# Patient Record
Sex: FEMALE | Race: WHITE | Employment: UNEMPLOYED | URBAN - METROPOLITAN AREA
[De-identification: names, ages, dates, MRNs, and addresses within clinical notes are randomized per-mention and may not be internally consistent; named-entity substitution may affect disease eponyms.]

---

## 2017-01-20 PROBLEM — R05.8 COUGH WITH SPUTUM: Status: ACTIVE | Noted: 2017-01-20

## 2017-07-12 PROBLEM — I83.90 VARICOSE VEIN OF LEG: Status: ACTIVE | Noted: 2017-07-12

## 2017-09-15 ENCOUNTER — APPOINTMENT (OUTPATIENT)
Dept: CT IMAGING | Age: 42
End: 2017-09-15
Payer: COMMERCIAL

## 2017-09-15 ENCOUNTER — HOSPITAL ENCOUNTER (EMERGENCY)
Age: 42
Discharge: HOME | End: 2017-09-16
Attending: EMERGENCY MEDICINE
Payer: COMMERCIAL

## 2017-09-15 DIAGNOSIS — N20.0 NEPHROLITHIASIS: Primary | ICD-10-CM

## 2017-09-15 LAB
-: ABNORMAL
ABSOLUTE EOS #: 0.4 K/UL (ref 0–0.4)
ABSOLUTE LYMPH #: 3.3 K/UL (ref 1–4.8)
ABSOLUTE MONO #: 0.3 K/UL (ref 0.1–1.3)
ALBUMIN SERPL-MCNC: 4.3 G/DL (ref 3.5–5.2)
ALBUMIN/GLOBULIN RATIO: ABNORMAL (ref 1–2.5)
ALP BLD-CCNC: 72 U/L (ref 35–104)
ALT SERPL-CCNC: 33 U/L (ref 5–33)
AMORPHOUS: ABNORMAL
ANION GAP SERPL CALCULATED.3IONS-SCNC: 14 MMOL/L (ref 9–17)
AST SERPL-CCNC: 19 U/L
BACTERIA: ABNORMAL
BASOPHILS # BLD: 1 %
BASOPHILS ABSOLUTE: 0.1 K/UL (ref 0–0.2)
BILIRUB SERPL-MCNC: <0.15 MG/DL (ref 0.3–1.2)
BILIRUBIN URINE: NEGATIVE
BUN BLDV-MCNC: 14 MG/DL (ref 6–20)
BUN/CREAT BLD: ABNORMAL (ref 9–20)
CALCIUM SERPL-MCNC: 9.2 MG/DL (ref 8.6–10.4)
CASTS UA: ABNORMAL /LPF
CHLORIDE BLD-SCNC: 104 MMOL/L (ref 98–107)
CO2: 21 MMOL/L (ref 20–31)
COLOR: YELLOW
COMMENT UA: ABNORMAL
CREAT SERPL-MCNC: 0.74 MG/DL (ref 0.5–0.9)
CRYSTALS, UA: ABNORMAL /HPF
DIFFERENTIAL TYPE: NORMAL
EOSINOPHILS RELATIVE PERCENT: 4 %
EPITHELIAL CELLS UA: ABNORMAL /HPF
GFR AFRICAN AMERICAN: >60 ML/MIN
GFR NON-AFRICAN AMERICAN: >60 ML/MIN
GFR SERPL CREATININE-BSD FRML MDRD: ABNORMAL ML/MIN/{1.73_M2}
GFR SERPL CREATININE-BSD FRML MDRD: ABNORMAL ML/MIN/{1.73_M2}
GLUCOSE BLD-MCNC: 100 MG/DL (ref 70–99)
GLUCOSE URINE: NEGATIVE
HCT VFR BLD CALC: 38.8 % (ref 36–46)
HEMOGLOBIN: 12.5 G/DL (ref 12–16)
KETONES, URINE: NEGATIVE
LEUKOCYTE ESTERASE, URINE: ABNORMAL
LYMPHOCYTES # BLD: 37 %
MCH RBC QN AUTO: 29.7 PG (ref 26–34)
MCHC RBC AUTO-ENTMCNC: 32.3 G/DL (ref 31–37)
MCV RBC AUTO: 91.9 FL (ref 80–100)
MONOCYTES # BLD: 4 %
MUCUS: ABNORMAL
NITRITE, URINE: NEGATIVE
OTHER OBSERVATIONS UA: ABNORMAL
PDW BLD-RTO: 14.2 % (ref 11.5–14.9)
PH UA: 5.5 (ref 5–8)
PLATELET # BLD: 352 K/UL (ref 150–450)
PLATELET ESTIMATE: NORMAL
PMV BLD AUTO: 9.3 FL (ref 6–12)
POTASSIUM SERPL-SCNC: 4.2 MMOL/L (ref 3.7–5.3)
PROTEIN UA: NEGATIVE
RBC # BLD: 4.22 M/UL (ref 4–5.2)
RBC # BLD: NORMAL 10*6/UL
RBC UA: ABNORMAL /HPF
RENAL EPITHELIAL, UA: ABNORMAL /HPF
SEG NEUTROPHILS: 54 %
SEGMENTED NEUTROPHILS ABSOLUTE COUNT: 4.9 K/UL (ref 1.3–9.1)
SODIUM BLD-SCNC: 139 MMOL/L (ref 135–144)
SPECIFIC GRAVITY UA: 1.02 (ref 1–1.03)
TOTAL PROTEIN: 7.6 G/DL (ref 6.4–8.3)
TRICHOMONAS: ABNORMAL
TURBIDITY: ABNORMAL
URINE HGB: ABNORMAL
UROBILINOGEN, URINE: NORMAL
WBC # BLD: 9 K/UL (ref 3.5–11)
WBC # BLD: NORMAL 10*3/UL
WBC UA: ABNORMAL /HPF
YEAST: ABNORMAL

## 2017-09-15 PROCEDURE — 87086 URINE CULTURE/COLONY COUNT: CPT

## 2017-09-15 PROCEDURE — 81001 URINALYSIS AUTO W/SCOPE: CPT

## 2017-09-15 PROCEDURE — 2580000003 HC RX 258: Performed by: EMERGENCY MEDICINE

## 2017-09-15 PROCEDURE — 96375 TX/PRO/DX INJ NEW DRUG ADDON: CPT

## 2017-09-15 PROCEDURE — 99284 EMERGENCY DEPT VISIT MOD MDM: CPT

## 2017-09-15 PROCEDURE — 36415 COLL VENOUS BLD VENIPUNCTURE: CPT

## 2017-09-15 PROCEDURE — 6360000002 HC RX W HCPCS: Performed by: EMERGENCY MEDICINE

## 2017-09-15 PROCEDURE — 96374 THER/PROPH/DIAG INJ IV PUSH: CPT

## 2017-09-15 PROCEDURE — 80053 COMPREHEN METABOLIC PANEL: CPT

## 2017-09-15 PROCEDURE — 74176 CT ABD & PELVIS W/O CONTRAST: CPT

## 2017-09-15 PROCEDURE — 85025 COMPLETE CBC W/AUTO DIFF WBC: CPT

## 2017-09-15 RX ORDER — MORPHINE SULFATE 15 MG/1
15 TABLET ORAL EVERY 4 HOURS PRN
Qty: 12 TABLET | Refills: 0 | Status: SHIPPED | OUTPATIENT
Start: 2017-09-15 | End: 2017-09-22

## 2017-09-15 RX ORDER — IBUPROFEN 200 MG
400 TABLET ORAL EVERY 6 HOURS PRN
Qty: 60 TABLET | Refills: 0 | Status: SHIPPED | OUTPATIENT
Start: 2017-09-15 | End: 2020-11-10

## 2017-09-15 RX ORDER — TAMSULOSIN HYDROCHLORIDE 0.4 MG/1
0.4 CAPSULE ORAL DAILY
Qty: 5 CAPSULE | Refills: 0 | Status: SHIPPED | OUTPATIENT
Start: 2017-09-15 | End: 2017-09-18 | Stop reason: SDUPTHER

## 2017-09-15 RX ORDER — KETOROLAC TROMETHAMINE 30 MG/ML
15 INJECTION, SOLUTION INTRAMUSCULAR; INTRAVENOUS ONCE
Status: COMPLETED | OUTPATIENT
Start: 2017-09-15 | End: 2017-09-15

## 2017-09-15 RX ORDER — MORPHINE SULFATE 2 MG/ML
4 INJECTION, SOLUTION INTRAMUSCULAR; INTRAVENOUS ONCE
Status: COMPLETED | OUTPATIENT
Start: 2017-09-15 | End: 2017-09-15

## 2017-09-15 RX ORDER — 0.9 % SODIUM CHLORIDE 0.9 %
1000 INTRAVENOUS SOLUTION INTRAVENOUS ONCE
Status: COMPLETED | OUTPATIENT
Start: 2017-09-15 | End: 2017-09-16

## 2017-09-15 RX ORDER — FENTANYL CITRATE 50 UG/ML
50 INJECTION, SOLUTION INTRAMUSCULAR; INTRAVENOUS ONCE
Status: DISCONTINUED | OUTPATIENT
Start: 2017-09-15 | End: 2017-09-16 | Stop reason: HOSPADM

## 2017-09-15 RX ORDER — ACETAMINOPHEN 500 MG
1000 TABLET ORAL EVERY 6 HOURS PRN
Qty: 60 TABLET | Refills: 0 | Status: SHIPPED | OUTPATIENT
Start: 2017-09-15 | End: 2020-11-10

## 2017-09-15 RX ADMIN — KETOROLAC TROMETHAMINE 15 MG: 30 INJECTION, SOLUTION INTRAMUSCULAR; INTRAVENOUS at 21:53

## 2017-09-15 RX ADMIN — MORPHINE SULFATE 4 MG: 2 INJECTION, SOLUTION INTRAMUSCULAR; INTRAVENOUS at 23:09

## 2017-09-15 RX ADMIN — SODIUM CHLORIDE 1000 ML: 9 INJECTION, SOLUTION INTRAVENOUS at 21:53

## 2017-09-15 ASSESSMENT — ENCOUNTER SYMPTOMS
NAUSEA: 1
SHORTNESS OF BREATH: 0
ABDOMINAL PAIN: 0
VOMITING: 0

## 2017-09-15 ASSESSMENT — PAIN SCALES - GENERAL
PAINLEVEL_OUTOF10: 7
PAINLEVEL_OUTOF10: 8

## 2017-09-15 ASSESSMENT — PAIN DESCRIPTION - LOCATION: LOCATION: BACK

## 2017-09-15 ASSESSMENT — PAIN DESCRIPTION - PAIN TYPE: TYPE: ACUTE PAIN

## 2017-09-15 ASSESSMENT — PAIN DESCRIPTION - ORIENTATION: ORIENTATION: LEFT

## 2017-09-16 VITALS
HEART RATE: 87 BPM | SYSTOLIC BLOOD PRESSURE: 105 MMHG | TEMPERATURE: 97.5 F | WEIGHT: 280 LBS | BODY MASS INDEX: 46.65 KG/M2 | RESPIRATION RATE: 18 BRPM | DIASTOLIC BLOOD PRESSURE: 60 MMHG | HEIGHT: 65 IN | OXYGEN SATURATION: 96 %

## 2017-09-16 LAB
CULTURE: NORMAL
CULTURE: NORMAL
Lab: NORMAL
SPECIMEN DESCRIPTION: NORMAL
SPECIMEN DESCRIPTION: NORMAL
STATUS: NORMAL

## 2017-09-16 ASSESSMENT — PAIN DESCRIPTION - LOCATION: LOCATION: ABDOMEN

## 2017-09-16 ASSESSMENT — PAIN SCALES - GENERAL: PAINLEVEL_OUTOF10: 3

## 2017-09-18 ENCOUNTER — TELEPHONE (OUTPATIENT)
Dept: UROLOGY | Age: 42
End: 2017-09-18

## 2017-09-18 ENCOUNTER — OFFICE VISIT (OUTPATIENT)
Dept: UROLOGY | Age: 42
End: 2017-09-18
Payer: COMMERCIAL

## 2017-09-18 VITALS
BODY MASS INDEX: 48.48 KG/M2 | HEART RATE: 78 BPM | TEMPERATURE: 98.8 F | HEIGHT: 65 IN | WEIGHT: 291 LBS | SYSTOLIC BLOOD PRESSURE: 104 MMHG | DIASTOLIC BLOOD PRESSURE: 67 MMHG

## 2017-09-18 DIAGNOSIS — N13.2 HYDRONEPHROSIS WITH URINARY OBSTRUCTION DUE TO RENAL CALCULUS: ICD-10-CM

## 2017-09-18 DIAGNOSIS — R10.9 FLANK PAIN: ICD-10-CM

## 2017-09-18 DIAGNOSIS — N20.1 URETERAL STONE: Primary | ICD-10-CM

## 2017-09-18 PROCEDURE — 99204 OFFICE O/P NEW MOD 45 MIN: CPT | Performed by: UROLOGY

## 2017-09-18 RX ORDER — TAMSULOSIN HYDROCHLORIDE 0.4 MG/1
0.4 CAPSULE ORAL DAILY
Qty: 30 CAPSULE | Refills: 0 | Status: SHIPPED | OUTPATIENT
Start: 2017-09-18 | End: 2017-09-22

## 2017-09-18 ASSESSMENT — ENCOUNTER SYMPTOMS
EYE PAIN: 0
WHEEZING: 0
NAUSEA: 1
COUGH: 0
EYE REDNESS: 0
BACK PAIN: 1
SHORTNESS OF BREATH: 0
VOMITING: 0
ABDOMINAL PAIN: 1
COLOR CHANGE: 0

## 2017-09-20 ENCOUNTER — APPOINTMENT (OUTPATIENT)
Dept: GENERAL RADIOLOGY | Age: 42
End: 2017-09-20
Attending: UROLOGY
Payer: COMMERCIAL

## 2017-09-20 ENCOUNTER — ANESTHESIA (OUTPATIENT)
Dept: OPERATING ROOM | Age: 42
End: 2017-09-20
Payer: COMMERCIAL

## 2017-09-20 ENCOUNTER — TELEPHONE (OUTPATIENT)
Dept: UROLOGY | Age: 42
End: 2017-09-20

## 2017-09-20 ENCOUNTER — ANESTHESIA EVENT (OUTPATIENT)
Dept: OPERATING ROOM | Age: 42
End: 2017-09-20
Payer: COMMERCIAL

## 2017-09-20 ENCOUNTER — HOSPITAL ENCOUNTER (OUTPATIENT)
Age: 42
Setting detail: OUTPATIENT SURGERY
Discharge: HOME | End: 2017-09-20
Attending: UROLOGY | Admitting: UROLOGY
Payer: COMMERCIAL

## 2017-09-20 VITALS
BODY MASS INDEX: 46.65 KG/M2 | RESPIRATION RATE: 16 BRPM | HEART RATE: 82 BPM | OXYGEN SATURATION: 94 % | TEMPERATURE: 97 F | WEIGHT: 280 LBS | HEIGHT: 65 IN | SYSTOLIC BLOOD PRESSURE: 127 MMHG | DIASTOLIC BLOOD PRESSURE: 77 MMHG

## 2017-09-20 VITALS — TEMPERATURE: 95.3 F | DIASTOLIC BLOOD PRESSURE: 69 MMHG | OXYGEN SATURATION: 99 % | SYSTOLIC BLOOD PRESSURE: 127 MMHG

## 2017-09-20 PROCEDURE — C1773 RET DEV, INSERTABLE: HCPCS | Performed by: UROLOGY

## 2017-09-20 PROCEDURE — 6370000000 HC RX 637 (ALT 250 FOR IP): Performed by: UROLOGY

## 2017-09-20 PROCEDURE — 3209999900 FLUORO FOR SURGICAL PROCEDURES

## 2017-09-20 PROCEDURE — C1769 GUIDE WIRE: HCPCS | Performed by: UROLOGY

## 2017-09-20 PROCEDURE — 7100000000 HC PACU RECOVERY - FIRST 15 MIN: Performed by: UROLOGY

## 2017-09-20 PROCEDURE — 3700000001 HC ADD 15 MINUTES (ANESTHESIA): Performed by: UROLOGY

## 2017-09-20 PROCEDURE — 6360000002 HC RX W HCPCS: Performed by: ANESTHESIOLOGY

## 2017-09-20 PROCEDURE — 7100000001 HC PACU RECOVERY - ADDTL 15 MIN: Performed by: UROLOGY

## 2017-09-20 PROCEDURE — C1758 CATHETER, URETERAL: HCPCS | Performed by: UROLOGY

## 2017-09-20 PROCEDURE — 2580000003 HC RX 258: Performed by: ANESTHESIOLOGY

## 2017-09-20 PROCEDURE — 2500000003 HC RX 250 WO HCPCS: Performed by: ANESTHESIOLOGY

## 2017-09-20 PROCEDURE — 74000 XR ABDOMEN LIMITED (KUB): CPT

## 2017-09-20 PROCEDURE — 7100000030 HC ASPR PHASE II RECOVERY - FIRST 15 MIN: Performed by: UROLOGY

## 2017-09-20 PROCEDURE — 82365 CALCULUS SPECTROSCOPY: CPT

## 2017-09-20 PROCEDURE — 3600000002 HC SURGERY LEVEL 2 BASE: Performed by: UROLOGY

## 2017-09-20 PROCEDURE — C2617 STENT, NON-COR, TEM W/O DEL: HCPCS | Performed by: UROLOGY

## 2017-09-20 PROCEDURE — 6370000000 HC RX 637 (ALT 250 FOR IP): Performed by: ANESTHESIOLOGY

## 2017-09-20 PROCEDURE — 7100000031 HC ASPR PHASE II RECOVERY - ADDTL 15 MIN: Performed by: UROLOGY

## 2017-09-20 PROCEDURE — 3700000000 HC ANESTHESIA ATTENDED CARE: Performed by: UROLOGY

## 2017-09-20 PROCEDURE — 3600000012 HC SURGERY LEVEL 2 ADDTL 15MIN: Performed by: UROLOGY

## 2017-09-20 PROCEDURE — 6360000002 HC RX W HCPCS: Performed by: UROLOGY

## 2017-09-20 DEVICE — URETERAL STENT
Type: IMPLANTABLE DEVICE | Status: FUNCTIONAL
Brand: POLARIS™ ULTRA

## 2017-09-20 RX ORDER — FENTANYL CITRATE 50 UG/ML
25 INJECTION, SOLUTION INTRAMUSCULAR; INTRAVENOUS EVERY 5 MIN PRN
Status: DISCONTINUED | OUTPATIENT
Start: 2017-09-20 | End: 2017-09-20 | Stop reason: HOSPADM

## 2017-09-20 RX ORDER — DIPHENHYDRAMINE HYDROCHLORIDE 50 MG/ML
12.5 INJECTION INTRAMUSCULAR; INTRAVENOUS
Status: DISCONTINUED | OUTPATIENT
Start: 2017-09-20 | End: 2017-09-20 | Stop reason: HOSPADM

## 2017-09-20 RX ORDER — OXYCODONE HYDROCHLORIDE AND ACETAMINOPHEN 5; 325 MG/1; MG/1
1 TABLET ORAL EVERY 6 HOURS PRN
Qty: 30 TABLET | Refills: 0 | Status: SHIPPED | OUTPATIENT
Start: 2017-09-20 | End: 2017-09-22

## 2017-09-20 RX ORDER — ONDANSETRON 2 MG/ML
INJECTION INTRAMUSCULAR; INTRAVENOUS PRN
Status: DISCONTINUED | OUTPATIENT
Start: 2017-09-20 | End: 2017-09-20 | Stop reason: SDUPTHER

## 2017-09-20 RX ORDER — MEPERIDINE HYDROCHLORIDE 25 MG/ML
12.5 INJECTION INTRAMUSCULAR; INTRAVENOUS; SUBCUTANEOUS EVERY 5 MIN PRN
Status: DISCONTINUED | OUTPATIENT
Start: 2017-09-20 | End: 2017-09-20 | Stop reason: HOSPADM

## 2017-09-20 RX ORDER — PROMETHAZINE HYDROCHLORIDE 25 MG/ML
6.25 INJECTION, SOLUTION INTRAMUSCULAR; INTRAVENOUS
Status: COMPLETED | OUTPATIENT
Start: 2017-09-20 | End: 2017-09-20

## 2017-09-20 RX ORDER — SCOLOPAMINE TRANSDERMAL SYSTEM 1 MG/1
1 PATCH, EXTENDED RELEASE TRANSDERMAL
Status: DISCONTINUED | OUTPATIENT
Start: 2017-09-20 | End: 2017-09-20 | Stop reason: HOSPADM

## 2017-09-20 RX ORDER — OXYBUTYNIN CHLORIDE 5 MG/1
5 TABLET, EXTENDED RELEASE ORAL DAILY
Qty: 90 TABLET | Refills: 3 | Status: SHIPPED | OUTPATIENT
Start: 2017-09-20 | End: 2017-11-22 | Stop reason: SDUPTHER

## 2017-09-20 RX ORDER — SODIUM CHLORIDE, SODIUM LACTATE, POTASSIUM CHLORIDE, CALCIUM CHLORIDE 600; 310; 30; 20 MG/100ML; MG/100ML; MG/100ML; MG/100ML
INJECTION, SOLUTION INTRAVENOUS CONTINUOUS
Status: DISCONTINUED | OUTPATIENT
Start: 2017-09-20 | End: 2017-09-20 | Stop reason: HOSPADM

## 2017-09-20 RX ORDER — DEXAMETHASONE SODIUM PHOSPHATE 4 MG/ML
INJECTION, SOLUTION INTRA-ARTICULAR; INTRALESIONAL; INTRAMUSCULAR; INTRAVENOUS; SOFT TISSUE PRN
Status: DISCONTINUED | OUTPATIENT
Start: 2017-09-20 | End: 2017-09-20 | Stop reason: SDUPTHER

## 2017-09-20 RX ORDER — LIDOCAINE HYDROCHLORIDE 10 MG/ML
INJECTION, SOLUTION INFILTRATION; PERINEURAL PRN
Status: DISCONTINUED | OUTPATIENT
Start: 2017-09-20 | End: 2017-09-20 | Stop reason: SDUPTHER

## 2017-09-20 RX ORDER — PROPOFOL 10 MG/ML
INJECTION, EMULSION INTRAVENOUS PRN
Status: DISCONTINUED | OUTPATIENT
Start: 2017-09-20 | End: 2017-09-20 | Stop reason: SDUPTHER

## 2017-09-20 RX ORDER — MORPHINE SULFATE 2 MG/ML
1 INJECTION, SOLUTION INTRAMUSCULAR; INTRAVENOUS EVERY 5 MIN PRN
Status: DISCONTINUED | OUTPATIENT
Start: 2017-09-20 | End: 2017-09-20 | Stop reason: HOSPADM

## 2017-09-20 RX ORDER — FENTANYL CITRATE 50 UG/ML
INJECTION, SOLUTION INTRAMUSCULAR; INTRAVENOUS PRN
Status: DISCONTINUED | OUTPATIENT
Start: 2017-09-20 | End: 2017-09-20 | Stop reason: SDUPTHER

## 2017-09-20 RX ORDER — TAMSULOSIN HYDROCHLORIDE 0.4 MG/1
0.4 CAPSULE ORAL DAILY
Qty: 90 CAPSULE | Refills: 3 | Status: SHIPPED | OUTPATIENT
Start: 2017-09-20 | End: 2017-11-22 | Stop reason: ALTCHOICE

## 2017-09-20 RX ORDER — SODIUM CHLORIDE, SODIUM LACTATE, POTASSIUM CHLORIDE, CALCIUM CHLORIDE 600; 310; 30; 20 MG/100ML; MG/100ML; MG/100ML; MG/100ML
INJECTION, SOLUTION INTRAVENOUS CONTINUOUS PRN
Status: DISCONTINUED | OUTPATIENT
Start: 2017-09-20 | End: 2017-09-20 | Stop reason: SDUPTHER

## 2017-09-20 RX ADMIN — MORPHINE SULFATE 1 MG: 2 INJECTION, SOLUTION INTRAMUSCULAR; INTRAVENOUS at 15:59

## 2017-09-20 RX ADMIN — Medication 3 G: at 17:23

## 2017-09-20 RX ADMIN — PROPOFOL 200 MG: 10 INJECTION, EMULSION INTRAVENOUS at 17:27

## 2017-09-20 RX ADMIN — SODIUM CHLORIDE, POTASSIUM CHLORIDE, SODIUM LACTATE AND CALCIUM CHLORIDE: 600; 310; 30; 20 INJECTION, SOLUTION INTRAVENOUS at 18:43

## 2017-09-20 RX ADMIN — FENTANYL CITRATE 50 MCG: 50 INJECTION, SOLUTION INTRAMUSCULAR; INTRAVENOUS at 17:27

## 2017-09-20 RX ADMIN — ONDANSETRON 4 MG: 2 INJECTION INTRAMUSCULAR; INTRAVENOUS at 17:45

## 2017-09-20 RX ADMIN — HYDROMORPHONE HYDROCHLORIDE 0.5 MG: 1 INJECTION, SOLUTION INTRAMUSCULAR; INTRAVENOUS; SUBCUTANEOUS at 18:19

## 2017-09-20 RX ADMIN — FENTANYL CITRATE 50 MCG: 50 INJECTION, SOLUTION INTRAMUSCULAR; INTRAVENOUS at 17:35

## 2017-09-20 RX ADMIN — SODIUM CHLORIDE, POTASSIUM CHLORIDE, SODIUM LACTATE AND CALCIUM CHLORIDE: 600; 310; 30; 20 INJECTION, SOLUTION INTRAVENOUS at 14:42

## 2017-09-20 RX ADMIN — DEXAMETHASONE SODIUM PHOSPHATE 4 MG: 4 INJECTION, SOLUTION INTRAMUSCULAR; INTRAVENOUS at 17:44

## 2017-09-20 RX ADMIN — FENTANYL CITRATE 50 MCG: 50 INJECTION, SOLUTION INTRAMUSCULAR; INTRAVENOUS at 17:52

## 2017-09-20 RX ADMIN — SODIUM CHLORIDE, POTASSIUM CHLORIDE, SODIUM LACTATE AND CALCIUM CHLORIDE: 600; 310; 30; 20 INJECTION, SOLUTION INTRAVENOUS at 17:23

## 2017-09-20 RX ADMIN — FENTANYL CITRATE 50 MCG: 50 INJECTION, SOLUTION INTRAMUSCULAR; INTRAVENOUS at 17:45

## 2017-09-20 RX ADMIN — LIDOCAINE HYDROCHLORIDE 5 ML: 10 INJECTION, SOLUTION INFILTRATION; PERINEURAL at 17:27

## 2017-09-20 RX ADMIN — MORPHINE SULFATE 1 MG: 2 INJECTION, SOLUTION INTRAMUSCULAR; INTRAVENOUS at 15:46

## 2017-09-20 RX ADMIN — PROMETHAZINE HYDROCHLORIDE 6.25 MG: 25 INJECTION INTRAMUSCULAR; INTRAVENOUS at 18:22

## 2017-09-20 ASSESSMENT — PAIN - FUNCTIONAL ASSESSMENT: PAIN_FUNCTIONAL_ASSESSMENT: 0-10

## 2017-09-20 ASSESSMENT — PAIN DESCRIPTION - DESCRIPTORS
DESCRIPTORS: BURNING;SHARP
DESCRIPTORS: SHARP
DESCRIPTORS: SHARP;ACHING

## 2017-09-20 ASSESSMENT — PAIN SCALES - GENERAL
PAINLEVEL_OUTOF10: 6
PAINLEVEL_OUTOF10: 7
PAINLEVEL_OUTOF10: 6
PAINLEVEL_OUTOF10: 5
PAINLEVEL_OUTOF10: 6

## 2017-09-20 ASSESSMENT — PAIN DESCRIPTION - PAIN TYPE
TYPE: SURGICAL PAIN

## 2017-09-20 ASSESSMENT — PAIN DESCRIPTION - LOCATION
LOCATION: FLANK;ABDOMEN
LOCATION: ABDOMEN
LOCATION: ABDOMEN;FLANK

## 2017-09-20 ASSESSMENT — PAIN DESCRIPTION - ORIENTATION
ORIENTATION: LEFT

## 2017-09-20 NOTE — IP AVS SNAPSHOT
Patient Information     Patient Name MANJINDER Sanchez 1975         This is your updated medication list to keep with you all times      TAKE these medications     acetaminophen 500 MG tablet   Commonly known as:  APAP EXTRA STRENGTH   Take 2 tablets by mouth every 6 hours as needed for Pain       acetaminophen-codeine 300-60 MG per tablet   Commonly known as:  TYLENOL #4   take 1 tablet by mouth twice a day       albuterol sulfate  (90 Base) MCG/ACT inhaler   Commonly known as:  PROVENTIL HFA   Inhale 2 puffs into the lungs every 4 hours as needed for Wheezing       ALPRAZolam 1 MG tablet   Commonly known as:  XANAX       amLODIPine 5 MG tablet   Commonly known as:  NORVASC   take 1 tablet by mouth once daily       betamethasone dipropionate 0.05 % ointment   Commonly known as:  DIPROLENE   Apply topically daily. budesonide-formoterol 160-4.5 MCG/ACT Aero   Commonly known as:  SYMBICORT   Inhale 2 puffs into the lungs 2 times daily       butalbital-acetaminophen-caffeine -40 MG per tablet   Commonly known as:  FIORICET, ESGIC   Take 1 tablet by mouth every 6 hours as needed for Migraine       clotrimazole-betamethasone 1-0.05 % cream   Commonly known as:  LOTRISONE   Apply topically 2 times daily.        DENAVIR 1 % cream   Generic drug:  penciclovir   APPLY TO AFFECTED AREA TWICE DAILY IF NEEDED       DULoxetine 60 MG extended release capsule   Commonly known as:  CYMBALTA   take 1 capsule by mouth twice a day       erythromycin with ethanol 2 % gel   Commonly known as:  EMGEL       fenofibrate 160 MG tablet   take 1 tablet by mouth once daily with food       fluconazole 100 MG tablet   Commonly known as:  DIFLUCAN   take 1 tablet by mouth once daily       * ibuprofen 800 MG tablet   Commonly known as:  ADVIL;MOTRIN   take 1 tablet by mouth every 8 hours if needed for pain       * ibuprofen 200 MG tablet   Commonly known as:  ADVIL Take 2 tablets by mouth every 6 hours as needed for Pain       ketoprofen 75 MG capsule   Commonly known as:  ORUDIS   Take 1 capsule by mouth 2 times daily       lamoTRIgine 100 MG tablet   Commonly known as:  LAMICTAL       metoprolol tartrate 25 MG tablet   Commonly known as:  LOPRESSOR       morphine 15 MG tablet   Commonly known as:  MSIR   Take 1 tablet by mouth every 4 hours as needed for Pain .        naproxen 500 MG tablet   Commonly known as:  NAPROSYN       nicotine 21 MG/24HR   Commonly known as:  NICODERM CQ   place 1 patch ONTO THE SKIN every 24 hours       nitrofurantoin (macrocrystal-monohydrate) 100 MG capsule   Commonly known as:  MACROBID   Take 1 capsule by mouth 2 times daily for 7 days       nortriptyline 50 MG capsule   Commonly known as:  PAMELOR       ONE TOUCH ULTRA TEST strip   Generic drug:  glucose blood VI test strips   TEST BLOOD SUGARS 2 TO 3 TIMES A DAY       ONETOUCH DELICA LANCETS FINE Misc   TEST BLOOD SUGARS 2 TO 3 TIMES A DAY       oxybutynin 5 MG extended release tablet   Commonly known as:  DITROPAN-XL   Take 1 tablet by mouth daily for 5 days       oxyCODONE-acetaminophen 5-325 MG per tablet   Commonly known as:  PERCOCET   Take 1 tablet by mouth every 6 hours as needed for Pain .       pantoprazole 20 MG tablet   Commonly known as:  PROTONIX   take 1 tablet by mouth once daily       promethazine 25 MG tablet   Commonly known as:  PHENERGAN   take 1 tablet by mouth twice a day if needed for nausea       promethazine-codeine 6.25-10 MG/5ML syrup   Commonly known as:  PHENERGAN with CODEINE   take 5 milliliters (1 teaspoonful) by mouth every 4 hours if needed for cough       RA ALLERGY RELIEF 180 MG tablet   Generic drug:  fexofenadine   take 1 tablet by mouth once daily       RA VITAMIN D-3 2000 units Caps   Generic drug:  Cholecalciferol   take 1 capsule by mouth once daily       ranitidine 300 MG tablet   Commonly known as:  ZANTAC   take 1 tablet by mouth twice a day

## 2017-09-20 NOTE — IP AVS SNAPSHOT
After Visit Summary  (Discharge Instructions)    Medication List for Home    Based on the information you provided to us as well as any changes during this visit, the following is your updated medication list.  Compare this with your prescription bottles at home. If you have any questions or concerns, contact your primary care physician's office. Daily Medication List (This medication list can be shared with any healthcare provider who is helping you manage your medications)      There are NEW medications for you. START taking them after you leave the hospital        Last Dose    Next Dose Due AM NOON PM NIGHT    oxybutynin 5 MG extended release tablet   Commonly known as:  DITROPAN-XL   Take 1 tablet by mouth daily for 5 days                                         oxyCODONE-acetaminophen 5-325 MG per tablet   Commonly known as:  PERCOCET   Take 1 tablet by mouth every 6 hours as needed for Pain . You told us you were taking these medications at home, but the amount or how often you take this medication has CHANGED        Last Dose    Next Dose Due AM NOON PM NIGHT    tamsulosin 0.4 MG capsule   Commonly known as:  FLOMAX   Take 1 capsule by mouth daily for 5 doses   What changed:  Another medication with the same name was added. Make sure you understand how and when to take each. tamsulosin 0.4 MG capsule   Commonly known as:  FLOMAX   Take 1 capsule by mouth daily for 14 days   What changed: You were already taking a medication with the same name, and this prescription was added. Make sure you understand how and when to take each.                                            These are medications you told us you were taking at home, CONTINUE taking them after you leave the hospital        Last Dose    Next Dose Due AM NOON PM NIGHT    acetaminophen 500 MG tablet   Commonly known as:  APAP EXTRA STRENGTH No driving while on narcotics  Please call attending physician with questions. Call or Present to ED if fever (> 101F), intractable nausea vomiting or pain. Rx in chart    Pt should follow up with Dr. Ether Goldmann, in 4 weeks, call to confirm appointment    Pt should Pull stent in 5 days. There may be some pain associated with the stent removal, which is usually self limiting. We suggest using the pain medication prescribed for you and a nonsteroidal anti-inflammatory such as Ibuprofen, if you are able to take this medication, to control symptoms. Take Ibuprofen as directed for 24 hrs after stent pull. Please stay hydrated. Please call with questions. DISCHARGE INSTRUCTIONS FOR GENERAL ANESTHESIA    In order to continue your care at home, please follow the instructions below. Anesthesia  Do not drink any alcoholic beverages or make any legal or important decisions for 24 hours. If your surgery was on an extremity or abdomen, do not drive or operate any machinery until your surgeon approves, otherwise do not drive or operate any machinery for 24 hours or as restricted by your surgeon. Pain Medications  Please do not drive, operate machinery, or drink alcoholic beverages while taking any prescribed pain medication. Diet -  Start out eating lightly (broth, soup, crackers, toast, etc.) advancing as tolerated to your usual diet. Try to avoid spicy and greasy/fatty foods for 24 hours. Drink plenty of fluids after surgery, unless you are on a fluid restriction. Avoid milk/milk product for several hours. Call your surgeon for the following:  ? You have pain that does not get better after you take pain medicine. ? For an oral temperature (by mouth) is 101 degrees or higher, chills, or excessive sweating. ? You have increasing and progressive bleeding or drainage from surgery site. ?  Signs of an infection:  increased swelling, redness, warmth, or hardness

## 2017-09-20 NOTE — OP NOTE
FACILITY:  [address] [place] [address]  1975  [id number]    DATE: 09/20/17  SURGEON: Dr. Diego Amezcua M.D, MD  PREOPERATIVE DIAGNOSIS: Left sided kidney stone  POSTOPERATIVE DIAGNOSIS: Left sided kidney stone  PROCEDURES PERFORMED:  1. Cystoscopy. 2. Left sided ureteroscopy with holmium laser lithotripsy and stone basketing  3. Left sided stent placement. 4. Lidocaine 2% jelly per the urethra for post op pain control  DRAINS: A Left sided 6x26 double J ureteral stent ( with string)  SPECIMEN: stone  ANESTHESIA: General  ESTIMATED BLOOD LOSS: None.   COMPLICATIONS: None.     INDICATIONS FOR PROCEDURE:  Tray Shelley is a 36 y.o. female presents for Left sided calculus. After the risks, benefits, alternatives, of the procedure were discussed with the patient, informed consent was obtained. The patient elected to proceed.     DETAILS OF THE PROCEDURE:  The patient was brought back from the preoperative holding area to the  operating suite, and was transferred to the operating table where the patient lay in  supine position. EPC's were in place, connected to the machine and the machine was turned on before induction. General endotracheal anesthesia was induced, and patient was prepped and draped in standard surgical fashion after being placed in dorsolithotomy position. A proper timeout was performed, preoperative antibiotics were given. We began by inserting a cystoscope with a 22 Tajik sheath and 30 degree lens into the patient's urethral meatus and advancing into the bladder without complication. A pan cystoscopy was preformed and the bladder appeared unremarkable. We then focused our attention on the Left ureteral orifice, which we cannulated with our glidewire, advanced up to renal pelvis. We then used a  dual lumen catheter to place a second wire.   Once in good position, we advanced our rigid ureteroscope over the working wire to the distal ureter

## 2017-09-20 NOTE — H&P
drained by Dr Popeye Krishna 2-3x, most recently > 5 yrs.      (Patient's old records have been requested, reviewed and summarized in today's note.)     Summary of old records: N/A     Additional History: N/A     Procedures Today: N/A     Urinalysis today:  No results found for this visit on 09/18/17.     AUA Symptom Score (9/18/2017): INCOMPLETE EMPTYING: How often have you had the sensation of not emptying your bladder?: More than Half the time  FREQUENCY: How often do you have to urinate less than every two hours?: More than Half the time  INTERMITTENCY: How often have you found you stopped and started again several times when you urinated?: More than Half the time  URGENCY: How often have you found it difficult to postpone urination?: Not at all  WEAK STREAM: How often have you had a weak urinary stream?: Not at all  STRAINING: How often have you had to strain to start  urination?: About Half the time  NOCTURIA: How many times did you typically get up at night to uriniate?: 5 Times  TOTAL I-PSS SCORE[de-identified] 20  How would you feel if you were to spend the rest of your life with your urinary condition?: Terrible     Last BUN and creatinine:        Lab Results   Component Value Date     BUN 14 09/15/2017            Lab Results   Component Value Date     CREATININE 0.74 09/15/2017         Additional Lab/Culture results: none     Imaging Reviewed during this Office Visit:       HISTORY:   ORDERING SYSTEM PROVIDED HISTORY: Left flank pain.  urinalysis suggestive of   nephrolithiasis   TECHNOLOGIST PROVIDED HISTORY:   Additional Contrast?->None   Ordering Physician Provided Reason for Exam: PATIENT C/O LEFT LOWER ABD PAIN   SINCE THIS AFTERNOON   Type of Exam: Initial        FINDINGS:   Lower Chest: Minor atelectatic changes.  In the left lung base, there is a 6   mm subpleural nodule, possibly focal subsegmental atelectasis.  Other   etiologies are not excluded.        Organs: Evidence of prior cholecystectomy.  Otherwise, the

## 2017-09-21 ENCOUNTER — TELEPHONE (OUTPATIENT)
Dept: UROLOGY | Age: 42
End: 2017-09-21

## 2017-09-22 ENCOUNTER — HOSPITAL ENCOUNTER (EMERGENCY)
Age: 42
Discharge: HOME | End: 2017-09-22
Attending: EMERGENCY MEDICINE
Payer: COMMERCIAL

## 2017-09-22 ENCOUNTER — APPOINTMENT (OUTPATIENT)
Dept: GENERAL RADIOLOGY | Age: 42
End: 2017-09-22
Payer: COMMERCIAL

## 2017-09-22 VITALS
BODY MASS INDEX: 46.65 KG/M2 | DIASTOLIC BLOOD PRESSURE: 47 MMHG | TEMPERATURE: 97.6 F | RESPIRATION RATE: 16 BRPM | WEIGHT: 280 LBS | HEART RATE: 76 BPM | OXYGEN SATURATION: 98 % | HEIGHT: 65 IN | SYSTOLIC BLOOD PRESSURE: 101 MMHG

## 2017-09-22 DIAGNOSIS — R10.9 FLANK PAIN, ACUTE: Primary | ICD-10-CM

## 2017-09-22 DIAGNOSIS — R31.9 HEMATURIA: ICD-10-CM

## 2017-09-22 LAB
-: ABNORMAL
ABSOLUTE EOS #: 0.3 K/UL (ref 0–0.4)
ABSOLUTE LYMPH #: 3.2 K/UL (ref 1–4.8)
ABSOLUTE MONO #: 0.6 K/UL (ref 0.1–1.3)
AMORPHOUS: ABNORMAL
ANION GAP SERPL CALCULATED.3IONS-SCNC: 11 MMOL/L (ref 9–17)
BACTERIA: ABNORMAL
BASOPHILS # BLD: 1 %
BASOPHILS ABSOLUTE: 0.1 K/UL (ref 0–0.2)
BILIRUBIN URINE: ABNORMAL
BUN BLDV-MCNC: 13 MG/DL (ref 6–20)
BUN/CREAT BLD: ABNORMAL (ref 9–20)
CALCIUM SERPL-MCNC: 8.7 MG/DL (ref 8.6–10.4)
CASTS UA: ABNORMAL /LPF
CHLORIDE BLD-SCNC: 104 MMOL/L (ref 98–107)
CO2: 26 MMOL/L (ref 20–31)
COLOR: ABNORMAL
COMMENT UA: ABNORMAL
CREAT SERPL-MCNC: 0.63 MG/DL (ref 0.5–0.9)
CRYSTALS, UA: ABNORMAL /HPF
DIFFERENTIAL TYPE: NORMAL
EOSINOPHILS RELATIVE PERCENT: 3 %
EPITHELIAL CELLS UA: ABNORMAL /HPF
GFR AFRICAN AMERICAN: >60 ML/MIN
GFR NON-AFRICAN AMERICAN: >60 ML/MIN
GFR SERPL CREATININE-BSD FRML MDRD: ABNORMAL ML/MIN/{1.73_M2}
GFR SERPL CREATININE-BSD FRML MDRD: ABNORMAL ML/MIN/{1.73_M2}
GLUCOSE BLD-MCNC: 83 MG/DL (ref 70–99)
GLUCOSE URINE: ABNORMAL
HCT VFR BLD CALC: 38 % (ref 36–46)
HEMOGLOBIN: 12.4 G/DL (ref 12–16)
KETONES, URINE: ABNORMAL
LEUKOCYTE ESTERASE, URINE: ABNORMAL
LYMPHOCYTES # BLD: 30 %
MCH RBC QN AUTO: 30.3 PG (ref 26–34)
MCHC RBC AUTO-ENTMCNC: 32.8 G/DL (ref 31–37)
MCV RBC AUTO: 92.5 FL (ref 80–100)
MONOCYTES # BLD: 5 %
MUCUS: ABNORMAL
NITRITE, URINE: POSITIVE
OTHER OBSERVATIONS UA: ABNORMAL
PDW BLD-RTO: 14.5 % (ref 11.5–14.9)
PH UA: 6 (ref 5–8)
PLATELET # BLD: 289 K/UL (ref 150–450)
PLATELET ESTIMATE: NORMAL
PMV BLD AUTO: 9.8 FL (ref 6–12)
POTASSIUM SERPL-SCNC: 3.6 MMOL/L (ref 3.7–5.3)
PROTEIN UA: ABNORMAL
RBC # BLD: 4.1 M/UL (ref 4–5.2)
RBC # BLD: NORMAL 10*6/UL
RBC UA: ABNORMAL /HPF
RENAL EPITHELIAL, UA: ABNORMAL /HPF
SEG NEUTROPHILS: 61 %
SEGMENTED NEUTROPHILS ABSOLUTE COUNT: 6.4 K/UL (ref 1.3–9.1)
SODIUM BLD-SCNC: 141 MMOL/L (ref 135–144)
SPECIFIC GRAVITY UA: 1.02 (ref 1–1.03)
TRICHOMONAS: ABNORMAL
TURBIDITY: ABNORMAL
URINE HGB: ABNORMAL
UROBILINOGEN, URINE: ABNORMAL
WBC # BLD: 10.5 K/UL (ref 3.5–11)
WBC # BLD: NORMAL 10*3/UL
WBC UA: ABNORMAL /HPF
YEAST: ABNORMAL

## 2017-09-22 PROCEDURE — 6360000002 HC RX W HCPCS: Performed by: EMERGENCY MEDICINE

## 2017-09-22 PROCEDURE — 87086 URINE CULTURE/COLONY COUNT: CPT

## 2017-09-22 PROCEDURE — 80048 BASIC METABOLIC PNL TOTAL CA: CPT

## 2017-09-22 PROCEDURE — 36415 COLL VENOUS BLD VENIPUNCTURE: CPT

## 2017-09-22 PROCEDURE — 85025 COMPLETE CBC W/AUTO DIFF WBC: CPT

## 2017-09-22 PROCEDURE — 2580000003 HC RX 258: Performed by: EMERGENCY MEDICINE

## 2017-09-22 PROCEDURE — 74000 XR ABDOMEN LIMITED (KUB): CPT

## 2017-09-22 PROCEDURE — 99284 EMERGENCY DEPT VISIT MOD MDM: CPT

## 2017-09-22 PROCEDURE — 81001 URINALYSIS AUTO W/SCOPE: CPT

## 2017-09-22 PROCEDURE — 96374 THER/PROPH/DIAG INJ IV PUSH: CPT

## 2017-09-22 PROCEDURE — 96375 TX/PRO/DX INJ NEW DRUG ADDON: CPT

## 2017-09-22 RX ORDER — KETOROLAC TROMETHAMINE 30 MG/ML
30 INJECTION, SOLUTION INTRAMUSCULAR; INTRAVENOUS ONCE
Status: COMPLETED | OUTPATIENT
Start: 2017-09-22 | End: 2017-09-22

## 2017-09-22 RX ORDER — 0.9 % SODIUM CHLORIDE 0.9 %
1000 INTRAVENOUS SOLUTION INTRAVENOUS ONCE
Status: COMPLETED | OUTPATIENT
Start: 2017-09-22 | End: 2017-09-22

## 2017-09-22 RX ORDER — ONDANSETRON 2 MG/ML
4 INJECTION INTRAMUSCULAR; INTRAVENOUS ONCE
Status: COMPLETED | OUTPATIENT
Start: 2017-09-22 | End: 2017-09-22

## 2017-09-22 RX ORDER — TRAMADOL HYDROCHLORIDE 50 MG/1
50 TABLET ORAL EVERY 8 HOURS PRN
Qty: 10 TABLET | Refills: 0 | Status: SHIPPED | OUTPATIENT
Start: 2017-09-22 | End: 2017-10-02

## 2017-09-22 RX ADMIN — KETOROLAC TROMETHAMINE 30 MG: 30 INJECTION, SOLUTION INTRAMUSCULAR at 11:43

## 2017-09-22 RX ADMIN — ONDANSETRON 4 MG: 2 INJECTION INTRAMUSCULAR; INTRAVENOUS at 11:43

## 2017-09-22 RX ADMIN — SODIUM CHLORIDE 1000 ML: 9 INJECTION, SOLUTION INTRAVENOUS at 11:42

## 2017-09-22 ASSESSMENT — PAIN DESCRIPTION - LOCATION: LOCATION: FLANK

## 2017-09-22 ASSESSMENT — ENCOUNTER SYMPTOMS
NAUSEA: 1
RHINORRHEA: 0
SHORTNESS OF BREATH: 0
EYE REDNESS: 0
VOMITING: 0
BACK PAIN: 0
EYE PAIN: 0
COUGH: 0
ABDOMINAL PAIN: 1

## 2017-09-22 ASSESSMENT — PAIN SCALES - GENERAL
PAINLEVEL_OUTOF10: 4
PAINLEVEL_OUTOF10: 9
PAINLEVEL_OUTOF10: 9

## 2017-09-22 ASSESSMENT — PAIN DESCRIPTION - ORIENTATION: ORIENTATION: LEFT

## 2017-09-23 LAB
CULTURE: NO GROWTH
CULTURE: NORMAL
Lab: NORMAL
SPECIMEN DESCRIPTION: NORMAL
SPECIMEN DESCRIPTION: NORMAL
STATUS: NORMAL

## 2017-09-24 RX ORDER — OXYBUTYNIN CHLORIDE 10 MG/1
10 TABLET, EXTENDED RELEASE ORAL 2 TIMES DAILY PRN
Qty: 20 TABLET | Refills: 0 | Status: SHIPPED | OUTPATIENT
Start: 2017-09-24 | End: 2017-11-22 | Stop reason: ALTCHOICE

## 2017-09-25 PROBLEM — M79.89 LEG SWELLING: Status: ACTIVE | Noted: 2017-09-25

## 2017-09-25 LAB
STONE COMPOSITION: NORMAL
STONE DESCRIPTION: NORMAL
STONE MASS: 25 MG
STONE NUMBER: 2
STONE SIZE: NORMAL MM

## 2017-10-25 ENCOUNTER — HOSPITAL ENCOUNTER (OUTPATIENT)
Dept: GENERAL RADIOLOGY | Age: 42
Discharge: HOME | End: 2017-10-25
Payer: COMMERCIAL

## 2017-10-25 ENCOUNTER — HOSPITAL ENCOUNTER (OUTPATIENT)
Age: 42
Discharge: HOME | End: 2017-10-25
Payer: COMMERCIAL

## 2017-10-25 DIAGNOSIS — R05.9 COUGH: ICD-10-CM

## 2017-10-25 PROCEDURE — 71020 XR CHEST STANDARD TWO VW: CPT

## 2018-07-23 PROBLEM — B02.9 HERPES ZOSTER WITHOUT COMPLICATION: Status: ACTIVE | Noted: 2018-07-23

## 2018-10-09 ENCOUNTER — HOSPITAL ENCOUNTER (EMERGENCY)
Age: 43
Discharge: HOME | End: 2018-10-09
Attending: EMERGENCY MEDICINE
Payer: COMMERCIAL

## 2018-10-09 ENCOUNTER — APPOINTMENT (OUTPATIENT)
Dept: GENERAL RADIOLOGY | Age: 43
End: 2018-10-09
Payer: COMMERCIAL

## 2018-10-09 ENCOUNTER — APPOINTMENT (OUTPATIENT)
Dept: CT IMAGING | Age: 43
End: 2018-10-09
Payer: COMMERCIAL

## 2018-10-09 VITALS
TEMPERATURE: 97.9 F | OXYGEN SATURATION: 92 % | DIASTOLIC BLOOD PRESSURE: 64 MMHG | HEIGHT: 65 IN | BODY MASS INDEX: 48.82 KG/M2 | SYSTOLIC BLOOD PRESSURE: 114 MMHG | WEIGHT: 293 LBS | HEART RATE: 82 BPM | RESPIRATION RATE: 21 BRPM

## 2018-10-09 DIAGNOSIS — R07.9 CHEST PAIN, UNSPECIFIED TYPE: Primary | ICD-10-CM

## 2018-10-09 LAB
ABSOLUTE EOS #: 0.3 K/UL (ref 0–0.4)
ABSOLUTE IMMATURE GRANULOCYTE: ABNORMAL K/UL (ref 0–0.3)
ABSOLUTE LYMPH #: 2.7 K/UL (ref 1–4.8)
ABSOLUTE MONO #: 0.6 K/UL (ref 0.1–1.3)
ANION GAP SERPL CALCULATED.3IONS-SCNC: 10 MMOL/L (ref 9–17)
BASOPHILS # BLD: 1 % (ref 0–2)
BASOPHILS ABSOLUTE: 0.1 K/UL (ref 0–0.2)
BUN BLDV-MCNC: 10 MG/DL (ref 6–20)
BUN/CREAT BLD: ABNORMAL (ref 9–20)
CALCIUM SERPL-MCNC: 8.7 MG/DL (ref 8.6–10.4)
CHLORIDE BLD-SCNC: 107 MMOL/L (ref 98–107)
CO2: 22 MMOL/L (ref 20–31)
CREAT SERPL-MCNC: 0.61 MG/DL (ref 0.5–0.9)
DIFFERENTIAL TYPE: ABNORMAL
EOSINOPHILS RELATIVE PERCENT: 3 % (ref 0–4)
GFR AFRICAN AMERICAN: >60 ML/MIN
GFR NON-AFRICAN AMERICAN: >60 ML/MIN
GFR SERPL CREATININE-BSD FRML MDRD: ABNORMAL ML/MIN/{1.73_M2}
GFR SERPL CREATININE-BSD FRML MDRD: ABNORMAL ML/MIN/{1.73_M2}
GLUCOSE BLD-MCNC: 112 MG/DL (ref 70–99)
HCT VFR BLD CALC: 36.4 % (ref 36–46)
HEMOGLOBIN: 11.8 G/DL (ref 12–16)
IMMATURE GRANULOCYTES: ABNORMAL %
LYMPHOCYTES # BLD: 26 % (ref 24–44)
MCH RBC QN AUTO: 29.4 PG (ref 26–34)
MCHC RBC AUTO-ENTMCNC: 32.4 G/DL (ref 31–37)
MCV RBC AUTO: 90.9 FL (ref 80–100)
MONOCYTES # BLD: 6 % (ref 1–7)
NRBC AUTOMATED: ABNORMAL PER 100 WBC
PDW BLD-RTO: 15.5 % (ref 11.5–14.9)
PLATELET # BLD: 285 K/UL (ref 150–450)
PLATELET ESTIMATE: ABNORMAL
PMV BLD AUTO: 9 FL (ref 6–12)
POTASSIUM SERPL-SCNC: 3.9 MMOL/L (ref 3.7–5.3)
RBC # BLD: 4.01 M/UL (ref 4–5.2)
RBC # BLD: ABNORMAL 10*6/UL
SEG NEUTROPHILS: 64 % (ref 36–66)
SEGMENTED NEUTROPHILS ABSOLUTE COUNT: 6.6 K/UL (ref 1.3–9.1)
SODIUM BLD-SCNC: 139 MMOL/L (ref 135–144)
TROPONIN INTERP: NORMAL
TROPONIN INTERP: NORMAL
TROPONIN T: <0.03 NG/ML
TROPONIN T: <0.03 NG/ML
WBC # BLD: 10.3 K/UL (ref 3.5–11)
WBC # BLD: ABNORMAL 10*3/UL

## 2018-10-09 PROCEDURE — 2580000003 HC RX 258: Performed by: EMERGENCY MEDICINE

## 2018-10-09 PROCEDURE — 99285 EMERGENCY DEPT VISIT HI MDM: CPT

## 2018-10-09 PROCEDURE — 85025 COMPLETE CBC W/AUTO DIFF WBC: CPT

## 2018-10-09 PROCEDURE — 36415 COLL VENOUS BLD VENIPUNCTURE: CPT

## 2018-10-09 PROCEDURE — 84484 ASSAY OF TROPONIN QUANT: CPT

## 2018-10-09 PROCEDURE — 6360000002 HC RX W HCPCS: Performed by: EMERGENCY MEDICINE

## 2018-10-09 PROCEDURE — 96374 THER/PROPH/DIAG INJ IV PUSH: CPT

## 2018-10-09 PROCEDURE — 6370000000 HC RX 637 (ALT 250 FOR IP): Performed by: EMERGENCY MEDICINE

## 2018-10-09 PROCEDURE — 80048 BASIC METABOLIC PNL TOTAL CA: CPT

## 2018-10-09 PROCEDURE — 93005 ELECTROCARDIOGRAM TRACING: CPT

## 2018-10-09 PROCEDURE — 71045 X-RAY EXAM CHEST 1 VIEW: CPT

## 2018-10-09 PROCEDURE — 71260 CT THORAX DX C+: CPT

## 2018-10-09 PROCEDURE — 6360000004 HC RX CONTRAST MEDICATION: Performed by: EMERGENCY MEDICINE

## 2018-10-09 RX ORDER — 0.9 % SODIUM CHLORIDE 0.9 %
80 INTRAVENOUS SOLUTION INTRAVENOUS ONCE
Status: COMPLETED | OUTPATIENT
Start: 2018-10-09 | End: 2018-10-09

## 2018-10-09 RX ORDER — SODIUM CHLORIDE 0.9 % (FLUSH) 0.9 %
10 SYRINGE (ML) INJECTION PRN
Status: DISCONTINUED | OUTPATIENT
Start: 2018-10-09 | End: 2018-10-09 | Stop reason: HOSPADM

## 2018-10-09 RX ORDER — ACETAMINOPHEN 500 MG
1000 TABLET ORAL ONCE
Status: COMPLETED | OUTPATIENT
Start: 2018-10-09 | End: 2018-10-09

## 2018-10-09 RX ORDER — KETOROLAC TROMETHAMINE 30 MG/ML
30 INJECTION, SOLUTION INTRAMUSCULAR; INTRAVENOUS ONCE
Status: COMPLETED | OUTPATIENT
Start: 2018-10-09 | End: 2018-10-09

## 2018-10-09 RX ORDER — 0.9 % SODIUM CHLORIDE 0.9 %
1000 INTRAVENOUS SOLUTION INTRAVENOUS ONCE
Status: COMPLETED | OUTPATIENT
Start: 2018-10-09 | End: 2018-10-09

## 2018-10-09 RX ADMIN — Medication 10 ML: at 14:31

## 2018-10-09 RX ADMIN — ACETAMINOPHEN 1000 MG: 500 TABLET ORAL at 16:15

## 2018-10-09 RX ADMIN — KETOROLAC TROMETHAMINE 30 MG: 30 INJECTION, SOLUTION INTRAMUSCULAR; INTRAVENOUS at 13:48

## 2018-10-09 RX ADMIN — IOPAMIDOL 75 ML: 755 INJECTION, SOLUTION INTRAVENOUS at 14:31

## 2018-10-09 RX ADMIN — SODIUM CHLORIDE 1000 ML: 9 INJECTION, SOLUTION INTRAVENOUS at 13:48

## 2018-10-09 RX ADMIN — SODIUM CHLORIDE 80 ML: 9 INJECTION, SOLUTION INTRAVENOUS at 14:31

## 2018-10-09 ASSESSMENT — ENCOUNTER SYMPTOMS
CONSTIPATION: 0
VOICE CHANGE: 0
TROUBLE SWALLOWING: 0
EYE PAIN: 0
COUGH: 0
COLOR CHANGE: 0
VOMITING: 0
NAUSEA: 0
SHORTNESS OF BREATH: 0
DIARRHEA: 0
EYE ITCHING: 0
SORE THROAT: 0
EYE DISCHARGE: 0
RHINORRHEA: 0
BACK PAIN: 0
WHEEZING: 0
ABDOMINAL PAIN: 0
ABDOMINAL DISTENTION: 0

## 2018-10-09 ASSESSMENT — PAIN DESCRIPTION - ORIENTATION: ORIENTATION: LEFT

## 2018-10-09 ASSESSMENT — PAIN SCALES - GENERAL
PAINLEVEL_OUTOF10: 2
PAINLEVEL_OUTOF10: 4

## 2018-10-09 ASSESSMENT — PAIN DESCRIPTION - LOCATION: LOCATION: BREAST;CHEST

## 2018-10-09 NOTE — ED PROVIDER NOTES
daily, Disp-30 tablet, R-3Normal      pramipexole (MIRAPEX) 0.5 MG tablet take 1 tablet by mouth twice a day, Disp-60 tablet, R-3Normal      RA VITAMIN D-3 2000 units CAPS take 1 capsule by mouth once daily, Disp-30 capsule, R-5Normal      clotrimazole-betamethasone (LOTRISONE) 1-0.05 % cream apply to affected area twice a day, Disp-45 g, R-0, Normal      albuterol (PROVENTIL) (2.5 MG/3ML) 0.083% nebulizer solution Take 2.5 mg by nebulization every 8 hours as needed for WheezingHistorical Med      hydrocortisone (ANUSOL-HC) 2.5 % rectal cream Place rectally 2 times daily. , Disp-60 g, R-1, Normal      Phenazopyridine HCl (PYRIDIUM PO) Take by mouth Indications: Pt says that she takes the OTC brand 97.5 mg once a day. Historical Med      ibuprofen (ADVIL) 200 MG tablet Take 2 tablets by mouth every 6 hours as needed for Pain, Disp-60 tablet, R-0Print      acetaminophen (APAP EXTRA STRENGTH) 500 MG tablet Take 2 tablets by mouth every 6 hours as needed for Pain, Disp-60 tablet, R-0Print      RA ALLERGY RELIEF 180 MG tablet take 1 tablet by mouth once daily, Disp-90 tablet, R-1Normal      ONETOUCH DELICA LANCETS FINE MISC Disp-100 each, R-2, Normal      ONE TOUCH ULTRA TEST strip TEST BLOOD SUGARS 2 TO 3 TIMES A DAY, Disp-100 strip, R-2Normal      budesonide-formoterol (SYMBICORT) 160-4.5 MCG/ACT AERO Inhale 2 puffs into the lungs 2 times daily, Disp-1 Inhaler, R-3      nortriptyline (PAMELOR) 50 MG capsule Take 100 mg by mouth nightly      metoprolol (LOPRESSOR) 25 MG tablet 25 mg 2 times daily , R-0      butalbital-acetaminophen-caffeine (FIORICET, ESGIC) -40 MG per tablet Take 1 tablet by mouth every 6 hours as needed for Migraine, Disp-10 tablet, R-0Normal           ALLERGIES     is allergic to aripiprazole; ciprofloxacin; eletriptan; hydrocodone-acetaminophen; oxycodone-acetaminophen; triptans [sumatriptan]; and lamotrigine. FAMILY HISTORY     indicated that her mother is alive.  She indicated that her father is alive. She indicated that both of her brothers are alive. SOCIAL HISTORY      reports that she has been smoking Cigarettes. She has been smoking about 0.25 packs per day. She has never used smokeless tobacco. She reports that she does not drink alcohol or use drugs. PHYSICAL EXAM     INITIAL VITALS: /64   Pulse 82   Temp 97.9 °F (36.6 °C) (Oral)   Resp 21   Ht 5' 5\" (1.651 m)   Wt 300 lb (136.1 kg)   SpO2 92%   BMI 49.92 kg/m²    Physical Exam   Constitutional: She is oriented to person, place, and time. She appears well-developed and well-nourished. No distress. HENT:   Head: Normocephalic and atraumatic. Nose: Nose normal.   Mouth/Throat: Oropharynx is clear and moist.   Eyes: Pupils are equal, round, and reactive to light. Conjunctivae and EOM are normal.   Neck: Normal range of motion. Neck supple. Cardiovascular: Normal rate, regular rhythm, normal heart sounds and intact distal pulses. Exam reveals no gallop and no friction rub. No murmur heard. Pulmonary/Chest: Effort normal and breath sounds normal. No stridor. No respiratory distress. She has no wheezes. She has no rales. She exhibits no tenderness. Abdominal: Soft. Bowel sounds are normal. She exhibits no distension. There is no tenderness. There is no rebound and no guarding. Musculoskeletal: Normal range of motion. She exhibits no edema, tenderness or deformity. Neurological: She is alert and oriented to person, place, and time. No cranial nerve deficit. Skin: Skin is warm and dry. No rash noted. She is not diaphoretic. No erythema. Psychiatric: She has a normal mood and affect. Her behavior is normal. Judgment and thought content normal.   Nursing note and vitals reviewed. MEDICAL DECISION MAKING:        patient's blood pressure dropped after 1 nitro. Provide patient fluids, Toradol and obtain CT chest for PE. Patient's initial troponin negative. EKG unremarkable.     Patient with heart score of 1 we'll

## 2018-10-10 LAB
EKG ATRIAL RATE: 80 BPM
EKG P AXIS: 57 DEGREES
EKG P-R INTERVAL: 168 MS
EKG Q-T INTERVAL: 382 MS
EKG QRS DURATION: 86 MS
EKG QTC CALCULATION (BAZETT): 440 MS
EKG R AXIS: 20 DEGREES
EKG T AXIS: 33 DEGREES
EKG VENTRICULAR RATE: 80 BPM

## 2019-01-25 ENCOUNTER — HOSPITAL ENCOUNTER (OUTPATIENT)
Age: 44
Setting detail: OUTPATIENT SURGERY
Discharge: HOME | End: 2019-01-25
Attending: SURGERY | Admitting: SURGERY
Payer: COMMERCIAL

## 2019-01-25 VITALS
OXYGEN SATURATION: 97 % | RESPIRATION RATE: 18 BRPM | TEMPERATURE: 97.9 F | HEART RATE: 75 BPM | DIASTOLIC BLOOD PRESSURE: 78 MMHG | WEIGHT: 290 LBS | HEIGHT: 65 IN | SYSTOLIC BLOOD PRESSURE: 128 MMHG | BODY MASS INDEX: 48.32 KG/M2

## 2019-01-25 DIAGNOSIS — L98.9 SKIN LESION OF HAND: Primary | ICD-10-CM

## 2019-01-25 PROCEDURE — 3600000012 HC SURGERY LEVEL 2 ADDTL 15MIN: Performed by: SURGERY

## 2019-01-25 PROCEDURE — 7100000010 HC PHASE II RECOVERY - FIRST 15 MIN: Performed by: SURGERY

## 2019-01-25 PROCEDURE — 2500000003 HC RX 250 WO HCPCS: Performed by: SURGERY

## 2019-01-25 PROCEDURE — 3600000002 HC SURGERY LEVEL 2 BASE: Performed by: SURGERY

## 2019-01-25 PROCEDURE — 7100000011 HC PHASE II RECOVERY - ADDTL 15 MIN: Performed by: SURGERY

## 2019-01-25 PROCEDURE — 88305 TISSUE EXAM BY PATHOLOGIST: CPT

## 2019-01-25 PROCEDURE — 2709999900 HC NON-CHARGEABLE SUPPLY: Performed by: SURGERY

## 2019-01-25 RX ORDER — CEPHALEXIN 500 MG/1
CAPSULE ORAL
Qty: 21 CAPSULE | Refills: 0 | Status: SHIPPED | OUTPATIENT
Start: 2019-01-25 | End: 2019-05-16 | Stop reason: ALTCHOICE

## 2019-01-25 RX ORDER — LIDOCAINE HYDROCHLORIDE 10 MG/ML
INJECTION, SOLUTION INFILTRATION; PERINEURAL PRN
Status: DISCONTINUED | OUTPATIENT
Start: 2019-01-25 | End: 2019-01-25 | Stop reason: HOSPADM

## 2019-01-25 RX ORDER — OXYCODONE HYDROCHLORIDE AND ACETAMINOPHEN 5; 325 MG/1; MG/1
1 TABLET ORAL EVERY 6 HOURS PRN
Qty: 28 TABLET | Refills: 0 | Status: SHIPPED | OUTPATIENT
Start: 2019-01-25 | End: 2019-02-01

## 2019-01-25 ASSESSMENT — PAIN DESCRIPTION - DESCRIPTORS: DESCRIPTORS: ACHING;DISCOMFORT

## 2019-01-25 ASSESSMENT — PAIN - FUNCTIONAL ASSESSMENT: PAIN_FUNCTIONAL_ASSESSMENT: 0-10

## 2019-01-25 ASSESSMENT — PAIN SCALES - GENERAL: PAINLEVEL_OUTOF10: 0

## 2019-01-29 LAB — SURGICAL PATHOLOGY REPORT: NORMAL

## 2019-02-11 ENCOUNTER — HOSPITAL ENCOUNTER (EMERGENCY)
Age: 44
Discharge: HOME | End: 2019-02-11
Attending: EMERGENCY MEDICINE
Payer: COMMERCIAL

## 2019-02-11 VITALS
TEMPERATURE: 97.4 F | HEART RATE: 98 BPM | WEIGHT: 290 LBS | HEIGHT: 65 IN | OXYGEN SATURATION: 99 % | RESPIRATION RATE: 16 BRPM | SYSTOLIC BLOOD PRESSURE: 115 MMHG | DIASTOLIC BLOOD PRESSURE: 56 MMHG | BODY MASS INDEX: 48.32 KG/M2

## 2019-02-11 DIAGNOSIS — T81.49XA WOUND INFECTION AFTER SURGERY: Primary | ICD-10-CM

## 2019-02-11 PROCEDURE — 6370000000 HC RX 637 (ALT 250 FOR IP): Performed by: PHYSICIAN ASSISTANT

## 2019-02-11 PROCEDURE — 99282 EMERGENCY DEPT VISIT SF MDM: CPT

## 2019-02-11 RX ORDER — SULFAMETHOXAZOLE AND TRIMETHOPRIM 800; 160 MG/1; MG/1
1 TABLET ORAL ONCE
Status: COMPLETED | OUTPATIENT
Start: 2019-02-11 | End: 2019-02-11

## 2019-02-11 RX ORDER — CEPHALEXIN 250 MG/1
500 CAPSULE ORAL ONCE
Status: COMPLETED | OUTPATIENT
Start: 2019-02-11 | End: 2019-02-11

## 2019-02-11 RX ORDER — TRAMADOL HYDROCHLORIDE 50 MG/1
50 TABLET ORAL EVERY 6 HOURS PRN
Qty: 10 TABLET | Refills: 0 | Status: SHIPPED | OUTPATIENT
Start: 2019-02-11 | End: 2019-02-14

## 2019-02-11 RX ORDER — SULFAMETHOXAZOLE AND TRIMETHOPRIM 800; 160 MG/1; MG/1
1 TABLET ORAL 2 TIMES DAILY
Qty: 20 TABLET | Refills: 0 | Status: SHIPPED | OUTPATIENT
Start: 2019-02-11 | End: 2019-02-21

## 2019-02-11 RX ORDER — CEPHALEXIN 500 MG/1
500 CAPSULE ORAL 4 TIMES DAILY
Qty: 40 CAPSULE | Refills: 0 | Status: SHIPPED | OUTPATIENT
Start: 2019-02-11 | End: 2019-02-21

## 2019-02-11 RX ADMIN — SULFAMETHOXAZOLE AND TRIMETHOPRIM 1 TABLET: 800; 160 TABLET ORAL at 20:58

## 2019-02-11 RX ADMIN — CEPHALEXIN 500 MG: 250 CAPSULE ORAL at 20:58

## 2019-02-11 ASSESSMENT — PAIN SCALES - GENERAL: PAINLEVEL_OUTOF10: 4

## 2019-05-03 ENCOUNTER — HOSPITAL ENCOUNTER (OUTPATIENT)
Dept: CT IMAGING | Age: 44
Discharge: HOME | End: 2019-05-05
Payer: COMMERCIAL

## 2019-05-03 DIAGNOSIS — R91.1 LUNG NODULE: ICD-10-CM

## 2019-05-03 PROCEDURE — 71250 CT THORAX DX C-: CPT

## 2019-05-16 ENCOUNTER — HOSPITAL ENCOUNTER (OUTPATIENT)
Age: 44
Setting detail: OBSERVATION
Discharge: HOME | End: 2019-05-18
Attending: EMERGENCY MEDICINE | Admitting: INTERNAL MEDICINE
Payer: COMMERCIAL

## 2019-05-16 ENCOUNTER — APPOINTMENT (OUTPATIENT)
Dept: GENERAL RADIOLOGY | Age: 44
End: 2019-05-16
Payer: COMMERCIAL

## 2019-05-16 DIAGNOSIS — R55 SYNCOPE AND COLLAPSE: ICD-10-CM

## 2019-05-16 DIAGNOSIS — R07.9 CHEST PAIN, UNSPECIFIED TYPE: Primary | ICD-10-CM

## 2019-05-16 LAB
ABSOLUTE EOS #: 0.3 K/UL (ref 0–0.4)
ABSOLUTE IMMATURE GRANULOCYTE: NORMAL K/UL (ref 0–0.3)
ABSOLUTE LYMPH #: 2.9 K/UL (ref 1–4.8)
ABSOLUTE MONO #: 0.5 K/UL (ref 0.1–1.3)
ANION GAP SERPL CALCULATED.3IONS-SCNC: 14 MMOL/L (ref 9–17)
BASOPHILS # BLD: 1 % (ref 0–2)
BASOPHILS ABSOLUTE: 0.1 K/UL (ref 0–0.2)
BNP INTERPRETATION: NORMAL
BUN BLDV-MCNC: 11 MG/DL (ref 6–20)
BUN/CREAT BLD: ABNORMAL (ref 9–20)
CALCIUM SERPL-MCNC: 8.2 MG/DL (ref 8.6–10.4)
CHLORIDE BLD-SCNC: 104 MMOL/L (ref 98–107)
CO2: 21 MMOL/L (ref 20–31)
CREAT SERPL-MCNC: 0.46 MG/DL (ref 0.5–0.9)
D-DIMER QUANTITATIVE: 0.28 MG/L FEU (ref 0–0.59)
DIFFERENTIAL TYPE: NORMAL
EOSINOPHILS RELATIVE PERCENT: 3 % (ref 0–4)
GFR AFRICAN AMERICAN: >60 ML/MIN
GFR NON-AFRICAN AMERICAN: >60 ML/MIN
GFR SERPL CREATININE-BSD FRML MDRD: ABNORMAL ML/MIN/{1.73_M2}
GFR SERPL CREATININE-BSD FRML MDRD: ABNORMAL ML/MIN/{1.73_M2}
GLUCOSE BLD-MCNC: 117 MG/DL (ref 70–99)
HCT VFR BLD CALC: 37.3 % (ref 36–46)
HEMOGLOBIN: 12.3 G/DL (ref 12–16)
IMMATURE GRANULOCYTES: NORMAL %
INR BLD: 1
LYMPHOCYTES # BLD: 27 % (ref 24–44)
MCH RBC QN AUTO: 30.6 PG (ref 26–34)
MCHC RBC AUTO-ENTMCNC: 33 G/DL (ref 31–37)
MCV RBC AUTO: 92.6 FL (ref 80–100)
MONOCYTES # BLD: 5 % (ref 1–7)
MYOGLOBIN: <21 NG/ML (ref 25–58)
MYOGLOBIN: <21 NG/ML (ref 25–58)
NRBC AUTOMATED: NORMAL PER 100 WBC
PARTIAL THROMBOPLASTIN TIME: 31.1 SEC (ref 24–36)
PDW BLD-RTO: 13.8 % (ref 11.5–14.9)
PLATELET # BLD: 311 K/UL (ref 150–450)
PLATELET ESTIMATE: NORMAL
PMV BLD AUTO: 8.8 FL (ref 6–12)
POTASSIUM SERPL-SCNC: 3.8 MMOL/L (ref 3.7–5.3)
PRO-BNP: 191 PG/ML
PROTHROMBIN TIME: 12.8 SEC (ref 11.8–14.6)
RBC # BLD: 4.03 M/UL (ref 4–5.2)
RBC # BLD: NORMAL 10*6/UL
SEG NEUTROPHILS: 64 % (ref 36–66)
SEGMENTED NEUTROPHILS ABSOLUTE COUNT: 6.9 K/UL (ref 1.3–9.1)
SODIUM BLD-SCNC: 139 MMOL/L (ref 135–144)
TROPONIN INTERP: ABNORMAL
TROPONIN INTERP: ABNORMAL
TROPONIN T: ABNORMAL NG/ML
TROPONIN T: ABNORMAL NG/ML
TROPONIN, HIGH SENSITIVITY: <6 NG/L (ref 0–14)
TROPONIN, HIGH SENSITIVITY: <6 NG/L (ref 0–14)
TSH SERPL DL<=0.05 MIU/L-ACNC: 1.55 MIU/L (ref 0.3–5)
WBC # BLD: 10.7 K/UL (ref 3.5–11)
WBC # BLD: NORMAL 10*3/UL

## 2019-05-16 PROCEDURE — G0378 HOSPITAL OBSERVATION PER HR: HCPCS

## 2019-05-16 PROCEDURE — 99285 EMERGENCY DEPT VISIT HI MDM: CPT

## 2019-05-16 PROCEDURE — 93005 ELECTROCARDIOGRAM TRACING: CPT

## 2019-05-16 PROCEDURE — 85025 COMPLETE CBC W/AUTO DIFF WBC: CPT

## 2019-05-16 PROCEDURE — 85379 FIBRIN DEGRADATION QUANT: CPT

## 2019-05-16 PROCEDURE — 6370000000 HC RX 637 (ALT 250 FOR IP): Performed by: EMERGENCY MEDICINE

## 2019-05-16 PROCEDURE — 80048 BASIC METABOLIC PNL TOTAL CA: CPT

## 2019-05-16 PROCEDURE — 84484 ASSAY OF TROPONIN QUANT: CPT

## 2019-05-16 PROCEDURE — 83880 ASSAY OF NATRIURETIC PEPTIDE: CPT

## 2019-05-16 PROCEDURE — 85610 PROTHROMBIN TIME: CPT

## 2019-05-16 PROCEDURE — 96374 THER/PROPH/DIAG INJ IV PUSH: CPT

## 2019-05-16 PROCEDURE — 84443 ASSAY THYROID STIM HORMONE: CPT

## 2019-05-16 PROCEDURE — 71046 X-RAY EXAM CHEST 2 VIEWS: CPT

## 2019-05-16 PROCEDURE — 85730 THROMBOPLASTIN TIME PARTIAL: CPT

## 2019-05-16 PROCEDURE — 83874 ASSAY OF MYOGLOBIN: CPT

## 2019-05-16 PROCEDURE — 36415 COLL VENOUS BLD VENIPUNCTURE: CPT

## 2019-05-16 PROCEDURE — 6360000002 HC RX W HCPCS: Performed by: EMERGENCY MEDICINE

## 2019-05-16 PROCEDURE — 96375 TX/PRO/DX INJ NEW DRUG ADDON: CPT

## 2019-05-16 RX ORDER — ACETAMINOPHEN 325 MG/1
650 TABLET ORAL EVERY 4 HOURS PRN
Status: DISCONTINUED | OUTPATIENT
Start: 2019-05-16 | End: 2019-05-18 | Stop reason: HOSPADM

## 2019-05-16 RX ORDER — ZALEPLON 5 MG/1
5 CAPSULE ORAL NIGHTLY
Status: ON HOLD | COMMUNITY
End: 2019-05-18 | Stop reason: HOSPADM

## 2019-05-16 RX ORDER — FENTANYL CITRATE 50 UG/ML
50 INJECTION, SOLUTION INTRAMUSCULAR; INTRAVENOUS ONCE
Status: COMPLETED | OUTPATIENT
Start: 2019-05-16 | End: 2019-05-16

## 2019-05-16 RX ORDER — MORPHINE SULFATE 4 MG/ML
4 INJECTION, SOLUTION INTRAMUSCULAR; INTRAVENOUS ONCE
Status: COMPLETED | OUTPATIENT
Start: 2019-05-16 | End: 2019-05-16

## 2019-05-16 RX ORDER — SODIUM CHLORIDE 0.9 % (FLUSH) 0.9 %
10 SYRINGE (ML) INJECTION EVERY 12 HOURS SCHEDULED
Status: DISCONTINUED | OUTPATIENT
Start: 2019-05-17 | End: 2019-05-18 | Stop reason: HOSPADM

## 2019-05-16 RX ORDER — MAGNESIUM OXIDE 400 MG/1
400 TABLET ORAL DAILY
COMMUNITY
End: 2022-07-05

## 2019-05-16 RX ORDER — ACETAMINOPHEN 325 MG/1
650 TABLET ORAL ONCE
Status: COMPLETED | OUTPATIENT
Start: 2019-05-16 | End: 2019-05-16

## 2019-05-16 RX ORDER — ONDANSETRON 2 MG/ML
4 INJECTION INTRAMUSCULAR; INTRAVENOUS ONCE
Status: COMPLETED | OUTPATIENT
Start: 2019-05-16 | End: 2019-05-16

## 2019-05-16 RX ORDER — SODIUM CHLORIDE 0.9 % (FLUSH) 0.9 %
10 SYRINGE (ML) INJECTION PRN
Status: DISCONTINUED | OUTPATIENT
Start: 2019-05-16 | End: 2019-05-17

## 2019-05-16 RX ADMIN — ONDANSETRON 4 MG: 2 INJECTION INTRAMUSCULAR; INTRAVENOUS at 15:43

## 2019-05-16 RX ADMIN — FENTANYL CITRATE 50 MCG: 50 INJECTION INTRAMUSCULAR; INTRAVENOUS at 19:34

## 2019-05-16 RX ADMIN — ACETAMINOPHEN 650 MG: 325 TABLET, FILM COATED ORAL at 18:20

## 2019-05-16 RX ADMIN — MORPHINE SULFATE 4 MG: 4 INJECTION INTRAVENOUS at 15:43

## 2019-05-16 ASSESSMENT — ENCOUNTER SYMPTOMS
VOMITING: 0
EYE PAIN: 0
WHEEZING: 0
EYE REDNESS: 0
TROUBLE SWALLOWING: 0
COLOR CHANGE: 0
COUGH: 0
DIARRHEA: 0
NAUSEA: 1
RHINORRHEA: 0
ABDOMINAL PAIN: 0
FACIAL SWELLING: 0
SHORTNESS OF BREATH: 0
EYE DISCHARGE: 0
SORE THROAT: 0
CHEST TIGHTNESS: 0
BACK PAIN: 0
CONSTIPATION: 0
SINUS PRESSURE: 0
BLOOD IN STOOL: 0

## 2019-05-16 ASSESSMENT — PAIN DESCRIPTION - FREQUENCY: FREQUENCY: CONTINUOUS

## 2019-05-16 ASSESSMENT — PAIN SCALES - GENERAL
PAINLEVEL_OUTOF10: 4
PAINLEVEL_OUTOF10: 6
PAINLEVEL_OUTOF10: 2
PAINLEVEL_OUTOF10: 6
PAINLEVEL_OUTOF10: 0
PAINLEVEL_OUTOF10: 5
PAINLEVEL_OUTOF10: 4
PAINLEVEL_OUTOF10: 5

## 2019-05-16 ASSESSMENT — PAIN DESCRIPTION - PAIN TYPE: TYPE: ACUTE PAIN

## 2019-05-16 ASSESSMENT — PAIN DESCRIPTION - DESCRIPTORS: DESCRIPTORS: HEAVINESS

## 2019-05-16 ASSESSMENT — PAIN DESCRIPTION - LOCATION: LOCATION: CHEST

## 2019-05-16 NOTE — ED PROVIDER NOTES
shortness of breath and wheezing. Cardiovascular: Positive for chest pain. Negative for palpitations and leg swelling. Gastrointestinal: Positive for nausea. Negative for abdominal pain, blood in stool, constipation, diarrhea and vomiting. Genitourinary: Negative for difficulty urinating, dysuria, flank pain, frequency, genital sores and hematuria. Musculoskeletal: Negative for arthralgias, back pain, gait problem, joint swelling, myalgias and neck pain. Skin: Negative for color change, pallor, rash and wound. Neurological: Positive for syncope and headaches. Negative for dizziness, tremors, seizures, speech difficulty, weakness and numbness. Psychiatric/Behavioral: Negative for confusion, decreased concentration, hallucinations, self-injury, sleep disturbance and suicidal ideas.        PAST MEDICAL HISTORY     Past Medical History:   Diagnosis Date    Anxiety     Chronic kidney disease     right renal cyst    Depression     DVT (deep venous thrombosis) ([facility] [address] 1997    after delivery    Fibromyalgia     Headache(784.0)     migraines    Hx of blood clots     1997 left calf    Kidney stone     Pancreatitis 2001    Pleural effusion 2001    SVT (supraventricular tachycardia) ([facility] [address] 9/8/2015       SURGICAL HISTORY       Past Surgical History:   Procedure Laterality Date    ATRIAL ABLATION SURGERY      BACK SURGERY      L4-5    CHOLECYSTECTOMY  2001    ENDOMETRIAL ABLATION      e sure implants placed also    ENDOSCOPY, COLON, DIAGNOSTIC      EXCISION LESION HAND / FINGER Right 1/25/2019    HAND LESION BIOPSY EXCISION performed by Bee Burns MD at [facility] Bilateral     left x2, right x1    FOOT SURGERY Right     x3    KNEE ARTHROSCOPY Left     x2    IN CYSTO/URETERO/PYELOSCOPY W/LITHOTRIPSY Left 9/20/2017    CYSTOSCOPY URETEROSCOPY HOLMIUM LASER LITHO WITH STONE BASKETING WITH  STENT  performed by Summer Palomino MD at [facility] Right        CURRENT TABLET    take 1 tablet by mouth twice a day    PROMETHAZINE (PHENERGAN) 25 MG TABLET    take 1 tablet by mouth twice a day if needed for nausea    RA VITAMIN D-3 2000 UNITS CAPS    take 1 capsule by mouth once daily    RANITIDINE (ZANTAC) 300 MG TABLET    take 1 tablet by mouth twice a day    TOPIRAMATE (TOPAMAX) 100 MG TABLET    take 1 tablet by mouth twice a day       ALLERGIES     is allergic to aripiprazole; eletriptan; triptans [sumatriptan]; and lamotrigine. FAMILY HISTORY     [unfilled]     SOCIAL HISTORY      reports that she has been smoking cigarettes. She has been smoking about 0.25 packs per day. She has never used smokeless tobacco. She reports that she does not drink alcohol or use drugs. PHYSICAL EXAM     INITIAL VITALS: BP (!) 101/53   Pulse 77   Temp 98.5 °F (36.9 °C) (Oral)   Resp 14   Ht 5' 5\" (1.651 m)   Wt 295 lb (133.8 kg)   SpO2 96%   BMI 49.09 kg/m²      Physical Exam   Constitutional: She is oriented to person, place, and time. She appears well-developed and well-nourished. No distress. HENT:   Head: Normocephalic and atraumatic. Eyes: Pupils are equal, round, and reactive to light. Conjunctivae and EOM are normal. Right eye exhibits no discharge. Left eye exhibits no discharge. No scleral icterus. Cardiovascular: Normal rate, regular rhythm and normal heart sounds. Exam reveals no gallop and no friction rub. No murmur heard. Pulmonary/Chest: Effort normal and breath sounds normal. No respiratory distress. She has no wheezes. She has no rales. She exhibits no tenderness. Abdominal: Soft. Bowel sounds are normal. She exhibits no distension and no mass. There is no tenderness. There is no rebound and no guarding. Musculoskeletal: Normal range of motion. She exhibits no edema or tenderness. Neurological: She is alert and oriented to person, place, and time. She displays normal reflexes. No cranial nerve deficit or sensory deficit. She exhibits normal muscle tone. Coordination normal.   Skin: Skin is warm and dry. No rash noted. She is not diaphoretic. No erythema. No pallor. Psychiatric: She has a normal mood and affect. Her behavior is normal. Judgment and thought content normal.   Nursing note and vitals reviewed. MEDICAL DECISION MAKING:     Patient is having chest pain and had a syncopal event. This point time has been concerned about a cardiac issue or a PE. We'll do a d-dimer as patient is low risk for this. EKG and a full cardiac workup. Depending on how patient is feeling will most likely try to get her admitted for further workup. DIAGNOSTIC RESULTS     EKG: All EKG's are interpreted by the Emergency Department Physician who either signs or Co-signs this chart in the absence of a cardiologist.    EKG Interpretation    Interpreted by me    Rhythm: normal sinus   Rate: normal  Axis: normal  Ectopy: none  Conduction: normal  ST Segments: no acute change  T Waves: no acute change  Q Waves: none    Clinical Impression: no acute changes and normal EKG    RADIOLOGY:All plain film, CT, MRI, and formal ultrasound images (except ED bedside ultrasound)are read by the radiologist and interpretations are directly viewed by the emergency physician. Xr Chest Standard (2 Vw)    Result Date: 5/16/2019  EXAMINATION: TWO XRAY VIEWS OF THE CHEST 5/16/2019 3:21 pm COMPARISON: 10/09/2018 HISTORY: ORDERING SYSTEM PROVIDED HISTORY: Chest Pain TECHNOLOGIST PROVIDED HISTORY: Chest Pain Ordering Physician Provided Reason for Exam: chest pain, passed out FINDINGS: Stable borderline heart size without evidence of vascular congestion. Minimal subsegmental atelectasis/scarring in the left mid lung/lingula. No focal lung consolidation or significant pleural effusion is seen. Mild degenerative changes of the spine with no acute osseous abnormality.   Tiny lung nodules described on the recent CT are not visualized on this exam. There are clips in the upper abdomen     Minimal subsegmental atelectasis/scarring left mid chest.           LABS: All lab results were reviewed bymyself, and all abnormals are listed below. Labs Reviewed   BASIC METABOLIC PANEL - Abnormal; Notable for the following components:       Result Value    Glucose 117 (*)     CREATININE 0.46 (*)     Calcium 8.2 (*)     All other components within normal limits   TROP/MYOGLOBIN - Abnormal; Notable for the following components:    Myoglobin <21 (*)     All other components within normal limits   CBC WITH AUTO DIFFERENTIAL   D-DIMER, QUANTITATIVE   APTT   PROTIME-INR   TSH WITHOUT REFLEX   BRAIN NATRIURETIC PEPTIDE   TROP/MYOGLOBIN   URINE RT REFLEX TO CULTURE         EMERGENCY DEPARTMENT COURSE:   Vitals:    Vitals:    05/16/19 1504 05/16/19 1644   BP: (!) 100/56 (!) 101/53   Pulse: 82 77   Resp: 16 14   Temp: 98.5 °F (36.9 °C)    TempSrc: Oral    SpO2: 97% 96%   Weight: 295 lb (133.8 kg)    Height: 5' 5\" (1.651 m)        The patient was given the following medications while in the emergency department:     Orders Placed This Encounter   Medications    morphine sulfate (PF) injection 4 mg    ondansetron (ZOFRAN) injection 4 mg       -------------------------  5:36 PM  Patient was updated on results. Patient states she is pain-free at this time. Because of the syncopal event with the chest pain this does increase my concern for cardiac event. At this point time I feel patient should be admitted. Patient is in agreement. I spoke with Dr. Lesly Pérez who agrees to admit the patient. CRITICAL CARE:   None    CONSULTS:  IP CONSULT TO PRIMARY CARE PROVIDER    PROCEDURES:  None    FINAL IMPRESSION      1. Chest pain, unspecified type    2.  Syncope and collapse          DISPOSITION/PLAN   DISPOSITION Admitted 05/16/2019 05:35:52 PM      PATIENT REFERRED TO:  Nikki De La Garza MD  [address]  [phone]            DISCHARGE MEDICATIONS:  New Prescriptions    No medications on file       (Please note that portions of this note were completed with a voice recognition program.  Efforts were made to edit the dictations but occasionally words are mis-transcribed.)    Wan Walsh MD  Attending Theone MD [place]  05/16/19 [id number]

## 2019-05-16 NOTE — PROGRESS NOTES
Medication History completed:    New medications: zaleplon, magnesium oxide    Medications discontinued: albuterol nebulizer, Symbicort, Fioricet, Lotrisone, fenofibrate, hydrocortisone cream, phenazopyridine, cephalexin    Changes to dosing: none    Stated allergies: As listed    Other pertinent information: [facility] history reviewed. Patient confirmed medication history.      Thank you,  Claudette Goltz, PharmD  [phone]

## 2019-05-16 NOTE — ED NOTES
Report given to Daryle Milian from [employer]. Report method in person   The following was reviewed with receiving RN:   Current vital signs:  BP (!) 101/47   Pulse 83   Temp 97.9 °F (36.6 °C) (Oral)   Resp 24   Ht 5' 5\" (1.651 m)   Wt 295 lb (133.8 kg)   SpO2 96%   BMI 49.09 kg/m²                MEWS Score: 1     Any medication or safety alerts were reviewed. Any pending diagnostics and notifications were also reviewed, as well as any safety concerns or issues, abnormal labs, abnormal imaging, and abnormal assessment findings. Questions were answered.           Shruthi Costa RN  05/16/19 7947

## 2019-05-17 ENCOUNTER — APPOINTMENT (OUTPATIENT)
Dept: NUCLEAR MEDICINE | Age: 44
End: 2019-05-17
Payer: COMMERCIAL

## 2019-05-17 LAB — HCG QUALITATIVE: NEGATIVE

## 2019-05-17 PROCEDURE — 3430000000 HC RX DIAGNOSTIC RADIOPHARMACEUTICAL: Performed by: INTERNAL MEDICINE

## 2019-05-17 PROCEDURE — 78452 HT MUSCLE IMAGE SPECT MULT: CPT

## 2019-05-17 PROCEDURE — 2580000003 HC RX 258: Performed by: INTERNAL MEDICINE

## 2019-05-17 PROCEDURE — 6360000002 HC RX W HCPCS: Performed by: INTERNAL MEDICINE

## 2019-05-17 PROCEDURE — 96372 THER/PROPH/DIAG INJ SC/IM: CPT

## 2019-05-17 PROCEDURE — 36415 COLL VENOUS BLD VENIPUNCTURE: CPT

## 2019-05-17 PROCEDURE — G0378 HOSPITAL OBSERVATION PER HR: HCPCS

## 2019-05-17 PROCEDURE — A9500 TC99M SESTAMIBI: HCPCS | Performed by: INTERNAL MEDICINE

## 2019-05-17 PROCEDURE — 6370000000 HC RX 637 (ALT 250 FOR IP): Performed by: INTERNAL MEDICINE

## 2019-05-17 PROCEDURE — 99222 1ST HOSP IP/OBS MODERATE 55: CPT | Performed by: INTERNAL MEDICINE

## 2019-05-17 PROCEDURE — 84703 CHORIONIC GONADOTROPIN ASSAY: CPT

## 2019-05-17 RX ORDER — ATROPINE SULFATE 0.1 MG/ML
0.5 INJECTION INTRAVENOUS EVERY 5 MIN PRN
Status: DISCONTINUED | OUTPATIENT
Start: 2019-05-17 | End: 2019-05-17

## 2019-05-17 RX ORDER — SODIUM CHLORIDE 0.9 % (FLUSH) 0.9 %
10 SYRINGE (ML) INJECTION PRN
Status: DISCONTINUED | OUTPATIENT
Start: 2019-05-17 | End: 2019-05-17

## 2019-05-17 RX ORDER — AMLODIPINE BESYLATE 5 MG/1
5 TABLET ORAL DAILY
Status: DISCONTINUED | OUTPATIENT
Start: 2019-05-17 | End: 2019-05-17

## 2019-05-17 RX ORDER — LORAZEPAM 0.5 MG/1
0.5 TABLET ORAL NIGHTLY
Status: DISCONTINUED | OUTPATIENT
Start: 2019-05-17 | End: 2019-05-18 | Stop reason: HOSPADM

## 2019-05-17 RX ORDER — SODIUM CHLORIDE 0.9 % (FLUSH) 0.9 %
10 SYRINGE (ML) INJECTION PRN
Status: ACTIVE | OUTPATIENT
Start: 2019-05-17 | End: 2019-05-17

## 2019-05-17 RX ORDER — TOPIRAMATE 100 MG/1
100 TABLET, FILM COATED ORAL 2 TIMES DAILY
Status: DISCONTINUED | OUTPATIENT
Start: 2019-05-17 | End: 2019-05-18 | Stop reason: HOSPADM

## 2019-05-17 RX ORDER — SODIUM CHLORIDE 9 MG/ML
500 INJECTION, SOLUTION INTRAVENOUS CONTINUOUS PRN
Status: DISCONTINUED | OUTPATIENT
Start: 2019-05-17 | End: 2019-05-17

## 2019-05-17 RX ORDER — METOPROLOL TARTRATE 5 MG/5ML
5 INJECTION INTRAVENOUS EVERY 5 MIN PRN
Status: ACTIVE | OUTPATIENT
Start: 2019-05-17 | End: 2019-05-17

## 2019-05-17 RX ORDER — ATROPINE SULFATE 0.1 MG/ML
0.5 INJECTION INTRAVENOUS EVERY 5 MIN PRN
Status: ACTIVE | OUTPATIENT
Start: 2019-05-17 | End: 2019-05-17

## 2019-05-17 RX ORDER — FAMOTIDINE 20 MG/1
40 TABLET, FILM COATED ORAL EVERY EVENING
Status: DISCONTINUED | OUTPATIENT
Start: 2019-05-17 | End: 2019-05-18 | Stop reason: HOSPADM

## 2019-05-17 RX ORDER — NICOTINE 21 MG/24HR
1 PATCH, TRANSDERMAL 24 HOURS TRANSDERMAL DAILY
Status: DISCONTINUED | OUTPATIENT
Start: 2019-05-17 | End: 2019-05-18 | Stop reason: HOSPADM

## 2019-05-17 RX ORDER — ACETAMINOPHEN 500 MG
1000 TABLET ORAL EVERY 6 HOURS PRN
Status: DISCONTINUED | OUTPATIENT
Start: 2019-05-17 | End: 2019-05-18 | Stop reason: SDUPTHER

## 2019-05-17 RX ORDER — PRAMIPEXOLE DIHYDROCHLORIDE 0.25 MG/1
0.5 TABLET ORAL 2 TIMES DAILY
Status: DISCONTINUED | OUTPATIENT
Start: 2019-05-17 | End: 2019-05-18 | Stop reason: HOSPADM

## 2019-05-17 RX ORDER — NITROGLYCERIN 0.4 MG/1
0.4 TABLET SUBLINGUAL EVERY 5 MIN PRN
Status: ACTIVE | OUTPATIENT
Start: 2019-05-17 | End: 2019-05-17

## 2019-05-17 RX ORDER — SODIUM CHLORIDE 9 MG/ML
500 INJECTION, SOLUTION INTRAVENOUS CONTINUOUS PRN
Status: ACTIVE | OUTPATIENT
Start: 2019-05-17 | End: 2019-05-17

## 2019-05-17 RX ORDER — NORTRIPTYLINE HYDROCHLORIDE 50 MG/1
100 CAPSULE ORAL NIGHTLY
Status: DISCONTINUED | OUTPATIENT
Start: 2019-05-17 | End: 2019-05-18 | Stop reason: HOSPADM

## 2019-05-17 RX ORDER — AMINOPHYLLINE DIHYDRATE 25 MG/ML
50 INJECTION, SOLUTION INTRAVENOUS PRN
Status: ACTIVE | OUTPATIENT
Start: 2019-05-17 | End: 2019-05-17

## 2019-05-17 RX ORDER — ALBUTEROL SULFATE 90 UG/1
2 AEROSOL, METERED RESPIRATORY (INHALATION) PRN
Status: DISCONTINUED | OUTPATIENT
Start: 2019-05-17 | End: 2019-05-17

## 2019-05-17 RX ORDER — NITROGLYCERIN 0.4 MG/1
0.4 TABLET SUBLINGUAL EVERY 5 MIN PRN
Status: DISCONTINUED | OUTPATIENT
Start: 2019-05-17 | End: 2019-05-17

## 2019-05-17 RX ORDER — ALBUTEROL SULFATE 90 UG/1
2 AEROSOL, METERED RESPIRATORY (INHALATION) PRN
Status: ACTIVE | OUTPATIENT
Start: 2019-05-17 | End: 2019-05-17

## 2019-05-17 RX ORDER — SODIUM CHLORIDE 0.9 % (FLUSH) 0.9 %
10 SYRINGE (ML) INJECTION PRN
Status: DISCONTINUED | OUTPATIENT
Start: 2019-05-17 | End: 2019-05-18 | Stop reason: HOSPADM

## 2019-05-17 RX ORDER — DULOXETIN HYDROCHLORIDE 60 MG/1
60 CAPSULE, DELAYED RELEASE ORAL 2 TIMES DAILY
Status: DISCONTINUED | OUTPATIENT
Start: 2019-05-17 | End: 2019-05-18 | Stop reason: HOSPADM

## 2019-05-17 RX ORDER — ONDANSETRON 2 MG/ML
4 INJECTION INTRAMUSCULAR; INTRAVENOUS EVERY 6 HOURS PRN
Status: DISCONTINUED | OUTPATIENT
Start: 2019-05-17 | End: 2019-05-18 | Stop reason: HOSPADM

## 2019-05-17 RX ADMIN — BREXPIPRAZOLE 1 MG: 1 TABLET ORAL at 12:08

## 2019-05-17 RX ADMIN — TOPIRAMATE 100 MG: 100 TABLET, FILM COATED ORAL at 12:08

## 2019-05-17 RX ADMIN — PRAMIPEXOLE DIHYDROCHLORIDE 0.5 MG: 0.25 TABLET ORAL at 21:29

## 2019-05-17 RX ADMIN — DULOXETINE HYDROCHLORIDE 60 MG: 60 CAPSULE, DELAYED RELEASE ORAL at 21:28

## 2019-05-17 RX ADMIN — DULOXETINE HYDROCHLORIDE 60 MG: 60 CAPSULE, DELAYED RELEASE ORAL at 12:08

## 2019-05-17 RX ADMIN — FAMOTIDINE 40 MG: 20 TABLET ORAL at 21:28

## 2019-05-17 RX ADMIN — ENOXAPARIN SODIUM 30 MG: 30 INJECTION SUBCUTANEOUS at 12:09

## 2019-05-17 RX ADMIN — TOPIRAMATE 100 MG: 100 TABLET, FILM COATED ORAL at 02:45

## 2019-05-17 RX ADMIN — Medication 10 ML: at 12:10

## 2019-05-17 RX ADMIN — Medication 10 ML: at 10:37

## 2019-05-17 RX ADMIN — DULOXETINE HYDROCHLORIDE 60 MG: 60 CAPSULE, DELAYED RELEASE ORAL at 02:45

## 2019-05-17 RX ADMIN — NORTRIPTYLINE HYDROCHLORIDE 100 MG: 50 CAPSULE ORAL at 21:29

## 2019-05-17 RX ADMIN — Medication 10 ML: at 21:29

## 2019-05-17 RX ADMIN — METOPROLOL TARTRATE 25 MG: 25 TABLET, FILM COATED ORAL at 12:07

## 2019-05-17 RX ADMIN — TOPIRAMATE 100 MG: 100 TABLET, FILM COATED ORAL at 21:29

## 2019-05-17 RX ADMIN — TETRAKIS(2-METHOXYISOBUTYLISOCYANIDE)COPPER(I) TETRAFLUOROBORATE 13 MILLICURIE: 1 INJECTION, POWDER, LYOPHILIZED, FOR SOLUTION INTRAVENOUS at 09:00

## 2019-05-17 RX ADMIN — ENOXAPARIN SODIUM 30 MG: 30 INJECTION SUBCUTANEOUS at 21:28

## 2019-05-17 RX ADMIN — PRAMIPEXOLE DIHYDROCHLORIDE 0.5 MG: 0.25 TABLET ORAL at 02:58

## 2019-05-17 RX ADMIN — PRAMIPEXOLE DIHYDROCHLORIDE 0.5 MG: 0.25 TABLET ORAL at 12:08

## 2019-05-17 RX ADMIN — LORAZEPAM 0.5 MG: 0.5 TABLET ORAL at 21:28

## 2019-05-17 RX ADMIN — METOPROLOL TARTRATE 25 MG: 25 TABLET, FILM COATED ORAL at 21:28

## 2019-05-17 NOTE — PLAN OF CARE
Problem: Falls - Risk of:  Goal: Will remain free from falls  Description  Will remain free from falls  5/17/2019 1808 by Scar Estrella RN  Outcome: Met This Shift  No falls this shift. Patient is alert and oriented Call light is within reach. Goal: Absence of physical injury  Description  Absence of physical injury  5/17/2019 1808 by Scar Estrella RN  Outcome: Met This Shift  No injury this shift.

## 2019-05-17 NOTE — H&P
History and Physical Service  [facility]    HISTORY AND PHYSICAL EXAMINATION            Date:   5/17/2019  Patient name:  Malina Russo  MRN:   [id number]  Account:  [de-identified]  YOB: 1975  PCP:    Elke Forde MD  Code Status:    Full Code    Chief Complaint:     Chief Complaint   Patient presents with    Chest Pain    Loss of Consciousness         History Obtained From:     patient    History of Present Illness: The patient is a 38 y.o. Non-/non  female who presents Malina Russo is a 38 y.o. female who presents complaining of chest pain. Patient states that about an hour ago she was at work not really doing anything physical when she started having a crushing heaviness in the left side of her chest that was going into her left shoulder. Patient states she denies any shortness of breath or palpitations. Patient states that she went off to the side and sat down to try to relax put her feet up. Patient states that it wasn't getting any better after a few minutes that she got up and she states next thing she know she was face down on the ground. Patient states nobody was around to see her. Patient is not sure how long she was out for. Patient continues to have chest pain. EMS was called they gave aspirin and nitroglycerin which has not helped. Patient states that she doesn't think she hit her head and does not have any neck pain or back pain. Patient does have a headache mostly in the back part of her head and behind her eyes. Patient is very nauseous but no vomiting. Patient denies numbness tingling or weakness. Patient denies any injuries other than an abrasion to her right elbow.   Patient states that she has had something like this before but they never found out exactly what was            Past Medical History:     Past Medical History:   Diagnosis Date    Anxiety     Chronic kidney disease     right renal cyst  Depression     DVT (deep venous thrombosis) ([facility] [address] 1997    after delivery    Fibromyalgia     Headache(784.0)     migraines    Hx of blood clots     1997 left calf    Kidney stone     Pancreatitis 2001    Pleural effusion 2001    SVT (supraventricular tachycardia) ([facility] [address] 9/8/2015        Past Surgical History:     Past Surgical History:   Procedure Laterality Date    ATRIAL ABLATION SURGERY      BACK SURGERY      L4-5    CHOLECYSTECTOMY  2001    ENDOMETRIAL ABLATION      e sure implants placed also    ENDOSCOPY, COLON, DIAGNOSTIC      EXCISION LESION HAND / FINGER Right 1/25/2019    HAND LESION BIOPSY EXCISION performed by Sang Jefferson MD at [facility] Bilateral     left x2, right x1    FOOT SURGERY Right     x3    KNEE ARTHROSCOPY Left     x2    CA CYSTO/URETERO/PYELOSCOPY W/LITHOTRIPSY Left 9/20/2017    CYSTOSCOPY URETEROSCOPY HOLMIUM LASER LITHO WITH STONE BASKETING WITH  STENT  performed by Ric Prado MD at [facility]         Medications Prior to Admission:     Prior to Admission medications    Medication Sig Start Date End Date Taking? Authorizing Provider   zaleplon (SONATA) 5 MG capsule Take 5 mg by mouth nightly.    Yes Historical Provider, MD   magnesium oxide (MAG-OX) 400 MG tablet Take 400 mg by mouth daily   Yes Historical Provider, MD   brexpiprazole (REXULTI) 1 MG TABS tablet Take 1 mg by mouth daily   Yes Historical Provider, MD   promethazine (PHENERGAN) 25 MG tablet take 1 tablet by mouth twice a day if needed for nausea 11/7/18  Yes Isaias Jeffries MD   fexofenadine (RA ALLERGY RELIEF) 180 MG tablet take 1 tablet by mouth once daily 11/7/18  Yes Isaias Jeffries MD   pramipexole (MIRAPEX) 0.5 MG tablet take 1 tablet by mouth twice a day 11/7/18  Yes Isaias Jeffries MD   nicotine (NICODERM CQ) 21 MG/24HR Place 1 patch onto the skin every 24 hours 10/22/18 10/22/19 Yes Isaias Jeffries MD   DULoxetine (CYMBALTA) 60 MG extended release capsule TAKE 1 CAPSULE BY MOUTH TWICE A DAY 8/3/18  Yes Gloria Arce MD   topiramate (TOPAMAX) 100 MG tablet take 1 tablet by mouth twice a day 8/3/18  Yes Gloria Arce MD   ranitidine (ZANTAC) 300 MG tablet take 1 tablet by mouth twice a day 8/3/18  Yes Gloria Arce MD   amLODIPine (NORVASC) 5 MG tablet take 1 tablet by mouth once daily 8/3/18  Yes Gloria Arce MD RA VITAMIN D-3 2000 units CAPS take 1 capsule by mouth once daily 5/30/18  Yes Gloria Arce MD   ibuprofen (ADVIL) 200 MG tablet Take 2 tablets by mouth every 6 hours as needed for Pain 9/15/17  Yes Meme De Leon MD   acetaminophen (APAP EXTRA STRENGTH) 500 MG tablet Take 2 tablets by mouth every 6 hours as needed for Pain 9/15/17  Yes Meme De Leon MD   nortriptyline (PAMELOR) 50 MG capsule Take 100 mg by mouth nightly   Yes Historical Provider, MD   metoprolol (LOPRESSOR) 25 MG tablet Take 25 mg by mouth 2 times daily  1/6/16  Yes Historical Provider, MD Carin Mcnamara LANCETS FINE [address] [facility] TEST BLOOD SUGARS 2 TO 3 TIMES A DAY  DX Code E11.9 11/14/18   Gloria Arce MD   blood glucose test strips (ONE TOUCH ULTRA TEST) strip TEST BLOOD SUGARS 2 TO 3 TIMES A DAY  DX Code E11.9 11/14/18   Gloria Arce MD        Allergies:     Aripiprazole; Eletriptan; Triptans [sumatriptan]; and Lamotrigine    Social History:     Tobacco:    reports that she has been smoking cigarettes. She has been smoking about 0.25 packs per day. She has never used smokeless tobacco.  Alcohol:      reports that she does not drink alcohol. Drug Use:  reports that she does not use drugs. Family History:     Family History   Problem Relation Age of Onset    Heart Disease Father     High Blood Pressure Brother        Review of Systems:     Positive and Negative as described in HPI.     CONSTITUTIONAL:  negative for fevers, chills, sweats, fatigue, weight loss  HEENT:  negative for vision, hearing changes, runny nose, throat pain  RESPIRATORY:  negative for shortness of breath, cough, congestion, wheezing. CARDIOVASCULAR:  POSor chest pain, palpitations. GASTROINTESTINAL:  POS for nausea, vomiting, diarrhea, constipation, change in bowel habits, abdominal pain   GENITOURINARY:  negative for difficulty of urination, burning with urination, frequency   INTEGUMENT:  negative for rash, skin lesions, easy bruising   HEMATOLOGIC/LYMPHATIC:  negative for swelling/edema   ALLERGIC/IMMUNOLOGIC:  negative for urticaria , itching  ENDOCRINE:  negative increase in drinking, increase in urination, hot or cold intolerance  MUSCULOSKELETAL:  negative joint pains, muscle aches, swelling of joints  NEUROLOGICAL:  negative for headaches, dizziness, lightheadedness, numbness, pain, tingling extremitiesPOS SYNCOPE  BEHAVIOR/PSYCH:  negative for depression, anxiety    Physical Exam:   /71   Pulse 88   Temp 97.7 °F (36.5 °C) (Oral)   Resp 17   Ht 5' 5\" (1.651 m)   Wt 295 lb (133.8 kg)   SpO2 95%   BMI 49.09 kg/m²   No results for input(s): POCGLU in the last 72 hours. General Appearance:  alert, well appearing, and in no acute distress  Mental status: oriented to person, place, and time with normal affect  Head:  normocephalic, atraumatic. Eye: no icterus, redness, pupils equal and reactive, extraocular eye movements intact, conjunctiva clear  Mouth: mucous membranes moist  Neck: supple, no carotid bruits, thyroid not palpable  Lungs: Bilateral equal air entry, clear to ausculation, no wheezing, rales or rhonchi, normal effort  Cardiovascular: normal rate, regular rhythm, no murmur, gallop, rub.   Abdomen: Soft, nontender, nondistended, normal bowel sounds, no hepatomegaly or splenomegaly  Neurologic: There are no new focal motor or sensory deficits, normal muscle tone and bulk, no abnormal sensation, normal speech, cranial nerves II through XII grossly intact  Skin: No gross lesions, rashes, bruising or bleeding on exposed skin area  Extremities:  peripheral pulses palpable, no pedal edema or calf pain with palpation      Investigations:      Laboratory Testing:  Recent Results (from the past 24 hour(s))   EKG 12 Lead    Collection Time: 05/16/19  3:26 PM   Result Value Ref Range    Ventricular Rate 74 BPM    Atrial Rate 74 BPM    P-R Interval 150 ms    QRS Duration 88 ms    Q-T Interval 396 ms    QTc Calculation (Bazett) 439 ms    P Axis 37 degrees    R Axis 19 degrees    T Axis 23 degrees   Basic Metabolic Panel    Collection Time: 05/16/19  4:25 PM   Result Value Ref Range    Glucose 117 (H) 70 - 99 mg/dL    BUN 11 6 - 20 mg/dL    CREATININE 0.46 (L) 0.50 - 0.90 mg/dL    Bun/Cre Ratio NOT REPORTED 9 - 20    Calcium 8.2 (L) 8.6 - 10.4 mg/dL    Sodium 139 135 - 144 mmol/L    Potassium 3.8 3.7 - 5.3 mmol/L    Chloride 104 98 - 107 mmol/L    CO2 21 20 - 31 mmol/L    Anion Gap 14 9 - 17 mmol/L    GFR Non-African American >60 >60 mL/min    GFR African American >60 >60 mL/min    GFR Comment          GFR Staging NOT REPORTED    Brain Natriuretic Peptide    Collection Time: 05/16/19  4:25 PM   Result Value Ref Range    Pro- <300 pg/mL    BNP Interpretation Pro-BNP Reference Range:    CBC Auto Differential    Collection Time: 05/16/19  4:25 PM   Result Value Ref Range    WBC 10.7 3.5 - 11.0 k/uL    RBC 4.03 4.0 - 5.2 m/uL    Hemoglobin 12.3 12.0 - 16.0 g/dL    Hematocrit 37.3 36 - 46 %    MCV 92.6 80 - 100 fL    MCH 30.6 26 - 34 pg    MCHC 33.0 31 - 37 g/dL    RDW 13.8 11.5 - 14.9 %    Platelets 680 340 - 391 k/uL    MPV 8.8 6.0 - 12.0 fL    NRBC Automated NOT REPORTED per 100 WBC    Differential Type NOT REPORTED     Seg Neutrophils 64 36 - 66 %    Lymphocytes 27 24 - 44 %    Monocytes 5 1 - 7 %    Eosinophils % 3 0 - 4 %    Basophils 1 0 - 2 %    Immature Granulocytes NOT REPORTED 0 %    Segs Absolute 6.90 1.3 - 9.1 k/uL    Absolute Lymph # 2.90 1.0 - 4.8 k/uL    Absolute Mono # 0.50 0.1 - 1.3 k/uL    Absolute Eos # 0.30 0.0 - 0.4 k/uL    Basophils # 0.10 0.0 - 0.2 k/uL    Absolute Immature Granulocyte NOT REPORTED 0.00 - 0.30 k/uL    WBC Morphology NOT REPORTED     RBC Morphology NOT REPORTED     Platelet Estimate NOT REPORTED    D-Dimer, Quantitative    Collection Time: 05/16/19  4:25 PM   Result Value Ref Range    D-Dimer, Quant 0.28 0.00 - 0.59 mg/L FEU   TROP/MYOGLOBIN    Collection Time: 05/16/19  4:25 PM   Result Value Ref Range    Troponin, High Sensitivity <6 0 - 14 ng/L    Troponin T NOT REPORTED <0.03 ng/mL    Troponin Interp NOT REPORTED     Myoglobin <21 (L) 25 - 58 ng/mL   APTT    Collection Time: 05/16/19  4:25 PM   Result Value Ref Range    PTT 31.1 24.0 - 36.0 sec   Protime-INR    Collection Time: 05/16/19  4:25 PM   Result Value Ref Range    Protime 12.8 11.8 - 14.6 sec    INR 1.0    TSH without Reflex    Collection Time: 05/16/19  4:25 PM   Result Value Ref Range    TSH 1.55 0.30 - 5.00 mIU/L   TROP/MYOGLOBIN    Collection Time: 05/16/19  6:20 PM   Result Value Ref Range    Troponin, High Sensitivity <6 0 - 14 ng/L    Troponin T NOT REPORTED <0.03 ng/mL    Troponin Interp NOT REPORTED     Myoglobin <21 (L) 25 - 58 ng/mL   HCG Qualitative, Serum    Collection Time: 05/17/19  8:56 AM   Result Value Ref Range    hCG Qual NEGATIVE NEGATIVE       Recent Labs     05/16/19  1625   HGB 12.3   HCT 37.3   WBC 10.7   MCV 92.6      K 3.8      CO2 21   BUN 11   CREATININE 0.46*   GLUCOSE 117*   INR 1.0   PROTIME 12.8   APTT 31.1       Hematology:  Recent Labs     05/16/19  1625   WBC 10.7   RBC 4.03   HGB 12.3   HCT 37.3   MCV 92.6   MCH 30.6   MCHC 33.0   RDW 13.8      MPV 8.8   INR 1.0   DDIMER 0.28     Chemistry:  Recent Labs     05/16/19  1625 05/16/19  1820     --    K 3.8  --      --    CO2 21  --    GLUCOSE 117*  --    BUN 11  --    CREATININE 0.46*  --    ANIONGAP 14  --    LABGLOM >60  --    GFRAA >60  --    CALCIUM 8.2*  --    PROBNP 191  --    TROPONINT NOT REPORTED NOT REPORTED   MYOGLOBIN <21* <21*     Recent Labs     05/16/19  1625   TSH 1.55 Imaging/Diagnostics:       Xr Chest Standard (2 Vw)    Result Date: 5/16/2019  EXAMINATION: TWO XRAY VIEWS OF THE CHEST 5/16/2019 3:21 pm COMPARISON: 10/09/2018 HISTORY: ORDERING SYSTEM PROVIDED HISTORY: Chest Pain TECHNOLOGIST PROVIDED HISTORY: Chest Pain Ordering Physician Provided Reason for Exam: chest pain, passed out FINDINGS: Stable borderline heart size without evidence of vascular congestion. Minimal subsegmental atelectasis/scarring in the left mid lung/lingula. No focal lung consolidation or significant pleural effusion is seen. Mild degenerative changes of the spine with no acute osseous abnormality. Tiny lung nodules described on the recent CT are not visualized on this exam. There are clips in the upper abdomen     Minimal subsegmental atelectasis/scarring left mid chest.     Ct Chest Wo Contrast    Result Date: 5/3/2019  EXAMINATION: CT OF THE CHEST WITHOUT CONTRAST 5/3/2019 11:52 am TECHNIQUE: CT of the chest was performed without the administration of intravenous contrast. Multiplanar reformatted images are provided for review. Dose modulation, iterative reconstruction, and/or weight based adjustment of the mA/kV was utilized to reduce the radiation dose to as low as reasonably achievable. COMPARISON: 10/19/2018 HISTORY: ORDERING SYSTEM PROVIDED HISTORY: Lung nodule TECHNOLOGIST PROVIDED HISTORY: Ordering Physician Provided Reason for Exam: PULMONARY NODULES Acuity: Unknown Type of Exam: Subsequent/Follow-up Additional signs and symptoms: NO COMPLAINTS; PT STATES RECHECK OF NODULES Relevant Medical/Surgical History: NO HX COPD OR ASTHMA; NO SURGERIES TO CHEST FINDINGS: Mediastinum: The cardiac size is normal. There is no significant mediastinal, hilar or axillary lymphadenopathy. The thyroid gland shows no significant abnormalities. The esophagus shows no significant abnormalities.  Lungs/pleura: 3 mm subpleural nodule with calcification in the anterior basal right lower lobe on series 4, image 67 was previous seen measuring 4 mm. A 6 mm(7 x 5 mm) solid noncalcified subpleural nodule in the anterior basal left lower lobe, series 4, image 79, is unchanged. Minor inferior lingular subsegmental atelectasis/scarring. No focal consolidation. No pleural effusion or pneumothorax. Upper Abdomen: Cholecystectomy. Soft Tissues/Bones: No acute process. Lipoma in the lower left lateral chest wall muscles. 1. Stable 6 mm (7 x 5 mm) anterior basal left lower lobe solid noncalcified subpleural nodule and a 3 mm nodule with calcification is also essentially unchanged from prior. 12 month follow-up CT for the 6 mm nodule. Impressions :      1. Active Problems:    Chest pain  Resolved Problems:    * No resolved hospital problems. *        2.  has a past medical history of Anxiety, Chronic kidney disease, Depression, DVT (deep venous thrombosis) ([facility] [address] (1997), Fibromyalgia, Headache(784.0), blood clots, Kidney stone, Pancreatitis (2001), Pleural effusion (2001), and SVT (supraventricular tachycardia) ([facility] [address] (9/8/2015). CHEST PAIN    TROPS NEG   EKG UNREMARKABLE   HAD SYNCOPE      ,H/O FIBROMYALGIA       BP    NO AFIB   NO VT  SVT     Plans:     1.   STRESS / ECHO     ASA   CONT LOPRESSOR    D/C NORVASC       Current Facility-Administered Medications   Medication Dose Route Frequency Provider Last Rate Last Dose    DULoxetine (CYMBALTA) extended release capsule 60 mg  60 mg Oral BID Bette Graham MD   60 mg at 05/17/19 0245    metoprolol tartrate (LOPRESSOR) tablet 25 mg  25 mg Oral BID Bette Graham MD        pramipexole (MIRAPEX) tablet 0.5 mg  0.5 mg Oral BID Lizy Alfaro MD   0.5 mg at 05/17/19 0258    topiramate (TOPAMAX) tablet 100 mg  100 mg Oral BID Lizy Alfaro MD   100 mg at 05/17/19 0245    LORazepam (ATIVAN) tablet 0.5 mg  0.5 mg Oral Nightly Bette Graham MD        nicotine (NICODERM CQ) 21 MG/24HR 1 patch  1 patch Transdermal Daily Hoyle High, MD Romayne Hoffman ondansetron (ZOFRAN) injection 4 mg  4 mg Intravenous Q6H PRN Madison Heath MD        brexpiprazole (REXULTI) tablet 1 mg  1 mg Oral Daily Madison Heath MD        metoprolol (LOPRESSOR) injection 5 mg  5 mg Intravenous Q5 Min PRN Ramon Lay MD        perflutren lipid microspheres (DEFINITY) injection 2.2 mg  2 mL Intravenous ONCE PRN Humberto Tierney MD        sodium chloride flush 0.9 % injection 10 mL  10 mL Intravenous PRN Ramon Lay MD        0.9 % sodium chloride infusion  500 mL Intravenous Continuous PRN Ramon Lay MD        albuterol sulfate  (90 Base) MCG/ACT inhaler 2 puff  2 puff Inhalation PRN Ramon Lay MD        atropine injection 0.5 mg  0.5 mg Intravenous Q5 Min PRN Ramon Lay MD        nitroGLYCERIN (NITROSTAT) SL tablet 0.4 mg  0.4 mg Sublingual Q5 Min PRN Ramon Lay MD        metoprolol (LOPRESSOR) injection 5 mg  5 mg Intravenous Q5 Min PRN Ramon Lay MD        technetium sestamibi (CARDIOLITE) injection 10 millicurie  10 millicurie Intravenous ONCE PRN Ramon Lay MD        sodium chloride flush 0.9 % injection 10 mL  10 mL Intravenous PRN Ramon Lay MD        regadenoson [facility]) injection 0.4 mg  0.4 mg Intravenous ONCE PRN Kartik Bj, DO        sodium chloride flush 0.9 % injection 10 mL  10 mL Intravenous PRN Kartik Bj, DO        0.9 % sodium chloride infusion  500 mL Intravenous Continuous PRN Kartik Bj, DO        albuterol sulfate  (90 Base) MCG/ACT inhaler 2 puff  2 puff Inhalation PRN Kartik Bj, DO        atropine injection 0.5 mg  0.5 mg Intravenous Q5 Min PRN Kartik Bj, DO        nitroGLYCERIN (NITROSTAT) SL tablet 0.4 mg  0.4 mg Sublingual Q5 Min PRN Kartik Bj, DO        metoprolol (LOPRESSOR) injection 5 mg  5 mg Intravenous Q5 Min PRN Kartik Bj, DO        aminophylline injection 50 mg  50 mg Intravenous PRN Kartik Bj, DO        acetaminophen (TYLENOL) tablet 1,000 mg  1,000 mg Oral Q6H PRN Frederick Garg MD        nortriptyline (PAMELOR) capsule 100 mg  100 mg Oral Nightly Frederick Garg MD        famotidine (PEPCID) tablet 40 mg  40 mg Oral QPM Frederick Garg MD        sodium chloride flush 0.9 % injection 10 mL  10 mL Intravenous 2 times per day Frederick Garg MD        acetaminophen (TYLENOL) tablet 650 mg  650 mg Oral Q4H PRN Frederick Garg MD        enoxaparin (LOVENOX) injection 30 mg  30 mg Subcutaneous BID MD Frederick Golden MD  5/17/2019  10:24 AM

## 2019-05-17 NOTE — ED NOTES
Report given to Ferny Allan RN from [place]. Report method by phone   The following was reviewed with receiving RN:   Current vital signs:  /64   Pulse 81   Temp 97.6 °F (36.4 °C) (Oral)   Resp 18   Ht 5' 5\" (1.651 m)   Wt 295 lb (133.8 kg)   SpO2 96%   BMI 49.09 kg/m²                MEWS Score: 1     Any medication or safety alerts were reviewed. Any pending diagnostics and notifications were also reviewed, as well as any safety concerns or issues, abnormal labs, abnormal imaging, and abnormal assessment findings. Questions were answered.             Josephine Santa RN  05/16/19 [id number]

## 2019-05-17 NOTE — CARE COORDINATION
CASE MANAGEMENT NOTE:    Admission Date:  5/16/2019 Helena Villatoro is a 38 y.o.  female    Admitted for : Chest pain [R07.9]  Chest pain [R07.9]    Met with:  Patient    PCP:  Dr. Lopez Ped:  [facility]      Current Residence/ Living Arrangements:  independently at home             Current Services PTA:  No    Is patient agreeable to VNS: No    Freedom of choice provided: NA    List of [address] provided: NA    VNS chosen:  No    DME:  none    Home Oxygen: No    Nebulizer: No    CPAP/BIPAP: No    Supplier: N/A    Potential Assistance Needed: No    SNF needed: No    Pharmacy:  [facility] in [place]       Is the Patient an [facility] with Readmission Risk Score greater than 14%? No  If yes, pt needs a follow up appointment made within 7 days. Does Patient want to use MEDS to BEDS? No    Family Members/Caregivers that pt would like involved in their care:    Yes    If yes, list name here:  HENNESSY    Transportation Provider:  Family             Is patient in Isolation/One on One/Altered Mental Status? No  If yes, skip next question. If no, would they like an I-Pad to  use? No  If yes, call [phone]. Discharge Plan:  5/17/19 [facility] Pt is from home in a one story home she uses no dme and denies need for vns plan is to discharge to home with no needs will continue to follow possible discharge to home today is stress is neg .//tv                 Electronically signed by:  Riri Samson RN on 5/17/2019 at 9:47 AM

## 2019-05-17 NOTE — PROGRESS NOTES
CRUZITO LIZARRAGA RN ATTEMPTED TO START PATIENT IV X2, UNSUCCESFULL, PATIENT REQUESTED WE CALL HER BROTHER WHO IS AN RN IN THE ICU JULIET, TO COME AND TRY AND START HER IV AND TELL HIM WHAT IS GOING ON WITH HER CARE. JULIET STATED HE DID NOT WANT TO COME AND START THE IV AND IT WAS AGAINST THE HOSPITAL POLICY TO DO WORK ON FAMILY MEMBERS. PATIENT UPDATED WHAT WAS STATED, AND SHE WAS TEARFUL. AGREED TO HAVE ANESTHESIA TRY AND IV. SUBJECTIVE:                                                      Deandra Garcia is a 1 year old female, here for a routine health maintenance visit.    Patient was roomed by: Cat Johansen    [facility] Child     Family/Social History  Patient accompanied by:  Mother and father  Questions or concerns?: No    Forms to complete? YES  Child lives with::  Mother, father, maternal grandmother and maternal grandfather  Who takes care of your child?:  Home with family member, pre-school and after school program  Languages spoken in the home:  English  Recent family changes/ special stressors?:  None noted and OTHER*    Safety  Is your child around anyone who smokes?  YES; passive exposure from smoking outside home    TB Exposure:     No TB exposure    Car seat <6 years old, in back seat, 5-point restraint?  Yes  Bike or sport helmet for bike trailer or trike?  Yes    Home Safety Survey:      Wood stove / Fireplace screened?  Not applicable     Poisons / cleaning supplies out of reach?:  Yes     Swimming pool?:  No     Firearms in the home?: No      Daily Activities    Diet and Exercise     Child gets at least 4 servings fruit or vegetables daily: Yes    Consumes beverages other than lowfat white milk or water: YES    Dairy/calcium sources: whole milk    Calcium servings per day: >3    Child gets at least 60 minutes per day of active play: Yes    TV in child's room: No    Sleep       Sleep concerns: no concerns- sleeps well through night     Bedtime: 19:30     Sleep duration (hours): 10    Elimination       Urinary frequency:4-6 times per 24 hours     Stool frequency: 1-3 times per 24 hours     Stool consistency: soft     Elimination problems:  None     Toilet training status:  Starting to toilet train    Media     Types of media used: video/dvd/tv    Daily use of media (hours): 0    Dental     Water source:  City water    Dental provider: patient has a dental home    Dental exam in last 6 months: No     No dental  risks      Dental visit recommended: Yes  Dental varnish declined by parent    Cardiac risk assessment:     Family history (males <55, females <65) of angina (chest pain), heart attack, heart surgery for clogged arteries, or stroke: no    Biological parent(s) with a total cholesterol over 240:  no    DEVELOPMENT  Screening tool used, reviewed with parent/guardian:   ASQ 30 month Communication Gross Motor Fine Motor Problem Solving Personal-social   Score 60 50 50 30 60   Cutoff 33.30 36.14 19.25 27.08 32.01   Result Passed Passed Passed MONITOR Passed     Milestones (by observation/ exam/ report) 75-90% ile   PERSONAL/ SOCIAL/COGNITIVE:    Removes garment    Emerging pretend play    Shows sympathy/ comforts others  LANGUAGE:    2 word phrases    Points to / names pictures    Follows 2 step commands  GROSS MOTOR:    Runs    Walks up steps    Kicks ball  FINE MOTOR/ ADAPTIVE:    Uses spoon/fork    [employer] of 4 blocks    Opens door by turning knob    PROBLEM LIST  Patient Active Problem List   Diagnosis     Normal  (single liveborn)     Cephalohematoma     Hyperbilirubinemia,       weight loss     Family history of severe allergy     Gross motor delay     MEDICATIONS  Current Outpatient Medications   Medication Sig Dispense Refill     Acetaminophen (TYLENOL PO)         ALLERGY  Allergies   Allergen Reactions     Sulfa Drugs Unknown     Family hx of sulfa allergy ,moms family          IMMUNIZATIONS  Immunization History   Administered Date(s) Administered     DTAP (<7y) 2018     DTAP-IPV/HIB (PENTACEL) 2016, 2016, 2017     HepA-ped 2 Dose 2017, 10/09/2018     HepB 2016, 2016, 2017     Hib (PRP-T) 2018     Influenza Vaccine IM Ages 6-35 Months 4 Valent (PF) 2017, 2018, 10/09/2018     MMR 2017     Pneumo Conj 13-V (2010&after) 2016, 2016, 2017, 2018     Rotavirus, monovalent, 2-dose 2016, 2016  "    Varicella 09/12/2017       HEALTH HISTORY SINCE LAST VISIT  No surgery, major illness or injury since last physical exam    ROS  Constitutional, eye, ENT, skin, respiratory, cardiac, and GI are normal except as otherwise noted.    OBJECTIVE:   EXAM  Pulse 129   Temp 97.7  F (36.5  C) (Tympanic)   Ht 3' 1\" (0.94 m)   Wt 27 lb 5 oz (12.4 kg)   HC 18.75\" (47.6 cm)   SpO2 99%   BMI 14.03 kg/m    82 %ile based on CDC (Girls, 2-20 Years) Stature-for-age data based on Stature recorded on 3/19/2019.  31 %ile based on CDC (Girls, 2-20 Years) weight-for-age data based on Weight recorded on 3/19/2019.  35 %ile based on [facility] (Girls, 0-36 Months) head circumference-for-age based on Head Circumference recorded on 3/19/2019.  GENERAL: Alert, well appearing, no distress  SKIN: Clear. No significant rash, abnormal pigmentation or lesions  HEAD: Normocephalic.  EYES:  Symmetric light reflex and no eye movement on cover/uncover test. Normal conjunctivae.  EARS: Normal canals. Tympanic membranes are normal; gray and translucent.  NOSE: Normal without discharge.  MOUTH/THROAT: Clear. No oral lesions. Teeth without obvious abnormalities.  NECK: Supple, no masses.  No thyromegaly.  LYMPH NODES: No adenopathy  LUNGS: Clear. No rales, rhonchi, wheezing or retractions  HEART: Regular rhythm. Normal S1/S2. No murmurs. Normal pulses.  ABDOMEN: Soft, non-tender, not distended, no masses or hepatosplenomegaly. Bowel sounds normal.   GENITALIA: Normal female external genitalia. Bayron stage I,  No inguinal herniae are present.  EXTREMITIES: Full range of motion, no deformities  NEUROLOGIC: No focal findings. Cranial nerves grossly intact: DTR's normal. Normal gait, strength and tone    ASSESSMENT/PLAN:       ICD-10-CM    1. Encounter for routine child health examination w/o abnormal findings Z00.129 DEVELOPMENTAL TEST, MILLER       Anticipatory Guidance  The following topics were discussed:  SOCIAL/ FAMILY:    Toilet training    " Speech/language    Reading to child    Given a book from Reach [employer] & Read    Limit TV - < 2 hrs/day  NUTRITION:    Variety at mealtime  HEALTH/ SAFETY:    Dental hygiene    Sleep issues    Preventive Care Plan  Immunizations    Reviewed, up to date  Referrals/Ongoing Specialty care: No   See other orders in EpicCare.  BMI at 4 %ile based on CDC (Girls, 2-20 Years) BMI-for-age based on body measurements available as of 3/19/2019. No weight concerns.  Dyslipidemia risk:    None    FOLLOW-UP:  at 1  years for a Preventive Care visit    Resources  Goal Tracker: Be More Active  Goal Tracker: Less Screen Time  Goal Tracker: Drink More Water  Goal Tracker: Eat More Fruits and Veggies  [place] Child and Teen Checkups (C&TC) Schedule of Age-Related Screening Standards    Umu Vega MD  [facility]

## 2019-05-17 NOTE — FLOWSHEET NOTE
05/17/19 0156   Provider Notification   Reason for Communication Evaluate;New orders   Provider Name Dr. Brian Schumacher   Provider Notification Physician   Method of Communication Call   Response See orders   Notification Time [phone]   RN spoke with physician regarding pts admission.  RN went over pts home meds with physician and he stated to reorder pts night time home meds, zofran, nicotine patch and a consult to Dr. Longo Postin

## 2019-05-17 NOTE — PROGRESS NOTES
Pt admitted to room 2096. Vitals taken, telemetry on and admission complete. No s/s or c/o distress noted at time of admission. Pt has no complaints of chest pain at this time.

## 2019-05-17 NOTE — FLOWSHEET NOTE
05/17/19 0044   Provider Notification   Reason for Communication Evaluate;New orders   Provider Name Dr. Hannah Kraft    Provider Notification Physician   Method of Communication Secure Message   Response No new orders   Notification Time [phone]   RN notified physician regarding pts reason for admission, restarting home meds, consults, and vitals. No new orders given.

## 2019-05-17 NOTE — PROGRESS NOTES
Highly likely that IV contrast will need to be utilized for optimal echo quality. After several attempts in the stress lab, IV access was not obtained. The echo will be done on this patient in the morning after her stress test, when contrast can be utilized. FRANKO burden.

## 2019-05-17 NOTE — PROGRESS NOTES
SPOKE WITH SAMANTHA FROM SURGERY, ASKED IF ANESTHESIA WAS AVAILABLE TO START AN IV ON THIS PATIENT, SHE STATED THEY WERE IN THE MIDDLE OF SURGERY CASES AND COULD DO THE IV AFTER THEY WERE DONE. PATIENT UPDATED, AND SHE AGREED. PATIENT PREPARED TO GO BACK TO HER ROOM PER WHEELCHAIR, TO ROOM 2096. IV PER ANESTHESIA WILL BE DONE LATER TODAY, STRESS TEST WILL HAVE TO BE FINISHED TOMORROW, DR ADAM RODRIGUEZ UPDATED, AND ECHO WILL HAVE TO BE COMPLETED TOMORROW, DUE TO PATIENT NEEDING DEFINITY.

## 2019-05-17 NOTE — PLAN OF CARE
Problem: Falls - Risk of:  Goal: Will remain free from falls  Description  Will remain free from falls  Outcome: Ongoing   The patient remained free from falls this shift, call light within reach, bed in locked and lowest position. Side rails up x2. Continue to monitor closely.

## 2019-05-17 NOTE — CONSULTS
(SONATA) 5 MG capsule Take 5 mg by mouth nightly.    Yes Historical Provider, MD   magnesium oxide (MAG-OX) 400 MG tablet Take 400 mg by mouth daily   Yes Historical Provider, MD   brexpiprazole (REXULTI) 1 MG TABS tablet Take 1 mg by mouth daily   Yes Historical Provider, MD   promethazine (PHENERGAN) 25 MG tablet take 1 tablet by mouth twice a day if needed for nausea 11/7/18  Yes Hernandez Knapp MD   fexofenadine (RA ALLERGY RELIEF) 180 MG tablet take 1 tablet by mouth once daily 11/7/18  Yes Hernandez Knapp MD   pramipexole (MIRAPEX) 0.5 MG tablet take 1 tablet by mouth twice a day 11/7/18  Yes Hernandez Knapp MD   nicotine (NICODERM CQ) 21 MG/24HR Place 1 patch onto the skin every 24 hours 10/22/18 10/22/19 Yes Hernandez Knapp MD   DULoxetine (CYMBALTA) 60 MG extended release capsule TAKE 1 CAPSULE BY MOUTH TWICE A DAY 8/3/18  Yes Hernandez Knapp MD   topiramate (TOPAMAX) 100 MG tablet take 1 tablet by mouth twice a day 8/3/18  Yes Hernandez Knapp MD   ranitidine (ZANTAC) 300 MG tablet take 1 tablet by mouth twice a day 8/3/18  Yes Hernandez Knapp MD   amLODIPine (NORVASC) 5 MG tablet take 1 tablet by mouth once daily 8/3/18  Yes Hernandez Knapp MD   RA VITAMIN D-3 2000 units CAPS take 1 capsule by mouth once daily 5/30/18  Yes Hernandez Knapp MD   ibuprofen (ADVIL) 200 MG tablet Take 2 tablets by mouth every 6 hours as needed for Pain 9/15/17  Yes Debra Xie MD   acetaminophen (APAP EXTRA STRENGTH) 500 MG tablet Take 2 tablets by mouth every 6 hours as needed for Pain 9/15/17  Yes Debra Xie MD   nortriptyline (PAMELOR) 50 MG capsule Take 100 mg by mouth nightly   Yes Historical Provider, MD   metoprolol (LOPRESSOR) 25 MG tablet Take 25 mg by mouth 2 times daily  1/6/16  Yes Historical Provider, MD   [address] TEST BLOOD SUGARS 2 TO 3 TIMES A DAY  DX Code E11.9 11/14/18   Hernandez Knapp MD   blood glucose test strips (ONE TOUCH ULTRA TEST) strip TEST BLOOD SUGARS 2 TO 3 TIMES A DAY  DX Code E11.9 11/14/18   Larissa Rivera MD        [facility] Meds:    Current Facility-Administered Medications   Medication Dose Route Frequency Provider Last Rate Last Dose    DULoxetine (CYMBALTA) extended release capsule 60 mg  60 mg Oral BID Bo Dasilva MD   60 mg at 05/17/19 0245    metoprolol tartrate (LOPRESSOR) tablet 25 mg  25 mg Oral BID Bo Dasilva MD        pramipexole (MIRAPEX) tablet 0.5 mg  0.5 mg Oral BID Bo Dasilva MD   0.5 mg at 05/17/19 0258    topiramate (TOPAMAX) tablet 100 mg  100 mg Oral BID Bo Dasilva MD   100 mg at 05/17/19 0245    LORazepam (ATIVAN) tablet 0.5 mg  0.5 mg Oral Nightly Bo Dasilva MD        nicotine (NICODERM CQ) 21 MG/24HR 1 patch  1 patch Transdermal Daily Bo Dasilva MD        ondansetron (ZOFRAN) injection 4 mg  4 mg Intravenous Q6H PRN Bo Dasilva MD        brexpiprazole (REXULTI) tablet 1 mg  1 mg Oral Daily Bo Dasilva MD        sodium chloride flush 0.9 % injection 10 mL  10 mL Intravenous 2 times per day Bo Dasilva MD        sodium chloride flush 0.9 % injection 10 mL  10 mL Intravenous PRN Bo Dasilva MD        acetaminophen (TYLENOL) tablet 650 mg  650 mg Oral Q4H PRN Bo Dasilva MD        enoxaparin (LOVENOX) injection 30 mg  30 mg Subcutaneous BID Bo Dasilva MD           Social History:     Current smoker:   yes     Ex-smoker:   not applicable         Family Histroy:    Heart Disease:   yes   Stroke:   no   Lung Disease:  not applicable        Review of Systems:   · Constitutional: there has been no unanticipated weight loss. There's been no change in energy level, sleep pattern, or activity level. · Eyes: No visual changes or diplopia. No scleral icterus. · ENT: No Headaches, hearing loss or vertigo. No mouth sores or sore throat. · Cardiovascular:  See HPI  · Respiratory: No cough or wheezing, no sputum production. No hematemesis.     · Gastrointestinal: No abdominal pain, appetite loss, blood in stools. No change in bowel or bladder habits. · Genitourinary: No dysuria, trouble voiding, or hematuria. · Musculoskeletal:  No gait disturbance, weakness or joint complaints. · Integumentary: No rash or pruritis. · Neurological: No headache, diplopia, change in muscle strength, numbness or tingling. No change in gait, balance, coordination, mood, affect, memory, mentation, behavior. · Psychiatric: No anxiety, or depression. · Endocrine: No temperature intolerance. No excessive thirst, fluid intake, or urination. No tremor. · Hematologic/Lymphatic: No abnormal bruising or bleeding, blood clots or swollen lymph nodes. · Allergic/Immunologic: No nasal congestion or hives. Physical Exam   Vital Signs: /71   Pulse 88   Temp 97.7 °F (36.5 °C) (Oral)   Resp 17   Ht 5' 5\" (1.651 m)   Wt 295 lb (133.8 kg)   SpO2 95%   BMI 49.09 kg/m²        Admission Weight: 295 lb (133.8 kg)     General appearance: Awake, Alert Cooperative    Head: Normocephalic, without obvious abnormality, atraumatic    Eyes: Conjunctivae/corneas clear. PERRL, EOM's intact. Fundi benign    Neck: no adenopathy, no carotid bruit, no JVD, supple, symmetrical, trachea midline and thyroid: not enlarged, symmetric, no tenderness/mass/nodules    Lungs: clear to auscultation bilaterally    Heart: regular rate and rhythm, S1, S2 normal, no murmur, click, rub or gallop    Abdomen: Soft, non-tender.  Bowel sounds normal. No masses,  no organomegaly    Extremities: extremities normal, atraumatic, no cyanosis or edema    Skin: Skin color, texture, turgor normal. No rashes or lesions    Neurologic: Grossly normal            Labs:      CBC:   Recent Labs     05/16/19  1625   WBC 10.7   HGB 12.3   HCT 37.3   MCV 92.6        BMP:   Recent Labs     05/16/19  1625      K 3.8      CO2 21   BUN 11   CREATININE 0.46*     PT/INR:   Recent Labs     05/16/19  1625   PROTIME 12.8   INR 1.0     APTT:   Recent Labs     05/16/19  1625

## 2019-05-17 NOTE — PROGRESS NOTES
PATIENT IN STRESS LAB, IV IN RIGHT HAND ATTEMPTED TO BE FLUSHED WITH NORMAL SALINE PER PROTOCOL, DID NOT FLUSH, D/C'D. ATTEMPTED TO RESTART PATIENT IV X2, NO SUCCESS.

## 2019-05-18 VITALS
HEART RATE: 83 BPM | BODY MASS INDEX: 48.82 KG/M2 | WEIGHT: 293 LBS | OXYGEN SATURATION: 97 % | SYSTOLIC BLOOD PRESSURE: 116 MMHG | RESPIRATION RATE: 15 BRPM | TEMPERATURE: 97.7 F | DIASTOLIC BLOOD PRESSURE: 64 MMHG | HEIGHT: 65 IN

## 2019-05-18 LAB
LV EF: 62 %
LV EF: 68 %
LVEF MODALITY: NORMAL
LVEF MODALITY: NORMAL

## 2019-05-18 PROCEDURE — 93306 TTE W/DOPPLER COMPLETE: CPT

## 2019-05-18 PROCEDURE — G0378 HOSPITAL OBSERVATION PER HR: HCPCS

## 2019-05-18 PROCEDURE — A9500 TC99M SESTAMIBI: HCPCS | Performed by: INTERNAL MEDICINE

## 2019-05-18 PROCEDURE — 93017 CV STRESS TEST TRACING ONLY: CPT

## 2019-05-18 PROCEDURE — 96372 THER/PROPH/DIAG INJ SC/IM: CPT

## 2019-05-18 PROCEDURE — 6360000002 HC RX W HCPCS: Performed by: INTERNAL MEDICINE

## 2019-05-18 PROCEDURE — 99238 HOSP IP/OBS DSCHRG MGMT 30/<: CPT | Performed by: INTERNAL MEDICINE

## 2019-05-18 PROCEDURE — 3430000000 HC RX DIAGNOSTIC RADIOPHARMACEUTICAL: Performed by: INTERNAL MEDICINE

## 2019-05-18 PROCEDURE — 6370000000 HC RX 637 (ALT 250 FOR IP): Performed by: INTERNAL MEDICINE

## 2019-05-18 PROCEDURE — 93351 STRESS TTE COMPLETE: CPT

## 2019-05-18 PROCEDURE — 2580000003 HC RX 258: Performed by: INTERNAL MEDICINE

## 2019-05-18 RX ADMIN — DULOXETINE HYDROCHLORIDE 60 MG: 60 CAPSULE, DELAYED RELEASE ORAL at 12:03

## 2019-05-18 RX ADMIN — ENOXAPARIN SODIUM 30 MG: 30 INJECTION SUBCUTANEOUS at 12:03

## 2019-05-18 RX ADMIN — Medication 10 ML: at 09:37

## 2019-05-18 RX ADMIN — BREXPIPRAZOLE 1 MG: 1 TABLET ORAL at 12:02

## 2019-05-18 RX ADMIN — METOPROLOL TARTRATE 25 MG: 25 TABLET, FILM COATED ORAL at 12:03

## 2019-05-18 RX ADMIN — PRAMIPEXOLE DIHYDROCHLORIDE 0.5 MG: 0.25 TABLET ORAL at 12:04

## 2019-05-18 RX ADMIN — TETRAKIS(2-METHOXYISOBUTYLISOCYANIDE)COPPER(I) TETRAFLUOROBORATE 33.1 MILLICURIE: 1 INJECTION, POWDER, LYOPHILIZED, FOR SOLUTION INTRAVENOUS at 09:37

## 2019-05-18 RX ADMIN — TOPIRAMATE 100 MG: 100 TABLET, FILM COATED ORAL at 12:03

## 2019-05-18 RX ADMIN — REGADENOSON 0.4 MG: 0.08 INJECTION, SOLUTION INTRAVENOUS at 09:36

## 2019-05-18 NOTE — DISCHARGE INSTR - COC
Continuity of Care Form    Patient Name: Kp Pratt   :  1975  MRN:  [id number]    Admit date:  2019  Discharge date:  ***    Code Status Order: Full Code   Advance Directives:   Advance Care Flowsheet Documentation     Date/Time Healthcare Directive Type of Healthcare Directive Copy in [address] Agent's Name Healthcare Agent's Phone Number    [phone]  No, patient does not have an advance directive for healthcare treatment -- -- -- -- --          Admitting Physician:  John Adams MD  PCP: Tia Cote MD    Discharging Nurse: [facility] Unit/Room#: 2096/2096-01  Discharging Unit Phone Number: ***    Emergency Contact:   Extended Emergency Contact Information  Primary Emergency Contact: PierreSuyapa   [address] Phone: [phone]  Relation: Parent    Past Surgical History:  Past Surgical History:   Procedure Laterality Date    ATRIAL ABLATION SURGERY      BACK SURGERY      L4-5    CHOLECYSTECTOMY      ENDOMETRIAL ABLATION      e sure implants placed also    ENDOSCOPY, COLON, DIAGNOSTIC      EXCISION LESION HAND / FINGER Right 2019    HAND LESION BIOPSY EXCISION performed by Nadja Bojorquez MD at [facility] Bilateral     left x2, right x1    FOOT SURGERY Right     x3    KNEE ARTHROSCOPY Left     x2    MO CYSTO/URETERO/PYELOSCOPY W/LITHOTRIPSY Left 2017    CYSTOSCOPY URETEROSCOPY HOLMIUM LASER LITHO WITH STONE BASKETING WITH  STENT  performed by Kenton Pham MD at [facility]        Immunization History:   Immunization History   Administered Date(s) Administered    Hepatitis A 2019    Influenza Virus Vaccine 2013    Influenza, Magaly Neri, 3 yrs and older, IM, PF (Fluzone 3 yrs and older or Afluria 5 yrs and older) 2016, 2018    Tdap (Boostrix, Adacel) 09/15/2017       Active Problems:  Patient Active Problem List   Diagnosis Code  Anxiety F41.9    Obesity E66.9    Restless leg syndrome G25.81    Previous back surgery Z98.890    Major depression F32.9    HTN (hypertension) I10    FHx: rheumatoid arthritis Z82.61    SVT (supraventricular tachycardia) (HCC) I47.1    Right elbow pain M25.521    Pain of right lower extremity M79.604    Cough with sputum R05    Varicose vein of leg I83.90    Leg swelling M79.89    Herpes zoster without complication I97.8    Chest pain R07.9       Isolation/Infection:   Isolation          No Isolation            Nurse Assessment:  Last Vital Signs: /64   Pulse 83   Temp 97.7 °F (36.5 °C) (Oral)   Resp 15   Ht 5' 5\" (1.651 m)   Wt 293 lb 10.4 oz (133.2 kg)   SpO2 97%   BMI 48.87 kg/m²     Last documented pain score (0-10 scale): Pain Level: 0  Last Weight:   Wt Readings from Last 1 Encounters:   05/18/19 293 lb 10.4 oz (133.2 kg)     Mental Status:  {IP PT MENTAL STATUS:78431}    IV Access:  { JUAN FRANCISCO IV ACCESS:848582485}    Nursing Mobility/ADLs:  Walking   {CHP DME LPHP:299636630}  Transfer  {CHP DME PYSQ:035546702}  Bathing  {CHP DME JQOV:336354195}  Dressing  {CHP DME SEPH:616965528}  Toileting  {CHP DME KFFM:071036344}  Feeding  {P DME TZEK:633801695}  Med Admin  {CHP DME YGWC:856334451}  Med Delivery   {[facility] [place] Delivery:656972863}    Wound Care Documentation and Therapy:        Elimination:  Continence:   · Bowel: {YES / KU:00666}  · Bladder: {YES / QQ:58616}  Urinary Catheter: {Urinary Catheter:704860778}   Colostomy/Ileostomy/Ileal Conduit: {YES / MB:14241}       Date of Last BM: ***    Intake/Output Summary (Last 24 hours) at 5/18/2019 1556  Last data filed at 5/18/2019 1345  Gross per 24 hour   Intake 1080 ml   Output 500 ml   Net 580 ml     I/O last 3 completed shifts:   In: 1080 [P.O.:1080]  Out: 500 [Urine:500]    Safety Concerns:     [address] JUAN FRANCISCO Safety Concerns:456214646}    Impairments/Disabilities:      [address] CHICAS Impairments/Disabilities:043360611}    Nutrition Therapy:  Current Nutrition Therapy:   [address] JUAN FRANCISCO Diet List:252872733}    Routes of Feeding: {CHP DME Other Feedings:489206517}  Liquids: {Slp liquid thickness:43932}  Daily Fluid Restriction: {CHP DME Yes amt example:449490960}  Last Modified Barium Swallow with Video (Video Swallowing Test): {Done Not Done XZSA:016452189}    Treatments at the Time of Hospital Discharge:   Respiratory Treatments: ***  Oxygen Therapy:  {Therapy; copd oxygen:52628}  Ventilator:    { CC Vent EHUP:893560784}    Rehab Therapies: {THERAPEUTIC INTERVENTION:1447350487}  Weight Bearing Status/Restrictions: { CC Weight Bearin}  Other Medical Equipment (for information only, NOT a DME order):  {EQUIPMENT:008178206}  Other Treatments: ***    Patient's personal belongings (please select all that are sent with patient):  {[facility] DME Belongings:692076775}    RN SIGNATURE:  {Esignature:863924022}    CASE MANAGEMENT/SOCIAL WORK SECTION    Inpatient Status Date: ***    Readmission Risk Assessment Score:  Readmission Risk              Risk of Unplanned Readmission:        10           Discharging to Facility/ Agency   · Name:   · Address:  · Phone:  · Fax:    Dialysis Facility (if applicable)   · Name:  · Address:  · Dialysis Schedule:  · Phone:  · Fax:    / signature: {Esignature:480362424}    PHYSICIAN SECTION    Prognosis: {Prognosis:3188899528}    Condition at Discharge: [address] Patient Condition:006605175}    Rehab Potential (if transferring to Rehab): {Prognosis:6537577897}    Recommended Labs or Other Treatments After Discharge: ***    Physician Certification: I certify the above information and transfer of Zena Carlin  is necessary for the continuing treatment of the diagnosis listed and that she requires {Admit to Appropriate Level of Care:40215} for {GREATER/LESS:471166398} 30 days.      Update Admission H&P: {[facility] DME Changes in YKB:204656578}    PHYSICIAN SIGNATURE:  {Esignature:467308005}

## 2019-05-18 NOTE — DISCHARGE INSTR - DIET

## 2019-05-18 NOTE — PROCEDURES
[address] [facility]                    [facility]                              CARDIAC STRESS TEST    PATIENT NAME: Aramis Zhao               :        1975  MED REC NO:   [id number]                              ROOM:       2096  ACCOUNT NO:   [de-identified]                           ADMIT DATE: 2019  PROVIDER:     Sylvia Beyer MD    DATE OF STUDY:  2019    TEST TYPE: LEXISCAN CARDIOLYTE STRESS TEST  INDICATION: CHEST PAIN  REFERRING PHYSICIAN: NORMA MULLINS    RESTING HEART RATE: 76 BEATS PER MINUTE  RESTING BLOOD PRESSURE: 112/70    MEDICATION(S) GIVEN: 0.4MG IV LEXISCAN  REASON FOR TERMINATION: MEDICATION INFUSION COMPLETE    RESTING EKG: NORMAL  STRESS EKGs: NO CHANGES SEEN  ISCHEMIC EKG CHANGES: NONE    EKG IMPRESSION: ELECTROCARDIOGRAPHICALLY NEGATIVE LEXISCAN STRESS TEST. RADIOISOTOPE RESULTS TO FOLLOW FROM THE DEPARTMENT OF NUCLEAR MEDICINE.       Mireya Robb MD    D: 2019 11:19:07       T: 2019 11:26:37     /NICK  Job#: [id number]     Doc#: Unknown    CC:    (Retain this field even if not dictated or not decipherable)

## 2019-05-18 NOTE — PROGRESS NOTES
Lexiscan stress complete. Patient tolerated well. Patient to [facility] for additional imaging per wheelchair.

## 2019-05-26 ENCOUNTER — APPOINTMENT (OUTPATIENT)
Dept: GENERAL RADIOLOGY | Age: 44
End: 2019-05-26
Payer: COMMERCIAL

## 2019-05-26 ENCOUNTER — HOSPITAL ENCOUNTER (EMERGENCY)
Age: 44
Discharge: HOME | End: 2019-05-26
Attending: EMERGENCY MEDICINE
Payer: COMMERCIAL

## 2019-05-26 ENCOUNTER — APPOINTMENT (OUTPATIENT)
Dept: CT IMAGING | Age: 44
End: 2019-05-26
Payer: COMMERCIAL

## 2019-05-26 VITALS
HEIGHT: 65 IN | SYSTOLIC BLOOD PRESSURE: 115 MMHG | OXYGEN SATURATION: 95 % | DIASTOLIC BLOOD PRESSURE: 61 MMHG | HEART RATE: 79 BPM | TEMPERATURE: 98 F | RESPIRATION RATE: 18 BRPM | BODY MASS INDEX: 48.82 KG/M2 | WEIGHT: 293 LBS

## 2019-05-26 DIAGNOSIS — R60.9 PERIPHERAL EDEMA: Primary | ICD-10-CM

## 2019-05-26 LAB
-: ABNORMAL
ABSOLUTE EOS #: 0.4 K/UL (ref 0–0.4)
ABSOLUTE IMMATURE GRANULOCYTE: ABNORMAL K/UL (ref 0–0.3)
ABSOLUTE LYMPH #: 2.7 K/UL (ref 1–4.8)
ABSOLUTE MONO #: 0.4 K/UL (ref 0.1–1.3)
AMORPHOUS: ABNORMAL
ANION GAP SERPL CALCULATED.3IONS-SCNC: 11 MMOL/L (ref 9–17)
BACTERIA: ABNORMAL
BASOPHILS # BLD: 1 % (ref 0–2)
BASOPHILS ABSOLUTE: 0.1 K/UL (ref 0–0.2)
BILIRUBIN URINE: NEGATIVE
BNP INTERPRETATION: NORMAL
BUN BLDV-MCNC: 9 MG/DL (ref 6–20)
BUN/CREAT BLD: ABNORMAL (ref 9–20)
CALCIUM SERPL-MCNC: 8.3 MG/DL (ref 8.6–10.4)
CASTS UA: ABNORMAL /LPF
CHLORIDE BLD-SCNC: 109 MMOL/L (ref 98–107)
CO2: 21 MMOL/L (ref 20–31)
COLOR: YELLOW
COMMENT UA: ABNORMAL
CREAT SERPL-MCNC: 0.54 MG/DL (ref 0.5–0.9)
CRYSTALS, UA: ABNORMAL /HPF
D-DIMER QUANTITATIVE: 0.67 MG/L FEU (ref 0–0.59)
DIFFERENTIAL TYPE: ABNORMAL
EOSINOPHILS RELATIVE PERCENT: 4 % (ref 0–4)
EPITHELIAL CELLS UA: ABNORMAL /HPF
GFR AFRICAN AMERICAN: >60 ML/MIN
GFR NON-AFRICAN AMERICAN: >60 ML/MIN
GFR SERPL CREATININE-BSD FRML MDRD: ABNORMAL ML/MIN/{1.73_M2}
GFR SERPL CREATININE-BSD FRML MDRD: ABNORMAL ML/MIN/{1.73_M2}
GLUCOSE BLD-MCNC: 136 MG/DL (ref 70–99)
GLUCOSE URINE: NEGATIVE
HCT VFR BLD CALC: 36.3 % (ref 36–46)
HEMOGLOBIN: 11.9 G/DL (ref 12–16)
IMMATURE GRANULOCYTES: ABNORMAL %
INR BLD: 1
KETONES, URINE: NEGATIVE
LEUKOCYTE ESTERASE, URINE: NEGATIVE
LYMPHOCYTES # BLD: 32 % (ref 24–44)
MCH RBC QN AUTO: 30.5 PG (ref 26–34)
MCHC RBC AUTO-ENTMCNC: 32.8 G/DL (ref 31–37)
MCV RBC AUTO: 92.9 FL (ref 80–100)
MONOCYTES # BLD: 4 % (ref 1–7)
MUCUS: ABNORMAL
MYOGLOBIN: <21 NG/ML (ref 25–58)
MYOGLOBIN: <21 NG/ML (ref 25–58)
NITRITE, URINE: NEGATIVE
NRBC AUTOMATED: ABNORMAL PER 100 WBC
OTHER OBSERVATIONS UA: ABNORMAL
PARTIAL THROMBOPLASTIN TIME: 33 SEC (ref 24–36)
PDW BLD-RTO: 14.6 % (ref 11.5–14.9)
PH UA: 5.5 (ref 5–8)
PLATELET # BLD: 283 K/UL (ref 150–450)
PLATELET ESTIMATE: ABNORMAL
PMV BLD AUTO: 9.2 FL (ref 6–12)
POTASSIUM SERPL-SCNC: 3.6 MMOL/L (ref 3.7–5.3)
PRO-BNP: 232 PG/ML
PROTEIN UA: NEGATIVE
PROTHROMBIN TIME: 13 SEC (ref 11.8–14.6)
RBC # BLD: 3.91 M/UL (ref 4–5.2)
RBC # BLD: ABNORMAL 10*6/UL
RBC UA: ABNORMAL /HPF
RENAL EPITHELIAL, UA: ABNORMAL /HPF
SEG NEUTROPHILS: 59 % (ref 36–66)
SEGMENTED NEUTROPHILS ABSOLUTE COUNT: 4.9 K/UL (ref 1.3–9.1)
SODIUM BLD-SCNC: 141 MMOL/L (ref 135–144)
SPECIFIC GRAVITY UA: 1.02 (ref 1–1.03)
THYROXINE, FREE: 0.95 NG/DL (ref 0.93–1.7)
TRICHOMONAS: ABNORMAL
TROPONIN INTERP: ABNORMAL
TROPONIN INTERP: ABNORMAL
TROPONIN T: ABNORMAL NG/ML
TROPONIN T: ABNORMAL NG/ML
TROPONIN, HIGH SENSITIVITY: <6 NG/L (ref 0–14)
TROPONIN, HIGH SENSITIVITY: <6 NG/L (ref 0–14)
TSH SERPL DL<=0.05 MIU/L-ACNC: 0.96 MIU/L (ref 0.3–5)
TURBIDITY: ABNORMAL
URINE HGB: NEGATIVE
UROBILINOGEN, URINE: NORMAL
WBC # BLD: 8.4 K/UL (ref 3.5–11)
WBC # BLD: ABNORMAL 10*3/UL
WBC UA: ABNORMAL /HPF
YEAST: ABNORMAL

## 2019-05-26 PROCEDURE — 71046 X-RAY EXAM CHEST 2 VIEWS: CPT

## 2019-05-26 PROCEDURE — 85379 FIBRIN DEGRADATION QUANT: CPT

## 2019-05-26 PROCEDURE — 99285 EMERGENCY DEPT VISIT HI MDM: CPT

## 2019-05-26 PROCEDURE — 6360000004 HC RX CONTRAST MEDICATION: Performed by: EMERGENCY MEDICINE

## 2019-05-26 PROCEDURE — 84443 ASSAY THYROID STIM HORMONE: CPT

## 2019-05-26 PROCEDURE — 83874 ASSAY OF MYOGLOBIN: CPT

## 2019-05-26 PROCEDURE — 36415 COLL VENOUS BLD VENIPUNCTURE: CPT

## 2019-05-26 PROCEDURE — 85730 THROMBOPLASTIN TIME PARTIAL: CPT

## 2019-05-26 PROCEDURE — 93005 ELECTROCARDIOGRAM TRACING: CPT | Performed by: EMERGENCY MEDICINE

## 2019-05-26 PROCEDURE — 71260 CT THORAX DX C+: CPT

## 2019-05-26 PROCEDURE — 84484 ASSAY OF TROPONIN QUANT: CPT

## 2019-05-26 PROCEDURE — 2580000003 HC RX 258: Performed by: EMERGENCY MEDICINE

## 2019-05-26 PROCEDURE — 83880 ASSAY OF NATRIURETIC PEPTIDE: CPT

## 2019-05-26 PROCEDURE — 6370000000 HC RX 637 (ALT 250 FOR IP): Performed by: EMERGENCY MEDICINE

## 2019-05-26 PROCEDURE — 80048 BASIC METABOLIC PNL TOTAL CA: CPT

## 2019-05-26 PROCEDURE — 84439 ASSAY OF FREE THYROXINE: CPT

## 2019-05-26 PROCEDURE — 85025 COMPLETE CBC W/AUTO DIFF WBC: CPT

## 2019-05-26 PROCEDURE — 81001 URINALYSIS AUTO W/SCOPE: CPT

## 2019-05-26 PROCEDURE — 85610 PROTHROMBIN TIME: CPT

## 2019-05-26 RX ORDER — SODIUM CHLORIDE 0.9 % (FLUSH) 0.9 %
10 SYRINGE (ML) INJECTION PRN
Status: DISCONTINUED | OUTPATIENT
Start: 2019-05-26 | End: 2019-05-26 | Stop reason: HOSPADM

## 2019-05-26 RX ORDER — FUROSEMIDE 40 MG/1
40 TABLET ORAL DAILY
Qty: 5 TABLET | Refills: 0 | Status: SHIPPED | OUTPATIENT
Start: 2019-05-26 | End: 2019-07-11

## 2019-05-26 RX ORDER — ASPIRIN 81 MG/1
324 TABLET, CHEWABLE ORAL ONCE
Status: COMPLETED | OUTPATIENT
Start: 2019-05-26 | End: 2019-05-26

## 2019-05-26 RX ORDER — 0.9 % SODIUM CHLORIDE 0.9 %
80 INTRAVENOUS SOLUTION INTRAVENOUS ONCE
Status: COMPLETED | OUTPATIENT
Start: 2019-05-26 | End: 2019-05-26

## 2019-05-26 RX ADMIN — SODIUM CHLORIDE 80 ML: 9 INJECTION, SOLUTION INTRAVENOUS at 10:56

## 2019-05-26 RX ADMIN — Medication 10 ML: at 10:56

## 2019-05-26 RX ADMIN — IOVERSOL 75 ML: 741 INJECTION INTRA-ARTERIAL; INTRAVENOUS at 10:56

## 2019-05-26 RX ADMIN — ASPIRIN 81 MG 324 MG: 81 TABLET ORAL at 09:21

## 2019-05-26 ASSESSMENT — ENCOUNTER SYMPTOMS
RHINORRHEA: 0
COUGH: 0
VOMITING: 0
COLOR CHANGE: 0
EYE PAIN: 0
TROUBLE SWALLOWING: 0
NAUSEA: 0
ABDOMINAL PAIN: 0
SORE THROAT: 0
CHEST TIGHTNESS: 0
DIARRHEA: 0
FACIAL SWELLING: 0
SHORTNESS OF BREATH: 1
EYE REDNESS: 0
SINUS PRESSURE: 0
WHEEZING: 0
EYE DISCHARGE: 0
CONSTIPATION: 0
BLOOD IN STOOL: 0
BACK PAIN: 0

## 2019-05-26 ASSESSMENT — PAIN DESCRIPTION - ORIENTATION: ORIENTATION: RIGHT;LEFT

## 2019-05-26 ASSESSMENT — PAIN DESCRIPTION - LOCATION: LOCATION: LEG

## 2019-05-26 ASSESSMENT — PAIN SCALES - GENERAL
PAINLEVEL_OUTOF10: 3
PAINLEVEL_OUTOF10: 4

## 2019-05-26 ASSESSMENT — PAIN DESCRIPTION - PAIN TYPE: TYPE: ACUTE PAIN

## 2019-05-26 NOTE — ED NOTES
Pt given instructions for follow-up and discharge. Pt given education on prescriptions. Pt verbalizes understanding. Pt is A&O x4, PWD, eupneic, and ambulatory with steady, even gait upon discharge.       Demond Johnston RN  05/26/19 9343

## 2019-05-26 NOTE — ED PROVIDER NOTES
[address] ED  eMERGENCY dEPARTMENT eNCOUnter      Pt Name: Maria Leung  MRN: [id number]  [place] 1975  Date of evaluation: 5/26/19      CHIEF COMPLAINT       Chief Complaint   Patient presents with    Leg Swelling    Shortness of Breath     began last night          HISTORY OF PRESENT ILLNESS    Maria Leung is a 38 y.o. female who presents complaining of leg swelling. Sensation states she's noticed that her feet and legs been swollen for last couple days. Patient states that that is new for her. Patient states that she even feels today that her thighs are tight. Patient states that doesn't really have much pain and numbness more swollen. Patient states last night she noticed when she went to lay down that she had some shortness of breath and has continued this morning. Patient denies any chest pain or palpitations. Patient has no fevers or recent illnesses. Patient was just admitted about a week and a half ago for chest pain and had a stress test that was normal.  Patient has no history of congestive heart failure. Patient said no recent travel. REVIEW OF SYSTEMS       Review of Systems   Constitutional: Negative for activity change, appetite change, chills, diaphoresis and fever. HENT: Negative for congestion, ear pain, facial swelling, nosebleeds, rhinorrhea, sinus pressure, sore throat and trouble swallowing. Eyes: Negative for pain, discharge and redness. Respiratory: Positive for shortness of breath. Negative for cough, chest tightness and wheezing. Cardiovascular: Positive for leg swelling. Negative for chest pain and palpitations. Gastrointestinal: Negative for abdominal pain, blood in stool, constipation, diarrhea, nausea and vomiting. Genitourinary: Negative for difficulty urinating, dysuria, flank pain, frequency, genital sores and hematuria.    Musculoskeletal: Negative for arthralgias, back pain, gait problem, joint swelling, myalgias and neck pain.   Skin: Negative for color change, pallor, rash and wound. Neurological: Negative for dizziness, tremors, seizures, syncope, speech difficulty, weakness, numbness and headaches. Psychiatric/Behavioral: Negative for confusion, decreased concentration, hallucinations, self-injury, sleep disturbance and suicidal ideas.        PAST MEDICAL HISTORY     Past Medical History:   Diagnosis Date    Anxiety     Chronic kidney disease     right renal cyst    Depression     DVT (deep venous thrombosis) ([facility] [address] 1997    after delivery    Fibromyalgia     Headache(784.0)     migraines    Hx of blood clots     1997 left calf    Kidney stone     Pancreatitis 2001    Pleural effusion 2001    SVT (supraventricular tachycardia) ([facility] [address] 9/8/2015       SURGICAL HISTORY       Past Surgical History:   Procedure Laterality Date    ATRIAL ABLATION SURGERY      BACK SURGERY      L4-5    CHOLECYSTECTOMY  2001    ENDOMETRIAL ABLATION      e sure implants placed also    ENDOSCOPY, COLON, DIAGNOSTIC      EXCISION LESION HAND / FINGER Right 1/25/2019    HAND LESION BIOPSY EXCISION performed by Sukhdeep Cervantes MD at [address] [facility] Bilateral     left x2, right x1    FOOT SURGERY Right     x3    KNEE ARTHROSCOPY Left     x2    HI CYSTO/URETERO/PYELOSCOPY W/LITHOTRIPSY Left 9/20/2017    CYSTOSCOPY URETEROSCOPY HOLMIUM LASER LITHO WITH STONE BASKETING WITH  STENT  performed by Genaro Amado MD at [facility]        CURRENT MEDICATIONS       Previous Medications    ACETAMINOPHEN (APAP EXTRA STRENGTH) 500 MG TABLET    Take 2 tablets by mouth every 6 hours as needed for Pain    AMLODIPINE (NORVASC) 5 MG TABLET    take 1 tablet by mouth once daily    BREXPIPRAZOLE (REXULTI) 1 MG TABS TABLET    Take 1 mg by mouth daily    DULOXETINE (CYMBALTA) 60 MG EXTENDED RELEASE CAPSULE    TAKE 1 CAPSULE BY MOUTH TWICE A DAY    FEXOFENADINE (RA ALLERGY RELIEF) 180 MG TABLET    take 1 tablet by mouth once daily IBUPROFEN (ADVIL) 200 MG TABLET    Take 2 tablets by mouth every 6 hours as needed for Pain    MAGNESIUM OXIDE (MAG-OX) 400 MG TABLET    Take 400 mg by mouth daily    METOPROLOL (LOPRESSOR) 25 MG TABLET    Take 25 mg by mouth 2 times daily     NORTRIPTYLINE (PAMELOR) 50 MG CAPSULE    Take 100 mg by mouth nightly    PRAMIPEXOLE (MIRAPEX) 0.5 MG TABLET    take 1 tablet by mouth twice a day    PROMETHAZINE (PHENERGAN) 25 MG TABLET    take 1 tablet by mouth twice a day if needed for nausea    TOPIRAMATE (TOPAMAX) 100 MG TABLET    take 1 tablet by mouth twice a day       ALLERGIES     is allergic to aripiprazole; eletriptan; triptans [sumatriptan]; and lamotrigine. FAMILY HISTORY     [unfilled]     SOCIAL HISTORY      reports that she has been smoking cigarettes. She has been smoking about 0.25 packs per day. She has never used smokeless tobacco. She reports that she does not drink alcohol or use drugs. PHYSICAL EXAM     INITIAL VITALS: /61   Pulse 79   Temp 98 °F (36.7 °C) (Oral)   Resp 18   Ht 5' 5\" (1.651 m)   Wt 296 lb (134.3 kg)   SpO2 95%   BMI 49.26 kg/m²      Physical Exam   Constitutional: She is oriented to person, place, and time. She appears well-developed and well-nourished. No distress. HENT:   Head: Normocephalic and atraumatic. Eyes: Pupils are equal, round, and reactive to light. Conjunctivae and EOM are normal. Right eye exhibits no discharge. Left eye exhibits no discharge. No scleral icterus. Cardiovascular: Normal rate, regular rhythm and normal heart sounds. Exam reveals no gallop and no friction rub. No murmur heard. Pulmonary/Chest: Effort normal and breath sounds normal. No respiratory distress. She has no wheezes. She has no rales. She exhibits no tenderness. Abdominal: Soft. Bowel sounds are normal. She exhibits no distension and no mass. There is no tenderness. There is no rebound and no guarding. Musculoskeletal: Normal range of motion.  She exhibits no edema atelectasis. No airspace consolidation. No pneumothorax. Patent airways. Upper Abdomen: Mild thickening of the distal esophagus likely due to reflux disease. No hiatal hernia. No adrenal nodules. Fatty liver. Soft Tissues/Bones: No acute osseous abnormality. Multilevel degenerative change. No pulmonary embolism within the central or segmental branches of the pulmonary artery. 7 mm self left lower lobe pulmonary nodule should be further evaluated with CT as below. RECOMMENDATIONS: Recommend a non-contrast Chest CT at 6-12 months, then consider an additional non-contrast Chest CT at 18-24 months. *These guidelines do not apply to patients younger than 35 years, immunocompromised patients, and patients with cancer. Follow up in patients with significant comorbidities as clinically warranted. For lung cancer screening, adhere to Lung-RADS guidelines. Reference:  Radiology. 2017 Jul; 284(1):228-243. LABS: All lab results were reviewed byjaqui, and all abnormals are listed below.   Labs Reviewed   BASIC METABOLIC PANEL - Abnormal; Notable for the following components:       Result Value    Glucose 136 (*)     Calcium 8.3 (*)     Potassium 3.6 (*)     Chloride 109 (*)     All other components within normal limits   CBC WITH AUTO DIFFERENTIAL - Abnormal; Notable for the following components:    RBC 3.91 (*)     Hemoglobin 11.9 (*)     All other components within normal limits   D-DIMER, QUANTITATIVE - Abnormal; Notable for the following components:    D-Dimer, Quant 0.67 (*)     All other components within normal limits   URINE RT REFLEX TO CULTURE - Abnormal; Notable for the following components:    Turbidity UA CLOUDY (*)     All other components within normal limits   TROP/MYOGLOBIN - Abnormal; Notable for the following components:    Myoglobin <21 (*)     All other components within normal limits   TROP/MYOGLOBIN - Abnormal; Notable for the following components:    Myoglobin <21 (*)     All other components within normal limits   MICROSCOPIC URINALYSIS - Abnormal; Notable for the following components:    Bacteria, UA FEW (*)     Amorphous, UA 1+ (*)     All other components within normal limits   BRAIN NATRIURETIC PEPTIDE   TSH WITHOUT REFLEX   T4, FREE   APTT   PROTIME-INR         EMERGENCY DEPARTMENT COURSE:   Vitals:    Vitals:    05/26/19 1115 05/26/19 1140 05/26/19 1145 05/26/19 1203   BP: 128/78 139/82 131/79 115/61   Pulse: 86 80 81 79   Resp: 20 17 20 18   Temp:       TempSrc:       SpO2: 96% 95% 95% 95%   Weight:       Height:           The patient was given the following medications while in the emergency department:     Orders Placed This Encounter   Medications    aspirin chewable tablet 324 mg    0.9 % sodium chloride bolus    sodium chloride flush 0.9 % injection 10 mL    ioversol (OPTIRAY) 74 % injection 75 mL    furosemide (LASIX) 40 MG tablet     Sig: Take 1 tablet by mouth daily for 5 days     Dispense:  5 tablet     Refill:  0       -------------------------  12:20 PM  Patient was updated on the results and plan of care. Patient is okay being discharged. CRITICAL CARE:   None    CONSULTS:  None    PROCEDURES:  None    FINAL IMPRESSION      1.  Peripheral edema          DISPOSITION/PLAN   DISPOSITION Decision To Discharge 05/26/2019 12:19:36 PM      PATIENT REFERRED TO:  Kalli Alonso MD  [facility] [address] [facility]  [phone]    In 1 week      [facility] ED  [address] [id number] [phone]    If symptoms worsen      DISCHARGE MEDICATIONS:  New Prescriptions    FUROSEMIDE (LASIX) 40 MG TABLET    Take 1 tablet by mouth daily for 5 days       (Please note that portions of this note were completed with a voice recognition program.  Efforts were made to edit the dictations but occasionally words are mis-transcribed.)    Sri Min MD  Attending Any Dior MD  05/26/19 4054

## 2019-05-28 ENCOUNTER — HOSPITAL ENCOUNTER (EMERGENCY)
Age: 44
Discharge: HOME | End: 2019-05-28
Attending: EMERGENCY MEDICINE
Payer: COMMERCIAL

## 2019-05-28 VITALS
BODY MASS INDEX: 48.82 KG/M2 | HEIGHT: 65 IN | TEMPERATURE: 97.9 F | SYSTOLIC BLOOD PRESSURE: 158 MMHG | RESPIRATION RATE: 15 BRPM | WEIGHT: 293 LBS | OXYGEN SATURATION: 95 % | DIASTOLIC BLOOD PRESSURE: 93 MMHG | HEART RATE: 92 BPM

## 2019-05-28 DIAGNOSIS — G43.901 MIGRAINE WITH STATUS MIGRAINOSUS, NOT INTRACTABLE, UNSPECIFIED MIGRAINE TYPE: Primary | ICD-10-CM

## 2019-05-28 PROCEDURE — 96374 THER/PROPH/DIAG INJ IV PUSH: CPT

## 2019-05-28 PROCEDURE — 96375 TX/PRO/DX INJ NEW DRUG ADDON: CPT

## 2019-05-28 PROCEDURE — 93010 ELECTROCARDIOGRAM REPORT: CPT | Performed by: INTERNAL MEDICINE

## 2019-05-28 PROCEDURE — 2580000003 HC RX 258: Performed by: EMERGENCY MEDICINE

## 2019-05-28 PROCEDURE — 6360000002 HC RX W HCPCS: Performed by: EMERGENCY MEDICINE

## 2019-05-28 PROCEDURE — 6370000000 HC RX 637 (ALT 250 FOR IP): Performed by: EMERGENCY MEDICINE

## 2019-05-28 PROCEDURE — 99283 EMERGENCY DEPT VISIT LOW MDM: CPT

## 2019-05-28 RX ORDER — METOCLOPRAMIDE HYDROCHLORIDE 5 MG/ML
10 INJECTION INTRAMUSCULAR; INTRAVENOUS ONCE
Status: COMPLETED | OUTPATIENT
Start: 2019-05-28 | End: 2019-05-28

## 2019-05-28 RX ORDER — 0.9 % SODIUM CHLORIDE 0.9 %
1000 INTRAVENOUS SOLUTION INTRAVENOUS ONCE
Status: COMPLETED | OUTPATIENT
Start: 2019-05-28 | End: 2019-05-28

## 2019-05-28 RX ORDER — DIPHENHYDRAMINE HYDROCHLORIDE 50 MG/ML
25 INJECTION INTRAMUSCULAR; INTRAVENOUS ONCE
Status: COMPLETED | OUTPATIENT
Start: 2019-05-28 | End: 2019-05-28

## 2019-05-28 RX ORDER — BUTALBITAL, ACETAMINOPHEN AND CAFFEINE 300; 40; 50 MG/1; MG/1; MG/1
1 CAPSULE ORAL EVERY 6 HOURS PRN
Qty: 15 CAPSULE | Refills: 0 | Status: SHIPPED | OUTPATIENT
Start: 2019-05-28 | End: 2019-07-11

## 2019-05-28 RX ORDER — BUTALBITAL, ACETAMINOPHEN AND CAFFEINE 50; 325; 40 MG/1; MG/1; MG/1
1 TABLET ORAL EVERY 4 HOURS PRN
Status: DISCONTINUED | OUTPATIENT
Start: 2019-05-28 | End: 2019-05-28

## 2019-05-28 RX ORDER — KETOROLAC TROMETHAMINE 30 MG/ML
15 INJECTION, SOLUTION INTRAMUSCULAR; INTRAVENOUS ONCE
Status: COMPLETED | OUTPATIENT
Start: 2019-05-28 | End: 2019-05-28

## 2019-05-28 RX ORDER — BUTALBITAL, ACETAMINOPHEN AND CAFFEINE 50; 325; 40 MG/1; MG/1; MG/1
1 TABLET ORAL ONCE
Status: COMPLETED | OUTPATIENT
Start: 2019-05-28 | End: 2019-05-28

## 2019-05-28 RX ADMIN — DIPHENHYDRAMINE HYDROCHLORIDE 25 MG: 50 INJECTION INTRAMUSCULAR; INTRAVENOUS at 16:36

## 2019-05-28 RX ADMIN — KETOROLAC TROMETHAMINE 15 MG: 30 INJECTION, SOLUTION INTRAMUSCULAR; INTRAVENOUS at 16:36

## 2019-05-28 RX ADMIN — METOCLOPRAMIDE 10 MG: 5 INJECTION, SOLUTION INTRAMUSCULAR; INTRAVENOUS at 16:35

## 2019-05-28 RX ADMIN — BUTALBITAL, ACETAMINOPHEN AND CAFFEINE 1 TABLET: 50; 325; 40 TABLET ORAL at 18:29

## 2019-05-28 RX ADMIN — SODIUM CHLORIDE 1000 ML: 9 INJECTION, SOLUTION INTRAVENOUS at 16:35

## 2019-05-28 ASSESSMENT — PAIN SCALES - GENERAL
PAINLEVEL_OUTOF10: 1
PAINLEVEL_OUTOF10: 7
PAINLEVEL_OUTOF10: 1

## 2019-05-28 ASSESSMENT — ENCOUNTER SYMPTOMS
DIARRHEA: 0
ABDOMINAL PAIN: 0
VOMITING: 0
PHOTOPHOBIA: 1
NAUSEA: 1
SHORTNESS OF BREATH: 0
BACK PAIN: 0

## 2019-05-28 ASSESSMENT — PAIN DESCRIPTION - FREQUENCY: FREQUENCY: CONTINUOUS

## 2019-05-28 ASSESSMENT — PAIN DESCRIPTION - DESCRIPTORS: DESCRIPTORS: ACHING

## 2019-05-28 ASSESSMENT — PAIN DESCRIPTION - PAIN TYPE: TYPE: ACUTE PAIN

## 2019-05-28 ASSESSMENT — PAIN DESCRIPTION - LOCATION: LOCATION: HEAD

## 2019-05-28 NOTE — LETTER
[facility] ED  [place] [address]  Phone: [phone]             May 28, 2019    Patient: Latosha Ortega   YOB: 1975   Date of Visit: 5/28/2019       To Whom It May Concern:    Jacquelyn Sin was seen and treated in our emergency department on 5/28/2019. She may return to work on 5/29/2019.       Sincerely,             Signature:__________________________________

## 2019-05-28 NOTE — ED NOTES
Pt arrived in ED with c/o migraine since this morning when she woke up. Pt states she has a hx of migraines, but hasn't had one in a long time. Pt states she also has photophobia and nausea. Pt is alert and oriented x4.       Ana Amos RN  05/28/19 5770

## 2019-05-28 NOTE — ED PROVIDER NOTES
[address] ED  eMERGENCY dEPARTMENT eNCOUnter    Pt Name: Gordon Navas  MRN: [id number]  [place] 1975  Date of evaluation: 5/28/19  CHIEF COMPLAINT       Chief Complaint   Patient presents with    Headache     started this morning    Shortness of Breath     HISTORY OF PRESENT ILLNESS   HPI  38 y.o. female with history of anxiety, fibromyalgia, migraine headaches, presents with a headache. Patient states she's having a migraine that began this morning, gradually, getting more intense throughout the day. It is located on the left side of her head, associated with light sensitivity. She denies any visual disturbances, nausea, vomiting, dizziness. She endorses feeling somewhat short of breath, but denies cough, chest pain, recent upper respiratory symptoms. No fever, no neck pain. REVIEW OF SYSTEMS     Review of Systems   Constitutional: Negative for fever. HENT: Negative for congestion. Eyes: Positive for photophobia. Negative for visual disturbance. Respiratory: Negative for shortness of breath. Cardiovascular: Negative for chest pain. Gastrointestinal: Positive for nausea. Negative for abdominal pain, diarrhea and vomiting. Musculoskeletal: Negative for back pain. Skin: Negative for rash. Neurological: Positive for headaches. Negative for light-headedness. Psychiatric/Behavioral: Negative for confusion.      PASTMEDICAL HISTORY     Past Medical History:   Diagnosis Date    Anxiety     Chronic kidney disease     right renal cyst    Depression     DVT (deep venous thrombosis) ([address] 1997    after delivery    Fibromyalgia     Headache(784.0)     migraines    Hx of blood clots     1997 left calf    Kidney stone     Pancreatitis 2001    Pleural effusion 2001    SVT (supraventricular tachycardia) ([address] 9/8/2015     SURGICAL HISTORY       Past Surgical History:   Procedure Laterality Date    ATRIAL ABLATION SURGERY      BACK SURGERY      L4-5    CHOLECYSTECTOMY 2001    ENDOMETRIAL ABLATION      e sure implants placed also    ENDOSCOPY, COLON, DIAGNOSTIC      EXCISION LESION HAND / FINGER Right 1/25/2019    HAND LESION BIOPSY EXCISION performed by Abilio Walls MD at [address] Bilateral     left x2, right x1    FOOT SURGERY Right     x3    KNEE ARTHROSCOPY Left     x2    VA CYSTO/URETERO/PYELOSCOPY W/LITHOTRIPSY Left 9/20/2017    CYSTOSCOPY URETEROSCOPY HOLMIUM LASER LITHO WITH STONE BASKETING WITH  STENT  performed by Tomy Quiñones MD at [facility]      CURRENT MEDICATIONS       Previous Medications    ACETAMINOPHEN (APAP EXTRA STRENGTH) 500 MG TABLET    Take 2 tablets by mouth every 6 hours as needed for Pain    AMLODIPINE (NORVASC) 5 MG TABLET    take 1 tablet by mouth once daily    BREXPIPRAZOLE (REXULTI) 1 MG TABS TABLET    Take 1 mg by mouth daily    DULOXETINE (CYMBALTA) 60 MG EXTENDED RELEASE CAPSULE    TAKE 1 CAPSULE BY MOUTH TWICE A DAY    FEXOFENADINE (RA ALLERGY RELIEF) 180 MG TABLET    take 1 tablet by mouth once daily    FUROSEMIDE (LASIX) 40 MG TABLET    Take 1 tablet by mouth daily for 5 days    IBUPROFEN (ADVIL) 200 MG TABLET    Take 2 tablets by mouth every 6 hours as needed for Pain    MAGNESIUM OXIDE (MAG-OX) 400 MG TABLET    Take 400 mg by mouth daily    METOPROLOL (LOPRESSOR) 25 MG TABLET    Take 25 mg by mouth 2 times daily     NORTRIPTYLINE (PAMELOR) 50 MG CAPSULE    Take 100 mg by mouth nightly    PRAMIPEXOLE (MIRAPEX) 0.5 MG TABLET    take 1 tablet by mouth twice a day    PROMETHAZINE (PHENERGAN) 25 MG TABLET    take 1 tablet by mouth twice a day if needed for nausea    TOPIRAMATE (TOPAMAX) 100 MG TABLET    take 1 tablet by mouth twice a day     ALLERGIES     is allergic to aripiprazole; eletriptan; triptans [sumatriptan]; and lamotrigine. FAMILY HISTORY     indicated that her mother is alive. She indicated that her father is alive. She indicated that both of her brothers are alive.      SOCIAL HISTORY       Social History     Tobacco Use    Smoking status: Current Every Day Smoker     Packs/day: 0.25     Types: Cigarettes    Smokeless tobacco: Never Used    Tobacco comment: today last smoke   Substance Use Topics    Alcohol use: No     Alcohol/week: 0.0 oz    Drug use: No     PHYSICAL EXAM     INITIAL VITALS: BP (!) 158/93   Pulse 92   Temp 97.9 °F (36.6 °C) (Oral)   Resp 15   Ht 5' 5\" (1.651 m)   Wt 297 lb (134.7 kg)   SpO2 94%   BMI 49.42 kg/m²    Physical Exam   Constitutional: She is oriented to person, place, and time. She appears well-developed and well-nourished. No distress. HENT:   Head: Normocephalic and atraumatic. Eyes: Pupils are equal, round, and reactive to light. EOM are normal.   +photophobia   Neck: Normal range of motion. Neck supple. Cardiovascular: Normal rate. Pulmonary/Chest: Effort normal. No stridor. No respiratory distress. She has no wheezes. She has no rales. Abdominal: She exhibits no distension. There is no tenderness. Neurological: She is alert and oriented to person, place, and time. She has normal strength. No cranial nerve deficit or sensory deficit. GCS eye subscore is 4. GCS verbal subscore is 5. GCS motor subscore is 6. Skin: Skin is warm and dry. She is not diaphoretic. Nursing note and vitals reviewed. MEDICAL DECISION MAKING:     Patient presents for evaluation of migraine headache. On exam patient has photophobia and looks somewhat uncomfortable, but otherwise has a unremarkable neurologic exam, normal vital signs other than a heart rate of 101. She is afebrile and exhibits no signs of meningismus or infectious symptoms. She has no red flag symptoms that would be concerning for malignant cause of headache. Despite her subjective shortness of breath, her lung exam is normal and she has a normal oxygen saturation and lack of upper respiratory symptoms.   She has no exam findings that would be concerning for DVT at this time.    Patient will be treated symptomatically for her migraine. ED Course as of May 28 1823   Tue May 28, 2019   1819 On initial reevaluation, patient felt some improvement of her headache but not quite resolved yet. I added Fioricet and Tylenol for headache. On second reevaluation, patient is not feeling much better. She tells me she has gotten relief from Fioricet in the past.  I will give her a prescription for Fioricet to take home to try when she gets migraines. [TR]   1820 Patient feels comfortable going home. No other apparent acute process was found during her evaluation. Multiple diagnoses were considered during her evaluation    [TR]      ED Course User Index  [TR] Merry Chung MD       Procedures    DIAGNOSTIC RESULTS   EKG:All EKG's are interpreted by the Emergency Department Physician who either signs or Co-signs this chart in the absence of a cardiologist.      RADIOLOGY:All plain film, CT, MRI, and formal ultrasound images (except ED bedside ultrasound) are read by the radiologist, see reports below, unless otherwisenoted in MDM or here. No orders to display     LABS: All lab results were reviewed by myself, and all abnormals are listed below.   Labs Reviewed - No data to display  EMERGENCY DEPARTMENTCOURSE:   Vitals:    Vitals:    05/28/19 1438 05/28/19 1701   BP: (!) 158/93    Pulse: 101 92   Resp: 15 15   Temp: 97.9 °F (36.6 °C)    TempSrc: Oral    SpO2: 97% 94%   Weight: 297 lb (134.7 kg)    Height: 5' 5\" (1.651 m)        The patient was given the following medications while in the emergency department:  Orders Placed This Encounter   Medications    metoclopramide (REGLAN) injection 10 mg    ketorolac (TORADOL) injection 15 mg    diphenhydrAMINE (BENADRYL) injection 25 mg    0.9 % sodium chloride bolus    DISCONTD: butalbital-acetaminophen-caffeine (FIORICET, ESGIC) per tablet 1 tablet    butalbital-acetaminophen-caffeine (FIORICET, ESGIC) per tablet 1 tablet CONSULTS:  None    FINAL IMPRESSION      1. Migraine with status migrainosus, not intractable, unspecified migraine type          DISPOSITION/PLAN   DISPOSITION Decision To Discharge 05/28/2019 06:22:12 PM      PATIENT REFERRED TO:  Dariel Hernandez MD  [facility] [address] [facility]  [phone]      If symptoms worsen    DISCHARGE MEDICATIONS:  New Prescriptions    No medications on file     Alanis Marroquin MD  Attending Emergency Physician  LRN voice recognition software used in portions of this document.               Alanis Marroquin MD  05/28/19 9232

## 2019-05-30 LAB
EKG ATRIAL RATE: 74 BPM
EKG ATRIAL RATE: 79 BPM
EKG ATRIAL RATE: 84 BPM
EKG P AXIS: 37 DEGREES
EKG P AXIS: 49 DEGREES
EKG P AXIS: 54 DEGREES
EKG P-R INTERVAL: 150 MS
EKG P-R INTERVAL: 168 MS
EKG P-R INTERVAL: 170 MS
EKG Q-T INTERVAL: 394 MS
EKG Q-T INTERVAL: 396 MS
EKG Q-T INTERVAL: 414 MS
EKG QRS DURATION: 88 MS
EKG QRS DURATION: 94 MS
EKG QRS DURATION: 96 MS
EKG QTC CALCULATION (BAZETT): 439 MS
EKG QTC CALCULATION (BAZETT): 465 MS
EKG QTC CALCULATION (BAZETT): 474 MS
EKG R AXIS: 17 DEGREES
EKG R AXIS: 19 DEGREES
EKG R AXIS: 9 DEGREES
EKG T AXIS: 20 DEGREES
EKG T AXIS: 23 DEGREES
EKG T AXIS: 24 DEGREES
EKG VENTRICULAR RATE: 74 BPM
EKG VENTRICULAR RATE: 79 BPM
EKG VENTRICULAR RATE: 84 BPM

## 2019-07-11 ENCOUNTER — HOSPITAL ENCOUNTER (OUTPATIENT)
Dept: PAIN MANAGEMENT | Age: 44
Discharge: HOME | End: 2019-07-11
Payer: COMMERCIAL

## 2019-07-11 VITALS
OXYGEN SATURATION: 98 % | RESPIRATION RATE: 16 BRPM | TEMPERATURE: 97.6 F | HEIGHT: 65 IN | BODY MASS INDEX: 48.82 KG/M2 | SYSTOLIC BLOOD PRESSURE: 116 MMHG | DIASTOLIC BLOOD PRESSURE: 56 MMHG | WEIGHT: 293 LBS | HEART RATE: 72 BPM

## 2019-07-11 DIAGNOSIS — F41.9 ANXIETY: ICD-10-CM

## 2019-07-11 DIAGNOSIS — M79.7 FIBROMYALGIA: Primary | ICD-10-CM

## 2019-07-11 PROCEDURE — 99203 OFFICE O/P NEW LOW 30 MIN: CPT

## 2019-07-11 PROCEDURE — 80307 DRUG TEST PRSMV CHEM ANLYZR: CPT

## 2019-07-11 PROCEDURE — 99244 OFF/OP CNSLTJ NEW/EST MOD 40: CPT | Performed by: PAIN MEDICINE

## 2019-07-11 RX ORDER — ALBUTEROL SULFATE 90 UG/1
2 AEROSOL, METERED RESPIRATORY (INHALATION) PRN
COMMUNITY
Start: 2018-12-28 | End: 2021-12-29

## 2019-07-11 RX ORDER — TIZANIDINE HYDROCHLORIDE 4 MG/1
4 CAPSULE, GELATIN COATED ORAL 3 TIMES DAILY
Qty: 90 CAPSULE | Refills: 0 | Status: SHIPPED | OUTPATIENT
Start: 2019-07-11 | End: 2020-06-25

## 2019-07-11 RX ORDER — ESZOPICLONE 3 MG/1
3 TABLET, FILM COATED ORAL NIGHTLY
COMMUNITY
Start: 2019-07-10 | End: 2020-07-09

## 2019-07-11 RX ORDER — HYDROXYZINE PAMOATE 25 MG/1
25 CAPSULE ORAL NIGHTLY PRN
Refills: 2 | Status: ON HOLD | COMMUNITY
Start: 2019-05-18 | End: 2020-08-19

## 2019-07-11 RX ORDER — METFORMIN HYDROCHLORIDE 500 MG/1
500 TABLET, EXTENDED RELEASE ORAL DAILY
Refills: 0 | COMMUNITY
Start: 2019-07-03 | End: 2020-05-19 | Stop reason: ALTCHOICE

## 2019-07-11 RX ORDER — RANITIDINE 150 MG/1
300 CAPSULE ORAL 2 TIMES DAILY
COMMUNITY
End: 2020-05-19 | Stop reason: ALTCHOICE

## 2019-07-11 RX ORDER — VALACYCLOVIR HYDROCHLORIDE 1 G/1
1000 TABLET, FILM COATED ORAL 3 TIMES DAILY
COMMUNITY
Start: 2018-12-20 | End: 2020-05-19 | Stop reason: ALTCHOICE

## 2019-07-11 RX ORDER — FUROSEMIDE 20 MG/1
20 TABLET ORAL PRN
Refills: 0 | COMMUNITY
Start: 2019-06-17 | End: 2020-05-19 | Stop reason: ALTCHOICE

## 2019-07-11 ASSESSMENT — ENCOUNTER SYMPTOMS
COUGH: 0
CHOKING: 0
SINUS PRESSURE: 0
BLOOD IN STOOL: 0
ABDOMINAL PAIN: 0
GASTROINTESTINAL NEGATIVE: 1
CONSTIPATION: 0
VOMITING: 0
EYE REDNESS: 0
SHORTNESS OF BREATH: 0
RESPIRATORY NEGATIVE: 1
EYES NEGATIVE: 1
NAUSEA: 0
PHOTOPHOBIA: 0

## 2019-07-11 ASSESSMENT — PAIN SCALES - GENERAL: PAINLEVEL_OUTOF10: 7

## 2019-07-11 ASSESSMENT — PAIN DESCRIPTION - PROGRESSION: CLINICAL_PROGRESSION: GRADUALLY WORSENING

## 2019-07-11 ASSESSMENT — PAIN DESCRIPTION - FREQUENCY: FREQUENCY: CONTINUOUS

## 2019-07-11 ASSESSMENT — PAIN DESCRIPTION - DESCRIPTORS: DESCRIPTORS: THROBBING;CONSTANT

## 2019-07-11 ASSESSMENT — PAIN DESCRIPTION - ONSET: ONSET: ON-GOING

## 2019-07-11 ASSESSMENT — PAIN DESCRIPTION - LOCATION: LOCATION: GENERALIZED;LEG;BACK

## 2019-07-11 ASSESSMENT — PAIN DESCRIPTION - PAIN TYPE: TYPE: CHRONIC PAIN

## 2019-07-11 ASSESSMENT — PAIN DESCRIPTION - ORIENTATION: ORIENTATION: RIGHT;LEFT;LOWER

## 2019-07-11 NOTE — PROGRESS NOTES
07-  Was Serum/UDS as anticipated?  not applicable  Brought pill bottles in :not applicable   Was Pill count appropriate? :not applicable   Are currently pregnant? no  Recent ER visits: No             Past Medical History      Diagnosis Date    Anxiety     Chronic kidney disease     right renal cyst    Depression     DVT (deep venous thrombosis) ([facility] [address] 1997    after delivery    Fibromyalgia     Headache(784.0)     migraines    Hx of blood clots     1997 left calf    Kidney stone     Pancreatitis 2001    Pleural effusion 2001    SVT (supraventricular tachycardia) ([facility] [address] 9/8/2015       Surgical History  Past Surgical History:   Procedure Laterality Date    ATRIAL ABLATION SURGERY      BACK SURGERY      L4-5    CHOLECYSTECTOMY  2001    ENDOMETRIAL ABLATION      e sure implants placed also    ENDOSCOPY, COLON, DIAGNOSTIC      EXCISION LESION HAND / FINGER Right 1/25/2019    HAND LESION BIOPSY EXCISION performed by J[facility] Gao [facility] at [facility] Bilateral     left x2, right x1    FOOT SURGERY Right     x3    KNEE ARTHROSCOPY Left     x2    IN CYSTO/URETERO/PYELOSCOPY W/LITHOTRIPSY Left 9/20/2017    CYSTOSCOPY URETEROSCOPY HOLMIUM LASER LITHO WITH STONE BASKETING WITH  STENT  performed by Ingris Canela MD at [facility]        Medications  Current Outpatient Medications   Medication Sig Dispense Refill    eszopiclone (ESZOPICLONE) 3 MG TABS Take 3 mg by mouth nightly.  Prn as needed      albuterol sulfate HFA (PROVENTIL HFA) 108 (90 Base) MCG/ACT inhaler Inhale 2 puffs into the lungs as needed      hydrOXYzine (VISTARIL) 25 MG capsule Take 25 mg by mouth nightly  2    metFORMIN (GLUCOPHAGE-XR) 500 MG extended release tablet Take 500 mg by mouth daily  0    ranitidine (ZANTAC) 150 MG capsule Take 300 mg by mouth 2 times daily      valACYclovir (VALTREX) 1 g tablet Take 1,000 mg by mouth 3 times daily Prn for outbreaks      furosemide (LASIX) 20 MG tablet Take 20 mg by mouth as needed Prn for ankle edema  0    tiZANidine (ZANAFLEX) 4 MG capsule Take 1 capsule by mouth 3 times daily 90 capsule 0    magnesium oxide (MAG-OX) 400 MG tablet Take 400 mg by mouth daily      brexpiprazole (REXULTI) 1 MG TABS tablet Take 1 mg by mouth daily      fexofenadine (RA ALLERGY RELIEF) 180 MG tablet take 1 tablet by mouth once daily 30 tablet 0    pramipexole (MIRAPEX) 0.5 MG tablet take 1 tablet by mouth twice a day 60 tablet 0    DULoxetine (CYMBALTA) 60 MG extended release capsule TAKE 1 CAPSULE BY MOUTH TWICE A DAY 60 capsule 3    topiramate (TOPAMAX) 100 MG tablet take 1 tablet by mouth twice a day 60 tablet 3    ibuprofen (ADVIL) 200 MG tablet Take 2 tablets by mouth every 6 hours as needed for Pain 60 tablet 0    acetaminophen (APAP EXTRA STRENGTH) 500 MG tablet Take 2 tablets by mouth every 6 hours as needed for Pain 60 tablet 0    nortriptyline (PAMELOR) 50 MG capsule Take 100 mg by mouth nightly      metoprolol (LOPRESSOR) 25 MG tablet Take 25 mg by mouth 2 times daily   0     No current facility-administered medications for this encounter. Allergies  Aripiprazole; Eletriptan; Triptans [sumatriptan]; and Lamotrigine    Family History  family history includes Heart Disease in her father; High Blood Pressure in her brother. Social History  Social History     Socioeconomic History    Marital status: Single     Spouse name: None    Number of children: None    Years of education: None    Highest education level: None   Occupational History    None   Social Needs    Financial resource strain: None    Food insecurity:     Worry: None     Inability: None    Transportation needs:     Medical: None     Non-medical: None   Tobacco Use    Smoking status: Current Every Day Smoker     Packs/day: 1.00     Types: Cigarettes    Smokeless tobacco: Never Used    Tobacco comment: today last smoke   Substance and Sexual Activity    Alcohol use:  No Patient Active Problem List   Diagnosis    Anxiety    Obesity    Restless leg syndrome    Previous back surgery    Major depression    HTN (hypertension)    FHx: rheumatoid arthritis    SVT (supraventricular tachycardia) (HCC)    Right elbow pain    Pain of right lower extremity    Cough with sputum    Varicose vein of leg    Leg swelling    Herpes zoster without complication    Chest pain        ASSESSMENT     Char Menjivar is a 38 y.o. female with     1. Fibromyalgia    2. Anxiety           PLAN   Patient's   [] x-ray    [x] CT scan    [] MRI  Were/was  Reviewed. These findings are inconsistent with the patient's symptoms and physical examination. [] Patient's findings on the x-ray were explained to the patient using a bone modal.    Other reports reviewed include    [] Bone scan   [] EMG and nerve conduction studies   [] Referral reports-  I also discussed with him the following treatment options Including advantages and disadvantages of each:    [] Physical therapy    [] Interventional pain treatment    [] Medication management    [] Surgical options    Patient's OARRS were reviewed. It is acceptable and appears patient is not receiving prescriptions from multiple prescribers. Patient is  forthcoming regarding prescriptions for pain medication in the past  Controlled Substances Monitoring: Periodic Controlled Substance Monitoring: Possible medication side effects, risk of tolerance/dependence & alternative treatments discussed. , Assessed functional status. , Random urine drug screen sent today. (Salena Thompson MD)    The following screens were also reviewed  SOAPP- the score is 3. (Values:cates patient is  <4minimal potential  4-7 Moderate potential  >7 High potential  for drug addiction  Counselling/Preventive measures for pain  Control:    [x]  Spine strengthening exercises are discussed with patient in detail.    []  Patient is counseled about  ill effects of smoking for 10

## 2019-07-14 LAB
6-ACETYLMORPHINE, UR: NOT DETECTED
7-AMINOCLONAZEPAM, URINE: NOT DETECTED
ALPHA-OH-ALPRAZ, URINE: NOT DETECTED
ALPRAZOLAM, URINE: NOT DETECTED
AMPHETAMINES, URINE: NOT DETECTED
BARBITURATES, URINE: NOT DETECTED
BENZOYLECGONINE, UR: NOT DETECTED
BUPRENORPHINE URINE: NOT DETECTED
CARISOPRODOL, UR: NOT DETECTED
CLONAZEPAM, URINE: NOT DETECTED
CODEINE, URINE: PRESENT
CREATININE URINE: 151.5 MG/DL (ref 20–400)
DIAZEPAM, URINE: NOT DETECTED
DRUGS EXPECTED, UR: NORMAL
EER HI RES INTERP UR: NORMAL
ETHYL GLUCURONIDE UR: NOT DETECTED
FENTANYL URINE: NOT DETECTED
HYDROCODONE, URINE: PRESENT
HYDROMORPHONE, URINE: NOT DETECTED
LORAZEPAM, URINE: NOT DETECTED
MARIJUANA METAB, UR: NOT DETECTED
MDA, UR: NOT DETECTED
MDEA, EVE, UR: NOT DETECTED
MDMA URINE: NOT DETECTED
MEPERIDINE METAB, UR: NOT DETECTED
METHADONE, URINE: NOT DETECTED
METHAMPHETAMINE, URINE: NOT DETECTED
METHYLPHENIDATE: NOT DETECTED
MIDAZOLAM, URINE: NOT DETECTED
MORPHINE URINE: NOT DETECTED
NORBUPRENORPHINE, URINE: NOT DETECTED
NORDIAZEPAM, URINE: NOT DETECTED
NORFENTANYL, URINE: NOT DETECTED
NORHYDROCODONE, URINE: NOT DETECTED
NOROXYCODONE, URINE: NOT DETECTED
NOROXYMORPHONE, URINE: NOT DETECTED
OXAZEPAM, URINE: NOT DETECTED
OXYCODONE URINE: NOT DETECTED
OXYMORPHONE, URINE: NOT DETECTED
PAIN MANAGEMENT DRUG PANEL INTERP, URINE: NORMAL
PAIN MGT DRUG PANEL, HI RES, UR: NORMAL
PCP,URINE: NOT DETECTED
PHENTERMINE, UR: NOT DETECTED
PROPOXYPHENE, URINE: NOT DETECTED
TAPENTADOL, URINE: NOT DETECTED
TAPENTADOL-O-SULFATE, URINE: NOT DETECTED
TEMAZEPAM, URINE: NOT DETECTED
TRAMADOL, URINE: NOT DETECTED
ZOLPIDEM, URINE: NOT DETECTED

## 2019-10-20 ENCOUNTER — HOSPITAL ENCOUNTER (EMERGENCY)
Age: 44
Discharge: HOME | End: 2019-10-20
Attending: EMERGENCY MEDICINE
Payer: COMMERCIAL

## 2019-10-20 VITALS
SYSTOLIC BLOOD PRESSURE: 134 MMHG | BODY MASS INDEX: 48.32 KG/M2 | OXYGEN SATURATION: 98 % | HEIGHT: 65 IN | HEART RATE: 90 BPM | RESPIRATION RATE: 18 BRPM | WEIGHT: 290 LBS | TEMPERATURE: 98 F | DIASTOLIC BLOOD PRESSURE: 78 MMHG

## 2019-10-20 DIAGNOSIS — R51.9 ACUTE NONINTRACTABLE HEADACHE, UNSPECIFIED HEADACHE TYPE: Primary | ICD-10-CM

## 2019-10-20 PROCEDURE — 96374 THER/PROPH/DIAG INJ IV PUSH: CPT

## 2019-10-20 PROCEDURE — 2580000003 HC RX 258: Performed by: EMERGENCY MEDICINE

## 2019-10-20 PROCEDURE — 96375 TX/PRO/DX INJ NEW DRUG ADDON: CPT

## 2019-10-20 PROCEDURE — 6360000002 HC RX W HCPCS: Performed by: EMERGENCY MEDICINE

## 2019-10-20 PROCEDURE — 96372 THER/PROPH/DIAG INJ SC/IM: CPT

## 2019-10-20 PROCEDURE — 99283 EMERGENCY DEPT VISIT LOW MDM: CPT

## 2019-10-20 RX ORDER — PROMETHAZINE HYDROCHLORIDE 25 MG/ML
25 INJECTION, SOLUTION INTRAMUSCULAR; INTRAVENOUS ONCE
Status: COMPLETED | OUTPATIENT
Start: 2019-10-20 | End: 2019-10-20

## 2019-10-20 RX ORDER — DIPHENHYDRAMINE HYDROCHLORIDE 50 MG/ML
50 INJECTION INTRAMUSCULAR; INTRAVENOUS ONCE
Status: COMPLETED | OUTPATIENT
Start: 2019-10-20 | End: 2019-10-20

## 2019-10-20 RX ORDER — 0.9 % SODIUM CHLORIDE 0.9 %
1000 INTRAVENOUS SOLUTION INTRAVENOUS ONCE
Status: COMPLETED | OUTPATIENT
Start: 2019-10-20 | End: 2019-10-20

## 2019-10-20 RX ORDER — DEXAMETHASONE SODIUM PHOSPHATE 10 MG/ML
10 INJECTION, SOLUTION INTRAMUSCULAR; INTRAVENOUS ONCE
Status: COMPLETED | OUTPATIENT
Start: 2019-10-20 | End: 2019-10-20

## 2019-10-20 RX ORDER — KETOROLAC TROMETHAMINE 30 MG/ML
30 INJECTION, SOLUTION INTRAMUSCULAR; INTRAVENOUS ONCE
Status: COMPLETED | OUTPATIENT
Start: 2019-10-20 | End: 2019-10-20

## 2019-10-20 RX ADMIN — SODIUM CHLORIDE 1000 ML: 9 INJECTION, SOLUTION INTRAVENOUS at 10:16

## 2019-10-20 RX ADMIN — PROMETHAZINE HYDROCHLORIDE 25 MG: 25 INJECTION INTRAMUSCULAR; INTRAVENOUS at 10:22

## 2019-10-20 RX ADMIN — DEXAMETHASONE SODIUM PHOSPHATE 10 MG: 10 INJECTION, SOLUTION INTRAMUSCULAR; INTRAVENOUS at 10:21

## 2019-10-20 RX ADMIN — KETOROLAC TROMETHAMINE 30 MG: 30 INJECTION, SOLUTION INTRAMUSCULAR; INTRAVENOUS at 10:19

## 2019-10-20 RX ADMIN — DIPHENHYDRAMINE HYDROCHLORIDE 50 MG: 50 INJECTION INTRAMUSCULAR; INTRAVENOUS at 10:17

## 2019-10-20 ASSESSMENT — ENCOUNTER SYMPTOMS
GASTROINTESTINAL NEGATIVE: 1
SHORTNESS OF BREATH: 0
EYES NEGATIVE: 1
BACK PAIN: 0
COUGH: 0
RESPIRATORY NEGATIVE: 1
ABDOMINAL PAIN: 0

## 2019-10-20 ASSESSMENT — PAIN SCALES - GENERAL
PAINLEVEL_OUTOF10: 7
PAINLEVEL_OUTOF10: 7

## 2019-10-24 ENCOUNTER — APPOINTMENT (OUTPATIENT)
Dept: CT IMAGING | Age: 44
End: 2019-10-24
Payer: COMMERCIAL

## 2019-10-24 ENCOUNTER — HOSPITAL ENCOUNTER (EMERGENCY)
Age: 44
Discharge: HOME | End: 2019-10-24
Attending: EMERGENCY MEDICINE
Payer: COMMERCIAL

## 2019-10-24 VITALS
HEIGHT: 65 IN | DIASTOLIC BLOOD PRESSURE: 77 MMHG | TEMPERATURE: 98.2 F | BODY MASS INDEX: 48.32 KG/M2 | OXYGEN SATURATION: 97 % | RESPIRATION RATE: 18 BRPM | HEART RATE: 98 BPM | SYSTOLIC BLOOD PRESSURE: 133 MMHG | WEIGHT: 290 LBS

## 2019-10-24 DIAGNOSIS — G43.009 MIGRAINE WITHOUT AURA AND WITHOUT STATUS MIGRAINOSUS, NOT INTRACTABLE: Primary | ICD-10-CM

## 2019-10-24 PROCEDURE — 96374 THER/PROPH/DIAG INJ IV PUSH: CPT

## 2019-10-24 PROCEDURE — 99283 EMERGENCY DEPT VISIT LOW MDM: CPT

## 2019-10-24 PROCEDURE — 6360000002 HC RX W HCPCS: Performed by: PHYSICIAN ASSISTANT

## 2019-10-24 PROCEDURE — 70450 CT HEAD/BRAIN W/O DYE: CPT

## 2019-10-24 PROCEDURE — 96375 TX/PRO/DX INJ NEW DRUG ADDON: CPT

## 2019-10-24 PROCEDURE — 2580000003 HC RX 258: Performed by: PHYSICIAN ASSISTANT

## 2019-10-24 RX ORDER — DIPHENHYDRAMINE HYDROCHLORIDE 50 MG/ML
25 INJECTION INTRAMUSCULAR; INTRAVENOUS ONCE
Status: COMPLETED | OUTPATIENT
Start: 2019-10-24 | End: 2019-10-24

## 2019-10-24 RX ORDER — METOCLOPRAMIDE HYDROCHLORIDE 5 MG/ML
5 INJECTION INTRAMUSCULAR; INTRAVENOUS ONCE
Status: COMPLETED | OUTPATIENT
Start: 2019-10-24 | End: 2019-10-24

## 2019-10-24 RX ORDER — DEXAMETHASONE SODIUM PHOSPHATE 10 MG/ML
10 INJECTION, SOLUTION INTRAMUSCULAR; INTRAVENOUS ONCE
Status: COMPLETED | OUTPATIENT
Start: 2019-10-24 | End: 2019-10-24

## 2019-10-24 RX ORDER — NALBUPHINE HCL 10 MG/ML
5 AMPUL (ML) INJECTION ONCE
Status: COMPLETED | OUTPATIENT
Start: 2019-10-24 | End: 2019-10-24

## 2019-10-24 RX ORDER — KETOROLAC TROMETHAMINE 30 MG/ML
30 INJECTION, SOLUTION INTRAMUSCULAR; INTRAVENOUS ONCE
Status: COMPLETED | OUTPATIENT
Start: 2019-10-24 | End: 2019-10-24

## 2019-10-24 RX ORDER — 0.9 % SODIUM CHLORIDE 0.9 %
1000 INTRAVENOUS SOLUTION INTRAVENOUS ONCE
Status: COMPLETED | OUTPATIENT
Start: 2019-10-24 | End: 2019-10-24

## 2019-10-24 RX ORDER — BUTALBITAL, ACETAMINOPHEN AND CAFFEINE 300; 40; 50 MG/1; MG/1; MG/1
1 CAPSULE ORAL EVERY 6 HOURS PRN
Qty: 6 CAPSULE | Refills: 0 | Status: ON HOLD | OUTPATIENT
Start: 2019-10-24 | End: 2020-08-19

## 2019-10-24 RX ADMIN — KETOROLAC TROMETHAMINE 30 MG: 30 INJECTION, SOLUTION INTRAMUSCULAR; INTRAVENOUS at 17:41

## 2019-10-24 RX ADMIN — NALBUPHINE HYDROCHLORIDE 5 MG: 10 INJECTION, SOLUTION INTRAMUSCULAR; INTRAVENOUS; SUBCUTANEOUS at 19:05

## 2019-10-24 RX ADMIN — DIPHENHYDRAMINE HYDROCHLORIDE 25 MG: 50 INJECTION, SOLUTION INTRAMUSCULAR; INTRAVENOUS at 17:44

## 2019-10-24 RX ADMIN — SODIUM CHLORIDE 1000 ML: 9 INJECTION, SOLUTION INTRAVENOUS at 17:41

## 2019-10-24 RX ADMIN — METOCLOPRAMIDE 5 MG: 5 INJECTION, SOLUTION INTRAMUSCULAR; INTRAVENOUS at 18:12

## 2019-10-24 RX ADMIN — DEXAMETHASONE SODIUM PHOSPHATE 10 MG: 10 INJECTION, SOLUTION INTRAMUSCULAR; INTRAVENOUS at 17:43

## 2019-10-24 ASSESSMENT — ENCOUNTER SYMPTOMS
BLURRED VISION: 0
PHOTOPHOBIA: 1
NAUSEA: 1
TINGLING: 0
VISUAL CHANGE: 0
VOMITING: 0

## 2019-10-24 ASSESSMENT — PAIN SCALES - GENERAL
PAINLEVEL_OUTOF10: 7
PAINLEVEL_OUTOF10: 8
PAINLEVEL_OUTOF10: 7

## 2019-10-24 ASSESSMENT — PAIN DESCRIPTION - PAIN TYPE: TYPE: ACUTE PAIN

## 2019-10-24 ASSESSMENT — PAIN DESCRIPTION - DESCRIPTORS: DESCRIPTORS: HEADACHE

## 2020-04-06 ENCOUNTER — APPOINTMENT (OUTPATIENT)
Dept: GENERAL RADIOLOGY | Age: 45
End: 2020-04-06
Payer: COMMERCIAL

## 2020-04-06 ENCOUNTER — HOSPITAL ENCOUNTER (EMERGENCY)
Age: 45
Discharge: HOME | End: 2020-04-06
Attending: EMERGENCY MEDICINE
Payer: COMMERCIAL

## 2020-04-06 VITALS
HEART RATE: 77 BPM | OXYGEN SATURATION: 98 % | BODY MASS INDEX: 48.26 KG/M2 | RESPIRATION RATE: 15 BRPM | DIASTOLIC BLOOD PRESSURE: 64 MMHG | SYSTOLIC BLOOD PRESSURE: 138 MMHG | WEIGHT: 290 LBS | TEMPERATURE: 97.9 F

## 2020-04-06 LAB
ABSOLUTE EOS #: 0.3 K/UL (ref 0–0.4)
ABSOLUTE IMMATURE GRANULOCYTE: ABNORMAL K/UL (ref 0–0.3)
ABSOLUTE LYMPH #: 2.9 K/UL (ref 1–4.8)
ABSOLUTE MONO #: 0.5 K/UL (ref 0.1–1.3)
ALBUMIN SERPL-MCNC: 4.5 G/DL (ref 3.5–5.2)
ALBUMIN/GLOBULIN RATIO: ABNORMAL (ref 1–2.5)
ALP BLD-CCNC: 144 U/L (ref 35–104)
ALT SERPL-CCNC: 44 U/L (ref 5–33)
ANION GAP SERPL CALCULATED.3IONS-SCNC: 13 MMOL/L (ref 9–17)
AST SERPL-CCNC: 87 U/L
BASOPHILS # BLD: 1 % (ref 0–2)
BASOPHILS ABSOLUTE: 0.1 K/UL (ref 0–0.2)
BILIRUB SERPL-MCNC: 0.31 MG/DL (ref 0.3–1.2)
BUN BLDV-MCNC: 8 MG/DL (ref 6–20)
BUN/CREAT BLD: ABNORMAL (ref 9–20)
C-REACTIVE PROTEIN: 31.6 MG/L (ref 0–5)
CALCIUM SERPL-MCNC: 9.5 MG/DL (ref 8.6–10.4)
CHLORIDE BLD-SCNC: 99 MMOL/L (ref 98–107)
CO2: 25 MMOL/L (ref 20–31)
CREAT SERPL-MCNC: 0.52 MG/DL (ref 0.5–0.9)
DIFFERENTIAL TYPE: ABNORMAL
EOSINOPHILS RELATIVE PERCENT: 3 % (ref 0–4)
GFR AFRICAN AMERICAN: >60 ML/MIN
GFR NON-AFRICAN AMERICAN: >60 ML/MIN
GFR SERPL CREATININE-BSD FRML MDRD: ABNORMAL ML/MIN/{1.73_M2}
GFR SERPL CREATININE-BSD FRML MDRD: ABNORMAL ML/MIN/{1.73_M2}
GLUCOSE BLD-MCNC: 94 MG/DL (ref 70–99)
HCT VFR BLD CALC: 40.5 % (ref 36–46)
HEMOGLOBIN: 13.5 G/DL (ref 12–16)
IMMATURE GRANULOCYTES: ABNORMAL %
INR BLD: 0.9
LACTATE DEHYDROGENASE: 261 U/L (ref 135–214)
LIPASE: 11 U/L (ref 13–60)
LYMPHOCYTES # BLD: 27 % (ref 24–44)
MCH RBC QN AUTO: 30.8 PG (ref 26–34)
MCHC RBC AUTO-ENTMCNC: 33.4 G/DL (ref 31–37)
MCV RBC AUTO: 92.4 FL (ref 80–100)
MONOCYTES # BLD: 4 % (ref 1–7)
NRBC AUTOMATED: ABNORMAL PER 100 WBC
PDW BLD-RTO: 15.2 % (ref 11.5–14.9)
PLATELET # BLD: 267 K/UL (ref 150–450)
PLATELET ESTIMATE: ABNORMAL
PMV BLD AUTO: 9.6 FL (ref 6–12)
POTASSIUM SERPL-SCNC: 4.6 MMOL/L (ref 3.7–5.3)
PROTHROMBIN TIME: 12.3 SEC (ref 11.8–14.6)
RBC # BLD: 4.38 M/UL (ref 4–5.2)
RBC # BLD: ABNORMAL 10*6/UL
SEG NEUTROPHILS: 65 % (ref 36–66)
SEGMENTED NEUTROPHILS ABSOLUTE COUNT: 7.1 K/UL (ref 1.3–9.1)
SODIUM BLD-SCNC: 137 MMOL/L (ref 135–144)
TOTAL PROTEIN: 7.6 G/DL (ref 6.4–8.3)
TROPONIN INTERP: NORMAL
TROPONIN INTERP: NORMAL
TROPONIN T: NORMAL NG/ML
TROPONIN T: NORMAL NG/ML
TROPONIN, HIGH SENSITIVITY: <6 NG/L (ref 0–14)
TROPONIN, HIGH SENSITIVITY: <6 NG/L (ref 0–14)
WBC # BLD: 10.9 K/UL (ref 3.5–11)
WBC # BLD: ABNORMAL 10*3/UL

## 2020-04-06 PROCEDURE — 96374 THER/PROPH/DIAG INJ IV PUSH: CPT

## 2020-04-06 PROCEDURE — 71045 X-RAY EXAM CHEST 1 VIEW: CPT

## 2020-04-06 PROCEDURE — 84484 ASSAY OF TROPONIN QUANT: CPT

## 2020-04-06 PROCEDURE — 6370000000 HC RX 637 (ALT 250 FOR IP): Performed by: PHYSICIAN ASSISTANT

## 2020-04-06 PROCEDURE — 80053 COMPREHEN METABOLIC PANEL: CPT

## 2020-04-06 PROCEDURE — 85610 PROTHROMBIN TIME: CPT

## 2020-04-06 PROCEDURE — 83690 ASSAY OF LIPASE: CPT

## 2020-04-06 PROCEDURE — 83615 LACTATE (LD) (LDH) ENZYME: CPT

## 2020-04-06 PROCEDURE — 93005 ELECTROCARDIOGRAM TRACING: CPT | Performed by: PHYSICIAN ASSISTANT

## 2020-04-06 PROCEDURE — 85025 COMPLETE CBC W/AUTO DIFF WBC: CPT

## 2020-04-06 PROCEDURE — 6360000002 HC RX W HCPCS: Performed by: PHYSICIAN ASSISTANT

## 2020-04-06 PROCEDURE — 36415 COLL VENOUS BLD VENIPUNCTURE: CPT

## 2020-04-06 PROCEDURE — 96375 TX/PRO/DX INJ NEW DRUG ADDON: CPT

## 2020-04-06 PROCEDURE — 86140 C-REACTIVE PROTEIN: CPT

## 2020-04-06 PROCEDURE — 99285 EMERGENCY DEPT VISIT HI MDM: CPT

## 2020-04-06 RX ORDER — ONDANSETRON 2 MG/ML
4 INJECTION INTRAMUSCULAR; INTRAVENOUS ONCE
Status: COMPLETED | OUTPATIENT
Start: 2020-04-06 | End: 2020-04-06

## 2020-04-06 RX ORDER — NITROGLYCERIN 0.4 MG/1
0.4 TABLET SUBLINGUAL EVERY 5 MIN PRN
Status: DISCONTINUED | OUTPATIENT
Start: 2020-04-06 | End: 2020-04-06 | Stop reason: HOSPADM

## 2020-04-06 RX ORDER — MORPHINE SULFATE 4 MG/ML
2 INJECTION, SOLUTION INTRAMUSCULAR; INTRAVENOUS ONCE
Status: COMPLETED | OUTPATIENT
Start: 2020-04-06 | End: 2020-04-06

## 2020-04-06 RX ADMIN — MORPHINE SULFATE 2 MG: 4 INJECTION, SOLUTION INTRAMUSCULAR; INTRAVENOUS at 18:28

## 2020-04-06 RX ADMIN — ONDANSETRON 4 MG: 2 INJECTION INTRAMUSCULAR; INTRAVENOUS at 18:28

## 2020-04-06 RX ADMIN — NITROGLYCERIN 0.4 MG: 0.4 TABLET SUBLINGUAL at 17:09

## 2020-04-06 ASSESSMENT — PAIN SCALES - GENERAL: PAINLEVEL_OUTOF10: 8

## 2020-04-06 ASSESSMENT — ENCOUNTER SYMPTOMS
SHORTNESS OF BREATH: 0
VOMITING: 0
COUGH: 0
BACK PAIN: 0
ABDOMINAL PAIN: 0
NAUSEA: 0

## 2020-04-06 ASSESSMENT — PAIN DESCRIPTION - LOCATION: LOCATION: CHEST

## 2020-04-06 NOTE — ED PROVIDER NOTES
[address] ED  eMERGENCY dEPARTMENT eNCOUnter      Pt Name: Dami Epstein  MRN: [id number]  [place] 1975  Date of evaluation: 4/6/20      CHIEF COMPLAINT       Chief Complaint   Patient presents with    Shortness of Breath    Chest Pain    Pharyngitis         HISTORY OF PRESENT ILLNESS    Dami Epstein is a 38 y.o. female who presents complaining of  Chest pain, sore throat and sob  The history is provided by the patient. Chest Pain   Pain location:  Substernal area and epigastric  Pain quality: aching and dull    Pain radiates to:  Does not radiate  Pain severity:  Mild  Onset quality:  Sudden  Duration:  2 hours  Timing:  Intermittent  Progression:  Waxing and waning  Chronicity:  New  Relieved by:  Nothing  Worsened by:  Nothing  Ineffective treatments:  None tried  Associated symptoms: no abdominal pain, no back pain, no cough, no fever, no nausea, no numbness, no palpitations, no shortness of breath and no vomiting    Risk factors: obesity        REVIEW OF SYSTEMS       Review of Systems   Constitutional: Negative for fever. Respiratory: Negative for cough and shortness of breath. Cardiovascular: Positive for chest pain. Negative for palpitations. Gastrointestinal: Negative for abdominal pain, nausea and vomiting. Musculoskeletal: Negative for back pain. Neurological: Negative for numbness. All other systems reviewed and are negative.       PAST MEDICAL HISTORY     Past Medical History:   Diagnosis Date    Anxiety     Chronic kidney disease     right renal cyst    Depression     DVT (deep venous thrombosis) ([address] 1997    after delivery    Fibromyalgia     Headache(784.0)     migraines    Hx of blood clots     1997 left calf    Kidney stone     Pancreatitis 2001    Pleural effusion 2001    SVT (supraventricular tachycardia) ([address] 9/8/2015       SURGICAL HISTORY       Past Surgical History:   Procedure Laterality Date    ATRIAL ABLATION SURGERY      BACK SURGERY      L4-5    CHOLECYSTECTOMY  2001    ENDOMETRIAL ABLATION      e sure implants placed also    ENDOSCOPY, COLON, DIAGNOSTIC      EXCISION LESION HAND / FINGER Right 1/25/2019    HAND LESION BIOPSY EXCISION performed by Kusum Adame MD at [address] Bilateral     left x2, right x1    FOOT SURGERY Right     x3    KNEE ARTHROSCOPY Left     x2    MD CYSTO/URETERO/PYELOSCOPY W/LITHOTRIPSY Left 9/20/2017    CYSTOSCOPY URETEROSCOPY HOLMIUM LASER LITHO WITH STONE BASKETING WITH  STENT  performed by Mario Osei MD at [facility]        CURRENT MEDICATIONS       Discharge Medication List as of 4/6/2020  9:09 PM      CONTINUE these medications which have NOT CHANGED    Details   butalbital-APAP-caffeine (FIORICET) -40 MG CAPS per capsule Take 1 capsule by mouth every 6 hours as needed for Headaches, Disp-6 capsule, R-0Print      eszopiclone (ESZOPICLONE) 3 MG TABS Take 3 mg by mouth nightly.  Prn as neededHistorical Med      albuterol sulfate HFA (PROVENTIL HFA) 108 (90 Base) MCG/ACT inhaler Inhale 2 puffs into the lungs as neededHistorical Med      hydrOXYzine (VISTARIL) 25 MG capsule Take 25 mg by mouth nightly, R-2Historical Med      metFORMIN (GLUCOPHAGE-XR) 500 MG extended release tablet Take 500 mg by mouth daily, R-0Historical Med      ranitidine (ZANTAC) 150 MG capsule Take 300 mg by mouth 2 times dailyHistorical Med      valACYclovir (VALTREX) 1 g tablet Take 1,000 mg by mouth 3 times daily Prn for outbreaksHistorical Med      furosemide (LASIX) 20 MG tablet Take 20 mg by mouth as needed Prn for ankle edema, R-0Historical Med      tiZANidine (ZANAFLEX) 4 MG capsule Take 1 capsule by mouth 3 times daily, Disp-90 capsule, R-0Normal      magnesium oxide (MAG-OX) 400 MG tablet Take 400 mg by mouth dailyHistorical Med      brexpiprazole (REXULTI) 1 MG TABS tablet Take 1 mg by mouth dailyHistorical Med      fexofenadine (RA ALLERGY RELIEF) 180 MG tablet take 1 The patient was evaluated during the global COVID-19 pandemic, and that diagnosis was suspected/considered upon their initial presentation. Their evaluation, treatment and testing was consistent with current guidelines for patients who present with complaints or symptoms that may be related to COVID-19. Troponin and EKG done x2 showed no acute process EKG shows normal sinus rhythm at 77 bpm no ST elevation ST depression or other acute changes. She is given nausea medicine pain medication she had relief with the morphine and Zofran . nitroglycerin given without relief x2. Chest x-ray shows no acute process. Elevated AST ALT noted elevated LDH and CRP. Cannot rule out covert at this time. I discussed the findings with the patient and recommend that she follow CDC guidelines and self quarantine. Increase fluids Tylenol for pain or fever if needed follow-up with primary care provider as needed. Return if difficulty breathing fevers worsening of symptoms any other concerns. DIAGNOSTIC RESULTS     EKG: All EKG's are interpreted by the Emergency Department Physician who either signs or Co-signs this chart in the absence of acardiologist.        RADIOLOGY:Allplain film, CT, MRI, and formal ultrasound images (except ED bedside ultrasound) are read by the radiologist and the images and interpretations are directly viewed by the emergency physician. LABS:All lab results were reviewed by myself, and all abnormals are listed below.   Labs Reviewed   CBC WITH AUTO DIFFERENTIAL - Abnormal; Notable for the following components:       Result Value    RDW 15.2 (*)     All other components within normal limits   COMPREHENSIVE METABOLIC PANEL W/ REFLEX TO MG FOR LOW K - Abnormal; Notable for the following components:    Alkaline Phosphatase 144 (*)     ALT 44 (*)     AST 87 (*)     All other components within normal limits   C-REACTIVE PROTEIN - Abnormal; Notable for the following components:    CRP 31.6 (*)

## 2020-04-07 ENCOUNTER — CARE COORDINATION (OUTPATIENT)
Dept: CARE COORDINATION | Age: 45
End: 2020-04-07

## 2020-04-07 LAB
EKG ATRIAL RATE: 75 BPM
EKG ATRIAL RATE: 75 BPM
EKG ATRIAL RATE: 77 BPM
EKG P AXIS: 44 DEGREES
EKG P AXIS: 64 DEGREES
EKG P AXIS: 67 DEGREES
EKG P-R INTERVAL: 142 MS
EKG P-R INTERVAL: 146 MS
EKG P-R INTERVAL: 152 MS
EKG Q-T INTERVAL: 388 MS
EKG Q-T INTERVAL: 388 MS
EKG Q-T INTERVAL: 394 MS
EKG QRS DURATION: 80 MS
EKG QRS DURATION: 84 MS
EKG QRS DURATION: 94 MS
EKG QTC CALCULATION (BAZETT): 433 MS
EKG QTC CALCULATION (BAZETT): 439 MS
EKG QTC CALCULATION (BAZETT): 439 MS
EKG R AXIS: 23 DEGREES
EKG R AXIS: 41 DEGREES
EKG R AXIS: 8 DEGREES
EKG T AXIS: 11 DEGREES
EKG T AXIS: 18 DEGREES
EKG T AXIS: 41 DEGREES
EKG VENTRICULAR RATE: 75 BPM
EKG VENTRICULAR RATE: 75 BPM
EKG VENTRICULAR RATE: 77 BPM

## 2020-04-07 PROCEDURE — 93010 ELECTROCARDIOGRAM REPORT: CPT | Performed by: INTERNAL MEDICINE

## 2020-04-07 NOTE — CARE COORDINATION
Ira Simpson was seen at [facility] ER 4/6/2020- Atypical CP, SOB, Pharyngitis    4/7/2020- Covid initial outreach-1:12 pm Unable to reach Patient- phone number no longer in service. Called - unable to reach- voice mail not set up. 4/7/2020- 4:25 pm- Unable to reach Patient- no longer in service, unable to Emergency contact- voicemail not set up.

## 2020-04-08 ENCOUNTER — CARE COORDINATION (OUTPATIENT)
Dept: CARE COORDINATION | Age: 45
End: 2020-04-08

## 2020-05-19 ENCOUNTER — HOSPITAL ENCOUNTER (EMERGENCY)
Age: 45
Discharge: HOME | End: 2020-05-19
Attending: EMERGENCY MEDICINE
Payer: COMMERCIAL

## 2020-05-19 ENCOUNTER — APPOINTMENT (OUTPATIENT)
Dept: GENERAL RADIOLOGY | Age: 45
End: 2020-05-19
Payer: COMMERCIAL

## 2020-05-19 VITALS
HEART RATE: 91 BPM | HEIGHT: 64 IN | OXYGEN SATURATION: 99 % | SYSTOLIC BLOOD PRESSURE: 135 MMHG | BODY MASS INDEX: 49.51 KG/M2 | WEIGHT: 290 LBS | DIASTOLIC BLOOD PRESSURE: 58 MMHG | RESPIRATION RATE: 17 BRPM | TEMPERATURE: 98.4 F

## 2020-05-19 LAB
ABSOLUTE EOS #: 0.4 K/UL (ref 0–0.4)
ABSOLUTE IMMATURE GRANULOCYTE: NORMAL K/UL (ref 0–0.3)
ABSOLUTE LYMPH #: 3.3 K/UL (ref 1–4.8)
ABSOLUTE MONO #: 0.5 K/UL (ref 0.1–1.3)
ALBUMIN SERPL-MCNC: 4 G/DL (ref 3.5–5.2)
ALBUMIN/GLOBULIN RATIO: ABNORMAL (ref 1–2.5)
ALP BLD-CCNC: 121 U/L (ref 35–104)
ALT SERPL-CCNC: 41 U/L (ref 5–33)
ANION GAP SERPL CALCULATED.3IONS-SCNC: 13 MMOL/L (ref 9–17)
AST SERPL-CCNC: 65 U/L
BASOPHILS # BLD: 1 % (ref 0–2)
BASOPHILS ABSOLUTE: 0.1 K/UL (ref 0–0.2)
BILIRUB SERPL-MCNC: <0.15 MG/DL (ref 0.3–1.2)
BNP INTERPRETATION: NORMAL
BUN BLDV-MCNC: 9 MG/DL (ref 6–20)
BUN/CREAT BLD: ABNORMAL (ref 9–20)
C-REACTIVE PROTEIN: 30.7 MG/L (ref 0–5)
CALCIUM SERPL-MCNC: 8.8 MG/DL (ref 8.6–10.4)
CHLORIDE BLD-SCNC: 104 MMOL/L (ref 98–107)
CO2: 22 MMOL/L (ref 20–31)
CREAT SERPL-MCNC: 0.73 MG/DL (ref 0.5–0.9)
D-DIMER QUANTITATIVE: 0.48 MG/L FEU (ref 0–0.59)
DIFFERENTIAL TYPE: NORMAL
EOSINOPHILS RELATIVE PERCENT: 4 % (ref 0–4)
GFR AFRICAN AMERICAN: >60 ML/MIN
GFR NON-AFRICAN AMERICAN: >60 ML/MIN
GFR SERPL CREATININE-BSD FRML MDRD: ABNORMAL ML/MIN/{1.73_M2}
GFR SERPL CREATININE-BSD FRML MDRD: ABNORMAL ML/MIN/{1.73_M2}
GLUCOSE BLD-MCNC: 132 MG/DL (ref 70–99)
HCT VFR BLD CALC: 37.5 % (ref 36–46)
HEMOGLOBIN: 12.6 G/DL (ref 12–16)
IMMATURE GRANULOCYTES: NORMAL %
LACTATE DEHYDROGENASE: 227 U/L (ref 135–214)
LYMPHOCYTES # BLD: 31 % (ref 24–44)
MCH RBC QN AUTO: 31.3 PG (ref 26–34)
MCHC RBC AUTO-ENTMCNC: 33.6 G/DL (ref 31–37)
MCV RBC AUTO: 93.1 FL (ref 80–100)
MONOCYTES # BLD: 5 % (ref 1–7)
NRBC AUTOMATED: NORMAL PER 100 WBC
PDW BLD-RTO: 14.4 % (ref 11.5–14.9)
PLATELET # BLD: 263 K/UL (ref 150–450)
PLATELET ESTIMATE: NORMAL
PMV BLD AUTO: 9.3 FL (ref 6–12)
POTASSIUM SERPL-SCNC: 3.6 MMOL/L (ref 3.7–5.3)
PRO-BNP: 227 PG/ML
RBC # BLD: 4.03 M/UL (ref 4–5.2)
RBC # BLD: NORMAL 10*6/UL
SEG NEUTROPHILS: 59 % (ref 36–66)
SEGMENTED NEUTROPHILS ABSOLUTE COUNT: 6.5 K/UL (ref 1.3–9.1)
SODIUM BLD-SCNC: 139 MMOL/L (ref 135–144)
TOTAL PROTEIN: 7.1 G/DL (ref 6.4–8.3)
TROPONIN INTERP: NORMAL
TROPONIN INTERP: NORMAL
TROPONIN T: NORMAL NG/ML
TROPONIN T: NORMAL NG/ML
TROPONIN, HIGH SENSITIVITY: <6 NG/L (ref 0–14)
TROPONIN, HIGH SENSITIVITY: <6 NG/L (ref 0–14)
WBC # BLD: 10.7 K/UL (ref 3.5–11)
WBC # BLD: NORMAL 10*3/UL

## 2020-05-19 PROCEDURE — 96374 THER/PROPH/DIAG INJ IV PUSH: CPT

## 2020-05-19 PROCEDURE — 96375 TX/PRO/DX INJ NEW DRUG ADDON: CPT

## 2020-05-19 PROCEDURE — 6360000002 HC RX W HCPCS: Performed by: EMERGENCY MEDICINE

## 2020-05-19 PROCEDURE — 6370000000 HC RX 637 (ALT 250 FOR IP): Performed by: EMERGENCY MEDICINE

## 2020-05-19 PROCEDURE — 84484 ASSAY OF TROPONIN QUANT: CPT

## 2020-05-19 PROCEDURE — 83615 LACTATE (LD) (LDH) ENZYME: CPT

## 2020-05-19 PROCEDURE — 93005 ELECTROCARDIOGRAM TRACING: CPT | Performed by: EMERGENCY MEDICINE

## 2020-05-19 PROCEDURE — 86140 C-REACTIVE PROTEIN: CPT

## 2020-05-19 PROCEDURE — 85379 FIBRIN DEGRADATION QUANT: CPT

## 2020-05-19 PROCEDURE — 80053 COMPREHEN METABOLIC PANEL: CPT

## 2020-05-19 PROCEDURE — 99285 EMERGENCY DEPT VISIT HI MDM: CPT

## 2020-05-19 PROCEDURE — 85025 COMPLETE CBC W/AUTO DIFF WBC: CPT

## 2020-05-19 PROCEDURE — 83880 ASSAY OF NATRIURETIC PEPTIDE: CPT

## 2020-05-19 PROCEDURE — 36415 COLL VENOUS BLD VENIPUNCTURE: CPT

## 2020-05-19 PROCEDURE — 71045 X-RAY EXAM CHEST 1 VIEW: CPT

## 2020-05-19 RX ORDER — KETOROLAC TROMETHAMINE 30 MG/ML
30 INJECTION, SOLUTION INTRAMUSCULAR; INTRAVENOUS ONCE
Status: COMPLETED | OUTPATIENT
Start: 2020-05-19 | End: 2020-05-19

## 2020-05-19 RX ORDER — QUETIAPINE 400 MG/1
800 TABLET, FILM COATED, EXTENDED RELEASE ORAL NIGHTLY
COMMUNITY

## 2020-05-19 RX ORDER — BUTALBITAL, ACETAMINOPHEN AND CAFFEINE 300; 40; 50 MG/1; MG/1; MG/1
1 CAPSULE ORAL EVERY 6 HOURS PRN
Qty: 28 CAPSULE | Refills: 0 | Status: SHIPPED | OUTPATIENT
Start: 2020-05-19 | End: 2022-07-05

## 2020-05-19 RX ORDER — OMEPRAZOLE 40 MG/1
40 CAPSULE, DELAYED RELEASE ORAL DAILY
COMMUNITY

## 2020-05-19 RX ORDER — BUTALBITAL, ACETAMINOPHEN AND CAFFEINE 300; 40; 50 MG/1; MG/1; MG/1
1 CAPSULE ORAL ONCE
Status: COMPLETED | OUTPATIENT
Start: 2020-05-19 | End: 2020-05-19

## 2020-05-19 RX ORDER — BUSPIRONE HYDROCHLORIDE 15 MG/1
30 TABLET ORAL NIGHTLY
COMMUNITY

## 2020-05-19 RX ORDER — MULTIVIT-MIN/IRON/FOLIC ACID/K 18-600-40
CAPSULE ORAL DAILY
COMMUNITY
End: 2022-07-05

## 2020-05-19 RX ORDER — BUTALBITAL, ASPIRIN, AND CAFFEINE 325; 50; 40 MG/1; MG/1; MG/1
1 CAPSULE ORAL ONCE
Status: DISCONTINUED | OUTPATIENT
Start: 2020-05-19 | End: 2020-05-19 | Stop reason: ALTCHOICE

## 2020-05-19 RX ORDER — ONDANSETRON 2 MG/ML
4 INJECTION INTRAMUSCULAR; INTRAVENOUS ONCE
Status: COMPLETED | OUTPATIENT
Start: 2020-05-19 | End: 2020-05-19

## 2020-05-19 RX ORDER — NIFEDIPINE 30 MG/1
30 TABLET, FILM COATED, EXTENDED RELEASE ORAL DAILY
Status: ON HOLD | COMMUNITY
End: 2020-08-19

## 2020-05-19 RX ADMIN — KETOROLAC TROMETHAMINE 30 MG: 30 INJECTION, SOLUTION INTRAMUSCULAR at 14:37

## 2020-05-19 RX ADMIN — ONDANSETRON 4 MG: 2 INJECTION INTRAMUSCULAR; INTRAVENOUS at 14:35

## 2020-05-19 RX ADMIN — BUTALBITAL, ACETAMINOPHEN AND CAFFEINE 1 CAPSULE: 300; 40; 50 CAPSULE ORAL at 17:08

## 2020-05-19 ASSESSMENT — PAIN SCALES - GENERAL
PAINLEVEL_OUTOF10: 7

## 2020-05-19 ASSESSMENT — ENCOUNTER SYMPTOMS
BACK PAIN: 0
ABDOMINAL PAIN: 0
SORE THROAT: 0
CONSTIPATION: 0
COUGH: 0
NAUSEA: 0
COLOR CHANGE: 0
DIARRHEA: 0
TROUBLE SWALLOWING: 0
BLOOD IN STOOL: 0
SHORTNESS OF BREATH: 1
VOMITING: 0

## 2020-05-19 ASSESSMENT — PAIN DESCRIPTION - ONSET: ONSET: SUDDEN

## 2020-05-19 ASSESSMENT — PAIN DESCRIPTION - PAIN TYPE: TYPE: ACUTE PAIN

## 2020-05-19 ASSESSMENT — PAIN DESCRIPTION - DESCRIPTORS: DESCRIPTORS: SHARP;STABBING

## 2020-05-19 ASSESSMENT — PAIN DESCRIPTION - LOCATION
LOCATION: CHEST
LOCATION: CHEST

## 2020-05-19 ASSESSMENT — PAIN DESCRIPTION - ORIENTATION: ORIENTATION: MID

## 2020-05-19 ASSESSMENT — PAIN DESCRIPTION - PROGRESSION: CLINICAL_PROGRESSION: NOT CHANGED

## 2020-05-19 NOTE — ED PROVIDER NOTES
[address] ED  eMERGENCY dEPARTMENT eNCOUnter    Pt Name: Azalea Sanchez  MRN: [id number]  YOB: 1975  Date of evaluation:5/19/20  PCP: Mayco Zuniga MD    [address] [place] [address]       Chief Complaint   Patient presents with    Chest Pain    Shortness of Breath       HISTORY OF PRESENT ILLNESS    Azalea Sanchez is a 54 y.o. female who presents with a chief complaint of chest pain. Patient states pain started acutely while she was at work today around 12:40 PM.  It 7 out of 10 severity. Pain is sharp it is in the center of her chest and does not radiate. Nothing makes it better or worse. Also reports a mild shortness of breath. She thinks she was also short of breath yesterday but did not have any chest pain then. No cough, fever, chills. No nausea vomiting or diarrhea. Has not noticed any leg swelling. Patient acknowledges a history of SVT but denies any palpitations. She has had an ablation in the past.  She also had a history of DVT in the past but she is not on blood thinners. States this was in the 1990s. Symptoms are acute. Symptoms are moderate. Nothing make symptoms better or worse. Patient has no other complaints at this time. REVIEW OF SYSTEMS       Review of Systems   Constitutional: Negative for chills, fatigue and fever. HENT: Negative for congestion, ear pain, sore throat and trouble swallowing. Eyes: Negative for visual disturbance. Respiratory: Positive for shortness of breath. Negative for cough. Cardiovascular: Positive for chest pain. Negative for palpitations and leg swelling. Gastrointestinal: Negative for abdominal pain, blood in stool, constipation, diarrhea, nausea and vomiting. Genitourinary: Negative for dysuria and flank pain. Musculoskeletal: Negative for arthralgias, back pain, myalgias and neck pain. Skin: Negative for color change, rash and wound.    Neurological: Negative for dizziness, weakness, light-headedness, numbness and hydrOXYzine (VISTARIL) 25 MG capsule Take 25 mg by mouth nightly as needed for Anxiety , R-2Historical Med      tiZANidine (ZANAFLEX) 4 MG capsule Take 1 capsule by mouth 3 times daily, Disp-90 capsule, R-0Normal      magnesium oxide (MAG-OX) 400 MG tablet Take 400 mg by mouth dailyHistorical Med      fexofenadine (RA ALLERGY RELIEF) 180 MG tablet take 1 tablet by mouth once daily, Disp-30 tablet, R-0Normal      pramipexole (MIRAPEX) 0.5 MG tablet take 1 tablet by mouth twice a day, Disp-60 tablet, R-0Normal      DULoxetine (CYMBALTA) 60 MG extended release capsule TAKE 1 CAPSULE BY MOUTH TWICE A DAY, Disp-60 capsule, R-3Normal      ibuprofen (ADVIL) 200 MG tablet Take 2 tablets by mouth every 6 hours as needed for Pain, Disp-60 tablet, R-0Print      acetaminophen (APAP EXTRA STRENGTH) 500 MG tablet Take 2 tablets by mouth every 6 hours as needed for Pain, Disp-60 tablet, R-0Print      metoprolol (LOPRESSOR) 25 MG tablet Take 25 mg by mouth daily , R-0Historical Med      eszopiclone (ESZOPICLONE) 3 MG TABS Take 3 mg by mouth nightly. Prn as neededHistorical Med       !! - Potential duplicate medications found. Please discuss with provider. ALLERGIES     is allergic to aripiprazole; eletriptan; triptans [sumatriptan]; and lamotrigine. FAMILY HISTORY     She indicated that her mother is alive. She indicated that her father is alive. She indicated that both of her brothers are alive. family history includes Heart Disease in her father; High Blood Pressure in her brother. SOCIAL HISTORY      reports that she quit smoking about 5 months ago. Her smoking use included cigarettes. She smoked 1.00 pack per day. She has never used smokeless tobacco. She reports current alcohol use. She reports that she does not use drugs. PHYSICAL EXAM     INITIAL VITALS:  height is 5' 4\" (1.626 m) and weight is 290 lb (131.5 kg). Her oral temperature is 98.4 °F (36.9 °C).  Her blood pressure is 135/58 (abnormal) and

## 2020-05-20 ENCOUNTER — TELEPHONE (OUTPATIENT)
Dept: FAMILY MEDICINE CLINIC | Age: 45
End: 2020-05-20

## 2020-05-20 ENCOUNTER — CARE COORDINATION (OUTPATIENT)
Dept: CARE COORDINATION | Age: 45
End: 2020-05-20

## 2020-05-20 LAB
EKG ATRIAL RATE: 90 BPM
EKG P AXIS: 54 DEGREES
EKG P-R INTERVAL: 146 MS
EKG Q-T INTERVAL: 344 MS
EKG QRS DURATION: 82 MS
EKG QTC CALCULATION (BAZETT): 420 MS
EKG R AXIS: 14 DEGREES
EKG T AXIS: 9 DEGREES
EKG VENTRICULAR RATE: 90 BPM

## 2020-05-20 PROCEDURE — 93010 ELECTROCARDIOGRAM REPORT: CPT | Performed by: INTERNAL MEDICINE

## 2020-05-20 NOTE — CARE COORDINATION
Patient contacted regarding Kim Carey. Care Transition Nurse/ Ambulatory Care Manager contacted the patient by telephone to perform post discharge assessment. Verified name and  with patient as identifiers. Provided introduction to self, and explanation of the CTN/ACM role, and reason for call due to risk factors for infection and/or exposure to COVID-19. Symptoms reviewed with patient who verbalized the following symptoms: shortness of breath. Due to no new or worsening symptoms encounter was not routed to provider for escalation. Patient has following risk factors of: no known risk factors. CTN/ACM reviewed discharge instructions, medical action plan and red flags such as increased shortness of breath, increasing fever and signs of decompensation with patient who verbalized understanding. Discussed exposure protocols and quarantine with CDC Guidelines What to do if you are sick with coronavirus disease .  Patient was given an opportunity for questions and concerns. The patient agrees to contact the [facility] exposure line [phone], local [facility] department [place] [facility]: ([phone]) and PCP office for questions related to their healthcare. CTN/ACM provided contact information for future needs. Reviewed and educated patient on any new and changed medications related to discharge diagnosis     Patient/family/caregiver given information for GetWell Loop and agrees to enroll yes  Patient referred for LOOP Program: yes  Patient's preferred e-mail:   [email or web address]  Patient's preferred phone number: [phone]   Care Plan:  Self Monitoring  LOOP Provider: Naun Handley  ([place] ONLY) Recommended Follow Up: 1 week    Pt will be further monitored by COVID Loop Team based on severity of symptoms and risk factors.

## 2020-06-14 ENCOUNTER — HOSPITAL ENCOUNTER (EMERGENCY)
Age: 45
Discharge: HOME | End: 2020-06-14
Attending: EMERGENCY MEDICINE
Payer: COMMERCIAL

## 2020-06-14 VITALS
TEMPERATURE: 97.8 F | OXYGEN SATURATION: 98 % | SYSTOLIC BLOOD PRESSURE: 187 MMHG | BODY MASS INDEX: 47.48 KG/M2 | HEART RATE: 47 BPM | HEIGHT: 65 IN | RESPIRATION RATE: 16 BRPM | DIASTOLIC BLOOD PRESSURE: 83 MMHG | WEIGHT: 285 LBS

## 2020-06-14 PROCEDURE — 96375 TX/PRO/DX INJ NEW DRUG ADDON: CPT

## 2020-06-14 PROCEDURE — 96374 THER/PROPH/DIAG INJ IV PUSH: CPT

## 2020-06-14 PROCEDURE — 99283 EMERGENCY DEPT VISIT LOW MDM: CPT

## 2020-06-14 PROCEDURE — 6360000002 HC RX W HCPCS: Performed by: EMERGENCY MEDICINE

## 2020-06-14 RX ORDER — METOCLOPRAMIDE HYDROCHLORIDE 5 MG/ML
10 INJECTION INTRAMUSCULAR; INTRAVENOUS ONCE
Status: COMPLETED | OUTPATIENT
Start: 2020-06-14 | End: 2020-06-14

## 2020-06-14 RX ORDER — DIPHENHYDRAMINE HYDROCHLORIDE 50 MG/ML
25 INJECTION INTRAMUSCULAR; INTRAVENOUS ONCE
Status: COMPLETED | OUTPATIENT
Start: 2020-06-14 | End: 2020-06-14

## 2020-06-14 RX ORDER — DEXAMETHASONE SODIUM PHOSPHATE 4 MG/ML
4 INJECTION, SOLUTION INTRA-ARTICULAR; INTRALESIONAL; INTRAMUSCULAR; INTRAVENOUS; SOFT TISSUE ONCE
Status: COMPLETED | OUTPATIENT
Start: 2020-06-14 | End: 2020-06-14

## 2020-06-14 RX ORDER — KETOROLAC TROMETHAMINE 30 MG/ML
30 INJECTION, SOLUTION INTRAMUSCULAR; INTRAVENOUS ONCE
Status: COMPLETED | OUTPATIENT
Start: 2020-06-14 | End: 2020-06-14

## 2020-06-14 RX ADMIN — KETOROLAC TROMETHAMINE 30 MG: 30 INJECTION, SOLUTION INTRAMUSCULAR at 14:52

## 2020-06-14 RX ADMIN — METOCLOPRAMIDE 10 MG: 5 INJECTION, SOLUTION INTRAMUSCULAR; INTRAVENOUS at 14:51

## 2020-06-14 RX ADMIN — DEXAMETHASONE SODIUM PHOSPHATE 4 MG: 4 INJECTION, SOLUTION INTRAMUSCULAR; INTRAVENOUS at 14:51

## 2020-06-14 RX ADMIN — DIPHENHYDRAMINE HYDROCHLORIDE 25 MG: 50 INJECTION INTRAMUSCULAR; INTRAVENOUS at 14:51

## 2020-06-14 ASSESSMENT — ENCOUNTER SYMPTOMS
COUGH: 0
EYE PAIN: 0
SORE THROAT: 0
VOMITING: 0
WHEEZING: 0
RHINORRHEA: 0
ABDOMINAL PAIN: 0
DIARRHEA: 0
BLOOD IN STOOL: 0
EYE DISCHARGE: 0
EYE REDNESS: 0
FACIAL SWELLING: 0
NAUSEA: 1
SINUS PRESSURE: 0
COLOR CHANGE: 0
SHORTNESS OF BREATH: 0
CHEST TIGHTNESS: 0
BACK PAIN: 0
TROUBLE SWALLOWING: 0
CONSTIPATION: 0

## 2020-06-14 ASSESSMENT — PAIN SCALES - GENERAL
PAINLEVEL_OUTOF10: 8
PAINLEVEL_OUTOF10: 8
PAINLEVEL_OUTOF10: 0

## 2020-06-14 ASSESSMENT — PAIN DESCRIPTION - PAIN TYPE: TYPE: ACUTE PAIN

## 2020-06-14 ASSESSMENT — PAIN DESCRIPTION - PROGRESSION: CLINICAL_PROGRESSION: RESOLVED

## 2020-06-14 ASSESSMENT — PAIN DESCRIPTION - LOCATION: LOCATION: HEAD

## 2020-06-14 ASSESSMENT — PAIN DESCRIPTION - DESCRIPTORS: DESCRIPTORS: ACHING

## 2020-06-14 ASSESSMENT — PAIN DESCRIPTION - FREQUENCY: FREQUENCY: CONTINUOUS

## 2020-06-14 NOTE — ED NOTES
Bed: 09  Expected date:   Expected time:   Means of arrival:   Comments:  Clif Marsh RN  06/14/20 2642

## 2020-06-14 NOTE — ED PROVIDER NOTES
[address] ED  eMERGENCY dEPARTMENT eNCOUnter      Pt Name: Fariba Maher  MRN: [id number]  [place] 1975  Date of evaluation: 6/14/20      CHIEF COMPLAINT       Chief Complaint   Patient presents with    Headache         HISTORY OF PRESENT ILLNESS    Fariba Maher is a 54 y.o. female who presents complaining of headache. Patient states that around noon today she woke up from a nap and was having a severe diffuse headache. Patient states she does have a history of migraines but states usually her headache is on one side versus the other but this 1 is on both sides. Patient states she has no blurry vision no numbness tingling or weakness. Patient does have some nausea but no vomiting. Patient has had no recent illnesses. REVIEW OF SYSTEMS       Review of Systems   Constitutional: Negative for activity change, appetite change, chills, diaphoresis and fever. HENT: Negative for congestion, ear pain, facial swelling, nosebleeds, rhinorrhea, sinus pressure, sore throat and trouble swallowing. Eyes: Negative for pain, discharge and redness. Respiratory: Negative for cough, chest tightness, shortness of breath and wheezing. Cardiovascular: Negative for chest pain, palpitations and leg swelling. Gastrointestinal: Positive for nausea. Negative for abdominal pain, blood in stool, constipation, diarrhea and vomiting. Genitourinary: Negative for difficulty urinating, dysuria, flank pain, frequency, genital sores and hematuria. Musculoskeletal: Negative for arthralgias, back pain, gait problem, joint swelling, myalgias and neck pain. Skin: Negative for color change, pallor, rash and wound. Neurological: Positive for headaches. Negative for dizziness, tremors, seizures, syncope, speech difficulty, weakness and numbness. Psychiatric/Behavioral: Negative for confusion, decreased concentration, hallucinations, self-injury, sleep disturbance and suicidal ideas.        PAST MEDICAL HISTORY     Past Medical History:   Diagnosis Date    Anxiety     Chronic kidney disease     right renal cyst    Depression     DVT (deep [facility] thrombosis) [address] [address] 1997    after delivery    Fibromyalgia     Headache(784.0)     migraines    Hx of blood clots     1997 left calf    Kidney stone     Pancreatitis 2001    Pleural effusion 2001    SVT (supraventricular tachycardia) ([facility] [address] 9/8/2015       SURGICAL HISTORY       Past Surgical History:   Procedure Laterality Date    ATRIAL ABLATION SURGERY      BACK SURGERY      L4-5    CHOLECYSTECTOMY  2001    ENDOMETRIAL ABLATION      e sure implants placed also    ENDOSCOPY, COLON, DIAGNOSTIC      EXCISION LESION HAND / FINGER Right 1/25/2019    HAND LESION BIOPSY EXCISION performed by Waldo Tirado MD at [facility] Bilateral     left x2, right x1    FOOT SURGERY Right     x3    KNEE ARTHROSCOPY Left     x2    HI CYSTO/URETERO/PYELOSCOPY W/LITHOTRIPSY Left 9/20/2017    CYSTOSCOPY URETEROSCOPY HOLMIUM LASER LITHO WITH STONE BASKETING WITH  STENT  performed by Jennifer Lester MD at [facility]        [address]       Current Discharge Medication List      CONTINUE these medications which have NOT CHANGED    Details   NIFEdipine (ADALAT CC) 30 MG extended release tablet Take 30 mg by mouth daily      QUEtiapine (SEROQUEL XR) 400 MG extended release tablet Take 800 mg by mouth nightly      busPIRone (BUSPAR) 15 MG tablet Take 15 mg by mouth nightly Can take once in morning PRN      omeprazole (PRILOSEC) 40 MG delayed release capsule Take 40 mg by mouth daily      Cholecalciferol (VITAMIN D) 50 MCG (2000 UT) CAPS capsule Take by mouth daily      butalbital-APAP-caffeine (FIORICET) -40 MG CAPS per capsule Take 1 capsule by mouth every 6 hours as needed for Headaches  Qty: 28 capsule, Refills: 0      butalbital-APAP-caffeine (FIORICET) -40 MG CAPS per capsule Take 1 capsule by mouth every 6 infection or other serious etiology. The patient is neurologically intact. Given the extremely low risk of these diagnoses further testing and evaluation for these possibilities does not appear to be indicated at this time. The patient has been instructed to return if the symptoms worsen or change in any way. EMERGENCY DEPARTMENT COURSE:   Vitals:    Vitals:    06/14/20 1445   BP: (!) 187/83   Pulse: (!) 47   Resp: 16   Temp: 97.8 °F (36.6 °C)   TempSrc: Oral   SpO2: 98%   Weight: 285 lb (129.3 kg)   Height: 5' 5\" (1.651 m)       The patient was given the following medications while in the emergency department:  Orders Placed This Encounter   Medications    ketorolac (TORADOL) injection 30 mg    diphenhydrAMINE (BENADRYL) injection 25 mg    metoclopramide (REGLAN) injection 10 mg    dexamethasone (DECADRON) injection 4 mg       -------------------------  3:44 PM EDT  Patient is reevaluated and states she is feeling much better at this time and is ready for discharge home. CONSULTS:  None    PROCEDURES:  None    FINAL IMPRESSION      1.  Migraine without status migrainosus, not intractable, unspecified migraine type          DISPOSITION/PLAN   DISPOSITION Decision To Discharge 06/14/2020 03:43:47 PM      PATIENT REFERREDTO:  [facility] ED  [facility] [address] [phone]    If symptoms worsen      DISCHARGEMEDICATIONS:  Current Discharge Medication List          (Please note that portions of this note were completed with a voice recognition program.  Efforts were made to edit thedictations but occasionally words are mis-transcribed.)    Meredith Turpin MD  Attending Emergency Physician                        Meredith Turpin MD  06/14/20 [id number]

## 2020-06-25 ENCOUNTER — APPOINTMENT (OUTPATIENT)
Dept: GENERAL RADIOLOGY | Age: 45
End: 2020-06-25
Payer: COMMERCIAL

## 2020-06-25 ENCOUNTER — HOSPITAL ENCOUNTER (EMERGENCY)
Age: 45
Discharge: HOME | End: 2020-06-25
Attending: EMERGENCY MEDICINE
Payer: COMMERCIAL

## 2020-06-25 VITALS
WEIGHT: 290 LBS | DIASTOLIC BLOOD PRESSURE: 68 MMHG | BODY MASS INDEX: 48.32 KG/M2 | SYSTOLIC BLOOD PRESSURE: 142 MMHG | TEMPERATURE: 97.6 F | HEIGHT: 65 IN | HEART RATE: 113 BPM | OXYGEN SATURATION: 95 % | RESPIRATION RATE: 18 BRPM

## 2020-06-25 LAB
-: NORMAL
ABSOLUTE EOS #: 0.2 K/UL (ref 0–0.4)
ABSOLUTE IMMATURE GRANULOCYTE: NORMAL K/UL (ref 0–0.3)
ABSOLUTE LYMPH #: 2.8 K/UL (ref 1–4.8)
ABSOLUTE MONO #: 0.5 K/UL (ref 0.1–1.3)
ALBUMIN SERPL-MCNC: 4.2 G/DL (ref 3.5–5.2)
ALBUMIN/GLOBULIN RATIO: ABNORMAL (ref 1–2.5)
ALP BLD-CCNC: 126 U/L (ref 35–104)
ALT SERPL-CCNC: 67 U/L (ref 5–33)
ANION GAP SERPL CALCULATED.3IONS-SCNC: 15 MMOL/L (ref 9–17)
AST SERPL-CCNC: 54 U/L
BASOPHILS # BLD: 0 % (ref 0–2)
BASOPHILS ABSOLUTE: 0 K/UL (ref 0–0.2)
BILIRUB SERPL-MCNC: 0.18 MG/DL (ref 0.3–1.2)
BUN BLDV-MCNC: 9 MG/DL (ref 6–20)
BUN/CREAT BLD: ABNORMAL (ref 9–20)
CALCIUM SERPL-MCNC: 9 MG/DL (ref 8.6–10.4)
CHLORIDE BLD-SCNC: 99 MMOL/L (ref 98–107)
CO2: 21 MMOL/L (ref 20–31)
CREAT SERPL-MCNC: 0.64 MG/DL (ref 0.5–0.9)
DIFFERENTIAL TYPE: NORMAL
EOSINOPHILS RELATIVE PERCENT: 2 % (ref 0–4)
GFR AFRICAN AMERICAN: >60 ML/MIN
GFR NON-AFRICAN AMERICAN: >60 ML/MIN
GFR SERPL CREATININE-BSD FRML MDRD: ABNORMAL ML/MIN/{1.73_M2}
GFR SERPL CREATININE-BSD FRML MDRD: ABNORMAL ML/MIN/{1.73_M2}
GLUCOSE BLD-MCNC: 105 MG/DL (ref 70–99)
HCT VFR BLD CALC: 41.8 % (ref 36–46)
HEMOGLOBIN: 13.9 G/DL (ref 12–16)
IMMATURE GRANULOCYTES: NORMAL %
LYMPHOCYTES # BLD: 29 % (ref 24–44)
MAGNESIUM: 2 MG/DL (ref 1.6–2.6)
MCH RBC QN AUTO: 31.2 PG (ref 26–34)
MCHC RBC AUTO-ENTMCNC: 33.2 G/DL (ref 31–37)
MCV RBC AUTO: 94 FL (ref 80–100)
MONOCYTES # BLD: 5 % (ref 1–7)
NRBC AUTOMATED: NORMAL PER 100 WBC
PDW BLD-RTO: 14 % (ref 11.5–14.9)
PLATELET # BLD: 347 K/UL (ref 150–450)
PLATELET ESTIMATE: NORMAL
PMV BLD AUTO: 9.2 FL (ref 6–12)
POTASSIUM SERPL-SCNC: 4.3 MMOL/L (ref 3.7–5.3)
RBC # BLD: 4.45 M/UL (ref 4–5.2)
RBC # BLD: NORMAL 10*6/UL
REASON FOR REJECTION: NORMAL
SEG NEUTROPHILS: 64 % (ref 36–66)
SEGMENTED NEUTROPHILS ABSOLUTE COUNT: 6.1 K/UL (ref 1.3–9.1)
SODIUM BLD-SCNC: 135 MMOL/L (ref 135–144)
TOTAL PROTEIN: 7.5 G/DL (ref 6.4–8.3)
TROPONIN INTERP: NORMAL
TROPONIN T: NORMAL NG/ML
TROPONIN, HIGH SENSITIVITY: <6 NG/L (ref 0–14)
WBC # BLD: 9.5 K/UL (ref 3.5–11)
WBC # BLD: NORMAL 10*3/UL
ZZ NTE CLEAN UP: ORDERED TEST: NORMAL
ZZ NTE WITH NAME CLEAN UP: SPECIMEN SOURCE: NORMAL

## 2020-06-25 PROCEDURE — 6360000002 HC RX W HCPCS: Performed by: EMERGENCY MEDICINE

## 2020-06-25 PROCEDURE — 99285 EMERGENCY DEPT VISIT HI MDM: CPT

## 2020-06-25 PROCEDURE — 96374 THER/PROPH/DIAG INJ IV PUSH: CPT

## 2020-06-25 PROCEDURE — 84484 ASSAY OF TROPONIN QUANT: CPT

## 2020-06-25 PROCEDURE — 93005 ELECTROCARDIOGRAM TRACING: CPT | Performed by: EMERGENCY MEDICINE

## 2020-06-25 PROCEDURE — 71045 X-RAY EXAM CHEST 1 VIEW: CPT

## 2020-06-25 PROCEDURE — 80053 COMPREHEN METABOLIC PANEL: CPT

## 2020-06-25 PROCEDURE — 2580000003 HC RX 258: Performed by: EMERGENCY MEDICINE

## 2020-06-25 PROCEDURE — 36415 COLL VENOUS BLD VENIPUNCTURE: CPT

## 2020-06-25 PROCEDURE — 85025 COMPLETE CBC W/AUTO DIFF WBC: CPT

## 2020-06-25 PROCEDURE — 83735 ASSAY OF MAGNESIUM: CPT

## 2020-06-25 RX ORDER — TIZANIDINE 4 MG/1
4 TABLET ORAL 3 TIMES DAILY PRN
Qty: 15 TABLET | Refills: 0 | Status: SHIPPED | OUTPATIENT
Start: 2020-06-25 | End: 2020-06-25 | Stop reason: SDUPTHER

## 2020-06-25 RX ORDER — 0.9 % SODIUM CHLORIDE 0.9 %
1000 INTRAVENOUS SOLUTION INTRAVENOUS ONCE
Status: COMPLETED | OUTPATIENT
Start: 2020-06-25 | End: 2020-06-25

## 2020-06-25 RX ORDER — TIZANIDINE 4 MG/1
4 TABLET ORAL 3 TIMES DAILY PRN
Qty: 15 TABLET | Refills: 0 | Status: ON HOLD | OUTPATIENT
Start: 2020-06-25 | End: 2020-08-19

## 2020-06-25 RX ORDER — DIPHENHYDRAMINE HYDROCHLORIDE 50 MG/ML
50 INJECTION INTRAMUSCULAR; INTRAVENOUS ONCE
Status: COMPLETED | OUTPATIENT
Start: 2020-06-25 | End: 2020-06-25

## 2020-06-25 RX ADMIN — SODIUM CHLORIDE 1000 ML: 9 INJECTION, SOLUTION INTRAVENOUS at 13:13

## 2020-06-25 RX ADMIN — DIPHENHYDRAMINE HYDROCHLORIDE 50 MG: 50 INJECTION INTRAMUSCULAR; INTRAVENOUS at 13:13

## 2020-06-25 ASSESSMENT — PAIN DESCRIPTION - PAIN TYPE: TYPE: ACUTE PAIN

## 2020-06-25 ASSESSMENT — ENCOUNTER SYMPTOMS
ABDOMINAL PAIN: 0
COUGH: 0
SHORTNESS OF BREATH: 1
BACK PAIN: 0
EYES NEGATIVE: 1
GASTROINTESTINAL NEGATIVE: 1

## 2020-06-25 ASSESSMENT — PAIN SCALES - GENERAL: PAINLEVEL_OUTOF10: 7

## 2020-06-25 ASSESSMENT — PAIN DESCRIPTION - LOCATION: LOCATION: ARM

## 2020-06-25 NOTE — ED PROVIDER NOTES
5\" (1.651 m)   Wt 290 lb (131.5 kg)   SpO2 95%   BMI 48.26 kg/m²    Physical Exam  Vitals signs and nursing note reviewed. Constitutional:       Appearance: Normal appearance. HENT:      Head: Normocephalic and atraumatic. Nose: No congestion. Mouth/Throat:      Mouth: Mucous membranes are moist.   Eyes:      Conjunctiva/sclera: Conjunctivae normal.   Neck:      Musculoskeletal: Normal range of motion and neck supple. Cardiovascular:      Rate and Rhythm: Normal rate and regular rhythm. Heart sounds: No murmur. Pulmonary:      Effort: Pulmonary effort is normal.      Breath sounds: Normal breath sounds. Abdominal:      Palpations: Abdomen is soft. Tenderness: There is no abdominal tenderness. Musculoskeletal:         General: No signs of injury. Skin:     General: Skin is warm and dry. Capillary Refill: Capillary refill takes less than 2 seconds. Neurological:      General: No focal deficit present. Mental Status: She is alert and oriented to person, place, and time. GCS: GCS eye subscore is 4. GCS verbal subscore is 5. GCS motor subscore is 6. Cranial Nerves: Cranial nerves are intact. Sensory: Sensation is intact. Motor: Motor function is intact. Gait: Gait is intact. Comments: Pt is having some twitching sporadically in all 4 extremities. MEDICAL DECISION MAKING:     Reviewed results. Nonacute. CXR negative.  ekg and trop negative. On reeval, no further twitching. Pt is resting comfortably, no distress. I do not suspect acs, pe, dissection, pna, pneumo or other emergent neuro, vascular, infectious, traumatic, cardiac, or pulmonary pathology. Pt does continue to describe some muscle spasms, which have previously responded to zanaflex. Otherwise, pt is appropriate for discharge. Discussed results and plan with the pt. They expressed appropriate understanding.   Pt given close follow up, supportive care instructions and

## 2020-06-26 ENCOUNTER — CARE COORDINATION (OUTPATIENT)
Dept: CARE COORDINATION | Age: 45
End: 2020-06-26

## 2020-06-26 LAB
EKG ATRIAL RATE: 111 BPM
EKG P AXIS: 45 DEGREES
EKG P-R INTERVAL: 136 MS
EKG Q-T INTERVAL: 308 MS
EKG QRS DURATION: 76 MS
EKG QTC CALCULATION (BAZETT): 418 MS
EKG R AXIS: 15 DEGREES
EKG T AXIS: 106 DEGREES
EKG VENTRICULAR RATE: 111 BPM

## 2020-06-30 ENCOUNTER — CARE COORDINATION (OUTPATIENT)
Dept: CARE COORDINATION | Age: 45
End: 2020-06-30

## 2020-06-30 NOTE — CARE COORDINATION
Date/Time:  6/30/2020 10:59 AM  RN attempted to reach patient by telephone regarding yellow alert in [facility] remote symptom monitoring program. Left HIPPA compliant message requesting a return call. Will attempt to reach patient again. Comment left in Loop monitoring program with contact information. Ravi Kan Morning and thank you for checking in with us this morning. I tried to call you to see how you are feeling today. You can reach me at [phone]. We are available 8-4 M-F, and 9-1 S/S.  Thank You- Radha Mathew RN

## 2020-07-01 ENCOUNTER — CARE COORDINATION (OUTPATIENT)
Dept: CARE COORDINATION | Age: 45
End: 2020-07-01

## 2020-07-01 NOTE — CARE COORDINATION
Yellow alert noted in [facility] remote symptom monitoring program. Messaged patient to notify Beba Curran if symptoms have worsened since yesterday. Loreta, Ravi Morning and thank you for checking in with us this morning. Please let us know if any of your symptoms are worse than yesterday, or if you would like to speak with a nurse. We are available 8-4 M-F, and 9-1 S/S. Thank You- Irma ABDUL    Patient responded:  9:23 AM  \"Not any worse than yesterday\"    Message sent to patient:   Thanks for the update. I hope things continue to improve each day.  Irma ABDUL

## 2020-07-08 ENCOUNTER — CARE COORDINATION (OUTPATIENT)
Dept: CARE COORDINATION | Age: 45
End: 2020-07-08

## 2020-07-08 NOTE — CARE COORDINATION
Yellow alert noted in [facility] remote symptom monitoring program. Messaged patient to notify Ania Robison if symptoms have worsened since yesterday. Ravi Kan Morning and thank you for checking in with us this morning. Please let us know if any of your symptoms are worse than yesterday, or if you would like to speak with a nurse. We are available 8-4 M-F, and 9-1 S/S.  Thank Brien Arana RN.

## 2020-07-10 ENCOUNTER — CARE COORDINATION (OUTPATIENT)
Dept: CASE MANAGEMENT | Age: 45
End: 2020-07-10

## 2020-07-10 NOTE — CARE COORDINATION
Yellow alert noted in Loop remote symptom monitoring program. Messaged patient to notify Beba Curran if symptoms have worsened since yesterday. Ozzy Gunter LPN, 68:14 AM  Thank you for checking in with us. Please let us know if your symptoms are worse than yesterday or if you wish to speak to a nurse. You can also E-mail your message to us. We are available between 8 am to 4 pm Mon-Fri. and 9 am to 1 pm weekends. If your symptoms worsen or are severe - Please call your doctor, call 911 and/or go to the nearest Emergency Room. Ozzy Gunter LPN    Waiting for Patient Reply. Ozzy Gunter, 3:50 PM  Patient did not respond to Loop E-mail or Emile Rdoriguez.       Ozzy Gunter LPN     [address] [facility] / [facility]

## 2020-07-18 ENCOUNTER — HOSPITAL ENCOUNTER (EMERGENCY)
Age: 45
Discharge: HOME | End: 2020-07-18
Attending: EMERGENCY MEDICINE
Payer: COMMERCIAL

## 2020-07-18 ENCOUNTER — APPOINTMENT (OUTPATIENT)
Dept: GENERAL RADIOLOGY | Age: 45
End: 2020-07-18
Payer: COMMERCIAL

## 2020-07-18 VITALS
WEIGHT: 293 LBS | BODY MASS INDEX: 48.82 KG/M2 | TEMPERATURE: 97.7 F | HEART RATE: 86 BPM | DIASTOLIC BLOOD PRESSURE: 80 MMHG | OXYGEN SATURATION: 98 % | HEIGHT: 65 IN | SYSTOLIC BLOOD PRESSURE: 139 MMHG | RESPIRATION RATE: 16 BRPM

## 2020-07-18 LAB
ABSOLUTE EOS #: 0.4 K/UL (ref 0–0.4)
ABSOLUTE IMMATURE GRANULOCYTE: ABNORMAL K/UL (ref 0–0.3)
ABSOLUTE LYMPH #: 2.6 K/UL (ref 1–4.8)
ABSOLUTE MONO #: 0.3 K/UL (ref 0.1–1.3)
ALBUMIN SERPL-MCNC: 3.9 G/DL (ref 3.5–5.2)
ALBUMIN/GLOBULIN RATIO: ABNORMAL (ref 1–2.5)
ALP BLD-CCNC: 202 U/L (ref 35–104)
ALT SERPL-CCNC: 188 U/L (ref 5–33)
ANION GAP SERPL CALCULATED.3IONS-SCNC: 11 MMOL/L (ref 9–17)
AST SERPL-CCNC: 111 U/L
BASOPHILS # BLD: 1 % (ref 0–2)
BASOPHILS ABSOLUTE: 0.1 K/UL (ref 0–0.2)
BILIRUB SERPL-MCNC: 0.19 MG/DL (ref 0.3–1.2)
BNP INTERPRETATION: NORMAL
BUN BLDV-MCNC: 15 MG/DL (ref 6–20)
BUN/CREAT BLD: ABNORMAL (ref 9–20)
CALCIUM SERPL-MCNC: 9 MG/DL (ref 8.6–10.4)
CHLORIDE BLD-SCNC: 100 MMOL/L (ref 98–107)
CO2: 26 MMOL/L (ref 20–31)
CREAT SERPL-MCNC: 0.55 MG/DL (ref 0.5–0.9)
D-DIMER QUANTITATIVE: 0.46 MG/L FEU (ref 0–0.59)
DIFFERENTIAL TYPE: ABNORMAL
EOSINOPHILS RELATIVE PERCENT: 5 % (ref 0–4)
GFR AFRICAN AMERICAN: >60 ML/MIN
GFR NON-AFRICAN AMERICAN: >60 ML/MIN
GFR SERPL CREATININE-BSD FRML MDRD: ABNORMAL ML/MIN/{1.73_M2}
GFR SERPL CREATININE-BSD FRML MDRD: ABNORMAL ML/MIN/{1.73_M2}
GLUCOSE BLD-MCNC: 124 MG/DL (ref 70–99)
HCT VFR BLD CALC: 35.1 % (ref 36–46)
HEMOGLOBIN: 11.8 G/DL (ref 12–16)
IMMATURE GRANULOCYTES: ABNORMAL %
LYMPHOCYTES # BLD: 36 % (ref 24–44)
MAGNESIUM: 2.1 MG/DL (ref 1.6–2.6)
MCH RBC QN AUTO: 32 PG (ref 26–34)
MCHC RBC AUTO-ENTMCNC: 33.6 G/DL (ref 31–37)
MCV RBC AUTO: 95.3 FL (ref 80–100)
MONOCYTES # BLD: 5 % (ref 1–7)
NRBC AUTOMATED: ABNORMAL PER 100 WBC
PDW BLD-RTO: 14 % (ref 11.5–14.9)
PLATELET # BLD: 345 K/UL (ref 150–450)
PLATELET ESTIMATE: ABNORMAL
PMV BLD AUTO: 9 FL (ref 6–12)
POTASSIUM SERPL-SCNC: 3.5 MMOL/L (ref 3.7–5.3)
PRO-BNP: 101 PG/ML
RBC # BLD: 3.68 M/UL (ref 4–5.2)
RBC # BLD: ABNORMAL 10*6/UL
SEG NEUTROPHILS: 53 % (ref 36–66)
SEGMENTED NEUTROPHILS ABSOLUTE COUNT: 4 K/UL (ref 1.3–9.1)
SODIUM BLD-SCNC: 137 MMOL/L (ref 135–144)
TOTAL PROTEIN: 6.8 G/DL (ref 6.4–8.3)
TROPONIN INTERP: NORMAL
TROPONIN T: NORMAL NG/ML
TROPONIN, HIGH SENSITIVITY: 7 NG/L (ref 0–14)
WBC # BLD: 7.4 K/UL (ref 3.5–11)
WBC # BLD: ABNORMAL 10*3/UL

## 2020-07-18 PROCEDURE — 85025 COMPLETE CBC W/AUTO DIFF WBC: CPT

## 2020-07-18 PROCEDURE — 84484 ASSAY OF TROPONIN QUANT: CPT

## 2020-07-18 PROCEDURE — 71045 X-RAY EXAM CHEST 1 VIEW: CPT

## 2020-07-18 PROCEDURE — 80053 COMPREHEN METABOLIC PANEL: CPT

## 2020-07-18 PROCEDURE — 99285 EMERGENCY DEPT VISIT HI MDM: CPT

## 2020-07-18 PROCEDURE — 93005 ELECTROCARDIOGRAM TRACING: CPT

## 2020-07-18 PROCEDURE — 83735 ASSAY OF MAGNESIUM: CPT

## 2020-07-18 PROCEDURE — 36415 COLL VENOUS BLD VENIPUNCTURE: CPT

## 2020-07-18 PROCEDURE — 83880 ASSAY OF NATRIURETIC PEPTIDE: CPT

## 2020-07-18 PROCEDURE — 85379 FIBRIN DEGRADATION QUANT: CPT

## 2020-07-18 RX ORDER — POTASSIUM CHLORIDE 750 MG/1
10 TABLET, EXTENDED RELEASE ORAL 2 TIMES DAILY
Qty: 8 TABLET | Refills: 0 | Status: SHIPPED | OUTPATIENT
Start: 2020-07-18 | End: 2021-02-25

## 2020-07-18 RX ORDER — FUROSEMIDE 20 MG/1
20 TABLET ORAL 2 TIMES DAILY
Qty: 8 TABLET | Refills: 0 | Status: SHIPPED | OUTPATIENT
Start: 2020-07-18 | End: 2020-11-10

## 2020-07-18 ASSESSMENT — PAIN DESCRIPTION - PAIN TYPE: TYPE: ACUTE PAIN

## 2020-07-18 ASSESSMENT — ENCOUNTER SYMPTOMS
RHINORRHEA: 0
NAUSEA: 0
VOMITING: 0
SHORTNESS OF BREATH: 1
BACK PAIN: 0
DIARRHEA: 0
ABDOMINAL PAIN: 0
COUGH: 0

## 2020-07-18 ASSESSMENT — PAIN DESCRIPTION - ORIENTATION: ORIENTATION: RIGHT;LEFT

## 2020-07-18 ASSESSMENT — PAIN DESCRIPTION - LOCATION: LOCATION: LEG

## 2020-07-18 ASSESSMENT — PAIN SCALES - GENERAL: PAINLEVEL_OUTOF10: 6

## 2020-07-18 NOTE — ED NOTES
Pt discharged in stable condition with Rx's and discharge instructions, including follow up with PCP. Pt ambulates to door with steady gait and without assistance.       Aura Lino RN  07/18/20 3513

## 2020-07-18 NOTE — ED NOTES
Bed: A  Expected date:   Expected time:   Means of arrival:   Comments:  covid clean at [address]  07/18/20 3277

## 2020-07-18 NOTE — ED NOTES
Writer attempted IV start x1 without success. Patient tolerated well. Lab paged for blood draw.      Regino Shankar RN  07/18/20 0078

## 2020-07-18 NOTE — ED PROVIDER NOTES
([facility] [address] 9/8/2015       SURGICAL HISTORY       Past Surgical History:   Procedure Laterality Date    ATRIAL ABLATION SURGERY      BACK SURGERY      L4-5    CHOLECYSTECTOMY  2001    ENDOMETRIAL ABLATION      e sure implants placed also    ENDOSCOPY, COLON, DIAGNOSTIC      EXCISION LESION HAND / FINGER Right 1/25/2019    HAND LESION BIOPSY EXCISION performed by Naun Bishop MD at [address] [place] [address] Bilateral     left x2, right x1    FOOT SURGERY Right     x3    KNEE ARTHROSCOPY Left     x2    SC CYSTO/URETERO/PYELOSCOPY W/LITHOTRIPSY Left 9/20/2017    CYSTOSCOPY URETEROSCOPY HOLMIUM LASER LITHO WITH STONE BASKETING WITH  STENT  performed by Danay Dejesus MD at [facility]. [address]       Discharge Medication List as of 7/18/2020 10:04 AM      CONTINUE these medications which have NOT CHANGED    Details   tiZANidine (ZANAFLEX) 4 MG tablet Take 1 tablet by mouth 3 times daily as needed (muscle spasm), Disp-15 tablet, R-0Print      NIFEdipine (ADALAT CC) 30 MG extended release tablet Take 30 mg by mouth dailyHistorical Med      QUEtiapine (SEROQUEL XR) 400 MG extended release tablet Take 800 mg by mouth nightlyHistorical Med      busPIRone (BUSPAR) 15 MG tablet Take 15 mg by mouth nightly Can take once in morning PRNHistorical Med      omeprazole (PRILOSEC) 40 MG delayed release capsule Take 40 mg by mouth dailyHistorical Med      Cholecalciferol (VITAMIN D) 50 MCG (2000 UT) CAPS capsule Take by mouth dailyHistorical Med      butalbital-APAP-caffeine (FIORICET) -40 MG CAPS per capsule Take 1 capsule by mouth every 6 hours as needed for Headaches, Disp-28 capsule, R-0Print      butalbital-APAP-caffeine (FIORICET) -40 MG CAPS per capsule Take 1 capsule by mouth every 6 hours as needed for Headaches, Disp-6 capsule, R-0Print      albuterol sulfate HFA (PROVENTIL HFA) 108 (90 Base) MCG/ACT inhaler Inhale 2 puffs into the lungs as neededHistorical Med hydrOXYzine (VISTARIL) 25 MG capsule Take 25 mg by mouth nightly as needed for Anxiety , R-2Historical Med      magnesium oxide (MAG-OX) 400 MG tablet Take 400 mg by mouth dailyHistorical Med      fexofenadine (RA ALLERGY RELIEF) 180 MG tablet take 1 tablet by mouth once daily, Disp-30 tablet, R-0Normal      pramipexole (MIRAPEX) 0.5 MG tablet take 1 tablet by mouth twice a day, Disp-60 tablet, R-0Normal      DULoxetine (CYMBALTA) 60 MG extended release capsule TAKE 1 CAPSULE BY MOUTH TWICE A DAY, Disp-60 capsule, R-3Normal      ibuprofen (ADVIL) 200 MG tablet Take 2 tablets by mouth every 6 hours as needed for Pain, Disp-60 tablet, R-0Print      acetaminophen (APAP EXTRA STRENGTH) 500 MG tablet Take 2 tablets by mouth every 6 hours as needed for Pain, Disp-60 tablet, R-0Print      metoprolol (LOPRESSOR) 25 MG tablet Take 25 mg by mouth daily , R-0Historical Med             ALLERGIES     is allergic to aripiprazole; eletriptan; triptans [sumatriptan]; and lamotrigine. FAMILY HISTORY     She indicated that her mother is alive. She indicated that her father is alive. She indicated that both of her brothers are alive. SOCIAL HISTORY      reports that she quit smoking about 7 months ago. Her smoking use included cigarettes. She smoked 1.00 pack per day. She has never used smokeless tobacco. She reports current alcohol use. She reports that she does not use drugs.     PHYSICAL EXAM     INITIAL VITALS: /80   Pulse 86   Temp 97.7 °F (36.5 °C) (Oral)   Resp 16   Ht 5' 5\" (1.651 m)   Wt 293 lb (132.9 kg)   SpO2 98%   BMI 48.76 kg/m²   General: NAD  Head: Normocephalic, atraumatic  Eye: Pupils equal round reactive to light, no conjunctivitis  Heart: Regular rate and rhythm no murmurs  Lungs: Clear to auscultation bilaterally, no respiratory distress  Chest wall: No crepitus, no tenderness palpation  Abdomen: Soft, nontender, nondistended, with no peritoneal signs  Neurologic: Patient is alert and oriented x3, motor and sensation is intact in all 4 extremities, cerebellar function is normal  Extremities: Bilateral equal pitting edema. There is some mild erythema to the left lower leg just proximal to the ankle this is not circumferential in nature. Is not warm to touch. There is no induration. MEDICAL DECISION MAKING:     MDM  This is a 57-year-old female presenting with bilateral ankle edema. We will rule out a DVT, will evaluate for any renal failure or liver failure heart failure. No sign of cellulitis. Emergency Department course:  Renal function is normal  Liver function, she has normal hepatic function but elevated AST and ALT, this is been chronic for her, but it is up above what she was previously. This was discussed with her and she will follow closely as an outpatient. D-dimer is negative and so I doubt any DVTs. BNP is not elevated. We will put the patient on some Lasix. D/w pt the results, treatment plan, warning precautions for prompt ED return and importance of close OP FU, she verbalizes understanding and agrees with the treatment plan. DIAGNOSTIC RESULTS     EKG: All EKG's are interpreted by the Emergency Department Physician who either signs or Co-signs this chart in the absence of a cardiologist.  EKG shows a sinus rhythm. HR is 70, , QRS 82, , no MARISA, No STD, No TWI, the axis is normal.          RADIOLOGY:All plain film, CT, MRI, and formal ultrasound images (except ED bedside ultrasound) are read by the radiologist and the images and interpretations are directly viewed by the emergency physician. XR CHEST PORTABLE   Final Result   Perihilar edema right greater than left possibly from shallow inspiration but   edema and pneumonia are also considered. LABS: All lab results were reviewed by myself, and all abnormals are listed below.   Labs Reviewed   CBC WITH AUTO DIFFERENTIAL - Abnormal; Notable for the following components: Result Value    RBC 3.68 (*)     Hemoglobin 11.8 (*)     Hematocrit 35.1 (*)     Eosinophils % 5 (*)     All other components within normal limits   COMPREHENSIVE METABOLIC PANEL - Abnormal; Notable for the following components:    Glucose 124 (*)     Potassium 3.5 (*)     Alkaline Phosphatase 202 (*)      (*)      (*)     Total Bilirubin 0.19 (*)     All other components within normal limits   D-DIMER, QUANTITATIVE   BRAIN NATRIURETIC PEPTIDE   MAGNESIUM   TROPONIN       EMERGENCY DEPARTMENT COURSE:   Vitals:    Vitals:    07/18/20 0724 07/18/20 0725 07/18/20 0730 07/18/20 1000   BP:  (!) 88/40 (!) 103/59 139/80   Pulse: 68   86   Resp: 16   16   Temp: 97.7 °F (36.5 °C)      TempSrc: Oral      SpO2: 96%   98%   Weight: 293 lb (132.9 kg)      Height: 5' 5\" (1.651 m)          The patient was given the following medications while in the emergency department:  Orders Placed This Encounter   Medications    potassium chloride (KLOR-CON M) 10 MEQ extended release tablet     Sig: Take 1 tablet by mouth 2 times daily     Dispense:  8 tablet     Refill:  0    furosemide (LASIX) 20 MG tablet     Sig: Take 1 tablet by mouth 2 times daily     Dispense:  8 tablet     Refill:  0     -------------------------  CRITICAL CARE:   CONSULTS: None  PROCEDURES: Procedures     FINAL IMPRESSION      1. Leg swelling    2. Hypokalemia    3. Abnormal chest x-ray    4.  Abnormal liver function tests          DISPOSITION/PLAN   DISPOSITION Decision To Discharge 07/18/2020 10:02:48 AM      PATIENT REFERRED TO:  Naun Veloz  [address]  [phone]    In 2 days      [facility] ED  [place] [id number] [phone]    If symptoms worsen      DISCHARGE MEDICATIONS:  Discharge Medication List as of 7/18/2020 10:04 AM      START taking these medications    Details   potassium chloride (KLOR-CON M) 10 MEQ extended release tablet Take 1 tablet by mouth 2 times daily, Disp-8 tablet,R-0Print

## 2020-07-18 NOTE — ED TRIAGE NOTES
Mode of arrival (squad #, walk in, police, etc) : Walk In        Chief complaint(s): Leg swelling, shortness of breath        Arrival Note (brief scenario, treatment PTA, etc). : Pt arrives to ED c/o leg swelling. Patient states that she has noticed over the last couple of days that her legs have been swelling. Patient states that last night she noticed redness to her legs. Patient states that she feelt short of breath when walking up the ramp entering the ED. Patient states that she does not normally have shortness of breath. Patient states that she takes lasix at home and reports taking it as prescribed. Patient is resting on stretcher at this time with no s/s of distress. Call light within reach. C= \"Have you ever felt that you should Cut down on your drinking? \"  No  A= \"Have people Annoyed you by criticizing your drinking? \"  No  G= \"Have you ever felt bad or Guilty about your drinking? \"  No  E= \"Have you ever had a drink as an Eye-opener first thing in the morning to steady your nerves or to help a hangover? \"  No      Deferred []      Reason for deferring: N/A    *If yes to two or more: probable alcohol abuse. *

## 2020-07-22 LAB
EKG ATRIAL RATE: 70 BPM
EKG P AXIS: 63 DEGREES
EKG P-R INTERVAL: 158 MS
EKG Q-T INTERVAL: 448 MS
EKG QRS DURATION: 82 MS
EKG QTC CALCULATION (BAZETT): 483 MS
EKG R AXIS: 37 DEGREES
EKG T AXIS: 35 DEGREES
EKG VENTRICULAR RATE: 70 BPM

## 2020-08-18 ENCOUNTER — TELEPHONE (OUTPATIENT)
Dept: GASTROENTEROLOGY | Age: 45
End: 2020-08-18

## 2020-08-18 NOTE — TELEPHONE ENCOUNTER
Patient called the office to schedule from referral.  Appointment for 9/3/20 2:45 pm at the [place] office. On cancellation list.  Writer thanked and call ended.

## 2020-08-19 ENCOUNTER — HOSPITAL ENCOUNTER (INPATIENT)
Age: 45
LOS: 16 days | Discharge: HOME | DRG: 871 | End: 2020-09-04
Attending: EMERGENCY MEDICINE | Admitting: INTERNAL MEDICINE
Payer: COMMERCIAL

## 2020-08-19 ENCOUNTER — APPOINTMENT (OUTPATIENT)
Dept: CT IMAGING | Age: 45
DRG: 871 | End: 2020-08-19
Payer: COMMERCIAL

## 2020-08-19 PROBLEM — K85.90 ACUTE PANCREATITIS: Status: ACTIVE | Noted: 2020-08-19

## 2020-08-19 LAB
ABSOLUTE EOS #: 0.4 K/UL (ref 0–0.4)
ABSOLUTE IMMATURE GRANULOCYTE: ABNORMAL K/UL (ref 0–0.3)
ABSOLUTE LYMPH #: 3.8 K/UL (ref 1–4.8)
ABSOLUTE MONO #: 0.5 K/UL (ref 0.1–1.3)
ALBUMIN SERPL-MCNC: 4.1 G/DL (ref 3.5–5.2)
ALBUMIN/GLOBULIN RATIO: ABNORMAL (ref 1–2.5)
ALP BLD-CCNC: 111 U/L (ref 35–104)
ALT SERPL-CCNC: 20 U/L (ref 5–33)
ANION GAP SERPL CALCULATED.3IONS-SCNC: 11 MMOL/L (ref 9–17)
AST SERPL-CCNC: 16 U/L
BASOPHILS # BLD: 1 % (ref 0–2)
BASOPHILS ABSOLUTE: 0.1 K/UL (ref 0–0.2)
BILIRUB SERPL-MCNC: 0.23 MG/DL (ref 0.3–1.2)
BILIRUBIN URINE: NEGATIVE
BUN BLDV-MCNC: 9 MG/DL (ref 6–20)
BUN/CREAT BLD: ABNORMAL (ref 9–20)
CALCIUM SERPL-MCNC: 9.3 MG/DL (ref 8.6–10.4)
CHLORIDE BLD-SCNC: 102 MMOL/L (ref 98–107)
CO2: 27 MMOL/L (ref 20–31)
COLOR: YELLOW
COMMENT UA: NORMAL
CREAT SERPL-MCNC: 0.57 MG/DL (ref 0.5–0.9)
DIFFERENTIAL TYPE: ABNORMAL
EOSINOPHILS RELATIVE PERCENT: 3 % (ref 0–4)
GFR AFRICAN AMERICAN: >60 ML/MIN
GFR NON-AFRICAN AMERICAN: >60 ML/MIN
GFR SERPL CREATININE-BSD FRML MDRD: ABNORMAL ML/MIN/{1.73_M2}
GFR SERPL CREATININE-BSD FRML MDRD: ABNORMAL ML/MIN/{1.73_M2}
GLUCOSE BLD-MCNC: 122 MG/DL (ref 70–99)
GLUCOSE URINE: NEGATIVE
HCT VFR BLD CALC: 39.7 % (ref 36–46)
HEMOGLOBIN: 13.6 G/DL (ref 12–16)
IMMATURE GRANULOCYTES: ABNORMAL %
KETONES, URINE: NEGATIVE
LEUKOCYTE ESTERASE, URINE: NEGATIVE
LIPASE: 1065 U/L (ref 13–60)
LYMPHOCYTES # BLD: 32 % (ref 24–44)
MCH RBC QN AUTO: 31.6 PG (ref 26–34)
MCHC RBC AUTO-ENTMCNC: 34.3 G/DL (ref 31–37)
MCV RBC AUTO: 92 FL (ref 80–100)
MONOCYTES # BLD: 5 % (ref 1–7)
NITRITE, URINE: NEGATIVE
NRBC AUTOMATED: ABNORMAL PER 100 WBC
PDW BLD-RTO: 13.7 % (ref 11.5–14.9)
PH UA: 6.5 (ref 5–8)
PLATELET # BLD: 327 K/UL (ref 150–450)
PLATELET ESTIMATE: ABNORMAL
PMV BLD AUTO: 10.4 FL (ref 6–12)
POTASSIUM SERPL-SCNC: 4.6 MMOL/L (ref 3.7–5.3)
PROTEIN UA: NEGATIVE
RBC # BLD: 4.31 M/UL (ref 4–5.2)
RBC # BLD: ABNORMAL 10*6/UL
SEG NEUTROPHILS: 59 % (ref 36–66)
SEGMENTED NEUTROPHILS ABSOLUTE COUNT: 7 K/UL (ref 1.3–9.1)
SODIUM BLD-SCNC: 140 MMOL/L (ref 135–144)
SPECIFIC GRAVITY UA: 1.01 (ref 1–1.03)
TOTAL PROTEIN: 7.3 G/DL (ref 6.4–8.3)
TROPONIN INTERP: NORMAL
TROPONIN T: NORMAL NG/ML
TROPONIN, HIGH SENSITIVITY: 7 NG/L (ref 0–14)
TURBIDITY: CLEAR
URINE HGB: NEGATIVE
UROBILINOGEN, URINE: NORMAL
WBC # BLD: 11.8 K/UL (ref 3.5–11)
WBC # BLD: ABNORMAL 10*3/UL

## 2020-08-19 PROCEDURE — 36415 COLL VENOUS BLD VENIPUNCTURE: CPT

## 2020-08-19 PROCEDURE — 74177 CT ABD & PELVIS W/CONTRAST: CPT

## 2020-08-19 PROCEDURE — 6360000002 HC RX W HCPCS: Performed by: NURSE PRACTITIONER

## 2020-08-19 PROCEDURE — 99223 1ST HOSP IP/OBS HIGH 75: CPT | Performed by: INTERNAL MEDICINE

## 2020-08-19 PROCEDURE — 80053 COMPREHEN METABOLIC PANEL: CPT

## 2020-08-19 PROCEDURE — 96375 TX/PRO/DX INJ NEW DRUG ADDON: CPT

## 2020-08-19 PROCEDURE — 81003 URINALYSIS AUTO W/O SCOPE: CPT

## 2020-08-19 PROCEDURE — 96374 THER/PROPH/DIAG INJ IV PUSH: CPT

## 2020-08-19 PROCEDURE — 6360000002 HC RX W HCPCS: Performed by: EMERGENCY MEDICINE

## 2020-08-19 PROCEDURE — 83690 ASSAY OF LIPASE: CPT

## 2020-08-19 PROCEDURE — 84484 ASSAY OF TROPONIN QUANT: CPT

## 2020-08-19 PROCEDURE — 93005 ELECTROCARDIOGRAM TRACING: CPT | Performed by: EMERGENCY MEDICINE

## 2020-08-19 PROCEDURE — 99285 EMERGENCY DEPT VISIT HI MDM: CPT

## 2020-08-19 PROCEDURE — 2580000003 HC RX 258: Performed by: EMERGENCY MEDICINE

## 2020-08-19 PROCEDURE — 6360000004 HC RX CONTRAST MEDICATION: Performed by: EMERGENCY MEDICINE

## 2020-08-19 PROCEDURE — 1200000000 HC SEMI PRIVATE

## 2020-08-19 PROCEDURE — 85025 COMPLETE CBC W/AUTO DIFF WBC: CPT

## 2020-08-19 RX ORDER — HYDROCODONE BITARTRATE AND ACETAMINOPHEN 5; 325 MG/1; MG/1
2 TABLET ORAL EVERY 4 HOURS PRN
Status: DISCONTINUED | OUTPATIENT
Start: 2020-08-19 | End: 2020-08-21

## 2020-08-19 RX ORDER — HYDROMORPHONE HCL 110MG/55ML
1.5 PATIENT CONTROLLED ANALGESIA SYRINGE INTRAVENOUS ONCE
Status: COMPLETED | OUTPATIENT
Start: 2020-08-20 | End: 2020-08-19

## 2020-08-19 RX ORDER — ONDANSETRON 2 MG/ML
4 INJECTION INTRAMUSCULAR; INTRAVENOUS EVERY 6 HOURS PRN
Status: DISCONTINUED | OUTPATIENT
Start: 2020-08-19 | End: 2020-09-04 | Stop reason: HOSPADM

## 2020-08-19 RX ORDER — POTASSIUM CHLORIDE 7.45 MG/ML
10 INJECTION INTRAVENOUS PRN
Status: DISCONTINUED | OUTPATIENT
Start: 2020-08-19 | End: 2020-09-04 | Stop reason: HOSPADM

## 2020-08-19 RX ORDER — CHOLECALCIFEROL (VITAMIN D3) 125 MCG
10 CAPSULE ORAL DAILY
COMMUNITY

## 2020-08-19 RX ORDER — SODIUM CHLORIDE 9 MG/ML
10 INJECTION INTRAVENOUS DAILY
Status: DISCONTINUED | OUTPATIENT
Start: 2020-08-20 | End: 2020-09-04 | Stop reason: HOSPADM

## 2020-08-19 RX ORDER — PANTOPRAZOLE SODIUM 40 MG/10ML
40 INJECTION, POWDER, LYOPHILIZED, FOR SOLUTION INTRAVENOUS DAILY
Status: DISCONTINUED | OUTPATIENT
Start: 2020-08-20 | End: 2020-09-04 | Stop reason: HOSPADM

## 2020-08-19 RX ORDER — SODIUM CHLORIDE, SODIUM LACTATE, POTASSIUM CHLORIDE, CALCIUM CHLORIDE 600; 310; 30; 20 MG/100ML; MG/100ML; MG/100ML; MG/100ML
INJECTION, SOLUTION INTRAVENOUS ONCE
Status: COMPLETED | OUTPATIENT
Start: 2020-08-19 | End: 2020-08-20

## 2020-08-19 RX ORDER — HYDROCODONE BITARTRATE AND ACETAMINOPHEN 5; 325 MG/1; MG/1
1 TABLET ORAL EVERY 4 HOURS PRN
Status: DISCONTINUED | OUTPATIENT
Start: 2020-08-19 | End: 2020-08-21

## 2020-08-19 RX ORDER — 0.9 % SODIUM CHLORIDE 0.9 %
1000 INTRAVENOUS SOLUTION INTRAVENOUS ONCE
Status: COMPLETED | OUTPATIENT
Start: 2020-08-19 | End: 2020-08-19

## 2020-08-19 RX ORDER — MORPHINE SULFATE 4 MG/ML
4 INJECTION, SOLUTION INTRAMUSCULAR; INTRAVENOUS ONCE
Status: COMPLETED | OUTPATIENT
Start: 2020-08-19 | End: 2020-08-19

## 2020-08-19 RX ORDER — 0.9 % SODIUM CHLORIDE 0.9 %
80 INTRAVENOUS SOLUTION INTRAVENOUS ONCE
Status: COMPLETED | OUTPATIENT
Start: 2020-08-19 | End: 2020-08-19

## 2020-08-19 RX ORDER — ONDANSETRON 2 MG/ML
4 INJECTION INTRAMUSCULAR; INTRAVENOUS ONCE
Status: COMPLETED | OUTPATIENT
Start: 2020-08-19 | End: 2020-08-19

## 2020-08-19 RX ORDER — MORPHINE SULFATE 4 MG/ML
4 INJECTION, SOLUTION INTRAMUSCULAR; INTRAVENOUS
Status: DISCONTINUED | OUTPATIENT
Start: 2020-08-19 | End: 2020-08-20

## 2020-08-19 RX ORDER — SODIUM CHLORIDE, SODIUM LACTATE, POTASSIUM CHLORIDE, CALCIUM CHLORIDE 600; 310; 30; 20 MG/100ML; MG/100ML; MG/100ML; MG/100ML
INJECTION, SOLUTION INTRAVENOUS CONTINUOUS
Status: DISCONTINUED | OUTPATIENT
Start: 2020-08-20 | End: 2020-08-24

## 2020-08-19 RX ORDER — SODIUM CHLORIDE 9 MG/ML
1000 INJECTION, SOLUTION INTRAVENOUS CONTINUOUS
Status: DISCONTINUED | OUTPATIENT
Start: 2020-08-19 | End: 2020-08-20

## 2020-08-19 RX ORDER — MORPHINE SULFATE 2 MG/ML
2 INJECTION, SOLUTION INTRAMUSCULAR; INTRAVENOUS
Status: DISCONTINUED | OUTPATIENT
Start: 2020-08-19 | End: 2020-08-20

## 2020-08-19 RX ORDER — CETIRIZINE HYDROCHLORIDE 10 MG/1
10 TABLET ORAL DAILY
Status: DISCONTINUED | OUTPATIENT
Start: 2020-08-20 | End: 2020-09-04 | Stop reason: HOSPADM

## 2020-08-19 RX ORDER — PROMETHAZINE HYDROCHLORIDE 25 MG/1
12.5 TABLET ORAL EVERY 6 HOURS PRN
Status: DISCONTINUED | OUTPATIENT
Start: 2020-08-19 | End: 2020-08-19

## 2020-08-19 RX ORDER — MAGNESIUM OXIDE 400 MG/1
400 TABLET ORAL DAILY
Status: DISCONTINUED | OUTPATIENT
Start: 2020-08-20 | End: 2020-08-20 | Stop reason: CLARIF

## 2020-08-19 RX ORDER — ACETAMINOPHEN 325 MG/1
650 TABLET ORAL EVERY 4 HOURS PRN
Status: DISCONTINUED | OUTPATIENT
Start: 2020-08-19 | End: 2020-09-04 | Stop reason: HOSPADM

## 2020-08-19 RX ORDER — POTASSIUM CHLORIDE 20 MEQ/1
40 TABLET, EXTENDED RELEASE ORAL PRN
Status: DISCONTINUED | OUTPATIENT
Start: 2020-08-19 | End: 2020-09-04 | Stop reason: HOSPADM

## 2020-08-19 RX ORDER — QUETIAPINE 400 MG/1
800 TABLET, FILM COATED, EXTENDED RELEASE ORAL NIGHTLY
Status: DISCONTINUED | OUTPATIENT
Start: 2020-08-20 | End: 2020-09-04 | Stop reason: HOSPADM

## 2020-08-19 RX ORDER — PRAMIPEXOLE DIHYDROCHLORIDE 0.25 MG/1
0.25 TABLET ORAL DAILY
Status: DISCONTINUED | OUTPATIENT
Start: 2020-08-20 | End: 2020-09-03

## 2020-08-19 RX ORDER — DULOXETIN HYDROCHLORIDE 60 MG/1
60 CAPSULE, DELAYED RELEASE ORAL 2 TIMES DAILY
Status: DISCONTINUED | OUTPATIENT
Start: 2020-08-20 | End: 2020-09-04 | Stop reason: HOSPADM

## 2020-08-19 RX ORDER — SODIUM CHLORIDE 0.9 % (FLUSH) 0.9 %
10 SYRINGE (ML) INJECTION ONCE
Status: COMPLETED | OUTPATIENT
Start: 2020-08-19 | End: 2020-08-19

## 2020-08-19 RX ORDER — BUSPIRONE HYDROCHLORIDE 15 MG/1
15 TABLET ORAL NIGHTLY
Status: DISCONTINUED | OUTPATIENT
Start: 2020-08-20 | End: 2020-09-04 | Stop reason: HOSPADM

## 2020-08-19 RX ADMIN — SODIUM CHLORIDE 1000 ML: 9 INJECTION, SOLUTION INTRAVENOUS at 20:09

## 2020-08-19 RX ADMIN — SODIUM CHLORIDE 1000 ML: 9 INJECTION, SOLUTION INTRAVENOUS at 21:00

## 2020-08-19 RX ADMIN — IOVERSOL 75 ML: 741 INJECTION INTRA-ARTERIAL; INTRAVENOUS at 22:15

## 2020-08-19 RX ADMIN — HYDROMORPHONE HYDROCHLORIDE 1.5 MG: 2 INJECTION, SOLUTION INTRAMUSCULAR; INTRAVENOUS; SUBCUTANEOUS at 23:58

## 2020-08-19 RX ADMIN — ONDANSETRON 4 MG: 2 INJECTION INTRAMUSCULAR; INTRAVENOUS at 20:05

## 2020-08-19 RX ADMIN — Medication 10 ML: at 22:15

## 2020-08-19 RX ADMIN — ONDANSETRON 4 MG: 2 INJECTION INTRAMUSCULAR; INTRAVENOUS at 23:33

## 2020-08-19 RX ADMIN — MORPHINE SULFATE 4 MG: 4 INJECTION, SOLUTION INTRAMUSCULAR; INTRAVENOUS at 20:05

## 2020-08-19 RX ADMIN — MORPHINE SULFATE 4 MG: 4 INJECTION, SOLUTION INTRAMUSCULAR; INTRAVENOUS at 23:29

## 2020-08-19 RX ADMIN — SODIUM CHLORIDE 80 ML: 9 INJECTION, SOLUTION INTRAVENOUS at 22:15

## 2020-08-19 RX ADMIN — SODIUM CHLORIDE 1000 ML: 9 INJECTION, SOLUTION INTRAVENOUS at 22:00

## 2020-08-19 ASSESSMENT — PAIN DESCRIPTION - ONSET: ONSET: ON-GOING

## 2020-08-19 ASSESSMENT — PAIN SCALES - GENERAL
PAINLEVEL_OUTOF10: 8
PAINLEVEL_OUTOF10: 9
PAINLEVEL_OUTOF10: 9
PAINLEVEL_OUTOF10: 8
PAINLEVEL_OUTOF10: 10

## 2020-08-19 ASSESSMENT — PAIN DESCRIPTION - PAIN TYPE
TYPE: ACUTE PAIN
TYPE: ACUTE PAIN

## 2020-08-19 ASSESSMENT — PAIN DESCRIPTION - ORIENTATION
ORIENTATION: MID;UPPER
ORIENTATION: RIGHT;UPPER

## 2020-08-19 ASSESSMENT — PAIN DESCRIPTION - DESCRIPTORS: DESCRIPTORS: CONSTANT;SHARP

## 2020-08-19 ASSESSMENT — PAIN DESCRIPTION - FREQUENCY: FREQUENCY: CONTINUOUS

## 2020-08-19 ASSESSMENT — PAIN DESCRIPTION - LOCATION
LOCATION: ABDOMEN
LOCATION: ABDOMEN

## 2020-08-19 NOTE — ED NOTES
Bed: 12  Expected date: 8/19/20  Expected time: 7:35 PM  Means of arrival: [facility]  Comments:  Abdominal pain     Joan Mueller RN  08/19/20 1935

## 2020-08-19 NOTE — ED PROVIDER NOTES
EMERGENCY DEPARTMENT ENCOUNTER    Pt Name: Roman Jose  MRN: [id number]  [place] 1975  Date of evaluation: 8/19/20  CHIEF COMPLAINT       Chief Complaint   Patient presents with    Abdominal Pain     RUQ     HISTORY OF PRESENT ILLNESS   59-year-old female presents with chief complaint of midepigastric and right upper quadrant pain. Patient states pain started approximately 2 hours ago while she was sitting and watching TV. Patient states that the pain is gotten progressively worse since then describes it as a sharp aching burning pain. Patient denies radiation of pain, admits nausea associate with pain. Patient with similar episode of pain prior which she states she was diagnosed with pancreatitis. Patient is unsure of the etiology of pancreatitis. Patient denies alcohol use at this time, denies any medication or other injuries denies recent trauma. Patient states she had prior cholecystectomy. The history is provided by the patient. REVIEW OF SYSTEMS     Review of Systems   Constitutional: Negative for fever. HENT: Negative for congestion and ear pain. Eyes: Negative for pain. Respiratory: Negative for shortness of breath. Cardiovascular: Negative for chest pain, palpitations and leg swelling. Gastrointestinal: Positive for abdominal pain and nausea. Genitourinary: Negative for dysuria and flank pain. Musculoskeletal: Negative for back pain. Skin: Negative for color change. Neurological: Negative for numbness and headaches. Psychiatric/Behavioral: Negative for confusion. All other systems reviewed and are negative.     PASTMEDICAL HISTORY     Past Medical History:   Diagnosis Date    Anxiety     Chronic kidney disease     right renal cyst    Depression     DVT (deep venous thrombosis) ([facility] [address] 1997    after delivery    Fibromyalgia     Headache(784.0)     migraines    Hx of blood clots     1997 left calf    Kidney stone     Pancreatitis 2001    Pleural effusion 2001    SVT (supraventricular tachycardia) ([facility] [address] 9/8/2015     Past Problem List  Patient Active Problem List   Diagnosis Code    Anxiety F41.9    Obesity E66.9    Restless leg syndrome G25.81    Previous back surgery Z98.890    Major depression F32.9    HTN (hypertension) I10    FHx: rheumatoid arthritis Z82.61    SVT (supraventricular tachycardia) (formerly [facility]) I47.1    Right elbow pain M25.521    Pain of right lower extremity M79.604    Cough with sputum R05    Varicose vein of leg I83.90    Leg swelling M79.89    Herpes zoster without complication A82.0    Chest pain R07.9    Fibromyalgia M79.7    Acute pancreatitis K85.90     SURGICAL HISTORY       Past Surgical History:   Procedure Laterality Date    ATRIAL ABLATION SURGERY      BACK SURGERY      L4-5    CHOLECYSTECTOMY  2001    ENDOMETRIAL ABLATION      e sure implants placed also    ENDOSCOPY, COLON, DIAGNOSTIC      EXCISION LESION HAND / FINGER Right 1/25/2019    HAND LESION BIOPSY EXCISION performed by Roger Segura MD at [address] [facility] Bilateral     left x2, right x1    FOOT SURGERY Right     x3    KNEE ARTHROSCOPY Left     x2    SD CYSTO/URETERO/PYELOSCOPY W/LITHOTRIPSY Left 9/20/2017    CYSTOSCOPY URETEROSCOPY HOLMIUM LASER LITHO WITH STONE BASKETING WITH  STENT  performed by Yun Live MD at [facility] Right      [address]       Current Discharge Medication List      CONTINUE these medications which have NOT CHANGED    Details   melatonin 5 MG TABS tablet Take 5 mg by mouth daily      potassium chloride (KLOR-CON M) 10 MEQ extended release tablet Take 1 tablet by mouth 2 times daily  Qty: 8 tablet, Refills: 0      furosemide (LASIX) 20 MG tablet Take 1 tablet by mouth 2 times daily  Qty: 8 tablet, Refills: 0      QUEtiapine (SEROQUEL XR) 400 MG extended release tablet Take 800 mg by mouth nightly      busPIRone (BUSPAR) 15 MG tablet Take 15 mg by mouth nightly Can take once in morning PRN      omeprazole (PRILOSEC) 40 MG delayed release capsule Take 40 mg by mouth daily      Cholecalciferol (VITAMIN D) 50 MCG (2000 UT) CAPS capsule Take by mouth daily      albuterol sulfate HFA (PROVENTIL HFA) 108 (90 Base) MCG/ACT inhaler Inhale 2 puffs into the lungs as needed      magnesium oxide (MAG-OX) 400 MG tablet Take 400 mg by mouth daily      fexofenadine (RA ALLERGY RELIEF) 180 MG tablet take 1 tablet by mouth once daily  Qty: 30 tablet, Refills: 0      pramipexole (MIRAPEX) 0.5 MG tablet take 1 tablet by mouth twice a day  Qty: 60 tablet, Refills: 0      DULoxetine (CYMBALTA) 60 MG extended release capsule TAKE 1 CAPSULE BY MOUTH TWICE A DAY  Qty: 60 capsule, Refills: 3      ibuprofen (ADVIL) 200 MG tablet Take 2 tablets by mouth every 6 hours as needed for Pain  Qty: 60 tablet, Refills: 0      acetaminophen (APAP EXTRA STRENGTH) 500 MG tablet Take 2 tablets by mouth every 6 hours as needed for Pain  Qty: 60 tablet, Refills: 0      metoprolol (LOPRESSOR) 25 MG tablet Take 25 mg by mouth daily   Refills: 0      butalbital-APAP-caffeine (FIORICET) -40 MG CAPS per capsule Take 1 capsule by mouth every 6 hours as needed for Headaches  Qty: 28 capsule, Refills: 0           ALLERGIES     is allergic to aripiprazole; eletriptan; triptans [sumatriptan]; and lamotrigine. FAMILY HISTORY     She indicated that her mother is alive. She indicated that her father is alive. She indicated that both of her brothers are alive. SOCIAL HISTORY       Social History     Tobacco Use    Smoking status: Former Smoker     Packs/day: 1.00     Types: Cigarettes     Last attempt to quit: 2019     Years since quittin.7    Smokeless tobacco: Never Used    Tobacco comment: today last smoke   Substance Use Topics    Alcohol use:  Yes     Alcohol/week: 0.0 standard drinks     Comment: rarely    Drug use: No     PHYSICAL EXAM     INITIAL VITALS: BP (!) 154/96   Pulse 95   Temp 97.2 °F (36.2 °C) (Oral) Patient agreeable to admission. Spoke with Maurisio Florez (NP for [facility]) who accepts admission on behalf of Dr. Harman Mcleod. Patient demonstrates understanding and agreement with the plan, was given the opportunity to ask questions, and these questions were answered to the best of the provided information at this time. VS stable for transfer. Assessment: Acute Pancreatitis    Patient signed out to oncoming physician pending CT results, patient will be admitted following CT. PCP: Ervin Rob  Admitting Physician:  Dr. Harman Mcleod    This dictation was prepared using [employer] voice recognition software. As a result, errors may have occurred. When identified, these errors have been corrected. While every attempt is made to correct errors in dictation, errors may still exist.            CRITICAL CARE:       PROCEDURES:    Procedures    DIAGNOSTIC RESULTS   EKG:All EKG's are interpreted by the Emergency Department Physician who either signs or Co-signs this chart in the absence of a cardiologist.    EKG reviewed showing normal sinus rhythm with a rate of 77, normal axis, normal intervals, no ST segment changes, normal T waves    RADIOLOGY:All plain film, CT, MRI, and formal ultrasound images (except ED bedside ultrasound) are read by the radiologist, see reports below, unless otherwisenoted in MDM or here. CT ABDOMEN PELVIS W IV CONTRAST Additional Contrast? None   Final Result   1. Pancreatic edema and associated fluid is consistent with pancreatitis. No   loculated fluid collection/pseudocyst.   2. Shotty retroperitoneal lymph nodes are likely reactive. 3. Hepatic steatosis. 4. Bilateral lower lobe consolidation, which could be due to edema or   pneumonia. XR CHEST (2 VW)    (Results Pending)     LABS: All lab results were reviewed by myself, and all abnormals are listed below.   Labs Reviewed   CBC WITH AUTO DIFFERENTIAL - Abnormal; Notable for the following components:       Result Value    WBC 11.8 (*)     All other components within normal limits   COMPREHENSIVE METABOLIC PANEL W/ REFLEX TO MG FOR LOW K - Abnormal; Notable for the following components:    Glucose 122 (*)     Alkaline Phosphatase 111 (*)     Total Bilirubin 0.23 (*)     All other components within normal limits   LIPASE - Abnormal; Notable for the following components:    Lipase 1,065 (*)     All other components within normal limits   URINALYSIS   TROPONIN   LIPID PANEL   HEPATIC FUNCTION PANEL   LACTIC ACID, PLASMA   LIPASE   AMYLASE   CBC WITH AUTO DIFFERENTIAL   PROTIME-INR   APTT       EMERGENCY DEPARTMENTCOURSE:         Vitals:    Vitals:    08/19/20 1942 08/19/20 2054 08/19/20 2315   BP: (!) 144/92 121/73 (!) 154/96   Pulse: 73 80 95   Resp: 20 16 19   Temp: 97.5 °F (36.4 °C)  97.2 °F (36.2 °C)   TempSrc: Oral  Oral   SpO2: 97% 92% 92%       The patient was given the following medications while in the emergency department:  Orders Placed This Encounter   Medications    0.9 % sodium chloride bolus    morphine sulfate (PF) injection 4 mg    ondansetron (ZOFRAN) injection 4 mg    0.9 % sodium chloride bolus    0.9 % sodium chloride infusion    sodium chloride flush 0.9 % injection 10 mL    0.9 % sodium chloride bolus    ioversol (OPTIRAY) 74 % injection 75 mL    FOLLOWED BY Linked Order Group     lactated ringers infusion     lactated ringers infusion    OR Linked Order Group     potassium chloride (KLOR-CON M) extended release tablet 40 mEq     potassium bicarb-citric acid (EFFER-K) effervescent tablet 40 mEq     potassium chloride 10 mEq/100 mL IVPB (Peripheral Line)    acetaminophen (TYLENOL) tablet 650 mg    OR Linked Order Group     HYDROcodone-acetaminophen (NORCO) 5-325 MG per tablet 1 tablet     HYDROcodone-acetaminophen (NORCO) 5-325 MG per tablet 2 tablet    OR Linked Order Group     morphine (PF) injection 2 mg     morphine sulfate (PF) injection 4 mg    DISCONTD: promethazine (PHENERGAN) tablet 12.5

## 2020-08-20 ENCOUNTER — APPOINTMENT (OUTPATIENT)
Dept: CT IMAGING | Age: 45
DRG: 871 | End: 2020-08-20
Payer: COMMERCIAL

## 2020-08-20 ENCOUNTER — APPOINTMENT (OUTPATIENT)
Dept: INTERVENTIONAL RADIOLOGY/VASCULAR | Age: 45
DRG: 871 | End: 2020-08-20
Payer: COMMERCIAL

## 2020-08-20 ENCOUNTER — APPOINTMENT (OUTPATIENT)
Dept: GENERAL RADIOLOGY | Age: 45
DRG: 871 | End: 2020-08-20
Payer: COMMERCIAL

## 2020-08-20 PROBLEM — A41.9 SEPSIS (HCC): Status: ACTIVE | Noted: 2020-08-20

## 2020-08-20 LAB
ABSOLUTE BANDS #: 4.94 K/UL (ref 0–1)
ABSOLUTE EOS #: 0 K/UL (ref 0–0.4)
ABSOLUTE EOS #: 0 K/UL (ref 0–0.4)
ABSOLUTE IMMATURE GRANULOCYTE: ABNORMAL K/UL (ref 0–0.3)
ABSOLUTE IMMATURE GRANULOCYTE: ABNORMAL K/UL (ref 0–0.3)
ABSOLUTE LYMPH #: 0.33 K/UL (ref 1–4.8)
ABSOLUTE LYMPH #: 1.64 K/UL (ref 1–4.8)
ABSOLUTE MONO #: 0.82 K/UL (ref 0.1–1.3)
ABSOLUTE MONO #: 0.99 K/UL (ref 0.1–1.3)
ALBUMIN SERPL-MCNC: 3.6 G/DL (ref 3.5–5.2)
ALBUMIN SERPL-MCNC: 4.2 G/DL (ref 3.5–5.2)
ALBUMIN/GLOBULIN RATIO: ABNORMAL (ref 1–2.5)
ALBUMIN/GLOBULIN RATIO: ABNORMAL (ref 1–2.5)
ALP BLD-CCNC: 121 U/L (ref 35–104)
ALP BLD-CCNC: 135 U/L (ref 35–104)
ALT SERPL-CCNC: 29 U/L (ref 5–33)
ALT SERPL-CCNC: 47 U/L (ref 5–33)
AMYLASE: 435 U/L (ref 28–100)
ANION GAP SERPL CALCULATED.3IONS-SCNC: 13 MMOL/L (ref 9–17)
ANION GAP SERPL CALCULATED.3IONS-SCNC: 15 MMOL/L (ref 9–17)
AST SERPL-CCNC: 33 U/L
AST SERPL-CCNC: 57 U/L
BANDS: 15 % (ref 0–10)
BASOPHILS # BLD: 0 % (ref 0–2)
BASOPHILS # BLD: 0 % (ref 0–2)
BASOPHILS ABSOLUTE: 0 K/UL (ref 0–0.2)
BASOPHILS ABSOLUTE: 0 K/UL (ref 0–0.2)
BILIRUB SERPL-MCNC: 0.35 MG/DL (ref 0.3–1.2)
BILIRUB SERPL-MCNC: 0.55 MG/DL (ref 0.3–1.2)
BILIRUBIN DIRECT: 0.14 MG/DL
BILIRUBIN, INDIRECT: 0.21 MG/DL (ref 0–1)
BUN BLDV-MCNC: 5 MG/DL (ref 6–20)
BUN BLDV-MCNC: 6 MG/DL (ref 6–20)
BUN/CREAT BLD: ABNORMAL (ref 9–20)
BUN/CREAT BLD: ABNORMAL (ref 9–20)
CALCIUM SERPL-MCNC: 7.1 MG/DL (ref 8.6–10.4)
CALCIUM SERPL-MCNC: 7.7 MG/DL (ref 8.6–10.4)
CHLORIDE BLD-SCNC: 103 MMOL/L (ref 98–107)
CHLORIDE BLD-SCNC: 99 MMOL/L (ref 98–107)
CO2: 18 MMOL/L (ref 20–31)
CO2: 20 MMOL/L (ref 20–31)
CREAT SERPL-MCNC: 0.5 MG/DL (ref 0.5–0.9)
CREAT SERPL-MCNC: <0.4 MG/DL (ref 0.5–0.9)
DIFFERENTIAL TYPE: ABNORMAL
DIFFERENTIAL TYPE: ABNORMAL
EKG ATRIAL RATE: 77 BPM
EKG P AXIS: 63 DEGREES
EKG P-R INTERVAL: 158 MS
EKG Q-T INTERVAL: 416 MS
EKG QRS DURATION: 86 MS
EKG QTC CALCULATION (BAZETT): 470 MS
EKG R AXIS: 43 DEGREES
EKG T AXIS: 27 DEGREES
EKG VENTRICULAR RATE: 77 BPM
EOSINOPHILS RELATIVE PERCENT: 0 % (ref 0–4)
EOSINOPHILS RELATIVE PERCENT: 0 % (ref 0–4)
ESTIMATED AVERAGE GLUCOSE: 134 MG/DL
GFR AFRICAN AMERICAN: >60 ML/MIN
GFR AFRICAN AMERICAN: ABNORMAL ML/MIN
GFR NON-AFRICAN AMERICAN: >60 ML/MIN
GFR NON-AFRICAN AMERICAN: ABNORMAL ML/MIN
GFR SERPL CREATININE-BSD FRML MDRD: ABNORMAL ML/MIN/{1.73_M2}
GLOBULIN: ABNORMAL G/DL (ref 1.5–3.8)
GLUCOSE BLD-MCNC: 120 MG/DL (ref 70–99)
GLUCOSE BLD-MCNC: 139 MG/DL (ref 70–99)
HBA1C MFR BLD: 6.3 % (ref 4–6)
HCT VFR BLD CALC: 45.1 % (ref 36–46)
HCT VFR BLD CALC: 47.5 % (ref 36–46)
HEMOGLOBIN: 15 G/DL (ref 12–16)
HEMOGLOBIN: 15.8 G/DL (ref 12–16)
IMMATURE GRANULOCYTES: ABNORMAL %
IMMATURE GRANULOCYTES: ABNORMAL %
INR BLD: 0.9
LACTIC ACID, SEPSIS WHOLE BLOOD: ABNORMAL MMOL/L (ref 0.5–1.9)
LACTIC ACID, SEPSIS WHOLE BLOOD: NORMAL MMOL/L (ref 0.5–1.9)
LACTIC ACID, SEPSIS: 1.8 MMOL/L (ref 0.5–1.9)
LACTIC ACID, SEPSIS: 5.4 MMOL/L (ref 0.5–1.9)
LACTIC ACID, WHOLE BLOOD: ABNORMAL MMOL/L (ref 0.7–2.1)
LACTIC ACID: 2.3 MMOL/L (ref 0.5–2.2)
LACTIC ACID: 5.3 MMOL/L (ref 0.5–2.2)
LIPASE: 1613 U/L (ref 13–60)
LYMPHOCYTES # BLD: 1 % (ref 24–44)
LYMPHOCYTES # BLD: 8 % (ref 24–44)
MCH RBC QN AUTO: 30.8 PG (ref 26–34)
MCH RBC QN AUTO: 31.1 PG (ref 26–34)
MCHC RBC AUTO-ENTMCNC: 33.2 G/DL (ref 31–37)
MCHC RBC AUTO-ENTMCNC: 33.2 G/DL (ref 31–37)
MCV RBC AUTO: 92.7 FL (ref 80–100)
MCV RBC AUTO: 93.6 FL (ref 80–100)
MONOCYTES # BLD: 3 % (ref 1–7)
MONOCYTES # BLD: 4 % (ref 1–7)
MORPHOLOGY: ABNORMAL
MORPHOLOGY: ABNORMAL
MORPHOLOGY: NORMAL
NRBC AUTOMATED: ABNORMAL PER 100 WBC
NRBC AUTOMATED: ABNORMAL PER 100 WBC
PARTIAL THROMBOPLASTIN TIME: 24.4 SEC (ref 24–36)
PDW BLD-RTO: 14 % (ref 11.5–14.9)
PDW BLD-RTO: 14 % (ref 11.5–14.9)
PLATELET # BLD: 343 K/UL (ref 150–450)
PLATELET # BLD: 365 K/UL (ref 150–450)
PLATELET ESTIMATE: ABNORMAL
PLATELET ESTIMATE: ABNORMAL
PMV BLD AUTO: 9.5 FL (ref 6–12)
PMV BLD AUTO: 9.8 FL (ref 6–12)
POTASSIUM SERPL-SCNC: 3.9 MMOL/L (ref 3.7–5.3)
POTASSIUM SERPL-SCNC: 4.1 MMOL/L (ref 3.7–5.3)
PROTHROMBIN TIME: 12.2 SEC (ref 11.8–14.6)
RBC # BLD: 4.81 M/UL (ref 4–5.2)
RBC # BLD: 5.12 M/UL (ref 4–5.2)
RBC # BLD: ABNORMAL 10*6/UL
RBC # BLD: ABNORMAL 10*6/UL
SEG NEUTROPHILS: 81 % (ref 36–66)
SEG NEUTROPHILS: 88 % (ref 36–66)
SEGMENTED NEUTROPHILS ABSOLUTE COUNT: 18.04 K/UL (ref 1.3–9.1)
SEGMENTED NEUTROPHILS ABSOLUTE COUNT: 26.64 K/UL (ref 1.3–9.1)
SODIUM BLD-SCNC: 132 MMOL/L (ref 135–144)
SODIUM BLD-SCNC: 136 MMOL/L (ref 135–144)
TOTAL PROTEIN: 6.9 G/DL (ref 6.4–8.3)
TOTAL PROTEIN: 8 G/DL (ref 6.4–8.3)
TRIGL SERPL-MCNC: 259 MG/DL
TROPONIN INTERP: NORMAL
TROPONIN T: NORMAL NG/ML
TROPONIN, HIGH SENSITIVITY: 11 NG/L (ref 0–14)
WBC # BLD: 20.5 K/UL (ref 3.5–11)
WBC # BLD: 32.9 K/UL (ref 3.5–11)
WBC # BLD: ABNORMAL 10*3/UL
WBC # BLD: ABNORMAL 10*3/UL

## 2020-08-20 PROCEDURE — 80053 COMPREHEN METABOLIC PANEL: CPT

## 2020-08-20 PROCEDURE — 82150 ASSAY OF AMYLASE: CPT

## 2020-08-20 PROCEDURE — 2700000000 HC OXYGEN THERAPY PER DAY

## 2020-08-20 PROCEDURE — 83605 ASSAY OF LACTIC ACID: CPT

## 2020-08-20 PROCEDURE — 84478 ASSAY OF TRIGLYCERIDES: CPT

## 2020-08-20 PROCEDURE — 87040 BLOOD CULTURE FOR BACTERIA: CPT

## 2020-08-20 PROCEDURE — 02HV33Z INSERTION OF INFUSION DEVICE INTO SUPERIOR VENA CAVA, PERCUTANEOUS APPROACH: ICD-10-PCS | Performed by: RADIOLOGY

## 2020-08-20 PROCEDURE — 6360000002 HC RX W HCPCS: Performed by: NURSE PRACTITIONER

## 2020-08-20 PROCEDURE — 83036 HEMOGLOBIN GLYCOSYLATED A1C: CPT

## 2020-08-20 PROCEDURE — 80076 HEPATIC FUNCTION PANEL: CPT

## 2020-08-20 PROCEDURE — 94770 HC ETCO2 MONITOR DAILY: CPT

## 2020-08-20 PROCEDURE — 85730 THROMBOPLASTIN TIME PARTIAL: CPT

## 2020-08-20 PROCEDURE — 36573 INSJ PICC RS&I 5 YR+: CPT | Performed by: RADIOLOGY

## 2020-08-20 PROCEDURE — 2580000003 HC RX 258: Performed by: RADIOLOGY

## 2020-08-20 PROCEDURE — 2709999900 IR FLUORO GUIDED CVA DEVICE PLMT/REPLACE/REMOVAL

## 2020-08-20 PROCEDURE — 2580000003 HC RX 258: Performed by: NURSE PRACTITIONER

## 2020-08-20 PROCEDURE — 2500000003 HC RX 250 WO HCPCS: Performed by: INTERNAL MEDICINE

## 2020-08-20 PROCEDURE — 74176 CT ABD & PELVIS W/O CONTRAST: CPT

## 2020-08-20 PROCEDURE — 36415 COLL VENOUS BLD VENIPUNCTURE: CPT

## 2020-08-20 PROCEDURE — 85610 PROTHROMBIN TIME: CPT

## 2020-08-20 PROCEDURE — 80048 BASIC METABOLIC PNL TOTAL CA: CPT

## 2020-08-20 PROCEDURE — 6360000002 HC RX W HCPCS: Performed by: INTERNAL MEDICINE

## 2020-08-20 PROCEDURE — 94761 N-INVAS EAR/PLS OXIMETRY MLT: CPT

## 2020-08-20 PROCEDURE — 99222 1ST HOSP IP/OBS MODERATE 55: CPT | Performed by: INTERNAL MEDICINE

## 2020-08-20 PROCEDURE — C9113 INJ PANTOPRAZOLE SODIUM, VIA: HCPCS | Performed by: NURSE PRACTITIONER

## 2020-08-20 PROCEDURE — 85025 COMPLETE CBC W/AUTO DIFF WBC: CPT

## 2020-08-20 PROCEDURE — 83690 ASSAY OF LIPASE: CPT

## 2020-08-20 PROCEDURE — 81001 URINALYSIS AUTO W/SCOPE: CPT

## 2020-08-20 PROCEDURE — 93010 ELECTROCARDIOGRAM REPORT: CPT | Performed by: INTERNAL MEDICINE

## 2020-08-20 PROCEDURE — 84484 ASSAY OF TROPONIN QUANT: CPT

## 2020-08-20 PROCEDURE — 2000000000 HC ICU R&B

## 2020-08-20 PROCEDURE — 71045 X-RAY EXAM CHEST 1 VIEW: CPT

## 2020-08-20 RX ORDER — 0.9 % SODIUM CHLORIDE 0.9 %
1000 INTRAVENOUS SOLUTION INTRAVENOUS ONCE
Status: COMPLETED | OUTPATIENT
Start: 2020-08-20 | End: 2020-08-20

## 2020-08-20 RX ORDER — MAGNESIUM SULFATE 1 G/100ML
1 INJECTION INTRAVENOUS PRN
Status: DISCONTINUED | OUTPATIENT
Start: 2020-08-20 | End: 2020-09-04 | Stop reason: HOSPADM

## 2020-08-20 RX ORDER — LORAZEPAM 2 MG/ML
0.5 INJECTION INTRAMUSCULAR EVERY 6 HOURS PRN
Status: DISCONTINUED | OUTPATIENT
Start: 2020-08-20 | End: 2020-08-23

## 2020-08-20 RX ORDER — SODIUM CHLORIDE 0.9 % (FLUSH) 0.9 %
10 SYRINGE (ML) INJECTION EVERY 12 HOURS SCHEDULED
Status: DISCONTINUED | OUTPATIENT
Start: 2020-08-20 | End: 2020-09-04 | Stop reason: HOSPADM

## 2020-08-20 RX ORDER — METOPROLOL TARTRATE 5 MG/5ML
5 INJECTION INTRAVENOUS EVERY 12 HOURS
Status: DISCONTINUED | OUTPATIENT
Start: 2020-08-20 | End: 2020-08-20

## 2020-08-20 RX ORDER — METOPROLOL TARTRATE 5 MG/5ML
5 INJECTION INTRAVENOUS ONCE
Status: COMPLETED | OUTPATIENT
Start: 2020-08-20 | End: 2020-08-20

## 2020-08-20 RX ORDER — METOPROLOL TARTRATE 5 MG/5ML
5 INJECTION INTRAVENOUS EVERY 6 HOURS
Status: DISCONTINUED | OUTPATIENT
Start: 2020-08-20 | End: 2020-08-21

## 2020-08-20 RX ORDER — LORAZEPAM 2 MG/ML
0.5 INJECTION INTRAMUSCULAR ONCE
Status: COMPLETED | OUTPATIENT
Start: 2020-08-20 | End: 2020-08-20

## 2020-08-20 RX ORDER — SODIUM CHLORIDE 0.9 % (FLUSH) 0.9 %
10 SYRINGE (ML) INJECTION PRN
Status: DISCONTINUED | OUTPATIENT
Start: 2020-08-20 | End: 2020-09-04 | Stop reason: HOSPADM

## 2020-08-20 RX ADMIN — Medication 10 ML: at 13:48

## 2020-08-20 RX ADMIN — LORAZEPAM 0.5 MG: 2 INJECTION INTRAMUSCULAR; INTRAVENOUS at 19:23

## 2020-08-20 RX ADMIN — MORPHINE SULFATE 4 MG: 4 INJECTION, SOLUTION INTRAMUSCULAR; INTRAVENOUS at 02:25

## 2020-08-20 RX ADMIN — SODIUM CHLORIDE, POTASSIUM CHLORIDE, SODIUM LACTATE AND CALCIUM CHLORIDE: 600; 310; 30; 20 INJECTION, SOLUTION INTRAVENOUS at 05:44

## 2020-08-20 RX ADMIN — PROMETHAZINE HYDROCHLORIDE 12.5 MG: 25 INJECTION INTRAMUSCULAR; INTRAVENOUS at 02:22

## 2020-08-20 RX ADMIN — METOPROLOL TARTRATE 5 MG: 5 INJECTION, SOLUTION INTRAVENOUS at 20:45

## 2020-08-20 RX ADMIN — HYDROMORPHONE HYDROCHLORIDE 1 MG: 1 INJECTION, SOLUTION INTRAMUSCULAR; INTRAVENOUS; SUBCUTANEOUS at 05:44

## 2020-08-20 RX ADMIN — MORPHINE SULFATE 4 MG: 4 INJECTION, SOLUTION INTRAMUSCULAR; INTRAVENOUS at 04:24

## 2020-08-20 RX ADMIN — HYDROMORPHONE HYDROCHLORIDE 1.5 MG: 1 INJECTION, SOLUTION INTRAMUSCULAR; INTRAVENOUS; SUBCUTANEOUS at 17:08

## 2020-08-20 RX ADMIN — PROMETHAZINE HYDROCHLORIDE 12.5 MG: 25 INJECTION INTRAMUSCULAR; INTRAVENOUS at 13:14

## 2020-08-20 RX ADMIN — METOPROLOL TARTRATE 5 MG: 5 INJECTION, SOLUTION INTRAVENOUS at 22:42

## 2020-08-20 RX ADMIN — PANTOPRAZOLE SODIUM 40 MG: 40 INJECTION, POWDER, FOR SOLUTION INTRAVENOUS at 13:48

## 2020-08-20 RX ADMIN — Medication 10 ML: at 23:00

## 2020-08-20 RX ADMIN — PIPERACILLIN SODIUM AND TAZOBACTAM SODIUM 3.38 G: 3; .375 INJECTION, POWDER, LYOPHILIZED, FOR SOLUTION INTRAVENOUS at 13:36

## 2020-08-20 RX ADMIN — LORAZEPAM 0.5 MG: 2 INJECTION INTRAMUSCULAR; INTRAVENOUS at 12:14

## 2020-08-20 RX ADMIN — ENOXAPARIN SODIUM 30 MG: 30 INJECTION SUBCUTANEOUS at 13:35

## 2020-08-20 RX ADMIN — PIPERACILLIN SODIUM AND TAZOBACTAM SODIUM 3.38 G: 3; .375 INJECTION, POWDER, LYOPHILIZED, FOR SOLUTION INTRAVENOUS at 22:50

## 2020-08-20 RX ADMIN — ONDANSETRON 4 MG: 2 INJECTION INTRAMUSCULAR; INTRAVENOUS at 10:26

## 2020-08-20 RX ADMIN — SODIUM CHLORIDE 1000 ML: 9 INJECTION, SOLUTION INTRAVENOUS at 08:40

## 2020-08-20 RX ADMIN — PIPERACILLIN AND TAZOBACTAM 4.5 G: 4; .5 INJECTION, POWDER, LYOPHILIZED, FOR SOLUTION INTRAVENOUS at 08:30

## 2020-08-20 RX ADMIN — ONDANSETRON 4 MG: 2 INJECTION INTRAMUSCULAR; INTRAVENOUS at 04:24

## 2020-08-20 RX ADMIN — PROMETHAZINE HYDROCHLORIDE 12.5 MG: 25 INJECTION INTRAMUSCULAR; INTRAVENOUS at 08:55

## 2020-08-20 RX ADMIN — HYDROMORPHONE HYDROCHLORIDE 1.5 MG: 1 INJECTION, SOLUTION INTRAMUSCULAR; INTRAVENOUS; SUBCUTANEOUS at 12:20

## 2020-08-20 RX ADMIN — PROMETHAZINE HYDROCHLORIDE 12.5 MG: 25 INJECTION INTRAMUSCULAR; INTRAVENOUS at 22:50

## 2020-08-20 RX ADMIN — METOPROLOL TARTRATE 5 MG: 5 INJECTION, SOLUTION INTRAVENOUS at 13:17

## 2020-08-20 RX ADMIN — HYDROMORPHONE HYDROCHLORIDE 1.5 MG: 1 INJECTION, SOLUTION INTRAMUSCULAR; INTRAVENOUS; SUBCUTANEOUS at 20:13

## 2020-08-20 RX ADMIN — HYDROMORPHONE HYDROCHLORIDE 1.5 MG: 1 INJECTION, SOLUTION INTRAMUSCULAR; INTRAVENOUS; SUBCUTANEOUS at 23:03

## 2020-08-20 RX ADMIN — HYDROMORPHONE HYDROCHLORIDE 1.5 MG: 1 INJECTION, SOLUTION INTRAMUSCULAR; INTRAVENOUS; SUBCUTANEOUS at 08:38

## 2020-08-20 RX ADMIN — SODIUM CHLORIDE, POTASSIUM CHLORIDE, SODIUM LACTATE AND CALCIUM CHLORIDE: 600; 310; 30; 20 INJECTION, SOLUTION INTRAVENOUS at 01:38

## 2020-08-20 ASSESSMENT — PAIN DESCRIPTION - LOCATION
LOCATION: ABDOMEN

## 2020-08-20 ASSESSMENT — PAIN SCALES - GENERAL
PAINLEVEL_OUTOF10: 10
PAINLEVEL_OUTOF10: 9
PAINLEVEL_OUTOF10: 10
PAINLEVEL_OUTOF10: 5
PAINLEVEL_OUTOF10: 10

## 2020-08-20 ASSESSMENT — PAIN DESCRIPTION - ORIENTATION
ORIENTATION: RIGHT;LEFT
ORIENTATION: ANTERIOR
ORIENTATION: LEFT
ORIENTATION: ANTERIOR
ORIENTATION: ANTERIOR
ORIENTATION: RIGHT;LEFT

## 2020-08-20 ASSESSMENT — ENCOUNTER SYMPTOMS
WHEEZING: 0
NAUSEA: 1
SORE THROAT: 0
ABDOMINAL PAIN: 1
COLOR CHANGE: 0
COUGH: 0
VOMITING: 0
SHORTNESS OF BREATH: 0
DIARRHEA: 0
BACK PAIN: 0
CONSTIPATION: 0
EYE PAIN: 0

## 2020-08-20 ASSESSMENT — PAIN DESCRIPTION - PAIN TYPE
TYPE: ACUTE PAIN

## 2020-08-20 ASSESSMENT — PAIN DESCRIPTION - FREQUENCY: FREQUENCY: CONTINUOUS

## 2020-08-20 ASSESSMENT — PAIN DESCRIPTION - ONSET: ONSET: ON-GOING

## 2020-08-20 ASSESSMENT — PAIN DESCRIPTION - DESCRIPTORS: DESCRIPTORS: CONSTANT;SHARP;STABBING

## 2020-08-20 NOTE — FLOWSHEET NOTE
08/20/20 1618   Provider Notification   Reason for Communication Evaluate   Provider Name Dr Ana Maria Alexandra   Provider Notification Physician   Method of Communication Call   Response See orders   Notification Time [id number]   Physician notified of SVT HR 120s, orders placed for labs and mag replacement of low level.

## 2020-08-20 NOTE — PROGRESS NOTES
House supervisor notified of patient in distress and heart rate in the 140's, EKG and vitals taken. MEWS of a five and EKG report reviewed with house supervisor, patient currently being moved to ICU.

## 2020-08-20 NOTE — H&P
[facility]     HISTORY AND PHYSICAL EXAMINATION            Date:   8/20/2020  Patientname:  Arcelia Torres  Date of admission:  8/19/2020  7:35 PM  MRN:   [id number]  Account:  [de-identified]  YOB: 1975  PCP:    Jensen Fulton  Room:   2081/2081-01  Code Status:    Full Code    CHIEF COMPLAINT     Chief Complaint   Patient presents with    Abdominal Pain     RUQ       HISTORY OF PRESENT ILLNESS  (Character, Onset, Location, Duration,  Exacerbating/RelievingFactors, Radiation,   Associated Symptoms, Severity )      The patient is a 54 y.o.  female, with a history of HTN, morbid obesity, and CKD, who presents with RUQ abdominal pain. According to patient, she developed constant, sharp, upper abdominal pain at approximately 6 PM this evening. Symptoms are associated with nausea and bilateral lower extremity edema. Patient denies vomiting, but is actively retching throughout examination. Denies fever, chills, chest pain, cough, diarrhea, and urinary symptoms. Denies recent alcohol use; denies history of hypertriglyceridemia. There are no aggravating or alleviating factors. Symptoms are reported as constant and severe. Patient reports 1 previous episode of pancreatitis approximately 10 years prior. PAST MEDICAL HISTORY   Patient  has a past medical history of Anxiety, Chronic kidney disease, Depression, DVT (deep venous thrombosis) ([address] Fibromyalgia, Headache(784.0), Hx of blood clots, Kidney stone, Pancreatitis, Pleural effusion, and SVT (supraventricular tachycardia) ([address] PAST SURGICAL HISTORY    Patient  has a past surgical history that includes Thyroid lobectomy (Right); Cholecystectomy (2001); Knee arthroscopy (Left); Foot surgery (Right); Endometrial ablation; Atrial ablation surgery; eye surgery (Bilateral);  Endoscopy, colon, diagnostic; back surgery; pr cysto/uretero/pyeloscopy w/lithotripsy (Left, 9/20/2017); and EXCISION LESION HAND / FINGER (Right, 1/25/2019). FAMILY HISTORY    Patient family history includes Heart Disease in her father; High Blood Pressure in her brother. SOCIAL HISTORY    Patient  reports that she quit smoking about 8 months ago. Her smoking use included cigarettes. She smoked 1.00 pack per day. She has never used smokeless tobacco. She reports current alcohol use. She reports that she does not use drugs. HOME MEDICATIONS        Prior to Admission medications    Medication Sig Start Date End Date Taking?  Authorizing Provider   melatonin 5 MG TABS tablet Take 5 mg by mouth daily   Yes Historical Provider, MD   potassium chloride (KLOR-CON M) 10 MEQ extended release tablet Take 1 tablet by mouth 2 times daily 7/18/20  Yes Monty Davila MD   furosemide (LASIX) 20 MG tablet Take 1 tablet by mouth 2 times daily 7/18/20  Yes Monty Davila MD   QUEtiapine (SEROQUEL XR) 400 MG extended release tablet Take 800 mg by mouth nightly   Yes Historical Provider, MD   busPIRone (BUSPAR) 15 MG tablet Take 15 mg by mouth nightly Can take once in morning PRN   Yes Historical Provider, MD   omeprazole (PRILOSEC) 40 MG delayed release capsule Take 40 mg by mouth daily   Yes Historical Provider, MD   Cholecalciferol (VITAMIN D) 50 MCG (2000 UT) CAPS capsule Take by mouth daily   Yes Historical Provider, MD   albuterol sulfate HFA (PROVENTIL HFA) 108 (90 Base) MCG/ACT inhaler Inhale 2 puffs into the lungs as needed 12/28/18  Yes Historical Provider, MD   magnesium oxide (MAG-OX) 400 MG tablet Take 400 mg by mouth daily   Yes Historical Provider, MD   fexofenadine (RA ALLERGY RELIEF) 180 MG tablet take 1 tablet by mouth once daily  Patient taking differently: Take 180 mg by mouth daily as needed take 1 tablet by mouth once daily 11/7/18  Yes Shellie Rodgers MD   pramipexole (MIRAPEX) 0.5 MG tablet take 1 tablet by mouth twice a day  Patient taking differently: Take 0.25 mg by mouth daily take 1 tablet by mouth twice a day 11/7/18  Yes Terry Saldana MD   DULoxetine (CYMBALTA) 60 MG extended release capsule TAKE 1 CAPSULE BY MOUTH TWICE A DAY 8/3/18  Yes Terry Saldana MD   ibuprofen (ADVIL) 200 MG tablet Take 2 tablets by mouth every 6 hours as needed for Pain  Patient taking differently: Take 600 mg by mouth every 6 hours as needed for Pain  9/15/17  Yes Paulina Ferris MD   acetaminophen (APAP EXTRA STRENGTH) 500 MG tablet Take 2 tablets by mouth every 6 hours as needed for Pain 9/15/17  Yes Paulina Ferris MD   metoprolol (LOPRESSOR) 25 MG tablet Take 25 mg by mouth daily  1/6/16  Yes Historical Provider, MD bentleybital-APAP-caffeine (FIORICET) -40 MG CAPS per capsule Take 1 capsule by mouth every 6 hours as needed for Headaches 5/19/20 5/26/20  Deandra Hill,        ALLERGIES      Aripiprazole; Eletriptan; Triptans [sumatriptan]; and Lamotrigine    REVIEW OF SYSTEMS     Review of Systems   Constitutional: Negative for chills, diaphoresis and fever. HENT: Negative for congestion and sore throat. Respiratory: Negative for cough, shortness of breath and wheezing. Cardiovascular: Positive for leg swelling. Negative for chest pain and palpitations. Gastrointestinal: Positive for abdominal pain and nausea. Negative for constipation, diarrhea and vomiting. Genitourinary: Negative for dysuria, frequency and urgency. Musculoskeletal: Negative for back pain and myalgias. Skin: Negative for rash. Neurological: Negative for dizziness, weakness and headaches. Psychiatric/Behavioral: The patient is not nervous/anxious. PHYSICAL EXAM      BP (!) 165/57   Pulse 118   Temp 98 °F (36.7 °C) (Oral)   Resp 20   Ht 5' 5\" (1.651 m)   Wt (!) 300 lb 4.3 oz (136.2 kg)   SpO2 99%   BMI 49.97 kg/m²  Body mass index is 49.97 kg/m². Physical Exam  Constitutional:       General: She is in acute distress. Appearance: She is well-developed. She is obese. She is ill-appearing.  She is not toxic-appearing or diaphoretic. HENT:      Head: Normocephalic and atraumatic. Mouth/Throat:      Mouth: Mucous membranes are dry. Eyes:      Conjunctiva/sclera: Conjunctivae normal.      Pupils: Pupils are equal, round, and reactive to light. Neck:      Musculoskeletal: Normal range of motion and neck supple. Trachea: No tracheal deviation. Cardiovascular:      Rate and Rhythm: Normal rate and regular rhythm. Heart sounds: Normal heart sounds. No murmur. No friction rub. No gallop. Pulmonary:      Effort: Pulmonary effort is normal. No respiratory distress. Breath sounds: Normal breath sounds. No wheezing or rales. Chest:      Chest wall: No tenderness. Abdominal:      General: Bowel sounds are normal. There is no distension. Palpations: Abdomen is soft. Tenderness: There is abdominal tenderness. There is no guarding. Musculoskeletal: Normal range of motion. General: No tenderness. Right lower leg: Edema present. Left lower leg: Edema present. Lymphadenopathy:      Cervical: No cervical adenopathy. Skin:     General: Skin is warm and dry. Coloration: Skin is not pale. Findings: No erythema or rash. Neurological:      Mental Status: She is alert and oriented to person, place, and time. Motor: No seizure activity. Coordination: Coordination normal.   Psychiatric:         Behavior: Behavior normal.         Thought Content:  Thought content normal.       DIAGNOSTICS      EKG:   EKG 12 Lead [[id number]]      Collected: 08/19/20 2013     Updated: 08/19/20 2020      Ventricular Rate  77  BPM     Atrial Rate  77  BPM     P-R Interval  158  ms     QRS Duration  86  ms     Q-T Interval  416  ms     QTc Calculation (Bazett)  470  ms     P Axis  63  degrees     R Axis  43  degrees     T Axis  27  degrees    Narrative:      Normal sinus rhythm   Low voltage QRS   Borderline ECG   When compared with ECG of 18-JUL-2020 08:41,   No significant change was found Labs:  CBC:   Recent Labs     08/19/20 2002 08/20/20  0606   WBC 11.8* 20.5*   HGB 13.6 15.8    365     BMP:    Recent Labs     08/19/20 2002      K 4.6      CO2 27   BUN 9   CREATININE 0.57   GLUCOSE 122*     S. Calcium:  Recent Labs     08/19/20 2002   CALCIUM 9.3     S. Ionized Calcium:No results for input(s): IONCA in the last 72 hours. S. Magnesium:No results for input(s): MG in the last 72 hours. S. Phosphorus:No results for input(s): PHOS in the last 72 hours. S. Glucose:No results for input(s): POCGLU in the last 72 hours. Glycosylated hemoglobin A1C:   Lab Results   Component Value Date    LABA1C 5.7 03/22/2016     Hepatic:   Recent Labs     08/19/20 2002 08/20/20  0606   AST 16 33*   ALT 20 29   ALKPHOS 111* 135*     CARDIAC ENZY:   Recent Labs     08/19/20 2002   TROPHS 7     INR:   Recent Labs     08/20/20  0606   INR 0.9     BNP: No results for input(s): PROBNP in the last 72 hours. ABGs: No results for input(s): PH, PCO2, PO2, HCO3, O2SAT in the last 72 hours. Lipids: No results for input(s): CHOL, TRIG, HDL, LDLCALC in the last 72 hours. Invalid input(s): LDL  Pancreatic functions:  Recent Labs     08/19/20 2002 08/20/20  0606   LIPASE 1,065* 1,613*   AMYLASE  --  435*     S. LacticAcid:   Recent Labs     08/20/20  0606   LACTA 5.3*     Thyroid functions:   Lab Results   Component Value Date    TSH 0.96 05/26/2019      U/A:  Recent Labs     08/19/20  2231   COLORU YELLOW   SPECGRAV 1.012   LEUKOCYTESUR NEGATIVE   GLUCOSEU NEGATIVE       Imaging/Diagonstics:     Ct Abdomen Pelvis W Iv Contrast Additional Contrast? None    Result Date: 8/19/2020  EXAMINATION: CT OF THE ABDOMEN AND PELVIS WITH CONTRAST 8/19/2020 10:11 pm TECHNIQUE: CT of the abdomen and pelvis was performed with the administration of intravenous contrast. Multiplanar reformatted images are provided for review.  Dose modulation, iterative reconstruction, and/or weight based adjustment of the mA/kV was utilized to reduce the radiation dose to as low as reasonably achievable. COMPARISON: None. HISTORY: ORDERING SYSTEM PROVIDED HISTORY: pain TECHNOLOGIST PROVIDED HISTORY: pain Reason for Exam: pt c/o all over abdominal pain with nausea for a few hours Acuity: Acute Type of Exam: Initial Relevant Medical/Surgical History: surg - gallbladder FINDINGS: Lower Chest: Consolidation within both lower lobes and the lingula. Scattered ground-glass opacity. No pleural effusion. Organs: Liver is diffusely hypoattenuating. No acute abnormality within the spleen, adrenals, or left kidney. There is right renal cortical scarring and calcification. Subcentimeter hypoattenuating bilateral renal lesions are too small to accurately characterize, likely cysts. Prior cholecystectomy. There is diffuse peripancreatic stranding and fluid. No loculated fluid collection. GI/Bowel: Stomach is partially distended. Small bowel is nondilated. Colon is nondilated. Pelvis: Urinary bladder is partially distended without vesicular stones. Uterus is present. Peritoneum/Retroperitoneum: Shotty retroperitoneal lymph nodes, likely reactive. Abdominal aorta is normal in caliber. No pneumoperitoneum. Bones/Soft Tissues: No acute osseous abnormality. 1. Pancreatic edema and associated fluid is consistent with pancreatitis. No loculated fluid collection/pseudocyst. 2. Shotty retroperitoneal lymph nodes are likely reactive. 3. Hepatic steatosis. 4. Bilateral lower lobe consolidation, which could be due to edema or pneumonia. ASSESSMENT  and  PLAN     Principal Problem:    Acute pancreatitis  Active Problems:    Anxiety    Class 3 severe obesity due to excess calories with serious comorbidity in adult [facility])    Restless leg syndrome    HTN (hypertension)    Fibromyalgia  Resolved Problems:    * No resolved hospital problems.  *    Plan:  Acute Pancreatitis  -presents with 4 hour history of sharp upper abdominal pain  -Reports 1 past episode of pancreatitis approximately 20 years ago  -Denies alcohol use and hypertriglyceridemia  -Lipase 1065 in ED  -NPO  -2 Litre fluid bolus in ED  -IVF @150 ml/hr  -Pain and nausea control  -Monitor amylase and lipase  -Check lipid panel in a.m. Consultations:     Jagdeep Tidwell TO PULMONOLOGY      Gustavo Ramsey MD   8/20/2020  11:05 AM    [facility]: [place] [address], [address] [place] [address]. Phone  and add on       I have discussed the care of [facility] ,   including pertinent history and exam findings,      8/20/20   with the Marcus Kendall CNP  I have seen and examined the patient and the key elements of all parts of the encounter have been performed by me . I agree with the assessment, plan and orders as documented by the resident. Principal Problem:    Acute pancreatitis  Active Problems:    Anxiety    Class 3 severe obesity due to excess calories with serious comorbidity in adult [facility])    Restless leg syndrome    HTN (hypertension)    Fibromyalgia  Resolved Problems:    * No resolved hospital problems.  *       Patient was admitted with acute pancreatitis  Her lactic acid was 5.4  Her lipase more than thousand  This morning at 16  Lactic acid and coming down  Patient was transferred from [facility] 5 to  Progressive  Patient does have a bilateral basilar infiltrate  With lactic acidosis bilateral basilar infiltrates she is high risk  We will transfer her to intermediate  We will consult Dr. Ezio Reaves who has seen the patient in the past  Patient is on Zosyn  Bilateral infiltrate can be part of the severe pancreatitis  We will check pulse ox and see if she is hypoxic    Her IV line is poor  We are going PICC line or midline   Consult pulmonary-Dr. Ezio Reaves          -    Condition    [x] ill ,     [x] high risk , [x] critical ,          [] improved but still labile                                        [] delirium ,      [x] --- acute pancreatitis with bilateral pulmonary infiltrates with lactic acidosis--,                 [] I----     Unit  [] ICU           [] PICU       [] MED_SRG             []  Other    Prognosis     ---                   Medications: Allergies: Allergies   Allergen Reactions    Aripiprazole      Bad reaction    Eletriptan      Other reaction(s): Swelling-throat    Triptans [Sumatriptan]     Lamotrigine Rash       Current Meds:   Scheduled Meds:    magnesium oxide  400 mg Oral Daily    piperacillin-tazobactam (ZOSYN) 3.375 g in dextrose 5% IVPB extended infusion (mini-bag)  3.375 g Intravenous Q8H    LORazepam  0.5 mg Intravenous Once    enoxaparin  30 mg Subcutaneous BID    QUEtiapine  800 mg Oral Nightly    pramipexole  0.25 mg Oral Daily    pantoprazole  40 mg Intravenous Daily    And    sodium chloride (PF)  10 mL Intravenous Daily    metoprolol tartrate  25 mg Oral Daily    busPIRone  15 mg Oral Nightly    DULoxetine  60 mg Oral BID    cetirizine  10 mg Oral Daily     Continuous Infusions:    lactated ringers 150 mL/hr at 08/20/20 0544     PRN Meds: promethazine (PHENERGAN) in sodium chloride 0.9% IVPB, HYDROmorphone **OR** HYDROmorphone, potassium chloride **OR** potassium alternative oral replacement **OR** potassium chloride, acetaminophen, HYDROcodone 5 mg - acetaminophen **OR** HYDROcodone 5 mg - acetaminophen, [DISCONTINUED] promethazine **OR** ondansetron, melatonin ER      ---- ;     [address]  [address] [facility] [place] [address] [place] [address].    Phone [phone]   Fax: [phone]  Answering Service: [phone]

## 2020-08-20 NOTE — PROGRESS NOTES
Pt arrived to room 2074 via stretcher from ED. Pt a&o x4, VSS, no s/s of distress noted at this time. Pt oriented to room. Head to toe and admission assessment completed at this time.

## 2020-08-20 NOTE — PLAN OF CARE
Problem: Falls - Risk of:  Goal: Will remain free from falls  Description: Will remain free from falls  8/20/2020 1640 by Chaim Nielsen RN  Outcome: Ongoing  8/20/2020 0830 by Arie Rios RN  Outcome: Ongoing  8/20/2020 0415 by Carley Russell RN  Outcome: Ongoing  Note: Pt remains free from falls this shift. Bed in lowest position with wheels locked and 2/4 siderails up. Call light and bedside table within reach. Room free from clutter. Nonskid socks on. Will continue to monitor. Goal: Absence of physical injury  Description: Absence of physical injury  8/20/2020 1640 by Chaim Nielsen RN  Outcome: Ongoing  8/20/2020 0830 by Arie Rios RN  Outcome: Ongoing  8/20/2020 0415 by Carley Russell RN  Outcome: Ongoing     Problem: Pain:  Description: Pain management should include both nonpharmacologic and pharmacologic interventions. Goal: Pain level will decrease  Description: Pain level will decrease  8/20/2020 1640 by Chaim Nielsen RN  Outcome: Ongoing  8/20/2020 0830 by Arie Rios RN  Outcome: Ongoing  8/20/2020 0415 by Carley Russell RN  Outcome: Ongoing  Note: Full pain assessment completed this shift. Pt pain controlled with PRN medication. Pt educated on nonpharmacologic interventions to help decrease pain. Will continue to monitor. Goal: Control of acute pain  Description: Control of acute pain  8/20/2020 1640 by Chaim Nielsen RN  Outcome: Ongoing  8/20/2020 0415 by Carley Russell RN  Outcome: Ongoing  Goal: Control of chronic pain  Description: Control of chronic pain  8/20/2020 1640 by Chaim Nielsen RN  Outcome: Ongoing  8/20/2020 0415 by Carley Russell RN  Outcome: Ongoing     Problem: Nausea/Vomiting:  Goal: Absence of nausea/vomiting  Description: Absence of nausea/vomiting  8/20/2020 1640 by Chaim Nielsen RN  Outcome: Ongoing  8/20/2020 0415 by Carley Russell RN  Outcome: Ongoing  Note: Pt encouraged to rest. Pt nausea being controlled with PRN medication.  Will continue to monitor.    Goal: Able to drink  Description: Able to drink  8/20/2020 1640 by Chaim Nielsen RN  Outcome: Ongoing  8/20/2020 0415 by Carley Russell RN  Outcome: Ongoing  Goal: Able to eat  Description: Able to eat  8/20/2020 1640 by Chaim Nielsen RN  Outcome: Ongoing  8/20/2020 0415 by Carley Russell RN  Outcome: Ongoing  Goal: Ability to achieve adequate nutritional intake will improve  Description: Ability to achieve adequate nutritional intake will improve  8/20/2020 1640 by Chaim Nielsen RN  Outcome: Ongoing  8/20/2020 0415 by Carley Russell RN  Outcome: Ongoing     Problem: Gas Exchange - Impaired:  Goal: Levels of oxygenation will improve  Description: Levels of oxygenation will improve  Outcome: Ongoing     Problem: Infection, Septic Shock:  Goal: Will show no infection signs and symptoms  Description: Will show no infection signs and symptoms  Outcome: Ongoing     Problem: Serum Glucose Level - Abnormal:  Goal: Ability to maintain appropriate glucose levels will improve  Description: Ability to maintain appropriate glucose levels will improve  Outcome: Ongoing     Problem: Tissue Perfusion, Altered:  Goal: Circulatory function within specified parameters  Description: Circulatory function within specified parameters  Outcome: Ongoing     Problem: Venous Thromboembolism:  Goal: Will show no signs or symptoms of venous thromboembolism  Description: Will show no signs or symptoms of venous thromboembolism  Outcome: Ongoing  Goal: Absence of signs or symptoms of impaired coagulation  Description: Absence of signs or symptoms of impaired coagulation  Outcome: Ongoing

## 2020-08-20 NOTE — PROGRESS NOTES
Attempted to call pt's mom, Poonam Sanz, again, cont. To be no answer. Major Cleaning RN, U, notified that pt's mom, KALPESH, can not be contacted yet,  After 2 attempts, regarding the pt.  Being moved to U

## 2020-08-20 NOTE — PROGRESS NOTES
Report called to PCU, Lucy Denney RN  Passed on to Lucy Denney all of the elevated labs from this AM, and that the pt's zosyn and IV bolus needs to be started asap.    Also passed on that the GI consult still needs to be done

## 2020-08-20 NOTE — PROGRESS NOTES
Tried to notify pt's mom, Marylen Jamaica, regarding pt's transfer to PCU-- no answer and no voicemail available

## 2020-08-20 NOTE — PROGRESS NOTES
Cele Willis visits at bedside aware of all elevated labs from this  AM, many new orders received  Sera Granados notified of pt's pain level of a 10, abd. Pain, requested that fentanyl be tried , as a new order  Not ordered at this time  Cont to await for phenergan IVPB to arrive from pharmacy

## 2020-08-20 NOTE — CONSULTS
PULMONARY  CONSULT NOTE      Date of Admission: 8/19/2020  7:35 PM    Reason for Consult: Possible pneumonia, pancreatitis     Referring Physician: Rk Ruiz MD   PCP: Keeley Ray     History of Present Illness: Ash Jolley is a 54 y.o. White   female, past medical history HTN, morbid obesity, CKD, who presents with RUQ abdominal pain. It started about an hour or so prior to arrival and she described it as a contast, sharp, upper abdominal pain. Associated symptoms include nausea, BLE edema. Denies fever, chills, chest pain, cough, diarrhea. Initial lipase 1065, WBC 11.8. CT abdomen and pelvis showed pancreatic edema and associated fluid, hepatic steatosis, and bilateral lower lobe consolidation. This morning WBC increased to 20.5, triglycerides 259, and lactic acid 5.4. She is being transferred to ICU due to elevated lactic acid and for closer monitoring.      Problem:  Principal Problem: Pancreatitis, possible pneumonia     PMH:   Past Medical History:   Diagnosis Date    Anxiety     Chronic kidney disease     right renal cyst    Depression     DVT (deep venous thrombosis) ([address] 1997    after delivery    Fibromyalgia     Headache(784.0)     migraines    Hx of blood clots     1997 left calf    Kidney stone     Pancreatitis 2001    Pleural effusion 2001    SVT (supraventricular tachycardia) ([address] 9/8/2015       PSH:   Past Surgical History:   Procedure Laterality Date    ATRIAL ABLATION SURGERY      BACK SURGERY      L4-5    CHOLECYSTECTOMY  2001    ENDOMETRIAL ABLATION      e sure implants placed also    ENDOSCOPY, COLON, DIAGNOSTIC      EXCISION LESION HAND / FINGER Right 1/25/2019    HAND LESION BIOPSY EXCISION performed by Gayle Hopkins MD at [address] Bilateral     left x2, right x1    FOOT SURGERY Right     x3    KNEE ARTHROSCOPY Left     x2    FL CYSTO/URETERO/PYELOSCOPY W/LITHOTRIPSY Left 9/20/2017    CYSTOSCOPY URETEROSCOPY HOLMIUM LASER LITHO WITH file     Forced sexual activity: Not on file   Other Topics Concern    Not on file   Social History Narrative    Not on file       Family History:   Family History   Problem Relation Age of Onset    Heart Disease Father     High Blood Pressure Brother        Review of Systems  Fever/ chills - no  Chest pain - no  Cough - no  Expectoration / hemoptysis - no  shortness of breath - no  Headache - no  Sinus drainage/ sore throat - no  abdominal pain - yes   Swelling feet - no  Nausea/ vomiting/ diarrhea/ constipation - no    Physical Exam  Vital Signs: BP (!) 149/87   Pulse 132   Temp 97.3 °F (36.3 °C) (Oral)   Resp 21   Ht 5' 5\" (1.651 m)   Wt (!) 300 lb 4.3 oz (136.2 kg)   SpO2 97%   BMI 49.97 kg/m²       Admission Weight: Weight: 298 lb 15.1 oz (135.6 kg)    General Appearance:Ill appearing, moaning out in pain, oriented to time, place and person  Head: Normocephalic, without obvious abnormality, atraumatic  Neck: no adenopathy, no JVD, supple, symmetrical, trachea midline\"thyroid not enlarged, symmetric, no tenderness/mass/nodule  Lungs: fair air entry bilaterally; breath sounds- vesicular; rhonchi- absent; rales/ crackles- absent  Heart: : regular rate and rhythm, S1, S2 normal, no murmur, click, rub or gallop  Abdomen: soft, tenderness noted RUQ, LUQ, no guarding; bowel sounds normal; no masses,  no organomegaly, obese   Extremities: extremities normal, atraumatic, no cyanosis or edema  Skin: skin color, texture, turgor normal. No rashes or lesions  Neurologic: Grossly normal    [unfilled]    Recent labs, Imaging studies reviewed      Data ReviewCBC:   Recent Labs     08/19/20 2002 08/20/20  0606   WBC 11.8* 20.5*   RBC 4.31 5.12   HGB 13.6 15.8   HCT 39.7 47.5*    365     BMP:   Recent Labs     08/19/20 2002   GLUCOSE 122*      K 4.6   BUN 9   CREATININE 0.57   CALCIUM 9.3     ABGs: No results for input(s): PHART, PO2ART, PMO2DND, QDS2SOX, BEART, O3QGNRGM, PTT3UDN in the last 72 hours.   PT/INR:  No results found for: PTINR      ASSESSMENT / PLAN:  Acute pancreatitis- GI consulted, monitor amylase lipase, MRCP ordered   Lactic acidosis- IVF   Possible pneumonia- ABx   Diet NPO  Monitor end tidal Co2 with pain meds   CXR 8/21    This is a late note on patient seen by me earlier today.   Electronically signed by Kadie Frank on 08/20/20 at 6:42 PM

## 2020-08-20 NOTE — CONSULTS
GI Consult Note:    Name: Elvia Shepherd  MRN: [id number]     Acct: [de-identified]  Room: 2081/2081-01    Admit Date: 8/19/2020  PCP: Livan Rodriguez    Physician Requesting Consult: Jenny Phipps MD     Reason for Consult:    Severe pancreatitis  Abnormal imaging  Morbid obesity  Abdominal pains  Nausea    Chief Complaint:     Chief Complaint   Patient presents with    Abdominal Pain     RUQ       History Obtained From:     Patient her mother at bedside and EMR    History of Present Illness:      Elvia Shepherd is a  54 y.o.  female who presents with Abdominal Pain (RUQ)    This patient has a history significant for morbid obesity has been having some significant abdominal pain for the past few days which is gradually getting worse she was brought to the emergency room subsequently was found to have marked elevation of the pancreatic enzymes    Her CT scan has revealed the following findings    Pancreatic edema and associated fluid is consistent with pancreatitis.  No    loculated fluid collection/pseudocyst.    2. Shotty retroperitoneal lymph nodes are likely reactive. 3. Hepatic steatosis. 4. Bilateral lower lobe consolidation, which could be due to edema or    pneumonia.       Patient never had previous episodes of pancreatitis like this before she claims    She denies any alcohol abuse illicit drug usage    Her triglycerides are normal range    Has no family history for pancreatitis as well she is currently in extreme pain    Has  been nauseated without any significant vomiting hematemesis etc.    She does have significant history for some irritable bowel syndrome-like symptoms including abdominal pain bloating gas    Had cholecystectomy done in the past?  Symptoms:  Onset:  Location:  abdomen  Duration:  day(s)  Severity:  severe  Quality:  constant      Past Medical History:     Past Medical History:   Diagnosis Date    Anxiety     Chronic kidney disease     right renal cyst    Depression     [facility]) [facility] 1997    after delivery    Fibromyalgia     Headache(784.0)     migraines    Hx of blood clots     1997 left calf    Kidney stone     Pancreatitis 2001    Pleural effusion 2001    [facility] 9/8/2015        Past Surgical History:     Past Surgical History:   Procedure Laterality Date    ATRIAL ABLATION SURGERY      BACK SURGERY      L4-5    CHOLECYSTECTOMY  2001    ENDOMETRIAL ABLATION      e sure implants placed also    ENDOSCOPY, COLON, DIAGNOSTIC      EXCISION LESION HAND / FINGER Right 1/25/2019    HAND LESION BIOPSY EXCISION performed by Nery Jones MD at [address] [facility] Bilateral     left x2, right x1    FOOT SURGERY Right     x3    KNEE ARTHROSCOPY Left     x2    LA CYSTO/URETERO/PYELOSCOPY W/LITHOTRIPSY Left 9/20/2017    CYSTOSCOPY URETEROSCOPY HOLMIUM LASER LITHO WITH STONE BASKETING WITH  STENT  performed by Christin Singer MD at [facility]         Medications Prior to Admission:       Prior to Admission medications    Medication Sig Start Date End Date Taking?  Authorizing Provider   melatonin 5 MG TABS tablet Take 5 mg by mouth daily   Yes Historical Provider, MD   potassium chloride (KLOR-CON M) 10 MEQ extended release tablet Take 1 tablet by mouth 2 times daily 7/18/20  Yes Buck Contreras MD   furosemide (LASIX) 20 MG tablet Take 1 tablet by mouth 2 times daily 7/18/20  Yes Buck Contreras MD   QUEtiapine (SEROQUEL XR) 400 MG extended release tablet Take 800 mg by mouth nightly   Yes Historical Provider, MD   busPIRone (BUSPAR) 15 MG tablet Take 15 mg by mouth nightly Can take once in morning PRN   Yes Historical Provider, MD   omeprazole (PRILOSEC) 40 MG delayed release capsule Take 40 mg by mouth daily   Yes Historical Provider, MD   Cholecalciferol (VITAMIN D) 50 MCG (2000 UT) CAPS capsule Take by mouth daily   Yes Historical Provider, MD   albuterol sulfate HFA (PROVENTIL HFA) 108 (90 Base) MCG/ACT inhaler Inhale 2 puffs into the lungs as needed 12/28/18  Yes Historical Provider, MD   magnesium oxide (MAG-OX) 400 MG tablet Take 400 mg by mouth daily   Yes Historical Provider, MD   fexofenadine (RA ALLERGY RELIEF) 180 MG tablet take 1 tablet by mouth once daily  Patient taking differently: Take 180 mg by mouth daily as needed take 1 tablet by mouth once daily 11/7/18  Yes Young Hussein MD   pramipexole (MIRAPEX) 0.5 MG tablet take 1 tablet by mouth twice a day  Patient taking differently: Take 0.25 mg by mouth daily take 1 tablet by mouth twice a day 11/7/18  Yes Young Hussein MD   DULoxetine (CYMBALTA) 60 MG extended release capsule TAKE 1 CAPSULE BY MOUTH TWICE A DAY 8/3/18  Yes Young Hussein MD   ibuprofen (ADVIL) 200 MG tablet Take 2 tablets by mouth every 6 hours as needed for Pain  Patient taking differently: Take 600 mg by mouth every 6 hours as needed for Pain  9/15/17  Yes Sharif Doherty MD   acetaminophen (APAP EXTRA STRENGTH) 500 MG tablet Take 2 tablets by mouth every 6 hours as needed for Pain 9/15/17  Yes Sharif Doherty MD   metoprolol (LOPRESSOR) 25 MG tablet Take 25 mg by mouth daily  1/6/16  Yes Historical Provider, MD   butalbital-APAP-caffeine (FIORICET) -40 MG CAPS per capsule Take 1 capsule by mouth every 6 hours as needed for Headaches 5/19/20 5/26/20  Delmar Duverney, DO        Allergies:       Aripiprazole; Eletriptan; Triptans [sumatriptan]; and Lamotrigine    Social History:     Tobacco:    reports that she quit smoking about 8 months ago. Her smoking use included cigarettes. She smoked 1.00 pack per day. She has never used smokeless tobacco.  Alcohol:      reports current alcohol use. Drug Use:  reports no history of drug use.     Family History:     Family History   Problem Relation Age of Onset    Heart Disease Father     High Blood Pressure Brother        Review of Systems:     Positive and Negative as described in or induration noted  Cranial Nerves : grossly intact  Lymph nodes: not done at this time    Data:   CBC:   Lab Results   Component Value Date    WBC 20.5 08/20/2020    RBC 5.12 08/20/2020    HGB 15.8 08/20/2020    HCT 47.5 08/20/2020    MCV 92.7 08/20/2020    MCH 30.8 08/20/2020    MCHC 33.2 08/20/2020    RDW 14.0 08/20/2020     08/20/2020    MPV 9.8 08/20/2020     CBC with Differential:    Lab Results   Component Value Date    WBC 20.5 08/20/2020    RBC 5.12 08/20/2020    HGB 15.8 08/20/2020    HCT 47.5 08/20/2020     08/20/2020    MCV 92.7 08/20/2020    MCH 30.8 08/20/2020    MCHC 33.2 08/20/2020    RDW 14.0 08/20/2020    LYMPHOPCT 8 08/20/2020    LYMPHOPCT 35.0 03/22/2016    MONOPCT 4 08/20/2020    MONOPCT 6.8 03/22/2016    EOSPCT 3.8 03/22/2016    BASOPCT 0 08/20/2020    BASOPCT 0.9 03/22/2016    MONOSABS 0.82 08/20/2020    MONOSABS 0.6 03/22/2016    LYMPHSABS 1.64 08/20/2020    LYMPHSABS 3.1 03/22/2016    EOSABS 0.00 08/20/2020    EOSABS 0.3 03/22/2016    BASOSABS 0.00 08/20/2020    DIFFTYPE NOT REPORTED 08/20/2020     Hemoglobin/Hematocrit:    Lab Results   Component Value Date    HGB 15.8 08/20/2020    HCT 47.5 08/20/2020     CMP:    Lab Results   Component Value Date     08/19/2020    K 4.6 08/19/2020     08/19/2020    CO2 27 08/19/2020    BUN 9 08/19/2020    CREATININE 0.57 08/19/2020    GFRAA >60 08/19/2020    LABGLOM >60 08/19/2020    GLUCOSE 122 08/19/2020    PROT 8.0 08/20/2020    PROT 7.2 01/23/2015    LABALBU 4.2 08/20/2020    CALCIUM 9.3 08/19/2020    BILITOT 0.35 08/20/2020    ALKPHOS 135 08/20/2020    AST 33 08/20/2020    ALT 29 08/20/2020     BMP:    Lab Results   Component Value Date     08/19/2020    K 4.6 08/19/2020     08/19/2020    CO2 27 08/19/2020    BUN 9 08/19/2020    LABALBU 4.2 08/20/2020    CREATININE 0.57 08/19/2020    CALCIUM 9.3 08/19/2020    GFRAA >60 08/19/2020    LABGLOM >60 08/19/2020    GLUCOSE 122 08/19/2020     PT/INR:    Lab Results Component Value Date    PROTIME 12.2 08/20/2020    INR 0.9 08/20/2020     PTT:    Lab Results   Component Value Date    APTT 24.4 08/20/2020   [APTT}    Assesment:     Primary Problem  Acute pancreatitis    Active Hospital Problems    Diagnosis Date Noted    [facility] [A41.9] 08/20/2020    Acute pancreatitis [K85.90] 08/19/2020    Fibromyalgia [M79.7]     HTN (hypertension) [I10] 12/15/2014    Major depression [F32.9] 10/30/2014    Restless leg syndrome [G25.81] 10/22/2013    Class 3 severe obesity due to excess calories with serious comorbidity in adult ([facility] [E66.01] 01/14/2013    Anxiety [F41.9] 01/14/2013     Severe pancreatitis  Abnormal imaging  Morbid obesity  Abdominal pains  Nausea    Plan:     1. Aggressive IV hydration  2. Watch intake and output  3. Analgesia  4. PRN antiemetics  5. Follow-up liver enzymes follow-up amylase lipase  6. Recheck triglycerides tomorrow  7. We will order MRCP to rule out any CBD stones  8. Supportive symptomatic care at this time  9. Explained to the patient discussed with her mother and their questions were answered  10. Discussed with nursing staff on the floor  11. We will reevaluate in the morning        Thank you for allowing me to participate in the care of your patient. Please feel free to contact me with any questions or concerns.      Electronically signed by Shelby Mathews MD on 8/20/2020 at 4:47 PM     Copy sent to Dr. Jason Espinal

## 2020-08-20 NOTE — PLAN OF CARE
Problem: Falls - Risk of:  Goal: Will remain free from falls  Outcome: Ongoing  Note: Pt remains free from falls this shift. Bed in lowest position with wheels locked and 2/4 siderails up. Call light and bedside table within reach. Room free from clutter. Nonskid socks on. Will continue to monitor. Goal: Absence of physical injury  Outcome: Ongoing     Problem: Pain:  Goal: Pain level will decrease  Outcome: Ongoing  Note: Full pain assessment completed this shift. Pt pain controlled with PRN medication. Pt educated on nonpharmacologic interventions to help decrease pain. Will continue to monitor. Goal: Control of acute pain  Outcome: Ongoing  Goal: Control of chronic pain  Outcome: Ongoing     Problem: Nausea/Vomiting:  Goal: Absence of nausea/vomiting  Outcome: Ongoing  Note: Pt encouraged to rest. Pt nausea being controlled with PRN medication. Will continue to monitor.    Goal: Able to drink  Outcome: Ongoing  Goal: Able to eat  Outcome: Ongoing  Goal: Ability to achieve adequate nutritional intake will improve  Outcome: Ongoing

## 2020-08-20 NOTE — PROGRESS NOTES
[employer] message sent to Raf Carlos CNP to notify of pt increased pain. Still awaiting response at this time.

## 2020-08-20 NOTE — ED NOTES
Report given to Sam Brochure from Elizabeth Harden. Report method by phone   The following was reviewed with receiving RN:   Current vital signs:  /73   Pulse 80   Temp 97.5 °F (36.4 °C) (Oral)   Resp 16   SpO2 92%                MEWS Score: 1     Any medication or safety alerts were reviewed. Any pending diagnostics and notifications were also reviewed, as well as any safety concerns or issues, abnormal labs, abnormal imaging, and abnormal assessment findings. Questions were answered.           Gustavo Elmore RN  08/19/20 [id number]

## 2020-08-20 NOTE — PROGRESS NOTES
Pt transferred to PCU, pt. Is alert and  O x4  No c/o of SOB  Pt. Cont. To c/o of nausea and abd. Pain  Call light put on when pt. Ar[facility] to [facility]  [facility]  [facility] .

## 2020-08-20 NOTE — BRIEF OP NOTE
Brief Postoperative Note    Valarie Solis  YOB: 1975  [id number]    Pre-operative Diagnosis: Sepsis    Post-operative Diagnosis: Same    Procedure: PICC placement    Anesthesia: Local    Surgeons/Assistants: Terrance    Estimated Blood Loss: less than 50     Complications: None    Specimens: Was Not Obtained    Electronically signed by Nasrin Wolfe MD on 8/20/2020 at 12:13 PM

## 2020-08-20 NOTE — CARE COORDINATION
CASE MANAGEMENT NOTE:    Admission Date:  8/19/2020 Knedy Kaur is a 54 y.o.  female    Admitted for : Acute pancreatitis, unspecified complication status, unspecified pancreatitis type [K85.90]    Met with:  Patient    PCP:  Miguel Mcdermott                                Insurance:  [facility]      Current Residence/ Living Arrangements:  independently at home             Current Services PTA:  Yes, current with [facility] on [facility]    Is patient agreeable to VNS: No    Freedom of choice provided:  No    List of [address] provided: NA    VNS chosen:  No    DME:  none    Home Oxygen: No    Nebulizer: No    CPAP/BIPAP: No    Supplier: N/A    Potential Assistance Needed: No    SNF needed: No    Freedom of choice and list provided: NA    Pharmacy:  [address] [facility]       Does Patient want to use MEDS to BEDS? No    Is the Patient an [facility] with Readmission Risk Score greater than 14%? No  If yes, pt needs a follow up appointment made within 7 days. Family Members/Caregivers that pt would like involved in their care:    Yes    If yes, list name here: Mother Ivon Gallardo    Transportation Provider:  Family             Is patient in Isolation/One on One/Altered Mental Status? No  If yes, skip next question. If no, would they like an I-Pad to  use? No  If yes, call [phone]. Discharge Plan:  8/20/20 [facility] - Patient is from home. Current with [facility] on [facility], Denies DME/VNS. Lipase 1065, WBC 11.5, on admission, transferred from med surg to PCU today,  Labs today show LActic Acid 5.4,WBC 20.5, Lipase 1613. Remains on IV Zosyn,  IV fluids, Plan is to return home with no needs. Will follow.  //pf                Electronically signed by: Danni Oneal RN on 8/20/2020 at 11:10 AM

## 2020-08-20 NOTE — CONSULTS
PULMONARY  CONSULT NOTE      Date of Admission: 8/19/2020  7:35 PM    Reason for Consult: Possible pneumonia, pancreatitis     Referring Physician: Selena Blanca MD   PCP: Bethel Velasco     History of Present Illness: Joon Gonzalez is a 54 y.o. White   female, past medical history HTN, morbid obesity, CKD, who presents with RUQ abdominal pain. It started about an hour or so prior to arrival and she described it as a contast, sharp, upper abdominal pain. Associated symptoms include nausea, BLE edema. Denies fever, chills, chest pain, cough, diarrhea. Initial lipase 1065, WBC 11.8. CT abdomen and pelvis showed pancreatic edema and associated fluid, hepatic steatosis, and bilateral lower lobe consolidation. This morning WBC increased to 20.5, triglycerides 259, and lactic acid 5.4. She is being transferred to ICU due to elevated lactic acid and for closer monitoring.      Problem:  Principal Problem: Pancreatitis, possible pneumonia     PMH:   Past Medical History:   Diagnosis Date    Anxiety     Chronic kidney disease     right renal cyst    Depression     DVT (deep venous thrombosis) (Micheline Eben) 1997    after delivery    Fibromyalgia     Headache(784.0)     migraines    Hx of blood clots     1997 left calf    Kidney stone     Pancreatitis 2001    Pleural effusion 2001    SVT (supraventricular tachycardia) (Micheline Eben) 9/8/2015       PSH:   Past Surgical History:   Procedure Laterality Date    ATRIAL ABLATION SURGERY      BACK SURGERY      L4-5    CHOLECYSTECTOMY  2001    ENDOMETRIAL ABLATION      e sure implants placed also    ENDOSCOPY, COLON, DIAGNOSTIC      EXCISION LESION HAND / FINGER Right 1/25/2019    HAND LESION BIOPSY EXCISION performed by Nery Jones MD at [address] Bilateral     left x2, right x1    FOOT SURGERY Right     x3    KNEE ARTHROSCOPY Left     x2    MN CYSTO/URETERO/PYELOSCOPY W/LITHOTRIPSY Left 9/20/2017    CYSTOSCOPY URETEROSCOPY HOLMIUM LASER LITHO WITH STONE BASKETING WITH  STENT  performed by Arcadio Marroquin MD at [facility]        Allergies:    Allergies   Allergen Reactions    Aripiprazole      Bad reaction    Eletriptan      Other reaction(s): Swelling-throat    Triptans [Sumatriptan]     Lamotrigine Rash       Home Meds:  Medications Prior to Admission: melatonin 5 MG TABS tablet, Take 5 mg by mouth daily  potassium chloride (KLOR-CON M) 10 MEQ extended release tablet, Take 1 tablet by mouth 2 times daily  furosemide (LASIX) 20 MG tablet, Take 1 tablet by mouth 2 times daily  QUEtiapine (SEROQUEL XR) 400 MG extended release tablet, Take 800 mg by mouth nightly  busPIRone (BUSPAR) 15 MG tablet, Take 15 mg by mouth nightly Can take once in morning PRN  omeprazole (PRILOSEC) 40 MG delayed release capsule, Take 40 mg by mouth daily  Cholecalciferol (VITAMIN D) 50 MCG (2000 UT) CAPS capsule, Take by mouth daily  albuterol sulfate HFA (PROVENTIL HFA) 108 (90 Base) MCG/ACT inhaler, Inhale 2 puffs into the lungs as needed  magnesium oxide (MAG-OX) 400 MG tablet, Take 400 mg by mouth daily  fexofenadine (RA ALLERGY RELIEF) 180 MG tablet, take 1 tablet by mouth once daily (Patient taking differently: Take 180 mg by mouth daily as needed take 1 tablet by mouth once daily)  pramipexole (MIRAPEX) 0.5 MG tablet, take 1 tablet by mouth twice a day (Patient taking differently: Take 0.25 mg by mouth daily take 1 tablet by mouth twice a day)  DULoxetine (CYMBALTA) 60 MG extended release capsule, TAKE 1 CAPSULE BY MOUTH TWICE A DAY  ibuprofen (ADVIL) 200 MG tablet, Take 2 tablets by mouth every 6 hours as needed for Pain (Patient taking differently: Take 600 mg by mouth every 6 hours as needed for Pain )  acetaminophen (APAP EXTRA STRENGTH) 500 MG tablet, Take 2 tablets by mouth every 6 hours as needed for Pain  metoprolol (LOPRESSOR) 25 MG tablet, Take 25 mg by mouth daily   [DISCONTINUED] tiZANidine (ZANAFLEX) 4 MG tablet, Take 1 tablet by mouth 3 times daily as needed (muscle spasm)  butalbital-APAP-caffeine (FIORICET) -40 MG CAPS per capsule, Take 1 capsule by mouth every 6 hours as needed for Headaches  [DISCONTINUED] NIFEdipine (ADALAT CC) 30 MG extended release tablet, Take 30 mg by mouth daily  [DISCONTINUED] butalbital-APAP-caffeine (FIORICET) -40 MG CAPS per capsule, Take 1 capsule by mouth every 6 hours as needed for Headaches  [DISCONTINUED] hydrOXYzine (VISTARIL) 25 MG capsule, Take 25 mg by mouth nightly as needed for Anxiety     Social History:   Social History     Socioeconomic History    Marital status:      Spouse name: Not on file    Number of children: Not on file    Years of education: Not on file    Highest education level: Not on file   Occupational History    Not on file   Social Needs    Financial resource strain: Not on file    Food insecurity     Worry: Not on file     Inability: Not on file    Transportation needs     Medical: Not on file     Non-medical: Not on file   Tobacco Use    Smoking status: Former Smoker     Packs/day: 1.00     Types: Cigarettes     Last attempt to quit: 2019     Years since quittin.7    Smokeless tobacco: Never Used    Tobacco comment: today last smoke   Substance and Sexual Activity    Alcohol use:  Yes     Alcohol/week: 0.0 standard drinks     Comment: rarely    Drug use: No    Sexual activity: Not on file   Lifestyle    Physical activity     Days per week: Not on file     Minutes per session: Not on file    Stress: Not on file   Relationships    Social connections     Talks on phone: Not on file     Gets together: Not on file     Attends Jewish service: Not on file     Active member of club or organization: Not on file     Attends meetings of clubs or organizations: Not on file     Relationship status: Not on file    Intimate partner violence     Fear of current or ex partner: Not on file     Emotionally abused: Not on file     Physically abused: Not on file     Forced sexual activity: Not on file   Other Topics Concern    Not on file   Social History Narrative    Not on file       Family History:   Family History   Problem Relation Age of Onset    Heart Disease Father     High Blood Pressure Brother        Review of Systems  Fever/ chills - no  Chest pain - no  Cough - no  Expectoration / hemoptysis - no  shortness of breath - no  Headache - no  Sinus drainage/ sore throat - no  abdominal pain - yes   Swelling feet - no  Nausea/ vomiting/ diarrhea/ constipation - no    Physical Exam  Vital Signs: BP (!) 160/101   Pulse 124   Temp 96.5 °F (35.8 °C) (Axillary)   Resp 21   Ht 5' 5\" (1.651 m)   Wt (!) 300 lb 4.3 oz (136.2 kg)   SpO2 100%   BMI 49.97 kg/m²       Admission Weight: Weight: 298 lb 15.1 oz (135.6 kg)    General Appearance:Ill appearing, moaning out in pain, oriented to time, place and person  Head: Normocephalic, without obvious abnormality, atraumatic  Neck: no adenopathy, no JVD, supple, symmetrical, trachea midline\"thyroid not enlarged, symmetric, no tenderness/mass/nodule  Lungs: fair air entry bilaterally; breath sounds- vesicular; rhonchi- absent; rales/ crackles- absent  Heart: : regular rate and rhythm, S1, S2 normal, no murmur, click, rub or gallop  Abdomen: soft, tenderness noted RUQ, LUQ, no guarding; bowel sounds normal; no masses,  no organomegaly, obese   Extremities: extremities normal, atraumatic, no cyanosis or edema  Skin: skin color, texture, turgor normal. No rashes or lesions  Neurologic: Grossly normal    [unfilled]    Recent labs, Imaging studies reviewed      Data ReviewCBC:   Recent Labs     08/19/20 2002 08/20/20  0606   WBC 11.8* 20.5*   RBC 4.31 5.12   HGB 13.6 15.8   HCT 39.7 47.5*    365     BMP:   Recent Labs     08/19/20 2002   GLUCOSE 122*      K 4.6   BUN 9   CREATININE 0.57   CALCIUM 9.3     ABGs: No results for input(s): PHART, PO2ART, AKC4YVL, LJI7DLK, BEART, R0LAOFNQ, QQH2SFT in the last 72 hours.    PT/INR:  No results found for: PTINR      ASSESSMENT / PLAN:  Acute pancreatitis- GI consulted, monitor amylase lipase, MRCP ordered   Lactic acidosis- IVF   Possible pneumonia- ABx   Diet NPO  Monitor end tidal Co2 with pain meds   CXR 8/21  Discussed with Dr. Ricky Walter     Electronically signed by Amy Majano on 08/20/20 at 1:08 PM.

## 2020-08-20 NOTE — ED NOTES
Mode of arrival (squad #, walk in, police, etc) : squad        Chief complaint(s): abdominal pain        Arrival Note (brief scenario, treatment PTA, etc). : Pt reports 9/10 RUQ abdominal pain x1 hour prior to call. States she's had similar pain before, during pancreatitis attacks, which she hasn't had in months. C= \"Have you ever felt that you should Cut down on your drinking? \"  No  A= \"Have people Annoyed you by criticizing your drinking? \"  No  G= \"Have you ever felt bad or Guilty about your drinking? \"  No  E= \"Have you ever had a drink as an Eye-opener first thing in the morning to steady your nerves or to help a hangover? \"  No      Deferred []      Reason for deferring: N/A    *If yes to two or more: probable alcohol abuse. Irwin Grace RN  08/19/20 FRANKO Brennan  08/19/20 1420

## 2020-08-21 ENCOUNTER — APPOINTMENT (OUTPATIENT)
Dept: GENERAL RADIOLOGY | Age: 45
DRG: 871 | End: 2020-08-21
Payer: COMMERCIAL

## 2020-08-21 PROBLEM — E83.51 HYPOCALCEMIA: Status: ACTIVE | Noted: 2020-08-21

## 2020-08-21 PROBLEM — J96.01 ACUTE RESPIRATORY FAILURE WITH HYPOXIA (HCC): Status: ACTIVE | Noted: 2020-08-21

## 2020-08-21 LAB
-: ABNORMAL
ABSOLUTE BANDS #: 9.69 K/UL (ref 0–1)
ABSOLUTE EOS #: 0 K/UL (ref 0–0.4)
ABSOLUTE IMMATURE GRANULOCYTE: ABNORMAL K/UL (ref 0–0.3)
ABSOLUTE LYMPH #: 1.04 K/UL (ref 1–4.8)
ABSOLUTE MONO #: 0.69 K/UL (ref 0.1–1.3)
ALBUMIN SERPL-MCNC: 3.2 G/DL (ref 3.5–5.2)
ALBUMIN/GLOBULIN RATIO: ABNORMAL (ref 1–2.5)
ALLEN TEST: ABNORMAL
ALP BLD-CCNC: 114 U/L (ref 35–104)
ALPHA-1 ANTITRYPSIN: 208 MG/DL (ref 90–200)
ALT SERPL-CCNC: 36 U/L (ref 5–33)
AMORPHOUS: ABNORMAL
ANION GAP SERPL CALCULATED.3IONS-SCNC: 11 MMOL/L (ref 9–17)
ANION GAP SERPL CALCULATED.3IONS-SCNC: 11 MMOL/L (ref 9–17)
AST SERPL-CCNC: 45 U/L
BACTERIA: ABNORMAL
BANDS: 28 % (ref 0–10)
BASOPHILS # BLD: 0 % (ref 0–2)
BASOPHILS ABSOLUTE: 0 K/UL (ref 0–0.2)
BILIRUB SERPL-MCNC: 0.47 MG/DL (ref 0.3–1.2)
BILIRUBIN URINE: NEGATIVE
BUN BLDV-MCNC: 6 MG/DL (ref 6–20)
BUN BLDV-MCNC: 9 MG/DL (ref 6–20)
BUN/CREAT BLD: ABNORMAL (ref 9–20)
BUN/CREAT BLD: ABNORMAL (ref 9–20)
CALCIUM IONIZED: 0.9 MMOL/L (ref 1.13–1.33)
CALCIUM IONIZED: 0.95 MMOL/L (ref 1.13–1.33)
CALCIUM SERPL-MCNC: 6.5 MG/DL (ref 8.6–10.4)
CALCIUM SERPL-MCNC: 6.8 MG/DL (ref 8.6–10.4)
CARBOXYHEMOGLOBIN: 1 % (ref 0–5)
CASTS UA: ABNORMAL /LPF
CERULOPLASMIN: 26 MG/DL (ref 16–45)
CHLORIDE BLD-SCNC: 104 MMOL/L (ref 98–107)
CHLORIDE BLD-SCNC: 104 MMOL/L (ref 98–107)
CO2: 23 MMOL/L (ref 20–31)
CO2: 23 MMOL/L (ref 20–31)
COLOR: YELLOW
COMMENT UA: ABNORMAL
CREAT SERPL-MCNC: 0.48 MG/DL (ref 0.5–0.9)
CREAT SERPL-MCNC: 0.66 MG/DL (ref 0.5–0.9)
CRYSTALS, UA: ABNORMAL /HPF
DIFFERENTIAL TYPE: ABNORMAL
EOSINOPHILS RELATIVE PERCENT: 0 % (ref 0–4)
EPITHELIAL CELLS UA: ABNORMAL /HPF
FIO2: ABNORMAL
GFR AFRICAN AMERICAN: >60 ML/MIN
GFR AFRICAN AMERICAN: >60 ML/MIN
GFR NON-AFRICAN AMERICAN: >60 ML/MIN
GFR NON-AFRICAN AMERICAN: >60 ML/MIN
GFR SERPL CREATININE-BSD FRML MDRD: ABNORMAL ML/MIN/{1.73_M2}
GLUCOSE BLD-MCNC: 147 MG/DL (ref 70–99)
GLUCOSE BLD-MCNC: 148 MG/DL (ref 70–99)
GLUCOSE URINE: NEGATIVE
HAV IGM SER IA-ACNC: NONREACTIVE
HCO3 ARTERIAL: 24.5 MMOL/L (ref 22–26)
HCT VFR BLD CALC: 41.7 % (ref 36–46)
HEMOGLOBIN: 13.9 G/DL (ref 12–16)
HEPATITIS B CORE IGM ANTIBODY: NONREACTIVE
HEPATITIS B SURFACE ANTIGEN: NONREACTIVE
HEPATITIS C ANTIBODY: NONREACTIVE
IMMATURE GRANULOCYTES: ABNORMAL %
KETONES, URINE: ABNORMAL
LACTIC ACID: 1.7 MMOL/L (ref 0.5–2.2)
LACTIC ACID: 1.9 MMOL/L (ref 0.5–2.2)
LACTIC ACID: 2.1 MMOL/L (ref 0.5–2.2)
LACTIC ACID: 2.1 MMOL/L (ref 0.5–2.2)
LEUKOCYTE ESTERASE, URINE: NEGATIVE
LIPASE: 692 U/L (ref 13–60)
LV EF: 65 %
LVEF MODALITY: NORMAL
LYMPHOCYTES # BLD: 3 % (ref 24–44)
MCH RBC QN AUTO: 31.1 PG (ref 26–34)
MCHC RBC AUTO-ENTMCNC: 33.2 G/DL (ref 31–37)
MCV RBC AUTO: 93.7 FL (ref 80–100)
METAMYELOCYTES ABSOLUTE COUNT: 1.04 K/UL
METAMYELOCYTES: 3 %
METHEMOGLOBIN: 0.5 % (ref 0–1.9)
MODE: ABNORMAL
MONOCYTES # BLD: 2 % (ref 1–7)
MORPHOLOGY: ABNORMAL
MUCUS: ABNORMAL
NEGATIVE BASE EXCESS, ART: 0.6 MMOL/L (ref 0–2)
NITRITE, URINE: NEGATIVE
NOTIFICATION TIME: ABNORMAL
NOTIFICATION: ABNORMAL
NRBC AUTOMATED: ABNORMAL PER 100 WBC
O2 DEVICE/FLOW/%: ABNORMAL
O2 SAT, ARTERIAL: 86.4 % (ref 95–98)
OTHER OBSERVATIONS UA: ABNORMAL
OXYHEMOGLOBIN: ABNORMAL % (ref 95–98)
PATIENT TEMP: 37
PCO2 ARTERIAL: 41.2 MMHG (ref 35–45)
PCO2, ART, TEMP ADJ: ABNORMAL (ref 35–45)
PDW BLD-RTO: 13.9 % (ref 11.5–14.9)
PEEP/CPAP: ABNORMAL
PH ARTERIAL: 7.38 (ref 7.35–7.45)
PH UA: 5.5 (ref 5–8)
PH, ART, TEMP ADJ: ABNORMAL (ref 7.35–7.45)
PLATELET # BLD: 331 K/UL (ref 150–450)
PLATELET ESTIMATE: ABNORMAL
PMV BLD AUTO: 10 FL (ref 6–12)
PO2 ARTERIAL: 50.9 MMHG (ref 80–100)
PO2, ART, TEMP ADJ: ABNORMAL MMHG (ref 80–100)
POSITIVE BASE EXCESS, ART: ABNORMAL MMOL/L (ref 0–2)
POTASSIUM SERPL-SCNC: 4.1 MMOL/L (ref 3.7–5.3)
POTASSIUM SERPL-SCNC: 4.1 MMOL/L (ref 3.7–5.3)
PROTEIN UA: ABNORMAL
PSV: ABNORMAL
PT. POSITION: ABNORMAL
RBC # BLD: 4.46 M/UL (ref 4–5.2)
RBC # BLD: ABNORMAL 10*6/UL
RBC UA: ABNORMAL /HPF
RENAL EPITHELIAL, UA: ABNORMAL /HPF
RESPIRATORY RATE: 25
SAMPLE SITE: ABNORMAL
SEG NEUTROPHILS: 64 % (ref 36–66)
SEGMENTED NEUTROPHILS ABSOLUTE COUNT: 22.14 K/UL (ref 1.3–9.1)
SET RATE: ABNORMAL
SODIUM BLD-SCNC: 138 MMOL/L (ref 135–144)
SODIUM BLD-SCNC: 138 MMOL/L (ref 135–144)
SPECIFIC GRAVITY UA: 1.02 (ref 1–1.03)
TEXT FOR RESPIRATORY: ABNORMAL
TOTAL HB: ABNORMAL G/DL (ref 12–16)
TOTAL PROTEIN: 6.2 G/DL (ref 6.4–8.3)
TOTAL RATE: ABNORMAL
TRANSFERRIN: 232 MG/DL (ref 200–360)
TRICHOMONAS: ABNORMAL
TURBIDITY: CLEAR
URINE HGB: NEGATIVE
UROBILINOGEN, URINE: NORMAL
VT: ABNORMAL
WBC # BLD: 34.6 K/UL (ref 3.5–11)
WBC # BLD: ABNORMAL 10*3/UL
WBC UA: ABNORMAL /HPF
YEAST: ABNORMAL

## 2020-08-21 PROCEDURE — 87641 MR-STAPH DNA AMP PROBE: CPT

## 2020-08-21 PROCEDURE — 84466 ASSAY OF TRANSFERRIN: CPT

## 2020-08-21 PROCEDURE — 6360000002 HC RX W HCPCS: Performed by: INTERNAL MEDICINE

## 2020-08-21 PROCEDURE — 82805 BLOOD GASES W/O2 SATURATION: CPT

## 2020-08-21 PROCEDURE — 99233 SBSQ HOSP IP/OBS HIGH 50: CPT | Performed by: INTERNAL MEDICINE

## 2020-08-21 PROCEDURE — 6360000002 HC RX W HCPCS: Performed by: NURSE PRACTITIONER

## 2020-08-21 PROCEDURE — 82330 ASSAY OF CALCIUM: CPT

## 2020-08-21 PROCEDURE — 36415 COLL VENOUS BLD VENIPUNCTURE: CPT

## 2020-08-21 PROCEDURE — 82390 ASSAY OF CERULOPLASMIN: CPT

## 2020-08-21 PROCEDURE — 80074 ACUTE HEPATITIS PANEL: CPT

## 2020-08-21 PROCEDURE — 6360000002 HC RX W HCPCS: Performed by: STUDENT IN AN ORGANIZED HEALTH CARE EDUCATION/TRAINING PROGRAM

## 2020-08-21 PROCEDURE — 2580000003 HC RX 258: Performed by: RADIOLOGY

## 2020-08-21 PROCEDURE — 94660 CPAP INITIATION&MGMT: CPT

## 2020-08-21 PROCEDURE — 83516 IMMUNOASSAY NONANTIBODY: CPT

## 2020-08-21 PROCEDURE — 80048 BASIC METABOLIC PNL TOTAL CA: CPT

## 2020-08-21 PROCEDURE — 99291 CRITICAL CARE FIRST HOUR: CPT | Performed by: INTERNAL MEDICINE

## 2020-08-21 PROCEDURE — 2580000003 HC RX 258: Performed by: NURSE PRACTITIONER

## 2020-08-21 PROCEDURE — 85025 COMPLETE CBC W/AUTO DIFF WBC: CPT

## 2020-08-21 PROCEDURE — 36600 WITHDRAWAL OF ARTERIAL BLOOD: CPT

## 2020-08-21 PROCEDURE — 83605 ASSAY OF LACTIC ACID: CPT

## 2020-08-21 PROCEDURE — 2580000003 HC RX 258: Performed by: STUDENT IN AN ORGANIZED HEALTH CARE EDUCATION/TRAINING PROGRAM

## 2020-08-21 PROCEDURE — C8929 TTE W OR WO FOL WCON,DOPPLER: HCPCS

## 2020-08-21 PROCEDURE — 2500000003 HC RX 250 WO HCPCS: Performed by: STUDENT IN AN ORGANIZED HEALTH CARE EDUCATION/TRAINING PROGRAM

## 2020-08-21 PROCEDURE — 94761 N-INVAS EAR/PLS OXIMETRY MLT: CPT

## 2020-08-21 PROCEDURE — 82103 ALPHA-1-ANTITRYPSIN TOTAL: CPT

## 2020-08-21 PROCEDURE — 83690 ASSAY OF LIPASE: CPT

## 2020-08-21 PROCEDURE — C9113 INJ PANTOPRAZOLE SODIUM, VIA: HCPCS | Performed by: NURSE PRACTITIONER

## 2020-08-21 PROCEDURE — 2580000003 HC RX 258: Performed by: INTERNAL MEDICINE

## 2020-08-21 PROCEDURE — 2000000000 HC ICU R&B

## 2020-08-21 PROCEDURE — 94770 HC ETCO2 MONITOR DAILY: CPT

## 2020-08-21 PROCEDURE — 71045 X-RAY EXAM CHEST 1 VIEW: CPT

## 2020-08-21 PROCEDURE — 6360000002 HC RX W HCPCS: Performed by: SURGERY

## 2020-08-21 PROCEDURE — 2500000003 HC RX 250 WO HCPCS: Performed by: INTERNAL MEDICINE

## 2020-08-21 PROCEDURE — 80053 COMPREHEN METABOLIC PANEL: CPT

## 2020-08-21 RX ORDER — KETOROLAC TROMETHAMINE 30 MG/ML
15 INJECTION, SOLUTION INTRAMUSCULAR; INTRAVENOUS EVERY 6 HOURS PRN
Status: DISPENSED | OUTPATIENT
Start: 2020-08-21 | End: 2020-08-26

## 2020-08-21 RX ORDER — DEXMEDETOMIDINE HYDROCHLORIDE 4 UG/ML
0.2 INJECTION, SOLUTION INTRAVENOUS CONTINUOUS
Status: DISCONTINUED | OUTPATIENT
Start: 2020-08-21 | End: 2020-08-21

## 2020-08-21 RX ADMIN — SODIUM CHLORIDE 6 MG/HR: 9 INJECTION, SOLUTION INTRAVENOUS at 04:59

## 2020-08-21 RX ADMIN — LABETALOL HYDROCHLORIDE 1 MG/MIN: 5 INJECTION INTRAVENOUS at 10:23

## 2020-08-21 RX ADMIN — HYDROMORPHONE HYDROCHLORIDE 1.5 MG: 1 INJECTION, SOLUTION INTRAMUSCULAR; INTRAVENOUS; SUBCUTANEOUS at 05:49

## 2020-08-21 RX ADMIN — HYDROMORPHONE HYDROCHLORIDE 1.5 MG: 1 INJECTION, SOLUTION INTRAMUSCULAR; INTRAVENOUS; SUBCUTANEOUS at 03:50

## 2020-08-21 RX ADMIN — PANTOPRAZOLE SODIUM 40 MG: 40 INJECTION, POWDER, FOR SOLUTION INTRAVENOUS at 08:41

## 2020-08-21 RX ADMIN — LORAZEPAM 0.5 MG: 2 INJECTION INTRAMUSCULAR; INTRAVENOUS at 21:16

## 2020-08-21 RX ADMIN — HYDROMORPHONE HYDROCHLORIDE 1.5 MG: 1 INJECTION, SOLUTION INTRAMUSCULAR; INTRAVENOUS; SUBCUTANEOUS at 13:38

## 2020-08-21 RX ADMIN — PIPERACILLIN SODIUM AND TAZOBACTAM SODIUM 3.38 G: 3; .375 INJECTION, POWDER, LYOPHILIZED, FOR SOLUTION INTRAVENOUS at 06:59

## 2020-08-21 RX ADMIN — HYDROMORPHONE HYDROCHLORIDE 1.5 MG: 1 INJECTION, SOLUTION INTRAMUSCULAR; INTRAVENOUS; SUBCUTANEOUS at 10:57

## 2020-08-21 RX ADMIN — LORAZEPAM 0.5 MG: 2 INJECTION INTRAMUSCULAR; INTRAVENOUS at 00:57

## 2020-08-21 RX ADMIN — PIPERACILLIN SODIUM AND TAZOBACTAM SODIUM 3.38 G: 3; .375 INJECTION, POWDER, LYOPHILIZED, FOR SOLUTION INTRAVENOUS at 13:42

## 2020-08-21 RX ADMIN — HYDROMORPHONE HYDROCHLORIDE 1.5 MG: 1 INJECTION, SOLUTION INTRAMUSCULAR; INTRAVENOUS; SUBCUTANEOUS at 01:07

## 2020-08-21 RX ADMIN — SODIUM CHLORIDE 5 MG/HR: 9 INJECTION, SOLUTION INTRAVENOUS at 09:06

## 2020-08-21 RX ADMIN — ENOXAPARIN SODIUM 30 MG: 30 INJECTION SUBCUTANEOUS at 08:41

## 2020-08-21 RX ADMIN — SODIUM CHLORIDE, POTASSIUM CHLORIDE, SODIUM LACTATE AND CALCIUM CHLORIDE: 600; 310; 30; 20 INJECTION, SOLUTION INTRAVENOUS at 06:29

## 2020-08-21 RX ADMIN — DEXMEDETOMIDINE 0.2 MCG/KG/HR: 100 INJECTION, SOLUTION, CONCENTRATE INTRAVENOUS at 21:47

## 2020-08-21 RX ADMIN — PIPERACILLIN SODIUM AND TAZOBACTAM SODIUM 3.38 G: 3; .375 INJECTION, POWDER, LYOPHILIZED, FOR SOLUTION INTRAVENOUS at 22:32

## 2020-08-21 RX ADMIN — SODIUM CHLORIDE 5 MG/HR: 9 INJECTION, SOLUTION INTRAVENOUS at 01:30

## 2020-08-21 RX ADMIN — HYDROMORPHONE HYDROCHLORIDE 1.5 MG: 1 INJECTION, SOLUTION INTRAMUSCULAR; INTRAVENOUS; SUBCUTANEOUS at 15:38

## 2020-08-21 RX ADMIN — METOPROLOL TARTRATE 5 MG: 5 INJECTION, SOLUTION INTRAVENOUS at 05:20

## 2020-08-21 RX ADMIN — Medication 10 ML: at 08:41

## 2020-08-21 RX ADMIN — HYDROMORPHONE HYDROCHLORIDE 1.5 MG: 1 INJECTION, SOLUTION INTRAMUSCULAR; INTRAVENOUS; SUBCUTANEOUS at 17:31

## 2020-08-21 RX ADMIN — ENOXAPARIN SODIUM 30 MG: 30 INJECTION SUBCUTANEOUS at 21:19

## 2020-08-21 RX ADMIN — Medication 10 ML: at 21:21

## 2020-08-21 RX ADMIN — KETOROLAC TROMETHAMINE 15 MG: 30 INJECTION, SOLUTION INTRAMUSCULAR at 23:12

## 2020-08-21 RX ADMIN — SODIUM CHLORIDE, POTASSIUM CHLORIDE, SODIUM LACTATE AND CALCIUM CHLORIDE: 600; 310; 30; 20 INJECTION, SOLUTION INTRAVENOUS at 20:20

## 2020-08-21 RX ADMIN — CALCIUM GLUCONATE 2 G: 98 INJECTION, SOLUTION INTRAVENOUS at 15:16

## 2020-08-21 RX ADMIN — HYDROMORPHONE HYDROCHLORIDE 1.5 MG: 1 INJECTION, SOLUTION INTRAMUSCULAR; INTRAVENOUS; SUBCUTANEOUS at 20:04

## 2020-08-21 RX ADMIN — HYDROMORPHONE HYDROCHLORIDE 1.5 MG: 1 INJECTION, SOLUTION INTRAMUSCULAR; INTRAVENOUS; SUBCUTANEOUS at 08:41

## 2020-08-21 ASSESSMENT — PAIN SCALES - GENERAL
PAINLEVEL_OUTOF10: 8
PAINLEVEL_OUTOF10: 8
PAINLEVEL_OUTOF10: 9
PAINLEVEL_OUTOF10: 10
PAINLEVEL_OUTOF10: 9
PAINLEVEL_OUTOF10: 10
PAINLEVEL_OUTOF10: 9
PAINLEVEL_OUTOF10: 10

## 2020-08-21 ASSESSMENT — PAIN DESCRIPTION - LOCATION
LOCATION: ABDOMEN

## 2020-08-21 ASSESSMENT — PAIN DESCRIPTION - PAIN TYPE
TYPE: ACUTE PAIN
TYPE: ACUTE PAIN

## 2020-08-21 ASSESSMENT — PAIN DESCRIPTION - ORIENTATION
ORIENTATION: ANTERIOR
ORIENTATION: ANTERIOR

## 2020-08-21 ASSESSMENT — ENCOUNTER SYMPTOMS: ABDOMINAL PAIN: 1

## 2020-08-21 ASSESSMENT — PAIN DESCRIPTION - FREQUENCY: FREQUENCY: CONTINUOUS

## 2020-08-21 ASSESSMENT — PAIN DESCRIPTION - DESCRIPTORS: DESCRIPTORS: CONSTANT;SHARP

## 2020-08-21 NOTE — PLAN OF CARE
Nutrition Problem #1: Inadequate oral intake  Intervention: Food and/or Nutrient Delivery: Continue NPO  Nutritional Goals: Meet 75% of nutrient needs with PO diet

## 2020-08-21 NOTE — PROGRESS NOTES
[address]    PROGRESS NOTE             8/21/2020    1:44 PM    Name:   Roman Jose  MRN:     [id number]     Acct:      [de-identified]   Room:   2003/2003-01  IP Day:  2  Admit Date:  8/19/2020  7:35 PM    PCP:  Betty Silva  Code Status:  Full Code    Subjective:     C/C:   Chief Complaint   Patient presents with    Abdominal Pain     RUQ     Interval History Status: worsened. Patient seen and examined at bedside this AM.  Overnight patient was transferred to the ICU for sinus tachycardia and a BP of 180/90. Patient was started on IV lopressor and cardene drip. Overnight she also needed to be placed on BiPaP. Her pain is being controlled with dilaudid q2h PRN. Overnight patient remained tachycardic in the 130s but her BP was controlled with SBP in the 130s. Remains afebrile. Berkowitz placed overnight and UO in the last shift was 0.6 ml/kr/hr. Lactic acid remains stable at 2.1. Unable to thoroughly interview patient since she was repeatedly moaning during exam this AM.    Brief History:     Please see H&P. Patient was admitted for RUQ abdominal pain and found to have a lipase of 1065 and CT showing pancreatitis. The next day her lactic acid was 5.4. CT abdomen and pelvis did note possible bilateral lower lobe consolidation. Concern for pneumonia vs. ARDS and patient was started on IV zosyn. Her lactic acid improved following fluids. Later in the evening patient had elevated HR in the 140s and /90. EKG showed nonspecific changes and patient was transferred to the ICU and started on IV lopressor and cardene drip. Review of Systems:     Review of Systems   Gastrointestinal: Positive for abdominal pain. Unable to assess since patient was continually moaning and not responding to questions. Medications: Allergies:     Allergies   Allergen Reactions    Aripiprazole      Bad reaction    Eletriptan      Other reaction(s): Swelling-throat    Triptans [Sumatriptan]     Lamotrigine Rash       Current Meds:   Scheduled Meds:    magnesium oxide  400 mg Oral Daily    piperacillin-tazobactam (ZOSYN) 3.375 g in dextrose 5% IVPB extended infusion (mini-bag)  3.375 g Intravenous Q8H    sodium chloride flush  10 mL Intravenous 2 times per day    enoxaparin  30 mg Subcutaneous BID    QUEtiapine  800 mg Oral Nightly    pramipexole  0.25 mg Oral Daily    pantoprazole  40 mg Intravenous Daily    And    sodium chloride (PF)  10 mL Intravenous Daily    busPIRone  15 mg Oral Nightly    DULoxetine  60 mg Oral BID    cetirizine  10 mg Oral Daily     Continuous Infusions:    labetalol (NORMODYNE) 1 mg/mL infusion 1 mg/min (08/21/20 1023)    lactated ringers 150 mL/hr at 08/21/20 0629     PRN Meds: perflutren lipid microspheres, promethazine (PHENERGAN) in sodium chloride 0.9% IVPB, LORazepam, sodium chloride flush, magnesium sulfate, HYDROmorphone, potassium chloride **OR** potassium alternative oral replacement **OR** potassium chloride, acetaminophen, HYDROcodone 5 mg - acetaminophen **OR** HYDROcodone 5 mg - acetaminophen, [DISCONTINUED] promethazine **OR** ondansetron, melatonin ER    Data:     Past Medical History:   has a past medical history of Anxiety, Chronic kidney disease, Depression, DVT (deep venous thrombosis) ([address] Fibromyalgia, Headache(784.0), Hx of blood clots, Kidney stone, Pancreatitis, Pleural effusion, and SVT (supraventricular tachycardia) ([address] Social History:   reports that she quit smoking about 8 months ago. Her smoking use included cigarettes. She smoked 1.00 pack per day. She has never used smokeless tobacco. She reports current alcohol use. She reports that she does not use drugs.      Family History:   Family History   Problem Relation Age of Onset    Heart Disease Father     High Blood Pressure Brother        Vitals:  /71   Pulse 116   Temp 98.5 °F (36.9 °C) (Axillary)   Resp 24 Ht 5' 5\" (1.651 m)   Wt (!) 304 lb 3.8 oz (138 kg)   SpO2 93%   BMI 50.63 kg/m²   Temp (24hrs), Av.8 °F (36.6 °C), Min:97.3 °F (36.3 °C), Max:98.5 °F (36.9 °C)    No results for input(s): POCGLU in the last 72 hours. I/O(24Hr):     Intake/Output Summary (Last 24 hours) at 2020 1344  Last data filed at 2020 0448  Gross per 24 hour   Intake 3886.83 ml   Output 1400 ml   Net 2486.83 ml       Labs:    CBC with Differential:    Lab Results   Component Value Date    WBC 34.6 2020    RBC 4.46 2020    HGB 13.9 2020    HCT 41.7 2020     2020    MCV 93.7 2020    MCH 31.1 2020    MCHC 33.2 2020    RDW 13.9 2020    METASPCT 3 2020    LYMPHOPCT 3 2020    LYMPHOPCT 35.0 2016    MONOPCT 2 2020    MONOPCT 6.8 2016    EOSPCT 3.8 2016    BASOPCT 0 2020    BASOPCT 0.9 2016    MONOSABS 0.69 2020    MONOSABS 0.6 2016    LYMPHSABS 1.04 2020    LYMPHSABS 3.1 2016    EOSABS 0.00 2020    EOSABS 0.3 2016    BASOSABS 0.00 2020    DIFFTYPE NOT REPORTED 2020     CMP:    Lab Results   Component Value Date     2020    K 4.1 2020     2020    CO2 23 2020    BUN 6 2020    CREATININE 0.48 2020    GFRAA >60 2020    LABGLOM >60 2020    GLUCOSE 148 2020    PROT 6.2 2020    PROT 7.2 2015    LABALBU 3.2 2020    CALCIUM 6.8 2020    BILITOT 0.47 2020    ALKPHOS 114 2020    AST 45 2020    ALT 36 2020     BUN/Creatinine:    Lab Results   Component Value Date    BUN 6 2020    CREATININE 0.48 2020     Hepatic Function Panel:    Lab Results   Component Value Date    ALKPHOS 114 2020    ALT 36 2020    AST 45 2020    PROT 6.2 2020    PROT 7.2 2015    BILITOT 0.47 2020    BILIDIR 0.14 2020    IBILI 0.21 2020    LABALBU 3.2 08/21/2020     Calcium:    Lab Results   Component Value Date    CALCIUM 6.8 08/21/2020     Ionized Calcium:  No results found for: IONCA  LDH:    Lab Results   Component Value Date     05/19/2020     U/A:    Lab Results   Component Value Date    NITRITE neg 09/15/2017    COLORU YELLOW 08/20/2020    PROTEINU TRACE 08/20/2020    PHUR 5.5 08/20/2020    WBCUA 0 TO 2 08/20/2020    RBCUA 5 TO 10 08/20/2020    MUCUS NOT REPORTED 08/20/2020    TRICHOMONAS NOT REPORTED 08/20/2020    YEAST NOT REPORTED 08/20/2020    BACTERIA MANY 08/20/2020    CLARITYU cloudy 09/15/2017    SPECGRAV 1.022 08/20/2020    LEUKOCYTESUR NEGATIVE 08/20/2020    UROBILINOGEN Normal 08/20/2020    BILIRUBINUR NEGATIVE 08/20/2020    BLOODU large 09/15/2017    GLUCOSEU NEGATIVE 08/20/2020    AMORPHOUS NOT REPORTED 08/20/2020     ABG:  No results found for: PH, PCO2, PO2, HCO3, BE, THGB, TCO2, O2SAT    Lab Results   Component Value Date/Time    SPECIAL left arm 10cc 08/20/2020 09:25 AM     Lab Results   Component Value Date/Time    CULTURE NO GROWTH 18 HOURS 08/20/2020 09:25 AM         Radiology:    Ct Abdomen Pelvis Wo Contrast Additional Contrast? None    Result Date: 8/20/2020  EXAMINATION: CT OF THE ABDOMEN AND PELVIS WITHOUT CONTRAST 8/20/2020 10:22 pm TECHNIQUE: CT of the abdomen and pelvis was performed without the administration of intravenous contrast. Multiplanar reformatted images are provided for review. Dose modulation, iterative reconstruction, and/or weight based adjustment of the mA/kV was utilized to reduce the radiation dose to as low as reasonably achievable. COMPARISON: CT abdomen and pelvis with contrast August 19, 2020. HISTORY: ORDERING SYSTEM PROVIDED HISTORY: severe acute pain TECHNOLOGIST PROVIDED HISTORY: severe acute pain Is the patient pregnant? ->Yes Reason for Exam: worsening abdominal pain Acuity: Acute Type of Exam: Ongoing Relevant Medical/Surgical History: surg - gallbladder FINDINGS: Lower Chest: Persisting bilateral lower lung scattered consolidation and atelectasis is present with trace posterior left-sided pleural effusion identified. Eileen Waka Heart is normal in size and configuration. Organs: Interval mild worsening of severe peripancreatic fat stranding and fluid is noted with intraperitoneal free fluid collecting within the bilateral pericolic gutters and collecting inferiorly within the pelvis with increased volume in comparison with the prior study. Diffuse fatty infiltration of the liver is again seen. The liver, gallbladder, spleen, pancreas, adrenal glands, kidneys, are otherwise unremarkable in appearance. GI/Bowel: The stomach is unremarkable without wall thickening or distention. Bowel loops are unremarkable in appearance without evidence of obstruction, distension or mucosal thickening. Pelvis: Berkowitz catheter is noted within the nondistended but grossly unremarkable urinary bladder. Bilateral fallopian tube Essure coils are seen without evidence of complication. The uterus is anteverted in position and is unremarkable in appearance there no evidence of pelvic free fluid is seen. Peritoneum/Retroperitoneum: No evidence of retroperitoneal or intraperitoneal lymphadenopathy is identified. . Bones/Soft Tissues: No evidence of retroperitoneal or intraperitoneal lymphadenopathy is identified. No evidence of intraperitoneal free fluid is seen. 1. Mild interval worsening of severe peripancreatic inflammation associated with acute pancreatitis, as discussed above. 2. No evidence of complication i.e. pseudocyst noted. 3. Stable bilateral lower lung multifocal consolidation atelectasis suggesting pneumonia versus alveolar edema. 4. Hepatic steatosis, unchanged. Ct Abdomen Pelvis W Iv Contrast Additional Contrast? None    Addendum Date: 8/20/2020    ADDENDUM: As stated in the body of the report, the abdominal aorta is normal in size. There is no significant atherosclerosis.      Result Date: 8/20/2020  EXAMINATION: CT OF THE ABDOMEN AND PELVIS WITH CONTRAST 8/19/2020 10:11 pm TECHNIQUE: CT of the abdomen and pelvis was performed with the administration of intravenous contrast. Multiplanar reformatted images are provided for review. Dose modulation, iterative reconstruction, and/or weight based adjustment of the mA/kV was utilized to reduce the radiation dose to as low as reasonably achievable. COMPARISON: None. HISTORY: ORDERING SYSTEM PROVIDED HISTORY: pain TECHNOLOGIST PROVIDED HISTORY: pain Reason for Exam: pt c/o all over abdominal pain with nausea for a few hours Acuity: Acute Type of Exam: Initial Relevant Medical/Surgical History: surg - gallbladder FINDINGS: Lower Chest: Consolidation within both lower lobes and the lingula. Scattered ground-glass opacity. No pleural effusion. Organs: Liver is diffusely hypoattenuating. No acute abnormality within the spleen, adrenals, or left kidney. There is right renal cortical scarring and calcification. Subcentimeter hypoattenuating bilateral renal lesions are too small to accurately characterize, likely cysts. Prior cholecystectomy. There is diffuse peripancreatic stranding and fluid. No loculated fluid collection. GI/Bowel: Stomach is partially distended. Small bowel is nondilated. Colon is nondilated. Pelvis: Urinary bladder is partially distended without vesicular stones. Uterus is present. Peritoneum/Retroperitoneum: Shotty retroperitoneal lymph nodes, likely reactive. Abdominal aorta is normal in caliber. No pneumoperitoneum. Bones/Soft Tissues: No acute osseous abnormality. 1. Pancreatic edema and associated fluid is consistent with pancreatitis. No loculated fluid collection/pseudocyst. 2. Shotty retroperitoneal lymph nodes are likely reactive. 3. Hepatic steatosis. 4. Bilateral lower lobe consolidation, which could be due to edema or pneumonia.      Libertad Banegas Device Plmt/replace/removal    Result Date: 8/20/2020  PROCEDURE: ULTRASOUND GUIDED VASCULAR ACCESS. FLUOROSCOPY GUIDED PICC PLACEMENT 8/20/2020. HISTORY: ORDERING SYSTEM PROVIDED HISTORY: fluid resusitation TECHNOLOGIST PROVIDED HISTORY: fluid resusitation Lumen?->Double Lumen Is the patient pregnant? ->Yes Acuity: Unknown Type of Exam: Unknown Sepsis SEDATION: None FLUOROSCOPY TIME: DAP 63 cGy cm squared TECHNIQUE: Informed consent was obtained after a detailed explanation of the procedure including risks, benefits, and alternatives. Universal protocol was observed. The right arm was prepped and draped in sterile fashion using maximum sterile barrier technique. Local anesthesia was achieved with lidocaine. A micropuncture needle was used to access the right basilic vein using ultrasound guidance. An ultrasound image demonstrating patency of the vein with needle tip located within it. An image was obtained and stored in PACs. A 0.018 guidewire was used to place a peel-a-way sheath and a 5 [employer] power injectable dual-lumen PICC was advanced with fluoroscopic guidance with the tip at the cavo-atrial junction. The catheter flushed easily and there was a good blood return. The catheter was secured to the skin. The patient tolerated the procedure well and there were no immediate complications. FINDINGS: Fluoroscopic image demonstrates the tip of the catheter at the cavo-atrial junction. Successful ultrasound and fluoroscopy guided right basilic vein 5 Lao power injectable dual-lumen PICC placement. Ready for use.      Xr Chest Portable    Result Date: 8/20/2020  EXAMINATION: ONE XRAY VIEW OF THE CHEST 8/20/2020 7:38 am COMPARISON: July 18, 2020 chest exam HISTORY: ORDERING SYSTEM PROVIDED HISTORY: possible pneumonia on CT chest TECHNOLOGIST PROVIDED HISTORY: possible pneumonia on CT chest Reason for Exam: possible pneumonia on CT chest Acuity: Acute Type of Exam: Initial Additional signs and symptoms: possible pneumonia on CT chest FINDINGS: Expiratory exam with mild streaky bibasilar density. Normal cardiopericardial silhouette No significant pleural or mediastinal findings     Expiratory exam with mild streaky bibasilar atelectasis         Physical Examination:        Physical Exam  Constitutional:       Appearance: She is obese. She is ill-appearing. HENT:      Head: Normocephalic and atraumatic. Cardiovascular:      Rate and Rhythm: Regular rhythm. Tachycardia present. Pulses: Normal pulses. Heart sounds: Normal heart sounds. No murmur. No gallop. Pulmonary:      Comments: On BiPaP this AM.  Diminished breath sounds bilaterally but no crackles or wheeze. Abdominal:      Comments: Abdomen is mildly distended and taut. Tender to palpation in RUQ and epigastric area. There is a faint blanching erythema overlying the abdomen on exam today. Documented in media. No hepatosplenomegaly. Skin:     General: Skin is warm and dry. Capillary Refill: Capillary refill takes less than 2 seconds. Neurological:      Mental Status: She is alert. Assessment:        Primary Problem  Acute pancreatitis    Active Hospital Problems    Diagnosis Date Noted    Acute respiratory failure with hypoxia ([address] [J96.01] 08/21/2020    Hypocalcemia [E83.51] 08/21/2020    Sepsis ([address] [A41.9] 08/20/2020    Morbid obesity ([address] [E66.01]     Abdominal pain, epigastric [R10.13]     Nausea [R11.0]     Acute pancreatitis [K85.90] 08/19/2020    Fibromyalgia [M79.7]     HTN (hypertension) [I10] 12/15/2014    Major depression [F32.9] 10/30/2014    Restless leg syndrome [G25.81] 10/22/2013    Class 3 severe obesity due to excess calories with serious comorbidity in adult [facility]) [E66.01] 01/14/2013    Anxiety [F41.9] 01/14/2013       Plan:        Severe acute pancreatitis complicated by sepsis and possible ARDS  - Lipase 1,065 on admission --> 1613 -->692 today;  Amylase 435  - CT abdomen and pelvis showed pancreatic edema and fluid consistent with pancreatitis and reactive lymph nodes; repeat CT showed worsening peripancreatic inflammation but no evidence of pseudocyst  - NPO; nausea control; pain control with norco / dilaudid  - GI on board; awaiting MRCP today to r/o CBD stones  - Lipid panel pending; TGs 259 (H)  - Monitoring creatinine and urine output for possible compartment syndrome, gen surg consulted  - Alpha-1 antitrypsin mildly elevated at 208; mitochondrial antibodies and smooth muscle Ab pending    Acute hypoxic respiratory failure secondary to ARDS secondary to pancreatitis vs. Pneumonia (less likely)  - 8/19 CT abdomen and pelvis showed bilateral lower lobe consolidation which could be edema or pneumonia  - On BiPaP this AM  - ABG pH 7.382/ pCO2 41.2 / pO2 50.9 (L) / bicarb 24.5  - Pulmonology consulted  - Repeat CXR on 8/21 with streaky bilateral basilar atelectasis  - On zosyn day 2    Sepsis and lactic acidosis secondary to pancreatitis  - On 8/20 lactic acid 5.4; SIRS criteria of tachypnea, tachycardia, and elevated WBC 20.5 met  - On IV zosyn day 2  - Continue  ml/hr LR  - UA normal; blood cultures x2 done on 8/20 NGTD  - Lactic acid stable at 2.1    HTN  - Lopressor oral 25 mg daily or 5 mg IV BID  - Cardine drip changed to labetalol drip this AM due to persistent tachycardia    Hypocalcemia  - Ionized calcium 0.90, replacing    Abdominal redness  - Marked today, will continue to monitor for concern of early cellulitis vs. compartment syndrome changes  - Nasal MRSA swab pendign    Anxiety/depression  - Buspar 15 mg nightly, cymbalta 60 mg BID, quetiapine 800 mg nightly    Restless leg syndrome  - Pramipexole 0.25 mg daily    PICC line in place  Diet: NPO  DVT prophylaxis: lovenox 30 mg BID      Elton Villa MD  8/21/2020  1:44 PM

## 2020-08-21 NOTE — CONSULTS
General Surgery Consult      Pt Name: Elise Vasquez  MRN: [id number]  YOB: 1975  Date of evaluation: 8/21/2020  Primary Care Physician: Tray Marr   Patient evaluated at the request of  Dr. Yesenia Ash  Reason for evaluation: Abdominal pain    SUBJECTIVE:   History of Chief Complaint:    Elise Vasquez is a 54 y.o. female who presents with abdominal pain. Patient with previous history of cholecystectomy. Admitted with abdominal pain was diagnosed with severe pancreatitis. Patient is on BiPAP. Significant abdominal pain. Abdominal bloating. Family at the bedside. Work-up since admission reviewed. CT scan images reports of blood work all reviewed. Symptom onset has been acute for a time period of few day(s). Severity is described as severe. Course of her symptoms over time is acute. Past Medical History   has a past medical history of Anxiety, Chronic kidney disease, Depression, DVT (deep venous thrombosis) ([address] Fibromyalgia, Headache(784.0), Hx of blood clots, Kidney stone, Pancreatitis, Pleural effusion, and SVT (supraventricular tachycardia) ([address] Past Surgical History   has a past surgical history that includes Thyroid lobectomy (Right); Cholecystectomy (2001); Knee arthroscopy (Left); Foot surgery (Right); Endometrial ablation; Atrial ablation surgery; eye surgery (Bilateral); Endoscopy, colon, diagnostic; back surgery; pr cysto/uretero/pyeloscopy w/lithotripsy (Left, 9/20/2017); and EXCISION LESION HAND / FINGER (Right, 1/25/2019). Medications  Prior to Admission medications    Medication Sig Start Date End Date Taking?  Authorizing Provider   melatonin 5 MG TABS tablet Take 5 mg by mouth daily   Yes Historical Provider, MD   potassium chloride (KLOR-CON M) 10 MEQ extended release tablet Take 1 tablet by mouth 2 times daily 7/18/20  Yes Adriana Diaz MD   furosemide (LASIX) 20 MG tablet Take 1 tablet by mouth 2 times daily 7/18/20  Yes Adriana Diaz MD lamotrigine. Family History  family history includes Heart Disease in her father; High Blood Pressure in her brother. Social History   reports that she quit smoking about 8 months ago. Her smoking use included cigarettes. She smoked 1.00 pack per day. She has never used smokeless tobacco. She reports current alcohol use. She reports that she does not use drugs. Review of Systems:  Patient in some respiratory distress on BiPAP. Difficult to obtain detailed review of system at this time. Chart was reviewed. OBJECTIVE:   VITALS:  height is 5' 5\" (1.651 m) and weight is 304 lb 3.8 oz (138 kg) (abnormal). Her axillary temperature is 97.8 °F (36.6 °C). Her blood pressure is 130/74 and her pulse is 115. Her respiration is 27 and oxygen saturation is 95%. CONSTITUTIONAL: Awake alert in some distress because of respiratory issues and abdominal pain. SKIN: Skin color, texture, turgor normal. No rashes or lesions. LYMPH: no cervical nodes, no inguinal nodes  HEENT: Head is normocephalic, atraumatic. EOMI, PERRLA  NECK: Supple, symmetrical, trachea midline, no adenopathy, thyroid symmetric, not enlarged and no tenderness, skin normal  CHEST/LUNGS: Lungs decreased air entry at bases. CARDIOVASCULAR: Heart regular rate and rhythm  Tachycardia. Normal S1 and S2. . Carotid and femoral pulses 2+/4 and equal bilaterally  ABDOMEN: Distended tympanitic significant tenderness upper abdomen. No erythema. No evidence of abdominal compartment syndrome.     RECTAL: deferred, not clinically indicated  NEUROLOGIC: Deferred  EXTREMITIES: no cyanosis, no clubbing and no edema    LABS:   CBC with Differential:    Lab Results   Component Value Date    WBC 34.6 08/21/2020    RBC 4.46 08/21/2020    HGB 13.9 08/21/2020    HCT 41.7 08/21/2020     08/21/2020    MCV 93.7 08/21/2020    MCH 31.1 08/21/2020    MCHC 33.2 08/21/2020    RDW 13.9 08/21/2020    METASPCT 3 08/21/2020    LYMPHOPCT 3 08/21/2020    LYMPHOPCT 35.0 03/22/2016    MONOPCT 2 08/21/2020    MONOPCT 6.8 03/22/2016    EOSPCT 3.8 03/22/2016    BASOPCT 0 08/21/2020    BASOPCT 0.9 03/22/2016    MONOSABS 0.69 08/21/2020    MONOSABS 0.6 03/22/2016    LYMPHSABS 1.04 08/21/2020    LYMPHSABS 3.1 03/22/2016    EOSABS 0.00 08/21/2020    EOSABS 0.3 03/22/2016    BASOSABS 0.00 08/21/2020    DIFFTYPE NOT REPORTED 08/21/2020     BMP:    Lab Results   Component Value Date     08/21/2020    K 4.1 08/21/2020     08/21/2020    CO2 23 08/21/2020    BUN 9 08/21/2020    LABALBU 3.2 08/21/2020    CREATININE 0.66 08/21/2020    CALCIUM 6.5 08/21/2020    GFRAA >60 08/21/2020    LABGLOM >60 08/21/2020    GLUCOSE 147 08/21/2020     Hepatic Function Panel:    Lab Results   Component Value Date    ALKPHOS 114 08/21/2020    ALT 36 08/21/2020    AST 45 08/21/2020    PROT 6.2 08/21/2020    PROT 7.2 01/23/2015    BILITOT 0.47 08/21/2020    BILIDIR 0.14 08/20/2020    IBILI 0.21 08/20/2020    LABALBU 3.2 08/21/2020     Calcium:    Lab Results   Component Value Date    CALCIUM 6.5 08/21/2020     Magnesium:    Lab Results   Component Value Date    MG 2.1 07/18/2020     Phosphorus:  No results found for: PHOS  PT/INR:    Lab Results   Component Value Date    PROTIME 12.2 08/20/2020    INR 0.9 08/20/2020     ABG:    Lab Results   Component Value Date    PHART 7.382 08/21/2020    NKO4JQZ 41.2 08/21/2020    PO2ART 50.9 08/21/2020    YYZ6UOS 24.5 08/21/2020    [id number] 86.4 08/21/2020     Urine Culture:  No components found for: CURINE  Blood Culture:  No components found for: CBLOOD, CFUNGUSBL  Stool Culture:  No components found for: CSTOOL    RADIOLOGY:   I have personally reviewed the following films:  Ct Abdomen Pelvis Wo Contrast Additional Contrast? None    Result Date: 8/20/2020  EXAMINATION: CT OF THE ABDOMEN AND PELVIS WITHOUT CONTRAST 8/20/2020 10:22 pm TECHNIQUE: CT of the abdomen and pelvis was performed without the administration of intravenous contrast. Multiplanar reformatted images are provided for review. Dose modulation, iterative reconstruction, and/or weight based adjustment of the mA/kV was utilized to reduce the radiation dose to as low as reasonably achievable. COMPARISON: CT abdomen and pelvis with contrast August 19, 2020. HISTORY: ORDERING SYSTEM PROVIDED HISTORY: severe acute pain TECHNOLOGIST PROVIDED HISTORY: severe acute pain Is the patient pregnant? ->Yes Reason for Exam: worsening abdominal pain Acuity: Acute Type of Exam: Ongoing Relevant Medical/Surgical History: surg - gallbladder FINDINGS: Lower Chest: Persisting bilateral lower lung scattered consolidation and atelectasis is present with trace posterior left-sided pleural effusion identified. Demi Messing Heart is normal in size and configuration. Organs: Interval mild worsening of severe peripancreatic fat stranding and fluid is noted with intraperitoneal free fluid collecting within the bilateral pericolic gutters and collecting inferiorly within the pelvis with increased volume in comparison with the prior study. Diffuse fatty infiltration of the liver is again seen. The liver, gallbladder, spleen, pancreas, adrenal glands, kidneys, are otherwise unremarkable in appearance. GI/Bowel: The stomach is unremarkable without wall thickening or distention. Bowel loops are unremarkable in appearance without evidence of obstruction, distension or mucosal thickening. Pelvis: Berkowitz catheter is noted within the nondistended but grossly unremarkable urinary bladder. Bilateral fallopian tube Essure coils are seen without evidence of complication. The uterus is anteverted in position and is unremarkable in appearance there no evidence of pelvic free fluid is seen. Peritoneum/Retroperitoneum: No evidence of retroperitoneal or intraperitoneal lymphadenopathy is identified. . Bones/Soft Tissues: No evidence of retroperitoneal or intraperitoneal lymphadenopathy is identified. No evidence of intraperitoneal free fluid is seen.      1. Mild Peritoneum/Retroperitoneum: Shotty retroperitoneal lymph nodes, likely reactive. Abdominal aorta is normal in caliber. No pneumoperitoneum. Bones/Soft Tissues: No acute osseous abnormality. 1. Pancreatic edema and associated fluid is consistent with pancreatitis. No loculated fluid collection/pseudocyst. 2. Shotty retroperitoneal lymph nodes are likely reactive. 3. Hepatic steatosis. 4. Bilateral lower lobe consolidation, which could be due to edema or pneumonia. Ir Radha Fam Device Plmt/replace/removal    Result Date: 8/20/2020  PROCEDURE: ULTRASOUND GUIDED VASCULAR ACCESS. FLUOROSCOPY GUIDED PICC PLACEMENT 8/20/2020. HISTORY: ORDERING SYSTEM PROVIDED HISTORY: fluid resusitation TECHNOLOGIST PROVIDED HISTORY: fluid resusitation Lumen?->Double Lumen Is the patient pregnant? ->Yes Acuity: Unknown Type of Exam: Unknown Sepsis SEDATION: None FLUOROSCOPY TIME: DAP 63 cGy cm squared TECHNIQUE: Informed consent was obtained after a detailed explanation of the procedure including risks, benefits, and alternatives. Universal protocol was observed. The right arm was prepped and draped in sterile fashion using maximum sterile barrier technique. Local anesthesia was achieved with lidocaine. A micropuncture needle was used to access the right basilic vein using ultrasound guidance. An ultrasound image demonstrating patency of the vein with needle tip located within it. An image was obtained and stored in PACs. A 0.018 guidewire was used to place a peel-a-way sheath and a 5 [place] power injectable dual-lumen PICC was advanced with fluoroscopic guidance with the tip at the cavo-atrial junction. The catheter flushed easily and there was a good blood return. The catheter was secured to the skin. The patient tolerated the procedure well and there were no immediate complications. FINDINGS: Fluoroscopic image demonstrates the tip of the catheter at the cavo-atrial junction.      Successful ultrasound and fluoroscopy guided right basilic vein 5 [place] power injectable dual-lumen PICC placement. Ready for use. Xr Chest Portable    Result Date: 8/20/2020  EXAMINATION: ONE XRAY VIEW OF THE CHEST 8/20/2020 7:38 am COMPARISON: July 18, 2020 chest exam HISTORY: ORDERING SYSTEM PROVIDED HISTORY: possible pneumonia on CT chest TECHNOLOGIST PROVIDED HISTORY: possible pneumonia on CT chest Reason for Exam: possible pneumonia on CT chest Acuity: Acute Type of Exam: Initial Additional signs and symptoms: possible pneumonia on CT chest FINDINGS: Expiratory exam with mild streaky bibasilar density. Normal cardiopericardial silhouette No significant pleural or mediastinal findings     Expiratory exam with mild streaky bibasilar atelectasis         IMPRESSION:   1. Acute severe pancreatitis. Etiology uncertain. 2. History of cholecystectomy. 3. Respiratory distress. Blood work CT scan all reviewed. does not have any pertinent problems on file. PLAN:   1. Continue close ICU monitoring. Monitor urine output. IV hydration. PICC line/TPN. If patient's WBC count remains elevated we will repeat her CT scan with IV contrast tomorrow looking for any complications from pancreatitis. Overall prognosis guarded given the overall clinical picture. Family updated. Discussed with nursing staff. If CT scan shows evidence of pancreatic necrosis or sign of infection patient may have to be transferred to tertiary care center. Thank you for this interesting consult and for allowing us to participate in the care of this patient. If you have any questions please don't hesitate to call.           Electronically signed by Lawrence Simmons MD  on 8/21/2020 at 6:32 PM

## 2020-08-21 NOTE — PROGRESS NOTES
PULMONARY PROGRESS NOTE:    REASON FOR VISIT: pancreatitis, resp failure  Interval History:  Poor historian  Shortness of Breath: +  Cough: no  Sputum: no          Hemoptysis: no  Chest Pain: no  Fever: no                   Swelling Feet: no  Headache: no                                           Nausea, Emesis, Abdominal Pain: +  Diarrhea: no         Constipation: no    Events since last visit: developed resp distress; transferred to ICU; on BIPAP    PAST MEDICAL HISTORY:      Scheduled Meds:   magnesium oxide  400 mg Oral Daily    piperacillin-tazobactam (ZOSYN) 3.375 g in dextrose 5% IVPB extended infusion (mini-bag)  3.375 g Intravenous Q8H    sodium chloride flush  10 mL Intravenous 2 times per day    enoxaparin  30 mg Subcutaneous BID    QUEtiapine  800 mg Oral Nightly    pramipexole  0.25 mg Oral Daily    pantoprazole  40 mg Intravenous Daily    And    sodium chloride (PF)  10 mL Intravenous Daily    busPIRone  15 mg Oral Nightly    DULoxetine  60 mg Oral BID    cetirizine  10 mg Oral Daily     Continuous Infusions:   labetalol (NORMODYNE) 1 mg/mL infusion 1 mg/min (08/21/20 1023)    lactated ringers 150 mL/hr at 08/21/20 0629     PRN Meds:perflutren lipid microspheres, promethazine (PHENERGAN) in sodium chloride 0.9% IVPB, LORazepam, sodium chloride flush, magnesium sulfate, HYDROmorphone, potassium chloride **OR** potassium alternative oral replacement **OR** potassium chloride, acetaminophen, HYDROcodone 5 mg - acetaminophen **OR** HYDROcodone 5 mg - acetaminophen, [DISCONTINUED] promethazine **OR** ondansetron, melatonin ER        PHYSICAL EXAMINATION:  /71   Pulse 116   Temp 98.5 °F (36.9 °C) (Axillary)   Resp 24   Ht 5' 5\" (1.651 m)   Wt (!) 304 lb 3.8 oz (138 kg)   SpO2 93%   BMI 50.63 kg/m²     General : on arouseable, BIPAP  Neck - supple, no lymphadenopathy, JVD not raised  Heart - regular rhythm, S1 and S2 normal; no additional sounds heard  Lungs - Air Entry- fair bilaterally; breath sounds : vesicular;   rales/crackles - absent  Abdomen - soft, no tenderness  Upper Extremities  - no cyanosis, mottling; edema : absent  Lower Extremities: no cyanosis, mottling; edema : absent    Current Laboratory, Radiologic, Microbiologic, and Diagnostic studies reviewed  Data ReviewCBC:   Recent Labs     08/20/20  0606 08/20/20 2159 08/21/20  0431   WBC 20.5* 32.9* 34.6*   RBC 5.12 4.81 4.46   HGB 15.8 15.0 13.9   HCT 47.5* 45.1 41.7    343 331     BMP:   Recent Labs     08/20/20  1943 08/20/20 2159 08/21/20  0431   GLUCOSE 139* 120* 148*   * 136 138   K 3.9 4.1 4.1   BUN 6 5* 6   CREATININE 0.50 <0.40* 0.48*   CALCIUM 7.7* 7.1* 6.8*     ABGs:   Recent Labs     08/21/20  1045   PHART 7.382   PO2ART 50.9*   CRP4FOI 41.2   [id number] 24.5   [id number] 86.4*      PT/INR:  No results found for: PTINR    ASSESSMENT / PLAN:    Acute pancreatitis- GI consulted, monitor amylase lipase, MRCP ordered   Lactic acidosis- resolved  Possible pneumonia- ABx   Diet NPO  Monitor end tidal Co2 with pain meds   acute hypoxic resp failure - O2/ NIPPV    This is a late note on patient seen by me earlier today.     Electronically signed by Corinne Herrera on 08/21/20 at 1:22 PM.

## 2020-08-21 NOTE — CONSULTS
Consult        Date of Admission:  8/19/2020  Date of Consultation:  8/20/2020      PCP:  Michael Johnson      Reason for the consult: Hypotension and tachycardia    History of Present Illness:  Paxton De Santiago is a 54 y.o. female admitted with acute pancreatitis. Patient has been having progressive severe epigastric pain with radiation to the chest area and the back. Her blood pressure have been rising with a rise of intensity of abdominal pain. Her heart rate was 130 when I came to see her with a blood pressure of 180/90. She is in severe pain and unable to give any history      Ablation of supraventricular:   has a past medical history of Anxiety, Chronic kidney disease, Depression, DVT (deep venous thrombosis) ([address] Fibromyalgia, Headache(784.0), Hx of blood clots, Kidney stone, Pancreatitis, Pleural effusion, and SVT (supraventricular tachycardia) ([address] PSH:   has a past surgical history that includes Thyroid lobectomy (Right); Cholecystectomy (2001); Knee arthroscopy (Left); Foot surgery (Right); Endometrial ablation; Atrial ablation surgery; eye surgery (Bilateral); Endoscopy, colon, diagnostic; back surgery; pr cysto/uretero/pyeloscopy w/lithotripsy (Left, 9/20/2017); and EXCISION LESION HAND / FINGER (Right, 1/25/2019). Allergies: Allergies   Allergen Reactions    Aripiprazole      Bad reaction    Eletriptan      Other reaction(s): Swelling-throat    Triptans [Sumatriptan]     Lamotrigine Rash        Home Meds:    Prior to Admission medications    Medication Sig Start Date End Date Taking?  Authorizing Provider   melatonin 5 MG TABS tablet Take 5 mg by mouth daily   Yes Historical Provider, MD   potassium chloride (KLOR-CON M) 10 MEQ extended release tablet Take 1 tablet by mouth 2 times daily 7/18/20  Yes Francesco Dee MD   furosemide (LASIX) 20 MG tablet Take 1 tablet by mouth 2 times daily 7/18/20  Yes Francesco Dee MD   QUEtiapine (SEROQUEL XR) 400 MG extended release tablet Take 800 mg by mouth nightly   Yes Historical Provider, MD   busPIRone (BUSPAR) 15 MG tablet Take 15 mg by mouth nightly Can take once in morning PRN   Yes Historical Provider, MD   omeprazole (PRILOSEC) 40 MG delayed release capsule Take 40 mg by mouth daily   Yes Historical Provider, MD   Cholecalciferol (VITAMIN D) 50 MCG (2000 UT) CAPS capsule Take by mouth daily   Yes Historical Provider, MD   albuterol sulfate HFA (PROVENTIL HFA) 108 (90 Base) MCG/ACT inhaler Inhale 2 puffs into the lungs as needed 12/28/18  Yes Historical Provider, MD   magnesium oxide (MAG-OX) 400 MG tablet Take 400 mg by mouth daily   Yes Historical Provider, MD   fexofenadine (RA ALLERGY RELIEF) 180 MG tablet take 1 tablet by mouth once daily  Patient taking differently: Take 180 mg by mouth daily as needed take 1 tablet by mouth once daily 11/7/18  Yes Lisa Fraire MD   pramipexole (MIRAPEX) 0.5 MG tablet take 1 tablet by mouth twice a day  Patient taking differently: Take 0.25 mg by mouth daily take 1 tablet by mouth twice a day 11/7/18  Yes Lisa Fraire MD   DULoxetine (CYMBALTA) 60 MG extended release capsule TAKE 1 CAPSULE BY MOUTH TWICE A DAY 8/3/18  Yes Lisa Fraire MD   ibuprofen (ADVIL) 200 MG tablet Take 2 tablets by mouth every 6 hours as needed for Pain  Patient taking differently: Take 600 mg by mouth every 6 hours as needed for Pain  9/15/17  Yes Wyatt Jiménez MD   acetaminophen (APAP EXTRA STRENGTH) 500 MG tablet Take 2 tablets by mouth every 6 hours as needed for Pain 9/15/17  Yes Wyatt Jiménez MD   metoprolol (LOPRESSOR) 25 MG tablet Take 25 mg by mouth daily  1/6/16  Yes Historical Provider, MD   butalbital-APAP-caffeine (FIORICET) -40 MG CAPS per capsule Take 1 capsule by mouth every 6 hours as needed for Headaches 5/19/20 5/26/20  Jesenia Alegria DO        [facility] Meds:    Current Facility-Administered Medications   Medication Dose Route Frequency Provider Last Rate Last Dose    magnesium oxide (MAG-OX) tablet 400 mg  400 mg Oral Daily Minal Rumpf, APRN - CNP        promethazine (PHENERGAN) 12.5 mg in sodium chloride 0.9 % 50 mL IVPB  12.5 mg Intravenous Q4H PRN Minal Rumpf, APRN - CNP   Stopped at 08/20/20 1330    piperacillin-tazobactam (ZOSYN) 3.375 g in dextrose 5 % 50 mL IVPB extended infusion (mini-bag)  3.375 g Intravenous Q8H Minal Rumpf, APRN - CNP   Stopped at 08/20/20 1736    HYDROmorphone (DILAUDID) injection 1 mg  1 mg Intravenous Q3H PRN Minal Rumpf, APRN - CNP        Or    HYDROmorphone (DILAUDID) injection 1.5 mg  1.5 mg Intravenous Q3H PRN Minal Rumpf, APRN - CNP   1.5 mg at 08/20/20 2013    LORazepam (ATIVAN) injection 0.5 mg  0.5 mg Intravenous Q6H PRN Asha Seo MD   0.5 mg at 08/20/20 1923    sodium chloride flush 0.9 % injection 10 mL  10 mL Intravenous 2 times per day Claude Slot, MD        sodium chloride flush 0.9 % injection 10 mL  10 mL Intravenous PRN Claude Slot, MD        metoprolol (LOPRESSOR) injection 5 mg  5 mg Intravenous Q12H Asha Seo MD   5 mg at 08/20/20 1317    magnesium sulfate 1 g in dextrose 5% 100 mL IVPB  1 g Intravenous PRN Asha Seo MD        lactated ringers infusion   Intravenous Continuous Minal Rumpf, APRN -  mL/hr at 08/20/20 0544      potassium chloride (KLOR-CON M) extended release tablet 40 mEq  40 mEq Oral PRN Minal Rumpf, APRN - CNP        Or    potassium bicarb-citric acid (EFFER-K) effervescent tablet 40 mEq  40 mEq Oral PRN Minal Rumpf, APRN - CNP        Or    potassium chloride 10 mEq/100 mL IVPB (Peripheral Line)  10 mEq Intravenous PRN Minal Rumpf, APRN - CNP        acetaminophen (TYLENOL) tablet 650 mg  650 mg Oral Q4H PRN Minal Rumpf, APRN - CNP        HYDROcodone-acetaminophen (NORCO) 5-325 MG per tablet 1 tablet  1 tablet Oral Q4H PRN ANIBAL Yang CNP        Or    HYDROcodone-acetaminophen (NORCO) 5-325 MG per tablet 2 tablet  2 tablet Oral Q4H PRN Leandro Velazquez, APRN - CNP        [facility]etron [facility]) injection 4 mg  4 mg Intravenous Q6H PRN Leandro Velazquez, APRN - CNP   4 mg at 20 1026    enoxaparin (LOVENOX) injection 30 mg  30 mg Subcutaneous BID Leandro Velazquez, APRN - CNP   30 mg at 20 1335    QUEtiapine (SEROQUEL XR) extended release tablet 800 mg  800 mg Oral Nightly Leandro Velazquez, APRN - CNP        pramipexole (MIRAPEX) tablet 0.25 mg  0.25 mg Oral Daily Leandro Velazquez, APRN - CNP        pantoprazole (PROTONIX) injection 40 mg  40 mg Intravenous Daily Leandro Velazquez, APRN - CNP   40 mg at 20 1348    And    sodium chloride (PF) 0.9 % injection 10 mL  10 mL Intravenous Daily Leandro Velazquez, APRN - CNP   10 mL at 20 1348    metoprolol tartrate (LOPRESSOR) tablet 25 mg  25 mg Oral Daily Leandro Velazquez, APRN - CNP        melatonin ER tablet 5 mg  5 mg Oral Nightly PRN Leandro Velazquez, APRN - CNP        busPIRone (BUSPAR) tablet 15 mg  15 mg Oral Nightly Leandro Velazquez, APRN - CNP        DULoxetine (CYMBALTA) extended release capsule 60 mg  60 mg Oral BID Leandro Velazquez, APRN - CNP        cetirizine (ZYRTEC) tablet 10 mg  10 mg Oral Daily Leandro Velazquez, APRN - CNP           Social History:    Social History     Socioeconomic History    Marital status:      Spouse name: Not on file    Number of children: Not on file    Years of education: Not on file    Highest education level: Not on file   Occupational History    Not on file   Social Needs    Financial resource strain: Not on file    Food insecurity     Worry: Not on file     Inability: Not on file    Transportation needs     Medical: Not on file     Non-medical: Not on file   Tobacco Use    Smoking status: Former Smoker     Packs/day: 1.00     Types: Cigarettes     Last attempt to quit: 2019     Years since quittin.7    Smokeless tobacco: Never Used    Tobacco comment: today last smoke   Substance and Sexual Activity    Alcohol use:  Yes     Alcohol/week: 0.0 standard drinks     Comment: rarely    Drug use: No    Sexual activity: Not on file   Lifestyle    Physical activity     Days per week: Not on file     Minutes per session: Not on file    Stress: Not on file   Relationships    Social connections     Talks on phone: Not on file     Gets together: Not on file     Attends Yazdanism service: Not on file     Active member of club or organization: Not on file     Attends meetings of clubs or organizations: Not on file     Relationship status: Not on file    Intimate partner violence     Fear of current or ex partner: Not on file     Emotionally abused: Not on file     Physically abused: Not on file     Forced sexual activity: Not on file   Other Topics Concern    Not on file   Social History Narrative    Not on file      Family History:   Family History   Problem Relation Age of Onset    Heart Disease Father     High Blood Pressure Brother        Review of Systems:   · Patient unable to give any review of system because of severe pain     Physical Exam   Vital Signs: BP (!) 150/100   Pulse 140   Temp 97.4 °F (36.3 °C) (Oral)   Resp 22   Ht 5' 5\" (1.651 m)   Wt (!) 300 lb 4.3 oz (136.2 kg)   SpO2 93%   BMI 49.97 kg/m²  O2 Flow Rate (L/min): 2 L/min     Admission Weight: 298 lb 15.1 oz (135.6 kg)     General appearance: Severely agitated and severe abdominal pain  Skin:  Warm and Dry to touch    Head: Normocephalic, without obvious abnormality, atraumatic    Eyes: Conjunctivae unremarkable, EOM's intact. Neck: No JVD, No carotid bruit. Neck supple, trachea midline    Lungs: Clear to ausculation bilaterally, no use of accessory muscles. Heart: Normal first and second heart sound    Abdomen: Diffusely tender    Extremities: No edema    Neurologic: Oriented to time, person and place, affect appropriate. No focal/major motor defects noted.       Psychiatric:  Appropriate mood, memory and judgment      Pertinent Testing:     Echocardiogram  Summary  Technically difficult study. Left ventricle is normal size. Mild concentric left ventricular hypertrophy. The endocardial borders are poorly defined in this study  Global left ventricular systolic function is normal. Estimated LV EF 65-70 %  Normal right ventricular size and function. No significant valvular regurgitation or stenosis seen. EKG:      CXR:      Labs:      CBC:   Recent Labs     08/19/20 2002 08/20/20 0606   WBC 11.8* 20.5*   HGB 13.6 15.8   HCT 39.7 47.5*   MCV 92.0 92.7    365     BMP:   Recent Labs     08/19/20 2002 08/20/20 1943    132*   K 4.6 3.9    99   CO2 27 18*   BUN 9 6   CREATININE 0.57 0.50     PT/INR:   Recent Labs     08/20/20 0606   PROTIME 12.2   INR 0.9     APTT:   Recent Labs     08/20/20 0606   APTT 24.4     MAG: No results for input(s): MG in the last 72 hours. D Dimer: No results for input(s): DDIMER in the last 72 hours. Troponin T   Recent Labs     08/19/20 2002 08/20/20 1943   TROPONINT NOT REPORTED NOT REPORTED      Pro-BNP No results for input(s): PROBNP in the last 72 hours. Impression  Severe hypertension with tachycardia appears to be related to severe abdominal pain. Minor nonspecific EKG changes which were present on previous EKG  Acute pancreatitis with worsening abdominal pain. .  Patient had a previous history of pancreatitis  History of longstanding hypertension left ventricular hypertrophy  History of supraventricular tachycardia and ablation.   Changes on the x-ray which is probably related to her acute pancreatitis  Recommendations  Patient going now for a repeat CT scan of the abdomen  Patient need much better pain control  Use IV Lopressor for blood pressure heart rate control if blood pressure still significantly elevated recommended to start Cardene drip  Clinicals or radiological evidence to suggest aortic dissection or acute  Myocardial infarction      Electronically signed by Trish Parikh MD on 8/20/2020 at 10:11 PM       This note was created with the assistance of a speech-recognition program.  Although the intention is to generate a document that actually reflects the content of the visit, no guarantees can be provided that every mistake has been identified and corrected by editing.     Patria Rosado MD [facility]

## 2020-08-21 NOTE — PROGRESS NOTES
Patient remains on BIPAP at high settings, unable to wean oxygen for MRI.  Attempted to notify Dr. Darcy Lee

## 2020-08-21 NOTE — PROGRESS NOTES
Dr. Carlos Mail updated that patient has not had any ABGs. Patient lethargic and on continuous BIPAP. Initial FIO2 on BIPAP this morning was 80%. RT has since made changes to decrease FIO2 to 60%. Order received for STAT ABG and STAT portable chest x-ray.

## 2020-08-21 NOTE — PROGRESS NOTES
Spoke with Nurse Roberth Simmons) from ICU - No screening form at this time. Patient has been NPO and has a CT abdomen/pelvis ordered (STAT). Nurse notified that MRI will be completed tomorrow once screening form is received (10:15pm).

## 2020-08-21 NOTE — CARE COORDINATION
Dr Tammi Nye updated on increase lactic,& WBC, decreased CA, continued acute pain. Dr Karel Camacho suggestions, and CT results. Orders received. RN updated. Pt has denied chest pain this afternoon and evening for us.

## 2020-08-21 NOTE — PLAN OF CARE
Problem: Falls - Risk of:  Goal: Will remain free from falls  Description: Will remain free from falls  8/21/2020 0526 by Edmundo Ferguson RN  Outcome: Ongoing  8/20/2020 1640 by Janell Jasso RN  Outcome: Ongoing  Goal: Absence of physical injury  Description: Absence of physical injury  8/21/2020 0526 by Edmundo Ferguson RN  Outcome: Ongoing  8/20/2020 1640 by Janell Jasso RN  Outcome: Ongoing     Problem: Pain:  Description: Pain management should include both nonpharmacologic and pharmacologic interventions.   Goal: Pain level will decrease  Description: Pain level will decrease  8/21/2020 0526 by Edmundo Ferguson RN  Outcome: Ongoing  Goal: Control of acute pain  Description: Control of acute pain  8/21/2020 0526 by Edmundo Ferguson RN  Outcome: Ongoing  Goal: Control of chronic pain  Description: Control of chronic pain  8/21/2020 0526 by Edmundo Ferguson RN  Outcome: Ongoing    Problem: Nausea/Vomiting:  Goal: Absence of nausea/vomiting  Description: Absence of nausea/vomiting  8/21/2020 0526 by Edmundo Ferguson RN  Outcome: Ongoing  Goal: Able to drink  Description: Able to drink  8/21/2020 0526 by Edmundo Ferguson RN  Outcome: Ongoing  Goal: Able to eat  Description: Able to eat  8/21/2020 0526 by Edmundo Ferguson RN  Outcome: Ongoing  Goal: Ability to achieve adequate nutritional intake will improve  Description: Ability to achieve adequate nutritional intake will improve  8/21/2020 0526 by Edmundo Ferguson RN  Outcome: Ongoing     Problem: Gas Exchange - Impaired:  Goal: Levels of oxygenation will improve  Description: Levels of oxygenation will improve  8/21/2020 0526 by Edmundo Ferguson RN  Outcome: Ongoing     Problem: Infection, Septic Shock:  Goal: Will show no infection signs and symptoms  Description: Will show no infection signs and symptoms  8/21/2020 0526 by Edmundo Ferguson RN  Outcome: Ongoing     Problem: Serum Glucose Level - Abnormal:  Goal: Ability to maintain appropriate glucose levels will

## 2020-08-21 NOTE — PROGRESS NOTES
Comprehensive Nutrition Assessment    Type and Reason for Visit:  Initial    Nutrition Recommendations/Plan: Continue NPO as ordered. Nutrition Assessment:  Patient to ER with severe abdominal pain, nausea, and vomiting diagnosed with acute Pancreatitis. Lipase elevated at 1,613 & amylase 435, , WBC-34.6; pt transferred to ICU for closer monitoring. Pt currently NPO with Rn noting she is lethargic and on continuous bipap. GI ordered MRCP.     Malnutrition Assessment:  Malnutrition Status:  No malnutrition    Context:  Acute Illness     Findings of the 6 clinical characteristics of malnutrition:  Energy Intake:  No significant decrease in energy intake  Weight Loss:  No significant weight loss     Body Fat Loss:  No significant body fat loss     Muscle Mass Loss:  No significant muscle mass loss    Fluid Accumulation:  No significant fluid accumulation     Strength:  Not Performed    Estimated Daily Nutrient Needs:  Energy (kcal):  2020 kcal based on  Luce-St. Jeor equation using adm wt 138 kg; Weight Used for Energy Requirements:  Admission     Protein (g):  102-113 gm protein based on 1.8-2 gm/kg IBW; Weight Used for Protein Requirements:  Ideal             Nutrition Related Findings:  GI: severe abdominal pain, nausea, vomiting      Wounds:  None       Current Nutrition Therapies:    Diet NPO Effective Now Exceptions are: Ice Chips    Anthropometric Measures:  · Height: 5' 5\" (165.1 cm)  · Current Body Weight: 304 lb (137.9 kg)   · Admission Body Weight: 304 lb (137.9 kg)    · Ideal Body Weight: 125 lbs; % Ideal Body Weight 243.2 %   · BMI: 50.6  · BMI Categories: Obese Class 3 (BMI 40.0 or greater)       Nutrition Diagnosis:   · Inadequate oral intake related to altered GI function as evidenced by NPO or clear liquid status due to medical condition, nausea, vomiting      Nutrition Interventions:   Food and/or Nutrient Delivery:  Continue NPO  Nutrition Education/Counseling:  No recommendation at this time   Coordination of Nutrition Care:  Continued Inpatient Monitoring    Goals:  Meet 75% of nutrient needs with PO diet       Nutrition Monitoring and Evaluation:   Food/Nutrient Intake Outcomes:  Diet Advancement/Tolerance  Physical Signs/Symptoms Outcomes:  Biochemical Data, GI Status, Nausea or Vomiting, Fluid Status or Edema, Hemodynamic Status, Weight     Discharge Planning: Too soon to determine     Some areas of assessment may be incomplete due to COVID-19 precautions. Dakota Reza R.D., L.D.   Clinical Dietitian  Office: [phone]

## 2020-08-21 NOTE — PROGRESS NOTES
Dr Adrian Coon called and wanted to add on a ionized calcium, asked for RNs to make Dr Keith Waddell aware today.

## 2020-08-21 NOTE — PLAN OF CARE
Problem: Falls - Risk of:  Goal: Will remain free from falls  Description: Will remain free from falls  8/21/2020 0815 by Joshua Clark  Outcome: Ongoing  Pt remained free from falls this shift. 2/4 side rails up, bed wheels locked and in lowest position, and proper safety measures put in place.      Problem: Falls - Risk of:  Goal: Absence of physical injury  Description: Absence of physical injury  8/21/2020 1901 by Joshua Clark  Outcome: Ongoing      Problem: Pain:  Goal: Pain level will decrease  Description: Pain level will decrease  8/21/2020 1901 by Joshua Clark  Outcome: Ongoing

## 2020-08-21 NOTE — PROGRESS NOTES
GI Progress notes    8/21/2020   12:17 PM    Name:  Joao Garza  MRN:    [id number]     Acct:     [de-identified]   Room:  2003/2003-01  IP Day: 2     Admit Date: 8/19/2020  7:35 PM  PCP: Jason Espinal    Subjective:     C/C:   Chief Complaint   Patient presents with    Abdominal Pain     RUQ       Interval History: Status: worsened. Patient seen and examined. Patient transferred to ICU. Currently on BiPAP  Patient alert but lethargic. Unable to answer questions. LFTs and lipase improved. Abdominal pain appears to be improved. UOP satisfactory per RN. Labs and progress notes reviewed. ROS:  Unable to assess: Lethargy    Medications: Allergies:    Allergies   Allergen Reactions    Aripiprazole      Bad reaction    Eletriptan      Other reaction(s): Swelling-throat    Triptans [Sumatriptan]     Lamotrigine Rash       Current Meds: perflutren lipid microspheres (DEFINITY) injection 2.2 mg, ONCE PRN  labetalol (NORMODYNE;TRANDATE) 300 mg in dextrose 5 % 300 mL infusion, Continuous  magnesium oxide (MAG-OX) tablet 400 mg, Daily  promethazine (PHENERGAN) 12.5 mg in sodium chloride 0.9 % 50 mL IVPB, Q4H PRN  piperacillin-tazobactam (ZOSYN) 3.375 g in dextrose 5 % 50 mL IVPB extended infusion (mini-bag), Q8H  LORazepam (ATIVAN) injection 0.5 mg, Q6H PRN  sodium chloride flush 0.9 % injection 10 mL, 2 times per day  sodium chloride flush 0.9 % injection 10 mL, PRN  magnesium sulfate 1 g in dextrose 5% 100 mL IVPB, PRN  HYDROmorphone (DILAUDID) injection 1.5 mg, Q2H PRN  lactated ringers infusion, Continuous  potassium chloride (KLOR-CON M) extended release tablet 40 mEq, PRN    Or  potassium bicarb-citric acid (EFFER-K) effervescent tablet 40 mEq, PRN    Or  potassium chloride 10 mEq/100 mL IVPB (Peripheral Line), PRN  acetaminophen (TYLENOL) tablet 650 mg, Q4H PRN  HYDROcodone-acetaminophen (NORCO) 5-325 MG per tablet 1 tablet, Q4H PRN    Or  HYDROcodone-acetaminophen (NORCO) 5-325 MG per tablet 2 tablet, Q4H PRN  ondansetron (ZOFRAN) injection 4 mg, Q6H PRN  enoxaparin (LOVENOX) injection 30 mg, BID  QUEtiapine (SEROQUEL XR) extended release tablet 800 mg, Nightly  pramipexole (MIRAPEX) tablet 0.25 mg, Daily  pantoprazole (PROTONIX) injection 40 mg, Daily    And  sodium chloride (PF) 0.9 % injection 10 mL, Daily  melatonin ER tablet 5 mg, Nightly PRN  busPIRone (BUSPAR) tablet 15 mg, Nightly  DULoxetine (CYMBALTA) extended release capsule 60 mg, BID  cetirizine (ZYRTEC) tablet 10 mg, Daily        Data:     Code Status:  Full Code    Family History   Problem Relation Age of Onset    Heart Disease Father     High Blood Pressure Brother        Social History     Socioeconomic History    Marital status:      Spouse name: Not on file    Number of children: Not on file    Years of education: Not on file    Highest education level: Not on file   Occupational History    Not on file   Social Needs    Financial resource strain: Not on file    Food insecurity     Worry: Not on file     Inability: Not on file    Transportation needs     Medical: Not on file     Non-medical: Not on file   Tobacco Use    Smoking status: Former Smoker     Packs/day: 1.00     Types: Cigarettes     Last attempt to quit: 2019     Years since quittin.7    Smokeless tobacco: Never Used    Tobacco comment: today last smoke   Substance and Sexual Activity    Alcohol use:  Yes     Alcohol/week: 0.0 standard drinks     Comment: rarely    Drug use: No    Sexual activity: Not on file   Lifestyle    Physical activity     Days per week: Not on file     Minutes per session: Not on file    Stress: Not on file   Relationships    Social connections     Talks on phone: Not on file     Gets together: Not on file     Attends Latter day service: Not on file     Active member of club or organization: Not on file     Attends meetings of clubs or organizations: Not on file     Relationship status: Not on file    Intimate partner violence     Fear of current or ex partner: Not on file     Emotionally abused: Not on file     Physically abused: Not on file     Forced sexual activity: Not on file   Other Topics Concern    Not on file   Social History Narrative    Not on file       Vitals:  /71   Pulse 116   Temp 98.5 °F (36.9 °C) (Axillary)   Resp 24   Ht 5' 5\" (1.651 m)   Wt (!) 304 lb 3.8 oz (138 kg)   SpO2 93%   BMI 50.63 kg/m²   Temp (24hrs), Av.6 °F (36.4 °C), Min:96.5 °F (35.8 °C), Max:98.5 °F (36.9 °C)    No results for input(s): POCGLU in the last 72 hours. I/O (24Hr):     Intake/Output Summary (Last 24 hours) at 2020 1217  Last data filed at 2020 0448  Gross per 24 hour   Intake 3886.83 ml   Output 1400 ml   Net 2486.83 ml       Labs:      CBC:   Lab Results   Component Value Date    WBC 34.6 2020    RBC 4.46 2020    HGB 13.9 2020    HCT 41.7 2020    MCV 93.7 2020    MCH 31.1 2020    MCHC 33.2 2020    RDW 13.9 2020     2020    MPV 10.0 2020     CBC with Differential:    Lab Results   Component Value Date    WBC 34.6 2020    RBC 4.46 2020    HGB 13.9 2020    HCT 41.7 2020     2020    MCV 93.7 2020    MCH 31.1 2020    MCHC 33.2 2020    RDW 13.9 2020    METASPCT 3 2020    LYMPHOPCT 3 2020    LYMPHOPCT 35.0 2016    MONOPCT 2 2020    MONOPCT 6.8 2016    EOSPCT 3.8 2016    BASOPCT 0 2020    BASOPCT 0.9 2016    MONOSABS 0.69 2020    MONOSABS 0.6 2016    LYMPHSABS 1.04 2020    LYMPHSABS 3.1 2016    EOSABS 0.00 2020    EOSABS 0.3 2016    BASOSABS 0.00 2020    DIFFTYPE NOT REPORTED 2020     Hemoglobin/Hematocrit:    Lab Results   Component Value Date    HGB 13.9 2020    HCT 41.7 2020     CMP:    Lab Results   Component Value Date     2020    K 4.1 2020    CL [facility]) [facility]        Plan:        1. Acute pancreatitis, unknown etiology  1. IV hydration  2. I&O  3. NPO  4. MRCP when able  5. Lipase improved 692 --> 1613  6. Triglycerides 259  7. Follow-up liver enzymes, amylase, lipase  8. CT reviewed:  Severe peripancreatic inflammation; no evidence of pseudocyst noted  9. Pain control per primary  10. Antiemetics as needed  11.  Supportive, symptomatic care    Explained to the patient and d/W Nursing Staff  Will F/U with you  Please call or Page for any issues or change in status  Thanks    Electronically signed by ANIBAL Allan NP on 8/21/2020 at 12:17 PM

## 2020-08-21 NOTE — PROGRESS NOTES
Physician Progress Note      Anurag Acuña  CSN #:                  [id number]  :                       1975  ADMIT DATE:       2020 7:35 PM  [address] [place] DATE:  RESPONDING  PROVIDER #:        Fredy Sanders MD          QUERY TEXT:    Internal Medicine,    Pt admitted with Acute pancreatitis. Pt noted to have streaky bibasilar   atelectasis on CXR, also documented Patient does have a bilateral basilar   infiltrate. If possible, please document in the progress notes and discharge   summary if you are evaluating and/or treating any of the following: The medical record reflects the following:  Risk Factors: Acute pancreatitis, Obesity  Clinical Indicators: Lactic acid 5.4, WBC 20.4, streaky bibasilar atelectasis   on CXR, also documented Patient does have a bilateral basilar infiltrate  Treatment: Pulmonary consulted, IV ATB's, lab monitoring, imaging, IVF    Thank You! Feliciano Jolly RN  RN Clinical   (V) [phone] (I) [phone]  Options provided:  -- Gram negative pneumonia  -- Gram positive pneumonia  -- Bacterial pneumonia  -- Viral pneumonia  -- Other - I will add my own diagnosis  -- Disagree - Not applicable / Not valid  -- Disagree - Clinically unable to determine / Unknown  -- Refer to Clinical Documentation Reviewer    PROVIDER RESPONSE TEXT:    Basilar infiltrates due to acute pancreatitis associated lung involvement .     Query created by: Chayo Vela on 2020 6:50 AM      Electronically signed by:  Fredy Sanders MD 2020 9:54 AM

## 2020-08-21 NOTE — PROGRESS NOTES
Pt transferred to ICU 3. Pt. Attached to B/P, Cardiac, O2 sat, ETCO2 monitors. Pt. Calling out in pain, dilaudid prn given as ordered.

## 2020-08-21 NOTE — PROGRESS NOTES
[facility])        Progress Note    2020 6:07 AM      Subjective:  Ms. Karla Osei is better this morning. She is sleeping had severe abdominal pain yesterday              LABS:     CBC:   Recent Labs     20   WBC 20.5* 32.9* 34.6*   HGB 15.8 15.0 13.9   HCT 47.5* 45.1 41.7   MCV 92.7 93.6 93.7    343 331     BMP:   Recent Labs     20   * 136 138   K 3.9 4.1 4.1   CL 99 103 104   CO2 18* 20 23   BUN 6 5* 6   CREATININE 0.50 <0.40* 0.48*     PT/INR:   Recent Labs     20   PROTIME 12.2   INR 0.9     APTT:   Recent Labs     20   APTT 24.4     MAG: No results for input(s): MG in the last 72 hours. D Dimer: No results for input(s): DDIMER in the last 72 hours. Troponin:   Recent Labs     20   TROPONINT NOT REPORTED NOT REPORTED     PROBNP No results for input(s): PROBNP in the last 72 hours. Pulse Ox:  SpO2  Av.4 %  Min: 88 %  Max: 100 %  Supplemental O2: O2 Flow Rate (L/min): 15 L/min     Current Meds:    magnesium oxide  400 mg Oral Daily    piperacillin-tazobactam (ZOSYN) 3.375 g in dextrose 5% IVPB extended infusion (mini-bag)  3.375 g Intravenous Q8H    sodium chloride flush  10 mL Intravenous 2 times per day    metoprolol  5 mg Intravenous Q6H    enoxaparin  30 mg Subcutaneous BID    QUEtiapine  800 mg Oral Nightly    pramipexole  0.25 mg Oral Daily    pantoprazole  40 mg Intravenous Daily    And    sodium chloride (PF)  10 mL Intravenous Daily    busPIRone  15 mg Oral Nightly    DULoxetine  60 mg Oral BID    cetirizine  10 mg Oral Daily     Continuous Infusions:    niCARdipine 6 mg/hr (20 7568)    lactated ringers 150 mL/hr at 20 0448          VITAL SIGNS:    /71   Pulse 116   Temp 97.7 °F (36.5 °C) (Axillary)   Resp 23   Ht 5' 5\" (1.651 m)   Wt (!) 304 lb 3.8 oz (138 kg)   SpO2 93%   BMI 50.63 kg/m²  15 L/min      Admit Weight:  298 lb 15.1 oz (135.6 kg)    Last 3 weights: Wt Readings from Last 3 Encounters:   08/21/20 (!) 304 lb 3.8 oz (138 kg)   07/18/20 293 lb (132.9 kg)   06/25/20 290 lb (131.5 kg)       BMI: Body mass index is 50.63 kg/m². INPUT/OUTPUT:          Intake/Output Summary (Last 24 hours) at 8/21/2020 0607  Last data filed at 8/21/2020 0448  Gross per 24 hour   Intake 3886.83 ml   Output 1400 ml   Net 2486.83 ml        TELEMETRY {sinus tachycardia    EXAM:     General appearance: Sleeping  Lungs: no rhonchi, no wheezes, no rales  Heart: S1 and S2 no murmur  Abdomen: Tender abdomen   extremities: warm and dry, no cyanosis, no clubbing      Principal Problem:    Acute pancreatitis  Active Problems:    Anxiety    Class 3 severe obesity due to excess calories with serious comorbidity in adult [facility])    Restless leg syndrome    Major depression    HTN (hypertension)    Fibromyalgia    Sepsis ([address]    Morbid obesity (HCC)    Abdominal pain, epigastric    Nausea  Resolved Problems:    * No resolved hospital problems. *      Assessment:  1. Asked to see the patient for severe hypertension and tachycardia which seem related to severe abdominal pain. Since control of the pain the blood pressure is better heart rate still up. 2.  Severe pancreatitis severe abdominal pain. 3.  Longstanding hypertension. 4.  History of supraventricular tachycardia and ablation    Recommendations:  Continue IV Lopressor  Consider Cardene IV if blood pressure starts to rise  Need to continue pain control  Echocardiogram today    Electronically signed by Raheem Sorenson MD on 8/21/2020 at 6:07 AM     This note was created with the assistance of a speech-recognition program.  Although the intention is to generate a document that actually reflects the content of the visit, no guarantees can be provided that every mistake has been identified and corrected by editing.     Gurjit Avila MD [address] [facility]

## 2020-08-21 NOTE — CARE COORDINATION
Spoke to Dr Anisa Hernandez at length regarding pain, VS, general condition, etc. Orders received. All results to be called to her when resulted tonight.

## 2020-08-22 ENCOUNTER — APPOINTMENT (OUTPATIENT)
Dept: GENERAL RADIOLOGY | Age: 45
DRG: 871 | End: 2020-08-22
Payer: COMMERCIAL

## 2020-08-22 LAB
ABSOLUTE BANDS #: 4.83 K/UL (ref 0–1)
ABSOLUTE EOS #: 0 K/UL (ref 0–0.4)
ABSOLUTE IMMATURE GRANULOCYTE: ABNORMAL K/UL (ref 0–0.3)
ABSOLUTE LYMPH #: 1.27 K/UL (ref 1–4.8)
ABSOLUTE MONO #: 0.25 K/UL (ref 0.1–1.3)
ALBUMIN SERPL-MCNC: 2.9 G/DL (ref 3.5–5.2)
ALBUMIN/GLOBULIN RATIO: ABNORMAL (ref 1–2.5)
ALLEN TEST: ABNORMAL
ALP BLD-CCNC: 107 U/L (ref 35–104)
ALT SERPL-CCNC: 34 U/L (ref 5–33)
ANION GAP SERPL CALCULATED.3IONS-SCNC: 8 MMOL/L (ref 9–17)
AST SERPL-CCNC: 42 U/L
BANDS: 19 % (ref 0–10)
BASOPHILS # BLD: 0 % (ref 0–2)
BASOPHILS ABSOLUTE: 0 K/UL (ref 0–0.2)
BILIRUB SERPL-MCNC: 0.46 MG/DL (ref 0.3–1.2)
BUN BLDV-MCNC: 14 MG/DL (ref 6–20)
BUN/CREAT BLD: ABNORMAL (ref 9–20)
CALCIUM IONIZED: 0.94 MMOL/L (ref 1.13–1.33)
CALCIUM SERPL-MCNC: 6.8 MG/DL (ref 8.6–10.4)
CARBOXYHEMOGLOBIN: 1.1 % (ref 0–5)
CHLORIDE BLD-SCNC: 102 MMOL/L (ref 98–107)
CO2: 26 MMOL/L (ref 20–31)
CREAT SERPL-MCNC: 0.77 MG/DL (ref 0.5–0.9)
DIFFERENTIAL TYPE: ABNORMAL
DIRECT EXAM: NORMAL
EOSINOPHILS RELATIVE PERCENT: 0 % (ref 0–4)
FIO2: 50
GFR AFRICAN AMERICAN: >60 ML/MIN
GFR NON-AFRICAN AMERICAN: >60 ML/MIN
GFR SERPL CREATININE-BSD FRML MDRD: ABNORMAL ML/MIN/{1.73_M2}
GFR SERPL CREATININE-BSD FRML MDRD: ABNORMAL ML/MIN/{1.73_M2}
GLUCOSE BLD-MCNC: 151 MG/DL (ref 70–99)
HCO3 ARTERIAL: 26.6 MMOL/L (ref 22–26)
HCT VFR BLD CALC: 35.7 % (ref 36–46)
HEMOGLOBIN: 11.5 G/DL (ref 12–16)
IMMATURE GRANULOCYTES: ABNORMAL %
LACTIC ACID: 1.3 MMOL/L (ref 0.5–2.2)
LACTIC ACID: 1.6 MMOL/L (ref 0.5–2.2)
LACTIC ACID: 1.6 MMOL/L (ref 0.5–2.2)
LIPASE: 273 U/L (ref 13–60)
LYMPHOCYTES # BLD: 5 % (ref 24–44)
Lab: NORMAL
MCH RBC QN AUTO: 30.8 PG (ref 26–34)
MCHC RBC AUTO-ENTMCNC: 32.3 G/DL (ref 31–37)
MCV RBC AUTO: 95.1 FL (ref 80–100)
METHEMOGLOBIN: 0.7 % (ref 0–1.9)
MODE: ABNORMAL
MONOCYTES # BLD: 1 % (ref 1–7)
MORPHOLOGY: ABNORMAL
NEGATIVE BASE EXCESS, ART: ABNORMAL MMOL/L (ref 0–2)
NOTIFICATION TIME: ABNORMAL
NOTIFICATION: ABNORMAL
NRBC AUTOMATED: ABNORMAL PER 100 WBC
O2 DEVICE/FLOW/%: ABNORMAL
O2 SAT, ARTERIAL: 95.1 % (ref 95–98)
OXYHEMOGLOBIN: ABNORMAL % (ref 95–98)
PATIENT TEMP: 37
PCO2 ARTERIAL: 44.6 MMHG (ref 35–45)
PCO2, ART, TEMP ADJ: ABNORMAL (ref 35–45)
PDW BLD-RTO: 14.3 % (ref 11.5–14.9)
PEEP/CPAP: ABNORMAL
PH ARTERIAL: 7.38 (ref 7.35–7.45)
PH, ART, TEMP ADJ: ABNORMAL (ref 7.35–7.45)
PLATELET # BLD: 234 K/UL (ref 150–450)
PLATELET ESTIMATE: ABNORMAL
PMV BLD AUTO: 9.8 FL (ref 6–12)
PO2 ARTERIAL: 80 MMHG (ref 80–100)
PO2, ART, TEMP ADJ: ABNORMAL MMHG (ref 80–100)
POSITIVE BASE EXCESS, ART: 1.5 MMOL/L (ref 0–2)
POTASSIUM SERPL-SCNC: 3.8 MMOL/L (ref 3.7–5.3)
PSV: ABNORMAL
PT. POSITION: ABNORMAL
RBC # BLD: 3.75 M/UL (ref 4–5.2)
RBC # BLD: ABNORMAL 10*6/UL
RESPIRATORY RATE: 21
SAMPLE SITE: ABNORMAL
SEG NEUTROPHILS: 75 % (ref 36–66)
SEGMENTED NEUTROPHILS ABSOLUTE COUNT: 19.05 K/UL (ref 1.3–9.1)
SET RATE: 16
SODIUM BLD-SCNC: 136 MMOL/L (ref 135–144)
SPECIMEN DESCRIPTION: NORMAL
TEXT FOR RESPIRATORY: ABNORMAL
TOTAL HB: ABNORMAL G/DL (ref 12–16)
TOTAL PROTEIN: 5.8 G/DL (ref 6.4–8.3)
TOTAL RATE: 21
VT: ABNORMAL
WBC # BLD: 25.4 K/UL (ref 3.5–11)
WBC # BLD: ABNORMAL 10*3/UL

## 2020-08-22 PROCEDURE — 6360000002 HC RX W HCPCS: Performed by: STUDENT IN AN ORGANIZED HEALTH CARE EDUCATION/TRAINING PROGRAM

## 2020-08-22 PROCEDURE — 2580000003 HC RX 258: Performed by: STUDENT IN AN ORGANIZED HEALTH CARE EDUCATION/TRAINING PROGRAM

## 2020-08-22 PROCEDURE — 80053 COMPREHEN METABOLIC PANEL: CPT

## 2020-08-22 PROCEDURE — 82330 ASSAY OF CALCIUM: CPT

## 2020-08-22 PROCEDURE — 6360000002 HC RX W HCPCS: Performed by: NURSE PRACTITIONER

## 2020-08-22 PROCEDURE — 6360000002 HC RX W HCPCS: Performed by: SURGERY

## 2020-08-22 PROCEDURE — 2000000000 HC ICU R&B

## 2020-08-22 PROCEDURE — 2700000000 HC OXYGEN THERAPY PER DAY

## 2020-08-22 PROCEDURE — 94761 N-INVAS EAR/PLS OXIMETRY MLT: CPT

## 2020-08-22 PROCEDURE — 2500000003 HC RX 250 WO HCPCS: Performed by: STUDENT IN AN ORGANIZED HEALTH CARE EDUCATION/TRAINING PROGRAM

## 2020-08-22 PROCEDURE — 2580000003 HC RX 258: Performed by: NURSE PRACTITIONER

## 2020-08-22 PROCEDURE — APPNB30 APP NON BILLABLE TIME 0-30 MINS: Performed by: NURSE PRACTITIONER

## 2020-08-22 PROCEDURE — 2500000003 HC RX 250 WO HCPCS: Performed by: INTERNAL MEDICINE

## 2020-08-22 PROCEDURE — 94660 CPAP INITIATION&MGMT: CPT

## 2020-08-22 PROCEDURE — 85025 COMPLETE CBC W/AUTO DIFF WBC: CPT

## 2020-08-22 PROCEDURE — 6360000002 HC RX W HCPCS: Performed by: INTERNAL MEDICINE

## 2020-08-22 PROCEDURE — 82805 BLOOD GASES W/O2 SATURATION: CPT

## 2020-08-22 PROCEDURE — 99233 SBSQ HOSP IP/OBS HIGH 50: CPT | Performed by: INTERNAL MEDICINE

## 2020-08-22 PROCEDURE — C9113 INJ PANTOPRAZOLE SODIUM, VIA: HCPCS | Performed by: NURSE PRACTITIONER

## 2020-08-22 PROCEDURE — 71045 X-RAY EXAM CHEST 1 VIEW: CPT

## 2020-08-22 PROCEDURE — 99232 SBSQ HOSP IP/OBS MODERATE 35: CPT | Performed by: INTERNAL MEDICINE

## 2020-08-22 PROCEDURE — 2580000003 HC RX 258: Performed by: INTERNAL MEDICINE

## 2020-08-22 PROCEDURE — 36600 WITHDRAWAL OF ARTERIAL BLOOD: CPT

## 2020-08-22 PROCEDURE — 82787 IGG 1 2 3 OR 4 EACH: CPT

## 2020-08-22 PROCEDURE — 36415 COLL VENOUS BLD VENIPUNCTURE: CPT

## 2020-08-22 PROCEDURE — 83690 ASSAY OF LIPASE: CPT

## 2020-08-22 RX ADMIN — PIPERACILLIN SODIUM AND TAZOBACTAM SODIUM 3.38 G: 3; .375 INJECTION, POWDER, LYOPHILIZED, FOR SOLUTION INTRAVENOUS at 05:38

## 2020-08-22 RX ADMIN — CALCIUM GLUCONATE 2 G: 98 INJECTION, SOLUTION INTRAVENOUS at 15:01

## 2020-08-22 RX ADMIN — SODIUM CHLORIDE, POTASSIUM CHLORIDE, SODIUM LACTATE AND CALCIUM CHLORIDE: 600; 310; 30; 20 INJECTION, SOLUTION INTRAVENOUS at 23:47

## 2020-08-22 RX ADMIN — Medication 10 ML: at 09:49

## 2020-08-22 RX ADMIN — DEXMEDETOMIDINE 1.2 MCG/KG/HR: 100 INJECTION, SOLUTION, CONCENTRATE INTRAVENOUS at 14:49

## 2020-08-22 RX ADMIN — HYDROMORPHONE HYDROCHLORIDE 1.5 MG: 1 INJECTION, SOLUTION INTRAMUSCULAR; INTRAVENOUS; SUBCUTANEOUS at 08:34

## 2020-08-22 RX ADMIN — HYDROMORPHONE HYDROCHLORIDE 1.5 MG: 1 INJECTION, SOLUTION INTRAMUSCULAR; INTRAVENOUS; SUBCUTANEOUS at 02:16

## 2020-08-22 RX ADMIN — PANTOPRAZOLE SODIUM 40 MG: 40 INJECTION, POWDER, FOR SOLUTION INTRAVENOUS at 09:49

## 2020-08-22 RX ADMIN — DEXMEDETOMIDINE 1 MCG/KG/HR: 100 INJECTION, SOLUTION, CONCENTRATE INTRAVENOUS at 08:38

## 2020-08-22 RX ADMIN — HYDROMORPHONE HYDROCHLORIDE 1.5 MG: 1 INJECTION, SOLUTION INTRAMUSCULAR; INTRAVENOUS; SUBCUTANEOUS at 06:18

## 2020-08-22 RX ADMIN — HYDROMORPHONE HYDROCHLORIDE 1.5 MG: 1 INJECTION, SOLUTION INTRAMUSCULAR; INTRAVENOUS; SUBCUTANEOUS at 00:04

## 2020-08-22 RX ADMIN — HYDROMORPHONE HYDROCHLORIDE 1 MG: 1 INJECTION, SOLUTION INTRAMUSCULAR; INTRAVENOUS; SUBCUTANEOUS at 18:15

## 2020-08-22 RX ADMIN — DEXMEDETOMIDINE 1.3 MCG/KG/HR: 100 INJECTION, SOLUTION, CONCENTRATE INTRAVENOUS at 22:02

## 2020-08-22 RX ADMIN — HYDROMORPHONE HYDROCHLORIDE 1.5 MG: 1 INJECTION, SOLUTION INTRAMUSCULAR; INTRAVENOUS; SUBCUTANEOUS at 11:34

## 2020-08-22 RX ADMIN — ENOXAPARIN SODIUM 30 MG: 30 INJECTION SUBCUTANEOUS at 22:02

## 2020-08-22 RX ADMIN — LABETALOL HYDROCHLORIDE 1 MG/MIN: 5 INJECTION INTRAVENOUS at 06:22

## 2020-08-22 RX ADMIN — PIPERACILLIN SODIUM AND TAZOBACTAM SODIUM 3.38 G: 3; .375 INJECTION, POWDER, LYOPHILIZED, FOR SOLUTION INTRAVENOUS at 22:02

## 2020-08-22 RX ADMIN — DEXMEDETOMIDINE 0.8 MCG/KG/HR: 100 INJECTION, SOLUTION, CONCENTRATE INTRAVENOUS at 05:32

## 2020-08-22 RX ADMIN — HYDROMORPHONE HYDROCHLORIDE 1 MG: 1 INJECTION, SOLUTION INTRAMUSCULAR; INTRAVENOUS; SUBCUTANEOUS at 22:43

## 2020-08-22 RX ADMIN — DEXMEDETOMIDINE 0.6 MCG/KG/HR: 100 INJECTION, SOLUTION, CONCENTRATE INTRAVENOUS at 00:21

## 2020-08-22 RX ADMIN — KETOROLAC TROMETHAMINE 15 MG: 30 INJECTION, SOLUTION INTRAMUSCULAR at 06:30

## 2020-08-22 RX ADMIN — PIPERACILLIN SODIUM AND TAZOBACTAM SODIUM 3.38 G: 3; .375 INJECTION, POWDER, LYOPHILIZED, FOR SOLUTION INTRAVENOUS at 13:46

## 2020-08-22 RX ADMIN — DEXMEDETOMIDINE 1.2 MCG/KG/HR: 100 INJECTION, SOLUTION, CONCENTRATE INTRAVENOUS at 16:53

## 2020-08-22 RX ADMIN — ENOXAPARIN SODIUM 30 MG: 30 INJECTION SUBCUTANEOUS at 09:49

## 2020-08-22 RX ADMIN — DEXMEDETOMIDINE 1.2 MCG/KG/HR: 100 INJECTION, SOLUTION, CONCENTRATE INTRAVENOUS at 20:03

## 2020-08-22 RX ADMIN — DEXMEDETOMIDINE 1 MCG/KG/HR: 100 INJECTION, SOLUTION, CONCENTRATE INTRAVENOUS at 11:35

## 2020-08-22 RX ADMIN — HYDROMORPHONE HYDROCHLORIDE 1 MG: 1 INJECTION, SOLUTION INTRAMUSCULAR; INTRAVENOUS; SUBCUTANEOUS at 14:58

## 2020-08-22 ASSESSMENT — PAIN SCALES - GENERAL
PAINLEVEL_OUTOF10: 6
PAINLEVEL_OUTOF10: 8
PAINLEVEL_OUTOF10: 9
PAINLEVEL_OUTOF10: 9
PAINLEVEL_OUTOF10: 8
PAINLEVEL_OUTOF10: 4
PAINLEVEL_OUTOF10: 4
PAINLEVEL_OUTOF10: 7
PAINLEVEL_OUTOF10: 9
PAINLEVEL_OUTOF10: 8
PAINLEVEL_OUTOF10: 7
PAINLEVEL_OUTOF10: 9
PAINLEVEL_OUTOF10: 9
PAINLEVEL_OUTOF10: 8

## 2020-08-22 ASSESSMENT — PAIN DESCRIPTION - DESCRIPTORS
DESCRIPTORS: CONSTANT
DESCRIPTORS: CONSTANT;SHARP
DESCRIPTORS: CONSTANT;SHARP

## 2020-08-22 ASSESSMENT — PAIN DESCRIPTION - FREQUENCY
FREQUENCY: CONTINUOUS

## 2020-08-22 ASSESSMENT — PAIN DESCRIPTION - LOCATION
LOCATION: ABDOMEN

## 2020-08-22 ASSESSMENT — PAIN DESCRIPTION - PAIN TYPE
TYPE: ACUTE PAIN

## 2020-08-22 ASSESSMENT — PAIN DESCRIPTION - ONSET
ONSET: ON-GOING

## 2020-08-22 ASSESSMENT — PAIN DESCRIPTION - ORIENTATION
ORIENTATION: MID
ORIENTATION: ANTERIOR
ORIENTATION: ANTERIOR

## 2020-08-22 NOTE — CARE COORDINATION
ONGOING DISCHARGE PLAN:    Patient currently on continuous bipap and on a precedex gtt. Writer reviewed chart and from previous discharge plans, patient denied any needs. Per GI notes - Severe pancreatitis, case was discussed with dr Tiffany Chapa; recommend starting TPN Monday  -continue IV hydration and pain mgt  -will order IGG subclasses to help r/o autoimmune pancreatitis  -will hold on mri abd due to bipap, its unlikely that she could complete testing at this time  -trend lft and wbc    PICC line on Monday. Start TPN    Remains on Precedex gtt, labetalol gtt, IV fluids, Iv zosyn,     Will continue to follow for additional discharge needs.     Electronically signed by Kenneth Zuluaga RN on 8/22/2020 at 4:39 PM

## 2020-08-22 NOTE — PROGRESS NOTES
[address] [facility].    PROGRESS NOTE             8/22/2020    9:41 AM    Name:   Damaris Borrero  MRN:     [id number]     Acct:      [de-identified]   Room:   2003/2003-01  IP Day:  3  Admit Date:  8/19/2020  7:35 PM    PCP:  Shawn Hernandez  Code Status:  Full Code    Subjective:     C/C:   Chief Complaint   Patient presents with    Abdominal Pain     RUQ     Interval History Status: not changed. Patient seen and examined at bedside. No acute events overnight. Patient is currently on BiPAP and is sleeping after receiving Dilaudid. Pt also on Precedex. PICC line was placed yesterday and patient is receiving Zosyn. Patient is still requiring BiPAP at 60%, and MRCP will be put on hold for now. As per surgery, there is no indication for Abdominal Compartment Syndrome at this time. Patient has been on a Labetalol drip which is currently stopped, BP is 109/92 and HR is 98. Patient will receive TPN starting Monday, continue NPO for now, and continue LR 160mL/hr. Chest XR today shows no consolidation in the lungs. WBC has decreased today from yesterday. Calcium today is 6.8 from 6.5, received Calcium Gluconate infusion yesterday. Brief History:     Refer to H&P. Review of Systems:     Review of Systems   Unable to perform ROS: Other   Unable to assess, patient is sedated. Medications: Allergies:     Allergies   Allergen Reactions    Aripiprazole      Bad reaction    Eletriptan      Other reaction(s): Swelling-throat    Triptans [Sumatriptan]     Lamotrigine Rash       Current Meds:   Scheduled Meds:    magnesium oxide  400 mg Oral Daily    piperacillin-tazobactam (ZOSYN) 3.375 g in dextrose 5% IVPB extended infusion (mini-bag)  3.375 g Intravenous Q8H    sodium chloride flush  10 mL Intravenous 2 times per day    enoxaparin  30 mg Subcutaneous BID    QUEtiapine  800 mg Oral Nightly    pramipexole  0.25 mg Oral Daily    pantoprazole  40 mg Intravenous Daily    And    sodium chloride (PF)  10 mL Intravenous Daily    busPIRone  15 mg Oral Nightly    DULoxetine  60 mg Oral BID    cetirizine  10 mg Oral Daily     Continuous Infusions:    labetalol (NORMODYNE) 1 mg/mL infusion Stopped (20 0800)    dexmedetomidine (PRECEDEX) IV infusion 1 mcg/kg/hr (20 8986)    lactated ringers 150 mL/hr at 20 0544     PRN Meds: perflutren lipid microspheres, ketorolac, promethazine (PHENERGAN) in sodium chloride 0.9% IVPB, LORazepam, sodium chloride flush, magnesium sulfate, HYDROmorphone, potassium chloride **OR** potassium alternative oral replacement **OR** potassium chloride, acetaminophen, [DISCONTINUED] promethazine **OR** ondansetron, melatonin ER    Data:     Past Medical History:   has a past medical history of Anxiety, Chronic kidney disease, Depression, DVT (deep venous thrombosis) ([facility] [address] Fibromyalgia, Headache(784.0), Hx of blood clots, Kidney stone, Pancreatitis, Pleural effusion, and SVT (supraventricular tachycardia) ([facility] [address] Social History:   reports that she quit smoking about 8 months ago. Her smoking use included cigarettes. She smoked 1.00 pack per day. She has never used smokeless tobacco. She reports current alcohol use. She reports that she does not use drugs. Family History:   Family History   Problem Relation Age of Onset    Heart Disease Father     High Blood Pressure Brother        Vitals:  BP (!) 109/92   Pulse 98   Temp 100.1 °F (37.8 °C) (Axillary)   Resp 19   Ht 5' 5\" (1.651 m)   Wt (!) 304 lb 14.3 oz (138.3 kg)   SpO2 95%   BMI 50.74 kg/m²   Temp (24hrs), Av.1 °F (37.3 °C), Min:97.3 °F (36.3 °C), Max:100.8 °F (38.2 °C)    No results for input(s): POCGLU in the last 72 hours. I/O(24Hr):     Intake/Output Summary (Last 24 hours) at 2020 0941  Last data filed at 2020 0544  Gross per 24 hour   Intake 4127 ml   Output 1100 ml   Net 3027 ml       Labs:    CBC with Differential:    Lab Results   Component Value Date    WBC 25.4 08/22/2020    RBC 3.75 08/22/2020    HGB 11.5 08/22/2020    HCT 35.7 08/22/2020     08/22/2020    MCV 95.1 08/22/2020    MCH 30.8 08/22/2020    MCHC 32.3 08/22/2020    RDW 14.3 08/22/2020    METASPCT 3 08/21/2020    LYMPHOPCT 5 08/22/2020    LYMPHOPCT 35.0 03/22/2016    MONOPCT 1 08/22/2020    MONOPCT 6.8 03/22/2016    EOSPCT 3.8 03/22/2016    BASOPCT 0 08/22/2020    BASOPCT 0.9 03/22/2016    MONOSABS 0.25 08/22/2020    MONOSABS 0.6 03/22/2016    LYMPHSABS 1.27 08/22/2020    LYMPHSABS 3.1 03/22/2016    EOSABS 0.00 08/22/2020    EOSABS 0.3 03/22/2016    BASOSABS 0.00 08/22/2020    DIFFTYPE NOT REPORTED 08/22/2020     CMP:    Lab Results   Component Value Date     08/22/2020    K 3.8 08/22/2020     08/22/2020    CO2 26 08/22/2020    BUN 14 08/22/2020    CREATININE 0.77 08/22/2020    GFRAA >60 08/22/2020    LABGLOM >60 08/22/2020    GLUCOSE 151 08/22/2020    PROT 5.8 08/22/2020    PROT 7.2 01/23/2015    LABALBU 2.9 08/22/2020    CALCIUM 6.8 08/22/2020    BILITOT 0.46 08/22/2020    ALKPHOS 107 08/22/2020    AST 42 08/22/2020    ALT 34 08/22/2020     BUN/Creatinine:    Lab Results   Component Value Date    BUN 14 08/22/2020    CREATININE 0.77 08/22/2020     Calcium:    Lab Results   Component Value Date    CALCIUM 6.8 08/22/2020     Ionized Calcium:  No results found for: IONCA  ABG:  No results found for: PH, PCO2, PO2, HCO3, BE, THGB, TCO2, O2SAT    Lab Results   Component Value Date/Time    SPECIAL NOT REPORTED 08/21/2020 10:48 PM     Lab Results   Component Value Date/Time    CULTURE NO GROWTH 2 DAYS 08/20/2020 09:25 AM         Radiology:    Ct Abdomen Pelvis Wo Contrast Additional Contrast? None    Result Date: 8/20/2020  EXAMINATION: CT OF THE ABDOMEN AND PELVIS WITHOUT CONTRAST 8/20/2020 10:22 pm TECHNIQUE: CT of the abdomen and pelvis was performed without the administration of intravenous contrast. Multiplanar reformatted images are provided for review. Dose modulation, iterative reconstruction, and/or weight based adjustment of the mA/kV was utilized to reduce the radiation dose to as low as reasonably achievable. COMPARISON: CT abdomen and pelvis with contrast August 19, 2020. HISTORY: ORDERING SYSTEM PROVIDED HISTORY: severe acute pain TECHNOLOGIST PROVIDED HISTORY: severe acute pain Is the patient pregnant? ->Yes Reason for Exam: worsening abdominal pain Acuity: Acute Type of Exam: Ongoing Relevant Medical/Surgical History: surg - gallbladder FINDINGS: Lower Chest: Persisting bilateral lower lung scattered consolidation and atelectasis is present with trace posterior left-sided pleural effusion identified. Micah Meagan Heart is normal in size and configuration. Organs: Interval mild worsening of severe peripancreatic fat stranding and fluid is noted with intraperitoneal free fluid collecting within the bilateral pericolic gutters and collecting inferiorly within the pelvis with increased volume in comparison with the prior study. Diffuse fatty infiltration of the liver is again seen. The liver, gallbladder, spleen, pancreas, adrenal glands, kidneys, are otherwise unremarkable in appearance. GI/Bowel: The stomach is unremarkable without wall thickening or distention. Bowel loops are unremarkable in appearance without evidence of obstruction, distension or mucosal thickening. Pelvis: Berkowitz catheter is noted within the nondistended but grossly unremarkable urinary bladder. Bilateral fallopian tube Essure coils are seen without evidence of complication. The uterus is anteverted in position and is unremarkable in appearance there no evidence of pelvic free fluid is seen. Peritoneum/Retroperitoneum: No evidence of retroperitoneal or intraperitoneal lymphadenopathy is identified. . Bones/Soft Tissues: No evidence of retroperitoneal or intraperitoneal lymphadenopathy is identified. No evidence of intraperitoneal free fluid is seen.      1. Mild interval worsening of severe peripancreatic inflammation associated with acute pancreatitis, as discussed above. 2. No evidence of complication i.e. pseudocyst noted. 3. Stable bilateral lower lung multifocal consolidation atelectasis suggesting pneumonia versus alveolar edema. 4. Hepatic steatosis, unchanged. Xr Chest (single View Frontal)    Result Date: 8/21/2020  EXAMINATION: ONE XRAY VIEW OF THE CHEST 8/21/2020 6:06 am COMPARISON: August 20, 2020 chest exam HISTORY: ORDERING SYSTEM PROVIDED HISTORY: f/u atelectasis TECHNOLOGIST PROVIDED HISTORY: f/u atelectasis Reason for Exam: F/U atelectasis. Acuity: Unknown Type of Exam: Unknown Additional signs and symptoms: F/U atelectasis. FINDINGS: Interval insertion of right PICC line catheter, the tip projected at the right atrium Mild cardiomegaly Limited extra sonya exam with low volume lungs, mild streaky, left greater than right, bibasilar atelectasis. Grossly clear upper lungs     Line placement as above Limited expiratory exam with mild streaky bibasilar atelectasis     Ct Abdomen Pelvis W Iv Contrast Additional Contrast? None    Addendum Date: 8/20/2020    ADDENDUM: As stated in the body of the report, the abdominal aorta is normal in size. There is no significant atherosclerosis. Result Date: 8/20/2020  EXAMINATION: CT OF THE ABDOMEN AND PELVIS WITH CONTRAST 8/19/2020 10:11 pm TECHNIQUE: CT of the abdomen and pelvis was performed with the administration of intravenous contrast. Multiplanar reformatted images are provided for review. Dose modulation, iterative reconstruction, and/or weight based adjustment of the mA/kV was utilized to reduce the radiation dose to as low as reasonably achievable. COMPARISON: None.  HISTORY: ORDERING SYSTEM PROVIDED HISTORY: pain TECHNOLOGIST PROVIDED HISTORY: pain Reason for Exam: pt c/o all over abdominal pain with nausea for a few hours Acuity: Acute Type of Exam: Initial Relevant Medical/Surgical History: surg - gallbladder are unremarkable. Cardiomegaly and small bilateral effusions with adjacent left basilar infiltrate. Xr Chest Portable    Result Date: 8/20/2020  EXAMINATION: ONE XRAY VIEW OF THE CHEST 8/20/2020 7:38 am COMPARISON: July 18, 2020 chest exam HISTORY: ORDERING SYSTEM PROVIDED HISTORY: possible pneumonia on CT chest TECHNOLOGIST PROVIDED HISTORY: possible pneumonia on CT chest Reason for Exam: possible pneumonia on CT chest Acuity: Acute Type of Exam: Initial Additional signs and symptoms: possible pneumonia on CT chest FINDINGS: Expiratory exam with mild streaky bibasilar density. Normal cardiopericardial silhouette No significant pleural or mediastinal findings     Expiratory exam with mild streaky bibasilar atelectasis         Physical Examination:        Physical Exam  Vitals signs reviewed. Constitutional:       General: She is sleeping. Appearance: She is morbidly obese. Cardiovascular:      Rate and Rhythm: Normal rate and regular rhythm. Heart sounds: Normal heart sounds. Pulmonary:      Effort: Pulmonary effort is normal.      Breath sounds: Normal breath sounds. Comments: Patient is on BiPAP. 60%  Musculoskeletal:      Right lower leg: No edema. Left lower leg: No edema. Skin:     Capillary Refill: Capillary refill takes less than 2 seconds.        Assessment:        Primary Problem  Acute pancreatitis    Active Hospital Problems    Diagnosis Date Noted    Acute respiratory failure with hypoxia ([facility] [address] [J96.01] 08/21/2020    Hypocalcemia [E83.51] 08/21/2020    Sepsis ([facility] [address] [A41.9] 08/20/2020    Morbid obesity ([address] [E66.01]     Abdominal pain, epigastric [R10.13]     Nausea [R11.0]     Acute pancreatitis [K85.90] 08/19/2020    Fibromyalgia [M79.7]     HTN (hypertension) [I10] 12/15/2014    Major depression [F32.9] 10/30/2014    Restless leg syndrome [G25.81] 10/22/2013    Class 3 severe obesity due to excess calories with serious comorbidity in adult ([address] [E66.01] 01/14/2013    Anxiety [F41.9] 01/14/2013       Plan:        1. Severe acute pancreatitis complicated by sepsis and possible ARDS  - Lipase 1,065 on admission --> 1613 -->692 --> 273 today today; Amylase 435 yesterday   - CT abdomen and pelvis showed pancreatic edema and fluid consistent with pancreatitis and reactive lymph nodes; repeat CT showed worsening peripancreatic inflammation but no evidence of pseudocyst  - NPO; nausea control; pain control with norco / dilaudid  - Precedex 400mcg at 6.9mL/hr   - GI on board, start TPN on Monday, IgG subclasses ordered to rule out Autoimmune Pacnreatitis  - MRCP currently on hold due to patient requiring BiPAP  - Triglycerides elevated at 259 on admission  - Alpha-1 antitrypsin mildly elevated at 208; mitochondrial antibodies and smooth muscle Ab pending  - WBC is 25.4 today compared to 34.6 yesterday  - Zosyn day 3  - Patient is +2500 for input and output, urine output is decreased still, but Kidney function intact with BUN 14 and Creatinine 0.7     2. Acute hypoxic respiratory failure secondary to ARDS secondary to pancreatitis vs. Pneumonia   - 8/19 CT abdomen and pelvis showed bilateral lower lobe consolidation which could be edema or pneumonia  - Chest XR today shows Cardiomegaly and No Consolidation  - On BiPaP this AM  - ABG today unremarkable: pH 7.384/pCO2 44.6/pO2 80/HCO3 26.6  - Pulmonology on board  - On Zosyn day 3  - Patient still requiring BiPAP     3. Sepsis and Lactic acidosis secondary to pancreatitis  - On 8/20 lactic acid 5.4; SIRS criteria of tachypnea, tachycardia, and elevated WBC 20.5 met  - Today, WBC is 25.4 less than yesterday  - On IV zosyn day 3  - Continue  ml/hr LR  - UA normal; blood cultures no growth after 2 days  - Lactic acid stable at 1.6     4. HTN  - Lopressor oral 25 mg daily or 5 mg IV BID  - Labetalol Drip, currently on hold due to normal HR and BP     5.  Hypocalcemia  - Corrected Calcium is 7.7  - Calcium Gluconate infusion was given yesterday      6. Anxiety/depression  - Buspar 15 mg nightly, cymbalta 60 mg BID, quetiapine 800 mg nightly     7. Restless leg syndrome  - Pramipexole 0.25 mg daily     PICC line in place  Diet: NPO  DVT prophylaxis: lovenox 30 mg BID    Kyra Caldwell MD  8/22/2020  9:41 AM     Attestation and add on       I have discussed the care of Quincy Whitt , including pertinent history and exam findings,      8/22/20    with the resident. I have seen and examined the patient and the key elements of all parts of the encounter have been performed by me . I agree with the assessment, plan and orders as documented by the resident. Principal Problem:    Acute pancreatitis  Active Problems:    Anxiety    Class 3 severe obesity due to excess calories with serious comorbidity in adult [facility])    Restless leg syndrome    Major depression    HTN (hypertension)    Fibromyalgia    Sepsis (HCC)    Morbid obesity (HCC)    Abdominal pain, epigastric    Nausea    Acute respiratory failure with hypoxia (HCC)    Hypocalcemia  Resolved Problems:    * No resolved hospital problems. *            ---- ;        Medications: Allergies:     Allergies   Allergen Reactions    Aripiprazole      Bad reaction    Eletriptan      Other reaction(s): Swelling-throat    Triptans [Sumatriptan]     Lamotrigine Rash       Current Meds:   Scheduled Meds:    calcium gluconate IVPB  2 g Intravenous Once    magnesium oxide  400 mg Oral Daily    piperacillin-tazobactam (ZOSYN) 3.375 g in dextrose 5% IVPB extended infusion (mini-bag)  3.375 g Intravenous Q8H    sodium chloride flush  10 mL Intravenous 2 times per day    enoxaparin  30 mg Subcutaneous BID    QUEtiapine  800 mg Oral Nightly    pramipexole  0.25 mg Oral Daily    pantoprazole  40 mg Intravenous Daily    And    sodium chloride (PF)  10 mL Intravenous Daily    busPIRone  15 mg Oral Nightly    DULoxetine  60 mg Oral BID    cetirizine  10 mg Oral Daily Continuous Infusions:    labetalol (NORMODYNE) 1 mg/mL infusion Stopped (08/22/20 0800)    dexmedetomidine (PRECEDEX) IV infusion 1 mcg/kg/hr (08/22/20 1135)    lactated ringers 150 mL/hr at 08/22/20 0544     PRN Meds: HYDROmorphone, perflutren lipid microspheres, ketorolac, promethazine (PHENERGAN) in sodium chloride 0.9% IVPB, LORazepam, sodium chloride flush, magnesium sulfate, potassium chloride **OR** potassium alternative oral replacement **OR** potassium chloride, acetaminophen, [DISCONTINUED] promethazine **OR** ondansetron, melatonin ER        [address], [address] [place] [address].    Phone [phone]   Fax: [phone]  Answering Service: [phone]

## 2020-08-22 NOTE — PROGRESS NOTES
NPO  Pancreatitis  She is on BIPAP  On labetolol gtt    BP controlled    Vitals:    08/22/20 0758   BP:    Pulse:    Resp: 28   Temp:    SpO2:      nad  Regular tachy  Skin intact  Op clear  jvp normal      Impression  1. Physiologic ST  2. HTN, uncontrolled, requiring IV labetolol gtt  3.  Hx SVT ablation    Echo reviewed, hyperdynamic LV function  No valvular issues  When able to take oral, switch to coreg or labetolol PO

## 2020-08-22 NOTE — PLAN OF CARE
Problem: Falls - Risk of:  Goal: Will remain free from falls  Description: Will remain free from falls  Outcome: Ongoing   Pt remained free from falls this shift. 2/4 side rails up, bed wheels locked and in lowest position, nonskid footwear on, and proper safety measures put in place.         Problem: Pain:  Goal: Pain level will decrease  Description: Pain level will decrease  Outcome: Ongoing       Problem: Nausea/Vomiting:  Goal: Absence of nausea/vomiting  Description: Absence of nausea/vomiting  Outcome: Ongoing       Problem: Infection, Septic Shock:  Goal: Will show no infection signs and symptoms  Description: Will show no infection signs and symptoms  Outcome: Ongoing

## 2020-08-22 NOTE — PROGRESS NOTES
Patient was seen and examined. Remains on BiPAP. Vital signs are stable. Low-grade temp of 100.8. Urine output is adequate. Abdomen is soft. Tender epigastric region. No peritoneal signs. Extremity positive edema. Blood work was reviewed. WBC count is improved. BMP is unremarkable. Lactic acid is normal.  Liver battery noted. Bilirubin is normal.  Alkaline phosphatase AST ALT are minimally elevated. Lipase is significantly improved to 273. WBC count is improved. Hemoglobin is 11.5. Surgically stable. Discussed with GI nurse practitioner. Discussed with nursing staff. PICC line on Monday. Start TPN. Continue to monitor clinically at this time. Some improvement. Continue aggressive supportive care.

## 2020-08-22 NOTE — PROGRESS NOTES
rhonchi, normal air movement, pt is on bipap  Cardiovascular:tachycardic rate, regular rhythm, normal S1 and S2, no murmurs, rubs, clicks or gallops, distal pulses intact, no carotid bruits  Abdomen: soft, obese LUQ is-tender, distended, normal bowel sounds, no masses or organomegaly lemons with clear yellow urine  Extremities: no cyanosis, clubbing or edema  Musculoskeletal: normal range of motion, no joint swelling, deformity or tenderness  Neurologic: no cranial nerve deficit and muscle strength normal    Lab and Imaging Review     CBC  Recent Labs     08/20/20  2159 08/21/20  0431 08/22/20 0432   WBC 32.9* 34.6* 25.4*   HGB 15.0 13.9 11.5*   HCT 45.1 41.7 35.7*   MCV 93.6 93.7 95.1    331 234       BMP  Recent Labs     08/21/20  0431 08/21/20  1504 08/22/20  0432    138 136   K 4.1 4.1 3.8    104 102   CO2 23 23 26   BUN 6 9 14   CREATININE 0.48* 0.66 0.77   GLUCOSE 148* 147* 151*   CALCIUM 6.8* 6.5* 6.8*       LFTS  Recent Labs     08/20/20  0606 08/20/20  1943 08/21/20 0431 08/22/20 0432   ALKPHOS 135* 121* 114* 107*   ALT 29 47* 36* 34*   AST 33* 57* 45* 42*   PROT 8.0 6.9 6.2* 5.8*   BILITOT 0.35 0.55 0.47 0.46   BILIDIR 0.14  --   --   --    LABALBU 4.2 3.6 3.2* 2.9*       AMYLASE/LIPASE/AMMONIA  Recent Labs     08/20/20  0606 08/21/20  0431 08/22/20  0432   AMYLASE 435*  --   --    LIPASE 1,613* 692* 273*   Results for Meaghan Rolon (MRN [id number]) as of 8/22/2020 08:50   Ref. Range 8/20/2020 06:06   Triglycerides Latest Ref Range: <150 mg/dL 259 (H)       PT/INR  Recent Labs     08/20/20  0606   PROTIME 12.2   INR 0.9     FINDINGS: ct abd 8/19/20   Lower Chest: Consolidation within both lower lobes and the lingula.     Scattered ground-glass opacity.  No pleural effusion.         Organs: Liver is diffusely hypoattenuating.  No acute abnormality within the    spleen, adrenals, or left kidney.  There is right renal cortical scarring and    calcification.  Subcentimeter hypoattenuating bilateral renal lesions are too    small to accurately characterize, likely cysts.  Prior cholecystectomy. There is diffuse peripancreatic stranding and fluid.  No loculated fluid    collection.         GI/Bowel: Stomach is partially distended.  Small bowel is nondilated.  Colon    is nondilated.         Pelvis: Urinary bladder is partially distended without vesicular stones. Uterus is present.         Peritoneum/Retroperitoneum: Shotty retroperitoneal lymph nodes, likely    reactive.  Abdominal aorta is normal in caliber.  No pneumoperitoneum.         Bones/Soft Tissues: No acute osseous abnormality.              Impression    1. Pancreatic edema and associated fluid is consistent with pancreatitis.  No    loculated fluid collection/pseudocyst.    2. Shotty retroperitoneal lymph nodes are likely reactive. 3. Hepatic steatosis. 4. Bilateral lower lobe consolidation, which could be due to edema or    pneumonia.                FINDINGS:  Ct abd 8/20/20   Lower Chest: Persisting bilateral lower lung scattered consolidation and    atelectasis is present with trace posterior left-sided pleural effusion    identified. Equilla Beat is normal in size and configuration.         Organs: Interval mild worsening of severe peripancreatic fat stranding and    fluid is noted with intraperitoneal free fluid collecting within the    bilateral pericolic gutters and collecting inferiorly within the pelvis with    increased volume in comparison with the prior study.  Diffuse fatty    infiltration of the liver is again seen.  The liver, gallbladder, spleen,    pancreas, adrenal glands, kidneys, are otherwise unremarkable in appearance.         GI/Bowel: The stomach is unremarkable without wall thickening or distention.     Bowel loops are unremarkable in appearance without evidence of obstruction,    distension or mucosal thickening.         Pelvis: Berkowitz catheter is noted within the nondistended but grossly unremarkable urinary bladder.  Bilateral fallopian tube Essure coils are seen    without evidence of complication.  The uterus is anteverted in position and    is unremarkable in appearance there no evidence of pelvic free fluid is seen.         Peritoneum/Retroperitoneum: No evidence of retroperitoneal or intraperitoneal    lymphadenopathy is identified. .         Bones/Soft Tissues: No evidence of retroperitoneal or intraperitoneal    lymphadenopathy is identified.  No evidence of intraperitoneal free fluid is    seen.              Impression    1. Mild interval worsening of severe peripancreatic inflammation associated    with acute pancreatitis, as discussed above. 2. No evidence of complication i.e. pseudocyst noted. 3. Stable bilateral lower lung multifocal consolidation atelectasis    suggesting pneumonia versus alveolar edema. 4. Hepatic steatosis, unchanged.                  ASSESSMENT/PLAN:  1. Severe pancreatitis; etiology unknown at this time  -case was discussed with dr Sari Cedillo; recommend starting TPN Monday  -continue IV hydration and pain mgt  -will order IGG subclasses to help r/o autoimmune pancreatitis  -will hold on mri abd due to bipap, its unlikely that she could complete testing at this time  -trend lft and wbc              This plan was formulated in collaboration with Dr. Venessa Lowe.     Electronically signed by: ANIBAL Rodriguez - CNP on 8/22/2020 at 7:20 AM

## 2020-08-22 NOTE — PROGRESS NOTES
Dr. Tere Chavez notified of increased respiratory rate. Patient stating she cannot breathe. Patient remains on continuous BIPAP. Order received for chest x-ray and ABG.

## 2020-08-22 NOTE — PROGRESS NOTES
PULMONARY PROGRESS NOTE:    REASON FOR VISIT: pancreatitis, resp failure  Interval History:  Poor historian  Shortness of Breath: +  Cough: no  Sputum: no          Hemoptysis: no  Chest Pain: no  Fever: no                   Swelling Feet: no  Headache: no                                           Nausea, Emesis, Abdominal Pain: +  Diarrhea: no         Constipation: no    Events since last visit: on BIPAP, precedex    PAST MEDICAL HISTORY:      Scheduled Meds:   magnesium oxide  400 mg Oral Daily    piperacillin-tazobactam (ZOSYN) 3.375 g in dextrose 5% IVPB extended infusion (mini-bag)  3.375 g Intravenous Q8H    sodium chloride flush  10 mL Intravenous 2 times per day    enoxaparin  30 mg Subcutaneous BID    QUEtiapine  800 mg Oral Nightly    pramipexole  0.25 mg Oral Daily    pantoprazole  40 mg Intravenous Daily    And    sodium chloride (PF)  10 mL Intravenous Daily    busPIRone  15 mg Oral Nightly    DULoxetine  60 mg Oral BID    cetirizine  10 mg Oral Daily     Continuous Infusions:   labetalol (NORMODYNE) 1 mg/mL infusion Stopped (08/22/20 0800)    dexmedetomidine (PRECEDEX) IV infusion 1 mcg/kg/hr (08/22/20 1135)    lactated ringers 150 mL/hr at 08/22/20 0544     PRN Meds:HYDROmorphone, perflutren lipid microspheres, ketorolac, promethazine (PHENERGAN) in sodium chloride 0.9% IVPB, LORazepam, sodium chloride flush, magnesium sulfate, potassium chloride **OR** potassium alternative oral replacement **OR** potassium chloride, acetaminophen, [DISCONTINUED] promethazine **OR** ondansetron, melatonin ER        PHYSICAL EXAMINATION:  /87   Pulse 96   Temp 98.1 °F (36.7 °C) (Temporal)   Resp 16   Ht 5' 5\" (1.651 m)   Wt (!) 304 lb 14.3 oz (138.3 kg)   SpO2 98%   BMI 50.74 kg/m²     General : on BIPAP  Neck - supple, no lymphadenopathy, JVD not raised  Heart - regular rhythm, S1 and S2 normal; no additional sounds heard  Lungs - Air Entry- fair bilaterally; breath sounds : vesicular; rales/crackles - absent  Abdomen - soft, no tenderness  Upper Extremities  - no cyanosis, mottling; edema : absent  Lower Extremities: no cyanosis, mottling; edema : absent    Current Laboratory, Radiologic, Microbiologic, and Diagnostic studies reviewed  Data ReviewCBC:   Recent Labs     08/20/20  2159 08/21/20  0431 08/22/20  0432   WBC 32.9* 34.6* 25.4*   RBC 4.81 4.46 3.75*   HGB 15.0 13.9 11.5*   HCT 45.1 41.7 35.7*    331 234     BMP:   Recent Labs     08/21/20  0431 08/21/20  1504 08/22/20  0432   GLUCOSE 148* 147* 151*    138 136   K 4.1 4.1 3.8   BUN 6 9 14   CREATININE 0.48* 0.66 0.77   CALCIUM 6.8* 6.5* 6.8*     ABGs:   Recent Labs     08/21/20  1045 08/22/20  0812   PHART 7.382 7.384   PO2ART 50.9* 80.0   HKM1LRN 41.2 44.6   KQD6PDM 24.5 26.6*   [id number] 86.4* 95.1      PT/INR:  No results found for: PTINR    ASSESSMENT / PLAN:    Acute pancreatitis- GI consulted, monitor amylase lipase - improving, MRCP ordered   Lactic acidosis- resolved  Possible pneumonia- ABx   Diet NPO  Monitor end tidal Co2 with pain meds   acute hypoxic resp failure - O2/ NIPPV  precedex for restlessness  Reduce pain meds    This is a late note on patient seen by me earlier today.     Electronically signed by Pardeep Hicks on 08/22/20 at 1:20 PM.

## 2020-08-22 NOTE — FLOWSHEET NOTE
08/22/20 1207   Encounter Summary   Services provided to: Patient   Referral/Consult From: Rounding   Complexity of Encounter Low   Length of Encounter 15 minutes   Spiritual/Evangelical   Type Spiritual support   Assessment Sleeping   Intervention Prayer

## 2020-08-23 LAB
ABSOLUTE BANDS #: 3.56 K/UL (ref 0–1)
ABSOLUTE EOS #: 0 K/UL (ref 0–0.4)
ABSOLUTE IMMATURE GRANULOCYTE: ABNORMAL K/UL (ref 0–0.3)
ABSOLUTE LYMPH #: 1.07 K/UL (ref 1–4.8)
ABSOLUTE MONO #: 1.25 K/UL (ref 0.1–1.3)
ALBUMIN SERPL-MCNC: 2.5 G/DL (ref 3.5–5.2)
ALBUMIN/GLOBULIN RATIO: ABNORMAL (ref 1–2.5)
ALP BLD-CCNC: 116 U/L (ref 35–104)
ALT SERPL-CCNC: 30 U/L (ref 5–33)
ANION GAP SERPL CALCULATED.3IONS-SCNC: 9 MMOL/L (ref 9–17)
AST SERPL-CCNC: 42 U/L
BANDS: 20 % (ref 0–10)
BASOPHILS # BLD: 0 % (ref 0–2)
BASOPHILS ABSOLUTE: 0 K/UL (ref 0–0.2)
BILIRUB SERPL-MCNC: 0.51 MG/DL (ref 0.3–1.2)
BUN BLDV-MCNC: 13 MG/DL (ref 6–20)
BUN/CREAT BLD: ABNORMAL (ref 9–20)
CALCIUM IONIZED: 1.02 MMOL/L (ref 1.13–1.33)
CALCIUM SERPL-MCNC: 7.3 MG/DL (ref 8.6–10.4)
CHLORIDE BLD-SCNC: 104 MMOL/L (ref 98–107)
CO2: 27 MMOL/L (ref 20–31)
CREAT SERPL-MCNC: 0.63 MG/DL (ref 0.5–0.9)
DIFFERENTIAL TYPE: ABNORMAL
EOSINOPHILS RELATIVE PERCENT: 0 % (ref 0–4)
GFR AFRICAN AMERICAN: >60 ML/MIN
GFR NON-AFRICAN AMERICAN: >60 ML/MIN
GFR SERPL CREATININE-BSD FRML MDRD: ABNORMAL ML/MIN/{1.73_M2}
GFR SERPL CREATININE-BSD FRML MDRD: ABNORMAL ML/MIN/{1.73_M2}
GLUCOSE BLD-MCNC: 122 MG/DL (ref 70–99)
HCT VFR BLD CALC: 30.7 % (ref 36–46)
HEMOGLOBIN: 10 G/DL (ref 12–16)
IMMATURE GRANULOCYTES: ABNORMAL %
LACTIC ACID: 1.1 MMOL/L (ref 0.5–2.2)
LIPASE: 51 U/L (ref 13–60)
LYMPHOCYTES # BLD: 6 % (ref 24–44)
MAGNESIUM: 2.1 MG/DL (ref 1.6–2.6)
MCH RBC QN AUTO: 31.1 PG (ref 26–34)
MCHC RBC AUTO-ENTMCNC: 32.6 G/DL (ref 31–37)
MCV RBC AUTO: 95.3 FL (ref 80–100)
MITOCHONDRIAL ANTIBODY: 3.6 UNITS (ref 0–24.9)
MONOCYTES # BLD: 7 % (ref 1–7)
MORPHOLOGY: ABNORMAL
NRBC AUTOMATED: ABNORMAL PER 100 WBC
PDW BLD-RTO: 14.3 % (ref 11.5–14.9)
PHOSPHORUS: 1.8 MG/DL (ref 2.6–4.5)
PLATELET # BLD: 199 K/UL (ref 150–450)
PLATELET ESTIMATE: ABNORMAL
PMV BLD AUTO: 10.1 FL (ref 6–12)
POTASSIUM SERPL-SCNC: 3.8 MMOL/L (ref 3.7–5.3)
PREALBUMIN: 6.1 MG/DL (ref 20–40)
PROCALCITONIN: 0.33 NG/ML
RBC # BLD: 3.22 M/UL (ref 4–5.2)
RBC # BLD: ABNORMAL 10*6/UL
SEG NEUTROPHILS: 67 % (ref 36–66)
SEGMENTED NEUTROPHILS ABSOLUTE COUNT: 11.92 K/UL (ref 1.3–9.1)
SMOOTH MUSCLE ANTIBODY: 5 UNITS (ref 0–19)
SODIUM BLD-SCNC: 140 MMOL/L (ref 135–144)
TOTAL PROTEIN: 5.7 G/DL (ref 6.4–8.3)
TRIGL SERPL-MCNC: 107 MG/DL
WBC # BLD: 17.8 K/UL (ref 3.5–11)
WBC # BLD: ABNORMAL 10*3/UL

## 2020-08-23 PROCEDURE — 82330 ASSAY OF CALCIUM: CPT

## 2020-08-23 PROCEDURE — 87040 BLOOD CULTURE FOR BACTERIA: CPT

## 2020-08-23 PROCEDURE — 84145 PROCALCITONIN (PCT): CPT

## 2020-08-23 PROCEDURE — 6370000000 HC RX 637 (ALT 250 FOR IP): Performed by: NURSE PRACTITIONER

## 2020-08-23 PROCEDURE — 84478 ASSAY OF TRIGLYCERIDES: CPT

## 2020-08-23 PROCEDURE — 2580000003 HC RX 258: Performed by: NURSE PRACTITIONER

## 2020-08-23 PROCEDURE — 6360000002 HC RX W HCPCS: Performed by: STUDENT IN AN ORGANIZED HEALTH CARE EDUCATION/TRAINING PROGRAM

## 2020-08-23 PROCEDURE — 6360000002 HC RX W HCPCS: Performed by: SURGERY

## 2020-08-23 PROCEDURE — 3E0336Z INTRODUCTION OF NUTRITIONAL SUBSTANCE INTO PERIPHERAL VEIN, PERCUTANEOUS APPROACH: ICD-10-PCS | Performed by: INTERNAL MEDICINE

## 2020-08-23 PROCEDURE — 6360000002 HC RX W HCPCS: Performed by: INTERNAL MEDICINE

## 2020-08-23 PROCEDURE — 83735 ASSAY OF MAGNESIUM: CPT

## 2020-08-23 PROCEDURE — 6360000002 HC RX W HCPCS: Performed by: NURSE PRACTITIONER

## 2020-08-23 PROCEDURE — 94660 CPAP INITIATION&MGMT: CPT

## 2020-08-23 PROCEDURE — 99232 SBSQ HOSP IP/OBS MODERATE 35: CPT | Performed by: INTERNAL MEDICINE

## 2020-08-23 PROCEDURE — 84100 ASSAY OF PHOSPHORUS: CPT

## 2020-08-23 PROCEDURE — 2500000003 HC RX 250 WO HCPCS: Performed by: INTERNAL MEDICINE

## 2020-08-23 PROCEDURE — 85025 COMPLETE CBC W/AUTO DIFF WBC: CPT

## 2020-08-23 PROCEDURE — 84134 ASSAY OF PREALBUMIN: CPT

## 2020-08-23 PROCEDURE — 2580000003 HC RX 258: Performed by: INTERNAL MEDICINE

## 2020-08-23 PROCEDURE — 2580000003 HC RX 258: Performed by: STUDENT IN AN ORGANIZED HEALTH CARE EDUCATION/TRAINING PROGRAM

## 2020-08-23 PROCEDURE — 80053 COMPREHEN METABOLIC PANEL: CPT

## 2020-08-23 PROCEDURE — 2000000000 HC ICU R&B

## 2020-08-23 PROCEDURE — 36415 COLL VENOUS BLD VENIPUNCTURE: CPT

## 2020-08-23 PROCEDURE — APPNB30 APP NON BILLABLE TIME 0-30 MINS: Performed by: NURSE PRACTITIONER

## 2020-08-23 PROCEDURE — C9113 INJ PANTOPRAZOLE SODIUM, VIA: HCPCS | Performed by: NURSE PRACTITIONER

## 2020-08-23 PROCEDURE — 83690 ASSAY OF LIPASE: CPT

## 2020-08-23 PROCEDURE — 83605 ASSAY OF LACTIC ACID: CPT

## 2020-08-23 PROCEDURE — 2500000003 HC RX 250 WO HCPCS: Performed by: SURGERY

## 2020-08-23 PROCEDURE — 99233 SBSQ HOSP IP/OBS HIGH 50: CPT | Performed by: INTERNAL MEDICINE

## 2020-08-23 RX ORDER — LORAZEPAM 2 MG/ML
0.5 INJECTION INTRAMUSCULAR EVERY 8 HOURS
Status: DISCONTINUED | OUTPATIENT
Start: 2020-08-23 | End: 2020-08-25

## 2020-08-23 RX ADMIN — HYDROMORPHONE HYDROCHLORIDE 1 MG: 1 INJECTION, SOLUTION INTRAMUSCULAR; INTRAVENOUS; SUBCUTANEOUS at 02:11

## 2020-08-23 RX ADMIN — DEXMEDETOMIDINE 1.3 MCG/KG/HR: 100 INJECTION, SOLUTION, CONCENTRATE INTRAVENOUS at 00:31

## 2020-08-23 RX ADMIN — SODIUM CHLORIDE, POTASSIUM CHLORIDE, SODIUM LACTATE AND CALCIUM CHLORIDE: 600; 310; 30; 20 INJECTION, SOLUTION INTRAVENOUS at 05:37

## 2020-08-23 RX ADMIN — KETOROLAC TROMETHAMINE 15 MG: 30 INJECTION, SOLUTION INTRAMUSCULAR at 20:41

## 2020-08-23 RX ADMIN — PANTOPRAZOLE SODIUM 40 MG: 40 INJECTION, POWDER, FOR SOLUTION INTRAVENOUS at 08:28

## 2020-08-23 RX ADMIN — SODIUM CHLORIDE, POTASSIUM CHLORIDE, SODIUM LACTATE AND CALCIUM CHLORIDE: 600; 310; 30; 20 INJECTION, SOLUTION INTRAVENOUS at 13:47

## 2020-08-23 RX ADMIN — DEXMEDETOMIDINE 1.1 MCG/KG/HR: 100 INJECTION, SOLUTION, CONCENTRATE INTRAVENOUS at 16:55

## 2020-08-23 RX ADMIN — ACETAMINOPHEN 650 MG: 325 TABLET, FILM COATED ORAL at 19:16

## 2020-08-23 RX ADMIN — POTASSIUM CHLORIDE: 2 INJECTION, SOLUTION, CONCENTRATE INTRAVENOUS at 18:58

## 2020-08-23 RX ADMIN — ACETAMINOPHEN 650 MG: 325 TABLET, FILM COATED ORAL at 08:51

## 2020-08-23 RX ADMIN — DEXMEDETOMIDINE 1.1 MCG/KG/HR: 100 INJECTION, SOLUTION, CONCENTRATE INTRAVENOUS at 13:54

## 2020-08-23 RX ADMIN — DEXMEDETOMIDINE 1.3 MCG/KG/HR: 100 INJECTION, SOLUTION, CONCENTRATE INTRAVENOUS at 08:01

## 2020-08-23 RX ADMIN — DEXMEDETOMIDINE 1 MCG/KG/HR: 100 INJECTION, SOLUTION, CONCENTRATE INTRAVENOUS at 22:58

## 2020-08-23 RX ADMIN — Medication 10 ML: at 08:27

## 2020-08-23 RX ADMIN — HYDROMORPHONE HYDROCHLORIDE 0.5 MG: 1 INJECTION, SOLUTION INTRAMUSCULAR; INTRAVENOUS; SUBCUTANEOUS at 13:28

## 2020-08-23 RX ADMIN — CALCIUM GLUCONATE 1 G: 98 INJECTION, SOLUTION INTRAVENOUS at 11:56

## 2020-08-23 RX ADMIN — ENOXAPARIN SODIUM 30 MG: 30 INJECTION SUBCUTANEOUS at 19:16

## 2020-08-23 RX ADMIN — HYDROMORPHONE HYDROCHLORIDE 1 MG: 1 INJECTION, SOLUTION INTRAMUSCULAR; INTRAVENOUS; SUBCUTANEOUS at 05:24

## 2020-08-23 RX ADMIN — PIPERACILLIN SODIUM AND TAZOBACTAM SODIUM 3.38 G: 3; .375 INJECTION, POWDER, LYOPHILIZED, FOR SOLUTION INTRAVENOUS at 15:42

## 2020-08-23 RX ADMIN — SODIUM CHLORIDE, POTASSIUM CHLORIDE, SODIUM LACTATE AND CALCIUM CHLORIDE: 600; 310; 30; 20 INJECTION, SOLUTION INTRAVENOUS at 19:17

## 2020-08-23 RX ADMIN — HYDROMORPHONE HYDROCHLORIDE 1 MG: 1 INJECTION, SOLUTION INTRAMUSCULAR; INTRAVENOUS; SUBCUTANEOUS at 18:38

## 2020-08-23 RX ADMIN — PIPERACILLIN SODIUM AND TAZOBACTAM SODIUM 3.38 G: 3; .375 INJECTION, POWDER, LYOPHILIZED, FOR SOLUTION INTRAVENOUS at 05:28

## 2020-08-23 RX ADMIN — ENOXAPARIN SODIUM 30 MG: 30 INJECTION SUBCUTANEOUS at 08:27

## 2020-08-23 RX ADMIN — DEXMEDETOMIDINE 1.2 MCG/KG/HR: 100 INJECTION, SOLUTION, CONCENTRATE INTRAVENOUS at 10:30

## 2020-08-23 RX ADMIN — DEXMEDETOMIDINE 1.3 MCG/KG/HR: 100 INJECTION, SOLUTION, CONCENTRATE INTRAVENOUS at 05:28

## 2020-08-23 RX ADMIN — LORAZEPAM 0.5 MG: 2 INJECTION INTRAMUSCULAR; INTRAVENOUS at 21:36

## 2020-08-23 RX ADMIN — DEXMEDETOMIDINE 1.1 MCG/KG/HR: 100 INJECTION, SOLUTION, CONCENTRATE INTRAVENOUS at 20:32

## 2020-08-23 RX ADMIN — PIPERACILLIN SODIUM AND TAZOBACTAM SODIUM 3.38 G: 3; .375 INJECTION, POWDER, LYOPHILIZED, FOR SOLUTION INTRAVENOUS at 21:43

## 2020-08-23 RX ADMIN — LORAZEPAM 0.5 MG: 2 INJECTION INTRAMUSCULAR; INTRAVENOUS at 13:58

## 2020-08-23 RX ADMIN — HYDROMORPHONE HYDROCHLORIDE 1 MG: 1 INJECTION, SOLUTION INTRAMUSCULAR; INTRAVENOUS; SUBCUTANEOUS at 08:27

## 2020-08-23 RX ADMIN — DEXMEDETOMIDINE 1.3 MCG/KG/HR: 100 INJECTION, SOLUTION, CONCENTRATE INTRAVENOUS at 02:55

## 2020-08-23 RX ADMIN — KETOROLAC TROMETHAMINE 15 MG: 30 INJECTION, SOLUTION INTRAMUSCULAR at 14:34

## 2020-08-23 ASSESSMENT — PAIN SCALES - GENERAL
PAINLEVEL_OUTOF10: 6
PAINLEVEL_OUTOF10: 6
PAINLEVEL_OUTOF10: 8
PAINLEVEL_OUTOF10: 4
PAINLEVEL_OUTOF10: 8
PAINLEVEL_OUTOF10: 9
PAINLEVEL_OUTOF10: 6
PAINLEVEL_OUTOF10: 8
PAINLEVEL_OUTOF10: 3
PAINLEVEL_OUTOF10: 2
PAINLEVEL_OUTOF10: 6
PAINLEVEL_OUTOF10: 8
PAINLEVEL_OUTOF10: 6
PAINLEVEL_OUTOF10: 4
PAINLEVEL_OUTOF10: 5

## 2020-08-23 ASSESSMENT — PAIN DESCRIPTION - PAIN TYPE
TYPE: ACUTE PAIN

## 2020-08-23 ASSESSMENT — PAIN DESCRIPTION - LOCATION
LOCATION: ABDOMEN

## 2020-08-23 ASSESSMENT — PAIN DESCRIPTION - DESCRIPTORS
DESCRIPTORS: ACHING;CONSTANT
DESCRIPTORS: CONSTANT;CRAMPING
DESCRIPTORS: CONSTANT;ACHING

## 2020-08-23 ASSESSMENT — PAIN DESCRIPTION - ORIENTATION
ORIENTATION: MID
ORIENTATION: MID

## 2020-08-23 ASSESSMENT — ENCOUNTER SYMPTOMS: ABDOMINAL PAIN: 1

## 2020-08-23 ASSESSMENT — PAIN DESCRIPTION - FREQUENCY
FREQUENCY: CONTINUOUS

## 2020-08-23 ASSESSMENT — PAIN DESCRIPTION - ONSET: ONSET: ON-GOING

## 2020-08-23 NOTE — FLOWSHEET NOTE
08/23/20 1137   Encounter Summary   Services provided to: Patient   Referral/Consult From: Rounding   Complexity of Encounter Low   Length of Encounter 15 minutes   Spiritual/Latter day   Type Spiritual support   Assessment Sleeping   Intervention Prayer   Outcome Did not respond

## 2020-08-23 NOTE — PROGRESS NOTES
Comprehensive Nutrition Assessment    Type and Reason for Visit:  (PN ordering and management)    Nutrition Recommendations/Plan: Start TPN at 41.7 ml without lipids. Additives indicated below. Nutrition Assessment:  Consult received to start TPN, day 1 rate 41.7 ml without lipids. Additives: K acetate- 15 mEq, K Phos- 20mmol, KCl- 20 mEq, Ca+- 5 mEq, no MVI or trace elements. Lipase down to 51 from 273.     Malnutrition Assessment:  Malnutrition Status:  No malnutrition    Context:  Acute Illness     Findings of the 6 clinical characteristics of malnutrition:  Energy Intake:  No significant decrease in energy intake  Weight Loss:  No significant weight loss     Body Fat Loss:  No significant body fat loss     Muscle Mass Loss:  No significant muscle mass loss    Fluid Accumulation:  No significant fluid accumulation     Strength:  Not Performed    Estimated Daily Nutrient Needs:  Energy (kcal):  2020 kcal based on  [place]-St. Jeor equation using adm wt 138 kg; Weight Used for Energy Requirements:  Admission     Protein (g):  102-113 gm protein based on 1.8-2 gm/kg IBW; Weight Used for Protein Requirements:  Ideal          Nutrition Related Findings:  GI: severe abdominal pain, nausea, vomiting      Wounds:  None       Current Nutrition Therapies:    Current Parenteral Nutrition Orders:  · Type and Formula: 2-in-1 Custom   · Lipids: None  · Duration: Continuous  · Rate/Volume: 41.7 ml/ 1000 ml  · Current PN Order Provides: 880 kcal, 50 gm protein    Anthropometric Measures:  · Height: 5' 5\" (165.1 cm)  · Current Body Weight: 311 lb (141.1 kg)   · Admission Body Weight: 304 lb (137.9 kg)    · Ideal Body Weight: 125 lbs; % Ideal Body Weight 243.2 %   · BMI: 51.8  · BMI Categories: Obese Class 3 (BMI 40.0 or greater)       Nutrition Diagnosis:   · Inadequate oral intake related to altered GI function as evidenced by NPO or clear liquid status due to medical condition, nutrition support - parenteral nutrition      Nutrition Interventions:   Food and/or Nutrient Delivery:  Continue NPO, Start Parenteral Nutrititon  Nutrition Education/Counseling:  No recommendation at this time   Coordination of Nutrition Care:  Continued Inpatient Monitoring    Goals:  Meet 75% of nutrient needs with TPN       Nutrition Monitoring and Evaluation:   Food/Nutrient Intake Outcomes:  Parenteral Nutrition Intake/Tolerance  Physical Signs/Symptoms Outcomes:  Biochemical Data, GI Status, Nausea or Vomiting, Fluid Status or Edema, Hemodynamic Status, Weight     Discharge Planning: Too soon to determine     Some areas of assessment may be incomplete due to COVID-19 precautions. Shyanne Wheat R.D., L.D.   Clinical Dietitian  Office: [phone]

## 2020-08-23 NOTE — FLOWSHEET NOTE
08/23/20 0414   Provider Notification   Reason for Communication Evaluate;New orders   Provider Name Dr. Marilynn So   Provider Notification Physician   Method of Communication Face to face   Response At bedside   Notification Time [phone]     Dr. Marilynn So rounding at bedside, updated on pt condition. Orders received to consult dietitian to start and manage TPN.

## 2020-08-23 NOTE — PROGRESS NOTES
Pt remains on bipap, appears to be in no distress at this time.  Pt pO2 96% on 45% FiO2 previously intubated - no problems

## 2020-08-23 NOTE — PLAN OF CARE
Nutrition Problem #1: Inadequate oral intake  Intervention: Food and/or Nutrient Delivery: Continue NPO, Start Parenteral Nutrititon  Nutritional Goals: Meet 75% of nutrient needs with TPN

## 2020-08-23 NOTE — CARE COORDINATION
ONGOING DISCHARGE PLAN:    Spoke with patient's mother Anahy Silverman at bedside regarding discharge plan and rose advised she will see how she progresses in the next few days  And stated she will either go home and she will help her or she will come stay with her for a while. No plans on having a VNS. Patient has a PICC line and will start TPN today. Labs:  WBC 17.8, Hgb 10.0, lipase 51    Per GI notes - Severe pancreatitis,    Remains on IV Zosyn, Precedex gtt, Labetalol gtt, Iv fluids, TPN,     Will continue to follow for additional discharge needs.     Electronically signed by Sree Cheek RN on 8/23/2020 at 2:09 PM

## 2020-08-23 NOTE — PROGRESS NOTES
Agitated overnight, on precedex  bipap  She is sleeping comfortably  Vitals:    08/23/20 0736   BP:    Pulse:    Resp: 18   Temp:    SpO2:      nad  RRR  Skin intact  No edema  jvp normal    Impression  1. Physiologic ST 2/2 pancreatitis  2. Agitation, controlled, BP responded to precedex  3. HTN, was on labetolol gtt, weaned without issue after agitation controlled  4. Hx SVT ablation    Reviewed echo and telemetry  Normal findings  No further CV plans, pls call with questions, will s/o  If BP becomes issue again, coreg would be ideal for her, pls call back if that is required.

## 2020-08-23 NOTE — PROGRESS NOTES
Pt put to 3L NC and sat up on side of bed per request, then to [facility]. Oxygen sats maintained above 90 for about 30 minutes; pt became tired and allowed mask to be replaced and lay back down. Slight tachycardia (-110) and slight increase in BP noted while sitting. Pt continues to exhibit constant discomfort through continuous moaning and expressing pain and desire to go home.

## 2020-08-23 NOTE — PROGRESS NOTES
[facility]    Gastroenterology Daily Progress Note      Patient:   Jack Herron   :    1975   [place]   [facility] [place]  Consultant:   Briana Khan, CNP      SUBJECTIVE  54 y.o. female admitted 2020 with Acute pancreatitis, unspecified complication status, unspecified pancreatitis type [K85.90] and seen for severe pancreatitis. The pt was seen and examined. She is off of bipap and on nasal cannula O2 but remains on precidex. Continues to have upper abdominal pain. No emesis. Lipase 51, IGG labs pending, leucocytosis is decreased.  Was febrile this am.        OBJECTIVE  Scheduled Meds:   calcium gluconate IVPB  1 g Intravenous Once    magnesium oxide  400 mg Oral Daily    piperacillin-tazobactam (ZOSYN) 3.375 g in dextrose 5% IVPB extended infusion (mini-bag)  3.375 g Intravenous Q8H    sodium chloride flush  10 mL Intravenous 2 times per day    enoxaparin  30 mg Subcutaneous BID    QUEtiapine  800 mg Oral Nightly    pramipexole  0.25 mg Oral Daily    pantoprazole  40 mg Intravenous Daily    And    sodium chloride (PF)  10 mL Intravenous Daily    busPIRone  15 mg Oral Nightly    DULoxetine  60 mg Oral BID    cetirizine  10 mg Oral Daily       Vital Signs:  /69   Pulse 75   Temp 97.5 °F (36.4 °C) (Axillary)   Resp 19   Ht 5' 5\" (1.651 m)   Wt (!) 311 lb 4.6 oz (141.2 kg)   SpO2 97%   BMI 51.80 kg/m²      Physical Exam:     General Appearance: ill appearing,lert and oriented to person, place and time, well-developed and well-nourished, in no acute distress  Skin: warm and dry, no rash or erythema  Head: normocephalic and atraumatic  Eyes: pupils equal, round, and reactive to light, extraocular eye movements intact, conjunctivae normal  ENT: hearing grossly normal bilaterally  Neck: neck supple and non tender without mass, no thyromegaly or thyroid nodules, no cervical lymphadenopathy   Pulmonary/Chest: clear to auscultation bilaterally- no wheezes, rales or rhonchi, normal air movement, no respiratory distress  Cardiovascular: normal rate, regular rhythm, normal S1 and S2, no murmurs, rubs, clicks or gallops, distal pulses intact, no carotid bruits  Abdomen: soft, obese LUQ is-tender, non-distended, normal bowel sounds, no masses or organomegaly  Extremities: no cyanosis, clubbing or edema  Musculoskeletal: normal range of motion, no joint swelling, deformity or tenderness  Neurologic: no cranial nerve deficit and muscle strength normal    Lab and Imaging Review     CBC  Recent Labs     08/21/20  0431 08/22/20  0432 08/23/20  0359   WBC 34.6* 25.4* 17.8*   HGB 13.9 11.5* 10.0*   HCT 41.7 35.7* 30.7*   MCV 93.7 95.1 95.3    234 199       BMP  Recent Labs     08/21/20  1504 08/22/20  0432 08/23/20  0359    136 140   K 4.1 3.8 3.8    102 104   CO2 23 26 27   BUN 9 14 13   CREATININE 0.66 0.77 0.63   GLUCOSE 147* 151* 122*   CALCIUM 6.5* 6.8* 7.3*       LFTS  Recent Labs     08/21/20  0431 08/22/20  0432 08/23/20  0359   ALKPHOS 114* 107* 116*   ALT 36* 34* 30   AST 45* 42* 42*   PROT 6.2* 5.8* 5.7*   BILITOT 0.47 0.46 0.51   LABALBU 3.2* 2.9* 2.5*       AMYLASE/LIPASE/AMMONIA  Recent Labs     08/21/20  0431 08/22/20  0432 08/23/20  0359   LIPASE 692* 273* 51   Results for Kendal Allred (MRN [id number]) as of 8/22/2020 08:50    Ref. Range 8/20/2020 06:06   Triglycerides Latest Ref Range: <150 mg/dL 259 (H)     FINDINGS: ct abd 8/19/20   Lower Chest: Consolidation within both lower lobes and the lingula. Scattered ground-glass opacity.  No pleural effusion.         Organs: Liver is diffusely hypoattenuating.  No acute abnormality within the    spleen, adrenals, or left kidney.  There is right renal cortical scarring and    calcification.  Subcentimeter hypoattenuating bilateral renal lesions are too    small to accurately characterize, likely cysts.  Prior cholecystectomy.     There is diffuse peripancreatic stranding and fluid.  No loculated fluid    collection.         GI/Bowel: Stomach is partially distended.  Small bowel is nondilated.  Colon    is nondilated.         Pelvis: Urinary bladder is partially distended without vesicular stones. Uterus is present.         Peritoneum/Retroperitoneum: Shotty retroperitoneal lymph nodes, likely    reactive.  Abdominal aorta is normal in caliber.  No pneumoperitoneum.         Bones/Soft Tissues: No acute osseous abnormality.              Impression    1. Pancreatic edema and associated fluid is consistent with pancreatitis.  No    loculated fluid collection/pseudocyst.    2. Shotty retroperitoneal lymph nodes are likely reactive. 3. Hepatic steatosis. 4. Bilateral lower lobe consolidation, which could be due to edema or    pneumonia.                 FINDINGS:  Ct abd 8/20/20   Lower Chest: Persisting bilateral lower lung scattered consolidation and    atelectasis is present with trace posterior left-sided pleural effusion    identified. Launie Sicard is normal in size and configuration.         Organs: Interval mild worsening of severe peripancreatic fat stranding and    fluid is noted with intraperitoneal free fluid collecting within the    bilateral pericolic gutters and collecting inferiorly within the pelvis with    increased volume in comparison with the prior study.  Diffuse fatty    infiltration of the liver is again seen.  The liver, gallbladder, spleen,    pancreas, adrenal glands, kidneys, are otherwise unremarkable in appearance.         GI/Bowel: The stomach is unremarkable without wall thickening or distention.     Bowel loops are unremarkable in appearance without evidence of obstruction,    distension or mucosal thickening.         Pelvis: Berkowitz catheter is noted within the nondistended but grossly    unremarkable urinary bladder.  Bilateral fallopian tube Essure coils are seen    without evidence of complication.  The uterus is anteverted in position and is unremarkable in appearance there no evidence of pelvic free fluid is seen.         Peritoneum/Retroperitoneum: No evidence of retroperitoneal or intraperitoneal    lymphadenopathy is identified. .         Bones/Soft Tissues: No evidence of retroperitoneal or intraperitoneal    lymphadenopathy is identified.  No evidence of intraperitoneal free fluid is    seen.              Impression    1. Mild interval worsening of severe peripancreatic inflammation associated    with acute pancreatitis, as discussed above. 2. No evidence of complication i.e. pseudocyst noted. 3. Stable bilateral lower lung multifocal consolidation atelectasis    suggesting pneumonia versus alveolar edema. 4. Hepatic steatosis, unchanged.                   ASSESSMENT/PLAN:  1. Severe pancreatitis; etiology unknown at this time. ipase and leucocytosis are improved but she was febrile this am  -continue antibiotics and IV hydration/pain mgt  -IGG subclasses pending  -to start TPN tonight  - discussed case with md will order mri abd to eval pancreas and bile ducts  her breathing has improved  -trend lft and wbc        This plan was formulated in collaboration with Dr. Randol Moritz.     Electronically signed by: ANIBAL Mcgowan - CNP on 8/23/2020 at 12:32 PM

## 2020-08-23 NOTE — PROGRESS NOTES
[facility]    PROGRESS NOTE             8/23/2020    7:14 AM    Name:   Ela Baeza  MRN:     [id number]     Acct:      [de-identified]   Room:   2003/2003-01  IP Day:  4  Admit Date:  8/19/2020  7:35 PM    PCP:  Nasim Michel  Code Status:  Full Code    Subjective:     C/C:   Chief Complaint   Patient presents with    Abdominal Pain     RUQ     Interval History Status: slightly improved. Patient seen and examined at bedside this AM.  No acute events overnight. On BiPaP with FiO2 45% and precedex 1.3 mcgkg/hr. Per nurse patient was off BiPaP some since yesterday for oral hygiene but she could not tolerate nasal cannula long before her O2 sat dropped. IV labetalol discontinued yesterday. Patient did have some SBP readings in the 150s and 160s. HR in 80s-90s. On LR at 150 ml/hr. UO 0.3 ml/kg/hr x 24 hours but 0.7 ml/kg/hr x last 8 hrs. Creatinine stable at 0.63. Leukocytosis trending down, 17.8 today. On IV Dilaudid q3h for pain, pain 6/10 this AM but seems to be better controlled. Tmax 100.4 at 20:15 overnight and 101.4 this AM.  Will repeat blood cultures. Brief History:     Please see H&P. Patient was admitted for RUQ abdominal pain and found to have a lipase of 1065 and CT showing pancreatitis. The next day her lactic acid was 5.4. CT abdomen and pelvis did note possible bilateral lower lobe consolidation. Concern for pneumonia vs. ARDS and patient was started on IV zosyn. Her lactic acid improved following fluids. Later in the evening patient had elevated HR in the 140s and /90. EKG showed nonspecific changes and patient was transferred to the ICU and started on IV lopressor and cardene drip. Review of Systems:     Review of Systems   Gastrointestinal: Positive for abdominal pain. Unable to complete full ROS since patient is on BiPaP and precedex and does not fully answer questions.       Medications: Allergies: Allergies   Allergen Reactions    Aripiprazole      Bad reaction    Eletriptan      Other reaction(s): Swelling-throat    Triptans [Sumatriptan]     Lamotrigine Rash       Current Meds:   Scheduled Meds:    magnesium oxide  400 mg Oral Daily    piperacillin-tazobactam (ZOSYN) 3.375 g in dextrose 5% IVPB extended infusion (mini-bag)  3.375 g Intravenous Q8H    sodium chloride flush  10 mL Intravenous 2 times per day    enoxaparin  30 mg Subcutaneous BID    QUEtiapine  800 mg Oral Nightly    pramipexole  0.25 mg Oral Daily    pantoprazole  40 mg Intravenous Daily    And    sodium chloride (PF)  10 mL Intravenous Daily    busPIRone  15 mg Oral Nightly    DULoxetine  60 mg Oral BID    cetirizine  10 mg Oral Daily     Continuous Infusions:    labetalol (NORMODYNE) 1 mg/mL infusion Stopped (08/22/20 0800)    dexmedetomidine (PRECEDEX) IV infusion 1.3 mcg/kg/hr (08/23/20 0528)    lactated ringers 150 mL/hr at 08/23/20 0537     PRN Meds: HYDROmorphone, perflutren lipid microspheres, ketorolac, promethazine (PHENERGAN) in sodium chloride 0.9% IVPB, LORazepam, sodium chloride flush, magnesium sulfate, potassium chloride **OR** potassium alternative oral replacement **OR** potassium chloride, acetaminophen, [DISCONTINUED] promethazine **OR** ondansetron, melatonin ER    Data:     Past Medical History:   has a past medical history of Anxiety, Chronic kidney disease, Depression, DVT (deep venous thrombosis) ([address] Fibromyalgia, Headache(784.0), Hx of blood clots, Kidney stone, Pancreatitis, Pleural effusion, and SVT (supraventricular tachycardia) ([address] Social History:   reports that she quit smoking about 8 months ago. Her smoking use included cigarettes. She smoked 1.00 pack per day. She has never used smokeless tobacco. She reports current alcohol use. She reports that she does not use drugs.      Family History:   Family History   Problem Relation Age of Onset    Heart Disease Father  High Blood Pressure Brother        Vitals:  BP (!) 146/77   Pulse 93   Temp 99.2 °F (37.3 °C) (Axillary)   Resp 20   Ht 5' 5\" (1.651 m)   Wt (!) 311 lb 4.6 oz (141.2 kg)   SpO2 95%   BMI 51.80 kg/m²   Temp (24hrs), Av.3 °F (37.4 °C), Min:98.1 °F (36.7 °C), Max:100.4 °F (38 °C)    No results for input(s): POCGLU in the last 72 hours. I/O(24Hr):     Intake/Output Summary (Last 24 hours) at 2020 0714  Last data filed at 2020 0505  Gross per 24 hour   Intake 2438 ml   Output 1050 ml   Net 1388 ml       Labs:    CBC with Differential:    Lab Results   Component Value Date    WBC 17.8 2020    RBC 3.22 2020    HGB 10.0 2020    HCT 30.7 2020     2020    MCV 95.3 2020    MCH 31.1 2020    MCHC 32.6 2020    RDW 14.3 2020    METASPCT 3 2020    LYMPHOPCT 6 2020    LYMPHOPCT 35.0 2016    MONOPCT 7 2020    MONOPCT 6.8 2016    EOSPCT 3.8 2016    BASOPCT 0 2020    BASOPCT 0.9 2016    MONOSABS 1.25 2020    MONOSABS 0.6 2016    LYMPHSABS 1.07 2020    LYMPHSABS 3.1 2016    EOSABS 0.00 2020    EOSABS 0.3 2016    BASOSABS 0.00 2020    DIFFTYPE NOT REPORTED 2020     CMP:    Lab Results   Component Value Date     2020    K 3.8 2020     2020    CO2 27 2020    BUN 13 2020    CREATININE 0.63 2020    GFRAA >60 2020    LABGLOM >60 2020    GLUCOSE 122 2020    PROT 5.7 2020    PROT 7.2 2015    LABALBU 2.5 2020    CALCIUM 7.3 2020    BILITOT 0.51 2020    ALKPHOS 116 2020    AST 42 2020    ALT 30 2020     BUN/Creatinine:    Lab Results   Component Value Date    BUN 13 2020    CREATININE 0.63 2020     Hepatic Function Panel:    Lab Results   Component Value Date    ALKPHOS 116 2020    ALT 30 2020    AST 42 2020    PROT 5.7 08/23/2020    PROT 7.2 01/23/2015    BILITOT 0.51 08/23/2020    BILIDIR 0.14 08/20/2020    IBILI 0.21 08/20/2020    LABALBU 2.5 08/23/2020     Calcium:    Lab Results   Component Value Date    CALCIUM 7.3 08/23/2020     Ionized Calcium:  No results found for: IONCA  LDH:    Lab Results   Component Value Date     05/19/2020     U/A:    Lab Results   Component Value Date    NITRITE neg 09/15/2017    COLORU YELLOW 08/20/2020    PROTEINU TRACE 08/20/2020    PHUR 5.5 08/20/2020    WBCUA 0 TO 2 08/20/2020    RBCUA 5 TO 10 08/20/2020    MUCUS NOT REPORTED 08/20/2020    TRICHOMONAS NOT REPORTED 08/20/2020    YEAST NOT REPORTED 08/20/2020    BACTERIA MANY 08/20/2020    CLARITYU cloudy 09/15/2017    SPECGRAV 1.022 08/20/2020    LEUKOCYTESUR NEGATIVE 08/20/2020    UROBILINOGEN Normal 08/20/2020    BILIRUBINUR NEGATIVE 08/20/2020    BLOODU large 09/15/2017    GLUCOSEU NEGATIVE 08/20/2020    AMORPHOUS NOT REPORTED 08/20/2020     ABG:  No results found for: PH, PCO2, PO2, HCO3, BE, THGB, TCO2, O2SAT    Lab Results   Component Value Date/Time    SPECIAL NOT REPORTED 08/21/2020 10:48 PM     Lab Results   Component Value Date/Time    CULTURE NO GROWTH 2 DAYS 08/20/2020 09:25 AM         Radiology:    Ct Abdomen Pelvis Wo Contrast Additional Contrast? None    Result Date: 8/20/2020  EXAMINATION: CT OF THE ABDOMEN AND PELVIS WITHOUT CONTRAST 8/20/2020 10:22 pm TECHNIQUE: CT of the abdomen and pelvis was performed without the administration of intravenous contrast. Multiplanar reformatted images are provided for review. Dose modulation, iterative reconstruction, and/or weight based adjustment of the mA/kV was utilized to reduce the radiation dose to as low as reasonably achievable. COMPARISON: CT abdomen and pelvis with contrast August 19, 2020. HISTORY: ORDERING SYSTEM PROVIDED HISTORY: severe acute pain TECHNOLOGIST PROVIDED HISTORY: severe acute pain Is the patient pregnant? ->Yes Reason for Exam: worsening abdominal pain Acuity: Acute Type of Exam: Ongoing Relevant Medical/Surgical History: surg - gallbladder FINDINGS: Lower Chest: Persisting bilateral lower lung scattered consolidation and atelectasis is present with trace posterior left-sided pleural effusion identified. [place] Heart is normal in size and configuration. Organs: Interval mild worsening of severe peripancreatic fat stranding and fluid is noted with intraperitoneal free fluid collecting within the bilateral pericolic gutters and collecting inferiorly within the pelvis with increased volume in comparison with the prior study. Diffuse fatty infiltration of the liver is again seen. The liver, gallbladder, spleen, pancreas, adrenal glands, kidneys, are otherwise unremarkable in appearance. GI/Bowel: The stomach is unremarkable without wall thickening or distention. Bowel loops are unremarkable in appearance without evidence of obstruction, distension or mucosal thickening. Pelvis: Berkowitz catheter is noted within the nondistended but grossly unremarkable urinary bladder. Bilateral fallopian tube Essure coils are seen without evidence of complication. The uterus is anteverted in position and is unremarkable in appearance there no evidence of pelvic free fluid is seen. Peritoneum/Retroperitoneum: No evidence of retroperitoneal or intraperitoneal lymphadenopathy is identified. . Bones/Soft Tissues: No evidence of retroperitoneal or intraperitoneal lymphadenopathy is identified. No evidence of intraperitoneal free fluid is seen. 1. Mild interval worsening of severe peripancreatic inflammation associated with acute pancreatitis, as discussed above. 2. No evidence of complication i.e. pseudocyst noted. 3. Stable bilateral lower lung multifocal consolidation atelectasis suggesting pneumonia versus alveolar edema. 4. Hepatic steatosis, unchanged.      Xr Chest (single View Frontal)    Result Date: 8/21/2020  EXAMINATION: ONE XRAY VIEW OF THE CHEST 8/21/2020 6:06 am COMPARISON: August 20, 2020 chest exam HISTORY: ORDERING SYSTEM PROVIDED HISTORY: f/u atelectasis TECHNOLOGIST PROVIDED HISTORY: f/u atelectasis Reason for Exam: F/U atelectasis. Acuity: Unknown Type of Exam: Unknown Additional signs and symptoms: F/U atelectasis. FINDINGS: Interval insertion of right PICC line catheter, the tip projected at the right atrium Mild cardiomegaly Limited extra sonya exam with low volume lungs, mild streaky, left greater than right, bibasilar atelectasis. Grossly clear upper lungs     Line placement as above Limited expiratory exam with mild streaky bibasilar atelectasis     Ct Abdomen Pelvis W Iv Contrast Additional Contrast? None    Addendum Date: 8/20/2020    ADDENDUM: As stated in the body of the report, the abdominal aorta is normal in size. There is no significant atherosclerosis. Result Date: 8/20/2020  EXAMINATION: CT OF THE ABDOMEN AND PELVIS WITH CONTRAST 8/19/2020 10:11 pm TECHNIQUE: CT of the abdomen and pelvis was performed with the administration of intravenous contrast. Multiplanar reformatted images are provided for review. Dose modulation, iterative reconstruction, and/or weight based adjustment of the mA/kV was utilized to reduce the radiation dose to as low as reasonably achievable. COMPARISON: None. HISTORY: ORDERING SYSTEM PROVIDED HISTORY: pain TECHNOLOGIST PROVIDED HISTORY: pain Reason for Exam: pt c/o all over abdominal pain with nausea for a few hours Acuity: Acute Type of Exam: Initial Relevant Medical/Surgical History: surg - gallbladder FINDINGS: Lower Chest: Consolidation within both lower lobes and the lingula. Scattered ground-glass opacity. No pleural effusion. Organs: Liver is diffusely hypoattenuating. No acute abnormality within the spleen, adrenals, or left kidney. There is right renal cortical scarring and calcification. Subcentimeter hypoattenuating bilateral renal lesions are too small to accurately characterize, likely cysts. Prior cholecystectomy. There is diffuse peripancreatic stranding and fluid. No loculated fluid collection. GI/Bowel: Stomach is partially distended. Small bowel is nondilated. Colon is nondilated. Pelvis: Urinary bladder is partially distended without vesicular stones. Uterus is present. Peritoneum/Retroperitoneum: Shotty retroperitoneal lymph nodes, likely reactive. Abdominal aorta is normal in caliber. No pneumoperitoneum. Bones/Soft Tissues: No acute osseous abnormality. 1. Pancreatic edema and associated fluid is consistent with pancreatitis. No loculated fluid collection/pseudocyst. 2. Shotty retroperitoneal lymph nodes are likely reactive. 3. Hepatic steatosis. 4. Bilateral lower lobe consolidation, which could be due to edema or pneumonia. [employer] Amat[employer] Device Plmt/replace/removal    Result Date: 8/20/2020  PROCEDURE: ULTRASOUND GUIDED VASCULAR ACCESS. FLUOROSCOPY GUIDED PICC PLACEMENT 8/20/2020. HISTORY: ORDERING SYSTEM PROVIDED HISTORY: fluid resusitation TECHNOLOGIST PROVIDED HISTORY: fluid resusitation Lumen?->Double Lumen Is the patient pregnant? ->Yes Acuity: Unknown Type of Exam: Unknown Sepsis SEDATION: None FLUOROSCOPY TIME: DAP 63 cGy cm squared TECHNIQUE: Informed consent was obtained after a detailed explanation of the procedure including risks, benefits, and alternatives. Universal protocol was observed. The right arm was prepped and draped in sterile fashion using maximum sterile barrier technique. Local anesthesia was achieved with lidocaine. A micropuncture needle was used to access the right basilic vein using ultrasound guidance. An ultrasound image demonstrating patency of the vein with needle tip located within it. An image was obtained and stored in PACs. A 0.018 guidewire was used to place a peel-a-way sheath and a 5 [place] power injectable dual-lumen PICC was advanced with fluoroscopic guidance with the tip at the cavo-atrial junction.   The catheter flushed easily and there was a good blood return. The catheter was secured to the skin. The patient tolerated the procedure well and there were no immediate complications. FINDINGS: Fluoroscopic image demonstrates the tip of the catheter at the cavo-atrial junction. Successful ultrasound and fluoroscopy guided right basilic vein 5 Wol[place] power injectable dual-lumen PICC placement. Ready for use. Xr Chest Portable    Result Date: 8/22/2020  EXAMINATION: ONE XRAY VIEW OF THE CHEST 8/22/2020 8:41 am COMPARISON: 08/21/2020 HISTORY: ORDERING SYSTEM PROVIDED HISTORY: Difficulty breathing TECHNOLOGIST PROVIDED HISTORY: Difficulty breathing Reason for Exam: dyspnea Acuity: Acute Type of Exam: Initial FINDINGS: Cardiomegaly. Low lung volumes. Right-sided PICC line remains in place. No focal consolidation. No pleural effusion or pneumothorax. Low lung volumes. This accentuates cardiomegaly. No focal consolidation. Xr Chest Portable    Result Date: 8/21/2020  EXAMINATION: ONE XRAY VIEW OF THE CHEST 8/21/2020 9:27 am COMPARISON: Chest radiograph performed 08/21/2020. HISTORY: ORDERING SYSTEM PROVIDED HISTORY: Increased oxygen demand TECHNOLOGIST PROVIDED HISTORY: Increased oxygen demand Reason for Exam: Increased oxygen demand Acuity: Acute Type of Exam: Initial Additional signs and symptoms: Increased oxygen demand FINDINGS: There are small bilateral effusions. There is a left basilar infiltrate. There is no pneumothorax. The heart is prominent. The upper abdomen is unremarkable. The extrathoracic soft tissues are unremarkable. Cardiomegaly and small bilateral effusions with adjacent left basilar infiltrate.      Xr Chest Portable    Result Date: 8/20/2020  EXAMINATION: ONE XRAY VIEW OF THE CHEST 8/20/2020 7:38 am COMPARISON: July 18, 2020 chest exam HISTORY: ORDERING SYSTEM PROVIDED HISTORY: possible pneumonia on CT chest TECHNOLOGIST PROVIDED HISTORY: possible pneumonia on CT chest Reason for Exam: possible pneumonia on CT chest Acuity: Acute Type of Exam: Initial Additional signs and symptoms: possible pneumonia on CT chest FINDINGS: Expiratory exam with mild streaky bibasilar density. Normal cardiopericardial silhouette No significant pleural or mediastinal findings     Expiratory exam with mild streaky bibasilar atelectasis       Physical Examination:        Physical Exam  Constitutional:       Appearance: She is obese. She is ill-appearing. HENT:      Head: Normocephalic and atraumatic. Cardiovascular:      Rate and Rhythm: Normal rate and regular rhythm. Pulses: Normal pulses. Heart sounds: Normal heart sounds. No murmur. No gallop. Pulmonary:      Comments: On BiPaP FiOs 45% and precedex. Diminished breath sounds bilaterally but no crackles or wheeze. Abdominal:      Comments: Abdomen is softer compared to previous examination. Still slightly distended and edematous. Tenderness to palpation in upper quadrants. No blanching erythema on today's exam.    Musculoskeletal:      Right lower leg: Edema present. Left lower leg: Edema present. Comments: Pitting edema present on bilateral lower extremities to midshin. Skin:     General: Skin is warm and dry. Capillary Refill: Capillary refill takes less than 2 seconds. Neurological:      Mental Status: She is alert. Comments: On precedex. Patient is awake and alert to sound.            Assessment:        Primary Problem  Acute pancreatitis    Active Hospital Problems    Diagnosis Date Noted    History of anxiety disorder [Z86.59]     Acute respiratory failure with hypoxia ([facility] [J96.01] 08/21/2020    Hypocalcemia [E83.51] 08/21/2020    Sepsis ([facility] [A41.9] 08/20/2020    Morbid obesity ([facility] [E66.01]     Abdominal pain, epigastric [R10.13]     Nausea [R11.0]     Acute pancreatitis [K85.90] 08/19/2020    Fibromyalgia [M79.7]     HTN (hypertension) [I10] 12/15/2014    Major depression [F32.9] 10/30/2014    Restless leg syndrome [G25.81] 10/22/2013    Class 3 severe obesity due to excess calories with serious comorbidity in adult [facility]) [E66.01] 01/14/2013    Anxiety [F41.9] 01/14/2013       Plan:        Severe acute pancreatitis complicated by sepsis and possible ARDS  - Lipase 1,065 on admission --> 1613 --> 692 --> 273 today;  Amylase 435  - CT abdomen and pelvis showed pancreatic edema and fluid consistent with pancreatitis and reactive lymph nodes; repeat CT showed worsening peripancreatic inflammation but no evidence of pseudocyst  - NPO; nausea control; pain control with norco / dilaudid  - GI on board  - TGs 259 (H)  - Monitoring creatinine and urine output for possible compartment syndrome, gen surg consulted  - Alpha-1 antitrypsin mildly elevated at 208; mitochondrial antibodies and smooth muscle Ab pending  - IGGg subclasses pending to rule out autoimmune pancreatitis  - MRCP planned for when patient stable to r/o CBD stone  - Gen surg on board in case of possible abdominal compartment syndrome    Acute hypoxic respiratory failure secondary to ARDS secondary to pancreatitis vs. Pneumonia (less likely)  - 8/19 CT abdomen and pelvis showed bilateral lower lobe consolidation which could be edema or pneumonia  - On BiPaP and precedex  - Pulmonology on board  - CXR yesterday showed low lung volumes with no focal consolidation  - On zosyn day 4  - Procal elevated at 0.33; will consult ID if patient continues to spike fevers  - Started lorazepam q8h    Sepsis secondary to pancreatitis  - On 8/20 lactic acid 5.4; SIRS criteria of tachypnea, tachycardia, and elevated WBC 20.5 met  - On IV zosyn day 4  - Continue  ml/hr LR  - UA normal; blood cultures x2 done on 8/20 NGTD x 3 days; repeat blood cultures today for fevers  - Lactic acid stable at 1.6  - Leukocytosis resolving, 17.8 today     HTN  - Lopressor oral 25 mg daily or 5 mg IV BID  - Labetolol drip for persistent sinus tachycardia; currently held; can switch to coreg or labetolol PO when able per cardio  - Cardio signed off today    Hypocalcemia  - Awaiting ionized calcium for today, received IV calcium yesterday    Abdominal redness, resolved  - Not present on exam today  - Nasal MRSA swab negative    Anxiety/depression  - Buspar 15 mg nightly, cymbalta 60 mg BID, quetiapine 800 mg nightly    Restless leg syndrome  - Pramipexole 0.25 mg daily    PICC line in place  Diet: NPO; TPN starting Monday  DVT prophylaxis: lovenox 30 mg BID    Will discuss plan with attending, Dr. Ana Maria Alexandra. Kevin Grijalva MD  8/23/2020  7:14 AM       Attestation and add on       I have discussed the care of Quincy Whitt , including pertinent history and exam findings,    8/23/20   with the resident. I have seen and examined the patient and the key elements of all parts of the encounter have been performed by me . I agree with the assessment, plan and orders as documented by the resident. Principal Problem:    Acute pancreatitis  Active Problems:    Anxiety    Class 3 severe obesity due to excess calories with serious comorbidity in adult [facility])    Restless leg syndrome    Major depression    HTN (hypertension)    Fibromyalgia    Sepsis (HCC)    Morbid obesity (HCC)    Abdominal pain, epigastric    Nausea    Acute respiratory failure with hypoxia (HCC)    Hypocalcemia    History of anxiety disorder  Resolved Problems:    * No resolved hospital problems.  *        --   CLINICAL COURSE ---          clinical course has improved,    -Patient was on multiple psychoactive medications including BuSpar and Seroquel etc  Has a history of fibromyalgia anxiety depression  Some of her symptoms of moaning groaning are related with anxiety disorder         Condition    [x] ill ,     [x] high risk , [] critical ,        [x] improved but still labile                                        [] delirium ,      [] -----,                 [] I----     Unit  [x] ICU           [] PICU       [] MED_SRG             [] Other  Prognosis -              Medications: Allergies: Allergies   Allergen Reactions    Aripiprazole      Bad reaction    Eletriptan      Other reaction(s): Swelling-throat    Triptans [Sumatriptan]     Lamotrigine Rash       Current Meds:   Scheduled Meds:    insulin lispro  0-6 Units Subcutaneous TID WC    LORazepam  0.5 mg Intravenous Q8H    magnesium oxide  400 mg Oral Daily    piperacillin-tazobactam (ZOSYN) 3.375 g in dextrose 5% IVPB extended infusion (mini-bag)  3.375 g Intravenous Q8H    sodium chloride flush  10 mL Intravenous 2 times per day    enoxaparin  30 mg Subcutaneous BID    QUEtiapine  800 mg Oral Nightly    pramipexole  0.25 mg Oral Daily    pantoprazole  40 mg Intravenous Daily    And    sodium chloride (PF)  10 mL Intravenous Daily    busPIRone  15 mg Oral Nightly    DULoxetine  60 mg Oral BID    cetirizine  10 mg Oral Daily     Continuous Infusions:    PN-Adult 2-in-1 Central Line (Standard)      labetalol (NORMODYNE) 1 mg/mL infusion Stopped (08/22/20 0800)    dexmedetomidine (PRECEDEX) IV infusion 1.1 mcg/kg/hr (08/23/20 1354)    lactated ringers 150 mL/hr at 08/23/20 1347     PRN Meds: HYDROmorphone, perflutren lipid microspheres, ketorolac, promethazine (PHENERGAN) in sodium chloride 0.9% IVPB, sodium chloride flush, magnesium sulfate, potassium chloride **OR** potassium alternative oral replacement **OR** potassium chloride, acetaminophen, [DISCONTINUED] promethazine **OR** ondansetron, melatonin ER      ---- ;     [address] [place] [address], [address] [place] [address].    Phone [phone]   Fax: [phone]  Answering Service: [phone]

## 2020-08-23 NOTE — PROGRESS NOTES
Patient was seen and examined. Resting comfortably. On BiPAP. Low-grade temp earlier. Vital signs are otherwise stable. Urine output is excellent. Abdomen is soft morbidly obese tender upper abdomen. No peritoneal signs. Extremity positive edema. Blood work reviewed. BMP is normal.  Alk phos is mildly elevated. AST is mildly elevated possibly hepatic steatosis. Bilirubin is normal.  Lipase is down to normal today. WBC count continues to improve at 17.8. Hemoglobin is 10. Platelet count is adequate. Patient has PICC line in place. Dietitian to see the patient and start TPN today. Continue aggressive supportive care. Overall stable and doing better. Pulmonary toilet. DVT prophylaxis.

## 2020-08-23 NOTE — PLAN OF CARE
Tissue Perfusion, Altered:  Goal: Circulatory function within specified parameters  Description: Circulatory function within specified parameters  8/23/2020 0313 by Jw Lei RN  Outcome: Ongoing     Problem: Venous Thromboembolism:  Goal: Will show no signs or symptoms of venous thromboembolism  Description: Will show no signs or symptoms of venous thromboembolism  8/23/2020 0313 by Jw Lei RN  Outcome: Ongoing     Problem: Venous Thromboembolism:  Goal: Absence of signs or symptoms of impaired coagulation  Description: Absence of signs or symptoms of impaired coagulation  8/23/2020 0313 by Jw Lei RN  Outcome: Ongoing     Problem: Nutrition  Goal: Optimal nutrition therapy  8/23/2020 0313 by Jw Lei RN  Outcome: Ongoing

## 2020-08-23 NOTE — PROGRESS NOTES
PULMONARY PROGRESS NOTE:    REASON FOR VISIT: pancreatitis, resp failure  Interval History:  Poor historian, no BIPAP  Shortness of Breath: +  Cough: no  Sputum: no          Hemoptysis: no  Chest Pain: no  Fever: +              Swelling Feet: no  Headache: no                                           Nausea, Emesis, Abdominal Pain: +  Diarrhea: no         Constipation: no    Events since last visit: on BIPAP, precedex    PAST MEDICAL HISTORY:      Scheduled Meds:   calcium gluconate IVPB  1 g Intravenous Once    magnesium oxide  400 mg Oral Daily    piperacillin-tazobactam (ZOSYN) 3.375 g in dextrose 5% IVPB extended infusion (mini-bag)  3.375 g Intravenous Q8H    sodium chloride flush  10 mL Intravenous 2 times per day    enoxaparin  30 mg Subcutaneous BID    QUEtiapine  800 mg Oral Nightly    pramipexole  0.25 mg Oral Daily    pantoprazole  40 mg Intravenous Daily    And    sodium chloride (PF)  10 mL Intravenous Daily    busPIRone  15 mg Oral Nightly    DULoxetine  60 mg Oral BID    cetirizine  10 mg Oral Daily     Continuous Infusions:   labetalol (NORMODYNE) 1 mg/mL infusion Stopped (08/22/20 0800)    dexmedetomidine (PRECEDEX) IV infusion 1.2 mcg/kg/hr (08/23/20 1046)    lactated ringers 150 mL/hr at 08/23/20 0537     PRN Meds:HYDROmorphone, perflutren lipid microspheres, ketorolac, promethazine (PHENERGAN) in sodium chloride 0.9% IVPB, LORazepam, sodium chloride flush, magnesium sulfate, potassium chloride **OR** potassium alternative oral replacement **OR** potassium chloride, acetaminophen, [DISCONTINUED] promethazine **OR** ondansetron, melatonin ER        PHYSICAL EXAMINATION:  /69   Pulse 75   Temp 97.5 °F (36.4 °C) (Axillary)   Resp 19   Ht 5' 5\" (1.651 m)   Wt (!) 311 lb 4.6 oz (141.2 kg)   SpO2 97%   BMI 51.80 kg/m²     General : on BIPAP, febrile  Neck - supple, no lymphadenopathy, JVD not raised  Heart - regular rhythm, S1 and S2 normal; no additional sounds heard  Lungs - Air Entry- fair bilaterally; breath sounds : vesicular;   rales/crackles - absent  Abdomen - soft, no tenderness  Upper Extremities  - no cyanosis, mottling; edema : absent  Lower Extremities: no cyanosis, mottling; edema : absent    Current Laboratory, Radiologic, Microbiologic, and Diagnostic studies reviewed  Data ReviewCBC:   Recent Labs     08/21/20  0431 08/22/20  0432 08/23/20  0359   WBC 34.6* 25.4* 17.8*   RBC 4.46 3.75* 3.22*   HGB 13.9 11.5* 10.0*   HCT 41.7 35.7* 30.7*    234 199     BMP:   Recent Labs     08/21/20  1504 08/22/20  0432 08/23/20  0359   GLUCOSE 147* 151* 122*    136 140   K 4.1 3.8 3.8   BUN 9 14 13   CREATININE 0.66 0.77 0.63   CALCIUM 6.5* 6.8* 7.3*     ABGs:   Recent Labs     08/21/20  1045 08/22/20  0812   PHART 7.382 7.384   PO2ART 50.9* 80.0   [id number] 41.2 44.6   [id number] 24.5 26.6*   [id number] 86.4* 95.1      PT/INR:  No results found for: PTINR    ASSESSMENT / PLAN:    Acute pancreatitis- GI consulted, monitor amylase lipase - improving,   Lactic acidosis- resolved  Possible pneumonia- ABx   Diet NPO  Monitor end tidal Co2 with pain meds   acute hypoxic resp failure - O2/ NIPPV - BPAP to prn  precedex for restlessness  Reduce pain meds    This is a late note on patient seen by me earlier today.     Electronically signed by Yolanda Muse on 08/23/20 at 12:12 PM.

## 2020-08-24 ENCOUNTER — APPOINTMENT (OUTPATIENT)
Dept: GENERAL RADIOLOGY | Age: 45
DRG: 871 | End: 2020-08-24
Payer: COMMERCIAL

## 2020-08-24 LAB
ABSOLUTE BANDS #: 0.65 K/UL (ref 0–1)
ABSOLUTE EOS #: 0.16 K/UL (ref 0–0.4)
ABSOLUTE IMMATURE GRANULOCYTE: ABNORMAL K/UL (ref 0–0.3)
ABSOLUTE LYMPH #: 2.75 K/UL (ref 1–4.8)
ABSOLUTE MONO #: 1.62 K/UL (ref 0.1–1.3)
ALBUMIN SERPL-MCNC: 2.5 G/DL (ref 3.5–5.2)
ALBUMIN/GLOBULIN RATIO: ABNORMAL (ref 1–2.5)
ALP BLD-CCNC: 134 U/L (ref 35–104)
ALT SERPL-CCNC: 34 U/L (ref 5–33)
AMPHETAMINE SCREEN URINE: NEGATIVE
ANION GAP SERPL CALCULATED.3IONS-SCNC: 10 MMOL/L (ref 9–17)
AST SERPL-CCNC: 46 U/L
BANDS: 4 % (ref 0–10)
BARBITURATE SCREEN URINE: NEGATIVE
BASOPHILS # BLD: 0 % (ref 0–2)
BASOPHILS ABSOLUTE: 0 K/UL (ref 0–0.2)
BENZODIAZEPINE SCREEN, URINE: NEGATIVE
BILIRUB SERPL-MCNC: 0.38 MG/DL (ref 0.3–1.2)
BUN BLDV-MCNC: 14 MG/DL (ref 6–20)
BUN/CREAT BLD: ABNORMAL (ref 9–20)
BUPRENORPHINE URINE: ABNORMAL
CALCIUM SERPL-MCNC: 7.8 MG/DL (ref 8.6–10.4)
CANNABINOID SCREEN URINE: NEGATIVE
CHLORIDE BLD-SCNC: 107 MMOL/L (ref 98–107)
CO2: 25 MMOL/L (ref 20–31)
COCAINE METABOLITE, URINE: NEGATIVE
CREAT SERPL-MCNC: 0.64 MG/DL (ref 0.5–0.9)
DIFFERENTIAL TYPE: ABNORMAL
EOSINOPHILS RELATIVE PERCENT: 1 % (ref 0–4)
GFR AFRICAN AMERICAN: >60 ML/MIN
GFR NON-AFRICAN AMERICAN: >60 ML/MIN
GFR SERPL CREATININE-BSD FRML MDRD: ABNORMAL ML/MIN/{1.73_M2}
GFR SERPL CREATININE-BSD FRML MDRD: ABNORMAL ML/MIN/{1.73_M2}
GLUCOSE BLD-MCNC: 147 MG/DL (ref 65–105)
GLUCOSE BLD-MCNC: 149 MG/DL (ref 70–99)
GLUCOSE BLD-MCNC: 152 MG/DL (ref 65–105)
GLUCOSE BLD-MCNC: 167 MG/DL (ref 65–105)
HCT VFR BLD CALC: 29.6 % (ref 36–46)
HEMOGLOBIN: 9.7 G/DL (ref 12–16)
IGG 1: 257 MG/DL (ref 240–1118)
IGG 2: 339 MG/DL (ref 124–549)
IGG 3: 44 MG/DL (ref 21–134)
IGG 4: 49 MG/DL (ref 1–123)
IMMATURE GRANULOCYTES: ABNORMAL %
LIPASE: 30 U/L (ref 13–60)
LYMPHOCYTES # BLD: 17 % (ref 24–44)
MAGNESIUM: 2.2 MG/DL (ref 1.6–2.6)
MCH RBC QN AUTO: 31.3 PG (ref 26–34)
MCHC RBC AUTO-ENTMCNC: 32.9 G/DL (ref 31–37)
MCV RBC AUTO: 95.3 FL (ref 80–100)
MDMA URINE: ABNORMAL
METHADONE SCREEN, URINE: NEGATIVE
METHAMPHETAMINE, URINE: ABNORMAL
MONOCYTES # BLD: 10 % (ref 1–7)
MORPHOLOGY: NORMAL
NRBC AUTOMATED: ABNORMAL PER 100 WBC
OPIATES, URINE: POSITIVE
OXYCODONE SCREEN URINE: NEGATIVE
PDW BLD-RTO: 14.5 % (ref 11.5–14.9)
PHENCYCLIDINE, URINE: NEGATIVE
PHOSPHORUS: 2 MG/DL (ref 2.6–4.5)
PLATELET # BLD: 210 K/UL (ref 150–450)
PLATELET ESTIMATE: ABNORMAL
PMV BLD AUTO: 9.5 FL (ref 6–12)
POTASSIUM SERPL-SCNC: 3.9 MMOL/L (ref 3.7–5.3)
PROPOXYPHENE, URINE: ABNORMAL
RBC # BLD: 3.11 M/UL (ref 4–5.2)
RBC # BLD: ABNORMAL 10*6/UL
SEG NEUTROPHILS: 68 % (ref 36–66)
SEGMENTED NEUTROPHILS ABSOLUTE COUNT: 11.02 K/UL (ref 1.3–9.1)
SODIUM BLD-SCNC: 142 MMOL/L (ref 135–144)
TEST INFORMATION: ABNORMAL
TOTAL PROTEIN: 5.7 G/DL (ref 6.4–8.3)
TRICYCLIC ANTIDEPRESSANTS, UR: ABNORMAL
WBC # BLD: 16.2 K/UL (ref 3.5–11)
WBC # BLD: ABNORMAL 10*3/UL

## 2020-08-24 PROCEDURE — 2000000000 HC ICU R&B

## 2020-08-24 PROCEDURE — 83690 ASSAY OF LIPASE: CPT

## 2020-08-24 PROCEDURE — 6360000002 HC RX W HCPCS: Performed by: SURGERY

## 2020-08-24 PROCEDURE — 2500000003 HC RX 250 WO HCPCS: Performed by: INTERNAL MEDICINE

## 2020-08-24 PROCEDURE — 6370000000 HC RX 637 (ALT 250 FOR IP): Performed by: NURSE PRACTITIONER

## 2020-08-24 PROCEDURE — 2500000003 HC RX 250 WO HCPCS: Performed by: SURGERY

## 2020-08-24 PROCEDURE — 83735 ASSAY OF MAGNESIUM: CPT

## 2020-08-24 PROCEDURE — 2580000003 HC RX 258: Performed by: INTERNAL MEDICINE

## 2020-08-24 PROCEDURE — C9113 INJ PANTOPRAZOLE SODIUM, VIA: HCPCS | Performed by: NURSE PRACTITIONER

## 2020-08-24 PROCEDURE — 71045 X-RAY EXAM CHEST 1 VIEW: CPT

## 2020-08-24 PROCEDURE — 2700000000 HC OXYGEN THERAPY PER DAY

## 2020-08-24 PROCEDURE — 94761 N-INVAS EAR/PLS OXIMETRY MLT: CPT

## 2020-08-24 PROCEDURE — 94660 CPAP INITIATION&MGMT: CPT

## 2020-08-24 PROCEDURE — 99233 SBSQ HOSP IP/OBS HIGH 50: CPT | Performed by: INTERNAL MEDICINE

## 2020-08-24 PROCEDURE — APPNB30 APP NON BILLABLE TIME 0-30 MINS: Performed by: NURSE PRACTITIONER

## 2020-08-24 PROCEDURE — 85025 COMPLETE CBC W/AUTO DIFF WBC: CPT

## 2020-08-24 PROCEDURE — 84100 ASSAY OF PHOSPHORUS: CPT

## 2020-08-24 PROCEDURE — 99291 CRITICAL CARE FIRST HOUR: CPT | Performed by: INTERNAL MEDICINE

## 2020-08-24 PROCEDURE — 36415 COLL VENOUS BLD VENIPUNCTURE: CPT

## 2020-08-24 PROCEDURE — 2500000003 HC RX 250 WO HCPCS: Performed by: STUDENT IN AN ORGANIZED HEALTH CARE EDUCATION/TRAINING PROGRAM

## 2020-08-24 PROCEDURE — 82947 ASSAY GLUCOSE BLOOD QUANT: CPT

## 2020-08-24 PROCEDURE — 6360000002 HC RX W HCPCS: Performed by: NURSE PRACTITIONER

## 2020-08-24 PROCEDURE — 80307 DRUG TEST PRSMV CHEM ANLYZR: CPT

## 2020-08-24 PROCEDURE — 80053 COMPREHEN METABOLIC PANEL: CPT

## 2020-08-24 PROCEDURE — 2580000003 HC RX 258: Performed by: NURSE PRACTITIONER

## 2020-08-24 PROCEDURE — 6360000002 HC RX W HCPCS: Performed by: STUDENT IN AN ORGANIZED HEALTH CARE EDUCATION/TRAINING PROGRAM

## 2020-08-24 PROCEDURE — 6370000000 HC RX 637 (ALT 250 FOR IP): Performed by: SURGERY

## 2020-08-24 RX ORDER — NICOTINE POLACRILEX 4 MG
15 LOZENGE BUCCAL PRN
Status: DISCONTINUED | OUTPATIENT
Start: 2020-08-24 | End: 2020-09-04

## 2020-08-24 RX ORDER — DEXTROSE MONOHYDRATE 25 G/50ML
12.5 INJECTION, SOLUTION INTRAVENOUS PRN
Status: DISCONTINUED | OUTPATIENT
Start: 2020-08-24 | End: 2020-09-04

## 2020-08-24 RX ORDER — DEXTROSE MONOHYDRATE 50 MG/ML
100 INJECTION, SOLUTION INTRAVENOUS PRN
Status: DISCONTINUED | OUTPATIENT
Start: 2020-08-24 | End: 2020-09-04

## 2020-08-24 RX ORDER — ENALAPRILAT 2.5 MG/2ML
1.25 INJECTION INTRAVENOUS EVERY 6 HOURS SCHEDULED
Status: DISCONTINUED | OUTPATIENT
Start: 2020-08-24 | End: 2020-08-30

## 2020-08-24 RX ADMIN — PIPERACILLIN SODIUM AND TAZOBACTAM SODIUM 3.38 G: 3; .375 INJECTION, POWDER, LYOPHILIZED, FOR SOLUTION INTRAVENOUS at 06:10

## 2020-08-24 RX ADMIN — DEXMEDETOMIDINE 0.9 MCG/KG/HR: 100 INJECTION, SOLUTION, CONCENTRATE INTRAVENOUS at 02:39

## 2020-08-24 RX ADMIN — SODIUM CHLORIDE, POTASSIUM CHLORIDE, SODIUM LACTATE AND CALCIUM CHLORIDE: 600; 310; 30; 20 INJECTION, SOLUTION INTRAVENOUS at 00:56

## 2020-08-24 RX ADMIN — HYDROMORPHONE HYDROCHLORIDE 0.5 MG: 1 INJECTION, SOLUTION INTRAMUSCULAR; INTRAVENOUS; SUBCUTANEOUS at 13:54

## 2020-08-24 RX ADMIN — DEXMEDETOMIDINE 0.9 MCG/KG/HR: 100 INJECTION, SOLUTION, CONCENTRATE INTRAVENOUS at 09:47

## 2020-08-24 RX ADMIN — ENALAPRILAT 1.25 MG: 1.25 INJECTION INTRAVENOUS at 09:46

## 2020-08-24 RX ADMIN — ENALAPRILAT 1.25 MG: 1.25 INJECTION INTRAVENOUS at 18:01

## 2020-08-24 RX ADMIN — KETOROLAC TROMETHAMINE 15 MG: 30 INJECTION, SOLUTION INTRAMUSCULAR at 19:00

## 2020-08-24 RX ADMIN — HYDROMORPHONE HYDROCHLORIDE 1 MG: 1 INJECTION, SOLUTION INTRAMUSCULAR; INTRAVENOUS; SUBCUTANEOUS at 06:29

## 2020-08-24 RX ADMIN — ENALAPRILAT 1.25 MG: 1.25 INJECTION INTRAVENOUS at 12:29

## 2020-08-24 RX ADMIN — PIPERACILLIN SODIUM AND TAZOBACTAM SODIUM 3.38 G: 3; .375 INJECTION, POWDER, LYOPHILIZED, FOR SOLUTION INTRAVENOUS at 14:18

## 2020-08-24 RX ADMIN — KETOROLAC TROMETHAMINE 15 MG: 30 INJECTION, SOLUTION INTRAMUSCULAR at 03:34

## 2020-08-24 RX ADMIN — DEXMEDETOMIDINE 0.9 MCG/KG/HR: 100 INJECTION, SOLUTION, CONCENTRATE INTRAVENOUS at 06:06

## 2020-08-24 RX ADMIN — ENOXAPARIN SODIUM 30 MG: 30 INJECTION SUBCUTANEOUS at 09:45

## 2020-08-24 RX ADMIN — PANTOPRAZOLE SODIUM 40 MG: 40 INJECTION, POWDER, FOR SOLUTION INTRAVENOUS at 09:46

## 2020-08-24 RX ADMIN — DEXMEDETOMIDINE 1 MCG/KG/HR: 100 INJECTION, SOLUTION, CONCENTRATE INTRAVENOUS at 18:05

## 2020-08-24 RX ADMIN — LORAZEPAM 0.5 MG: 2 INJECTION INTRAMUSCULAR; INTRAVENOUS at 13:54

## 2020-08-24 RX ADMIN — LORAZEPAM 0.5 MG: 2 INJECTION INTRAMUSCULAR; INTRAVENOUS at 22:24

## 2020-08-24 RX ADMIN — I.V. FAT EMULSION 100 ML: 20 EMULSION INTRAVENOUS at 17:57

## 2020-08-24 RX ADMIN — INSULIN LISPRO 1 UNITS: 100 INJECTION, SOLUTION INTRAVENOUS; SUBCUTANEOUS at 18:01

## 2020-08-24 RX ADMIN — ACETAMINOPHEN 650 MG: 325 TABLET, FILM COATED ORAL at 19:11

## 2020-08-24 RX ADMIN — Medication 10 ML: at 09:46

## 2020-08-24 RX ADMIN — HYDROMORPHONE HYDROCHLORIDE 1 MG: 1 INJECTION, SOLUTION INTRAMUSCULAR; INTRAVENOUS; SUBCUTANEOUS at 00:30

## 2020-08-24 RX ADMIN — DEXMEDETOMIDINE 1 MCG/KG/HR: 100 INJECTION, SOLUTION, CONCENTRATE INTRAVENOUS at 21:42

## 2020-08-24 RX ADMIN — DEXMEDETOMIDINE 0.7 MCG/KG/HR: 100 INJECTION, SOLUTION, CONCENTRATE INTRAVENOUS at 13:55

## 2020-08-24 RX ADMIN — POTASSIUM CHLORIDE: 2 INJECTION, SOLUTION, CONCENTRATE INTRAVENOUS at 17:57

## 2020-08-24 RX ADMIN — LORAZEPAM 0.5 MG: 2 INJECTION INTRAMUSCULAR; INTRAVENOUS at 06:10

## 2020-08-24 RX ADMIN — INSULIN LISPRO 1 UNITS: 100 INJECTION, SOLUTION INTRAVENOUS; SUBCUTANEOUS at 09:45

## 2020-08-24 RX ADMIN — KETOROLAC TROMETHAMINE 15 MG: 30 INJECTION, SOLUTION INTRAMUSCULAR at 11:39

## 2020-08-24 RX ADMIN — ENOXAPARIN SODIUM 30 MG: 30 INJECTION SUBCUTANEOUS at 19:01

## 2020-08-24 RX ADMIN — PIPERACILLIN SODIUM AND TAZOBACTAM SODIUM 3.38 G: 3; .375 INJECTION, POWDER, LYOPHILIZED, FOR SOLUTION INTRAVENOUS at 22:24

## 2020-08-24 RX ADMIN — INSULIN LISPRO 1 UNITS: 100 INJECTION, SOLUTION INTRAVENOUS; SUBCUTANEOUS at 12:29

## 2020-08-24 RX ADMIN — ENALAPRILAT 1.25 MG: 1.25 INJECTION INTRAVENOUS at 23:49

## 2020-08-24 ASSESSMENT — PAIN DESCRIPTION - LOCATION
LOCATION: ABDOMEN

## 2020-08-24 ASSESSMENT — PAIN DESCRIPTION - PAIN TYPE
TYPE: ACUTE PAIN

## 2020-08-24 ASSESSMENT — PAIN DESCRIPTION - PROGRESSION

## 2020-08-24 ASSESSMENT — PAIN DESCRIPTION - FREQUENCY
FREQUENCY: CONTINUOUS

## 2020-08-24 ASSESSMENT — PAIN - FUNCTIONAL ASSESSMENT
PAIN_FUNCTIONAL_ASSESSMENT: PREVENTS OR INTERFERES SOME ACTIVE ACTIVITIES AND ADLS

## 2020-08-24 ASSESSMENT — PAIN SCALES - GENERAL
PAINLEVEL_OUTOF10: 8
PAINLEVEL_OUTOF10: 6
PAINLEVEL_OUTOF10: 5
PAINLEVEL_OUTOF10: 4
PAINLEVEL_OUTOF10: 8
PAINLEVEL_OUTOF10: 4
PAINLEVEL_OUTOF10: 2
PAINLEVEL_OUTOF10: 8
PAINLEVEL_OUTOF10: 8
PAINLEVEL_OUTOF10: 9
PAINLEVEL_OUTOF10: 6
PAINLEVEL_OUTOF10: 5
PAINLEVEL_OUTOF10: 8
PAINLEVEL_OUTOF10: 3

## 2020-08-24 ASSESSMENT — PAIN DESCRIPTION - ORIENTATION
ORIENTATION: MID

## 2020-08-24 ASSESSMENT — PAIN DESCRIPTION - DESCRIPTORS
DESCRIPTORS: SORE

## 2020-08-24 ASSESSMENT — PAIN DESCRIPTION - ONSET
ONSET: ON-GOING

## 2020-08-24 NOTE — PROGRESS NOTES
Eletriptan      Other reaction(s): Swelling-throat    Triptans [Sumatriptan]     Lamotrigine Rash       Current Meds:   Scheduled Meds:    insulin lispro  0-6 Units Subcutaneous TID WC    LORazepam  0.5 mg Intravenous Q8H    magnesium oxide  400 mg Oral Daily    piperacillin-tazobactam (ZOSYN) 3.375 g in dextrose 5% IVPB extended infusion (mini-bag)  3.375 g Intravenous Q8H    sodium chloride flush  10 mL Intravenous 2 times per day    enoxaparin  30 mg Subcutaneous BID    QUEtiapine  800 mg Oral Nightly    pramipexole  0.25 mg Oral Daily    pantoprazole  40 mg Intravenous Daily    And    sodium chloride (PF)  10 mL Intravenous Daily    busPIRone  15 mg Oral Nightly    DULoxetine  60 mg Oral BID    cetirizine  10 mg Oral Daily     Continuous Infusions:    PN-Adult 2-in-1 Central Line (Standard) 41.7 mL/hr at 08/23/20 1858    labetalol (NORMODYNE) 1 mg/mL infusion Stopped (08/22/20 0800)    dexmedetomidine (PRECEDEX) IV infusion 0.9 mcg/kg/hr (08/24/20 0606)    lactated ringers 150 mL/hr at 08/24/20 0056     PRN Meds: HYDROmorphone, perflutren lipid microspheres, ketorolac, promethazine (PHENERGAN) in sodium chloride 0.9% IVPB, sodium chloride flush, magnesium sulfate, potassium chloride **OR** potassium alternative oral replacement **OR** potassium chloride, acetaminophen, [DISCONTINUED] promethazine **OR** ondansetron, melatonin ER    Data:     Past Medical History:   has a past medical history of Anxiety, Chronic kidney disease, Depression, DVT (deep venous thrombosis) ([address] Fibromyalgia, Headache(784.0), Hx of blood clots, Kidney stone, Pancreatitis, Pleural effusion, and SVT (supraventricular tachycardia) ([address] Social History:   reports that she quit smoking about 8 months ago. Her smoking use included cigarettes. She smoked 1.00 pack per day. She has never used smokeless tobacco. She reports current alcohol use. She reports that she does not use drugs.      Family History:   Family History   Problem Relation Age of Onset    Heart Disease Father     High Blood Pressure Brother        Vitals:  BP (!) 171/111   Pulse 112   Temp 98.6 °F (37 °C) (Axillary)   Resp (!) 31   Ht 5' 5\" (1.651 m)   Wt (!) 315 lb 4.1 oz (143 kg)   SpO2 92%   BMI 52.46 kg/m²   Temp (24hrs), Av.4 °F (37.4 °C), Min:97.5 °F (36.4 °C), Max:101.4 °F (38.6 °C)    No results for input(s): POCGLU in the last 72 hours. I/O(24Hr):     Intake/Output Summary (Last 24 hours) at 2020 0647  Last data filed at 2020 0503  Gross per 24 hour   Intake 4984.09 ml   Output 950 ml   Net 4034.09 ml       Labs:    CBC:   Lab Results   Component Value Date    WBC 16.2 2020    RBC 3.11 2020    HGB 9.7 2020    HCT 29.6 2020    MCV 95.3 2020    MCH 31.3 2020    MCHC 32.9 2020    RDW 14.5 2020     2020    MPV 9.5 2020     CMP:    Lab Results   Component Value Date     2020    K 3.9 2020     2020    CO2 25 2020    BUN 14 2020    CREATININE 0.64 2020    GFRAA >60 2020    LABGLOM >60 2020    GLUCOSE 149 2020    PROT 5.7 2020    PROT 7.2 2015    LABALBU 2.5 2020    CALCIUM 7.8 2020    BILITOT 0.38 2020    ALKPHOS 134 2020    AST 46 2020    ALT 34 2020     ABG:  No results found for: PH, PCO2, PO2, HCO3, BE, THGB, TCO2, O2SAT  LIPASE:    Lab Results   Component Value Date    LIPASE 30 2020       Lab Results   Component Value Date/Time    SPECIAL NOT REPORTED 2020 10:48 PM     Lab Results   Component Value Date/Time    CULTURE NO GROWTH 3 DAYS 2020 09:25 AM         Radiology:    Ct Abdomen Pelvis Wo Contrast Additional Contrast? None    Result Date: 2020  EXAMINATION: CT OF THE ABDOMEN AND PELVIS WITHOUT CONTRAST 2020 10:22 pm TECHNIQUE: CT of the abdomen and pelvis was performed without the administration of intravenous free fluid is seen. 1. Mild interval worsening of severe peripancreatic inflammation associated with acute pancreatitis, as discussed above. 2. No evidence of complication i.e. pseudocyst noted. 3. Stable bilateral lower lung multifocal consolidation atelectasis suggesting pneumonia versus alveolar edema. 4. Hepatic steatosis, unchanged. Xr Chest (single View Frontal)    Result Date: 8/21/2020  EXAMINATION: ONE XRAY VIEW OF THE CHEST 8/21/2020 6:06 am COMPARISON: August 20, 2020 chest exam HISTORY: ORDERING SYSTEM PROVIDED HISTORY: f/u atelectasis TECHNOLOGIST PROVIDED HISTORY: f/u atelectasis Reason for Exam: F/U atelectasis. Acuity: Unknown Type of Exam: Unknown Additional signs and symptoms: F/U atelectasis. FINDINGS: Interval insertion of right PICC line catheter, the tip projected at the right atrium Mild cardiomegaly Limited extra sonya exam with low volume lungs, mild streaky, left greater than right, bibasilar atelectasis. Grossly clear upper lungs     Line placement as above Limited expiratory exam with mild streaky bibasilar atelectasis     Ct Abdomen Pelvis W Iv Contrast Additional Contrast? None    Addendum Date: 8/20/2020    ADDENDUM: As stated in the body of the report, the abdominal aorta is normal in size. There is no significant atherosclerosis. Result Date: 8/20/2020  EXAMINATION: CT OF THE ABDOMEN AND PELVIS WITH CONTRAST 8/19/2020 10:11 pm TECHNIQUE: CT of the abdomen and pelvis was performed with the administration of intravenous contrast. Multiplanar reformatted images are provided for review. Dose modulation, iterative reconstruction, and/or weight based adjustment of the mA/kV was utilized to reduce the radiation dose to as low as reasonably achievable. COMPARISON: None.  HISTORY: ORDERING SYSTEM PROVIDED HISTORY: pain TECHNOLOGIST PROVIDED HISTORY: pain Reason for Exam: pt c/o all over abdominal pain with nausea for a few hours Acuity: Acute Type of Exam: Initial Relevant achieved with lidocaine. A micropuncture needle was used to access the right basilic vein using ultrasound guidance. An ultrasound image demonstrating patency of the vein with needle tip located within it. An image was obtained and stored in PACs. A 0.018 guidewire was used to place a peel-a-way sheath and a 5 [place] power injectable dual-lumen PICC was advanced with fluoroscopic guidance with the tip at the cavo-atrial junction. The catheter flushed easily and there was a good blood return. The catheter was secured to the skin. The patient tolerated the procedure well and there were no immediate complications. FINDINGS: Fluoroscopic image demonstrates the tip of the catheter at the cavo-atrial junction. Successful ultrasound and fluoroscopy guided right basilic vein 5 [place] power injectable dual-lumen PICC placement. Ready for use. Xr Chest Portable    Result Date: 8/22/2020  EXAMINATION: ONE XRAY VIEW OF THE CHEST 8/22/2020 8:41 am COMPARISON: 08/21/2020 HISTORY: ORDERING SYSTEM PROVIDED HISTORY: Difficulty breathing TECHNOLOGIST PROVIDED HISTORY: Difficulty breathing Reason for Exam: dyspnea Acuity: Acute Type of Exam: Initial FINDINGS: Cardiomegaly. Low lung volumes. Right-sided PICC line remains in place. No focal consolidation. No pleural effusion or pneumothorax. Low lung volumes. This accentuates cardiomegaly. No focal consolidation. Xr Chest Portable    Result Date: 8/21/2020  EXAMINATION: ONE XRAY VIEW OF THE CHEST 8/21/2020 9:27 am COMPARISON: Chest radiograph performed 08/21/2020. HISTORY: ORDERING SYSTEM PROVIDED HISTORY: Increased oxygen demand TECHNOLOGIST PROVIDED HISTORY: Increased oxygen demand Reason for Exam: Increased oxygen demand Acuity: Acute Type of Exam: Initial Additional signs and symptoms: Increased oxygen demand FINDINGS: There are small bilateral effusions. There is a left basilar infiltrate. There is no pneumothorax. The heart is prominent.   The upper abdomen is unremarkable. The extrathoracic soft tissues are unremarkable. Cardiomegaly and small bilateral effusions with adjacent left basilar infiltrate. Xr Chest Portable    Result Date: 8/20/2020  EXAMINATION: ONE XRAY VIEW OF THE CHEST 8/20/2020 7:38 am COMPARISON: July 18, 2020 chest exam HISTORY: ORDERING SYSTEM PROVIDED HISTORY: possible pneumonia on CT chest TECHNOLOGIST PROVIDED HISTORY: possible pneumonia on CT chest Reason for Exam: possible pneumonia on CT chest Acuity: Acute Type of Exam: Initial Additional signs and symptoms: possible pneumonia on CT chest FINDINGS: Expiratory exam with mild streaky bibasilar density. Normal cardiopericardial silhouette No significant pleural or mediastinal findings     Expiratory exam with mild streaky bibasilar atelectasis         Physical Examination:        Physical Exam  Constitutional:       General: She is not in acute distress. Appearance: She is obese. She is ill-appearing. HENT:      Head: Normocephalic and atraumatic. Cardiovascular:      Rate and Rhythm: Normal rate and regular rhythm. Pulses: Normal pulses. Heart sounds: Normal heart sounds. No murmur. No gallop. Pulmonary:      Comments: Patient on BiPaP this AM.  Breath sounds clear to auscultation bilaterally with no crackles or wheezes. Abdominal:      Comments: Soft, tender to palpation, and distended but not taut. There is no more abdominal erythema. Musculoskeletal:      Comments: Significant pitting edema present on the bilateral lower extremities. Neurological:      Mental Status: She is alert. Comments: Patient is awake and alert. She does not respond to questioning but does follow commands.            Assessment:        Primary Problem  Acute pancreatitis    Active Hospital Problems    Diagnosis Date Noted    History of anxiety disorder [Z86.59]     Acute respiratory failure with hypoxia ([facility] [J96.01] 08/21/2020    Hypocalcemia [E83.51] 08/21/2020  Sepsis ([facility] [A41.9] 08/20/2020    Morbid obesity ([facility] [E66.01]     Abdominal pain, epigastric [R10.13]     Nausea [R11.0]     Acute pancreatitis [K85.90] 08/19/2020    Fibromyalgia [M79.7]     HTN (hypertension) [I10] 12/15/2014    Major depression [F32.9] 10/30/2014    Restless leg syndrome [G25.81] 10/22/2013    Class 3 severe obesity due to excess calories with serious comorbidity in adult [facility]) [E66.01] 01/14/2013    Anxiety [F41.9] 01/14/2013       Plan:        Severe acute pancreatitis complicated by sepsis and possible ARDS  - Lipase 1,065 on admission --> 1613 --> 692 --> 273 today --> 30; Amylase 435  - CT abdomen and pelvis showed pancreatic edema and fluid consistent with pancreatitis and reactive lymph nodes; repeat CT showed worsening peripancreatic inflammation but no evidence of pseudocyst  - NPO; nausea control; pain control with dilaudid  - GI on board  - TGs 259 (H)  - Monitoring creatinine and urine output for possible compartment syndrome, gen surg consulted  - Alpha-1 antitrypsin mildly elevated at 208; mitochondrial antibodies and smooth muscle Ab normal  - IGGg subclasses pending to rule out autoimmune pancreatitis  - MRCP planned for when patient stable to r/o CBD stone  - Gen surg on board in case of possible abdominal compartment syndrome    Acute hypoxic respiratory failure secondary to ARDS secondary to pancreatitis vs. Pneumonia (less likely)  - 8/19 CT abdomen and pelvis showed bilateral lower lobe consolidation which could be edema or pneumonia  - On BiPaP and precedex  - Pulmonology on board  - Last CXR showed low lung volumes with no focal consolidation; repeat today pending  - On zosyn day 5  - Procal elevated at 0.33; will consult ID if patient continues to spike fevers  - Lorazepam q8h    Sepsis secondary to pancreatitis  - On 8/20 lactic acid 5.4; SIRS criteria of tachypnea, tachycardia, and elevated WBC 20.5 met  - On IV zosyn day 5  - UA normal; blood cultures x2 done on 8/20 NGTD x 3 days; repeat blood cultures on 8/23  - Lactic acid stable within normal limits  - Leukocytosis resolving, 16.2 (peak 34) today   - Held IVF today due to concern for fluid overload, net positive 11L with low urine output; bladder scan pending    HTN  - Labetolol drip for persistent sinus tachycardia; currently held; can switch to coreg or labetolol PO when able per cardio  - Started IV vasotec 1.25 mg q6h today for elevated BP readings  - Cardio signed off    Hypocalcemia  - Awaiting ionized calcium for today, received IV calcium yesterday    Anxiety/depression  - Buspar 15 mg nightly, cymbalta 60 mg BID, quetiapine 800 mg nightly    Restless leg syndrome  - Pramipexole 0.25 mg daily    PICC line in place  Diet: TPN  DVT prophylaxis: lovenox 30 mg BID    Will discuss plan with attending, Dr. Medina Carlin. Jocelyne Huertas MD  8/24/2020  6:47 AM     Attending Physician Statement  I have discussed the care of Joao Garza and I have examined the patient myselft and taken ros and hpi , including pertinent history and exam findings,  with the resident. I have reviewed the key elements of all parts of the encounter with the resident.   I agree with the assessment, plan and orders as documented by the resident.cc .28  Morbidly obese 69-year-old lady with BMI 52.46 initially admitted for idiopathic pancreatitis  Patient daily use of vaping      Acute lung injury versus vaping induced lung injury on BiPAP support in ICU Precedex drip for agitation pain control with IV opioids supportive care  With acute respiratory failure acute lung injury and severe pancreatitis and morbid obesity at risk of bad outcome patient's condition is critical hence admitted to ICU  Electronically signed by Taylor Juan MD

## 2020-08-24 NOTE — PROGRESS NOTES
Dr. Shireen Ordaz at bedside to assess, RN updated physician on pt status, orders for MRI of ABD, pt not stable due to demand of BiPAP. Okay to hold off on MRI until pt stable.

## 2020-08-24 NOTE — PLAN OF CARE
Absence of signs or symptoms of impaired coagulation  Description: Absence of signs or symptoms of impaired coagulation  Outcome: Ongoing     Problem: Nutrition  Goal: Optimal nutrition therapy  Outcome: Ongoing  Note: Pt has TPN infusing. Problem: Skin Integrity:  Goal: Will show no infection signs and symptoms  Description: Will show no infection signs and symptoms  Outcome: Ongoing     Problem: Skin Integrity:  Goal: Absence of new skin breakdown  Description: Absence of new skin breakdown  Outcome: Ongoing  Note: Pt able to turn and reposition self. Pt educated on the importance of frequent repositioning in order to prevent skin breakdown. Pt assisted with turns as needed. Skin remains dry and intact. No new skin breakdown noted.

## 2020-08-24 NOTE — FLOWSHEET NOTE
08/24/20 1802   Encounter Summary   Services provided to: Patient   Referral/Consult From: Rounding   Complexity of Encounter Low   Length of Encounter 15 minutes   Spiritual/Oriental orthodox   Type Spiritual support   Assessment Sleeping   Intervention Prayer

## 2020-08-24 NOTE — PROGRESS NOTES
PULMONARY PROGRESS NOTE:    REASON FOR VISIT: pancreatitis, resp failure  Interval History:    Shortness of Breath: +  Cough: no  Sputum: no          Hemoptysis: no  Chest Pain: no  Fever: +              Swelling Feet: no  Headache: no                                           Nausea, Emesis - denies   Abdominal Pain: +  Diarrhea: no         Constipation: no    Events since last visit: wore bipap overnight,  precedex    PAST MEDICAL HISTORY:      Scheduled Meds:   enalaprilat  1.25 mg Intravenous 4 times per day    insulin lispro  0-6 Units Subcutaneous TID WC    LORazepam  0.5 mg Intravenous Q8H    magnesium oxide  400 mg Oral Daily    piperacillin-tazobactam (ZOSYN) 3.375 g in dextrose 5% IVPB extended infusion (mini-bag)  3.375 g Intravenous Q8H    sodium chloride flush  10 mL Intravenous 2 times per day    enoxaparin  30 mg Subcutaneous BID    QUEtiapine  800 mg Oral Nightly    pramipexole  0.25 mg Oral Daily    pantoprazole  40 mg Intravenous Daily    And    sodium chloride (PF)  10 mL Intravenous Daily    busPIRone  15 mg Oral Nightly    DULoxetine  60 mg Oral BID    cetirizine  10 mg Oral Daily     Continuous Infusions:   dextrose      PN-Adult 2-in-1 Central Line (Standard) 75 mL/hr at 08/24/20 1757    fat emulsion 100 mL (08/24/20 1757)    dexmedetomidine (PRECEDEX) IV infusion 1 mcg/kg/hr (08/24/20 1805)     PRN Meds:HYDROmorphone, glucose, dextrose, glucagon (rDNA), dextrose, perflutren lipid microspheres, ketorolac, promethazine (PHENERGAN) in sodium chloride 0.9% IVPB, sodium chloride flush, magnesium sulfate, potassium chloride **OR** potassium alternative oral replacement **OR** potassium chloride, acetaminophen, [DISCONTINUED] promethazine **OR** ondansetron, melatonin ER        PHYSICAL EXAMINATION:  BP (!) 158/84   Pulse 108   Temp 98.6 °F (37 °C) (Axillary)   Resp 29   Ht 5' 5\" (1.651 m)   Wt (!) 315 lb 4.1 oz (143 kg)   SpO2 97%   BMI 52.46 kg/m²   tmax 100.3  General : in no acute distress - awakens to answer questions, sleepy  Neck - supple, no lymphadenopathy, JVD not raised  Heart -   Lungs - Air Entry- fair bilaterally; breath sounds : vesicular, 92% on 4 l nc   rales/crackles - absent  Abdomen - soft, no tenderness  Upper Extremities  - no cyanosis, mottling; edema : absent  Lower Extremities: no cyanosis, mottling; edema : absent    Current Laboratory, Radiologic, Microbiologic, and Diagnostic studies reviewed  Data ReviewCBC:   Recent Labs     08/22/20  0432 08/23/20  0359 08/24/20  0343   WBC 25.4* 17.8* 16.2*   RBC 3.75* 3.22* 3.11*   HGB 11.5* 10.0* 9.7*   HCT 35.7* 30.7* 29.6*    199 210     BMP:   Recent Labs     08/22/20  0432 08/23/20  0359 08/24/20  0343   GLUCOSE 151* 122* 149*    140 142   K 3.8 3.8 3.9   BUN 14 13 14   CREATININE 0.77 0.63 0.64   CALCIUM 6.8* 7.3* 7.8*     ABGs:   Recent Labs     08/22/20  0812   PHART 7.384   PO2ART 80.0   [id number] 44.6   [id number] 26.6*   [id number] 95.1      PT/INR:  No results found for: PTINR    ASSESSMENT / PLAN:    Acute pancreatitis- GI consulted, monitor amylase lipase - improving,   Lactic acidosis- resolved  Possible pneumonia- ABx   Diet NPO  Monitor end tidal Co2 with pain meds   acute hypoxic resp failure - O2/ NIPPV - BPAP prn  precedex for restlessness  Reduce pain meds    This is a late note on patient seen by me earlier today.   Electronically signed by Agustin Collier on 08/24/20 at 6:34 PM

## 2020-08-24 NOTE — PLAN OF CARE
Problem: Falls - Risk of:  Goal: Will remain free from falls  Description: Will remain free from falls  Outcome: Ongoing  Note: Pt remains free from falls this shift, Fall precautions remain in place. Problem: Pain:  Goal: Pain level will decrease  Description: Pain level will decrease  Outcome: Ongoing  Note: Pt's pain assessed at regular intervals throughout the shift. PRN pain medication given per physician orders. Problem: Gas Exchange - Impaired:  Goal: Levels of oxygenation will improve  Description: Levels of oxygenation will improve  Outcome: Ongoing  Note: Pt remains on Bipap PRN throughout the shift. Pt on 4 liters NC as tolerated.

## 2020-08-24 NOTE — PROGRESS NOTES
[facility]    Gastroenterology Daily Progress Note      Patient:   Damaris Borrero   :    1975   [facility]   [facility]  Date:     2020  Consultant:   Ricky Flores CNP      SUBJECTIVE  54 y.o. female admitted 2020 with Acute pancreatitis, unspecified complication status, unspecified pancreatitis type [K85.90] and seen for pancreatitis. The pt was seen and examined. She was placed on bipap again today. Continues to have left sided abdominal pain. IGG subclasses are normal. lft's elevated from yesterday. Leucocytosis decreased.          OBJECTIVE  Scheduled Meds:   insulin lispro  0-6 Units Subcutaneous TID WC    LORazepam  0.5 mg Intravenous Q8H    magnesium oxide  400 mg Oral Daily    piperacillin-tazobactam (ZOSYN) 3.375 g in dextrose 5% IVPB extended infusion (mini-bag)  3.375 g Intravenous Q8H    sodium chloride flush  10 mL Intravenous 2 times per day    enoxaparin  30 mg Subcutaneous BID    QUEtiapine  800 mg Oral Nightly    pramipexole  0.25 mg Oral Daily    pantoprazole  40 mg Intravenous Daily    And    sodium chloride (PF)  10 mL Intravenous Daily    busPIRone  15 mg Oral Nightly    DULoxetine  60 mg Oral BID    cetirizine  10 mg Oral Daily       Vital Signs:  BP (!) 152/95   Pulse 107   Temp 98.6 °F (37 °C) (Axillary)   Resp 23   Ht 5' 5\" (1.651 m)   Wt (!) 315 lb 4.1 oz (143 kg)   SpO2 94%   BMI 52.46 kg/m²      Physical Exam:     General Appearance: ill appearing,lert and oriented to person, place and time, well-developed and well-nourished, in no acute distress  Skin: warm and dry, no rash or erythema  Head: normocephalic and atraumatic  Eyes: pupils equal, round, and reactive to light, extraocular eye movements intact, conjunctivae normal  ENT: hearing grossly normal bilaterally  Neck: neck supple and non tender without mass, no thyromegaly or thyroid nodules, no cervical lymphadenopathy   Pulmonary/Chest: clear to auscultation bilaterally- no wheezes, rales or rhonchi, normal air movement, on bipap  Cardiovascular: tachycardic rate, regular rhythm, normal S1 and S2, no murmurs, rubs, clicks or gallops, distal pulses intact, no carotid bruits  Abdomen: soft, obese LUQ is-tender, non-distended, normal bowel sounds, no masses or organomegaly  Extremities: no cyanosis, clubbing or edema  Musculoskeletal: normal range of motion, no joint swelling, deformity or tenderness  Neurologic: no cranial nerve deficit and muscle strength normal      Lab and Imaging Review     CBC  Recent Labs     08/22/20 0432 08/23/20  0359 08/24/20  0343   WBC 25.4* 17.8* 16.2*   HGB 11.5* 10.0* 9.7*   HCT 35.7* 30.7* 29.6*   MCV 95.1 95.3 95.3    199 210       BMP  Recent Labs     08/22/20 0432 08/23/20  0359 08/24/20  0343    140 142   K 3.8 3.8 3.9    104 107   CO2 26 27 25   BUN 14 13 14   CREATININE 0.77 0.63 0.64   GLUCOSE 151* 122* 149*   CALCIUM 6.8* 7.3* 7.8*       LFTS  Recent Labs     08/22/20 0432 08/23/20  0359 08/24/20  0343   ALKPHOS 107* 116* 134*   ALT 34* 30 34*   AST 42* 42* 46*   PROT 5.8* 5.7* 5.7*   BILITOT 0.46 0.51 0.38   LABALBU 2.9* 2.5* 2.5*       AMYLASE/LIPASE/AMMONIA  Recent Labs     08/22/20 0432 08/23/20  0359 08/24/20  0343   LIPASE 273* 51 30     Results for Memo Bains (MRN [id number]) as of 8/22/2020 08:50    Ref. Range 8/20/2020 06:06   Triglycerides Latest Ref Range: <150 mg/dL 259 (H)      FINDINGS: ct abd 8/19/20   Lower Chest: Consolidation within both lower lobes and the lingula. Scattered ground-glass opacity.  No pleural effusion.         Organs: Liver is diffusely hypoattenuating.  No acute abnormality within the    spleen, adrenals, or left kidney.  There is right renal cortical scarring and    calcification.  Subcentimeter hypoattenuating bilateral renal lesions are too    small to accurately characterize, likely cysts.  Prior cholecystectomy.     There is diffuse peripancreatic stranding and fluid.  No loculated fluid    collection.         GI/Bowel: Stomach is partially distended.  Small bowel is nondilated.  Colon    is nondilated.         Pelvis: Urinary bladder is partially distended without vesicular stones. Uterus is present.         Peritoneum/Retroperitoneum: Shotty retroperitoneal lymph nodes, likely    reactive.  Abdominal aorta is normal in caliber.  No pneumoperitoneum.         Bones/Soft Tissues: No acute osseous abnormality.           Impression    1. Pancreatic edema and associated fluid is consistent with pancreatitis.  No    loculated fluid collection/pseudocyst.    2. Shotty retroperitoneal lymph nodes are likely reactive. 3. Hepatic steatosis. 4. Bilateral lower lobe consolidation, which could be due to edema or    pneumonia. Cleave Dad      FINDINGS:  Ct abd 8/20/20   Lower Chest: Persisting bilateral lower lung scattered consolidation and    atelectasis is present with trace posterior left-sided pleural effusion    identified. Launie Sicard is normal in size and configuration.         Organs: Interval mild worsening of severe peripancreatic fat stranding and    fluid is noted with intraperitoneal free fluid collecting within the    bilateral pericolic gutters and collecting inferiorly within the pelvis with    increased volume in comparison with the prior study.  Diffuse fatty    infiltration of the liver is again seen.  The liver, gallbladder, spleen,    pancreas, adrenal glands, kidneys, are otherwise unremarkable in appearance.         GI/Bowel: The stomach is unremarkable without wall thickening or distention.     Bowel loops are unremarkable in appearance without evidence of obstruction,    distension or mucosal thickening.         Pelvis: Berkowitz catheter is noted within the nondistended but grossly    unremarkable urinary bladder.  Bilateral fallopian tube Essure coils are seen    without evidence of complication.  The uterus is anteverted in position and    is unremarkable in appearance there no evidence of pelvic free fluid is seen.         Peritoneum/Retroperitoneum: No evidence of retroperitoneal or intraperitoneal    lymphadenopathy is identified. Larry Semen   Bones/Soft Tissues: No evidence of retroperitoneal or intraperitoneal    lymphadenopathy is identified.  No evidence of intraperitoneal free fluid is    seen.              Impression    1. Mild interval worsening of severe peripancreatic inflammation associated    with acute pancreatitis, as discussed above. 2. No evidence of complication i.e. pseudocyst noted. 3. Stable bilateral lower lung multifocal consolidation atelectasis    suggesting pneumonia versus alveolar edema. 4. Hepatic steatosis, unchanged. ASSESSMENT/PLAN:  1. Severe pancreatitis; etiology unknown at this time. liipase and leucocytosis are improved but she was febrile again this am  -continue antibiotics and IV hydration/pain mgt  -IGG subclasses are normal  -continue TPN  -mri abd ordered for today, hopefully can be completed with the bipap-    -trend lft and wbc      This plan was formulated in collaboration with Dr. Blanca Torers. Electronically signed by: ANIBAL Greene CNP on 8/24/2020 at 7:30 AM     Attending Physician Statement  I have discussed the care of Quincy Whitt and   I have examined the patient myselft independently, and taken ros and hpi , including pertinent history and exam findings,  with the author of this note . I have reviewed the key elements of all parts of the encounter with the nurse practitioner/resident.     I agree with the assessment, plan and orders as documented by the above health care provider         Electronically signed by Suellen Yanez MD

## 2020-08-24 NOTE — PROGRESS NOTES
This RN spoke with Dr. Junior Weiss regarding pt HTN, physician states cardiology will address tomorrow AM during rounds. No new orders at this time.

## 2020-08-24 NOTE — PLAN OF CARE
Nutrition Problem #1: Inadequate oral intake  Intervention: Food and/or Nutrient Delivery: Continue NPO, Modify Parenteral Nutrition  Nutritional Goals: Meet 75% of nutrient needs with TPN

## 2020-08-24 NOTE — PROGRESS NOTES
Patient resting comfortably on BiPAP. LFT's and lipase stable. Leukocytosis improved. Discussed with nursing staff. TPN started thru PICC. Possible MRCP today if patient can tolerate being off of BiPAP. Attempted to be off BiPAP with 4L O2 NC however desaturated to 87% after about an hour. Abdomen soft, benign. No new general surgery issues. Continue medical management.      Anel Escobar PA-C  [address] [facility]

## 2020-08-24 NOTE — PROGRESS NOTES
Comprehensive Nutrition Assessment    Type and Reason for Visit:  Reassess    Nutrition Recommendations/Plan: TPN increased to 75 ml/hr with 100 ml lipids. Following changes made in additives: Na acetate- 30 mEq, K acetate- 15 to 40 mEq, K Phos- 20 to 40 mmol, Ca+- 5 to 9 mEq, insulin- 10 units, add MVI & trace elements. Nutrition Assessment:  Discussed with RN plan to take TPN up to 75 ml/hr, reviewed residents note indicating plan to hold IVF (lactated ringers at 150 ml/hr)  today due to concern fluid overloaded with net positive 11 liter with low UO. Monitor labs and adjust additives accordingly. Phosphorus level low, increased K+ Phos from 20 to 40 mmol.     Malnutrition Assessment:  Malnutrition Status:  No malnutrition    Context:  Acute Illness     Findings of the 6 clinical characteristics of malnutrition:  Energy Intake:  No significant decrease in energy intake  Weight Loss:  No significant weight loss     Body Fat Loss:  No significant body fat loss     Muscle Mass Loss:  No significant muscle mass loss    Fluid Accumulation:  No significant fluid accumulation     Strength:  Not Performed    Estimated Daily Nutrient Needs:  Energy (kcal):  2020 kcal based on  Coffee-St. Jeor equation using adm wt 138 kg; Weight Used for Energy Requirements:  Admission     Protein (g):  102-113 gm protein based on 1.8-2 gm/kg IBW; Weight Used for Protein Requirements:  Ideal          Nutrition Related Findings:  GI: severe abdominal pain, nausea, vomiting      Wounds:  None       Current Nutrition Therapies:    Current Parenteral Nutrition Orders:  · Type and Formula: 2-in-1 Custom   · Lipids: 100ml  · Duration: Continuous  · Rate/Volume: 75 ml/ 1800 ml  · Current PN Order Provides: 1784 kcal, 90 gm protein      Anthropometric Measures:  · Height: 5' 5\" (165.1 cm)  · Current Body Weight: 315 lb (142.9 kg)   · Admission Body Weight: 304 lb (137.9 kg)    · Ideal Body Weight: 125 lbs; % Ideal Body Weight 243.2 %

## 2020-08-24 NOTE — FLOWSHEET NOTE
08/24/20 1000   Oxygen Therapy   SpO2 91 %   Pulse Oximeter Device Mode Continuous   Pulse Oximeter Device Location Finger   O2 Device Nasal cannula   O2 Flow Rate (L/min) 4 L/min   Per pt request placed on 4 Liters nasal cannula.

## 2020-08-24 NOTE — CARE COORDINATION
DISCHARGE PLANNING NOTE:    Pt's mother, Doyle Saravia who is also her emergency contact, at the bedside. She requests that writer not speak to patient as she is sleeping at this time. Per previous d/c planner, plan is for patient to be discharged home without VNS. Active order for IV Zosyn and Precedex gtt. Pt was also started on TPN last evening. Has PICC. Per surgery notes, possible MRCP today if patient can tolerate being off Bipap. Will continue to follow for additional discharge needs.     Electronically signed by Norm Blackburn RN on 8/24/2020 at 2:19 PM

## 2020-08-24 NOTE — PROGRESS NOTES
Aurelio Monday surgical PA and Dr. Christopher Cheng, med resident at bedside to assess pt, RN updated them on pt status. Notified of low phosphorus, pt on TPN. No new orders at this time.

## 2020-08-24 NOTE — CONSULTS
Dr Chely King was cardiology on this patient  I went through [facility] and was infomed .toña  Was covering tonight .  [email or web address]

## 2020-08-24 NOTE — PROGRESS NOTES
PULMONARY PROGRESS NOTE:    REASON FOR VISIT: pancreatitis, resp failure  Interval History:    Shortness of Breath: +  Cough: no  Sputum: no          Hemoptysis: no  Chest Pain: no  Fever: +              Swelling Feet: no  Headache: no                                           Nausea, Emesis - denies   Abdominal Pain: +  Diarrhea: no         Constipation: no    Events since last visit: wore bipap overnight,  precedex    PAST MEDICAL HISTORY:      Scheduled Meds:   enalaprilat  1.25 mg Intravenous 4 times per day    insulin lispro  0-6 Units Subcutaneous TID WC    LORazepam  0.5 mg Intravenous Q8H    magnesium oxide  400 mg Oral Daily    piperacillin-tazobactam (ZOSYN) 3.375 g in dextrose 5% IVPB extended infusion (mini-bag)  3.375 g Intravenous Q8H    sodium chloride flush  10 mL Intravenous 2 times per day    enoxaparin  30 mg Subcutaneous BID    QUEtiapine  800 mg Oral Nightly    pramipexole  0.25 mg Oral Daily    pantoprazole  40 mg Intravenous Daily    And    sodium chloride (PF)  10 mL Intravenous Daily    busPIRone  15 mg Oral Nightly    DULoxetine  60 mg Oral BID    cetirizine  10 mg Oral Daily     Continuous Infusions:   PN-Adult 2-in-1 Central Line (Standard) 41.7 mL/hr at 08/23/20 1858    dexmedetomidine (PRECEDEX) IV infusion 0.7 mcg/kg/hr (08/24/20 1223)     PRN Meds:HYDROmorphone, perflutren lipid microspheres, ketorolac, promethazine (PHENERGAN) in sodium chloride 0.9% IVPB, sodium chloride flush, magnesium sulfate, potassium chloride **OR** potassium alternative oral replacement **OR** potassium chloride, acetaminophen, [DISCONTINUED] promethazine **OR** ondansetron, melatonin ER        PHYSICAL EXAMINATION:  BP (!) 154/85   Pulse 107   Temp 98.7 °F (37.1 °C) (Axillary)   Resp 25   Ht 5' 5\" (1.651 m)   Wt (!) 315 lb 4.1 oz (143 kg)   SpO2 92%   BMI 52.46 kg/m²   tmax 100.3  General : in no acute distress - awakens to answer questions, sleepy  Neck - supple, no

## 2020-08-24 NOTE — PROGRESS NOTES
Pt screaming for help, RN entered room, pt asked to be sat up at side of bed, RN attempted to aide pt in sitting up at side of bed, pt became tachypneic and SPO2 dropped to 79%, RN called for help, PCT and RN elena at bedside to aid pt to get back into bed and BIpap placed on pt. RN called RT to notify.

## 2020-08-25 ENCOUNTER — APPOINTMENT (OUTPATIENT)
Dept: ULTRASOUND IMAGING | Age: 45
DRG: 871 | End: 2020-08-25
Payer: COMMERCIAL

## 2020-08-25 PROBLEM — F17.200 SMOKER: Status: ACTIVE | Noted: 2020-08-25

## 2020-08-25 PROBLEM — G56.01 CARPAL TUNNEL SYNDROME OF RIGHT WRIST: Status: ACTIVE | Noted: 2020-08-25

## 2020-08-25 PROBLEM — F51.02 INSOMNIA DUE TO PSYCHOLOGICAL STRESS: Status: ACTIVE | Noted: 2019-10-03

## 2020-08-25 PROBLEM — F33.2 SEVERE EPISODE OF RECURRENT MAJOR DEPRESSIVE DISORDER, WITHOUT PSYCHOTIC FEATURES (HCC): Status: ACTIVE | Noted: 2018-02-09

## 2020-08-25 PROBLEM — F41.1 GENERALIZED ANXIETY DISORDER: Status: ACTIVE | Noted: 2017-10-18

## 2020-08-25 LAB
ABSOLUTE BANDS #: 2.06 K/UL (ref 0–1)
ABSOLUTE EOS #: 0.15 K/UL (ref 0–0.4)
ABSOLUTE IMMATURE GRANULOCYTE: ABNORMAL K/UL (ref 0–0.3)
ABSOLUTE LYMPH #: 1.32 K/UL (ref 1–4.8)
ABSOLUTE MONO #: 1.03 K/UL (ref 0.1–1.3)
ALBUMIN SERPL-MCNC: 2.3 G/DL (ref 3.5–5.2)
ALBUMIN/GLOBULIN RATIO: ABNORMAL (ref 1–2.5)
ALP BLD-CCNC: 117 U/L (ref 35–104)
ALT SERPL-CCNC: 28 U/L (ref 5–33)
ANION GAP SERPL CALCULATED.3IONS-SCNC: 7 MMOL/L (ref 9–17)
AST SERPL-CCNC: 35 U/L
BANDS: 14 % (ref 0–10)
BASOPHILS # BLD: 0 % (ref 0–2)
BASOPHILS ABSOLUTE: 0 K/UL (ref 0–0.2)
BILIRUB SERPL-MCNC: 0.25 MG/DL (ref 0.3–1.2)
BUN BLDV-MCNC: 14 MG/DL (ref 6–20)
BUN/CREAT BLD: ABNORMAL (ref 9–20)
C-REACTIVE PROTEIN: 294.9 MG/L (ref 0–5)
CALCIUM SERPL-MCNC: 8.1 MG/DL (ref 8.6–10.4)
CHLORIDE BLD-SCNC: 106 MMOL/L (ref 98–107)
CO2: 28 MMOL/L (ref 20–31)
CREAT SERPL-MCNC: 0.57 MG/DL (ref 0.5–0.9)
DIFFERENTIAL TYPE: ABNORMAL
EOSINOPHILS RELATIVE PERCENT: 1 % (ref 0–4)
FERRITIN: 510 UG/L (ref 13–150)
FOLATE: 11.1 NG/ML
GFR AFRICAN AMERICAN: >60 ML/MIN
GFR NON-AFRICAN AMERICAN: >60 ML/MIN
GFR SERPL CREATININE-BSD FRML MDRD: ABNORMAL ML/MIN/{1.73_M2}
GFR SERPL CREATININE-BSD FRML MDRD: ABNORMAL ML/MIN/{1.73_M2}
GLUCOSE BLD-MCNC: 157 MG/DL (ref 65–105)
GLUCOSE BLD-MCNC: 161 MG/DL (ref 65–105)
GLUCOSE BLD-MCNC: 162 MG/DL (ref 65–105)
GLUCOSE BLD-MCNC: 180 MG/DL (ref 70–99)
HCT VFR BLD CALC: 28.5 % (ref 36–46)
HEMOGLOBIN: 9.4 G/DL (ref 12–16)
IMMATURE GRANULOCYTES: ABNORMAL %
IRON SATURATION: 11 % (ref 20–55)
IRON: 20 UG/DL (ref 37–145)
LIPASE: 40 U/L (ref 13–60)
LYMPHOCYTES # BLD: 9 % (ref 24–44)
MAGNESIUM: 1.9 MG/DL (ref 1.6–2.6)
MCH RBC QN AUTO: 30.8 PG (ref 26–34)
MCHC RBC AUTO-ENTMCNC: 32.9 G/DL (ref 31–37)
MCV RBC AUTO: 93.8 FL (ref 80–100)
MONOCYTES # BLD: 7 % (ref 1–7)
MORPHOLOGY: ABNORMAL
MORPHOLOGY: ABNORMAL
NRBC AUTOMATED: ABNORMAL PER 100 WBC
PDW BLD-RTO: 14.5 % (ref 11.5–14.9)
PHOSPHORUS: 2.4 MG/DL (ref 2.6–4.5)
PLATELET # BLD: 229 K/UL (ref 150–450)
PLATELET ESTIMATE: ABNORMAL
PMV BLD AUTO: 9.2 FL (ref 6–12)
POTASSIUM SERPL-SCNC: 3.9 MMOL/L (ref 3.7–5.3)
RBC # BLD: 3.04 M/UL (ref 4–5.2)
RBC # BLD: ABNORMAL 10*6/UL
SEG NEUTROPHILS: 69 % (ref 36–66)
SEGMENTED NEUTROPHILS ABSOLUTE COUNT: 10.14 K/UL (ref 1.3–9.1)
SODIUM BLD-SCNC: 141 MMOL/L (ref 135–144)
TOTAL IRON BINDING CAPACITY: 180 UG/DL (ref 250–450)
TOTAL PROTEIN: 5.6 G/DL (ref 6.4–8.3)
UNSATURATED IRON BINDING CAPACITY: 160 UG/DL (ref 112–347)
VITAMIN B-12: 878 PG/ML (ref 232–1245)
WBC # BLD: 14.7 K/UL (ref 3.5–11)
WBC # BLD: ABNORMAL 10*3/UL

## 2020-08-25 PROCEDURE — 2500000003 HC RX 250 WO HCPCS: Performed by: INTERNAL MEDICINE

## 2020-08-25 PROCEDURE — 82805 BLOOD GASES W/O2 SATURATION: CPT

## 2020-08-25 PROCEDURE — 80053 COMPREHEN METABOLIC PANEL: CPT

## 2020-08-25 PROCEDURE — 6360000002 HC RX W HCPCS: Performed by: SURGERY

## 2020-08-25 PROCEDURE — 99232 SBSQ HOSP IP/OBS MODERATE 35: CPT | Performed by: INTERNAL MEDICINE

## 2020-08-25 PROCEDURE — 6360000002 HC RX W HCPCS: Performed by: NURSE PRACTITIONER

## 2020-08-25 PROCEDURE — 84100 ASSAY OF PHOSPHORUS: CPT

## 2020-08-25 PROCEDURE — 83690 ASSAY OF LIPASE: CPT

## 2020-08-25 PROCEDURE — 85025 COMPLETE CBC W/AUTO DIFF WBC: CPT

## 2020-08-25 PROCEDURE — 2000000000 HC ICU R&B

## 2020-08-25 PROCEDURE — APPNB30 APP NON BILLABLE TIME 0-30 MINS: Performed by: NURSE PRACTITIONER

## 2020-08-25 PROCEDURE — 2580000003 HC RX 258: Performed by: STUDENT IN AN ORGANIZED HEALTH CARE EDUCATION/TRAINING PROGRAM

## 2020-08-25 PROCEDURE — 2580000003 HC RX 258: Performed by: INTERNAL MEDICINE

## 2020-08-25 PROCEDURE — 2580000003 HC RX 258: Performed by: RADIOLOGY

## 2020-08-25 PROCEDURE — 82746 ASSAY OF FOLIC ACID SERUM: CPT

## 2020-08-25 PROCEDURE — 2500000003 HC RX 250 WO HCPCS: Performed by: SURGERY

## 2020-08-25 PROCEDURE — 2580000003 HC RX 258: Performed by: NURSE PRACTITIONER

## 2020-08-25 PROCEDURE — 82607 VITAMIN B-12: CPT

## 2020-08-25 PROCEDURE — 83550 IRON BINDING TEST: CPT

## 2020-08-25 PROCEDURE — 82728 ASSAY OF FERRITIN: CPT

## 2020-08-25 PROCEDURE — 83735 ASSAY OF MAGNESIUM: CPT

## 2020-08-25 PROCEDURE — 99233 SBSQ HOSP IP/OBS HIGH 50: CPT | Performed by: INTERNAL MEDICINE

## 2020-08-25 PROCEDURE — 6360000002 HC RX W HCPCS: Performed by: STUDENT IN AN ORGANIZED HEALTH CARE EDUCATION/TRAINING PROGRAM

## 2020-08-25 PROCEDURE — 86140 C-REACTIVE PROTEIN: CPT

## 2020-08-25 PROCEDURE — 76775 US EXAM ABDO BACK WALL LIM: CPT

## 2020-08-25 PROCEDURE — 82947 ASSAY GLUCOSE BLOOD QUANT: CPT

## 2020-08-25 PROCEDURE — C9113 INJ PANTOPRAZOLE SODIUM, VIA: HCPCS | Performed by: NURSE PRACTITIONER

## 2020-08-25 PROCEDURE — 6370000000 HC RX 637 (ALT 250 FOR IP): Performed by: SURGERY

## 2020-08-25 PROCEDURE — 94761 N-INVAS EAR/PLS OXIMETRY MLT: CPT

## 2020-08-25 PROCEDURE — 94660 CPAP INITIATION&MGMT: CPT

## 2020-08-25 PROCEDURE — 2700000000 HC OXYGEN THERAPY PER DAY

## 2020-08-25 PROCEDURE — 6360000002 HC RX W HCPCS: Performed by: INTERNAL MEDICINE

## 2020-08-25 PROCEDURE — 2500000003 HC RX 250 WO HCPCS: Performed by: STUDENT IN AN ORGANIZED HEALTH CARE EDUCATION/TRAINING PROGRAM

## 2020-08-25 PROCEDURE — 6370000000 HC RX 637 (ALT 250 FOR IP): Performed by: NURSE PRACTITIONER

## 2020-08-25 PROCEDURE — 36415 COLL VENOUS BLD VENIPUNCTURE: CPT

## 2020-08-25 PROCEDURE — 83540 ASSAY OF IRON: CPT

## 2020-08-25 RX ORDER — HYDRALAZINE HYDROCHLORIDE 20 MG/ML
10 INJECTION INTRAMUSCULAR; INTRAVENOUS EVERY 6 HOURS PRN
Status: DISCONTINUED | OUTPATIENT
Start: 2020-08-25 | End: 2020-09-04 | Stop reason: HOSPADM

## 2020-08-25 RX ORDER — LORAZEPAM 2 MG/ML
0.5 INJECTION INTRAMUSCULAR EVERY 6 HOURS
Status: DISCONTINUED | OUTPATIENT
Start: 2020-08-25 | End: 2020-08-27

## 2020-08-25 RX ORDER — LORAZEPAM 2 MG/ML
0.5 INJECTION INTRAMUSCULAR EVERY 6 HOURS SCHEDULED
Status: DISCONTINUED | OUTPATIENT
Start: 2020-08-25 | End: 2020-08-25

## 2020-08-25 RX ADMIN — HYDROMORPHONE HYDROCHLORIDE 0.5 MG: 1 INJECTION, SOLUTION INTRAMUSCULAR; INTRAVENOUS; SUBCUTANEOUS at 15:55

## 2020-08-25 RX ADMIN — KETOROLAC TROMETHAMINE 15 MG: 30 INJECTION, SOLUTION INTRAMUSCULAR at 11:13

## 2020-08-25 RX ADMIN — IRON SUCROSE 200 MG: 20 INJECTION, SOLUTION INTRAVENOUS at 14:50

## 2020-08-25 RX ADMIN — INSULIN LISPRO 1 UNITS: 100 INJECTION, SOLUTION INTRAVENOUS; SUBCUTANEOUS at 08:30

## 2020-08-25 RX ADMIN — DEXMEDETOMIDINE 1.4 MCG/KG/HR: 100 INJECTION, SOLUTION, CONCENTRATE INTRAVENOUS at 15:37

## 2020-08-25 RX ADMIN — LORAZEPAM 0.5 MG: 2 INJECTION INTRAMUSCULAR; INTRAVENOUS at 05:07

## 2020-08-25 RX ADMIN — ENOXAPARIN SODIUM 30 MG: 30 INJECTION SUBCUTANEOUS at 08:32

## 2020-08-25 RX ADMIN — HYDRALAZINE HYDROCHLORIDE 10 MG: 20 INJECTION, SOLUTION INTRAMUSCULAR; INTRAVENOUS at 09:53

## 2020-08-25 RX ADMIN — HYDROMORPHONE HYDROCHLORIDE 0.5 MG: 1 INJECTION, SOLUTION INTRAMUSCULAR; INTRAVENOUS; SUBCUTANEOUS at 22:45

## 2020-08-25 RX ADMIN — PIPERACILLIN SODIUM AND TAZOBACTAM SODIUM 3.38 G: 3; .375 INJECTION, POWDER, LYOPHILIZED, FOR SOLUTION INTRAVENOUS at 17:07

## 2020-08-25 RX ADMIN — DEXMEDETOMIDINE 1.4 MCG/KG/HR: 100 INJECTION, SOLUTION, CONCENTRATE INTRAVENOUS at 17:06

## 2020-08-25 RX ADMIN — POTASSIUM CHLORIDE: 2 INJECTION, SOLUTION, CONCENTRATE INTRAVENOUS at 18:00

## 2020-08-25 RX ADMIN — DEXMEDETOMIDINE 0.8 MCG/KG/HR: 100 INJECTION, SOLUTION, CONCENTRATE INTRAVENOUS at 07:32

## 2020-08-25 RX ADMIN — ENALAPRILAT 1.25 MG: 1.25 INJECTION INTRAVENOUS at 05:49

## 2020-08-25 RX ADMIN — LORAZEPAM 0.5 MG: 2 INJECTION INTRAMUSCULAR; INTRAVENOUS at 23:41

## 2020-08-25 RX ADMIN — HYDROMORPHONE HYDROCHLORIDE 0.5 MG: 1 INJECTION, SOLUTION INTRAMUSCULAR; INTRAVENOUS; SUBCUTANEOUS at 07:32

## 2020-08-25 RX ADMIN — ACETAMINOPHEN 650 MG: 325 TABLET, FILM COATED ORAL at 20:16

## 2020-08-25 RX ADMIN — ENALAPRILAT 1.25 MG: 1.25 INJECTION INTRAVENOUS at 17:07

## 2020-08-25 RX ADMIN — INSULIN LISPRO 1 UNITS: 100 INJECTION, SOLUTION INTRAVENOUS; SUBCUTANEOUS at 12:08

## 2020-08-25 RX ADMIN — SODIUM CHLORIDE 10 MG/HR: 9 INJECTION, SOLUTION INTRAVENOUS at 18:16

## 2020-08-25 RX ADMIN — Medication 10 ML: at 22:20

## 2020-08-25 RX ADMIN — DEXMEDETOMIDINE 1.4 MCG/KG/HR: 100 INJECTION, SOLUTION, CONCENTRATE INTRAVENOUS at 19:26

## 2020-08-25 RX ADMIN — SODIUM CHLORIDE 5 MG/HR: 9 INJECTION, SOLUTION INTRAVENOUS at 11:13

## 2020-08-25 RX ADMIN — LORAZEPAM 0.5 MG: 2 INJECTION INTRAMUSCULAR; INTRAVENOUS at 10:24

## 2020-08-25 RX ADMIN — KETOROLAC TROMETHAMINE 15 MG: 30 INJECTION, SOLUTION INTRAMUSCULAR at 20:32

## 2020-08-25 RX ADMIN — PIPERACILLIN SODIUM AND TAZOBACTAM SODIUM 3.38 G: 3; .375 INJECTION, POWDER, LYOPHILIZED, FOR SOLUTION INTRAVENOUS at 05:32

## 2020-08-25 RX ADMIN — SODIUM CHLORIDE 12.5 MG/HR: 9 INJECTION, SOLUTION INTRAVENOUS at 21:02

## 2020-08-25 RX ADMIN — Medication 10 ML: at 08:32

## 2020-08-25 RX ADMIN — DEXMEDETOMIDINE 1.4 MCG/KG/HR: 100 INJECTION, SOLUTION, CONCENTRATE INTRAVENOUS at 22:09

## 2020-08-25 RX ADMIN — LORAZEPAM 0.5 MG: 2 INJECTION INTRAMUSCULAR; INTRAVENOUS at 17:07

## 2020-08-25 RX ADMIN — ENOXAPARIN SODIUM 30 MG: 30 INJECTION SUBCUTANEOUS at 22:09

## 2020-08-25 RX ADMIN — INSULIN LISPRO 1 UNITS: 100 INJECTION, SOLUTION INTRAVENOUS; SUBCUTANEOUS at 17:07

## 2020-08-25 RX ADMIN — HYDROMORPHONE HYDROCHLORIDE 0.5 MG: 1 INJECTION, SOLUTION INTRAMUSCULAR; INTRAVENOUS; SUBCUTANEOUS at 01:42

## 2020-08-25 RX ADMIN — ENALAPRILAT 1.25 MG: 1.25 INJECTION INTRAVENOUS at 12:09

## 2020-08-25 RX ADMIN — I.V. FAT EMULSION 100 ML: 20 EMULSION INTRAVENOUS at 18:16

## 2020-08-25 RX ADMIN — DEXMEDETOMIDINE 0.9 MCG/KG/HR: 100 INJECTION, SOLUTION, CONCENTRATE INTRAVENOUS at 04:11

## 2020-08-25 RX ADMIN — SODIUM CHLORIDE 10 MG/HR: 9 INJECTION, SOLUTION INTRAVENOUS at 23:20

## 2020-08-25 RX ADMIN — DEXMEDETOMIDINE 0.9 MCG/KG/HR: 100 INJECTION, SOLUTION, CONCENTRATE INTRAVENOUS at 01:36

## 2020-08-25 RX ADMIN — PANTOPRAZOLE SODIUM 40 MG: 40 INJECTION, POWDER, FOR SOLUTION INTRAVENOUS at 08:31

## 2020-08-25 RX ADMIN — DEXMEDETOMIDINE 1.4 MCG/KG/HR: 100 INJECTION, SOLUTION, CONCENTRATE INTRAVENOUS at 11:44

## 2020-08-25 RX ADMIN — PIPERACILLIN SODIUM AND TAZOBACTAM SODIUM 3.38 G: 3; .375 INJECTION, POWDER, LYOPHILIZED, FOR SOLUTION INTRAVENOUS at 12:03

## 2020-08-25 RX ADMIN — KETOROLAC TROMETHAMINE 15 MG: 30 INJECTION, SOLUTION INTRAMUSCULAR at 02:48

## 2020-08-25 ASSESSMENT — PAIN DESCRIPTION - LOCATION
LOCATION: ABDOMEN

## 2020-08-25 ASSESSMENT — PAIN DESCRIPTION - ONSET
ONSET: ON-GOING

## 2020-08-25 ASSESSMENT — PAIN SCALES - GENERAL
PAINLEVEL_OUTOF10: 9
PAINLEVEL_OUTOF10: 9
PAINLEVEL_OUTOF10: 7
PAINLEVEL_OUTOF10: 10
PAINLEVEL_OUTOF10: 9
PAINLEVEL_OUTOF10: 10
PAINLEVEL_OUTOF10: 6
PAINLEVEL_OUTOF10: 9
PAINLEVEL_OUTOF10: 9
PAINLEVEL_OUTOF10: 4
PAINLEVEL_OUTOF10: 2
PAINLEVEL_OUTOF10: 8
PAINLEVEL_OUTOF10: 10
PAINLEVEL_OUTOF10: 10
PAINLEVEL_OUTOF10: 6

## 2020-08-25 ASSESSMENT — PAIN DESCRIPTION - PAIN TYPE
TYPE: ACUTE PAIN

## 2020-08-25 ASSESSMENT — PAIN DESCRIPTION - FREQUENCY
FREQUENCY: CONTINUOUS

## 2020-08-25 ASSESSMENT — PAIN DESCRIPTION - DESCRIPTORS
DESCRIPTORS: ACHING;SORE
DESCRIPTORS: ACHING
DESCRIPTORS: SORE
DESCRIPTORS: ACHING;SORE
DESCRIPTORS: ACHING
DESCRIPTORS: SORE

## 2020-08-25 ASSESSMENT — PAIN DESCRIPTION - PROGRESSION
CLINICAL_PROGRESSION: NOT CHANGED
CLINICAL_PROGRESSION: GRADUALLY IMPROVING
CLINICAL_PROGRESSION: NOT CHANGED
CLINICAL_PROGRESSION: GRADUALLY IMPROVING

## 2020-08-25 ASSESSMENT — PAIN DESCRIPTION - ORIENTATION
ORIENTATION: MID

## 2020-08-25 NOTE — PROGRESS NOTES
[address]    PROGRESS NOTE             8/25/2020    7:02 AM    Name:   Raul Gordon  MRN:     [id number]     Acct:      [de-identified]   Room:   2003/2003-01  IP Day:  6  Admit Date:  8/19/2020  7:35 PM    PCP:  Hans Porter  Code Status:  Full Code    Subjective:     C/C:   Chief Complaint   Patient presents with    Abdominal Pain     RUQ     Interval History Status: improved. Patient seen and examined at bedside this AM.  Overnight patient tolerated 4L nasal cannula for about 8-9 hours before being placed back on bipap for SOB after repositioning which caused tachypnea and perioral cyanosis. This AM on bipap with FiO2 35% and precedex 0.8 mcg/kg/hr. Her pressures have still been elevated overnight in the 160s-170s with a peak BPB of 191/97. HR . Cardiology has been reconsulted. UO 0.3 ml/kg/hr x 24 hours. Creatinine stable at 0.57. Leukocytosis continues to downtrend (14.7). Tmax 100.6 at 19:00. Receiving IV dilaudid 0.5 mg q4h for pain. Per nurse this is not controlling patient's pain and they are having difficulty with both pain control and weaning the precedex due to anxiety. In room this AM patient's BP was 191/102, given IV hydralazine. Brief History:     Please see H&P. Patient was admitted for RUQ abdominal pain and found to have a lipase of 1065 and CT showing pancreatitis. The next day her lactic acid was 5.4. CT abdomen and pelvis did note possible bilateral lower lobe consolidation. Concern for pneumonia vs. ARDS and patient was started on IV zosyn. Her lactic acid improved following fluids. Later in the evening patient had elevated HR in the 140s and /90. EKG showed nonspecific changes and patient was transferred to the ICU and started on IV lopressor and cardene drip. Review of Systems:     Review of Systems  Patient on BiPaP and precedex.   She does not respond well to questioning and is moaning during examination. She does endorse abdominal pain but full ROS difficult to obtain. Only nods to endorse pain. Medications: Allergies:     Allergies   Allergen Reactions    Aripiprazole      Bad reaction    Eletriptan      Other reaction(s): Swelling-throat    Triptans [Sumatriptan]     Lamotrigine Rash       Current Meds:   Scheduled Meds:    enalaprilat  1.25 mg Intravenous 4 times per day    insulin lispro  0-6 Units Subcutaneous TID WC    LORazepam  0.5 mg Intravenous Q8H    magnesium oxide  400 mg Oral Daily    piperacillin-tazobactam (ZOSYN) 3.375 g in dextrose 5% IVPB extended infusion (mini-bag)  3.375 g Intravenous Q8H    sodium chloride flush  10 mL Intravenous 2 times per day    enoxaparin  30 mg Subcutaneous BID    QUEtiapine  800 mg Oral Nightly    pramipexole  0.25 mg Oral Daily    pantoprazole  40 mg Intravenous Daily    And    sodium chloride (PF)  10 mL Intravenous Daily    busPIRone  15 mg Oral Nightly    DULoxetine  60 mg Oral BID    cetirizine  10 mg Oral Daily     Continuous Infusions:    dextrose      PN-Adult 2-in-1 Central Line (Standard) 75 mL/hr at 08/24/20 1757    fat emulsion 100 mL (08/24/20 1757)    dexmedetomidine (PRECEDEX) IV infusion 0.8 mcg/kg/hr (08/25/20 0631)     PRN Meds: HYDROmorphone, glucose, dextrose, glucagon (rDNA), dextrose, perflutren lipid microspheres, ketorolac, promethazine (PHENERGAN) in sodium chloride 0.9% IVPB, sodium chloride flush, magnesium sulfate, potassium chloride **OR** potassium alternative oral replacement **OR** potassium chloride, acetaminophen, [DISCONTINUED] promethazine **OR** ondansetron, melatonin ER    Data:     Past Medical History:   has a past medical history of Abnormal levels of other serum enzymes, Anxiety, Chronic kidney disease, Depression, DVT (deep venous thrombosis) (HCC), Fibromyalgia, Headache(784.0), Hx of blood clots, Kidney stone, Pancreatitis, Pleural effusion, and SVT (supraventricular tachycardia) ([facility] [address] Social History:   reports that she quit smoking about 8 months ago. Her smoking use included cigarettes. She smoked 1.00 pack per day. She has never used smokeless tobacco. She reports current alcohol use. She reports that she does not use drugs. Family History:   Family History   Problem Relation Age of Onset    Heart Disease Father     High Blood Pressure Brother        Vitals:  BP (!) 161/90   Pulse 99   Temp 98.9 °F (37.2 °C) (Axillary)   Resp 26   Ht 5' 5\" (1.651 m)   Wt (!) 319 lb 10.7 oz (145 kg)   SpO2 97%   BMI 53.20 kg/m²   Temp (24hrs), Av.6 °F (37 °C), Min:97 °F (36.1 °C), Max:100.6 °F (38.1 °C)    Recent Labs     20  0744 20  1213 20  1737   POCGLU 152* 167* 147*       I/O(24Hr):     Intake/Output Summary (Last 24 hours) at 2020 0702  Last data filed at 2020 0510  Gross per 24 hour   Intake 1815.38 ml   Output 1200 ml   Net 615.38 ml       Labs:    CBC:   Lab Results   Component Value Date    WBC 14.7 2020    RBC 3.04 2020    HGB 9.4 2020    HCT 28.5 2020    MCV 93.8 2020    MCH 30.8 2020    MCHC 32.9 2020    RDW 14.5 2020     2020    MPV 9.2 2020     CMP:    Lab Results   Component Value Date     2020    K 3.9 2020     2020    CO2 28 2020    BUN 14 2020    CREATININE 0.57 2020    GFRAA >60 2020    LABGLOM >60 2020    GLUCOSE 180 2020    PROT 5.6 2020    PROT 7.2 2015    LABALBU 2.3 2020    CALCIUM 8.1 2020    BILITOT 0.25 2020    ALKPHOS 117 2020    AST 35 2020    ALT 28 2020     ABG:  No results found for: PH, PCO2, PO2, HCO3, BE, THGB, TCO2, O2SAT  LIPASE:    Lab Results   Component Value Date    LIPASE 40 2020       Lab Results   Component Value Date/Time    SPECIAL 4ml LT HAND 2020 10:01 AM     Lab Results   Component Value Date/Time    CULTURE NO GROWTH 2 DAYS 08/23/2020 10:01 AM         Radiology:    Ct Abdomen Pelvis Wo Contrast Additional Contrast? None    Result Date: 8/20/2020  EXAMINATION: CT OF THE ABDOMEN AND PELVIS WITHOUT CONTRAST 8/20/2020 10:22 pm TECHNIQUE: CT of the abdomen and pelvis was performed without the administration of intravenous contrast. Multiplanar reformatted images are provided for review. Dose modulation, iterative reconstruction, and/or weight based adjustment of the mA/kV was utilized to reduce the radiation dose to as low as reasonably achievable. COMPARISON: CT abdomen and pelvis with contrast August 19, 2020. HISTORY: ORDERING SYSTEM PROVIDED HISTORY: severe acute pain TECHNOLOGIST PROVIDED HISTORY: severe acute pain Is the patient pregnant? ->Yes Reason for Exam: worsening abdominal pain Acuity: Acute Type of Exam: Ongoing Relevant Medical/Surgical History: surg - gallbladder FINDINGS: Lower Chest: Persisting bilateral lower lung scattered consolidation and atelectasis is present with trace posterior left-sided pleural effusion identified. [place] Heart is normal in size and configuration. Organs: Interval mild worsening of severe peripancreatic fat stranding and fluid is noted with intraperitoneal free fluid collecting within the bilateral pericolic gutters and collecting inferiorly within the pelvis with increased volume in comparison with the prior study. Diffuse fatty infiltration of the liver is again seen. The liver, gallbladder, spleen, pancreas, adrenal glands, kidneys, are otherwise unremarkable in appearance. GI/Bowel: The stomach is unremarkable without wall thickening or distention. Bowel loops are unremarkable in appearance without evidence of obstruction, distension or mucosal thickening. Pelvis: Berkowitz catheter is noted within the nondistended but grossly unremarkable urinary bladder. Bilateral fallopian tube Essure coils are seen without evidence of complication.   The uterus is anteverted in position and is unremarkable in appearance there no evidence of pelvic free fluid is seen. Peritoneum/Retroperitoneum: No evidence of retroperitoneal or intraperitoneal lymphadenopathy is identified. . Bones/Soft Tissues: No evidence of retroperitoneal or intraperitoneal lymphadenopathy is identified. No evidence of intraperitoneal free fluid is seen. 1. Mild interval worsening of severe peripancreatic inflammation associated with acute pancreatitis, as discussed above. 2. No evidence of complication i.e. pseudocyst noted. 3. Stable bilateral lower lung multifocal consolidation atelectasis suggesting pneumonia versus alveolar edema. 4. Hepatic steatosis, unchanged. Xr Chest (single View Frontal)    Result Date: 8/21/2020  EXAMINATION: ONE XRAY VIEW OF THE CHEST 8/21/2020 6:06 am COMPARISON: August 20, 2020 chest exam HISTORY: ORDERING SYSTEM PROVIDED HISTORY: f/u atelectasis TECHNOLOGIST PROVIDED HISTORY: f/u atelectasis Reason for Exam: F/U atelectasis. Acuity: Unknown Type of Exam: Unknown Additional signs and symptoms: F/U atelectasis. FINDINGS: Interval insertion of right PICC line catheter, the tip projected at the right atrium Mild cardiomegaly Limited extra sonya exam with low volume lungs, mild streaky, left greater than right, bibasilar atelectasis. Grossly clear upper lungs     Line placement as above Limited expiratory exam with mild streaky bibasilar atelectasis     Ct Abdomen Pelvis W Iv Contrast Additional Contrast? None    Addendum Date: 8/20/2020    ADDENDUM: As stated in the body of the report, the abdominal aorta is normal in size. There is no significant atherosclerosis. Result Date: 8/20/2020  EXAMINATION: CT OF THE ABDOMEN AND PELVIS WITH CONTRAST 8/19/2020 10:11 pm TECHNIQUE: CT of the abdomen and pelvis was performed with the administration of intravenous contrast. Multiplanar reformatted images are provided for review.  Dose modulation, iterative reconstruction, and/or weight based adjustment of the mA/kV was utilized to reduce the radiation dose to as low as reasonably achievable. COMPARISON: None. HISTORY: ORDERING SYSTEM PROVIDED HISTORY: pain TECHNOLOGIST PROVIDED HISTORY: pain Reason for Exam: pt c/o all over abdominal pain with nausea for a few hours Acuity: Acute Type of Exam: Initial Relevant Medical/Surgical History: surg - gallbladder FINDINGS: Lower Chest: Consolidation within both lower lobes and the lingula. Scattered ground-glass opacity. No pleural effusion. Organs: Liver is diffusely hypoattenuating. No acute abnormality within the spleen, adrenals, or left kidney. There is right renal cortical scarring and calcification. Subcentimeter hypoattenuating bilateral renal lesions are too small to accurately characterize, likely cysts. Prior cholecystectomy. There is diffuse peripancreatic stranding and fluid. No loculated fluid collection. GI/Bowel: Stomach is partially distended. Small bowel is nondilated. Colon is nondilated. Pelvis: Urinary bladder is partially distended without vesicular stones. Uterus is present. Peritoneum/Retroperitoneum: Shotty retroperitoneal lymph nodes, likely reactive. Abdominal aorta is normal in caliber. No pneumoperitoneum. Bones/Soft Tissues: No acute osseous abnormality. 1. Pancreatic edema and associated fluid is consistent with pancreatitis. No loculated fluid collection/pseudocyst. 2. Shotty retroperitoneal lymph nodes are likely reactive. 3. Hepatic steatosis. 4. Bilateral lower lobe consolidation, which could be due to edema or pneumonia. [employer] Device Plmt/replace/removal    Result Date: 8/20/2020  PROCEDURE: ULTRASOUND GUIDED VASCULAR ACCESS. FLUOROSCOPY GUIDED PICC PLACEMENT 8/20/2020. HISTORY: ORDERING SYSTEM PROVIDED HISTORY: fluid resusitation TECHNOLOGIST PROVIDED HISTORY: fluid resusitation Lumen?->Double Lumen Is the patient pregnant? ->Yes Acuity: Unknown Type of Exam: Unknown Sepsis SEDATION: None FLUOROSCOPY TIME: DAP 63 cGy cm squared TECHNIQUE: Informed consent was obtained after a detailed explanation of the procedure including risks, benefits, and alternatives. Universal protocol was observed. The right arm was prepped and draped in sterile fashion using maximum sterile barrier technique. Local anesthesia was achieved with lidocaine. A micropuncture needle was used to access the right basilic vein using ultrasound guidance. An ultrasound image demonstrating patency of the vein with needle tip located within it. An image was obtained and stored in PACs. A 0.018 guidewire was used to place a peel-a-way sheath and a 5 [place] power injectable dual-lumen PICC was advanced with fluoroscopic guidance with the tip at the cavo-atrial junction. The catheter flushed easily and there was a good blood return. The catheter was secured to the skin. The patient tolerated the procedure well and there were no immediate complications. FINDINGS: Fluoroscopic image demonstrates the tip of the catheter at the cavo-atrial junction. Successful ultrasound and fluoroscopy guided right basilic vein 5 [place] sor[place] power injectable dual-lumen PICC placement. Ready for use. Xr Chest Portable    Result Date: 8/24/2020  EXAMINATION: ONE XRAY VIEW OF THE CHEST 8/24/2020 9:10 am COMPARISON: August 21, 2020, chest exam HISTORY: ORDERING SYSTEM PROVIDED HISTORY: Resp failure TECHNOLOGIST PROVIDED HISTORY: Resp failure Reason for Exam: resp failure Acuity: Unknown Type of Exam: Unknown FINDINGS: Interval insertion of a right PICC line catheter, the tip is projected at the right atrium. Limited markedly rotated exam No obvious significant pleuroparenchymal or mediastinal findings.   Limited assessment of the left lung base     Limited markedly rotated exam, limited left base assessment Line placement as above No obvious acute cardiopulmonary findings     Xr Chest Portable    Result Date: 8/22/2020  EXAMINATION: ONE XRAY VIEW OF THE is ill-appearing. HENT:      Head: Normocephalic and atraumatic. Cardiovascular:      Rate and Rhythm: Normal rate and regular rhythm. Pulses: Normal pulses. Heart sounds: Normal heart sounds. No murmur. No gallop. Pulmonary:      Comments: Patient on BiPaP this AM.  Breath sounds clear to auscultation bilaterally with no crackles or wheezes. Abdominal:      Comments: Soft, tender to palpation, and distended but not taut or firm. Some very faint erythema again. Small ecchymosis in lower quadrants but per nurse this is where patient was getting insulin and lovenox. Musculoskeletal:      Comments: Significant pitting edema present on the bilateral lower extremities, improved from yesterday. Neurological:      Mental Status: She is alert. Comments: Patient is awake and alert. She does not respond to questioning but does follow one step commands.            Assessment:        Primary Problem  Acute pancreatitis    Active Hospital Problems    Diagnosis Date Noted    History of anxiety disorder [Z86.59]     Acute respiratory failure with hypoxia ([facility] [address] [J96.01] 08/21/2020    Hypocalcemia [E83.51] 08/21/2020    Sepsis ([facility] [address] [A41.9] 08/20/2020    Morbid obesity ([facility] [address] [E66.01]     Abdominal pain, epigastric [R10.13]     Nausea [R11.0]     Acute pancreatitis [K85.90] 08/19/2020    Fibromyalgia [M79.7]     HTN (hypertension) [I10] 12/15/2014    Major depression [F32.9] 10/30/2014    Restless leg syndrome [G25.81] 10/22/2013    Class 3 severe obesity due to excess calories with serious comorbidity in adult [facility]) [E66.01] 01/14/2013    Anxiety [F41.9] 01/14/2013       Plan:        Severe acute pancreatitis complicated by sepsis and possible ARDS  - Lipase 1,065 on admission --> 1613 --> 692 --> 273 --> normal; Amylase 435  - CT abdomen and pelvis showed pancreatic edema and fluid consistent with pancreatitis and reactive lymph nodes; repeat CT showed worsening peripancreatic inflammation but no evidence of pseudocyst  - NPO; nausea control; pain control with dilaudid  - GI on board  - TGs 259 (H)  - Monitoring creatinine and urine output for possible compartment syndrome, gen surg consulted  - Alpha-1 antitrypsin mildly elevated at 208; mitochondrial antibodies and smooth muscle Ab normal  - IGGg subclasses normal  - MRCP planned for when patient stable to r/o CBD stone  - Gen surg on board in case of possible abdominal compartment syndrome    Acute hypoxic respiratory failure secondary to sepsis vs. ARDS secondary to pancreatitis vs. pneumonia (less likely)  - 8/19 CT abdomen and pelvis showed bilateral lower lobe consolidation which could be edema or pneumonia  - On bipap and precedex, has been tolerating longer nasal cannula trials  - Pulmonology on board  - Most recent CXR 8/24 showed no obvious acute cardiopulmonary findings  - On zosyn day 6  - Procal elevated at 0.33  - Lorazepam q8h  - Mother informed us patient vapes everyday, possibly contributing to difficulty weaning off bipap    Sepsis secondary to pancreatitis  - On 8/20 lactic acid 5.4; SIRS criteria of tachypnea, tachycardia, and elevated WBC 20.5 met  - On IV zosyn day 6  - UA normal; blood cultures x2 done on 8/20 NGTD x 5 days; repeat blood cultures on 8/23 NGTD x 2 days  - Lactic acid stable within normal limits  - Leukocytosis resolving, 14.7 (peak 34) today   - Held IVF yesterday due to concern for fluid overload, net positive 11L with low urine output; bladder scan was normal    HTN  - Labetolol drip for persistent sinus tachycardia; currently held; can switch to coreg or labetolol PO when able per cardio  - Continue IV vasotec 1.25 mg q6h for elevated BP readings; started IV hydralazine PRN until cardiology sees patient again today  - Started cardene drip during AM rounds    Hypocalcemia, resolved  - Corrected calcium today is 9.5    Normocytic anemia  - HgB 9.4 today, MCV 93.8  - Likely some component of hemodilution since patient is 11L net positive  - Iron studies and B12/folate pending  - Baseline in 13s    Anxiety/depression  - Buspar 15 mg nightly, cymbalta 60 mg BID, quetiapine 800 mg nightly    Restless leg syndrome  - Pramipexole 0.25 mg daily    PICC line in place  Diet: TPN  DVT prophylaxis: lovenox 30 mg BID    Will discuss plan with attending, Dr. Micah Varner. Darrell Grier MD  8/25/2020  7:02 AM       Attending Physician Statement  I have discussed the care of Elvia Shepherd with the resident team. I have examined the patient myself and taken ros and hpi , including pertinent history and exam findings,  with the resident. I have reviewed the key elements of all parts of the encounter with the resident. I agree with the assessment, plan and orders as documented by the resident. Principal Problem:    Acute pancreatitis  Active Problems:    Anxiety    Class 3 severe obesity due to excess calories with serious comorbidity in [facility])    Restless leg syndrome    Major depression    HTN (hypertension)    Fibromyalgia    Sepsis (HCC)    Morbid obesity (HCC)    Abdominal pain, epigastric    Nausea    Acute respiratory failure with hypoxia (HCC)    Hypocalcemia    History of anxiety disorder  Resolved Problems:    * No resolved hospital problems. *    Acute pancreatitis, sepsis-resolved. Patient refused MRCP. Zosyn day 6 today. ARDS with acute hypoxic respiratory failure-needing BiPAP on and off, will repeat ABG today. Inability to wean completely off of BiPAP, likely persistent anxiety contributing. Will recheck CRP. Persistent sinus tachycardia with increased blood pressures-Resume labetalol drip as needed  Persistently low urine output, creatinine increased from <0.4 on admission to 0.57 today; urine output slightly more than 0.3 ml/kg in 24 hours. Will check renal ultrasound and monitor. Continues to have drop in hemoglobin- will trend/check iron studies.     electronically signed by Tere Dukes MD

## 2020-08-25 NOTE — PROGRESS NOTES
Residents rounding with Dr. Deidra Mattson, would like an ABG draw when pt is on nasal cannula, RN notified RT Luciana.

## 2020-08-25 NOTE — PROGRESS NOTES
This RN spoke with Dano Snow for psych consult. Pt currently unable to sign consent. When patient available to sign consent mateusz needs to be notified. RN to pass on to night shift RN.

## 2020-08-25 NOTE — ADT AUTH CERT
91-95%, then    back on bipap w/ sat 97%, then 4lnc w sat 95%    +dyspnea w/ rest & exertion       phos 2.0    lipase 30    bs 149    ca 7.8    alk phos 134    ast 46    alt 34    alb. 2.5    wbc 16.2    h&h 9.7/29.6       pcxr:    Interval insertion of a right PICC line catheter,    the tip is projected at the right atrium. Limited markedly rotated exam    No obvious significant pleuroparenchymal or mediastinal findings. Limited assessment of the left lung base. PCP A/P:    Yesterday evening patient was put on 4L nasal cannula twice for breaks from BiPaP.  The first time she tolerated for 2 hours and the second for under an hour.  On BiPaP this AM with FiO2 35% and precedex 0.9 mcg/kg/hr. Overnight BP was in the 140s - 150s but this AM patient's BP did go up to 171/111.  UO 0.3 ml/kg/hr x 24 hours.  Creatinine stable at 0.64.  Lipase normal 30. Leukocytosis continues to downtrend (16.2). Started TPN yesterday. Physical Exam    Constitutional:         General: She is not in acute distress.       Appearance: She is obese. She is ill-appearing. HENT:       Head: Normocephalic and atraumatic. Cardiovascular:       Rate and Rhythm: Normal rate and regular rhythm.       Pulses: Normal pulses.       Heart sounds: Normal heart sounds. No murmur. No gallop.      Pulmonary:       Comments: Patient on BiPaP this AM.  Breath sounds clear to auscultation bilaterally with no crackles or wheezes. Abdominal:       Comments: Soft, tender to palpation, and distended but not taut.  There is no more abdominal erythema. Musculoskeletal:       Comments: Significant pitting edema present on the bilateral lower extremities. Neurological:       Mental Status: She is alert.       Comments: Patient is awake and alert.  She does not respond to questioning but does follow commands.        Morbidly obese 44-year-old lady with BMI 52.46 initially admitted for idiopathic pancreatitis    Patient daily use of vaping           Acute lung injury versus vaping induced lung injury on    BiPAP support in, ICU Precedex drip for agitation pain control with IV opioids supportive care    With acute respiratory failure acute lung injury and severe pancreatitis and morbid obesity at risk of bad outcome patient's condition is critical       Held IVF today due to concern for fluid overload, net    positive 11L with low urine output    Started IV vasotec 1.25 mg q6h today for elevated BP readings       GI A/P:    54 y.o. female admitted 8/19/2020 with Acute pancreatitis    The pt was seen and examined. She was placed on bipap again today. Continues to have left sided abdominal pain. IGG subclasses are normal. lft's elevated from yesterday. Leucocytosis decreased. 1.Severe pancreatitis; etiology unknown at this time. liipase and leucocytosis are improved but she was febrile again this am    -continue antibiotics and IV hydration/pain mgt    -IGG subclasses are normal    -continue TPN    -mri abd ordered for today, hopefully can be completed with the bipap-       SURG A/P:     Patient resting comfortably on BiPAP. LFT's and lipase stable. Leukocytosis improved. Discussed with nursing staff. TPN started thru PICC. Possible MRCP today if patient can tolerate being off of BiPAP. Attempted to be off BiPAP with 4L O2 NC however desaturated to 87% after about an hour. Abdomen soft, benign. No new general surgery issues. Continue medical management. CM note:    Pt's mother, Sulma Walls who is also her emergency contact, at the bedside. She requests that writer not speak to patient as she is sleeping at this time.      Per previous d/c planner, plan is for patient to be discharged home without VNS. Active order for IV Zosyn and Precedex gtt.  Pt was also started on TPN last evening.  Has PICC.  Per surgery notes, possible MRCP today if patient can tolerate being off Bipap. Will continue to follow for additional discharge needs. +2-8/10 acute abd. pain:    cont iv dilaudid 0.5mg or 1mg q4h prn pain x1 dose of    0.5mg & x2 doses of 1mg    cont iv toradol 15mg q6h prn pain  x3 doses       iv vasotec 1.25mg q6h    cont tylenol 650mg q4h prn x1    cont sq lovenox 30mg bid    cont ISS coverage w/ bs qac&hs    cont iv ativan 0.5mg q8h    cont iv protonix 40mg qd    cont iv zosyn 3.375gm q8h    cont iv precedex drip . Conchetta Peaks  started 8/21    cont Cental TPN [id number] daily       MRI abd done w/ results pending

## 2020-08-25 NOTE — FLOWSHEET NOTE
08/25/20 1103   Encounter Summary   Services provided to: Patient   Referral/Consult From: Rounding   Complexity of Encounter Low   Length of Encounter 15 minutes   Spiritual/Yarsani   Type Spiritual support   Assessment Sleeping   Intervention Prayer

## 2020-08-25 NOTE — CARE COORDINATION
DISCHARGE PLANNING NOTE:    Was unable to speak with patient at this time as she is on bipap. Plan remains for patient to be discharged home without needs. This was confirmed with pt's mother, Tray Braswell, yesterday. Active order for IV Zosyn and Precedex gtt. Remains NPO, on TPN. Still needs MRCP done. Will continue to follow for additional discharge needs.     Electronically signed by Bee Alonso RN on 8/25/2020 at 8:17 AM

## 2020-08-25 NOTE — PROGRESS NOTES
RN spoke with Guicho Faria in pharmacy regarding pt's Zosyn and drug incompatibility with cardene gtt and TPN. Zosyn changed to 30 min dose and RN to pause TPN for 30 mins to infuse IVPB.

## 2020-08-25 NOTE — PROGRESS NOTES
Pt resting on 4l NC without distress. Pt does not want to go on BIPAP at this time. Will continue to monitor and RN notified.

## 2020-08-25 NOTE — PROGRESS NOTES
Comprehensive Nutrition Assessment    Type and Reason for Visit:  Reassess    Nutrition Recommendations/Plan: Continue lipids and TPN with the following adjustments to additives:   Mg Sulfate 0 to 6 mg, remove MVI and Trace Elements. Nutrition Assessment:  TPN and lipids to continue. Surgeon approved continuation of ice chips, but otherwise NPO with reassessment tomorrow. Malnutrition Assessment:  Malnutrition Status:  No malnutrition    Context:  Acute Illness       Estimated Daily Nutrient Needs:  Energy (kcal):  2020 kcal based on  Brantley-St. Jeor equation using adm wt 138 kg; Weight Used for Energy Requirements:  Admission     Protein (g):  102-113 gm protein based on 1.8-2 gm/kg IBW; Weight Used for Protein Requirements:  Ideal         Nutrition Related Findings:  Hypoactive bowel sounds. Edema: +1 RUE, LUE, RLE, LLE. Labs reviewed.       Wounds:  None       Current Nutrition Therapies:    Diet NPO Effective Now Exceptions are: Ice Chips  PN-Adult 2-in-1 Central Line (Standard)  · Type and Formula: 2-in-1 Custom   · Lipids: 100ml  · Duration: Continuous  · Rate/Volume: 75 ml/ 1800 ml  · Current PN Order Provides: 1784 kcal, 90 gm protein    Anthropometric Measures:  · Height: 5' 5\" (165.1 cm)  · Current Body Weight: 319 lb 10.7 oz (145 kg)   · Admission Body Weight: 304 lb (137.9 kg)    · Ideal Body Weight: 125 lbs  · BMI Categories: Obese Class 3 (BMI 40.0 or greater)       Nutrition Diagnosis:   · Inadequate oral intake related to altered GI function as evidenced by NPO or clear liquid status due to medical condition, nutrition support - parenteral nutrition    Nutrition Interventions:   Food and/or Nutrient Delivery:  Continue NPO, Modify Parenteral Nutrition  Nutrition Education/Counseling:  No recommendation at this time   Coordination of Nutrition Care:  Continued Inpatient Monitoring    Goals:  Meet 75% of nutrient needs with TPN       Nutrition Monitoring and Evaluation:   Food/Nutrient Intake Outcomes:  Parenteral Nutrition Intake/Tolerance  Physical Signs/Symptoms Outcomes:  Biochemical Data, GI Status, Nausea or Vomiting, Fluid Status or Edema, Hemodynamic Status, Weight     Discharge Planning: Too soon to determine     Some areas of assessment may be incomplete due to standard COVID-19 Precautions. Naye Taylor R.D., L.HOMAR.   Phone: [phone]

## 2020-08-25 NOTE — PLAN OF CARE
Problem: Falls - Risk of:  Goal: Will remain free from falls  Description: Will remain free from falls  8/25/2020 0304 by Severo Roan, RN  Outcome: Ongoing  Note: Bed remains in lowest position, call light within reach. Patient remains free of falls at this time. RN will continue to monitor. Problem: Falls - Risk of:  Goal: Absence of physical injury  Description: Absence of physical injury  Outcome: Ongoing     Problem: Pain:  Goal: Pain level will decrease  Description: Pain level will decrease  8/25/2020 0304 by Severo Roan, RN  Outcome: Ongoing  Note: Pain assessed at regular intervals. Medications administered as requested for comfort. Pain remains at a tolerable level. Problem: Pain:  Goal: Control of acute pain  Description: Control of acute pain  Outcome: Ongoing     Problem: Pain:  Goal: Control of chronic pain  Description: Control of chronic pain  Outcome: Ongoing     Problem: Gas Exchange - Impaired:  Goal: Levels of oxygenation will improve  Description: Levels of oxygenation will improve  8/25/2020 0304 by Severo Roan, RN  Outcome: Ongoing  Note: Pt on bipap this shift and taking breaks with nasal cannula.      Problem: Infection, Septic Shock:  Goal: Will show no infection signs and symptoms  Description: Will show no infection signs and symptoms  Outcome: Ongoing     Problem: Serum Glucose Level - Abnormal:  Goal: Ability to maintain appropriate glucose levels will improve  Description: Ability to maintain appropriate glucose levels will improve  Outcome: Ongoing     Problem: Tissue Perfusion, Altered:  Goal: Circulatory function within specified parameters  Description: Circulatory function within specified parameters  Outcome: Ongoing     Problem: Venous Thromboembolism:  Goal: Will show no signs or symptoms of venous thromboembolism  Description: Will show no signs or symptoms of venous thromboembolism  Outcome: Ongoing     Problem: Venous Thromboembolism:  Goal: Absence of signs or symptoms of impaired coagulation  Description: Absence of signs or symptoms of impaired coagulation  Outcome: Ongoing     Problem: Nutrition  Goal: Optimal nutrition therapy  Outcome: Ongoing  Note: Pt has TPN infusing. Problem: Skin Integrity:  Goal: Will show no infection signs and symptoms  Description: Will show no infection signs and symptoms  Outcome: Ongoing     Problem: Skin Integrity:  Goal: Absence of new skin breakdown  Description: Absence of new skin breakdown  Outcome: Ongoing  Note: Pt able to turn and reposition self. Pt educated on the importance of frequent repositioning in order to prevent skin breakdown. Pt assisted with turns as needed. Skin remains dry and intact. No new skin breakdown noted.

## 2020-08-25 NOTE — PLAN OF CARE
Problem: Falls - Risk of:  Goal: Will remain free from falls  Description: Will remain free from falls  Outcome: Ongoing   Pt remains free from falls this shift, Fall precautions remain in place, pt educated on fall precautions. Problem: Pain:  Goal: Pain level will decrease  Description: Pain level will decrease  Outcome: Ongoing   Pt's pain assessed at regular intervals throughout the shift. RN educates pt on non pharm interventions to aid in pain relief. Pt receives PRN medication per physician orders. Problem: Gas Exchange - Impaired:  Goal: Levels of oxygenation will improve  Description: Levels of oxygenation will improve  Outcome: Ongoing   Pt remains on BiPAP throughout the shift, pt tachypneic and unable to tolerate nasal cannula. SPO2 remains greater than 90% while on Bipap. RN will attempt to wean to nasal cannula as pt tolerates.

## 2020-08-25 NOTE — PROGRESS NOTES
Medical resident at bedside,RN updated physician on pt status. RN mentioned if we could DC lemons catheter, physician stated she would consult with Senior and let this RN know. Resident will also consult with senior regarding HTN. RN stated cardiology was notified yesterday regarding HTN but no new orders were given.

## 2020-08-25 NOTE — PROGRESS NOTES
[facility]    Gastroenterology Daily Progress Note      Patient:   Yazmin Ruffin   :    1975   Facility:   [address] [place] [address]  Date:     2020  Consultant:   Rhonda Ayoub CNP      SUBJECTIVE  54 y.o. female admitted 2020 with Acute pancreatitis, unspecified complication status, unspecified pancreatitis type [K85.90] and seen for pancreatitis. The pt was seen and examined. She is back on bipap and continues to have LUQ pain. RN states that the pt refused to have mri of abd yesterday. She did have one large non bloody BM overnight. LFT's and leucocytosis trending down.          OBJECTIVE  Scheduled Meds:   enalaprilat  1.25 mg Intravenous 4 times per day    insulin lispro  0-6 Units Subcutaneous TID WC    LORazepam  0.5 mg Intravenous Q8H    magnesium oxide  400 mg Oral Daily    piperacillin-tazobactam (ZOSYN) 3.375 g in dextrose 5% IVPB extended infusion (mini-bag)  3.375 g Intravenous Q8H    sodium chloride flush  10 mL Intravenous 2 times per day    enoxaparin  30 mg Subcutaneous BID    QUEtiapine  800 mg Oral Nightly    pramipexole  0.25 mg Oral Daily    pantoprazole  40 mg Intravenous Daily    And    sodium chloride (PF)  10 mL Intravenous Daily    busPIRone  15 mg Oral Nightly    DULoxetine  60 mg Oral BID    cetirizine  10 mg Oral Daily       Vital Signs:  BP (!) 173/107   Pulse 101   Temp 98.9 °F (37.2 °C) (Axillary)   Resp (!) 34   Ht 5' 5\" (1.651 m)   Wt (!) 319 lb 10.7 oz (145 kg)   SpO2 100%   BMI 53.20 kg/m²      Physical Exam:       General Appearance: ill appearing,lert and oriented to person, place and time, well-developed and well-nourished, in no acute distress  Skin: warm and dry, no rash or erythema  Head: normocephalic and atraumatic  Eyes: pupils equal, round, and reactive to light, extraocular eye movements intact, conjunctivae normal  ENT: hearing grossly normal bilaterally  Neck: neck supple and non tender without mass, no thyromegaly or thyroid nodules, no cervical lymphadenopathy   Pulmonary/Chest: clear to auscultation bilaterally- no wheezes, rales or rhonchi, normal air movement, on bipap  Cardiovascular: tachycardic rate, regular rhythm, normal S1 and S2, no murmurs, rubs, clicks or gallops, distal pulses intact, no carotid bruits  Abdomen: soft, obese LUQ is-tender, non-distended, normal bowel sounds, no masses or organomegaly  Extremities: no cyanosis, clubbing or edema  Musculoskeletal: normal range of motion, no joint swelling, deformity or tenderness  Neurologic: no cranial nerve deficit and muscle strength normal    Lab and Imaging Review     CBC  Recent Labs     08/23/20  0359 08/24/20  0343 08/25/20  0412   WBC 17.8* 16.2* 14.7*   HGB 10.0* 9.7* 9.4*   HCT 30.7* 29.6* 28.5*   MCV 95.3 95.3 93.8    210 229       BMP  Recent Labs     08/23/20  0359 08/24/20  0343 08/25/20  0412    142 141   K 3.8 3.9 3.9    107 106   CO2 27 25 28   BUN 13 14 14   CREATININE 0.63 0.64 0.57   GLUCOSE 122* 149* 180*   CALCIUM 7.3* 7.8* 8.1*       LFTS  Recent Labs     08/23/20  0359 08/24/20  0343 08/25/20  0412   ALKPHOS 116* 134* 117*   ALT 30 34* 28   AST 42* 46* 35*   PROT 5.7* 5.7* 5.6*   BILITOT 0.51 0.38 0.25*   LABALBU 2.5* 2.5* 2.3*       AMYLASE/LIPASE/AMMONIA  Recent Labs     08/23/20  0359 08/24/20  0343 08/25/20  0412   LIPASE 51 30 40     Results for Gabriela Morales (MRN [id number]) as of 8/22/2020 08:50    Ref. Range 8/20/2020 06:06   Triglycerides Latest Ref Range: <150 mg/dL 259 (H)      FINDINGS: ct abd 8/19/20   Lower Chest: Consolidation within both lower lobes and the lingula.     Scattered ground-glass opacity.  No pleural effusion.         Organs: Liver is diffusely hypoattenuating.  No acute abnormality within the    spleen, adrenals, or left kidney.  There is right renal cortical scarring and    calcification.  Subcentimeter hypoattenuating bilateral renal lesions are too    small to accurately seen    without evidence of complication.  The uterus is anteverted in position and    is unremarkable in appearance there no evidence of pelvic free fluid is seen.         Peritoneum/Retroperitoneum: No evidence of retroperitoneal or intraperitoneal    lymphadenopathy is identified. Annmarie Spurling   Bones/Soft Tissues: No evidence of retroperitoneal or intraperitoneal    lymphadenopathy is identified.  No evidence of intraperitoneal free fluid is    seen.              Impression    1. Mild interval worsening of severe peripancreatic inflammation associated    with acute pancreatitis, as discussed above. 2. No evidence of complication i.e. pseudocyst noted. 3. Stable bilateral lower lung multifocal consolidation atelectasis    suggesting pneumonia versus alveolar edema. 4. Hepatic steatosis, unchanged.           ASSESSMENT/PLAN:  1. Severe pancreatitis; etiology unknown at this time. liipase and leucocytosis are improved refused to have mri abd done  -continue antibiotics and IV hydration/pain mgt  -continue TPN  -trend lft wbc, will discuss case  with md      This plan was formulated in collaboration with Dr. Nae Marie. Electronically signed by: ANIBAL Calhoun - CNP on 8/25/2020 at 7:40 AM     Attending Physician Statement  I have discussed the care of Jack Herron and   I have examined the patient myselft independently, and taken ros and hpi , including pertinent history and exam findings,  with the author of this note . I have reviewed the key elements of all parts of the encounter with the nurse practitioner/resident.     I agree with the assessment, plan and orders as documented by the above health care provider         Electronically signed by Traci Guzman MD

## 2020-08-25 NOTE — PROGRESS NOTES
RN updated Juliana NOYOLA with Dr. Radha Casillas on pt status. Okay for ice chips, keep pt NPO today will reassess tomorrow.

## 2020-08-25 NOTE — PROGRESS NOTES
Elbert Green called to check up on pt, cardio has signed off but if there is a need for cardio to call on call physician. RN updated physician that medical resdidents started a cardene gtt for HTN and he is okay with that. No new orders at this time.

## 2020-08-25 NOTE — PROGRESS NOTES
PULMONARY PROGRESS NOTE:      Interval History:pancreatitis, resp failure    Shortness of Breath: yes   Cough: no  Sputum: no          Hemoptysis: no  Chest Pain: no  Fever: no                   Swelling Feet: no  Headache: no                                           Nausea, Emesis-no   Abdominal Pain: yes  Diarrhea: no         Constipation: no    Events since last visit: Unable to wean precedex. RN states patient very anxious. Previous on NC and was placed on bipap for increased SOB and reported perioral cyanosis.      PAST MEDICAL HISTORY:      Scheduled Meds:   LORazepam  0.5 mg Intravenous Q6H    piperacillin-tazobactam  3.375 g Intravenous Q6H    iron sucrose  200 mg Intravenous Q24H    enalaprilat  1.25 mg Intravenous 4 times per day    insulin lispro  0-6 Units Subcutaneous TID WC    magnesium oxide  400 mg Oral Daily    sodium chloride flush  10 mL Intravenous 2 times per day    enoxaparin  30 mg Subcutaneous BID    QUEtiapine  800 mg Oral Nightly    pramipexole  0.25 mg Oral Daily    pantoprazole  40 mg Intravenous Daily    And    sodium chloride (PF)  10 mL Intravenous Daily    busPIRone  15 mg Oral Nightly    DULoxetine  60 mg Oral BID    cetirizine  10 mg Oral Daily     Continuous Infusions:   niCARdipine 10 mg/hr (08/25/20 1919)    PN-Adult 2-in-1 Central Line (Standard) 75 mL/hr at 08/25/20 1800    [START ON 8/26/2020] fat emulsion 100 mL (08/25/20 1816)    dextrose      dexmedetomidine (PRECEDEX) IV infusion Stopped (08/25/20 1925)     PRN Meds:hydrALAZINE, HYDROmorphone, glucose, dextrose, glucagon (rDNA), dextrose, perflutren lipid microspheres, ketorolac, promethazine (PHENERGAN) in sodium chloride 0.9% IVPB, sodium chloride flush, magnesium sulfate, potassium chloride **OR** potassium alternative oral replacement **OR** potassium chloride, acetaminophen, [DISCONTINUED] promethazine **OR** ondansetron, melatonin ER        PHYSICAL EXAMINATION:  BP (!) 172/80   Pulse 135

## 2020-08-25 NOTE — PROGRESS NOTES
PULMONARY PROGRESS NOTE:      Interval History:pancreatitis, resp failure    Shortness of Breath: yes   Cough: no  Sputum: no          Hemoptysis: no  Chest Pain: no  Fever: no                   Swelling Feet: no  Headache: no                                           Nausea, Emesis-no   Abdominal Pain: yes  Diarrhea: no         Constipation: no    Events since last visit: Unable to wean precedex. RN states patient very anxious. Previous on NC and was placed on bipap for increased SOB and reported perioral cyanosis.      PAST MEDICAL HISTORY:      Scheduled Meds:   LORazepam  0.5 mg Intravenous Q6H    enalaprilat  1.25 mg Intravenous 4 times per day    insulin lispro  0-6 Units Subcutaneous TID WC    magnesium oxide  400 mg Oral Daily    piperacillin-tazobactam (ZOSYN) 3.375 g in dextrose 5% IVPB extended infusion (mini-bag)  3.375 g Intravenous Q8H    sodium chloride flush  10 mL Intravenous 2 times per day    enoxaparin  30 mg Subcutaneous BID    QUEtiapine  800 mg Oral Nightly    pramipexole  0.25 mg Oral Daily    pantoprazole  40 mg Intravenous Daily    And    sodium chloride (PF)  10 mL Intravenous Daily    busPIRone  15 mg Oral Nightly    DULoxetine  60 mg Oral BID    cetirizine  10 mg Oral Daily     Continuous Infusions:   niCARdipine      dextrose      PN-Adult 2-in-1 Central Line (Standard) 75 mL/hr at 08/24/20 1757    fat emulsion 100 mL (08/24/20 1757)    dexmedetomidine (PRECEDEX) IV infusion 1 mcg/kg/hr (08/25/20 1015)     PRN Meds:hydrALAZINE, HYDROmorphone, glucose, dextrose, glucagon (rDNA), dextrose, perflutren lipid microspheres, ketorolac, promethazine (PHENERGAN) in sodium chloride 0.9% IVPB, sodium chloride flush, magnesium sulfate, potassium chloride **OR** potassium alternative oral replacement **OR** potassium chloride, acetaminophen, [DISCONTINUED] promethazine **OR** ondansetron, melatonin ER        PHYSICAL EXAMINATION:  BP (!) 164/97   Pulse 113   Temp 98.8 °F (37.1 °C) (Axillary)   Resp 29   Ht 5' 5\" (1.651 m)   Wt (!) 319 lb 10.7 oz (145 kg)   SpO2 97%   BMI 53.20 kg/m²     Tmax 100.6 yesterday  General : Awake, alert, anxious, on bipap  Neck - supple, no lymphadenopathy, JVD not raised  Heart -   Lungs - Air Entry- fair bilaterally; breath sounds : vesicular;   rales/crackles - absent  Abdomen - soft, no tenderness  Upper Extremities  - no cyanosis, mottling; edema : absent  Lower Extremities: no cyanosis, mottling; edema : absent    Current Laboratory, Radiologic, Microbiologic, and Diagnostic studies reviewed  Data ReviewCBC:   Recent Labs     08/23/20  0359 08/24/20 0343 08/25/20  0412   WBC 17.8* 16.2* 14.7*   RBC 3.22* 3.11* 3.04*   HGB 10.0* 9.7* 9.4*   HCT 30.7* 29.6* 28.5*    210 229     BMP:   Recent Labs     08/23/20  0359 08/24/20 0343 08/25/20  0412   GLUCOSE 122* 149* 180*    142 141   K 3.8 3.9 3.9   BUN 13 14 14   CREATININE 0.63 0.64 0.57   CALCIUM 7.3* 7.8* 8.1*     ABGs: No results for input(s): PHART, PO2ART, WWP0OAP, TMR5BTC, BEART, J6JQWVSS, YLL0VJN in the last 72 hours.    PT/INR:  No results found for: PTINR    ASSESSMENT / PLAN:  · Acute pancreatitis- GI consulted, monitor amylase lipase - improving,   · Lactic acidosis- resolved  · Possible pneumonia- ABx   · Diet NPO  · Monitor end tidal Co2 with pain meds   · acute hypoxic resp failure - O2/ NIPPV - BPAP prn  · precedex for restlessness- will add scheduled ativan, wean precedex as able  · Check with surgery about restarting oral meds for anxiety  · Discussed with Dr. Tracey Houston       Electronically signed by Matty Solis on 08/25/20 at 10:32 AM.

## 2020-08-25 NOTE — PROGRESS NOTES
Dr. Junior Richards and NP Lani Camarillo at bedside, rn updated them on pt status. Pt extremely anxious and agitated attempting to pull off bipap. Orders to change scheduled ativan to Q6 hours. RN to ask Dr. Mervat Molina if pt can have PO meds. RN stated she attempted to wean precedex but pt extremely anxious and agitated and has pulled off bipap multiple times. Okay to keep precedex at this time. Will attempt to wean as pt tolerates.

## 2020-08-26 ENCOUNTER — APPOINTMENT (OUTPATIENT)
Dept: CT IMAGING | Age: 45
DRG: 871 | End: 2020-08-26
Payer: COMMERCIAL

## 2020-08-26 ENCOUNTER — APPOINTMENT (OUTPATIENT)
Dept: GENERAL RADIOLOGY | Age: 45
DRG: 871 | End: 2020-08-26
Payer: COMMERCIAL

## 2020-08-26 LAB
-: NORMAL
ABSOLUTE BANDS #: 1.13 K/UL (ref 0–1)
ABSOLUTE EOS #: 0.16 K/UL (ref 0–0.4)
ABSOLUTE IMMATURE GRANULOCYTE: ABNORMAL K/UL (ref 0–0.3)
ABSOLUTE LYMPH #: 1.46 K/UL (ref 1–4.8)
ABSOLUTE MONO #: 0.65 K/UL (ref 0.1–1.3)
ALBUMIN SERPL-MCNC: 2.4 G/DL (ref 3.5–5.2)
ALBUMIN/GLOBULIN RATIO: ABNORMAL (ref 1–2.5)
ALP BLD-CCNC: 123 U/L (ref 35–104)
ALT SERPL-CCNC: 29 U/L (ref 5–33)
AMORPHOUS: NORMAL
ANION GAP SERPL CALCULATED.3IONS-SCNC: 12 MMOL/L (ref 9–17)
ANION GAP SERPL CALCULATED.3IONS-SCNC: 8 MMOL/L (ref 9–17)
AST SERPL-CCNC: 35 U/L
BACTERIA: NORMAL
BANDS: 7 % (ref 0–10)
BASOPHILS # BLD: 0 % (ref 0–2)
BASOPHILS ABSOLUTE: 0 K/UL (ref 0–0.2)
BILIRUB SERPL-MCNC: 0.27 MG/DL (ref 0.3–1.2)
BILIRUBIN URINE: NEGATIVE
BUN BLDV-MCNC: 11 MG/DL (ref 6–20)
BUN BLDV-MCNC: 9 MG/DL (ref 6–20)
BUN/CREAT BLD: ABNORMAL (ref 9–20)
BUN/CREAT BLD: ABNORMAL (ref 9–20)
C-REACTIVE PROTEIN: 260.2 MG/L (ref 0–5)
C-REACTIVE PROTEIN: 296.2 MG/L (ref 0–5)
CALCIUM SERPL-MCNC: 8 MG/DL (ref 8.6–10.4)
CALCIUM SERPL-MCNC: 8.2 MG/DL (ref 8.6–10.4)
CASTS UA: NORMAL /LPF
CHLORIDE BLD-SCNC: 101 MMOL/L (ref 98–107)
CHLORIDE BLD-SCNC: 108 MMOL/L (ref 98–107)
CO2: 27 MMOL/L (ref 20–31)
CO2: 28 MMOL/L (ref 20–31)
COLOR: YELLOW
COMMENT UA: ABNORMAL
CREAT SERPL-MCNC: 0.5 MG/DL (ref 0.5–0.9)
CREAT SERPL-MCNC: 0.5 MG/DL (ref 0.5–0.9)
CRYSTALS, UA: NORMAL /HPF
CULTURE: NORMAL
D-DIMER QUANTITATIVE: >20 MG/L FEU (ref 0–0.59)
DIFFERENTIAL TYPE: ABNORMAL
EOSINOPHILS RELATIVE PERCENT: 1 % (ref 0–4)
EPITHELIAL CELLS UA: NORMAL /HPF
FERRITIN: 1141 UG/L (ref 13–150)
GFR AFRICAN AMERICAN: >60 ML/MIN
GFR AFRICAN AMERICAN: >60 ML/MIN
GFR NON-AFRICAN AMERICAN: >60 ML/MIN
GFR NON-AFRICAN AMERICAN: >60 ML/MIN
GFR SERPL CREATININE-BSD FRML MDRD: ABNORMAL ML/MIN/{1.73_M2}
GLUCOSE BLD-MCNC: 161 MG/DL (ref 65–105)
GLUCOSE BLD-MCNC: 167 MG/DL (ref 70–99)
GLUCOSE BLD-MCNC: 178 MG/DL (ref 65–105)
GLUCOSE BLD-MCNC: 178 MG/DL (ref 70–99)
GLUCOSE BLD-MCNC: 185 MG/DL (ref 65–105)
GLUCOSE URINE: NEGATIVE
HCT VFR BLD CALC: 29.1 % (ref 36–46)
HEMOGLOBIN: 9.5 G/DL (ref 12–16)
IMMATURE GRANULOCYTES: ABNORMAL %
KETONES, URINE: NEGATIVE
LACTATE DEHYDROGENASE: 429 U/L (ref 135–214)
LACTIC ACID: 1.2 MMOL/L (ref 0.5–2.2)
LEUKOCYTE ESTERASE, URINE: NEGATIVE
LIPASE: 46 U/L (ref 13–60)
LYMPHOCYTES # BLD: 9 % (ref 24–44)
Lab: NORMAL
MAGNESIUM: 1.7 MG/DL (ref 1.6–2.6)
MCH RBC QN AUTO: 30.6 PG (ref 26–34)
MCHC RBC AUTO-ENTMCNC: 32.5 G/DL (ref 31–37)
MCV RBC AUTO: 94.2 FL (ref 80–100)
METAMYELOCYTES ABSOLUTE COUNT: 0.16 K/UL
METAMYELOCYTES: 1 %
MONOCYTES # BLD: 4 % (ref 1–7)
MORPHOLOGY: ABNORMAL
MUCUS: NORMAL
NITRITE, URINE: NEGATIVE
NRBC AUTOMATED: ABNORMAL PER 100 WBC
OTHER OBSERVATIONS UA: NORMAL
PDW BLD-RTO: 14.5 % (ref 11.5–14.9)
PH UA: 7.5 (ref 5–8)
PHOSPHORUS: 3.5 MG/DL (ref 2.6–4.5)
PLATELET # BLD: 238 K/UL (ref 150–450)
PLATELET ESTIMATE: ABNORMAL
PMV BLD AUTO: 8.3 FL (ref 6–12)
POTASSIUM SERPL-SCNC: 3.7 MMOL/L (ref 3.7–5.3)
POTASSIUM SERPL-SCNC: 3.9 MMOL/L (ref 3.7–5.3)
PROCALCITONIN: 0.32 NG/ML
PROTEIN UA: NEGATIVE
RBC # BLD: 3.09 M/UL (ref 4–5.2)
RBC # BLD: ABNORMAL 10*6/UL
RBC UA: NORMAL /HPF
RENAL EPITHELIAL, UA: NORMAL /HPF
SARS-COV-2, PCR: NORMAL
SARS-COV-2, RAPID: NORMAL
SARS-COV-2: NOT DETECTED
SEG NEUTROPHILS: 78 % (ref 36–66)
SEGMENTED NEUTROPHILS ABSOLUTE COUNT: 12.64 K/UL (ref 1.3–9.1)
SODIUM BLD-SCNC: 140 MMOL/L (ref 135–144)
SODIUM BLD-SCNC: 144 MMOL/L (ref 135–144)
SOURCE: NORMAL
SPECIFIC GRAVITY UA: 1.01 (ref 1–1.03)
SPECIMEN DESCRIPTION: NORMAL
TOTAL PROTEIN: 5.9 G/DL (ref 6.4–8.3)
TRICHOMONAS: NORMAL
TURBIDITY: CLEAR
URINE HGB: ABNORMAL
UROBILINOGEN, URINE: NORMAL
WBC # BLD: 16.2 K/UL (ref 3.5–11)
WBC # BLD: ABNORMAL 10*3/UL
WBC UA: NORMAL /HPF
YEAST: NORMAL

## 2020-08-26 PROCEDURE — 6370000000 HC RX 637 (ALT 250 FOR IP): Performed by: SURGERY

## 2020-08-26 PROCEDURE — 2700000000 HC OXYGEN THERAPY PER DAY

## 2020-08-26 PROCEDURE — APPNB30 APP NON BILLABLE TIME 0-30 MINS: Performed by: NURSE PRACTITIONER

## 2020-08-26 PROCEDURE — 94761 N-INVAS EAR/PLS OXIMETRY MLT: CPT

## 2020-08-26 PROCEDURE — 2580000003 HC RX 258: Performed by: NURSE PRACTITIONER

## 2020-08-26 PROCEDURE — 36415 COLL VENOUS BLD VENIPUNCTURE: CPT

## 2020-08-26 PROCEDURE — 86140 C-REACTIVE PROTEIN: CPT

## 2020-08-26 PROCEDURE — 6360000002 HC RX W HCPCS: Performed by: INTERNAL MEDICINE

## 2020-08-26 PROCEDURE — 6370000000 HC RX 637 (ALT 250 FOR IP): Performed by: INTERNAL MEDICINE

## 2020-08-26 PROCEDURE — 83605 ASSAY OF LACTIC ACID: CPT

## 2020-08-26 PROCEDURE — 84100 ASSAY OF PHOSPHORUS: CPT

## 2020-08-26 PROCEDURE — 84145 PROCALCITONIN (PCT): CPT

## 2020-08-26 PROCEDURE — 2580000003 HC RX 258: Performed by: STUDENT IN AN ORGANIZED HEALTH CARE EDUCATION/TRAINING PROGRAM

## 2020-08-26 PROCEDURE — 6360000002 HC RX W HCPCS: Performed by: NURSE PRACTITIONER

## 2020-08-26 PROCEDURE — 51702 INSERT TEMP BLADDER CATH: CPT

## 2020-08-26 PROCEDURE — 2500000003 HC RX 250 WO HCPCS: Performed by: STUDENT IN AN ORGANIZED HEALTH CARE EDUCATION/TRAINING PROGRAM

## 2020-08-26 PROCEDURE — 83690 ASSAY OF LIPASE: CPT

## 2020-08-26 PROCEDURE — 99223 1ST HOSP IP/OBS HIGH 75: CPT | Performed by: INTERNAL MEDICINE

## 2020-08-26 PROCEDURE — 80048 BASIC METABOLIC PNL TOTAL CA: CPT

## 2020-08-26 PROCEDURE — 82947 ASSAY GLUCOSE BLOOD QUANT: CPT

## 2020-08-26 PROCEDURE — C9113 INJ PANTOPRAZOLE SODIUM, VIA: HCPCS | Performed by: NURSE PRACTITIONER

## 2020-08-26 PROCEDURE — 81001 URINALYSIS AUTO W/SCOPE: CPT

## 2020-08-26 PROCEDURE — U0003 INFECTIOUS AGENT DETECTION BY NUCLEIC ACID (DNA OR RNA); SEVERE ACUTE RESPIRATORY SYNDROME CORONAVIRUS 2 (SARS-COV-2) (CORONAVIRUS DISEASE [COVID-19]), AMPLIFIED PROBE TECHNIQUE, MAKING USE OF HIGH THROUGHPUT TECHNOLOGIES AS DESCRIBED BY CMS-2020-01-R: HCPCS

## 2020-08-26 PROCEDURE — 83735 ASSAY OF MAGNESIUM: CPT

## 2020-08-26 PROCEDURE — 94660 CPAP INITIATION&MGMT: CPT

## 2020-08-26 PROCEDURE — 82728 ASSAY OF FERRITIN: CPT

## 2020-08-26 PROCEDURE — 6360000002 HC RX W HCPCS: Performed by: SURGERY

## 2020-08-26 PROCEDURE — 6360000004 HC RX CONTRAST MEDICATION: Performed by: STUDENT IN AN ORGANIZED HEALTH CARE EDUCATION/TRAINING PROGRAM

## 2020-08-26 PROCEDURE — 80053 COMPREHEN METABOLIC PANEL: CPT

## 2020-08-26 PROCEDURE — 2580000003 HC RX 258: Performed by: INTERNAL MEDICINE

## 2020-08-26 PROCEDURE — 2580000003 HC RX 258: Performed by: RADIOLOGY

## 2020-08-26 PROCEDURE — 85379 FIBRIN DEGRADATION QUANT: CPT

## 2020-08-26 PROCEDURE — 2500000003 HC RX 250 WO HCPCS: Performed by: INTERNAL MEDICINE

## 2020-08-26 PROCEDURE — 2500000003 HC RX 250 WO HCPCS: Performed by: SURGERY

## 2020-08-26 PROCEDURE — 74177 CT ABD & PELVIS W/CONTRAST: CPT

## 2020-08-26 PROCEDURE — 99291 CRITICAL CARE FIRST HOUR: CPT | Performed by: INTERNAL MEDICINE

## 2020-08-26 PROCEDURE — 6360000002 HC RX W HCPCS: Performed by: STUDENT IN AN ORGANIZED HEALTH CARE EDUCATION/TRAINING PROGRAM

## 2020-08-26 PROCEDURE — 85025 COMPLETE CBC W/AUTO DIFF WBC: CPT

## 2020-08-26 PROCEDURE — 71260 CT THORAX DX C+: CPT

## 2020-08-26 PROCEDURE — 99232 SBSQ HOSP IP/OBS MODERATE 35: CPT | Performed by: INTERNAL MEDICINE

## 2020-08-26 PROCEDURE — 83615 LACTATE (LD) (LDH) ENZYME: CPT

## 2020-08-26 PROCEDURE — 71045 X-RAY EXAM CHEST 1 VIEW: CPT

## 2020-08-26 PROCEDURE — 2000000000 HC ICU R&B

## 2020-08-26 RX ORDER — ACETAMINOPHEN 650 MG/1
650 SUPPOSITORY RECTAL EVERY 4 HOURS PRN
Status: DISCONTINUED | OUTPATIENT
Start: 2020-08-26 | End: 2020-09-04 | Stop reason: HOSPADM

## 2020-08-26 RX ORDER — 0.9 % SODIUM CHLORIDE 0.9 %
80 INTRAVENOUS SOLUTION INTRAVENOUS ONCE
Status: COMPLETED | OUTPATIENT
Start: 2020-08-26 | End: 2020-08-26

## 2020-08-26 RX ORDER — FUROSEMIDE 10 MG/ML
40 INJECTION INTRAMUSCULAR; INTRAVENOUS ONCE
Status: COMPLETED | OUTPATIENT
Start: 2020-08-26 | End: 2020-08-26

## 2020-08-26 RX ORDER — SODIUM CHLORIDE 0.9 % (FLUSH) 0.9 %
10 SYRINGE (ML) INJECTION PRN
Status: DISCONTINUED | OUTPATIENT
Start: 2020-08-26 | End: 2020-09-04 | Stop reason: HOSPADM

## 2020-08-26 RX ORDER — DIPHENHYDRAMINE HYDROCHLORIDE 50 MG/ML
25 INJECTION INTRAMUSCULAR; INTRAVENOUS ONCE
Status: COMPLETED | OUTPATIENT
Start: 2020-08-26 | End: 2020-08-26

## 2020-08-26 RX ADMIN — PIPERACILLIN SODIUM AND TAZOBACTAM SODIUM 3.38 G: 3; .375 INJECTION, POWDER, LYOPHILIZED, FOR SOLUTION INTRAVENOUS at 05:23

## 2020-08-26 RX ADMIN — ENALAPRILAT 1.25 MG: 1.25 INJECTION INTRAVENOUS at 12:41

## 2020-08-26 RX ADMIN — DEXMEDETOMIDINE 1.4 MCG/KG/HR: 100 INJECTION, SOLUTION, CONCENTRATE INTRAVENOUS at 18:24

## 2020-08-26 RX ADMIN — DIPHENHYDRAMINE HYDROCHLORIDE 25 MG: 50 INJECTION INTRAMUSCULAR; INTRAVENOUS at 12:41

## 2020-08-26 RX ADMIN — SODIUM CHLORIDE 10 MG/HR: 9 INJECTION, SOLUTION INTRAVENOUS at 11:07

## 2020-08-26 RX ADMIN — ACETAMINOPHEN 650 MG: 650 SUPPOSITORY RECTAL at 23:57

## 2020-08-26 RX ADMIN — Medication 10 ML: at 21:21

## 2020-08-26 RX ADMIN — SODIUM CHLORIDE 5 MG/HR: 9 INJECTION, SOLUTION INTRAVENOUS at 06:08

## 2020-08-26 RX ADMIN — SODIUM CHLORIDE 80 ML: 9 INJECTION, SOLUTION INTRAVENOUS at 10:55

## 2020-08-26 RX ADMIN — IRON SUCROSE 200 MG: 20 INJECTION, SOLUTION INTRAVENOUS at 15:06

## 2020-08-26 RX ADMIN — LORAZEPAM 0.5 MG: 2 INJECTION INTRAMUSCULAR; INTRAVENOUS at 18:03

## 2020-08-26 RX ADMIN — I.V. FAT EMULSION 100 ML: 20 EMULSION INTRAVENOUS at 18:04

## 2020-08-26 RX ADMIN — ENOXAPARIN SODIUM 150 MG: 150 INJECTION SUBCUTANEOUS at 20:35

## 2020-08-26 RX ADMIN — IOVERSOL 100 ML: 741 INJECTION INTRA-ARTERIAL; INTRAVENOUS at 10:56

## 2020-08-26 RX ADMIN — SODIUM CHLORIDE 10 MG/HR: 9 INJECTION, SOLUTION INTRAVENOUS at 16:10

## 2020-08-26 RX ADMIN — SODIUM CHLORIDE 7.5 MG/HR: 9 INJECTION, SOLUTION INTRAVENOUS at 18:32

## 2020-08-26 RX ADMIN — ENALAPRILAT 1.25 MG: 1.25 INJECTION INTRAVENOUS at 00:33

## 2020-08-26 RX ADMIN — ENALAPRILAT 1.25 MG: 1.25 INJECTION INTRAVENOUS at 23:08

## 2020-08-26 RX ADMIN — ENALAPRILAT 1.25 MG: 1.25 INJECTION INTRAVENOUS at 04:34

## 2020-08-26 RX ADMIN — HYDROMORPHONE HYDROCHLORIDE 0.5 MG: 1 INJECTION, SOLUTION INTRAMUSCULAR; INTRAVENOUS; SUBCUTANEOUS at 20:03

## 2020-08-26 RX ADMIN — MEROPENEM 1 G: 1 INJECTION, POWDER, FOR SOLUTION INTRAVENOUS at 21:22

## 2020-08-26 RX ADMIN — HYDROMORPHONE HYDROCHLORIDE 0.5 MG: 1 INJECTION, SOLUTION INTRAMUSCULAR; INTRAVENOUS; SUBCUTANEOUS at 02:45

## 2020-08-26 RX ADMIN — CALCIUM GLUCONATE: 94 INJECTION, SOLUTION INTRAVENOUS at 18:04

## 2020-08-26 RX ADMIN — DEXMEDETOMIDINE 1.4 MCG/KG/HR: 100 INJECTION, SOLUTION, CONCENTRATE INTRAVENOUS at 10:25

## 2020-08-26 RX ADMIN — INSULIN LISPRO 1 UNITS: 100 INJECTION, SOLUTION INTRAVENOUS; SUBCUTANEOUS at 18:05

## 2020-08-26 RX ADMIN — PANTOPRAZOLE SODIUM 40 MG: 40 INJECTION, POWDER, FOR SOLUTION INTRAVENOUS at 09:58

## 2020-08-26 RX ADMIN — Medication 10 ML: at 10:55

## 2020-08-26 RX ADMIN — SODIUM CHLORIDE 10 MG/HR: 9 INJECTION, SOLUTION INTRAVENOUS at 13:34

## 2020-08-26 RX ADMIN — DEXMEDETOMIDINE 1.4 MCG/KG/HR: 100 INJECTION, SOLUTION, CONCENTRATE INTRAVENOUS at 07:10

## 2020-08-26 RX ADMIN — LORAZEPAM 0.5 MG: 2 INJECTION INTRAMUSCULAR; INTRAVENOUS at 11:12

## 2020-08-26 RX ADMIN — LORAZEPAM 0.5 MG: 2 INJECTION INTRAMUSCULAR; INTRAVENOUS at 22:15

## 2020-08-26 RX ADMIN — KETOROLAC TROMETHAMINE 15 MG: 30 INJECTION, SOLUTION INTRAMUSCULAR at 11:15

## 2020-08-26 RX ADMIN — DEXMEDETOMIDINE 0.9 MCG/KG/HR: 100 INJECTION, SOLUTION, CONCENTRATE INTRAVENOUS at 23:20

## 2020-08-26 RX ADMIN — DEXMEDETOMIDINE 1.2 MCG/KG/HR: 100 INJECTION, SOLUTION, CONCENTRATE INTRAVENOUS at 20:38

## 2020-08-26 RX ADMIN — ENALAPRILAT 1.25 MG: 1.25 INJECTION INTRAVENOUS at 18:02

## 2020-08-26 RX ADMIN — FUROSEMIDE 40 MG: 10 INJECTION, SOLUTION INTRAMUSCULAR; INTRAVENOUS at 12:15

## 2020-08-26 RX ADMIN — KETOROLAC TROMETHAMINE 15 MG: 30 INJECTION, SOLUTION INTRAMUSCULAR at 04:46

## 2020-08-26 RX ADMIN — HYDROMORPHONE HYDROCHLORIDE 0.5 MG: 1 INJECTION, SOLUTION INTRAMUSCULAR; INTRAVENOUS; SUBCUTANEOUS at 09:25

## 2020-08-26 RX ADMIN — Medication 10 ML: at 11:08

## 2020-08-26 RX ADMIN — HYDROMORPHONE HYDROCHLORIDE 0.5 MG: 1 INJECTION, SOLUTION INTRAMUSCULAR; INTRAVENOUS; SUBCUTANEOUS at 15:55

## 2020-08-26 RX ADMIN — MEROPENEM 1 G: 1 INJECTION, POWDER, FOR SOLUTION INTRAVENOUS at 16:18

## 2020-08-26 RX ADMIN — LORAZEPAM 0.5 MG: 2 INJECTION INTRAMUSCULAR; INTRAVENOUS at 04:37

## 2020-08-26 RX ADMIN — ACETAMINOPHEN 650 MG: 650 SUPPOSITORY RECTAL at 13:30

## 2020-08-26 RX ADMIN — SODIUM CHLORIDE 6 MG/HR: 9 INJECTION, SOLUTION INTRAVENOUS at 22:16

## 2020-08-26 RX ADMIN — DEXMEDETOMIDINE 1.4 MCG/KG/HR: 100 INJECTION, SOLUTION, CONCENTRATE INTRAVENOUS at 04:22

## 2020-08-26 RX ADMIN — DEXMEDETOMIDINE 5 MCG/KG/HR: 100 INJECTION, SOLUTION, CONCENTRATE INTRAVENOUS at 01:57

## 2020-08-26 RX ADMIN — PIPERACILLIN SODIUM AND TAZOBACTAM SODIUM 3.38 G: 3; .375 INJECTION, POWDER, LYOPHILIZED, FOR SOLUTION INTRAVENOUS at 00:00

## 2020-08-26 RX ADMIN — HYDROMORPHONE HYDROCHLORIDE 0.5 MG: 1 INJECTION, SOLUTION INTRAMUSCULAR; INTRAVENOUS; SUBCUTANEOUS at 00:03

## 2020-08-26 RX ADMIN — ENOXAPARIN SODIUM 30 MG: 30 INJECTION SUBCUTANEOUS at 09:34

## 2020-08-26 ASSESSMENT — PAIN SCALES - GENERAL
PAINLEVEL_OUTOF10: 6
PAINLEVEL_OUTOF10: 6
PAINLEVEL_OUTOF10: 7
PAINLEVEL_OUTOF10: 7
PAINLEVEL_OUTOF10: 6
PAINLEVEL_OUTOF10: 8
PAINLEVEL_OUTOF10: 8
PAINLEVEL_OUTOF10: 5
PAINLEVEL_OUTOF10: 7
PAINLEVEL_OUTOF10: 5
PAINLEVEL_OUTOF10: 7

## 2020-08-26 NOTE — PLAN OF CARE
Problem: Falls - Risk of:  Goal: Will remain free from falls  Description: Will remain free from falls  8/26/2020 [id number] by Nicko Anderson RN  Outcome: Ongoing  8/25/2020 1731 by Denny Turcios RN  Outcome: Ongoing  Goal: Absence of physical injury  Description: Absence of physical injury  Outcome: Ongoing     Problem: Pain:  Goal: Pain level will decrease  Description: Pain level will decrease  8/26/2020 6962 by Nicko Anderson RN  Outcome: Ongoing  8/25/2020 1731 by Denny Turcios RN  Outcome: Ongoing  Goal: Control of acute pain  Description: Control of acute pain  Outcome: Ongoing  Note: Patient's pain has been well managed this shift with PRN and scheduled meds. Patient has remained calm and well rested this shift.   Goal: Control of chronic pain  Description: Control of chronic pain  Outcome: Ongoing     Problem: Nausea/Vomiting:  Goal: Absence of nausea/vomiting  Description: Absence of nausea/vomiting  Outcome: Ongoing  Goal: Able to drink  Description: Able to drink  Outcome: Ongoing  Goal: Able to eat  Description: Able to eat  Outcome: Ongoing  Goal: Ability to achieve adequate nutritional intake will improve  Description: Ability to achieve adequate nutritional intake will improve  Outcome: Ongoing     Problem: Gas Exchange - Impaired:  Goal: Levels of oxygenation will improve  Description: Levels of oxygenation will improve  8/26/2020 0867 by Nicko Anderson RN  Outcome: Ongoing  8/25/2020 1731 by Denny Turcios RN  Outcome: Ongoing     Problem: Infection, Septic Shock:  Goal: Will show no infection signs and symptoms  Description: Will show no infection signs and symptoms  Outcome: Ongoing     Problem: Serum Glucose Level - Abnormal:  Goal: Ability to maintain appropriate glucose levels will improve  Description: Ability to maintain appropriate glucose levels will improve  Outcome: Ongoing     Problem: Tissue Perfusion, Altered:  Goal: Circulatory function within specified parameters  Description: Circulatory function within specified parameters  Outcome: Ongoing  Note: Patient has worn BiPaP continuously this shift with no issues of anxiety. Will continue to monitor.      Problem: Venous Thromboembolism:  Goal: Will show no signs or symptoms of venous thromboembolism  Description: Will show no signs or symptoms of venous thromboembolism  Outcome: Ongoing  Goal: Absence of signs or symptoms of impaired coagulation  Description: Absence of signs or symptoms of impaired coagulation  Outcome: Ongoing     Problem: Nutrition  Goal: Optimal nutrition therapy  Outcome: Ongoing     Problem: Skin Integrity:  Goal: Will show no infection signs and symptoms  Description: Will show no infection signs and symptoms  Outcome: Ongoing  Goal: Absence of new skin breakdown  Description: Absence of new skin breakdown  Outcome: Ongoing

## 2020-08-26 NOTE — PROGRESS NOTES
[facility]    Gastroenterology Daily Progress Note      Patient:   Joon Gonzalez   :    1975   Facility:   [facility]  Date:     2020  Consultant:   Judy Chavez CNP      SUBJECTIVE  54 y.o. female admitted 2020 with Acute pancreatitis, unspecified complication status, unspecified pancreatitis type [K85.90] and seen for pancreatitis. The pt was seen and examined. Continues to be febrile, and c/o LUQ pain, pt remains on bipap. Now willing to have mri abd. lft's  Essentially the same as yesterday, leucocytosis trending up.        OBJECTIVE  Scheduled Meds:   LORazepam  0.5 mg Intravenous Q6H    piperacillin-tazobactam  3.375 g Intravenous Q6H    iron sucrose  200 mg Intravenous Q24H    enalaprilat  1.25 mg Intravenous 4 times per day    insulin lispro  0-6 Units Subcutaneous TID WC    magnesium oxide  400 mg Oral Daily    sodium chloride flush  10 mL Intravenous 2 times per day    enoxaparin  30 mg Subcutaneous BID    QUEtiapine  800 mg Oral Nightly    pramipexole  0.25 mg Oral Daily    pantoprazole  40 mg Intravenous Daily    And    sodium chloride (PF)  10 mL Intravenous Daily    busPIRone  15 mg Oral Nightly    DULoxetine  60 mg Oral BID    cetirizine  10 mg Oral Daily       Vital Signs:  BP (!) 166/88   Pulse 116   Temp 99.4 °F (37.4 °C) (Temporal)   Resp 22   Ht 5' 5\" (1.651 m)   Wt (!) 322 lb 8.5 oz (146.3 kg)   SpO2 96%   BMI 53.67 kg/m²      Physical Exam:     General Appearance: ill appearing,lert and oriented to person, place and time, well-developed and well-nourished, in no acute distress  Skin: warm and dry, no rash or erythema  Head: normocephalic and atraumatic  Eyes: pupils equal, round, and reactive to light, extraocular eye movements intact, conjunctivae normal  ENT: hearing grossly normal bilaterally  Neck: neck supple and non tender without mass, no thyromegaly or thyroid nodules, no cervical lymphadenopathy Pulmonary/Chest: clear to auscultation bilaterally- no wheezes, rales or rhonchi, normal air movement, on bipap  Cardiovascular: tachycardic rate, regular rhythm, normal S1 and S2, no murmurs, rubs, clicks or gallops, distal pulses intact, no carotid bruits  Abdomen: soft, obese LUQ is-tender, non-distended, normal bowel sounds, no masses or organomegaly  Extremities: no cyanosis, clubbing or edema  Musculoskeletal: normal range of motion, no joint swelling, deformity or tenderness  Neurologic: no cranial nerve deficit and muscle strength normal      Lab and Imaging Review     CBC  Recent Labs     08/24/20  0343 08/25/20  0412 08/26/20  0337   WBC 16.2* 14.7* 16.2*   HGB 9.7* 9.4* 9.5*   HCT 29.6* 28.5* 29.1*   MCV 95.3 93.8 94.2    229 238       BMP  Recent Labs     08/24/20  0343 08/25/20  0412 08/26/20  0337    141 144   K 3.9 3.9 3.9    106 108*   CO2 25 28 28   BUN 14 14 11   CREATININE 0.64 0.57 0.50   GLUCOSE 149* 180* 178*   CALCIUM 7.8* 8.1* 8.0*       LFTS  Recent Labs     08/24/20  0343 08/25/20  0412 08/26/20  0337   ALKPHOS 134* 117* 123*   ALT 34* 28 29   AST 46* 35* 35*   PROT 5.7* 5.6* 5.9*   BILITOT 0.38 0.25* 0.27*   LABALBU 2.5* 2.3* 2.4*       AMYLASE/LIPASE/AMMONIA  Recent Labs     08/24/20  0343 08/25/20  0412 08/26/20  0337   LIPASE 30 40 46         ANEMIA STUDIES  Recent Labs     08/25/20  0858   LABIRON 11*   TIBC 180*   FERRITIN 510*   [id number] 878   FOLATE 11.1     Results for Arturo Medina (MRN [id number]) as of 8/22/2020 08:50    Ref. Range 8/20/2020 06:06   Triglycerides Latest Ref Range: <150 mg/dL 259 (H)      FINDINGS: ct abd 8/19/20   Lower Chest: Consolidation within both lower lobes and the lingula.     Scattered ground-glass opacity.  No pleural effusion.         Organs: Liver is diffusely hypoattenuating.  No acute abnormality within the    spleen, adrenals, or left kidney.  There is right renal cortical scarring and    calcification.  Subcentimeter hypoattenuating bilateral renal lesions are too    small to accurately characterize, likely cysts.  Prior cholecystectomy. There is diffuse peripancreatic stranding and fluid.  No loculated fluid    collection.         GI/Bowel: Stomach is partially distended.  Small bowel is nondilated.  Colon    is nondilated.         Pelvis: Urinary bladder is partially distended without vesicular stones. Uterus is present.         Peritoneum/Retroperitoneum: Shotty retroperitoneal lymph nodes, likely    reactive.  Abdominal aorta is normal in caliber.  No pneumoperitoneum.         Bones/Soft Tissues: No acute osseous abnormality.           Impression    1. Pancreatic edema and associated fluid is consistent with pancreatitis.  No    loculated fluid collection/pseudocyst.    2. Shotty retroperitoneal lymph nodes are likely reactive. 3. Hepatic steatosis. 4. Bilateral lower lobe consolidation, which could be due to edema or    pneumonia. Miguel Echols      FINDINGS:  Ct abd 8/20/20   Lower Chest: Persisting bilateral lower lung scattered consolidation and    atelectasis is present with trace posterior left-sided pleural effusion    identified. Hakeem Glenys is normal in size and configuration.         Organs: Interval mild worsening of severe peripancreatic fat stranding and    fluid is noted with intraperitoneal free fluid collecting within the    bilateral pericolic gutters and collecting inferiorly within the pelvis with    increased volume in comparison with the prior study.  Diffuse fatty    infiltration of the liver is again seen.  The liver, gallbladder, spleen,    pancreas, adrenal glands, kidneys, are otherwise unremarkable in appearance.         GI/Bowel: The stomach is unremarkable without wall thickening or distention.     Bowel loops are unremarkable in appearance without evidence of obstruction,    distension or mucosal thickening.         Pelvis: Berkowitz catheter is noted within the nondistended but grossly unremarkable urinary bladder.  Bilateral fallopian tube Essure coils are seen    without evidence of complication.  The uterus is anteverted in position and    is unremarkable in appearance there no evidence of pelvic free fluid is seen.         Peritoneum/Retroperitoneum: No evidence of retroperitoneal or intraperitoneal    lymphadenopathy is identified. Chelle Beams   Bones/Soft Tissues: No evidence of retroperitoneal or intraperitoneal    lymphadenopathy is identified.  No evidence of intraperitoneal free fluid is    seen.              Impression    1. Mild interval worsening of severe peripancreatic inflammation associated    with acute pancreatitis, as discussed above. 2. No evidence of complication i.e. pseudocyst noted. 3. Stable bilateral lower lung multifocal consolidation atelectasis    suggesting pneumonia versus alveolar edema. 4. Hepatic steatosis, unchanged.          ASSESSMENT/PLAN:  1. Severe pancreatitis, continues to have LUQ pain with fevers  -agreeable to mri of abd if breathing allows  -continue TPN and pain mgt    2. pneumonia antibiotics per primary, ID consulted              This plan was formulated in collaboration with Dr. Adarsh Zaidi . Electronically signed by: ANIBAL Richardson CNP on 8/26/2020 at 8:01 AM       Attending Physician Statement  I have discussed the care of Valarie Solis and   I have examined the patient myselft independently, and taken ros and hpi , including pertinent history and exam findings,  with the author of this note . I have reviewed the key elements of all parts of the encounter with the nurse practitioner/resident.     I agree with the assessment, plan and orders as documented by the above health care provider     Liver enzymes are the same without any elevation, and pancreatic enzymes normalized,   But the CAT scan still showing severe pancreatitis although there is no necrosis and no air bubbles to indicate abscess formation  Continue

## 2020-08-26 NOTE — CARE COORDINATION
DISCHARGE PLANNING NOTE:    COVID TESTING PENDING. Temp max in the last 24 hours- 101.9, on bipap. Unable to speak with patient at this time. Writer did speak with pt's mother the other day and she declined VNS at that time. Active order for IV Merrem and IV Venofer. Received one time dose of IV Lasix 40mg today. Precedex and Cardene gtt's. Remains on TPN as well. CT chest today showed questionable RLL PE and moderate left pleural effusion. Started on treatment dose Lovenox. Will follow for PO AC. CT abdomen showed severe acute pancreatitis. Will continue to follow for additional discharge needs.     Electronically signed by Erica Moss RN on 8/26/2020 at 3:13 PM

## 2020-08-26 NOTE — PROGRESS NOTES
Other (See Comments)    Triptans      Other reaction(s): Unknown    Lamotrigine Rash       Current Meds:   Scheduled Meds:    LORazepam  0.5 mg Intravenous Q6H    piperacillin-tazobactam  3.375 g Intravenous Q6H    iron sucrose  200 mg Intravenous Q24H    enalaprilat  1.25 mg Intravenous 4 times per day    insulin lispro  0-6 Units Subcutaneous TID WC    magnesium oxide  400 mg Oral Daily    sodium chloride flush  10 mL Intravenous 2 times per day    enoxaparin  30 mg Subcutaneous BID    QUEtiapine  800 mg Oral Nightly    pramipexole  0.25 mg Oral Daily    pantoprazole  40 mg Intravenous Daily    And    sodium chloride (PF)  10 mL Intravenous Daily    busPIRone  15 mg Oral Nightly    DULoxetine  60 mg Oral BID    cetirizine  10 mg Oral Daily     Continuous Infusions:    niCARdipine 5 mg/hr (08/26/20 0608)    PN-Adult 2-in-1 Central Line (Standard) 75 mL/hr at 08/25/20 1800    fat emulsion 100 mL (08/25/20 1816)    dextrose      dexmedetomidine (PRECEDEX) IV infusion 1.4 mcg/kg/hr (08/26/20 0606)     PRN Meds: hydrALAZINE, HYDROmorphone, glucose, dextrose, glucagon (rDNA), dextrose, perflutren lipid microspheres, ketorolac, promethazine (PHENERGAN) in sodium chloride 0.9% IVPB, sodium chloride flush, magnesium sulfate, potassium chloride **OR** potassium alternative oral replacement **OR** potassium chloride, acetaminophen, [DISCONTINUED] promethazine **OR** ondansetron, melatonin ER    Data:     Past Medical History:   has a past medical history of Abnormal levels of other serum enzymes, Anxiety, Bilateral leg edema, Chronic kidney disease, Depression, DVT (deep venous thrombosis) ([address] Elevated liver enzymes, Fibromyalgia, Headache(784.0), Hx of blood clots, Hypertension, Kidney stone, Obstructive sleep apnea syndrome, Pancreatitis, Pleural effusion, SOB (shortness of breath), Supraventricular tachycardia ([address] and SVT (supraventricular tachycardia) ([address]     Social History:   reports that she quit smoking about 8 months ago. Her smoking use included cigarettes. She smoked 1.00 pack per day. She has never used smokeless tobacco. She reports current alcohol use. She reports that she does not use drugs. Family History:   Family History   Problem Relation Age of Onset    Heart Disease Father     High Blood Pressure Brother        Vitals:  BP (!) 166/88   Pulse 116   Temp 99.4 °F (37.4 °C) (Temporal)   Resp 29   Ht 5' 5\" (1.651 m)   Wt (!) 322 lb 8.5 oz (146.3 kg)   SpO2 97%   BMI 53.67 kg/m²   Temp (24hrs), Av.8 °F (37.7 °C), Min:98.8 °F (37.1 °C), Max:101.9 °F (38.8 °C)    Recent Labs     20  1737 20  0806 20  1202 20  1618   POCGLU 147* 157* 162* 161*       I/O(24Hr):     Intake/Output Summary (Last 24 hours) at 2020 0654  Last data filed at 2020 0400  Gross per 24 hour   Intake 4849.63 ml   Output 3800 ml   Net 1049.63 ml       Labs:    CBC:   Lab Results   Component Value Date    WBC 16.2 2020    RBC 3.09 2020    HGB 9.5 2020    HCT 29.1 2020    MCV 94.2 2020    MCH 30.6 2020    MCHC 32.5 2020    RDW 14.5 2020     2020    MPV 8.3 2020     CMP:    Lab Results   Component Value Date     2020    K 3.9 2020     2020    CO2 28 2020    BUN 11 2020    CREATININE 0.50 2020    GFRAA >60 2020    LABGLOM >60 2020    GLUCOSE 178 2020    PROT 5.9 2020    PROT 7.2 2015    LABALBU 2.4 2020    CALCIUM 8.0 2020    BILITOT 0.27 2020    ALKPHOS 123 2020    AST 35 2020    ALT 29 2020     ABG:  No results found for: PH, PCO2, PO2, HCO3, BE, THGB, TCO2, O2SAT  LIPASE:    Lab Results   Component Value Date    LIPASE 46 2020       Lab Results   Component Value Date/Time    SPECIAL 4ml LT HAND 2020 10:01 AM     Lab Results   Component Value Date/Time    CULTURE NO GROWTH 2 DAYS 08/23/2020 10:01 AM         Radiology:    Ct Abdomen Pelvis Wo Contrast Additional Contrast? None    Result Date: 8/20/2020  EXAMINATION: CT OF THE ABDOMEN AND PELVIS WITHOUT CONTRAST 8/20/2020 10:22 pm TECHNIQUE: CT of the abdomen and pelvis was performed without the administration of intravenous contrast. Multiplanar reformatted images are provided for review. Dose modulation, iterative reconstruction, and/or weight based adjustment of the mA/kV was utilized to reduce the radiation dose to as low as reasonably achievable. COMPARISON: CT abdomen and pelvis with contrast August 19, 2020. HISTORY: ORDERING SYSTEM PROVIDED HISTORY: severe acute pain TECHNOLOGIST PROVIDED HISTORY: severe acute pain Is the patient pregnant? ->Yes Reason for Exam: worsening abdominal pain Acuity: Acute Type of Exam: Ongoing Relevant Medical/Surgical History: surg - gallbladder FINDINGS: Lower Chest: Persisting bilateral lower lung scattered consolidation and atelectasis is present with trace posterior left-sided pleural effusion identified. Kim Boop Heart is normal in size and configuration. Organs: Interval mild worsening of severe peripancreatic fat stranding and fluid is noted with intraperitoneal free fluid collecting within the bilateral pericolic gutters and collecting inferiorly within the pelvis with increased volume in comparison with the prior study. Diffuse fatty infiltration of the liver is again seen. The liver, gallbladder, spleen, pancreas, adrenal glands, kidneys, are otherwise unremarkable in appearance. GI/Bowel: The stomach is unremarkable without wall thickening or distention. Bowel loops are unremarkable in appearance without evidence of obstruction, distension or mucosal thickening. Pelvis: Berkowitz catheter is noted within the nondistended but grossly unremarkable urinary bladder. Bilateral fallopian tube Essure coils are seen without evidence of complication.   The uterus is anteverted in position and is unremarkable in appearance there no evidence of pelvic free fluid is seen. Peritoneum/Retroperitoneum: No evidence of retroperitoneal or intraperitoneal lymphadenopathy is identified. . Bones/Soft Tissues: No evidence of retroperitoneal or intraperitoneal lymphadenopathy is identified. No evidence of intraperitoneal free fluid is seen. 1. Mild interval worsening of severe peripancreatic inflammation associated with acute pancreatitis, as discussed above. 2. No evidence of complication i.e. pseudocyst noted. 3. Stable bilateral lower lung multifocal consolidation atelectasis suggesting pneumonia versus alveolar edema. 4. Hepatic steatosis, unchanged. Xr Chest (single View Frontal)    Result Date: 8/21/2020  EXAMINATION: ONE XRAY VIEW OF THE CHEST 8/21/2020 6:06 am COMPARISON: August 20, 2020 chest exam HISTORY: ORDERING SYSTEM PROVIDED HISTORY: f/u atelectasis TECHNOLOGIST PROVIDED HISTORY: f/u atelectasis Reason for Exam: F/U atelectasis. Acuity: Unknown Type of Exam: Unknown Additional signs and symptoms: F/U atelectasis. FINDINGS: Interval insertion of right PICC line catheter, the tip projected at the right atrium Mild cardiomegaly Limited extra sonya exam with low volume lungs, mild streaky, left greater than right, bibasilar atelectasis. Grossly clear upper lungs     Line placement as above Limited expiratory exam with mild streaky bibasilar atelectasis     Ct Abdomen Pelvis W Iv Contrast Additional Contrast? None    Addendum Date: 8/20/2020    ADDENDUM: As stated in the body of the report, the abdominal aorta is normal in size. There is no significant atherosclerosis. Result Date: 8/20/2020  EXAMINATION: CT OF THE ABDOMEN AND PELVIS WITH CONTRAST 8/19/2020 10:11 pm TECHNIQUE: CT of the abdomen and pelvis was performed with the administration of intravenous contrast. Multiplanar reformatted images are provided for review.  Dose modulation, iterative reconstruction, and/or weight based adjustment of the mA/kV was utilized to reduce the radiation dose to as low as reasonably achievable. COMPARISON: None. HISTORY: ORDERING SYSTEM PROVIDED HISTORY: pain TECHNOLOGIST PROVIDED HISTORY: pain Reason for Exam: pt c/o all over abdominal pain with nausea for a few hours Acuity: Acute Type of Exam: Initial Relevant Medical/Surgical History: surg - gallbladder FINDINGS: Lower Chest: Consolidation within both lower lobes and the lingula. Scattered ground-glass opacity. No pleural effusion. Organs: Liver is diffusely hypoattenuating. No acute abnormality within the spleen, adrenals, or left kidney. There is right renal cortical scarring and calcification. Subcentimeter hypoattenuating bilateral renal lesions are too small to accurately characterize, likely cysts. Prior cholecystectomy. There is diffuse peripancreatic stranding and fluid. No loculated fluid collection. GI/Bowel: Stomach is partially distended. Small bowel is nondilated. Colon is nondilated. Pelvis: Urinary bladder is partially distended without vesicular stones. Uterus is present. Peritoneum/Retroperitoneum: Shotty retroperitoneal lymph nodes, likely reactive. Abdominal aorta is normal in caliber. No pneumoperitoneum. Bones/Soft Tissues: No acute osseous abnormality. 1. Pancreatic edema and associated fluid is consistent with pancreatitis. No loculated fluid collection/pseudocyst. 2. Shotty retroperitoneal lymph nodes are likely reactive. 3. Hepatic steatosis. 4. Bilateral lower lobe consolidation, which could be due to edema or pneumonia. Ir Cornel Matthewss Device Plmt/replace/removal    Result Date: 8/20/2020  PROCEDURE: ULTRASOUND GUIDED VASCULAR ACCESS. FLUOROSCOPY GUIDED PICC PLACEMENT 8/20/2020. HISTORY: ORDERING SYSTEM PROVIDED HISTORY: fluid resusitation TECHNOLOGIST PROVIDED HISTORY: fluid resusitation Lumen?->Double Lumen Is the patient pregnant? ->Yes Acuity: Unknown Type of Exam: Unknown Sepsis SEDATION: None FLUOROSCOPY TIME: DAP 63 cGy cm squared TECHNIQUE: Informed consent was obtained after a detailed explanation of the procedure including risks, benefits, and alternatives. Universal protocol was observed. The right arm was prepped and draped in sterile fashion using maximum sterile barrier technique. Local anesthesia was achieved with lidocaine. A micropuncture needle was used to access the right basilic vein using ultrasound guidance. An ultrasound image demonstrating patency of the vein with needle tip located within it. An image was obtained and stored in PACs. A 0.018 guidewire was used to place a peel-a-way sheath and a 5 [place] power injectable dual-lumen PICC was advanced with fluoroscopic guidance with the tip at the cavo-atrial junction. The catheter flushed easily and there was a good blood return. The catheter was secured to the skin. The patient tolerated the procedure well and there were no immediate complications. FINDINGS: Fluoroscopic image demonstrates the tip of the catheter at the cavo-atrial junction. Successful ultrasound and fluoroscopy guided right basilic vein 5 [place] power injectable dual-lumen PICC placement. Ready for use. Xr Chest Portable    Result Date: 8/24/2020  EXAMINATION: ONE XRAY VIEW OF THE CHEST 8/24/2020 9:10 am COMPARISON: August 21, 2020, chest exam HISTORY: ORDERING SYSTEM PROVIDED HISTORY: Resp failure TECHNOLOGIST PROVIDED HISTORY: Resp failure Reason for Exam: resp failure Acuity: Unknown Type of Exam: Unknown FINDINGS: Interval insertion of a right PICC line catheter, the tip is projected at the right atrium. Limited markedly rotated exam No obvious significant pleuroparenchymal or mediastinal findings.   Limited assessment of the left lung base     Limited markedly rotated exam, limited left base assessment Line placement as above No obvious acute cardiopulmonary findings     Xr Chest Portable    Result Date: 8/22/2020  EXAMINATION: ONE XRAY VIEW OF THE CHEST 8/22/2020 8:41 am COMPARISON: Head: Normocephalic and atraumatic. Cardiovascular:      Rate and Rhythm: Normal rate and regular rhythm. Pulses: Normal pulses. Heart sounds: Normal heart sounds. No murmur. No gallop. Pulmonary:      Comments: Patient on BiPaP this AM.  Breath sounds clear to auscultation bilaterally with no crackles or wheezes. Abdominal:      Comments: Soft, tender to palpation. No significant distension. Some very faint erythema again, still within marked area. Musculoskeletal:      Comments: Significant pitting 2-3+ edema present on the bilateral lower extremities, the same compared to yesterday. Neurological:      Mental Status: She is alert. Comments: On precedex. Patient is sleeping.   Awoke briefly but not alert and went back to sleep during exam.         Assessment:        Primary Problem  Acute pancreatitis    Active Hospital Problems    Diagnosis Date Noted    History of anxiety disorder [Z86.59]     Acute respiratory failure with hypoxia ([address] [J96.01] 08/21/2020    Hypocalcemia [E83.51] 08/21/2020    Sepsis ([facility] [A41.9] 08/20/2020    Morbid obesity ([facility] [E66.01]     Abdominal pain, epigastric [R10.13]     Nausea [R11.0]     Acute pancreatitis [K85.90] 08/19/2020    Fibromyalgia [M79.7]     HTN (hypertension) [I10] 12/15/2014    Major depression [F32.9] 10/30/2014    Restless leg syndrome [G25.81] 10/22/2013    Class 3 severe obesity due to excess calories with serious comorbidity in adult [facility]) [E66.01] 01/14/2013    Anxiety [F41.9] 01/14/2013       Plan:        Severe acute pancreatitis complicated by sepsis and possible ARDS  - Lipase 1,065 on admission --> 1613 --> 692 --> 273 --> normal; Amylase 435  - CT abdomen and pelvis showed pancreatic edema and fluid consistent with pancreatitis and reactive lymph nodes; repeat CT showed worsening peripancreatic inflammation but no evidence of pseudocyst  - NPO; nausea control; pain control with dilaudid  - GI on board  - TGs 259 (H)  - Monitoring creatinine and urine output for possible compartment syndrome, gen surg consulted  - Alpha-1 antitrypsin mildly elevated at 208; mitochondrial antibodies and smooth muscle Ab normal  - IGGg subclasses normal  - Patient refused MRCP  - Gen surg on board in case of possible abdominal compartment syndrome    Acute hypoxic respiratory failure secondary to sepsis vs. ARDS secondary to pancreatitis vs. pneumonia (less likely)  - 8/19 CT abdomen and pelvis showed bilateral lower lobe consolidation which could be edema or pneumonia  - On bipap and precedex, has been tolerating longer nasal cannula trials  - Pulmonology on board  - Most recent CXR 8/24 showed no obvious acute cardiopulmonary findings  - On zosyn day 7  - Pulmonology increased lorazepam to q6h; awaiting psych consult  - Mother informed us patient vapes everyday, possibly contributing to difficulty weaning off bipap; awaiting ABG on nasal cannula  - Plan today is to repeat CXR, will consider possible CT PE and arterial contrast study of abdomen looking for thrombosis    Sepsis secondary to pancreatitis  - On 8/20 lactic acid 5.4; SIRS criteria of tachypnea, tachycardia, and elevated WBC 20.5 met  - On IV zosyn day 7  - UA normal; blood cultures done on 8/20 NGTD x 5 days; repeat blood cultures on 8/23 NGTD x 2 days  - Lactic acid stable within normal limits  - Procal elevated at 0.33, CRP elevated at 294  - Leukocytosis resolving, 16.2 (peak 34) today   - Held IVF due to concern for fluid overload, net positive 11L with low urine output; bladder scan was normal  - Repeating UA today  - ID consulted today due to Tmax of 101.9    HTN  - Labetolol drip for persistent sinus tachycardia; currently held; can switch to coreg or labetolol PO when able per cardio  - Continue IV vasotec 1.25 mg q6h for elevated BP readings  - On cardene drip    Hypocalcemia, resolved  - Corrected calcium today is normal    Normocytic anemia  - HgB stable at 9.5 today, normalized but CRP still elevated. Patient continues to be unstable and has risk of sudden deterioration requiring highest level of care. Patient seen in ICU. Critical care time spent 35 minutes.       Electronically signed by Candance Chol, MD

## 2020-08-26 NOTE — PLAN OF CARE
Problem: Falls - Risk of:  Goal: Will remain free from falls  Description: Will remain free from falls  8/26/2020 1844 by Lynette Edwards RN  Outcome: Ongoing  8/26/2020 6909 by Yasmin Galloway RN  Outcome: Ongoing  Goal: Absence of physical injury  Description: Absence of physical injury  8/26/2020 1844 by Lynette Edwards RN  Outcome: Ongoing  8/26/2020 0652 by Yasmin Galloway RN  Outcome: Ongoing     Problem: Pain:  Goal: Pain level will decrease  Description: Pain level will decrease  8/26/2020 1844 by Lynette Edwards RN  Outcome: Ongoing  8/26/2020 0652 by Yasmin Galloway RN  Outcome: Ongoing  Goal: Control of acute pain  Description: Control of acute pain  8/26/2020 1844 by Lynette Edwards RN  Outcome: Ongoing  8/26/2020 0652 by Yasmin Galloway RN  Outcome: Ongoing  Note: Patient's pain has been well managed this shift with PRN and scheduled meds. Patient has remained calm and well rested this shift.   Goal: Control of chronic pain  Description: Control of chronic pain  8/26/2020 1844 by Lynette Edwards RN  Outcome: Ongoing  8/26/2020 1627 by Yasmin Galloway RN  Outcome: Ongoing     Problem: Nausea/Vomiting:  Goal: Absence of nausea/vomiting  Description: Absence of nausea/vomiting  8/26/2020 1844 by Lynette Edwards RN  Outcome: Ongoing  8/26/2020 0652 by Yasmin Galloway RN  Outcome: Ongoing  Goal: Able to drink  Description: Able to drink  8/26/2020 1844 by Lynette Edwards RN  Outcome: Ongoing  8/26/2020 4180 by Yasmin Galloway RN  Outcome: Ongoing  Goal: Able to eat  Description: Able to eat  8/26/2020 1844 by Lynette Edawrds RN  Outcome: Ongoing  8/26/2020 0652 by Yasmin Galloway RN  Outcome: Ongoing  Goal: Ability to achieve adequate nutritional intake will improve  Description: Ability to achieve adequate nutritional intake will improve  8/26/2020 1844 by Lynette Edwards RN  Outcome: Ongoing  8/26/2020 0652 by Bethel Davies Amelie Ramos RN  Outcome: Ongoing     Problem: Gas Exchange - Impaired:  Goal: Levels of oxygenation will improve  Description: Levels of oxygenation will improve  8/26/2020 1844 by Gadiel Piper RN  Outcome: Ongoing  8/26/2020 0652 by Terrance Aquino RN  Outcome: Ongoing     Problem: Infection, Septic Shock:  Goal: Will show no infection signs and symptoms  Description: Will show no infection signs and symptoms  8/26/2020 1844 by Gadiel Piper RN  Outcome: Ongoing  8/26/2020 0652 by Terrance Aquino RN  Outcome: Ongoing     Problem: Serum Glucose Level - Abnormal:  Goal: Ability to maintain appropriate glucose levels will improve  Description: Ability to maintain appropriate glucose levels will improve  8/26/2020 1844 by Gadiel Piper RN  Outcome: Ongoing  8/26/2020 0652 by Terrance Aquino RN  Outcome: Ongoing     Problem: Tissue Perfusion, Altered:  Goal: Circulatory function within specified parameters  Description: Circulatory function within specified parameters  8/26/2020 1844 by Gadiel Piper RN  Outcome: Ongoing  8/26/2020 0652 by Terrance Aquino RN  Outcome: Ongoing  Note: Patient has worn BiPaP continuously this shift with no issues of anxiety. Will continue to monitor.      Problem: Venous Thromboembolism:  Goal: Will show no signs or symptoms of venous thromboembolism  Description: Will show no signs or symptoms of venous thromboembolism  8/26/2020 1844 by Gadiel Piper RN  Outcome: Ongoing  8/26/2020 0652 by Terrance Aquino RN  Outcome: Ongoing  Goal: Absence of signs or symptoms of impaired coagulation  Description: Absence of signs or symptoms of impaired coagulation  8/26/2020 1844 by Gadiel Piper RN  Outcome: Ongoing  8/26/2020 0652 by Terrance Aquino RN  Outcome: Ongoing     Problem: Nutrition  Goal: Optimal nutrition therapy  8/26/2020 1844 by Gadiel Piper RN  Outcome: Ongoing  8/26/2020 0652 by Terrance Aquino RN  Outcome: Ongoing     Problem: Skin Integrity:  Goal: Will show no infection signs and symptoms  Description: Will show no infection signs and symptoms  8/26/2020 1844 by Britt Pittman RN  Outcome: Ongoing  8/26/2020 0652 by Jhonatan Garibay RN  Outcome: Ongoing  Goal: Absence of new skin breakdown  Description: Absence of new skin breakdown  8/26/2020 1844 by Britt Pittman RN  Outcome: Ongoing  8/26/2020 0652 by Jhonatan Garibay RN  Outcome: Ongoing

## 2020-08-26 NOTE — PROGRESS NOTES
Page out to Dr Tanna Padron in regards to recent cxr, and increase in work of breathing. Patient contiues to maintain saturation at 98 % on bipap however hr and bp are elevated. cardine at 10 mg per hr.

## 2020-08-26 NOTE — PROGRESS NOTES
Comprehensive Nutrition Assessment    Type and Reason for Visit:  Reassess    Nutrition Recommendations/Plan: Following change made in additives: Na acetate- 30 to 10 mEq, K acetate- 40 to 50 mEq, KCl- 20 to 30 mEq, Mg- 6 to 9 mEq, insulin- 10 to 20 units, add MVI & trace elements. Nutrition Assessment:  TPN and lipids to continue. Surgeon approved continuation of ice chips, but otherwise NPO with reassessment tomorrow. Malnutrition Assessment:  Malnutrition Status:  No malnutrition    Context:  Acute Illness     Findings of the 6 clinical characteristics of malnutrition:  Energy Intake:  No significant decrease in energy intake  Weight Loss:  No significant weight loss     Body Fat Loss:  No significant body fat loss     Muscle Mass Loss:  No significant muscle mass loss    Fluid Accumulation:  No significant fluid accumulation     Strength:  Not Performed    Estimated Daily Nutrient Needs:  Energy (kcal):  2020 kcal based on  Mills-St. Jeor equation using adm wt 138 kg; Weight Used for Energy Requirements:  Admission     Protein (g):  102-113 gm protein based on 1.8-2 gm/kg IBW; Weight Used for Protein Requirements:  Ideal            Nutrition Related Findings:  Hypoactive bowel sounds. Edema: +1 RUE, LUE, RLE, LLE. Labs reviewed.       Wounds:  None       Current Nutrition Therapies:    Current Parenteral Nutrition Orders:  · Type and Formula: 2-in-1 Custom   · Lipids: 100ml  · Duration: Continuous  · Rate/Volume: 75 ml/ 1800 ml  · Current PN Order Provides: 1784 kcal, 90 gm protein    Anthropometric Measures:  · Height: 5' 5\" (165.1 cm)  · Current Body Weight: 322 lb (146.1 kg)   · Admission Body Weight: 304 lb (137.9 kg)    · Ideal Body Weight: 125 lbs; % Ideal Body Weight 243.2 %   · BMI: 53.6  · BMI Categories: Obese Class 3 (BMI 40.0 or greater)       Nutrition Diagnosis:   · Inadequate oral intake related to altered GI function as evidenced by NPO or clear liquid status due to medical condition, nutrition support - parenteral nutrition      Nutrition Interventions:   Food and/or Nutrient Delivery:  Continue NPO, Modify Parenteral Nutrition  Nutrition Education/Counseling:  No recommendation at this time   Coordination of Nutrition Care:  Continued Inpatient Monitoring    Goals:  Meet 75% of nutrient needs with TPN       Nutrition Monitoring and Evaluation:   Food/Nutrient Intake Outcomes:  Parenteral Nutrition Intake/Tolerance  Physical Signs/Symptoms Outcomes:  Biochemical Data, GI Status, Nausea or Vomiting, Fluid Status or Edema, Hemodynamic Status, Weight     Discharge Planning: Too soon to determine     Some areas of assessment may be incomplete due to COVID-19 precautions. Yomaira Pal R.D., L.D.   Clinical Dietitian  Office: [phone]

## 2020-08-26 NOTE — PROGRESS NOTES
chloride flush, magnesium sulfate, potassium chloride **OR** potassium alternative oral replacement **OR** potassium chloride, acetaminophen, [DISCONTINUED] promethazine **OR** ondansetron, melatonin ER        PHYSICAL EXAMINATION:  BP (!) 143/67   Pulse 132   Temp 98.7 °F (37.1 °C) (Oral)   Resp 28   Ht 5' 5\" (1.651 m)   Wt (!) 322 lb 8.5 oz (146.3 kg)   SpO2 96%   BMI 53.67 kg/m²     Febrile   Cardene 10  Precedex 1.4  General : Awake, alert, flushed appearance, febrile, on bipap   Neck - supple, no lymphadenopathy, JVD not raised  Heart -   Lungs - Air Entry- fair bilaterally; breath sounds : vesicular;   rales/crackles - absent  Abdomen - obese, soft, no tenderness, no significant distention  Upper Extremities  - no cyanosis, mottling; edema : absent  Lower Extremities: no cyanosis, mottling; edema : +1 BLE edema    Current Laboratory, Radiologic, Microbiologic, and Diagnostic studies reviewed  Data ReviewCBC:   Recent Labs     08/24/20  0343 08/25/20  0412 08/26/20  0337   WBC 16.2* 14.7* 16.2*   RBC 3.11* 3.04* 3.09*   HGB 9.7* 9.4* 9.5*   HCT 29.6* 28.5* 29.1*    229 238     BMP:   Recent Labs     08/24/20  0343 08/25/20  0412 08/26/20  0337   GLUCOSE 149* 180* 178*    141 144   K 3.9 3.9 3.9   BUN 14 14 11   CREATININE 0.64 0.57 0.50   CALCIUM 7.8* 8.1* 8.0*     ABGs: No results for input(s): PHART, PO2ART, ELS7NLZ, FCW7GBL, BEART, L3ATYWAT, JOR8YYI in the last 72 hours.    PT/INR:  No results found for: PTINR    ASSESSMENT / PLAN:    · Acute pancreatitis- GI consulted, monitor amylase lipase - improving,   · Lactic acidosis- resolved  · Possible pneumonia- ABx   · Febrile, Leukocytosis- Abx changed per ID, UA, COVID, lactate 1.2  · Hypertension- Cardene drip   · Diet NPO  · Monitor end tidal Co2 with pain meds   · acute hypoxic resp failure - O2/ NIPPV - remains bipap dependent   · precedex for restlessness- will add scheduled ativan, wean precedex as able  · Discussed with Dr. Tanna Padron

## 2020-08-26 NOTE — PROGRESS NOTES
Patient taken off bipap to nasal canula, lasted about 10 minutes then and increase work of breathing, put back on bipap, patients RR elevated at 34 on bipap, sedation turned  Back to 1.4.

## 2020-08-26 NOTE — CONSULTS
[facility] -   Infectious diseases evaluation  admission date 8/19/2020    reason for consultation:   Fever    Impression :   · Fever /leukocytosis possible pneumonia. · Severe pancreatitis, pancreatic enzymes improved  · Systemic inflammatory response syndrome versus sepsis  · Acute hypoxic respiratory failure  · Skin rash, possible allergic reaction to Zosyn  · Hypertension on Cardene drip  · Obesity      Recommendations   Current:  · Discontinue Zosyn  · IV meropenem   · Procalcitonin level, LD, ferritin, reactive protein, lactic acid  · UA   · COVID 19 swab  · Follow blood cultures  · MRSA nasal swab was negative  · Continue supportive care  · Further recommendation as per hospital course. History of Present Illness:   Initial history:  Margarito Cao is a 54y.o.-year-old female presented to the hospital with epigastric abdominal pain and bloating for several days prior to admission, severe, no radiation, no alleviating or relieving factors. Pancreatic enzymes are elevated, imaging suggestive of pancreatitis. She has been on IV Zosyn for 7 days  Right arm PICC line was placed and has been on TPN  . Interval changes  8/26/2020   She is complaining of abdominal pain, on TPN, on BiPAP and Precedex, tachycardic, had a fever with a temperature max of 101.9, WBC increased to 16.2. She does have erythematous rash to the upper and lower extremity, new according to the nursing staff. CT chest earlier today suggestive of questionable PE, bilateral consolidations        I have personally reviewed the past medical history, past surgical history, medications, social history, and family history, and I haveupdated the database accordingly.   Past Medical History:     Past Medical History:   Diagnosis Date    Abnormal levels of other serum enzymes     Anxiety     Bilateral leg edema     Chronic kidney disease     right renal cyst    Depression     DVT [facility]deep [facility] thrombosis) [facility] [address] 1997    after delivery    Elevated liver enzymes     Fibromyalgia     Headache(784.0)     migraines    Hx of blood clots     1997 left calf    Hypertension     Kidney stone     Obstructive sleep apnea syndrome     Pancreatitis 2001    Pleural effusion 2001    SOB (shortness of breath)     Supraventricular tachycardia (HCC)     SVT (supraventricular tachycardia) ([facility] [address] 9/8/2015       Past Surgical  History:     Past Surgical History:   Procedure Laterality Date    ATRIAL ABLATION SURGERY      BACK SURGERY      L4-5    CHOLECYSTECTOMY  2001    ENDOMETRIAL ABLATION      e sure implants placed also    ENDOSCOPY, COLON, DIAGNOSTIC      EXCISION LESION HAND / FINGER Right 1/25/2019    HAND LESION BIOPSY EXCISION performed by Naun Bishop MD at [facility] Bilateral     left x2, right x1    FOOT SURGERY Right     x3    KNEE ARTHROSCOPY Left     x2    ID CYSTO/URETERO/PYELOSCOPY W/LITHOTRIPSY Left 9/20/2017    CYSTOSCOPY URETEROSCOPY HOLMIUM LASER LITHO WITH STONE BASKETING WITH  STENT  performed by Danay Dejesus MD at [facility]        Medications:      LORazepam  0.5 mg Intravenous Q6H    piperacillin-tazobactam  3.375 g Intravenous Q6H    iron sucrose  200 mg Intravenous Q24H    enalaprilat  1.25 mg Intravenous 4 times per day    insulin lispro  0-6 Units Subcutaneous TID WC    magnesium oxide  400 mg Oral Daily    sodium chloride flush  10 mL Intravenous 2 times per day    enoxaparin  30 mg Subcutaneous BID    QUEtiapine  800 mg Oral Nightly    pramipexole  0.25 mg Oral Daily    pantoprazole  40 mg Intravenous Daily    And    sodium chloride (PF)  10 mL Intravenous Daily    busPIRone  15 mg Oral Nightly    DULoxetine  60 mg Oral BID    cetirizine  10 mg Oral Daily       Social History:     Social History     Socioeconomic History    Marital status:      Spouse name: Not on file    Number of children: 08/26/20 1215 -- -- -- 124 24 --   08/26/20 1200 -- -- -- 125 29 --   08/26/20 1159 -- -- -- -- (!) 33 --   08/26/20 1145 -- -- -- 128 (!) 34 --   08/26/20 1130 (!) 162/94 -- -- 128 (!) 31 --   08/26/20 1124 -- -- -- -- -- 96 %   08/26/20 1123 -- -- -- -- -- 97 %   08/26/20 1122 -- -- -- -- -- 96 %   08/26/20 1121 -- -- -- -- -- 96 %   08/26/20 1120 -- -- -- -- -- 96 %   08/26/20 1115 (!) 170/82 -- -- 124 26 --   08/26/20 1100 (!) 203/105 -- -- 126 (!) 33 --   08/26/20 1015 (!) 177/86 -- -- 119 28 --   08/26/20 1000 (!) 169/88 -- -- 117 26 --   08/26/20 0945 (!) 162/75 -- -- 116 30 --   08/26/20 0930 (!) 174/90 -- -- 113 (!) 32 --   08/26/20 0915 (!) 170/84 -- -- 116 (!) 34 --   08/26/20 0900 (!) 162/80 -- -- 113 30 --   08/26/20 0845 (!) 151/82 -- -- 107 (!) 40 --   08/26/20 0830 130/71 -- -- 100 25 --   08/26/20 0815 (!) 131/59 -- -- 103 27 --   08/26/20 0800 123/67 98.7 °F (37.1 °C) Oral 102 26 --   08/26/20 0745 135/72 -- -- 105 25 --   08/26/20 0730 133/69 -- -- 105 22 96 %       Physical Exam  Constitutional:       Comments: On BiPAP   HENT:      Head: Normocephalic and atraumatic. Neck:      Musculoskeletal: Neck supple. No neck rigidity. Cardiovascular:      Rate and Rhythm: Normal rate and regular rhythm. Heart sounds: No murmur. Pulmonary:      Breath sounds: No wheezing. Comments: Coarse breath sounds bilaterally  Abdominal:      Tenderness: There is abdominal tenderness. There is no guarding or rebound. Comments: Epigastric tenderness   Musculoskeletal:      Right lower leg: Edema present. Left lower leg: Edema present. Skin:     General: Skin is warm. Coloration: Skin is not jaundiced. Findings: Rash present. Comments: Erythematous rash to the lower and upper extremities. Neurological:      Mental Status: She is alert and oriented to person, place, and time.            Medical Decision Making:   I have independently reviewed/ordered the following labs:    CBC with Differential:   Recent Labs     08/25/20  0412 08/26/20  0337   WBC 14.7* 16.2*   HGB 9.4* 9.5*   HCT 28.5* 29.1*    238   LYMPHOPCT 9* 9*   MONOPCT 7 4     BMP:  Recent Labs     08/25/20  0412 08/26/20  0337    144   K 3.9 3.9    108*   CO2 28 28   BUN 14 11   CREATININE 0.57 0.50   MG 1.9 1.7     Hepatic Function Panel:   Recent Labs     08/25/20  0412 08/26/20  0337   PROT 5.6* 5.9*   LABALBU 2.3* 2.4*   BILITOT 0.25* 0.27*   ALKPHOS 117* 123*   ALT 28 29   AST 35* 35*     No results for input(s): RPR in the last 72 hours. No results for input(s): HIV in the last 72 hours. No results for input(s): BC in the last 72 hours. Lab Results   Component Value Date    CREATININE 0.50 08/26/2020    GLUCOSE 178 08/26/2020       Detailed results: Thank you for allowing us to participate in the care of this patient. Please call with questions. This note is created with the assistance of a speech recognition program.  While intending to generate adocument that actually reflects the content of the visit, the document can still have some errors including those of syntax and sound a like substitutions which may escape proof reading. It such instances, actual meaningcan be extrapolated by contextual diversion.     Manju Avery MD  Office: [phone]  Perfect serve / office [phone]

## 2020-08-26 NOTE — PROGRESS NOTES
Patient resting comfortably on BiPAP. Lipase 46 today, LFT's stable. Continues to have intermittent fevers, elevation in leukocytosis noted. Infectious disease consulted. No MRCP yet. Abdomen soft, c/o LUQ pain. Bowels are moving. No new general surgery issues. Continue TPN. Continue medical management. Patient was seen and examined. Infectious disease now has seen the patient. COVID swab was taken. Patient is in isolation. Discussed with nursing staff. Had a high-grade temp of 104. Came down to 101 with Tylenol. Blood pressure was high. Repeat CT scan of the chest abdomen pelvis reviewed. Continue TPN. IV antibiotics per infectious disease. Aggressive supportive care. Will follow with you. Blood work noted.     Rodolfo Newby PA-C  [facility]

## 2020-08-26 NOTE — PROGRESS NOTES
Patient taken to CT, remains arousable but sedate on 1.4 mcg of precedex. Transported on portable monitor per bed on bipap. And returned to room without issue.

## 2020-08-27 ENCOUNTER — APPOINTMENT (OUTPATIENT)
Dept: GENERAL RADIOLOGY | Age: 45
DRG: 871 | End: 2020-08-27
Payer: COMMERCIAL

## 2020-08-27 PROBLEM — J18.9 PNEUMONIA OF BOTH LUNGS DUE TO INFECTIOUS ORGANISM: Status: ACTIVE | Noted: 2020-08-27

## 2020-08-27 LAB
ABSOLUTE BANDS #: 1.57 K/UL (ref 0–1)
ABSOLUTE EOS #: 0 K/UL (ref 0–0.4)
ABSOLUTE IMMATURE GRANULOCYTE: ABNORMAL K/UL (ref 0–0.3)
ABSOLUTE LYMPH #: 0.98 K/UL (ref 1–4.8)
ABSOLUTE MONO #: 1.18 K/UL (ref 0.1–1.3)
ALBUMIN SERPL-MCNC: 2.3 G/DL (ref 3.5–5.2)
ALBUMIN/GLOBULIN RATIO: ABNORMAL (ref 1–2.5)
ALP BLD-CCNC: 100 U/L (ref 35–104)
ALT SERPL-CCNC: 26 U/L (ref 5–33)
ANION GAP SERPL CALCULATED.3IONS-SCNC: 9 MMOL/L (ref 9–17)
AST SERPL-CCNC: 27 U/L
BANDS: 8 % (ref 0–10)
BASOPHILS # BLD: 0 % (ref 0–2)
BASOPHILS ABSOLUTE: 0 K/UL (ref 0–0.2)
BILIRUB SERPL-MCNC: 0.23 MG/DL (ref 0.3–1.2)
BUN BLDV-MCNC: 11 MG/DL (ref 6–20)
BUN/CREAT BLD: ABNORMAL (ref 9–20)
CALCIUM SERPL-MCNC: 8 MG/DL (ref 8.6–10.4)
CHLORIDE BLD-SCNC: 105 MMOL/L (ref 98–107)
CO2: 29 MMOL/L (ref 20–31)
CREAT SERPL-MCNC: 0.53 MG/DL (ref 0.5–0.9)
DIFFERENTIAL TYPE: ABNORMAL
EOSINOPHILS RELATIVE PERCENT: 0 % (ref 0–4)
GFR AFRICAN AMERICAN: >60 ML/MIN
GFR NON-AFRICAN AMERICAN: >60 ML/MIN
GFR SERPL CREATININE-BSD FRML MDRD: ABNORMAL ML/MIN/{1.73_M2}
GFR SERPL CREATININE-BSD FRML MDRD: ABNORMAL ML/MIN/{1.73_M2}
GLUCOSE BLD-MCNC: 120 MG/DL (ref 65–105)
GLUCOSE BLD-MCNC: 169 MG/DL (ref 65–105)
GLUCOSE BLD-MCNC: 180 MG/DL (ref 65–105)
GLUCOSE BLD-MCNC: 180 MG/DL (ref 70–99)
HCT VFR BLD CALC: 28 % (ref 36–46)
HEMOGLOBIN: 9.3 G/DL (ref 12–16)
IMMATURE GRANULOCYTES: ABNORMAL %
LIPASE: 57 U/L (ref 13–60)
LYMPHOCYTES # BLD: 5 % (ref 24–44)
MAGNESIUM: 1.6 MG/DL (ref 1.6–2.6)
MCH RBC QN AUTO: 30.8 PG (ref 26–34)
MCHC RBC AUTO-ENTMCNC: 33.4 G/DL (ref 31–37)
MCV RBC AUTO: 92.4 FL (ref 80–100)
MONOCYTES # BLD: 6 % (ref 1–7)
MORPHOLOGY: ABNORMAL
MORPHOLOGY: ABNORMAL
NRBC AUTOMATED: ABNORMAL PER 100 WBC
PDW BLD-RTO: 14.4 % (ref 11.5–14.9)
PHOSPHORUS: 4.5 MG/DL (ref 2.6–4.5)
PLATELET # BLD: 264 K/UL (ref 150–450)
PLATELET ESTIMATE: ABNORMAL
PMV BLD AUTO: 8.9 FL (ref 6–12)
POTASSIUM SERPL-SCNC: 3.5 MMOL/L (ref 3.7–5.3)
RBC # BLD: 3.03 M/UL (ref 4–5.2)
RBC # BLD: ABNORMAL 10*6/UL
SEG NEUTROPHILS: 81 % (ref 36–66)
SEGMENTED NEUTROPHILS ABSOLUTE COUNT: 15.87 K/UL (ref 1.3–9.1)
SODIUM BLD-SCNC: 143 MMOL/L (ref 135–144)
TOTAL PROTEIN: 5.7 G/DL (ref 6.4–8.3)
TRIGL SERPL-MCNC: 133 MG/DL
WBC # BLD: 19.6 K/UL (ref 3.5–11)
WBC # BLD: ABNORMAL 10*3/UL

## 2020-08-27 PROCEDURE — APPNB30 APP NON BILLABLE TIME 0-30 MINS: Performed by: NURSE PRACTITIONER

## 2020-08-27 PROCEDURE — 0100U HC RESPIRPTHGN MULT REV TRANS & AMP PRB TECH 21 TRGT: CPT

## 2020-08-27 PROCEDURE — 6360000002 HC RX W HCPCS: Performed by: INTERNAL MEDICINE

## 2020-08-27 PROCEDURE — 94660 CPAP INITIATION&MGMT: CPT

## 2020-08-27 PROCEDURE — 2500000003 HC RX 250 WO HCPCS: Performed by: INTERNAL MEDICINE

## 2020-08-27 PROCEDURE — 6360000002 HC RX W HCPCS: Performed by: STUDENT IN AN ORGANIZED HEALTH CARE EDUCATION/TRAINING PROGRAM

## 2020-08-27 PROCEDURE — 99232 SBSQ HOSP IP/OBS MODERATE 35: CPT | Performed by: INTERNAL MEDICINE

## 2020-08-27 PROCEDURE — 6360000002 HC RX W HCPCS: Performed by: NURSE PRACTITIONER

## 2020-08-27 PROCEDURE — 6370000000 HC RX 637 (ALT 250 FOR IP): Performed by: NURSE PRACTITIONER

## 2020-08-27 PROCEDURE — 6370000000 HC RX 637 (ALT 250 FOR IP): Performed by: SURGERY

## 2020-08-27 PROCEDURE — 2000000000 HC ICU R&B

## 2020-08-27 PROCEDURE — 71045 X-RAY EXAM CHEST 1 VIEW: CPT

## 2020-08-27 PROCEDURE — 2500000003 HC RX 250 WO HCPCS: Performed by: STUDENT IN AN ORGANIZED HEALTH CARE EDUCATION/TRAINING PROGRAM

## 2020-08-27 PROCEDURE — 84100 ASSAY OF PHOSPHORUS: CPT

## 2020-08-27 PROCEDURE — 2580000003 HC RX 258: Performed by: STUDENT IN AN ORGANIZED HEALTH CARE EDUCATION/TRAINING PROGRAM

## 2020-08-27 PROCEDURE — 82947 ASSAY GLUCOSE BLOOD QUANT: CPT

## 2020-08-27 PROCEDURE — 83690 ASSAY OF LIPASE: CPT

## 2020-08-27 PROCEDURE — 2500000003 HC RX 250 WO HCPCS: Performed by: SURGERY

## 2020-08-27 PROCEDURE — 36415 COLL VENOUS BLD VENIPUNCTURE: CPT

## 2020-08-27 PROCEDURE — 84478 ASSAY OF TRIGLYCERIDES: CPT

## 2020-08-27 PROCEDURE — 80053 COMPREHEN METABOLIC PANEL: CPT

## 2020-08-27 PROCEDURE — 2700000000 HC OXYGEN THERAPY PER DAY

## 2020-08-27 PROCEDURE — 2580000003 HC RX 258: Performed by: INTERNAL MEDICINE

## 2020-08-27 PROCEDURE — 94761 N-INVAS EAR/PLS OXIMETRY MLT: CPT

## 2020-08-27 PROCEDURE — 99291 CRITICAL CARE FIRST HOUR: CPT | Performed by: INTERNAL MEDICINE

## 2020-08-27 PROCEDURE — 99233 SBSQ HOSP IP/OBS HIGH 50: CPT | Performed by: INTERNAL MEDICINE

## 2020-08-27 PROCEDURE — 83735 ASSAY OF MAGNESIUM: CPT

## 2020-08-27 PROCEDURE — 85025 COMPLETE CBC W/AUTO DIFF WBC: CPT

## 2020-08-27 PROCEDURE — C9113 INJ PANTOPRAZOLE SODIUM, VIA: HCPCS | Performed by: NURSE PRACTITIONER

## 2020-08-27 PROCEDURE — 2580000003 HC RX 258: Performed by: NURSE PRACTITIONER

## 2020-08-27 RX ORDER — LORAZEPAM 2 MG/ML
1 INJECTION INTRAMUSCULAR ONCE
Status: COMPLETED | OUTPATIENT
Start: 2020-08-27 | End: 2020-08-27

## 2020-08-27 RX ORDER — FUROSEMIDE 10 MG/ML
40 INJECTION INTRAMUSCULAR; INTRAVENOUS ONCE
Status: COMPLETED | OUTPATIENT
Start: 2020-08-27 | End: 2020-08-27

## 2020-08-27 RX ORDER — LORAZEPAM 2 MG/ML
0.5 INJECTION INTRAMUSCULAR EVERY 6 HOURS PRN
Status: DISCONTINUED | OUTPATIENT
Start: 2020-08-27 | End: 2020-08-30

## 2020-08-27 RX ADMIN — POTASSIUM CHLORIDE 10 MEQ: 7.46 INJECTION, SOLUTION INTRAVENOUS at 13:03

## 2020-08-27 RX ADMIN — VANCOMYCIN HYDROCHLORIDE 2500 MG: 1 INJECTION, POWDER, LYOPHILIZED, FOR SOLUTION INTRAVENOUS at 19:48

## 2020-08-27 RX ADMIN — MEROPENEM 1 G: 1 INJECTION, POWDER, FOR SOLUTION INTRAVENOUS at 05:06

## 2020-08-27 RX ADMIN — MAGNESIUM SULFATE HEPTAHYDRATE 1 G: 1 INJECTION, SOLUTION INTRAVENOUS at 23:27

## 2020-08-27 RX ADMIN — POTASSIUM CHLORIDE 10 MEQ: 7.46 INJECTION, SOLUTION INTRAVENOUS at 09:19

## 2020-08-27 RX ADMIN — DULOXETINE HYDROCHLORIDE 60 MG: 60 CAPSULE, DELAYED RELEASE ORAL at 21:07

## 2020-08-27 RX ADMIN — ONDANSETRON 4 MG: 2 INJECTION INTRAMUSCULAR; INTRAVENOUS at 21:52

## 2020-08-27 RX ADMIN — MEROPENEM 1 G: 1 INJECTION, POWDER, FOR SOLUTION INTRAVENOUS at 16:00

## 2020-08-27 RX ADMIN — SODIUM CHLORIDE 12.5 MG/HR: 9 INJECTION, SOLUTION INTRAVENOUS at 17:47

## 2020-08-27 RX ADMIN — INSULIN LISPRO 1 UNITS: 100 INJECTION, SOLUTION INTRAVENOUS; SUBCUTANEOUS at 12:03

## 2020-08-27 RX ADMIN — QUETIAPINE FUMARATE 800 MG: 400 TABLET, EXTENDED RELEASE ORAL at 21:08

## 2020-08-27 RX ADMIN — HYDROMORPHONE HYDROCHLORIDE 0.5 MG: 1 INJECTION, SOLUTION INTRAMUSCULAR; INTRAVENOUS; SUBCUTANEOUS at 15:08

## 2020-08-27 RX ADMIN — HYDROMORPHONE HYDROCHLORIDE 0.5 MG: 1 INJECTION, SOLUTION INTRAMUSCULAR; INTRAVENOUS; SUBCUTANEOUS at 23:38

## 2020-08-27 RX ADMIN — DEXMEDETOMIDINE 0.8 MCG/KG/HR: 100 INJECTION, SOLUTION, CONCENTRATE INTRAVENOUS at 02:59

## 2020-08-27 RX ADMIN — SODIUM CHLORIDE 10 MG/HR: 9 INJECTION, SOLUTION INTRAVENOUS at 19:45

## 2020-08-27 RX ADMIN — BUSPIRONE HYDROCHLORIDE 15 MG: 15 TABLET ORAL at 21:07

## 2020-08-27 RX ADMIN — ENOXAPARIN SODIUM 30 MG: 30 INJECTION SUBCUTANEOUS at 21:13

## 2020-08-27 RX ADMIN — DEXMEDETOMIDINE 0.9 MCG/KG/HR: 100 INJECTION, SOLUTION, CONCENTRATE INTRAVENOUS at 07:35

## 2020-08-27 RX ADMIN — HYDROMORPHONE HYDROCHLORIDE 0.5 MG: 1 INJECTION, SOLUTION INTRAMUSCULAR; INTRAVENOUS; SUBCUTANEOUS at 19:39

## 2020-08-27 RX ADMIN — DEXMEDETOMIDINE 1.4 MCG/KG/HR: 100 INJECTION, SOLUTION, CONCENTRATE INTRAVENOUS at 22:28

## 2020-08-27 RX ADMIN — ENALAPRILAT 1.25 MG: 1.25 INJECTION INTRAVENOUS at 05:28

## 2020-08-27 RX ADMIN — ENOXAPARIN SODIUM 150 MG: 150 INJECTION SUBCUTANEOUS at 07:34

## 2020-08-27 RX ADMIN — DEXMEDETOMIDINE 1 MCG/KG/HR: 100 INJECTION, SOLUTION, CONCENTRATE INTRAVENOUS at 13:15

## 2020-08-27 RX ADMIN — LORAZEPAM 0.5 MG: 2 INJECTION INTRAMUSCULAR; INTRAVENOUS at 12:02

## 2020-08-27 RX ADMIN — LORAZEPAM 1 MG: 2 INJECTION INTRAMUSCULAR; INTRAVENOUS at 17:46

## 2020-08-27 RX ADMIN — POTASSIUM CHLORIDE 10 MEQ: 7.46 INJECTION, SOLUTION INTRAVENOUS at 10:40

## 2020-08-27 RX ADMIN — HYDROMORPHONE HYDROCHLORIDE 0.5 MG: 1 INJECTION, SOLUTION INTRAMUSCULAR; INTRAVENOUS; SUBCUTANEOUS at 05:28

## 2020-08-27 RX ADMIN — ENALAPRILAT 1.25 MG: 1.25 INJECTION INTRAVENOUS at 17:23

## 2020-08-27 RX ADMIN — SODIUM CHLORIDE 12.5 MG/HR: 9 INJECTION, SOLUTION INTRAVENOUS at 14:03

## 2020-08-27 RX ADMIN — Medication 10 ML: at 07:31

## 2020-08-27 RX ADMIN — ACETAMINOPHEN 650 MG: 325 TABLET, FILM COATED ORAL at 21:07

## 2020-08-27 RX ADMIN — LORAZEPAM 0.5 MG: 2 INJECTION INTRAMUSCULAR; INTRAVENOUS at 04:53

## 2020-08-27 RX ADMIN — FUROSEMIDE 40 MG: 10 INJECTION, SOLUTION INTRAMUSCULAR; INTRAVENOUS at 19:41

## 2020-08-27 RX ADMIN — ENALAPRILAT 1.25 MG: 1.25 INJECTION INTRAVENOUS at 12:02

## 2020-08-27 RX ADMIN — DEXMEDETOMIDINE 1.4 MCG/KG/HR: 100 INJECTION, SOLUTION, CONCENTRATE INTRAVENOUS at 20:29

## 2020-08-27 RX ADMIN — DEXMEDETOMIDINE 1.4 MCG/KG/HR: 100 INJECTION, SOLUTION, CONCENTRATE INTRAVENOUS at 18:30

## 2020-08-27 RX ADMIN — INSULIN LISPRO 1 UNITS: 100 INJECTION, SOLUTION INTRAVENOUS; SUBCUTANEOUS at 07:30

## 2020-08-27 RX ADMIN — CALCIUM GLUCONATE: 94 INJECTION, SOLUTION INTRAVENOUS at 19:08

## 2020-08-27 RX ADMIN — DEXMEDETOMIDINE 0.9 MCG/KG/HR: 100 INJECTION, SOLUTION, CONCENTRATE INTRAVENOUS at 11:45

## 2020-08-27 RX ADMIN — PANTOPRAZOLE SODIUM 40 MG: 40 INJECTION, POWDER, FOR SOLUTION INTRAVENOUS at 07:31

## 2020-08-27 RX ADMIN — HYDROMORPHONE HYDROCHLORIDE 0.5 MG: 1 INJECTION, SOLUTION INTRAMUSCULAR; INTRAVENOUS; SUBCUTANEOUS at 10:06

## 2020-08-27 RX ADMIN — POTASSIUM CHLORIDE 10 MEQ: 7.46 INJECTION, SOLUTION INTRAVENOUS at 12:02

## 2020-08-27 RX ADMIN — IRON SUCROSE 200 MG: 20 INJECTION, SOLUTION INTRAVENOUS at 14:02

## 2020-08-27 RX ADMIN — SODIUM CHLORIDE 7.5 MG/HR: 9 INJECTION, SOLUTION INTRAVENOUS at 11:00

## 2020-08-27 RX ADMIN — SODIUM CHLORIDE 15 MG/HR: 9 INJECTION, SOLUTION INTRAVENOUS at 15:59

## 2020-08-27 RX ADMIN — DEXMEDETOMIDINE 1.4 MCG/KG/HR: 100 INJECTION, SOLUTION, CONCENTRATE INTRAVENOUS at 15:59

## 2020-08-27 ASSESSMENT — ENCOUNTER SYMPTOMS
CHEST TIGHTNESS: 0
DIARRHEA: 0
COUGH: 0
NAUSEA: 0
SHORTNESS OF BREATH: 1
VOMITING: 0
ABDOMINAL PAIN: 1
SORE THROAT: 0
TROUBLE SWALLOWING: 0
CONSTIPATION: 0

## 2020-08-27 ASSESSMENT — PAIN SCALES - GENERAL
PAINLEVEL_OUTOF10: 7
PAINLEVEL_OUTOF10: 0
PAINLEVEL_OUTOF10: 4
PAINLEVEL_OUTOF10: 6
PAINLEVEL_OUTOF10: 8
PAINLEVEL_OUTOF10: 0
PAINLEVEL_OUTOF10: 7
PAINLEVEL_OUTOF10: 0
PAINLEVEL_OUTOF10: 7
PAINLEVEL_OUTOF10: 7

## 2020-08-27 ASSESSMENT — PAIN DESCRIPTION - DESCRIPTORS: DESCRIPTORS: ACHING

## 2020-08-27 ASSESSMENT — PAIN DESCRIPTION - ONSET: ONSET: ON-GOING

## 2020-08-27 ASSESSMENT — PAIN DESCRIPTION - ORIENTATION: ORIENTATION: MID

## 2020-08-27 ASSESSMENT — PAIN DESCRIPTION - LOCATION: LOCATION: ABDOMEN

## 2020-08-27 ASSESSMENT — PAIN DESCRIPTION - PAIN TYPE: TYPE: ACUTE PAIN

## 2020-08-27 ASSESSMENT — PAIN DESCRIPTION - FREQUENCY: FREQUENCY: CONTINUOUS

## 2020-08-27 ASSESSMENT — PAIN DESCRIPTION - PROGRESSION: CLINICAL_PROGRESSION: GRADUALLY IMPROVING

## 2020-08-27 NOTE — PROGRESS NOTES
[facility] -   Infectious diseases evaluation  admission date 8/19/2020    reason for consultation:   Fever    Impression :   · Fever /leukocytosis possible pneumonia. · Left pleural effusion  · Severe pancreatitis, pancreatic enzymes improved  · Possible PE on Lovenox  · Acute hypoxic respiratory failure  · Skin rash, possible allergic reaction to Zosyn resolved  · Hypertension   · Obesity      Recommendations   Current:  · Continue IV meropenem , add vancomycin  · COVID 19  test was -8/26/2020  · Follow blood cultures  · MRSA nasal swab was negative  · Respiratory culture and viral PCR pending  · Will discuss with pulmonary, may need thoracentesis if persistent fever and leukocytosis. · Continue supportive care  · Further recommendation as per hospital course. History of Present Illness:   Initial history:  Quincy Whitt is a 54y.o.-year-old female presented to the hospital with epigastric abdominal pain and bloating for several days prior to admission, severe, no radiation, no alleviating or relieving factors. Pancreatic enzymes were elevated, imaging suggestive of severe pancreatitis. She was on IV Zosyn for 7 days, developed a rash on 8/26/2020, IV Zosyn was discontinued and the rash resolved. IV meropenem started 8/26/2020  Right arm PICC line was placed and has been on TPN  CT chest 8/26/2020 suggestive of questionable PE, bilateral consolidations and left pleural effusion  Interval changes  8/27/2020   She remains on oxygen by nasal cannula, complaining of diffuse abdominal pain, mild cough, had a fever with a temperature max of 104 yesterday. She denied any nausea or vomiting, no diarrhea.   Skin rash is resolving  WBC increased to 19, not on steroids  Lipase normal today    8/26/2020 procalcitonin level 0.32, , ferritin,C reactive protein 296, lactic acid normal, d-dimer more than 20, ferritin 1141  UA remarkable    I have personally reviewed the past medical history, past surgical history, medications, social history, and family history, and I haveupdated the database accordingly.   Past Medical History:     Past Medical History:   Diagnosis Date    Abnormal levels of other serum enzymes     Anxiety     Bilateral leg edema     Chronic kidney disease     right renal cyst    Depression     DVT (deep venous thrombosis) ([facility] [address] 1997    after delivery    Elevated liver enzymes     Fibromyalgia     Headache(784.0)     migraines    Hx of blood clots     1997 left calf    Hypertension     Kidney stone     Obstructive sleep apnea syndrome     Pancreatitis 2001    Pleural effusion 2001    SOB (shortness of breath)     Supraventricular tachycardia (HCC)     SVT (supraventricular tachycardia) ([facility] [address] 9/8/2015       Past Surgical  History:     Past Surgical History:   Procedure Laterality Date    ATRIAL ABLATION SURGERY      BACK SURGERY      L4-5    CHOLECYSTECTOMY  2001    ENDOMETRIAL ABLATION      e sure implants placed also    ENDOSCOPY, COLON, DIAGNOSTIC      EXCISION LESION HAND / FINGER Right 1/25/2019    HAND LESION BIOPSY EXCISION performed by Cam Gibson MD at [address] [place] [address] Bilateral     left x2, right x1    FOOT SURGERY Right     x3    KNEE ARTHROSCOPY Left     x2    RI CYSTO/URETERO/PYELOSCOPY W/LITHOTRIPSY Left 9/20/2017    CYSTOSCOPY URETEROSCOPY HOLMIUM LASER LITHO WITH STONE BASKETING WITH  STENT  performed by Margaret Zarco MD at [facility]        Medications:      enoxaparin  30 mg Subcutaneous BID    vancomycin (VANCOCIN) intermittent dosing (placeholder)   Other RX Placeholder    vancomycin  2,500 mg Intravenous Once    [START ON 8/28/2020] vancomycin  1,250 mg Intravenous Q8H    meropenem (MERREM) IVPB extended  1 g Intravenous Q8H    enalaprilat  1.25 mg Intravenous 4 times per day    insulin lispro  0-6 Units Subcutaneous TID WC    magnesium oxide  400 mg Oral Daily    sodium chloride flush  10 mL Intravenous 2 times per day    QUEtiapine  800 mg Oral Nightly    pramipexole  0.25 mg Oral Daily    pantoprazole  40 mg Intravenous Daily    And    sodium chloride (PF)  10 mL Intravenous Daily    busPIRone  15 mg Oral Nightly    DULoxetine  60 mg Oral BID    cetirizine  10 mg Oral Daily       Social History:     Social History     Socioeconomic History    Marital status:      Spouse name: Not on file    Number of children: Not on file    Years of education: Not on file    Highest education level: Not on file   Occupational History    Not on file   Social Needs    Financial resource strain: Not on file    Food insecurity     Worry: Not on file     Inability: Not on file    Transportation needs     Medical: Not on file     Non-medical: Not on file   Tobacco Use    Smoking status: Former Smoker     Packs/day: 1.00     Types: Cigarettes     Last attempt to quit: 2019     Years since quittin.7    Smokeless tobacco: Never Used    Tobacco comment: today last smoke   Substance and Sexual Activity    Alcohol use:  Yes     Alcohol/week: 0.0 standard drinks     Comment: rarely    Drug use: No    Sexual activity: Not on file   Lifestyle    Physical activity     Days per week: Not on file     Minutes per session: Not on file    Stress: Not on file   Relationships    Social connections     Talks on phone: Not on file     Gets together: Not on file     Attends Rastafarian service: Not on file     Active member of club or organization: Not on file     Attends meetings of clubs or organizations: Not on file     Relationship status: Not on file    Intimate partner violence     Fear of current or ex partner: Not on file     Emotionally abused: Not on file     Physically abused: Not on file     Forced sexual activity: Not on file   Other Topics Concern    Not on file   Social History Narrative    Not on file       Family History:     Family History Problem Relation Age of Onset    Heart Disease Father     High Blood Pressure Brother         Allergies:   Aripiprazole; Eletriptan; Hydrocodone-acetaminophen; Oxycodone-acetaminophen; Sumatriptan; Triptans; Zosyn [piperacillin sod-tazobactam so]; and Lamotrigine     Review of Systems:     Review of Systems  As per history of present illness, difficult to obtain the patient on BiPAP  Physical Examination :     Patient Vitals for the past 8 hrs:   BP Temp Temp src Pulse Resp SpO2   08/27/20 1200 (!) 171/90 100.1 °F (37.8 °C) Temporal 120 (!) 35 90 %   08/27/20 1100 (!) 176/90 -- -- 118 26 94 %       Physical Exam  Constitutional:       Comments: On BiPAP   HENT:      Head: Normocephalic and atraumatic. Neck:      Musculoskeletal: Neck supple. No neck rigidity. Cardiovascular:      Rate and Rhythm: Normal rate and regular rhythm. Heart sounds: No murmur. Pulmonary:      Breath sounds: No wheezing. Comments: Coarse breath sounds bilaterally  Abdominal:      Tenderness: There is abdominal tenderness. There is no guarding or rebound. Comments: Epigastric tenderness   Musculoskeletal:      Right lower leg: Edema present. Left lower leg: Edema present. Skin:     General: Skin is warm. Coloration: Skin is not jaundiced. Comments: Erythematous rash to the lower and upper extremities resolving. Neurological:      Mental Status: She is alert and oriented to person, place, and time.            Medical Decision Making:   I have independently reviewed/ordered the following labs:    CBC with Differential:   Recent Labs     08/26/20  0337 08/27/20  0327   WBC 16.2* 19.6*   HGB 9.5* 9.3*   HCT 29.1* 28.0*    264   LYMPHOPCT 9* 5*   MONOPCT 4 6     BMP:  Recent Labs     08/26/20  0337 08/26/20  1754 08/27/20  0327    140 143   K 3.9 3.7 3.5*   * 101 105   CO2 28 27 29   BUN 11 9 11   CREATININE 0.50 0.50 0.53   MG 1.7  --  1.6     Hepatic Function Panel:   Recent Labs 08/26/20  0337 08/27/20  0327   PROT 5.9* 5.7*   LABALBU 2.4* 2.3*   BILITOT 0.27* 0.23*   ALKPHOS 123* 100   ALT 29 26   AST 35* 27       Lab Results   Component Value Date    CREATININE 0.53 08/27/2020    GLUCOSE 180 08/27/2020       Detailed results: Thank you for allowing us to participate in the care of this patient. Please call with questions. This note is created with the assistance of a speech recognition program.  While intending to generate adocument that actually reflects the content of the visit, the document can still have some errors including those of syntax and sound a like substitutions which may escape proof reading. It such instances, actual meaningcan be extrapolated by contextual diversion.     Markell Lopes MD  Office: [phone]  Perfect serve / office [phone]

## 2020-08-27 NOTE — PROGRESS NOTES
Afebrile vsstable  UO Good  Awake alert doing better on nasal O2  C/o abd pain  No N/V  abd soft obese dist tender  Ext edema  Labs noted    May have ice chips and popsicle  Abx per ID  CT noted  Supportive care and time

## 2020-08-27 NOTE — PLAN OF CARE
Problem: Falls - Risk of:  Goal: Will remain free from falls  Description: Will remain free from falls  8/27/2020 0332 by Michelle Blackman RN  Outcome: Ongoing     Problem: Falls - Risk of:  Goal: Absence of physical injury  Description: Absence of physical injury  8/27/2020 0332 by Michelle Blackman RN  Outcome: Ongoing     Problem: Pain:  Goal: Pain level will decrease  Description: Pain level will decrease  8/27/2020 0332 by Michelle Blackman RN  Outcome: Ongoing     Problem: Pain:  Goal: Control of acute pain  Description: Control of acute pain  8/27/2020 0332 by Michelle Blackman RN  Outcome: Ongoing     Problem: Nausea/Vomiting:  Goal: Absence of nausea/vomiting  Description: Absence of nausea/vomiting  8/27/2020 0332 by Michelle Blackman RN  Outcome: Ongoing     Problem: Gas Exchange - Impaired:  Goal: Levels of oxygenation will improve  Description: Levels of oxygenation will improve  8/27/2020 0332 by Michelle Blackman RN  Outcome: Ongoing     Problem: Tissue Perfusion, Altered:  Goal: Circulatory function within specified parameters  Description: Circulatory function within specified parameters  8/27/2020 0332 by Michelle Blackman RN  Outcome: Ongoing     Problem: Skin Integrity:  Goal: Will show no infection signs and symptoms  Description: Will show no infection signs and symptoms  8/27/2020 0332 by Michelle Blackman RN  Outcome: Ongoing     Problem: Skin Integrity:  Goal: Absence of new skin breakdown  Description: Absence of new skin breakdown  8/27/2020 0332 by Michelle Blackman RN  Outcome: Ongoing

## 2020-08-27 NOTE — PROGRESS NOTES
Pharmacy Note  Vancomycin Consult    Annabella Reasons is a 54 y.o. female started on Vancomycin for pneumonia; consult received from Dr. Arlene Matute to manage therapy. Also receiving the following antibiotics: meropenem.     Patient Active Problem List   Diagnosis    Anxiety    Class 3 severe obesity due to excess calories with serious comorbidity in adult [facility])    Restless leg syndrome    Previous back surgery    Major depression    HTN (hypertension)    FHx: rheumatoid arthritis    SVT (supraventricular tachycardia) ([facility])    Right elbow pain    Pain of right lower extremity    Cough with sputum    Varicose vein of leg    Leg swelling    Herpes zoster without complication    Chest pain    Fibromyalgia    Acute pancreatitis    Sepsis ([facility] [address]    Morbid obesity (HCC)    Abdominal pain, epigastric    Nausea    Acute respiratory failure with hypoxia ([facility])    Hypocalcemia    History of anxiety disorder    Abnormal levels of other serum enzymes    Elevated liver enzymes    Bilateral leg edema    Smoker    Severe episode of recurrent major depressive disorder, without psychotic features [facility]    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)    Presbyopia    Pseudotumor cerebri syndrome    Insomnia due to psychological stress    Astigmatism    Generalized anxiety disorder    Carpal tunnel syndrome of right wrist    Pneumonia of both lungs due to infectious organism       Allergies:  Aripiprazole; Eletriptan; Hydrocodone-acetaminophen; Oxycodone-acetaminophen; Sumatriptan; Triptans; Zosyn [piperacillin sod-tazobactam so]; and Lamotrigine     Temp max: 104.1 F    Recent Labs     08/26/20  1754 08/27/20  0327   BUN 9 11       Recent Labs     08/26/20  1754 08/27/20  0327   CREATININE 0.50 0.53       Recent Labs     08/26/20  0337 08/27/20  0327   WBC 16.2* 19.6*         Intake/Output Summary (Last 24 hours) at 8/27/2020 1524  Last data filed at 8/27/2020 0534  Gross per 24 hour   Intake 3393.75 ml   Output 8200 ml   Net -4806.25 ml       Culture Date      Source                       Results  See micro    Ht Readings from Last 1 Encounters:   08/25/20 5' 5\" (1.651 m)        Wt Readings from Last 1 Encounters:   08/25/20 (!) 322 lb 8.5 oz (146.3 kg)         Body mass index is 53.67 kg/m². Estimated Creatinine Clearance: 194 mL/min (based on SCr of 0.53 mg/dL). Goal Trough Level: 15-20 mcg/mL    Assessment/Plan:  Will initiate Vancomycin with a one time loading dose of 2500 mg x1, followed by 1250 mg IV every 8 hours. Timing of trough level will be determined based on culture results, renal function, and clinical response. Thank you for the consult. Will continue to follow.     Melissa Chapa, PharmD, 8/27/2020 3:34 PM

## 2020-08-27 NOTE — PLAN OF CARE
Problem: Falls - Risk of:  Goal: Will remain free from falls  Description: Will remain free from falls  Outcome: Ongoing   Pt remained free from falls this shift. 2/4 side rails up, bed wheels locked and in lowest position, and proper safety measures put in place. Problem: Pain:  Goal: Pain level will decrease  Description: Pain level will decrease  Outcome: Ongoing         Problem: Skin Integrity:  Goal: Absence of new skin breakdown  Description: Absence of new skin breakdown  Outcome: Ongoing   Pt remains free from skin breakdown this shift. Pt turns and repositions    Problem: Venous Thromboembolism:  Goal: Will show no signs or symptoms of venous thromboembolism  Description: Will show no signs or symptoms of venous thromboembolism  Outcome: Ongoing    self with pillow support, skin assessed and maintained with turns and PRN. Feet elevated.

## 2020-08-27 NOTE — PROGRESS NOTES
PULMONARY PROGRESS NOTE:      Interval History:pancreatitis, resp failure    Shortness of Breath: improved   Cough: no  Sputum: no          Hemoptysis: no  Chest Pain: no  Fever: yes                  Swelling Feet: no  Headache: no                                           Nausea, Emesis, Abdominal Pain: no  Diarrhea: no         Constipation: no    Events since last visit: Leukocytosis, Intermittent fevers continue. Repeat lactate yesterday 1.2. D-dimer elevated - on 1 mg/kg BID lovenox. Was taken off bipap around 6 this morning and is currently 94% on 5L.      PAST MEDICAL HISTORY:      Scheduled Meds:   enoxaparin  30 mg Subcutaneous BID    meropenem (MERREM) IVPB extended  1 g Intravenous Q8H    LORazepam  0.5 mg Intravenous Q6H    iron sucrose  200 mg Intravenous Q24H    enalaprilat  1.25 mg Intravenous 4 times per day    insulin lispro  0-6 Units Subcutaneous TID WC    magnesium oxide  400 mg Oral Daily    sodium chloride flush  10 mL Intravenous 2 times per day    QUEtiapine  800 mg Oral Nightly    pramipexole  0.25 mg Oral Daily    pantoprazole  40 mg Intravenous Daily    And    sodium chloride (PF)  10 mL Intravenous Daily    busPIRone  15 mg Oral Nightly    DULoxetine  60 mg Oral BID    cetirizine  10 mg Oral Daily     Continuous Infusions:   PN-Adult 2-in-1 Central Line (Standard) 75 mL/hr at 08/26/20 1804    fat emulsion Stopped (08/27/20 0654)    niCARdipine 7.5 mg/hr (08/27/20 1100)    dextrose      dexmedetomidine (PRECEDEX) IV infusion 0.9 mcg/kg/hr (08/27/20 0735)     PRN Meds:sodium chloride flush, acetaminophen, hydrALAZINE, HYDROmorphone, glucose, dextrose, glucagon (rDNA), dextrose, perflutren lipid microspheres, promethazine (PHENERGAN) in sodium chloride 0.9% IVPB, sodium chloride flush, magnesium sulfate, potassium chloride **OR** potassium alternative oral replacement **OR** potassium chloride, acetaminophen, [DISCONTINUED] promethazine **OR** ondansetron, melatonin ER        PHYSICAL EXAMINATION:  BP (!) 175/88   Pulse 114   Temp 100.8 °F (38.2 °C) (Temporal)   Resp 24   Ht 5' 5\" (1.651 m)   Wt (!) 322 lb 8.5 oz (146.3 kg)   SpO2 96%   BMI 53.67 kg/m²     Febrile 100.8  General : Awake, alert, NAD   Neck - supple, no lymphadenopathy, JVD not raised  Heart -   Lungs - Tachypneic, shallow breaths Air Entry- fair bilaterally; breath sounds : vesicular;   rales/crackles - absent, 94% on 5L  Abdomen - obese, soft, tenderness noted LUQ  Upper Extremities  - no cyanosis, mottling; edema : absent  Lower Extremities: no cyanosis, mottling; edema : absent  Skin: upper chest rash    Current Laboratory, Radiologic, Microbiologic, and Diagnostic studies reviewed  Data ReviewCBC:   Recent Labs     08/25/20  0412 08/26/20  0337 08/27/20  0327   WBC 14.7* 16.2* 19.6*   RBC 3.04* 3.09* 3.03*   HGB 9.4* 9.5* 9.3*   HCT 28.5* 29.1* 28.0*    238 264     BMP:   Recent Labs     08/26/20  0337 08/26/20  1754 08/27/20  0327   GLUCOSE 178* 167* 180*    140 143   K 3.9 3.7 3.5*   BUN 11 9 11   CREATININE 0.50 0.50 0.53   CALCIUM 8.0* 8.2* 8.0*     ABGs: No results for input(s): PHART, PO2ART, PAN6IFC, FZC4KYA, BEART, N1OYYOHY, MHV0SKI in the last 72 hours.    PT/INR:  No results found for: PTINR    ASSESSMENT / PLAN:  · Acute pancreatitis- GI consulted, monitor amylase lipase - improving,   · Lactic acidosis- resolved  · Possible pneumonia- ABx   · Febrile, Leukocytosis- Abx changed per ID, UA- unremarkable, COVID negative, lactate 1.2  · Hypertension- Cardene drip   · Possible PE- 1 mg/kg lovenox BID, D-dimer elevated, unable to rule out on CTA chest RLL PE  · Diet NPO-ice chips  · Monitor end tidal Co2 with pain meds   · acute hypoxic resp failure - O2/ NIPPV - remains bipap dependent   · precedex for restlessness-  Ativan prn, wean precedex as able  · Encourage IS   · CXR 8/28  · Discussed with Dr. Panchito Guzmán         Electronically signed by Yarelis Madrid on 08/27/20 at 11:42 AM.

## 2020-08-27 NOTE — PROGRESS NOTES
Dr. Bello [facility] notified that patient remains increasingly agitated. Patient maxed on precedex, O2 sats 88% RR in the upper 30s-40s. Patient refusing BIPAP.  Order received for STAT chest x-ray and 1mg of Ativan IV x1

## 2020-08-27 NOTE — PROGRESS NOTES
[address] [facility].    PROGRESS NOTE             8/27/2020    7:21 AM    Name:   Damaris Borrero  MRN:     [id number]     Acct:      [de-identified]   Room:   2003/2003-01  IP Day:  8  Admit Date:  8/19/2020  7:35 PM    PCP:  Shawn Hernandez  Code Status:  Full Code    Subjective:     C/C:   Chief Complaint   Patient presents with    Abdominal Pain     RUQ     Interval History Status: not changed. Patient seen and examined at bedside this AM.  No acute events overnight. Break from bipap overnight, needed 5L NC for under 2 hours. Patient on 0.8 mcg/kg/hr precedex. Patient on nasal cannula this AM and able to converse some. Alert and oriented. Endorses SOB and chest pain but denies cough or other issues. On nicardipine drip 7.5 mg/hr. BP through the night better controlled in 120s and 130s but this AM went up to 170s with a peak of 197/93. HR . Tmax 104.1 at 15:00 yesterday. Leukocytosis trending up again 16.2 to 19.6 today. Patient got IV lasix yesterday and urinated 8.2L, UO 2.3 ml/kg/hr x 24 hrs. Creatinine stable at 0.5. Started on full dose lovenox yesterday after d-dimer over 20,000 and CT PE showed questionable right pulmonary embolus. Also showed bilateral lung opacities most consistent with pneumonia. COVID negative. ID switched zosyn to meropenem. ID saw erythematous rash on extremities yesterday PM but gone this AM.    Brief History:     Please see H&P. Review of Systems:     Review of Systems   Constitutional: Positive for fever. Negative for chills, diaphoresis and unexpected weight change. HENT: Negative for sore throat and trouble swallowing. Respiratory: Positive for shortness of breath. Negative for cough and chest tightness. Cardiovascular: Positive for palpitations and leg swelling. Negative for chest pain. Gastrointestinal: Positive for abdominal pain.  Negative for constipation, diarrhea, nausea and vomiting. Genitourinary: Negative for difficulty urinating, dysuria, enuresis and hematuria. Skin: Negative for rash. Neurological: Negative for tremors, weakness, light-headedness and headaches. Psychiatric/Behavioral: The patient is nervous/anxious. Medications: Allergies:     Allergies   Allergen Reactions    Aripiprazole      Bad reaction    Eletriptan      Other reaction(s): Swelling-throat    Hydrocodone-Acetaminophen      Other reaction(s): Unknown    Oxycodone-Acetaminophen      Other reaction(s): Unknown    Sumatriptan Other (See Comments)    Triptans      Other reaction(s): Unknown    Lamotrigine Rash       Current Meds:   Scheduled Meds:    enoxaparin  1 mg/kg Subcutaneous BID    meropenem (MERREM) IVPB extended  1 g Intravenous Q8H    LORazepam  0.5 mg Intravenous Q6H    iron sucrose  200 mg Intravenous Q24H    enalaprilat  1.25 mg Intravenous 4 times per day    insulin lispro  0-6 Units Subcutaneous TID WC    magnesium oxide  400 mg Oral Daily    sodium chloride flush  10 mL Intravenous 2 times per day    QUEtiapine  800 mg Oral Nightly    pramipexole  0.25 mg Oral Daily    pantoprazole  40 mg Intravenous Daily    And    sodium chloride (PF)  10 mL Intravenous Daily    busPIRone  15 mg Oral Nightly    DULoxetine  60 mg Oral BID    cetirizine  10 mg Oral Daily     Continuous Infusions:    PN-Adult 2-in-1 Central Line (Standard) 75 mL/hr at 08/26/20 1804    fat emulsion Stopped (08/27/20 0654)    niCARdipine 7.5 mg/hr (08/27/20 0701)    dextrose      dexmedetomidine (PRECEDEX) IV infusion 0.8 mcg/kg/hr (08/27/20 0657)     PRN Meds: sodium chloride flush, acetaminophen, hydrALAZINE, HYDROmorphone, glucose, dextrose, glucagon (rDNA), dextrose, perflutren lipid microspheres, promethazine (PHENERGAN) in sodium chloride 0.9% IVPB, sodium chloride flush, magnesium sulfate, potassium chloride **OR** potassium alternative oral replacement **OR** potassium chloride, acetaminophen, [DISCONTINUED] promethazine **OR** ondansetron, melatonin ER    Data:     Past Medical History:   has a past medical history of Abnormal levels of other serum enzymes, Anxiety, Bilateral leg edema, Chronic kidney disease, Depression, DVT (deep venous thrombosis) (HCC), Elevated liver enzymes, Fibromyalgia, Headache(784.0), Hx of blood clots, Hypertension, Kidney stone, Obstructive sleep apnea syndrome, Pancreatitis, Pleural effusion, SOB (shortness of breath), Supraventricular tachycardia ([address] and SVT (supraventricular tachycardia) ([address] Social History:   reports that she quit smoking about 8 months ago. Her smoking use included cigarettes. She smoked 1.00 pack per day. She has never used smokeless tobacco. She reports current alcohol use. She reports that she does not use drugs. Family History:   Family History   Problem Relation Age of Onset    Heart Disease Father     High Blood Pressure Brother        Vitals:  BP (!) 175/88   Pulse 114   Temp 99.7 °F (37.6 °C) (Temporal)   Resp 24   Ht 5' 5\" (1.651 m)   Wt (!) 322 lb 8.5 oz (146.3 kg)   SpO2 96%   BMI 53.67 kg/m²   Temp (24hrs), Av.6 °F (38.1 °C), Min:98.7 °F (37.1 °C), Max:104.1 °F (40.1 °C)    Recent Labs     20  1618 20  0722 20  1312 20  1801   POCGLU 161* 185* 178* 161*       I/O(24Hr):     Intake/Output Summary (Last 24 hours) at 2020 0721  Last data filed at 2020 0534  Gross per 24 hour   Intake 3393.75 ml   Output 8200 ml   Net -4806.25 ml       Labs:    CBC:   Lab Results   Component Value Date    WBC 19.6 2020    RBC 3.03 2020    HGB 9.3 2020    HCT 28.0 2020    MCV 92.4 2020    MCH 30.8 2020    MCHC 33.4 2020    RDW 14.4 2020     2020    MPV 8.9 2020     CMP:    Lab Results   Component Value Date     2020    K 3.5 2020     2020    CO2 29 2020    BUN 11 2020    CREATININE 0.53 08/27/2020    GFRAA >60 08/27/2020    LABGLOM >60 08/27/2020    GLUCOSE 180 08/27/2020    PROT 5.7 08/27/2020    PROT 7.2 01/23/2015    LABALBU 2.3 08/27/2020    CALCIUM 8.0 08/27/2020    BILITOT 0.23 08/27/2020    ALKPHOS 100 08/27/2020    AST 27 08/27/2020    ALT 26 08/27/2020     ABG:  No results found for: PH, PCO2, PO2, HCO3, BE, THGB, TCO2, O2SAT  LIPASE:    Lab Results   Component Value Date    LIPASE 57 08/27/2020       Lab Results   Component Value Date/Time    SPECIAL 4ml LT HAND 08/23/2020 10:01 AM     Lab Results   Component Value Date/Time    CULTURE NO GROWTH 4 DAYS 08/23/2020 10:01 AM         Radiology:    Ct Abdomen Pelvis Wo Contrast Additional Contrast? None    Result Date: 8/20/2020  EXAMINATION: CT OF THE ABDOMEN AND PELVIS WITHOUT CONTRAST 8/20/2020 10:22 pm TECHNIQUE: CT of the abdomen and pelvis was performed without the administration of intravenous contrast. Multiplanar reformatted images are provided for review. Dose modulation, iterative reconstruction, and/or weight based adjustment of the mA/kV was utilized to reduce the radiation dose to as low as reasonably achievable. COMPARISON: CT abdomen and pelvis with contrast August 19, 2020. HISTORY: ORDERING SYSTEM PROVIDED HISTORY: severe acute pain TECHNOLOGIST PROVIDED HISTORY: severe acute pain Is the patient pregnant? ->Yes Reason for Exam: worsening abdominal pain Acuity: Acute Type of Exam: Ongoing Relevant Medical/Surgical History: surg - gallbladder FINDINGS: Lower Chest: Persisting bilateral lower lung scattered consolidation and atelectasis is present with trace posterior left-sided pleural effusion identified. Sherlyn Simpler Heart is normal in size and configuration. Organs: Interval mild worsening of severe peripancreatic fat stranding and fluid is noted with intraperitoneal free fluid collecting within the bilateral pericolic gutters and collecting inferiorly within the pelvis with increased volume in comparison with the prior study. Diffuse fatty infiltration of the liver is again seen. The liver, gallbladder, spleen, pancreas, adrenal glands, kidneys, are otherwise unremarkable in appearance. GI/Bowel: The stomach is unremarkable without wall thickening or distention. Bowel loops are unremarkable in appearance without evidence of obstruction, distension or mucosal thickening. Pelvis: Berkowitz catheter is noted within the nondistended but grossly unremarkable urinary bladder. Bilateral fallopian tube Essure coils are seen without evidence of complication. The uterus is anteverted in position and is unremarkable in appearance there no evidence of pelvic free fluid is seen. Peritoneum/Retroperitoneum: No evidence of retroperitoneal or intraperitoneal lymphadenopathy is identified. . Bones/Soft Tissues: No evidence of retroperitoneal or intraperitoneal lymphadenopathy is identified. No evidence of intraperitoneal free fluid is seen. 1. Mild interval worsening of severe peripancreatic inflammation associated with acute pancreatitis, as discussed above. 2. No evidence of complication i.e. pseudocyst noted. 3. Stable bilateral lower lung multifocal consolidation atelectasis suggesting pneumonia versus alveolar edema. 4. Hepatic steatosis, unchanged. Xr Chest (single View Frontal)    Result Date: 8/21/2020  EXAMINATION: ONE XRAY VIEW OF THE CHEST 8/21/2020 6:06 am COMPARISON: August 20, 2020 chest exam HISTORY: ORDERING SYSTEM PROVIDED HISTORY: f/u atelectasis TECHNOLOGIST PROVIDED HISTORY: f/u atelectasis Reason for Exam: F/U atelectasis. Acuity: Unknown Type of Exam: Unknown Additional signs and symptoms: F/U atelectasis. FINDINGS: Interval insertion of right PICC line catheter, the tip projected at the right atrium Mild cardiomegaly Limited extra sonya exam with low volume lungs, mild streaky, left greater than right, bibasilar atelectasis.   Grossly clear upper lungs     Line placement as above Limited expiratory exam with mild streaky bibasilar atelectasis     Ct Abdomen Pelvis W Iv Contrast Additional Contrast? None    Result Date: 8/26/2020  EXAMINATION: CTA OF THE CHEST; CT OF THE ABDOMEN AND PELVIS WITH CONTRAST 8/26/2020 10:34 am TECHNIQUE: CTA of the chest was performed after the administration of intravenous contrast.  Multiplanar reformatted images are provided for review. MIP images are provided for review. Dose modulation, iterative reconstruction, and/or weight based adjustment of the mA/kV was utilized to reduce the radiation dose to as low as reasonably achievable.; CT of the abdomen and pelvis was performed with the administration of intravenous contrast. Multiplanar reformatted images are provided for review. Dose modulation, iterative reconstruction, and/or weight based adjustment of the mA/kV was utilized to reduce the radiation dose to as low as reasonably achievable. COMPARISON: CT chest May 26, 2019 CT abdomen and pelvis August 20, 2020 HISTORY: ORDERING SYSTEM PROVIDED HISTORY: Hypoxia, recurrent fevers TECHNOLOGIST PROVIDED HISTORY: Hypoxia, recurrent fevers Reason for Exam: Hypoxia, recurrent fevers, best images possible due to patient size 322 pounds Acuity: Acute Type of Exam: Initial; ORDERING SYSTEM PROVIDED HISTORY: Splenic vein thrombosis? TECHNOLOGIST PROVIDED HISTORY: Splenic vein thrombosis? Reason for Exam: Splenic vein thrombosis? best images possible due to patient size 322  poounds Acuity: Acute Type of Exam: Initial FINDINGS: CT CHEST: Chest wall: No axillary adenopathy. Mediastinum: Cardiomegaly. No pericardial effusion. No adenopathy. Pulmonary arteries: Significantly limited evaluation for pulmonary embolus due to bolus timing, motion, and patient body habitus. Questionable pulmonary embolus within the right lower lobe. Lungs: Moderate left pleural effusion. Small right pleural effusion. Left lower lobe consolidation versus atelectasis. Bilateral upper lobe areas of consolidation.  Bones: No acute osseous abnormality. CT ABDOMEN-PELVIS: Organs: Decreased attenuation of the liver is consistent with hepatic steatosis. No focal liver lesion. Cholecystectomy. Severe pancreatitis with surrounding inflammatory changes and fluid. Mild splenomegaly. No adrenal lesion. No hydronephrosis. Right renal cyst.  Focal area of scarring within the right kidney is unchanged. GI/Bowel: No bowel obstruction. Secondary involvement of the descending colon adjacent to the peripancreatic inflammatory changes along the tail of the pancreas. Pelvis: Essure devices. No significant free fluid. No bladder calculus. Peritoneum/Retroperitoneum: Splenic artery is patent. No evidence of splenic artery thrombosis. No definite splenic venous thrombosis. A portion of the splenic vein as it courses along the tail of the pancreas is indistinct. However, proximal and distal to this, there is flow and this may be related to artifact. No abdominal aortic aneurysm. Prominent peripancreatic lymph nodes are likely reactive. Bones/Soft Tissues: No acute soft tissue abnormality. No acute osseous abnormality. Significantly limited evaluation for pulmonary embolus. Questionable right lower lobe pulmonary embolus. Bilateral lung opacities are likely pneumonia. Recommend follow-up to document resolution. Moderate left pleural effusion. Severe acute pancreatitis. No definite splenic venous thrombosis. Critical results were called by Dr. Joya Ledezma MD to Dr. Charan Temple on 8/26/2020 at 11:40. Ct Abdomen Pelvis W Iv Contrast Additional Contrast? None    Addendum Date: 8/20/2020    ADDENDUM: As stated in the body of the report, the abdominal aorta is normal in size. There is no significant atherosclerosis.      Result Date: 8/20/2020  EXAMINATION: CT OF THE ABDOMEN AND PELVIS WITH CONTRAST 8/19/2020 10:11 pm TECHNIQUE: CT of the abdomen and pelvis was performed with the administration of intravenous contrast. Multiplanar reformatted images are provided for review. Dose modulation, iterative reconstruction, and/or weight based adjustment of the mA/kV was utilized to reduce the radiation dose to as low as reasonably achievable. COMPARISON: None. HISTORY: ORDERING SYSTEM PROVIDED HISTORY: pain TECHNOLOGIST PROVIDED HISTORY: pain Reason for Exam: pt c/o all over abdominal pain with nausea for a few hours Acuity: Acute Type of Exam: Initial Relevant Medical/Surgical History: surg - gallbladder FINDINGS: Lower Chest: Consolidation within both lower lobes and the lingula. Scattered ground-glass opacity. No pleural effusion. Organs: Liver is diffusely hypoattenuating. No acute abnormality within the spleen, adrenals, or left kidney. There is right renal cortical scarring and calcification. Subcentimeter hypoattenuating bilateral renal lesions are too small to accurately characterize, likely cysts. Prior cholecystectomy. There is diffuse peripancreatic stranding and fluid. No loculated fluid collection. GI/Bowel: Stomach is partially distended. Small bowel is nondilated. Colon is nondilated. Pelvis: Urinary bladder is partially distended without vesicular stones. Uterus is present. Peritoneum/Retroperitoneum: Shotty retroperitoneal lymph nodes, likely reactive. Abdominal aorta is normal in caliber. No pneumoperitoneum. Bones/Soft Tissues: No acute osseous abnormality. 1. Pancreatic edema and associated fluid is consistent with pancreatitis. No loculated fluid collection/pseudocyst. 2. Shotty retroperitoneal lymph nodes are likely reactive. 3. Hepatic steatosis. 4. Bilateral lower lobe consolidation, which could be due to edema or pneumonia. [employer]/replace/removal    Result Date: 8/20/2020  PROCEDURE: ULTRASOUND GUIDED VASCULAR ACCESS. FLUOROSCOPY GUIDED PICC PLACEMENT 8/20/2020.  HISTORY: ORDERING SYSTEM PROVIDED HISTORY: fluid resusitation TECHNOLOGIST PROVIDED HISTORY: fluid resusitation Lumen?->Double Lumen Is the patient pregnant? ->Yes Acuity: Unknown Type of Exam: Unknown Sepsis SEDATION: None FLUOROSCOPY TIME: DAP 63 cGy cm squared TECHNIQUE: Informed consent was obtained after a detailed explanation of the procedure including risks, benefits, and alternatives. Universal protocol was observed. The right arm was prepped and draped in sterile fashion using maximum sterile barrier technique. Local anesthesia was achieved with lidocaine. A micropuncture needle was used to access the right basilic vein using ultrasound guidance. An ultrasound image demonstrating patency of the vein with needle tip located within it. An image was obtained and stored in PACs. A 0.018 guidewire was used to place a peel-a-way sheath and a 5 [place] power injectable dual-lumen PICC was advanced with fluoroscopic guidance with the tip at the cavo-atrial junction. The catheter flushed easily and there was a good blood return. The catheter was secured to the skin. The patient tolerated the procedure well and there were no immediate complications. FINDINGS: Fluoroscopic image demonstrates the tip of the catheter at the cavo-atrial junction. Successful ultrasound and fluoroscopy guided right basilic vein 5 [place] power injectable dual-lumen PICC placement. Ready for use. Xr Chest Portable    Result Date: 8/26/2020  EXAMINATION: ONE XRAY VIEW OF THE CHEST 8/26/2020 9:07 am COMPARISON: August 24, 2020, chest exam HISTORY: ORDERING SYSTEM PROVIDED HISTORY: SHARON TECHNOLOGIST PROVIDED HISTORY: SHARON Reason for Exam: PT CO increased SOB and CP. Acuity: Acute Type of Exam: Initial FINDINGS: Right PICC line catheter tip is projected at the SVC right atrial junction Persistent mild cardiomegaly Expiratory exam with mild diffuse prominence of lung markings likely related to the low volume lungs. Mild vascular congestion is not reliably excluded. Interval increase in now mild patchy right upper lobe infiltrate.   Moderate left basilar infiltrate Reason for Exam: Increased oxygen demand Acuity: Acute Type of Exam: Initial Additional signs and symptoms: Increased oxygen demand FINDINGS: There are small bilateral effusions. There is a left basilar infiltrate. There is no pneumothorax. The heart is prominent. The upper abdomen is unremarkable. The extrathoracic soft tissues are unremarkable. Cardiomegaly and small bilateral effusions with adjacent left basilar infiltrate. Xr Chest Portable    Result Date: 8/20/2020  EXAMINATION: ONE XRAY VIEW OF THE CHEST 8/20/2020 7:38 am COMPARISON: July 18, 2020 chest exam HISTORY: ORDERING SYSTEM PROVIDED HISTORY: possible pneumonia on CT chest TECHNOLOGIST PROVIDED HISTORY: possible pneumonia on CT chest Reason for Exam: possible pneumonia on CT chest Acuity: Acute Type of Exam: Initial Additional signs and symptoms: possible pneumonia on CT chest FINDINGS: Expiratory exam with mild streaky bibasilar density. Normal cardiopericardial silhouette No significant pleural or mediastinal findings     Expiratory exam with mild streaky bibasilar atelectasis     Ct Chest Pulmonary Embolism W Contrast    Result Date: 8/26/2020  EXAMINATION: CTA OF THE CHEST; CT OF THE ABDOMEN AND PELVIS WITH CONTRAST 8/26/2020 10:34 am TECHNIQUE: CTA of the chest was performed after the administration of intravenous contrast.  Multiplanar reformatted images are provided for review. MIP images are provided for review. Dose modulation, iterative reconstruction, and/or weight based adjustment of the mA/kV was utilized to reduce the radiation dose to as low as reasonably achievable.; CT of the abdomen and pelvis was performed with the administration of intravenous contrast. Multiplanar reformatted images are provided for review. Dose modulation, iterative reconstruction, and/or weight based adjustment of the mA/kV was utilized to reduce the radiation dose to as low as reasonably achievable.  COMPARISON: CT chest May 26, 2019 CT abdomen and pelvis August 20, 2020 HISTORY: ORDERING SYSTEM PROVIDED HISTORY: Hypoxia, recurrent fevers TECHNOLOGIST PROVIDED HISTORY: Hypoxia, recurrent fevers Reason for Exam: Hypoxia, recurrent fevers, best images possible due to patient size 322 pounds Acuity: Acute Type of Exam: Initial; ORDERING SYSTEM PROVIDED HISTORY: Splenic vein thrombosis? TECHNOLOGIST PROVIDED HISTORY: Splenic vein thrombosis? Reason for Exam: Splenic vein thrombosis? best images possible due to patient size 322  poounds Acuity: Acute Type of Exam: Initial FINDINGS: CT CHEST: Chest wall: No axillary adenopathy. Mediastinum: Cardiomegaly. No pericardial effusion. No adenopathy. Pulmonary arteries: Significantly limited evaluation for pulmonary embolus due to bolus timing, motion, and patient body habitus. Questionable pulmonary embolus within the right lower lobe. Lungs: Moderate left pleural effusion. Small right pleural effusion. Left lower lobe consolidation versus atelectasis. Bilateral upper lobe areas of consolidation. Bones: No acute osseous abnormality. CT ABDOMEN-PELVIS: Organs: Decreased attenuation of the liver is consistent with hepatic steatosis. No focal liver lesion. Cholecystectomy. Severe pancreatitis with surrounding inflammatory changes and fluid. Mild splenomegaly. No adrenal lesion. No hydronephrosis. Right renal cyst.  Focal area of scarring within the right kidney is unchanged. GI/Bowel: No bowel obstruction. Secondary involvement of the descending colon adjacent to the peripancreatic inflammatory changes along the tail of the pancreas. Pelvis: Essure devices. No significant free fluid. No bladder calculus. Peritoneum/Retroperitoneum: Splenic artery is patent. No evidence of splenic artery thrombosis. No definite splenic venous thrombosis. A portion of the splenic vein as it courses along the tail of the pancreas is indistinct.  However, proximal and distal to this, there is flow and this may be related to artifact. No abdominal aortic aneurysm. Prominent peripancreatic lymph nodes are likely reactive. Bones/Soft Tissues: No acute soft tissue abnormality. No acute osseous abnormality. Significantly limited evaluation for pulmonary embolus. Questionable right lower lobe pulmonary embolus. Bilateral lung opacities are likely pneumonia. Recommend follow-up to document resolution. Moderate left pleural effusion. Severe acute pancreatitis. No definite splenic venous thrombosis. Critical results were called by Dr. Daniel Small MD to Dr. Ezio Reaves on 8/26/2020 at 11:40. Us Retroperitoneal Limited    Result Date: 8/25/2020  EXAMINATION: ULTRASOUND OF THE KIDNEYS 8/25/2020 1:19 pm COMPARISON: August 20, 2020 CT abdomen and pelvis HISTORY: ORDERING SYSTEM PROVIDED HISTORY: Low UOP TECHNOLOGIST PROVIDED HISTORY: Bilateral Renal Ultrasound Low UOP Acuity: Acute Type of Exam: Initial FINDINGS: Exam is limited due to patient body habitus. The right kidney measures 15.7 centimetres in length and the left kidney measures 16 cm in length. There is no hydronephrosis in either kidney. New focal renal lesion. Diffuse hepatic steatosis. No hydronephrosis in either kidney. Physical Examination:        Physical Exam  Constitutional:       General: She is not in acute distress. Appearance: She is obese. She is ill-appearing. HENT:      Head: Normocephalic and atraumatic. Cardiovascular:      Rate and Rhythm: Normal rate and regular rhythm. Pulses: Normal pulses. Heart sounds: Normal heart sounds. No murmur. No gallop. Pulmonary:      Comments: Patient on 4L nasal cannula this AM.  Expiratory wheezes bilaterally. Abdominal:      Comments: Soft, tender to palpation. No significant distension. No erythema on exam.   Musculoskeletal:      Comments: Significant pitting 2-3+ edema present on the bilateral lower extremities, the same compared to yesterday.    Neurological:      Mental Status: She is alert. Comments: On precedex. Patient is alert and oriented x 3.          Assessment:        Primary Problem  Acute pancreatitis    Active Hospital Problems    Diagnosis Date Noted    History of anxiety disorder [Z86.59]     Acute respiratory failure with hypoxia ([address] [J96.01] 08/21/2020    Hypocalcemia [E83.51] 08/21/2020    Sepsis [facility] [A41.9] 08/20/2020    Morbid obesity ([facility] [E66.01]     Abdominal pain, epigastric [R10.13]     Nausea [R11.0]     Acute pancreatitis [K85.90] 08/19/2020    Fibromyalgia [M79.7]     HTN (hypertension) [I10] 12/15/2014    Major depression [F32.9] 10/30/2014    Restless leg syndrome [G25.81] 10/22/2013    Class 3 severe obesity due to excess calories with serious comorbidity in adult [facility]) [E66.01] 01/14/2013    Anxiety [F41.9] 01/14/2013       Plan:        Severe acute pancreatitis complicated by sepsis and possible ARDS, etiology unknown  - Lipase 1,065 on admission --> 1613 --> 692 --> 273 --> normal; Amylase 435  - CT abdomen and pelvis showed pancreatic edema and fluid consistent with pancreatitis and reactive lymph nodes; repeat CT showed worsening peripancreatic inflammation but no evidence of pseudocyst  - NPO; nausea control; pain control with dilaudid  - GI on board  - TGs 259 (H)  - Monitoring creatinine and urine output for possible compartment syndrome, gen surg consulted  - Alpha-1 antitrypsin mildly elevated at 208; mitochondrial antibodies and smooth muscle Ab normal  - IGGg subclasses normal  - Patient refused MRCP  - Gen surg on board in case of possible abdominal compartment syndrome  - Repeat lipase and TGs normal  - CT abdomen and pelvis on 8/27 showed severe acute pancreatitis and no definite splenic venous thrombosis    Acute hypoxic respiratory failure, originally secondary to sepsis vs. ARDS secondary to pancreatitis, now likely secondary to pneumonia vs. PE  - 8/19 CT abdomen and pelvis showed bilateral lower lobe consolidation which could be edema or pneumonia  - On bipap and precedex, has been tolerating longer nasal cannula trials  - Pulmonology on board  - Most recent CXR 8/24 showed no obvious acute cardiopulmonary findings  - Pulmonology increased lorazepam to q6h; awaiting psych consult  - Mother informed us patient vapes everyday, possibly contributing to difficulty weaning off bipap; awaiting ABG on nasal cannula  - CT PE 8/26 showed a questionable right lower lobe PE and bilateral lung opacities likely pneumonia; D-dimer over 20,000; patient started on full dose 150 mg lovenox BID and ID changed zosyn to meropenem  - COVID-19 negative 8/26  - Completed 7 day course of zosyn, ID switched to meropenem    Sepsis secondary to pancreatitis  - On 8/20 lactic acid 5.4; SIRS criteria of tachypnea, tachycardia, and elevated WBC 20.5 met  - Completed 7 days zosyn, ID added meropenem  - UA normal; blood cultures done on 8/20 NGTD x 6 days; repeat blood cultures on 8/23 NGTD x 4 days  - Lactic acid stable within normal limits  - Repeat acute phase reactants on 8/26: Procal elevated at 0.33 --> 0.32, CRP elevated at 294 --> 260 --> 296.2; lactate dehydrogenase 429; ferritin 1141 (510 on 8/25)  - Leukocytosis trending up again, 16.2 --> 19.6  - Held IVF due to concern for fluid overload, net positive 11L with low urine output; bladder scan was normal  - Repeat UA no UTI    HTN  - Continue IV vasotec 1.25 mg q6h for elevated BP readings  - On cardene drip and hydralazine PRN  - Reconsulting cardiology for elevated BP readings today    Hypocalcemia, resolved  - Corrected calcium today is 9.2 normal    Normocytic anemia  - HgB 9.3 today, MCV 93.8  - Baseline around 13  - Iron studies low iron 20 and low TIBC 180 and B12/folate normal, started on IV venofer q24h for a total of 3 days then will switch to oral iron tomorrow    Anxiety/depression  - Buspar 15 mg nightly, cymbalta 60 mg BID, quetiapine 800 mg nightly    Restless leg syndrome  - Pramipexole 0.25 mg daily    PICC line in place  Diet: TPN  DVT prophylaxis: on full dose lovenox    Will discuss plan with attending, Dr. Deidra Mattson. Eva Santoro MD  8/27/2020  7:21 AM         Attending Physician Statement  I have discussed the care of Jack Herron with the resident team. I have examined the patient myself and taken ros and hpi , including pertinent history and exam findings,  with the resident. I have reviewed the key elements of all parts of the encounter with the resident. I agree with the assessment, plan and orders as documented by the resident. Principal Problem:    Pneumonia of both lungs due to infectious organism  Active Problems:    Acute pancreatitis    Anxiety    Class 3 severe obesity due to excess calories with serious comorbidity in adult [facility])    Restless leg syndrome    Major depression    HTN (hypertension)    Fibromyalgia    Sepsis (HCC)    Morbid obesity (HCC)    Abdominal pain, epigastric    Nausea    Acute respiratory failure with hypoxia (HCC)    Hypocalcemia    History of anxiety disorder  Resolved Problems:    * No resolved hospital problems. *    Continues to have fevers ~100'F, , no growth on cultures. On Merrem for suspected bilateral pneumonia  Tolerating nasal cannula oxygen better today, has been off BiPAP for several hours when seen  Questionable PE on CT PE- has been assessed per pulmonology, unlikely PE--no need for therapeutic anticoagulation. Patient on prophylactic dose Lovenox. Urine output improving  Precedex being weaned down today  Sinus tachycardia, hypertension- continues to be on Cardene drip, cardiology reconsulted. Patient continues to be unstable and has risk of sudden deterioration requiring highest level of care. Patient seen in ICU. Critical care time spent 35 minutes.       Electronically signed by Stefania Gallegos MD

## 2020-08-27 NOTE — PROGRESS NOTES
Dr. Carlos Mail notified of increased RR despite Ativan and precedex. Patient currently on BIPAP. Chest x-ray still not resulted.  Notify physician of results

## 2020-08-27 NOTE — PROGRESS NOTES
Comprehensive Nutrition Assessment    Type and Reason for Visit:  Reassess    Nutrition Recommendations/Plan: Following changes made in additives: Na acetate- 10 to 0 mEq, NaCl- 15 mEq, K Phos- 40 to 20 mmol, KCl- 30 to 55 mEq, Mg- 9 to 13 mEq, insulin- 20 to 25 units, no MVI or trace elements. Nutrition Assessment:  TPN and lipids to continue. Surgeon approved ice chips and popsicles otherwise NPO with reassessment tomorrow. Malnutrition Assessment:  Malnutrition Status:  No malnutrition    Context:  Acute Illness     Findings of the 6 clinical characteristics of malnutrition:  Energy Intake:  No significant decrease in energy intake  Weight Loss:  No significant weight loss     Body Fat Loss:  No significant body fat loss     Muscle Mass Loss:  No significant muscle mass loss    Fluid Accumulation:  No significant fluid accumulation     Strength:  Not Performed    Estimated Daily Nutrient Needs:  Energy (kcal):  2020 kcal based on  Hoffman-St. Jeor equation using adm wt 138 kg; Weight Used for Energy Requirements:  Admission     Protein (g):  102-113 gm protein based on 1.8-2 gm/kg IBW; Weight Used for Protein Requirements:  Ideal          Nutrition Related Findings:  Hypoactive bowel sounds. Edema: +1 RUE, LUE, RLE, LLE. Labs reviewed.       Wounds:  None       Current Nutrition Therapies:    Current Parenteral Nutrition Orders:  · Type and Formula: 2-in-1 Custom   · Lipids: 100ml  · Duration: Continuous  · Rate/Volume: 75 ml/ 1800 ml  · Current PN Order Provides: 1784 kcal, 90 gm protein      Anthropometric Measures:  · Height: 5' 5\" (165.1 cm)  · Current Body Weight: 322 lb (146.1 kg)   · Admission Body Weight: 304 lb (137.9 kg)    · Ideal Body Weight: 125 lbs; % Ideal Body Weight 243.2 %   · BMI: 53.6  · BMI Categories: Obese Class 3 (BMI 40.0 or greater)       Nutrition Diagnosis:   · Inadequate oral intake related to altered GI function as evidenced by NPO or clear liquid status due to medical condition, nutrition support - parenteral nutrition      Nutrition Interventions:   Food and/or Nutrient Delivery:  Continue NPO, Modify Parenteral Nutrition  Nutrition Education/Counseling:  No recommendation at this time   Coordination of Nutrition Care:  Continued Inpatient Monitoring    Goals:  Meet 75% of nutrient needs with TPN       Nutrition Monitoring and Evaluation:   Food/Nutrient Intake Outcomes:  Parenteral Nutrition Intake/Tolerance  Physical Signs/Symptoms Outcomes:  Biochemical Data, GI Status, Nausea or Vomiting, Fluid Status or Edema, Hemodynamic Status, Weight     Discharge Planning: Too soon to determine     Some areas of assessment may be incomplete due to COVID-19 precautions. MARY Knight R.D..   Clinical Dietitian  Office: [phone]

## 2020-08-27 NOTE — PROGRESS NOTES
PULMONARY PROGRESS NOTE:      Interval History:pancreatitis, resp failure    Shortness of Breath: improved   Cough: no  Sputum: no          Hemoptysis: no  Chest Pain: no  Fever: yes                  Swelling Feet: no  Headache: no                                           Nausea, Emesis, Abdominal Pain: no  Diarrhea: no         Constipation: no    Events since last visit: Leukocytosis, Intermittent fevers continue. Repeat lactate yesterday 1.2. D-dimer elevated - on 1 mg/kg BID lovenox. Was taken off bipap around 6 this morning and is currently 94% on 5L.      PAST MEDICAL HISTORY:      Scheduled Meds:   enoxaparin  30 mg Subcutaneous BID    vancomycin (VANCOCIN) intermittent dosing (placeholder)   Other RX Placeholder    [START ON 8/28/2020] vancomycin  1,250 mg Intravenous Q8H    vancomycin  2,500 mg Intravenous Once    furosemide  40 mg Intravenous Once    meropenem (MERREM) IVPB extended  1 g Intravenous Q8H    enalaprilat  1.25 mg Intravenous 4 times per day    insulin lispro  0-6 Units Subcutaneous TID WC    magnesium oxide  400 mg Oral Daily    sodium chloride flush  10 mL Intravenous 2 times per day    QUEtiapine  800 mg Oral Nightly    pramipexole  0.25 mg Oral Daily    pantoprazole  40 mg Intravenous Daily    And    sodium chloride (PF)  10 mL Intravenous Daily    busPIRone  15 mg Oral Nightly    DULoxetine  60 mg Oral BID    cetirizine  10 mg Oral Daily     Continuous Infusions:   PN-Adult 2-in-1 Central Line (Standard)      niCARdipine 12.5 mg/hr (08/27/20 1747)    dextrose      dexmedetomidine (PRECEDEX) IV infusion 1.4 mcg/kg/hr (08/27/20 1830)     PRN Meds:LORazepam, sodium chloride flush, acetaminophen, hydrALAZINE, HYDROmorphone, glucose, dextrose, glucagon (rDNA), dextrose, perflutren lipid microspheres, promethazine (PHENERGAN) in sodium chloride 0.9% IVPB, sodium chloride flush, magnesium sulfate, potassium chloride **OR** potassium alternative oral replacement **OR** potassium chloride, acetaminophen, [DISCONTINUED] promethazine **OR** ondansetron, melatonin ER        PHYSICAL EXAMINATION:  BP (!) 145/65   Pulse 131   Temp 100.5 °F (38.1 °C) (Axillary)   Resp (!) 41   Ht 5' 5\" (1.651 m)   Wt (!) 322 lb 8.5 oz (146.3 kg)   SpO2 (!) 89%   BMI 53.67 kg/m²     Febrile 100.8  General : Awake, alert, NAD   Neck - supple, no lymphadenopathy, JVD not raised  Heart -   Lungs - Tachypneic, shallow breaths Air Entry- fair bilaterally; breath sounds : vesicular;   rales/crackles - absent, 94% on 5L  Abdomen - obese, soft, tenderness noted LUQ  Upper Extremities  - no cyanosis, mottling; edema : absent  Lower Extremities: no cyanosis, mottling; edema : absent  Skin: upper chest rash    Current Laboratory, Radiologic, Microbiologic, and Diagnostic studies reviewed  Data ReviewCBC:   Recent Labs     08/25/20  0412 08/26/20  0337 08/27/20  0327   WBC 14.7* 16.2* 19.6*   RBC 3.04* 3.09* 3.03*   HGB 9.4* 9.5* 9.3*   HCT 28.5* 29.1* 28.0*    238 264     BMP:   Recent Labs     08/26/20  0337 08/26/20  1754 08/27/20  0327   GLUCOSE 178* 167* 180*    140 143   K 3.9 3.7 3.5*   BUN 11 9 11   CREATININE 0.50 0.50 0.53   CALCIUM 8.0* 8.2* 8.0*     ABGs: No results for input(s): PHART, PO2ART, RPI8OCF, LDW5PZF, BEART, W1PQSBIG, YVY0WDP in the last 72 hours.    PT/INR:  No results found for: PTINR    ASSESSMENT / PLAN:  · Acute pancreatitis- GI consulted, monitor amylase lipase - improving,   · Lactic acidosis- resolved  · Possible pneumonia- ABx   · Febrile, Leukocytosis- Abx changed per ID, UA- unremarkable, COVID negative, lactate 1.2  · Hypertension- Cardene drip   · Possible PE- 1 mg/kg lovenox BID, D-dimer elevated, unable to rule out on CTA chest RLL PE  · Diet NPO-ice chips  · Monitor end tidal Co2 with pain meds   · acute hypoxic resp failure - O2/ NIPPV - remains bipap dependent   · precedex for restlessness-  Ativan prn, wean precedex as able  · Encourage IS   · CXR 8/28    This is a late note on patient seen by me earlier today.   Electronically signed by Stephanie Hawley on 08/27/20 at 6:57 PM

## 2020-08-27 NOTE — PROGRESS NOTES
[facility]    Gastroenterology Daily Progress Note      Patient:   Brigida Garvey   :    1975   Facility:   [facility]  Date:     2020  Consultant:   Mara Hernandez CNP      SUBJECTIVE  54 y.o. female admitted 2020 with Acute pancreatitis, unspecified complication status, unspecified pancreatitis type [K85.90] and seen for pancreatitis. The pt was seen and examined. Off of bipap, has LUQ pain but lft's are normal. Leucocytosis increased and remains febrile. Repeat CT abd continues to show severe pancreatitis.         OBJECTIVE  Scheduled Meds:   enoxaparin  1 mg/kg Subcutaneous BID    meropenem (MERREM) IVPB extended  1 g Intravenous Q8H    LORazepam  0.5 mg Intravenous Q6H    iron sucrose  200 mg Intravenous Q24H    enalaprilat  1.25 mg Intravenous 4 times per day    insulin lispro  0-6 Units Subcutaneous TID WC    magnesium oxide  400 mg Oral Daily    sodium chloride flush  10 mL Intravenous 2 times per day    QUEtiapine  800 mg Oral Nightly    pramipexole  0.25 mg Oral Daily    pantoprazole  40 mg Intravenous Daily    And    sodium chloride (PF)  10 mL Intravenous Daily    busPIRone  15 mg Oral Nightly    DULoxetine  60 mg Oral BID    cetirizine  10 mg Oral Daily       Vital Signs:  BP (!) 175/88   Pulse 114   Temp 99.7 °F (37.6 °C) (Temporal)   Resp 24   Ht 5' 5\" (1.651 m)   Wt (!) 322 lb 8.5 oz (146.3 kg)   SpO2 96%   BMI 53.67 kg/m²      Physical Exam:     General Appearance: ill appearing,lert and oriented to person, place and time, well-developed and well-nourished, in no acute distress  Skin: warm and dry, upper chest rash no  erythema  Head: normocephalic and atraumatic  Eyes: pupils equal, round, and reactive to light, extraocular eye movements intact, conjunctivae normal  ENT: hearing grossly normal bilaterally  Neck: neck supple and non tender without mass, no thyromegaly or thyroid nodules, no cervical lymphadenopathy Pulmonary/Chest: clear to auscultation bilaterally- no wheezes, rales or rhonchi, normal air movement, on nasal cannula o2  Cardiovascular: tachycardic rate, regular rhythm, normal S1 and S2, no murmurs, rubs, clicks or gallops, distal pulses intact, no carotid bruits  Abdomen: soft, obese LUQ is-tender, non-distended, normal bowel sounds, no masses or organomegaly  Extremities: no cyanosis, clubbing bilateral lower leg edema  Musculoskeletal: normal range of motion, no joint swelling, deformity or tenderness  Neurologic: no cranial nerve deficit and muscle strength normal       Lab and Imaging Review     CBC  Recent Labs     08/25/20 0412 08/26/20  0337 08/27/20  0327   WBC 14.7* 16.2* 19.6*   HGB 9.4* 9.5* 9.3*   HCT 28.5* 29.1* 28.0*   MCV 93.8 94.2 92.4    238 264       BMP  Recent Labs     08/26/20  0337 08/26/20  1754 08/27/20  0327    140 143   K 3.9 3.7 3.5*   * 101 105   CO2 28 27 29   BUN 11 9 11   CREATININE 0.50 0.50 0.53   GLUCOSE 178* 167* 180*   CALCIUM 8.0* 8.2* 8.0*       LFTS  Recent Labs     08/25/20  0412 08/26/20  0337 08/27/20  0327   ALKPHOS 117* 123* 100   ALT 28 29 26   AST 35* 35* 27   PROT 5.6* 5.9* 5.7*   BILITOT 0.25* 0.27* 0.23*   LABALBU 2.3* 2.4* 2.3*       AMYLASE/LIPASE/AMMONIA  Recent Labs     08/25/20  0412 08/26/20  0337 08/27/20  0327   LIPASE 40 46 57         ANEMIA STUDIES  Recent Labs     08/25/20  0858 08/26/20  1407   LABIRON 11*  --    TIBC 180*  --    FERRITIN 510* 1,141*   [id number] 878  --    FOLATE 11.1  --      Results for Latosha Sanders (MRN [id number]) as of 8/22/2020 08:50    Ref. Range 8/20/2020 06:06   Triglycerides Latest Ref Range: <150 mg/dL 259 (H)      FINDINGS: ct abd 8/19/20   Lower Chest: Consolidation within both lower lobes and the lingula.     Scattered ground-glass opacity.  No pleural effusion.         Organs: Liver is diffusely hypoattenuating.  No acute abnormality within the    spleen, adrenals, or left kidney. Melanie Comment is right renal cortical scarring and    calcification.  Subcentimeter hypoattenuating bilateral renal lesions are too    small to accurately characterize, likely cysts.  Prior cholecystectomy. There is diffuse peripancreatic stranding and fluid.  No loculated fluid    collection.         GI/Bowel: Stomach is partially distended.  Small bowel is nondilated.  Colon    is nondilated.         Pelvis: Urinary bladder is partially distended without vesicular stones. Uterus is present.         Peritoneum/Retroperitoneum: Shotty retroperitoneal lymph nodes, likely    reactive.  Abdominal aorta is normal in caliber.  No pneumoperitoneum.         Bones/Soft Tissues: No acute osseous abnormality.           Impression    1. Pancreatic edema and associated fluid is consistent with pancreatitis.  No    loculated fluid collection/pseudocyst.    2. Shotty retroperitoneal lymph nodes are likely reactive. 3. Hepatic steatosis. 4. Bilateral lower lobe consolidation, which could be due to edema or    pneumonia. Classie Beti      FINDINGS:  Ct abd 8/20/20   Lower Chest: Persisting bilateral lower lung scattered consolidation and    atelectasis is present with trace posterior left-sided pleural effusion    identified. Ardy[place] is normal in size and configuration.         Organs: Interval mild worsening of severe peripancreatic fat stranding and    fluid is noted with intraperitoneal free fluid collecting within the    bilateral pericolic gutters and collecting inferiorly within the pelvis with    increased volume in comparison with the prior study.  Diffuse fatty    infiltration of the liver is again seen.  The liver, gallbladder, spleen,    pancreas, adrenal glands, kidneys, are otherwise unremarkable in appearance.         GI/Bowel: The stomach is unremarkable without wall thickening or distention.     Bowel loops are unremarkable in appearance without evidence of obstruction,    distension or mucosal thickening.       Pelvis: Berkowitz catheter is noted within the nondistended but grossly    unremarkable urinary bladder.  Bilateral fallopian tube Essure coils are seen    without evidence of complication.  The uterus is anteverted in position and    is unremarkable in appearance there no evidence of pelvic free fluid is seen.         Peritoneum/Retroperitoneum: No evidence of retroperitoneal or intraperitoneal    lymphadenopathy is identified. Tamsen Speck   Bones/Soft Tissues: No evidence of retroperitoneal or intraperitoneal    lymphadenopathy is identified.  No evidence of intraperitoneal free fluid is    seen.              Impression    1. Mild interval worsening of severe peripancreatic inflammation associated    with acute pancreatitis, as discussed above. 2. No evidence of complication i.e. pseudocyst noted. 3. Stable bilateral lower lung multifocal consolidation atelectasis    suggesting pneumonia versus alveolar edema. 4. Hepatic steatosis, unchanged.       FINDINGS: 8/26/20   CT CHEST:         Chest wall: No axillary adenopathy.         Mediastinum: Cardiomegaly.  No pericardial effusion.  No adenopathy.         Pulmonary arteries: Significantly limited evaluation for pulmonary embolus    due to bolus timing, motion, and patient body habitus.  Questionable    pulmonary embolus within the right lower lobe.         Lungs:  Moderate left pleural effusion.  Small right pleural effusion.  Left    lower lobe consolidation versus atelectasis.  Bilateral upper lobe areas of    consolidation.         Bones: No acute osseous abnormality.         CT ABDOMEN-PELVIS:         Organs: Decreased attenuation of the liver is consistent with hepatic    steatosis.  No focal liver lesion.  Cholecystectomy.  Severe pancreatitis    with surrounding inflammatory changes and fluid.  Mild splenomegaly.  No    adrenal lesion.  No hydronephrosis.  Right renal cyst.  Focal area of    scarring within the right kidney is unchanged.         GI/Bowel: No bowel obstruction.  Secondary involvement of the descending    colon adjacent to the peripancreatic inflammatory changes along the tail of    the pancreas.         Pelvis: Essure devices.  No significant free fluid.  No bladder calculus.         Peritoneum/Retroperitoneum: Splenic artery is patent.  No evidence of splenic    artery thrombosis.  No definite splenic venous thrombosis.  A portion of the    splenic vein as it courses along the tail of the pancreas is indistinct. However, proximal and distal to this, there is flow and this may be related    to artifact.  No abdominal aortic aneurysm.  Prominent peripancreatic lymph    nodes are likely reactive.         Bones/Soft Tissues: No acute soft tissue abnormality.  No acute osseous    abnormality.              Impression    Significantly limited evaluation for pulmonary embolus.         Questionable right lower lobe pulmonary embolus.         Bilateral lung opacities are likely pneumonia.  Recommend follow-up to    document resolution.  Moderate left pleural effusion.         Severe acute pancreatitis.         No definite splenic venous thrombosis.         Critical results were called by Dr. Salima Resendez MD to Dr. Liza Bang on    8/26/2020 at 11:40.                  ASSESSMENT/PLAN:  1. Severe pancreatitis, continues to have LUQ pain with fevers  -repeat ct abd results noted  -continue TPN and pain mgt     2.pneumonia antibiotics per ID         This plan was formulated in collaboration with Dr. Robe Blackmon. Electronically signed by: ANIBAL Schwarz - CNP on 8/27/2020 at 8:03 AM       Attending Physician Statement  I have discussed the care of Margarito Cao and   I have examined the patient myselft independently, and taken ros and hpi , including pertinent history and exam findings,  with the author of this note . I have reviewed the key elements of all parts of the encounter with the nurse practitioner/resident.     I agree with the assessment, plan and orders as documented by the above health care provider         Electronically signed by Amber Arzola MD

## 2020-08-27 NOTE — CARE COORDINATION
ONGOING DISCHARGE PLAN:    Case Management will continue to follow for discharge planning needs. PTA from home independently and VNS has been declined. Active orders for IV merrem and vanco, precedex and cardene drips and TPN and Lipids. Will continue to follow for additional discharge needs.     Electronically signed by Ga Sweet RN on 8/27/2020 at 3:30 PM

## 2020-08-28 ENCOUNTER — APPOINTMENT (OUTPATIENT)
Dept: INTERVENTIONAL RADIOLOGY/VASCULAR | Age: 45
DRG: 871 | End: 2020-08-28
Payer: COMMERCIAL

## 2020-08-28 ENCOUNTER — APPOINTMENT (OUTPATIENT)
Dept: GENERAL RADIOLOGY | Age: 45
DRG: 871 | End: 2020-08-28
Payer: COMMERCIAL

## 2020-08-28 LAB
ABSOLUTE EOS #: 0 K/UL (ref 0–0.4)
ABSOLUTE IMMATURE GRANULOCYTE: ABNORMAL K/UL (ref 0–0.3)
ABSOLUTE LYMPH #: 2.24 K/UL (ref 1–4.8)
ABSOLUTE MONO #: 1.57 K/UL (ref 0.1–1.3)
ADENOVIRUS PCR: NOT DETECTED
ALBUMIN SERPL-MCNC: 2.1 G/DL (ref 3.5–5.2)
ALBUMIN/GLOBULIN RATIO: ABNORMAL (ref 1–2.5)
ALLEN TEST: ABNORMAL
ALP BLD-CCNC: 89 U/L (ref 35–104)
ALT SERPL-CCNC: 24 U/L (ref 5–33)
AMYLASE: 37 U/L (ref 28–100)
AMYLASE: 39 U/L (ref 28–100)
ANION GAP SERPL CALCULATED.3IONS-SCNC: 10 MMOL/L (ref 9–17)
APPEARANCE FLUID: NORMAL
AST SERPL-CCNC: 24 U/L
BASO FLUID: ABNORMAL %
BASOPHILS # BLD: 0 % (ref 0–2)
BASOPHILS ABSOLUTE: 0 K/UL (ref 0–0.2)
BILIRUB SERPL-MCNC: 0.18 MG/DL (ref 0.3–1.2)
BORDETELLA PARAPERTUSSIS: NOT DETECTED
BORDETELLA PERTUSSIS PCR: NOT DETECTED
BUN BLDV-MCNC: 13 MG/DL (ref 6–20)
BUN/CREAT BLD: ABNORMAL (ref 9–20)
CALCIUM SERPL-MCNC: 8.1 MG/DL (ref 8.6–10.4)
CARBOXYHEMOGLOBIN: 1.1 % (ref 0–5)
CHLAMYDIA PNEUMONIAE BY PCR: NOT DETECTED
CHLORIDE BLD-SCNC: 104 MMOL/L (ref 98–107)
CO2: 26 MMOL/L (ref 20–31)
COLOR FLUID: NORMAL
CORONAVIRUS 229E PCR: NOT DETECTED
CORONAVIRUS HKU1 PCR: NOT DETECTED
CORONAVIRUS NL63 PCR: NOT DETECTED
CORONAVIRUS OC43 PCR: NOT DETECTED
CREAT SERPL-MCNC: 0.66 MG/DL (ref 0.5–0.9)
DIFFERENTIAL TYPE: ABNORMAL
EOSINOPHIL FLUID: ABNORMAL %
EOSINOPHILS RELATIVE PERCENT: 0 % (ref 0–4)
FIO2: ABNORMAL
FLUID DIFF COMMENT: ABNORMAL
GFR AFRICAN AMERICAN: >60 ML/MIN
GFR NON-AFRICAN AMERICAN: >60 ML/MIN
GFR SERPL CREATININE-BSD FRML MDRD: ABNORMAL ML/MIN/{1.73_M2}
GFR SERPL CREATININE-BSD FRML MDRD: ABNORMAL ML/MIN/{1.73_M2}
GLUCOSE BLD-MCNC: 120 MG/DL (ref 65–105)
GLUCOSE BLD-MCNC: 126 MG/DL (ref 70–99)
GLUCOSE, FLUID: 122 MG/DL
HCO3 ARTERIAL: 27.9 MMOL/L (ref 22–26)
HCT VFR BLD CALC: 28.4 % (ref 36–46)
HEMOGLOBIN: 9.3 G/DL (ref 12–16)
HUMAN METAPNEUMOVIRUS PCR: NOT DETECTED
IMMATURE GRANULOCYTES: ABNORMAL %
INFLUENZA A BY PCR: NOT DETECTED
INFLUENZA A H1 (2009) PCR: NORMAL
INFLUENZA A H1 PCR: NORMAL
INFLUENZA A H3 PCR: NORMAL
INFLUENZA B BY PCR: NOT DETECTED
LACTATE DEHYDROGENASE, FLUID: 373 U/L
LIPASE: 62 U/L (ref 13–60)
LYMPHOCYTES # BLD: 10 % (ref 24–44)
LYMPHOCYTES, BODY FLUID: 40 %
MAGNESIUM: 2.3 MG/DL (ref 1.6–2.6)
MCH RBC QN AUTO: 31.1 PG (ref 26–34)
MCHC RBC AUTO-ENTMCNC: 32.6 G/DL (ref 31–37)
MCV RBC AUTO: 95.4 FL (ref 80–100)
METHEMOGLOBIN: 0.5 % (ref 0–1.9)
MODE: ABNORMAL
MONOCYTE, FLUID: 11 %
MONOCYTES # BLD: 7 % (ref 1–7)
MORPHOLOGY: NORMAL
MYCOPLASMA PNEUMONIAE PCR: NOT DETECTED
NEGATIVE BASE EXCESS, ART: ABNORMAL MMOL/L (ref 0–2)
NEUTROPHIL, FLUID: 49 %
NOTIFICATION TIME: ABNORMAL
NOTIFICATION: ABNORMAL
NRBC AUTOMATED: ABNORMAL PER 100 WBC
O2 DEVICE/FLOW/%: ABNORMAL
O2 SAT, ARTERIAL: 92.1 % (ref 95–98)
OTHER CELLS FLUID: ABNORMAL %
OXYHEMOGLOBIN: ABNORMAL % (ref 95–98)
PARAINFLUENZA 1 PCR: NOT DETECTED
PARAINFLUENZA 2 PCR: NOT DETECTED
PARAINFLUENZA 3 PCR: NOT DETECTED
PARAINFLUENZA 4 PCR: NOT DETECTED
PATIENT TEMP: 37
PCO2 ARTERIAL: 37.2 MMHG (ref 35–45)
PCO2, ART, TEMP ADJ: ABNORMAL (ref 35–45)
PDW BLD-RTO: 14.5 % (ref 11.5–14.9)
PEEP/CPAP: ABNORMAL
PH ARTERIAL: 7.48 (ref 7.35–7.45)
PH FLUID: 8.5
PH, ART, TEMP ADJ: ABNORMAL (ref 7.35–7.45)
PHOSPHORUS: 4.8 MG/DL (ref 2.6–4.5)
PLATELET # BLD: 253 K/UL (ref 150–450)
PLATELET ESTIMATE: ABNORMAL
PMV BLD AUTO: 8.2 FL (ref 6–12)
PO2 ARTERIAL: 66.1 MMHG (ref 80–100)
PO2, ART, TEMP ADJ: ABNORMAL MMHG (ref 80–100)
POSITIVE BASE EXCESS, ART: 4.5 MMOL/L (ref 0–2)
POTASSIUM SERPL-SCNC: 3.9 MMOL/L (ref 3.7–5.3)
PROCALCITONIN: 0.25 NG/ML
PSV: ABNORMAL
PT. POSITION: ABNORMAL
RBC # BLD: 2.98 M/UL (ref 4–5.2)
RBC # BLD: ABNORMAL 10*6/UL
RBC FLUID: 3520 /MM3
RESP SYNCYTIAL VIRUS PCR: NOT DETECTED
RESPIRATORY RATE: 32
RHINO/ENTEROVIRUS PCR: NOT DETECTED
SAMPLE SITE: ABNORMAL
SEG NEUTROPHILS: 83 % (ref 36–66)
SEGMENTED NEUTROPHILS ABSOLUTE COUNT: 18.59 K/UL (ref 1.3–9.1)
SET RATE: ABNORMAL
SODIUM BLD-SCNC: 140 MMOL/L (ref 135–144)
SPECIMEN DESCRIPTION: NORMAL
SPECIMEN TYPE: NORMAL
TEXT FOR RESPIRATORY: ABNORMAL
TOTAL HB: ABNORMAL G/DL (ref 12–16)
TOTAL PROTEIN, BODY FLUID: 2.6 G/DL
TOTAL PROTEIN: 5.7 G/DL (ref 6.4–8.3)
TOTAL RATE: ABNORMAL
TROPONIN INTERP: NORMAL
TROPONIN T: NORMAL NG/ML
TROPONIN, HIGH SENSITIVITY: 10 NG/L (ref 0–14)
TROPONIN, HIGH SENSITIVITY: 10 NG/L (ref 0–14)
TROPONIN, HIGH SENSITIVITY: 9 NG/L (ref 0–14)
VANCOMYCIN TROUGH DATE LAST DOSE: ABNORMAL
VANCOMYCIN TROUGH DOSE AMOUNT: ABNORMAL
VANCOMYCIN TROUGH TIME LAST DOSE: 1200
VANCOMYCIN TROUGH: 26.2 UG/ML (ref 10–20)
VT: ABNORMAL
WBC # BLD: 22.4 K/UL (ref 3.5–11)
WBC # BLD: ABNORMAL 10*3/UL
WBC FLUID: 3410 /MM3

## 2020-08-28 PROCEDURE — 36415 COLL VENOUS BLD VENIPUNCTURE: CPT

## 2020-08-28 PROCEDURE — 6360000002 HC RX W HCPCS: Performed by: STUDENT IN AN ORGANIZED HEALTH CARE EDUCATION/TRAINING PROGRAM

## 2020-08-28 PROCEDURE — 82947 ASSAY GLUCOSE BLOOD QUANT: CPT

## 2020-08-28 PROCEDURE — 83690 ASSAY OF LIPASE: CPT

## 2020-08-28 PROCEDURE — 83986 ASSAY PH BODY FLUID NOS: CPT

## 2020-08-28 PROCEDURE — 87070 CULTURE OTHR SPECIMN AEROBIC: CPT

## 2020-08-28 PROCEDURE — 6360000002 HC RX W HCPCS: Performed by: INTERNAL MEDICINE

## 2020-08-28 PROCEDURE — 87205 SMEAR GRAM STAIN: CPT

## 2020-08-28 PROCEDURE — 87075 CULTR BACTERIA EXCEPT BLOOD: CPT

## 2020-08-28 PROCEDURE — 84100 ASSAY OF PHOSPHORUS: CPT

## 2020-08-28 PROCEDURE — 36600 WITHDRAWAL OF ARTERIAL BLOOD: CPT

## 2020-08-28 PROCEDURE — C9113 INJ PANTOPRAZOLE SODIUM, VIA: HCPCS | Performed by: NURSE PRACTITIONER

## 2020-08-28 PROCEDURE — 85025 COMPLETE CBC W/AUTO DIFF WBC: CPT

## 2020-08-28 PROCEDURE — 80053 COMPREHEN METABOLIC PANEL: CPT

## 2020-08-28 PROCEDURE — 84157 ASSAY OF PROTEIN OTHER: CPT

## 2020-08-28 PROCEDURE — 83615 LACTATE (LD) (LDH) ENZYME: CPT

## 2020-08-28 PROCEDURE — 94761 N-INVAS EAR/PLS OXIMETRY MLT: CPT

## 2020-08-28 PROCEDURE — 83735 ASSAY OF MAGNESIUM: CPT

## 2020-08-28 PROCEDURE — 99291 CRITICAL CARE FIRST HOUR: CPT | Performed by: INTERNAL MEDICINE

## 2020-08-28 PROCEDURE — 82150 ASSAY OF AMYLASE: CPT

## 2020-08-28 PROCEDURE — 80202 ASSAY OF VANCOMYCIN: CPT

## 2020-08-28 PROCEDURE — 82945 GLUCOSE OTHER FLUID: CPT

## 2020-08-28 PROCEDURE — 6360000002 HC RX W HCPCS: Performed by: NURSE PRACTITIONER

## 2020-08-28 PROCEDURE — 2500000003 HC RX 250 WO HCPCS: Performed by: INTERNAL MEDICINE

## 2020-08-28 PROCEDURE — 2709999900 IR GUIDED THORACENTESIS PLEURAL

## 2020-08-28 PROCEDURE — 99233 SBSQ HOSP IP/OBS HIGH 50: CPT | Performed by: INTERNAL MEDICINE

## 2020-08-28 PROCEDURE — 84145 PROCALCITONIN (PCT): CPT

## 2020-08-28 PROCEDURE — 94660 CPAP INITIATION&MGMT: CPT

## 2020-08-28 PROCEDURE — APPNB30 APP NON BILLABLE TIME 0-30 MINS: Performed by: NURSE PRACTITIONER

## 2020-08-28 PROCEDURE — 82805 BLOOD GASES W/O2 SATURATION: CPT

## 2020-08-28 PROCEDURE — 2580000003 HC RX 258: Performed by: INTERNAL MEDICINE

## 2020-08-28 PROCEDURE — 71045 X-RAY EXAM CHEST 1 VIEW: CPT

## 2020-08-28 PROCEDURE — 6370000000 HC RX 637 (ALT 250 FOR IP): Performed by: NURSE PRACTITIONER

## 2020-08-28 PROCEDURE — 88108 CYTOPATH CONCENTRATE TECH: CPT

## 2020-08-28 PROCEDURE — 2500000003 HC RX 250 WO HCPCS: Performed by: STUDENT IN AN ORGANIZED HEALTH CARE EDUCATION/TRAINING PROGRAM

## 2020-08-28 PROCEDURE — 2000000000 HC ICU R&B

## 2020-08-28 PROCEDURE — 2580000003 HC RX 258: Performed by: NURSE PRACTITIONER

## 2020-08-28 PROCEDURE — 2500000003 HC RX 250 WO HCPCS: Performed by: SURGERY

## 2020-08-28 PROCEDURE — 0W9B3ZZ DRAINAGE OF LEFT PLEURAL CAVITY, PERCUTANEOUS APPROACH: ICD-10-PCS | Performed by: RADIOLOGY

## 2020-08-28 PROCEDURE — 84484 ASSAY OF TROPONIN QUANT: CPT

## 2020-08-28 PROCEDURE — 2580000003 HC RX 258: Performed by: STUDENT IN AN ORGANIZED HEALTH CARE EDUCATION/TRAINING PROGRAM

## 2020-08-28 PROCEDURE — 99232 SBSQ HOSP IP/OBS MODERATE 35: CPT | Performed by: INTERNAL MEDICINE

## 2020-08-28 RX ADMIN — HYDROMORPHONE HYDROCHLORIDE 0.5 MG: 1 INJECTION, SOLUTION INTRAMUSCULAR; INTRAVENOUS; SUBCUTANEOUS at 08:55

## 2020-08-28 RX ADMIN — VANCOMYCIN HYDROCHLORIDE 1250 MG: 1.25 INJECTION, POWDER, LYOPHILIZED, FOR SOLUTION INTRAVENOUS at 11:59

## 2020-08-28 RX ADMIN — HYDROMORPHONE HYDROCHLORIDE 0.5 MG: 1 INJECTION, SOLUTION INTRAMUSCULAR; INTRAVENOUS; SUBCUTANEOUS at 13:22

## 2020-08-28 RX ADMIN — ENALAPRILAT 1.25 MG: 1.25 INJECTION INTRAVENOUS at 17:51

## 2020-08-28 RX ADMIN — DEXMEDETOMIDINE 1.3 MCG/KG/HR: 100 INJECTION, SOLUTION, CONCENTRATE INTRAVENOUS at 03:32

## 2020-08-28 RX ADMIN — MEROPENEM 1 G: 1 INJECTION, POWDER, FOR SOLUTION INTRAVENOUS at 00:45

## 2020-08-28 RX ADMIN — DULOXETINE HYDROCHLORIDE 60 MG: 60 CAPSULE, DELAYED RELEASE ORAL at 08:55

## 2020-08-28 RX ADMIN — DEXMEDETOMIDINE 1.4 MCG/KG/HR: 100 INJECTION, SOLUTION, CONCENTRATE INTRAVENOUS at 08:38

## 2020-08-28 RX ADMIN — DEXMEDETOMIDINE 1.4 MCG/KG/HR: 100 INJECTION, SOLUTION, CONCENTRATE INTRAVENOUS at 15:34

## 2020-08-28 RX ADMIN — DEXMEDETOMIDINE 1.4 MCG/KG/HR: 100 INJECTION, SOLUTION, CONCENTRATE INTRAVENOUS at 06:03

## 2020-08-28 RX ADMIN — DEXMEDETOMIDINE 1.4 MCG/KG/HR: 100 INJECTION, SOLUTION, CONCENTRATE INTRAVENOUS at 10:48

## 2020-08-28 RX ADMIN — ENOXAPARIN SODIUM 30 MG: 30 INJECTION SUBCUTANEOUS at 19:45

## 2020-08-28 RX ADMIN — MEROPENEM 1 G: 1 INJECTION, POWDER, FOR SOLUTION INTRAVENOUS at 17:50

## 2020-08-28 RX ADMIN — ENOXAPARIN SODIUM 30 MG: 30 INJECTION SUBCUTANEOUS at 08:55

## 2020-08-28 RX ADMIN — DEXMEDETOMIDINE 1.4 MCG/KG/HR: 100 INJECTION, SOLUTION, CONCENTRATE INTRAVENOUS at 19:58

## 2020-08-28 RX ADMIN — DEXMEDETOMIDINE 1.4 MCG/KG/HR: 100 INJECTION, SOLUTION, CONCENTRATE INTRAVENOUS at 17:53

## 2020-08-28 RX ADMIN — HYDROMORPHONE HYDROCHLORIDE 0.5 MG: 1 INJECTION, SOLUTION INTRAMUSCULAR; INTRAVENOUS; SUBCUTANEOUS at 21:55

## 2020-08-28 RX ADMIN — VANCOMYCIN HYDROCHLORIDE 1250 MG: 1.25 INJECTION, POWDER, LYOPHILIZED, FOR SOLUTION INTRAVENOUS at 04:55

## 2020-08-28 RX ADMIN — CETIRIZINE HYDROCHLORIDE 10 MG: 10 TABLET, FILM COATED ORAL at 08:55

## 2020-08-28 RX ADMIN — Medication 10 ML: at 08:55

## 2020-08-28 RX ADMIN — DEXMEDETOMIDINE 1.4 MCG/KG/HR: 100 INJECTION, SOLUTION, CONCENTRATE INTRAVENOUS at 01:20

## 2020-08-28 RX ADMIN — HYDROMORPHONE HYDROCHLORIDE 0.5 MG: 1 INJECTION, SOLUTION INTRAMUSCULAR; INTRAVENOUS; SUBCUTANEOUS at 03:54

## 2020-08-28 RX ADMIN — QUETIAPINE FUMARATE 800 MG: 400 TABLET, EXTENDED RELEASE ORAL at 19:45

## 2020-08-28 RX ADMIN — MAGNESIUM SULFATE HEPTAHYDRATE 1 G: 1 INJECTION, SOLUTION INTRAVENOUS at 00:45

## 2020-08-28 RX ADMIN — HYDROMORPHONE HYDROCHLORIDE 0.5 MG: 1 INJECTION, SOLUTION INTRAMUSCULAR; INTRAVENOUS; SUBCUTANEOUS at 17:50

## 2020-08-28 RX ADMIN — PANTOPRAZOLE SODIUM 40 MG: 40 INJECTION, POWDER, FOR SOLUTION INTRAVENOUS at 08:55

## 2020-08-28 RX ADMIN — MEROPENEM 1 G: 1 INJECTION, POWDER, FOR SOLUTION INTRAVENOUS at 08:56

## 2020-08-28 RX ADMIN — CALCIUM GLUCONATE: 94 INJECTION, SOLUTION INTRAVENOUS at 17:51

## 2020-08-28 RX ADMIN — I.V. FAT EMULSION 100 ML: 20 EMULSION INTRAVENOUS at 17:51

## 2020-08-28 RX ADMIN — DEXMEDETOMIDINE 1.4 MCG/KG/HR: 100 INJECTION, SOLUTION, CONCENTRATE INTRAVENOUS at 13:07

## 2020-08-28 RX ADMIN — IRON SUCROSE 200 MG: 20 INJECTION, SOLUTION INTRAVENOUS at 21:26

## 2020-08-28 RX ADMIN — BUSPIRONE HYDROCHLORIDE 15 MG: 15 TABLET ORAL at 19:45

## 2020-08-28 RX ADMIN — Medication 400 MG: at 08:55

## 2020-08-28 RX ADMIN — DULOXETINE HYDROCHLORIDE 60 MG: 60 CAPSULE, DELAYED RELEASE ORAL at 19:45

## 2020-08-28 RX ADMIN — DEXMEDETOMIDINE 1.4 MCG/KG/HR: 100 INJECTION, SOLUTION, CONCENTRATE INTRAVENOUS at 22:28

## 2020-08-28 ASSESSMENT — PAIN SCALES - GENERAL
PAINLEVEL_OUTOF10: 0
PAINLEVEL_OUTOF10: 6
PAINLEVEL_OUTOF10: 7
PAINLEVEL_OUTOF10: 4
PAINLEVEL_OUTOF10: 6
PAINLEVEL_OUTOF10: 6
PAINLEVEL_OUTOF10: 8
PAINLEVEL_OUTOF10: 10
PAINLEVEL_OUTOF10: 10
PAINLEVEL_OUTOF10: 0
PAINLEVEL_OUTOF10: 7
PAINLEVEL_OUTOF10: 0

## 2020-08-28 ASSESSMENT — PAIN DESCRIPTION - PAIN TYPE
TYPE: ACUTE PAIN

## 2020-08-28 ASSESSMENT — PAIN DESCRIPTION - DESCRIPTORS
DESCRIPTORS: ACHING

## 2020-08-28 ASSESSMENT — PAIN DESCRIPTION - ONSET
ONSET: ON-GOING

## 2020-08-28 ASSESSMENT — PAIN DESCRIPTION - PROGRESSION
CLINICAL_PROGRESSION: GRADUALLY WORSENING

## 2020-08-28 ASSESSMENT — PAIN - FUNCTIONAL ASSESSMENT
PAIN_FUNCTIONAL_ASSESSMENT: PREVENTS OR INTERFERES WITH MANY ACTIVE NOT PASSIVE ACTIVITIES
PAIN_FUNCTIONAL_ASSESSMENT: PREVENTS OR INTERFERES WITH MANY ACTIVE NOT PASSIVE ACTIVITIES
PAIN_FUNCTIONAL_ASSESSMENT: PREVENTS OR INTERFERES SOME ACTIVE ACTIVITIES AND ADLS
PAIN_FUNCTIONAL_ASSESSMENT: PREVENTS OR INTERFERES WITH MANY ACTIVE NOT PASSIVE ACTIVITIES

## 2020-08-28 ASSESSMENT — PAIN DESCRIPTION - LOCATION
LOCATION: ABDOMEN

## 2020-08-28 ASSESSMENT — ENCOUNTER SYMPTOMS
ABDOMINAL PAIN: 1
BLOOD IN STOOL: 0
DIARRHEA: 0
SORE THROAT: 0
SHORTNESS OF BREATH: 1
TROUBLE SWALLOWING: 0
COUGH: 0
NAUSEA: 0
CHEST TIGHTNESS: 0
VOMITING: 0
CONSTIPATION: 0

## 2020-08-28 ASSESSMENT — PAIN DESCRIPTION - FREQUENCY
FREQUENCY: CONTINUOUS

## 2020-08-28 ASSESSMENT — PAIN DESCRIPTION - ORIENTATION: ORIENTATION: MID

## 2020-08-28 NOTE — CARE COORDINATION
Writer spoke to the pt mother about d/c plan. Explained serina perez. She agreed to writer making a referral. However, does nor want to start precert. She said she's not ready to make that commitment. \" I have to much on my plate\" in addition she wants to talk to other family members.

## 2020-08-28 NOTE — PROGRESS NOTES
Pulmonary/Chest: clear/decreased to auscultation bilaterally- no wheezes, rales or rhonchi, normal air movement, on bipap  Cardiovascular: tachycardic rate, regular rhythm, normal S1 and S2, no murmurs, rubs, clicks or gallops, distal pulses intact, no carotid bruits  Abdomen: soft, obese LUQ is-tender, non-distended, normal bowel sounds, no masses or organomegaly  Extremities: no cyanosis, clubbing bilateral lower leg edema  Musculoskeletal: normal range of motion, no joint swelling, deformity or tenderness  Neurologic: no cranial nerve deficit and muscle strength normal       Lab and Imaging Review     CBC  Recent Labs     08/26/20  0337 08/27/20  0327 08/28/20  0352   WBC 16.2* 19.6* 22.4*   HGB 9.5* 9.3* 9.3*   HCT 29.1* 28.0* 28.4*   MCV 94.2 92.4 95.4    264 253       BMP  Recent Labs     08/26/20  1754 08/27/20  0327 08/28/20  0352    143 140   K 3.7 3.5* 3.9    105 104   CO2 27 29 26   BUN 9 11 13   CREATININE 0.50 0.53 0.66   GLUCOSE 167* 180* 126*   CALCIUM 8.2* 8.0* 8.1*       LFTS  Recent Labs     08/26/20  0337 08/27/20  0327 08/28/20  0352   ALKPHOS 123* 100 89   ALT 29 26 24   AST 35* 27 24   PROT 5.9* 5.7* 5.7*   BILITOT 0.27* 0.23* 0.18*   LABALBU 2.4* 2.3* 2.1*       AMYLASE/LIPASE/AMMONIA  Recent Labs     08/26/20  0337 08/27/20  0327 08/28/20  0352   LIPASE 46 57 62*     ANEMIA STUDIES  Recent Labs     08/25/20  0858 08/26/20  1407   LABIRON 11*  --    TIBC 180*  --    FERRITIN 510* 1,141*   [id number] 878  --    FOLATE 11.1  --      Results for Rosario Nation (MRN [id number]) as of 8/22/2020 08:50    Ref. Range 8/20/2020 06:06   Triglycerides Latest Ref Range: <150 mg/dL 259 (H)      FINDINGS: ct abd 8/19/20   Lower Chest: Consolidation within both lower lobes and the lingula.     Scattered ground-glass opacity.  No pleural effusion.         Organs: Liver is diffusely hypoattenuating.  No acute abnormality within the    spleen, adrenals, or left kidney. Channie Cluster is right obstruction.  Secondary involvement of the descending    colon adjacent to the peripancreatic inflammatory changes along the tail of    the pancreas.         Pelvis: Essure devices.  No significant free fluid.  No bladder calculus.         Peritoneum/Retroperitoneum: Splenic artery is patent.  No evidence of splenic    artery thrombosis.  No definite splenic venous thrombosis.  A portion of the    splenic vein as it courses along the tail of the pancreas is indistinct. However, proximal and distal to this, there is flow and this may be related    to artifact.  No abdominal aortic aneurysm.  Prominent peripancreatic lymph    nodes are likely reactive.         Bones/Soft Tissues: No acute soft tissue abnormality.  No acute osseous    abnormality.              Impression    Significantly limited evaluation for pulmonary embolus.         Questionable right lower lobe pulmonary embolus.         Bilateral lung opacities are likely pneumonia.  Recommend follow-up to    document resolution.  Moderate left pleural effusion.         Severe acute pancreatitis.         No definite splenic venous thrombosis.         Critical results were called by Dr. Maite Schrader MD to Dr. Jarred Agosto on    8/26/2020 at 11:40.          ASSESSMENT/PLAN:  1. Acute pancreatitis-reports continued but decreased LUQ pain  -continue TPN, IGG subclasses are normal. Lipase 62, no evidence of necrosis on CT abd  -pain mgt per primary      2.acute hypoxic respiratory failure ? PE ?pneumonia on antibiotics and lovenox    3.anemia -iron deficiency;no overt bleeding   Trend hh  Continue replacement    This plan was formulated in collaboration with Dr. Diego Hoang .     Electronically signed by: ANIBAL Leon CNP on 8/28/2020 at 7:38 AM     Attending Physician Statement  I have discussed the care of Evert Lai and   I have examined the patient myselft independently, and taken ros and hpi , including pertinent history and exam findings,  with the author of this note . I have reviewed the key elements of all parts of the encounter with the nurse practitioner/resident.     I agree with the assessment, plan and orders as documented by the above health care provider       Patient still complaining of abdominal pain requiring high-dose Dilaudid  To be controlled  Yet amylase lipase and liver enzymes are all normalized  CAT scan on 8/26 showed no necrosis or free air in the pancreatic area to indicate need for debridement  In addition to that there is significant abnormalities on the chest with pneumonias pleural effusion possible PE    We will watch 1 more day on antibiotics as per ID with Vanco/meropenem  If the patient continued to worsen and continued to have pain we might need to repeat the imaging of the chest abdomen and pelvis  Discussed with ID and the staff    Electronically signed by Kenny Mattson MD

## 2020-08-28 NOTE — PROGRESS NOTES
Pharmacy Vancomycin Consult     Vancomycin Day: 2  Current Dosin mg q8h    Temp max:  101.2 F    Recent Labs     20  0327 20  0352   BUN 11 13       Recent Labs     20  0327 20  0352   CREATININE 0.53 0.66       Recent Labs     20  0327 20  0352   WBC 19.6* 22.4*         Intake/Output Summary (Last 24 hours) at 2020 1717  Last data filed at 2020 1310  Gross per 24 hour   Intake 4626.75 ml   Output 5200 ml   Net -573.25 ml       Culture Date      Source                       Results                       blood x2    Ht Readings from Last 1 Encounters:   20 5' 5\" (1.651 m)        Wt Readings from Last 1 Encounters:   20 (!) 322 lb 8.5 oz (146.3 kg)         Body mass index is 53.67 kg/m². Estimated Creatinine Clearance: 156 mL/min (based on SCr of 0.66 mg/dL). Trough: 26.2 @ 1548    Assessment/Plan:  Vanco doses have been given on approximately a 2000, 0400, 1200 schedule - trough was timed and drawn for 1600, 3 hours early to be a true trough. Trough expectedly elevated at 26.2, but would still anticipate it being elevated even if trough had been timed appropriately. Will reduce dose from 1250 mg q8h to q12h at this time. Will draw another trough prior to the 4th dose to evaluate decrease in dose. Pharmacy will follow.      Sofía Leon,PharmD,  2020, 5:22 PM

## 2020-08-28 NOTE — PROGRESS NOTES
Attending Physician Statement  I have discussed the care of Ash Jolley with the resident team. I have examined the patient myself and taken ros and hpi , including pertinent history and exam findings,  with the resident. I have reviewed the key elements of all parts of the encounter with the resident. I agree with the assessment, plan and orders as documented by the resident. Principal Problem:    Pneumonia of both lungs due to infectious organism  Active Problems:    Acute pancreatitis    Anxiety    Class 3 severe obesity due to excess calories with serious comorbidity in adult [facility])    Restless leg syndrome    Major depression    HTN (hypertension)    Fibromyalgia    Sepsis (HCC)    Morbid obesity (HCC)    Abdominal pain, epigastric    Nausea    Acute respiratory failure with hypoxia (HCC)    Hypocalcemia    History of anxiety disorder  Resolved Problems:    * No resolved hospital problems. *    Minimal improvement, continues to need BiPAP  continues to have fevers 100-101 °F.  No growth on cultures, patient on Merrem plus vancomycin for suspected pneumonia   Needed BiPAP overnight, on nasal cannula when seen. However still needing Precedex infusion. Abdomen less tender and softer compared to prior  Sinus tachycardia and hypotension improved, patient off Cardene drip now. Moderate left-sided pleural effusion on chest CT-we will discuss with pulmonary regarding tapping    Full note to follow. Patient continues to be unstable and has risk of sudden deterioration requiring highest level of care. Patient seen in ICU. Critical care time spent 35 minutes.         Electronically signed by Valerie Crooks MD

## 2020-08-28 NOTE — PROGRESS NOTES
Pt placed on NC d/t nausea and spitting up. PRN Zofran given. Sp02 94%. RN will continue to monitor.

## 2020-08-28 NOTE — BRIEF OP NOTE
Brief Postoperative Note    Mary Hensley  YOB: 1975  [id number]    Pre-operative Diagnosis: Sepsis; acute resp failure. Post-operative Diagnosis: Same    Procedure: Diagnostic left thoracentesis    Medications Given: none    Anesthesia: Local    Surgeons/Assistants: Magi Aceves MD    Estimated Blood Loss: minimal    Complications: none    Specimens: were obtained    Findings: 15 ml sl cloudy yellowish fluid aspirated left pleural space under US guidance. Only small left effusion visualized readily with US. Considerable LLL volume loss or consolidation noted. Fluid provided for further analysis. Pt tolerated well.       Electronically signed by Magi Aceves on 8/28/2020 at 5:22 PM

## 2020-08-28 NOTE — PLAN OF CARE
Ongoing     Problem: Infection, Septic Shock:  Goal: Will show no infection signs and symptoms  Description: Will show no infection signs and symptoms  8/28/2020 0734 by Sergei Keyes RN  Outcome: Ongoing  8/27/2020 1902 by Brooks Reyes  Outcome: Ongoing     Problem: Serum Glucose Level - Abnormal:  Goal: Ability to maintain appropriate glucose levels will improve  Description: Ability to maintain appropriate glucose levels will improve  8/28/2020 0734 by Sergei Keyes RN  Outcome: Ongoing  8/27/2020 1902 by Brooks Reyes  Outcome: Ongoing     Problem: Tissue Perfusion, Altered:  Goal: Circulatory function within specified parameters  Description: Circulatory function within specified parameters  8/28/2020 0734 by Sergei Keyes RN  Outcome: Ongoing  8/27/2020 1902 by Brooks Reyes  Outcome: Ongoing     Problem: Venous Thromboembolism:  Goal: Will show no signs or symptoms of venous thromboembolism  Description: Will show no signs or symptoms of venous thromboembolism  8/28/2020 0734 by Sergei Keyes RN  Outcome: Ongoing  8/27/2020 1902 by Brooks Reyes  Outcome: Ongoing  Goal: Absence of signs or symptoms of impaired coagulation  Description: Absence of signs or symptoms of impaired coagulation  8/28/2020 0734 by Sergei Keyes RN  Outcome: Ongoing  8/27/2020 1902 by Brooks Reyes  Outcome: Ongoing     Problem: Nutrition  Goal: Optimal nutrition therapy  8/28/2020 0734 by Sergei Keyes RN  Outcome: Ongoing  8/27/2020 1902 by Brooks Reyes  Outcome: Ongoing     Problem: Skin Integrity:  Goal: Will show no infection signs and symptoms  Description: Will show no infection signs and symptoms  8/28/2020 0734 by Sergei Keyes RN  Outcome: Ongoing  8/27/2020 1902 by Brooks Reyes  Outcome: Ongoing  Goal: Absence of new skin breakdown  Description: Absence of new skin breakdown  8/28/2020 0734 by Sergei Keyes RN  Outcome: Ongoing  8/27/2020 1902 by Brooks Reyes  Outcome: Ongoing

## 2020-08-28 NOTE — PROGRESS NOTES
PULMONARY PROGRESS NOTE:      Interval History:pancreatitis, resp failure    Shortness of Breath: improved; off and on increasing SOB; required BIPAP overnight  Cough: no  Sputum: no          Hemoptysis: no  Chest Pain: no  Fever: yes                  Swelling Feet: no  Headache: no                                           Nausea, Emesis, Abdominal Pain: abd pain +  Diarrhea: no         Constipation: no    Events since last visit: none    PAST MEDICAL HISTORY:      Scheduled Meds:   enoxaparin  30 mg Subcutaneous BID    vancomycin (VANCOCIN) intermittent dosing (placeholder)   Other RX Placeholder    vancomycin  1,250 mg Intravenous Q8H    meropenem (MERREM) IVPB extended  1 g Intravenous Q8H    enalaprilat  1.25 mg Intravenous 4 times per day    insulin lispro  0-6 Units Subcutaneous TID WC    magnesium oxide  400 mg Oral Daily    sodium chloride flush  10 mL Intravenous 2 times per day    QUEtiapine  800 mg Oral Nightly    pramipexole  0.25 mg Oral Daily    pantoprazole  40 mg Intravenous Daily    And    sodium chloride (PF)  10 mL Intravenous Daily    busPIRone  15 mg Oral Nightly    DULoxetine  60 mg Oral BID    cetirizine  10 mg Oral Daily     Continuous Infusions:   PN-Adult 2-in-1 Central Line (Standard) 75 mL/hr at 08/27/20 1908    niCARdipine Stopped (08/27/20 2240)    dextrose      dexmedetomidine (PRECEDEX) IV infusion 1.4 mcg/kg/hr (08/28/20 1048)     PRN Meds:LORazepam, sodium chloride flush, acetaminophen, hydrALAZINE, HYDROmorphone, glucose, dextrose, glucagon (rDNA), dextrose, perflutren lipid microspheres, promethazine (PHENERGAN) in sodium chloride 0.9% IVPB, sodium chloride flush, magnesium sulfate, potassium chloride **OR** potassium alternative oral replacement **OR** potassium chloride, acetaminophen, [DISCONTINUED] promethazine **OR** ondansetron, melatonin ER        PHYSICAL EXAMINATION:  /63   Pulse 106   Temp 100.1 °F (37.8 °C) (Temporal)   Resp 29   Ht 5' 5\" (1.651 m)   Wt (!) 322 lb 8.5 oz (146.3 kg)   SpO2 94%   BMI 53.67 kg/m²       General : Awake, alert, NAD   Neck - supple, no lymphadenopathy, JVD not raised  Heart -   Lungs - Tachypneic, shallow breaths Air Entry- fair bilaterally; breath sounds : vesicular;   rales/crackles - absent, 94% on 5L  Abdomen - obese, soft, tenderness noted LUQ  Upper Extremities  - no cyanosis, mottling; edema : absent  Lower Extremities: no cyanosis, mottling; edema : absent  Skin: upper chest rash    Current Laboratory, Radiologic, Microbiologic, and Diagnostic studies reviewed  Data ReviewCBC:   Recent Labs     08/26/20  0337 08/27/20  0327 08/28/20  0352   WBC 16.2* 19.6* 22.4*   RBC 3.09* 3.03* 2.98*   HGB 9.5* 9.3* 9.3*   HCT 29.1* 28.0* 28.4*    264 253     BMP:   Recent Labs     08/26/20  1754 08/27/20  0327 08/28/20  0352   GLUCOSE 167* 180* 126*    143 140   K 3.7 3.5* 3.9   BUN 9 11 13   CREATININE 0.50 0.53 0.66   CALCIUM 8.2* 8.0* 8.1*     ABGs:   Recent Labs     08/28/20  0818   PHART 7.484*   PO2ART 66.1*   [id number] 37.2   [id number] 27.9*   [id number] 92.1*      PT/INR:  No results found for: PTINR    ASSESSMENT / PLAN:  · Acute pancreatitis- GI consulted, monitor amylase lipase - improving,   · Lactic acidosis- resolved  · Possible pneumonia- ABx   · Febrile, Leukocytosis- Abx changed per ID, UA- unremarkable, COVID negative, lactate 1.2  · Hypertension- Cardene drip   · Possible PE- CTA - no obvious PE  · Diet NPO-ice chips; meds OK - home meds rsumed  · Monitor end tidal Co2 with pain meds   · acute hypoxic resp failure - O2/ NIPPV - remains bipap dependent   · precedex for restlessness-  Ativan prn, wean precedex as able  · Encourage IS   · CXR 8/28 - B/L infiltrates - ? ARDS  · Small left pl effusion - thoracentesis per IR if possible    This is a late note on patient seen by me earlier today.   Electronically signed by Magdiel Osorio on 08/28/20 at 11:03 AM

## 2020-08-28 NOTE — PROGRESS NOTES
[address] [facility]    PROGRESS NOTE             8/28/2020    6:52 AM    Name:   Rayray Pennington  MRN:     [id number]     Acct:      [de-identified]   Room:   2003/2003-01  IP Day:  9  Admit Date:  8/19/2020  7:35 PM    PCP:  Baljit Host  Code Status:  Full Code    Subjective:     C/C:   Chief Complaint   Patient presents with    Abdominal Pain     RUQ     Interval History Status: improved. Patient seen and examined at bedside this AM.  Overnight patient was very tachpyneic, maxed on precedex, placed back on bipap, CXR showed worsening perihilar and bilateral ground glass opacities. Patient able to take her oral seroquel and calmed down some. Also had left sided chest pain overnight but EKG with no ST changes and troponins normal x 3. This AM on 5L nasal cannula and precedex 1.4 mck/kg/hr. Said she still has abdominal pain but it is getting better and that she is focusing on her breathing. Endorses left sided pleuritic chest pain. Denies any cough just has shortness of breath. BP controlled overnight. Tmax 101.2 at 21:07 and 100.1 this AM.  WBCs trending up to 22.4  UO 1.8 ml/kr/hr x 24 hours and creatinine stable at 0.66. Rectal tube placed yesterday for increased stool output. Spoke with pulmonology and plan is for thoracocentesis of left pleural effusion. Brief History:     Please see H&P. Review of Systems:     Review of Systems   Constitutional: Positive for fever. Negative for chills, diaphoresis and unexpected weight change. HENT: Negative for sore throat and trouble swallowing. Respiratory: Positive for shortness of breath. Negative for cough and chest tightness. Cardiovascular: Positive for palpitations and leg swelling. Negative for chest pain. Gastrointestinal: Positive for abdominal pain. Negative for blood in stool, constipation, diarrhea, nausea and vomiting.    Genitourinary: Negative for difficulty chloride **OR** potassium alternative oral replacement **OR** potassium chloride, acetaminophen, [DISCONTINUED] promethazine **OR** ondansetron, melatonin ER    Data:     Past Medical History:   has a past medical history of Abnormal levels of other serum enzymes, Anxiety, Bilateral leg edema, Chronic kidney disease, Depression, DVT (deep venous thrombosis) (HCC), Elevated liver enzymes, Fibromyalgia, Headache(784.0), Hx of blood clots, Hypertension, Kidney stone, Obstructive sleep apnea syndrome, Pancreatitis, Pleural effusion, SOB (shortness of breath), Supraventricular tachycardia ([address] and SVT (supraventricular tachycardia) ([address] Social History:   reports that she quit smoking about 8 months ago. Her smoking use included cigarettes. She smoked 1.00 pack per day. She has never used smokeless tobacco. She reports current alcohol use. She reports that she does not use drugs. Family History:   Family History   Problem Relation Age of Onset    Heart Disease Father     High Blood Pressure Brother        Vitals:  BP (!) 140/67   Pulse 103   Temp 100.1 °F (37.8 °C) (Temporal)   Resp (!) 33   Ht 5' 5\" (1.651 m)   Wt (!) 322 lb 8.5 oz (146.3 kg)   SpO2 93%   BMI 53.67 kg/m²   Temp (24hrs), Av.8 °F (37.7 °C), Min:97.1 °F (36.2 °C), Max:101.2 °F (38.4 °C)    Recent Labs     20  1801 20  1102 20  1152 20  1709   POCGLU 161* 180* 169* 120*       I/O(24Hr):     Intake/Output Summary (Last 24 hours) at 2020 0652  Last data filed at 2020 0526  Gross per 24 hour   Intake 4626.75 ml   Output 6300 ml   Net -1673.25 ml       Labs:    CBC:   Lab Results   Component Value Date    WBC 22.4 2020    RBC 2.98 2020    HGB 9.3 2020    HCT 28.4 2020    MCV 95.4 2020    MCH 31.1 2020    MCHC 32.6 2020    RDW 14.5 2020     2020    MPV 8.2 2020     CMP:    Lab Results   Component Value Date     2020    K 3.9 08/28/2020     08/28/2020    CO2 26 08/28/2020    BUN 13 08/28/2020    CREATININE 0.66 08/28/2020    GFRAA >60 08/28/2020    LABGLOM >60 08/28/2020    GLUCOSE 126 08/28/2020    PROT 5.7 08/28/2020    PROT 7.2 01/23/2015    LABALBU 2.1 08/28/2020    CALCIUM 8.1 08/28/2020    BILITOT 0.18 08/28/2020    ALKPHOS 89 08/28/2020    AST 24 08/28/2020    ALT 24 08/28/2020     ABG:  No results found for: PH, PCO2, PO2, HCO3, BE, THGB, TCO2, O2SAT  LIPASE:    Lab Results   Component Value Date    LIPASE 62 08/28/2020       Lab Results   Component Value Date/Time    SPECIAL 4ml LT HAND 08/23/2020 10:01 AM     Lab Results   Component Value Date/Time    CULTURE NO GROWTH 5 DAYS 08/23/2020 10:01 AM         Radiology:    Ct Abdomen Pelvis Wo Contrast Additional Contrast? None    Result Date: 8/20/2020  EXAMINATION: CT OF THE ABDOMEN AND PELVIS WITHOUT CONTRAST 8/20/2020 10:22 pm TECHNIQUE: CT of the abdomen and pelvis was performed without the administration of intravenous contrast. Multiplanar reformatted images are provided for review. Dose modulation, iterative reconstruction, and/or weight based adjustment of the mA/kV was utilized to reduce the radiation dose to as low as reasonably achievable. COMPARISON: CT abdomen and pelvis with contrast August 19, 2020. HISTORY: ORDERING SYSTEM PROVIDED HISTORY: severe acute pain TECHNOLOGIST PROVIDED HISTORY: severe acute pain Is the patient pregnant? ->Yes Reason for Exam: worsening abdominal pain Acuity: Acute Type of Exam: Ongoing Relevant Medical/Surgical History: surg - gallbladder FINDINGS: Lower Chest: Persisting bilateral lower lung scattered consolidation and atelectasis is present with trace posterior left-sided pleural effusion identified. Delmer Bees Heart is normal in size and configuration.  Organs: Interval mild worsening of severe peripancreatic fat stranding and fluid is noted with intraperitoneal free fluid collecting within the bilateral pericolic gutters and collecting inferiorly within the pelvis with increased volume in comparison with the prior study. Diffuse fatty infiltration of the liver is again seen. The liver, gallbladder, spleen, pancreas, adrenal glands, kidneys, are otherwise unremarkable in appearance. GI/Bowel: The stomach is unremarkable without wall thickening or distention. Bowel loops are unremarkable in appearance without evidence of obstruction, distension or mucosal thickening. Pelvis: Berkowitz catheter is noted within the nondistended but grossly unremarkable urinary bladder. Bilateral fallopian tube Essure coils are seen without evidence of complication. The uterus is anteverted in position and is unremarkable in appearance there no evidence of pelvic free fluid is seen. Peritoneum/Retroperitoneum: No evidence of retroperitoneal or intraperitoneal lymphadenopathy is identified. . Bones/Soft Tissues: No evidence of retroperitoneal or intraperitoneal lymphadenopathy is identified. No evidence of intraperitoneal free fluid is seen. 1. Mild interval worsening of severe peripancreatic inflammation associated with acute pancreatitis, as discussed above. 2. No evidence of complication i.e. pseudocyst noted. 3. Stable bilateral lower lung multifocal consolidation atelectasis suggesting pneumonia versus alveolar edema. 4. Hepatic steatosis, unchanged. Xr Chest (single View Frontal)    Result Date: 8/28/2020  EXAMINATION: ONE XRAY VIEW OF THE CHEST 8/28/2020 6:20 am COMPARISON: 08/27/2020 HISTORY: ORDERING SYSTEM PROVIDED HISTORY: f/u pneumonia TECHNOLOGIST PROVIDED HISTORY: f/u pneumonia Reason for Exam: f/u pneumonia Acuity: Unknown Type of Exam: Subsequent/Follow-up Additional signs and symptoms: f/u pneumonia Relevant Medical/Surgical History: f/u pneumonia FINDINGS: Poor inspiration with decrease lung volumes worse on the prior. Right upper extremity PICC line remain in place. Cardiomediastinal silhouette stable accentuated by the low lung volumes.   No focal consolidation. Patchy airspace opacities in the left upper lung accentuated by low lung volume. Poor inspiration with low lung volumes worse than prior. Patchy airspace opacities in the left upper lung accentuated by low lung volume. Xr Chest (single View Frontal)    Result Date: 8/21/2020  EXAMINATION: ONE XRAY VIEW OF THE CHEST 8/21/2020 6:06 am COMPARISON: August 20, 2020 chest exam HISTORY: ORDERING SYSTEM PROVIDED HISTORY: f/u atelectasis TECHNOLOGIST PROVIDED HISTORY: f/u atelectasis Reason for Exam: F/U atelectasis. Acuity: Unknown Type of Exam: Unknown Additional signs and symptoms: F/U atelectasis. FINDINGS: Interval insertion of right PICC line catheter, the tip projected at the right atrium Mild cardiomegaly Limited extra sonya exam with low volume lungs, mild streaky, left greater than right, bibasilar atelectasis. Grossly clear upper lungs     Line placement as above Limited expiratory exam with mild streaky bibasilar atelectasis     Ct Abdomen Pelvis W Iv Contrast Additional Contrast? None    Result Date: 8/26/2020  EXAMINATION: CTA OF THE CHEST; CT OF THE ABDOMEN AND PELVIS WITH CONTRAST 8/26/2020 10:34 am TECHNIQUE: CTA of the chest was performed after the administration of intravenous contrast.  Multiplanar reformatted images are provided for review. MIP images are provided for review. Dose modulation, iterative reconstruction, and/or weight based adjustment of the mA/kV was utilized to reduce the radiation dose to as low as reasonably achievable.; CT of the abdomen and pelvis was performed with the administration of intravenous contrast. Multiplanar reformatted images are provided for review. Dose modulation, iterative reconstruction, and/or weight based adjustment of the mA/kV was utilized to reduce the radiation dose to as low as reasonably achievable.  COMPARISON: CT chest May 26, 2019 CT abdomen and pelvis August 20, 2020 HISTORY: ORDERING SYSTEM PROVIDED HISTORY: Hypoxia, recurrent fevers TECHNOLOGIST PROVIDED HISTORY: Hypoxia, recurrent fevers Reason for Exam: Hypoxia, recurrent fevers, best images possible due to patient size 322 pounds Acuity: Acute Type of Exam: Initial; ORDERING SYSTEM PROVIDED HISTORY: Splenic vein thrombosis? TECHNOLOGIST PROVIDED HISTORY: Splenic vein thrombosis? Reason for Exam: Splenic vein thrombosis? best images possible due to patient size 322  poounds Acuity: Acute Type of Exam: Initial FINDINGS: CT CHEST: Chest wall: No axillary adenopathy. Mediastinum: Cardiomegaly. No pericardial effusion. No adenopathy. Pulmonary arteries: Significantly limited evaluation for pulmonary embolus due to bolus timing, motion, and patient body habitus. Questionable pulmonary embolus within the right lower lobe. Lungs: Moderate left pleural effusion. Small right pleural effusion. Left lower lobe consolidation versus atelectasis. Bilateral upper lobe areas of consolidation. Bones: No acute osseous abnormality. CT ABDOMEN-PELVIS: Organs: Decreased attenuation of the liver is consistent with hepatic steatosis. No focal liver lesion. Cholecystectomy. Severe pancreatitis with surrounding inflammatory changes and fluid. Mild splenomegaly. No adrenal lesion. No hydronephrosis. Right renal cyst.  Focal area of scarring within the right kidney is unchanged. GI/Bowel: No bowel obstruction. Secondary involvement of the descending colon adjacent to the peripancreatic inflammatory changes along the tail of the pancreas. Pelvis: Essure devices. No significant free fluid. No bladder calculus. Peritoneum/Retroperitoneum: Splenic artery is patent. No evidence of splenic artery thrombosis. No definite splenic venous thrombosis. A portion of the splenic vein as it courses along the tail of the pancreas is indistinct. However, proximal and distal to this, there is flow and this may be related to artifact. No abdominal aortic aneurysm.   Prominent peripancreatic lymph nodes are likely reactive. Bones/Soft Tissues: No acute soft tissue abnormality. No acute osseous abnormality. Significantly limited evaluation for pulmonary embolus. Questionable right lower lobe pulmonary embolus. Bilateral lung opacities are likely pneumonia. Recommend follow-up to document resolution. Moderate left pleural effusion. Severe acute pancreatitis. No definite splenic venous thrombosis. Critical results were called by Dr. Jessy Balderas MD to Dr. Vilma Avalos on 8/26/2020 at 11:40. Ct Abdomen Pelvis W Iv Contrast Additional Contrast? None    Addendum Date: 8/20/2020    ADDENDUM: As stated in the body of the report, the abdominal aorta is normal in size. There is no significant atherosclerosis. Result Date: 8/20/2020  EXAMINATION: CT OF THE ABDOMEN AND PELVIS WITH CONTRAST 8/19/2020 10:11 pm TECHNIQUE: CT of the abdomen and pelvis was performed with the administration of intravenous contrast. Multiplanar reformatted images are provided for review. Dose modulation, iterative reconstruction, and/or weight based adjustment of the mA/kV was utilized to reduce the radiation dose to as low as reasonably achievable. COMPARISON: None. HISTORY: ORDERING SYSTEM PROVIDED HISTORY: pain TECHNOLOGIST PROVIDED HISTORY: pain Reason for Exam: pt c/o all over abdominal pain with nausea for a few hours Acuity: Acute Type of Exam: Initial Relevant Medical/Surgical History: surg - gallbladder FINDINGS: Lower Chest: Consolidation within both lower lobes and the lingula. Scattered ground-glass opacity. No pleural effusion. Organs: Liver is diffusely hypoattenuating. No acute abnormality within the spleen, adrenals, or left kidney. There is right renal cortical scarring and calcification. Subcentimeter hypoattenuating bilateral renal lesions are too small to accurately characterize, likely cysts. Prior cholecystectomy. There is diffuse peripancreatic stranding and fluid. No loculated fluid collection.  GI/Bowel: Stomach is partially distended. Small bowel is nondilated. Colon is nondilated. Pelvis: Urinary bladder is partially distended without vesicular stones. Uterus is present. Peritoneum/Retroperitoneum: Shotty retroperitoneal lymph nodes, likely reactive. Abdominal aorta is normal in caliber. No pneumoperitoneum. Bones/Soft Tissues: No acute osseous abnormality. 1. Pancreatic edema and associated fluid is consistent with pancreatitis. No loculated fluid collection/pseudocyst. 2. Shotty retroperitoneal lymph nodes are likely reactive. 3. Hepatic steatosis. 4. Bilateral lower lobe consolidation, which could be due to edema or pneumonia. Libert[employer] Daugherty Device Plmt/replace/removal    Result Date: 8/20/2020  PROCEDURE: ULTRASOUND GUIDED VASCULAR ACCESS. FLUOROSCOPY GUIDED PICC PLACEMENT 8/20/2020. HISTORY: ORDERING SYSTEM PROVIDED HISTORY: fluid resusitation TECHNOLOGIST PROVIDED HISTORY: fluid resusitation Lumen?->Double Lumen Is the patient pregnant? ->Yes Acuity: Unknown Type of Exam: Unknown Sepsis SEDATION: None FLUOROSCOPY TIME: DAP 63 cGy cm squared TECHNIQUE: Informed consent was obtained after a detailed explanation of the procedure including risks, benefits, and alternatives. Universal protocol was observed. The right arm was prepped and draped in sterile fashion using maximum sterile barrier technique. Local anesthesia was achieved with lidocaine. A micropuncture needle was used to access the right basilic vein using ultrasound guidance. An ultrasound image demonstrating patency of the vein with needle tip located within it. An image was obtained and stored in PACs. A 0.018 guidewire was used to place a peel-a-way sheath and a 5 [place] power injectable dual-lumen PICC was advanced with fluoroscopic guidance with the tip at the cavo-atrial junction. The catheter flushed easily and there was a good blood return. The catheter was secured to the skin.   The patient tolerated the procedure well and there were no immediate complications. FINDINGS: Fluoroscopic image demonstrates the tip of the catheter at the cavo-atrial junction. Successful ultrasound and fluoroscopy guided right basilic vein 5 [place] power injectable dual-lumen PICC placement. Ready for use. Xr Chest Portable    Result Date: 8/27/2020  EXAMINATION: ONE XRAY VIEW OF THE CHEST 8/27/2020 6:15 pm COMPARISON: 08/26/2020 radiograph HISTORY: ORDERING SYSTEM PROVIDED HISTORY: Increased WOB TECHNOLOGIST PROVIDED HISTORY: Increased WOB Reason for Exam: Increased WOB Acuity: Unknown Type of Exam: Unknown Additional signs and symptoms: Increased WOB FINDINGS: Right PICC line stable. The heart is enlarged and there is severe perihilar opacification that is increased centrally. Diffuse ground-glass opacities are also increased bilaterally. No pneumothorax. No skeletal finding. Perihilar and diffuse ground-glass opacities have increased from prior imaging. Pattern suspected to represent pulmonary edema. Developing pneumonitis can not be excluded. Xr Chest Portable    Result Date: 8/26/2020  EXAMINATION: ONE XRAY VIEW OF THE CHEST 8/26/2020 9:07 am COMPARISON: August 24, 2020, chest exam HISTORY: ORDERING SYSTEM PROVIDED HISTORY: PNA TECHNOLOGIST PROVIDED HISTORY: PNA Reason for Exam: PT CO increased SOB and CP. Acuity: Acute Type of Exam: Initial FINDINGS: Right PICC line catheter tip is projected at the SVC right atrial junction Persistent mild cardiomegaly Expiratory exam with mild diffuse prominence of lung markings likely related to the low volume lungs. Mild vascular congestion is not reliably excluded. Interval increase in now mild patchy right upper lobe infiltrate. Moderate left basilar infiltrate     Interval increase in now mild patchy right upper lobe infiltrate with moderate left basilar infiltrate Line placement as above Expiratory exam, possible mild vascular congestion.   Repeat upright good inspiration PA view of the chest is suggested for more accurate assessment of the pulmonary vascularity RECOMMENDATION: Repeat upright good inspiration PA view of the chest is suggested for more accurate assessment of the pulmonary vascularity     Xr Chest Portable    Result Date: 8/24/2020  EXAMINATION: ONE XRAY VIEW OF THE CHEST 8/24/2020 9:10 am COMPARISON: August 21, 2020, chest exam HISTORY: ORDERING SYSTEM PROVIDED HISTORY: Resp failure TECHNOLOGIST PROVIDED HISTORY: Resp failure Reason for Exam: resp failure Acuity: Unknown Type of Exam: Unknown FINDINGS: Interval insertion of a right PICC line catheter, the tip is projected at the right atrium. Limited markedly rotated exam No obvious significant pleuroparenchymal or mediastinal findings. Limited assessment of the left lung base     Limited markedly rotated exam, limited left base assessment Line placement as above No obvious acute cardiopulmonary findings     Xr Chest Portable    Result Date: 8/22/2020  EXAMINATION: ONE XRAY VIEW OF THE CHEST 8/22/2020 8:41 am COMPARISON: 08/21/2020 HISTORY: ORDERING SYSTEM PROVIDED HISTORY: Difficulty breathing TECHNOLOGIST PROVIDED HISTORY: Difficulty breathing Reason for Exam: dyspnea Acuity: Acute Type of Exam: Initial FINDINGS: Cardiomegaly. Low lung volumes. Right-sided PICC line remains in place. No focal consolidation. No pleural effusion or pneumothorax. Low lung volumes. This accentuates cardiomegaly. No focal consolidation. Xr Chest Portable    Result Date: 8/21/2020  EXAMINATION: ONE XRAY VIEW OF THE CHEST 8/21/2020 9:27 am COMPARISON: Chest radiograph performed 08/21/2020. HISTORY: ORDERING SYSTEM PROVIDED HISTORY: Increased oxygen demand TECHNOLOGIST PROVIDED HISTORY: Increased oxygen demand Reason for Exam: Increased oxygen demand Acuity: Acute Type of Exam: Initial Additional signs and symptoms: Increased oxygen demand FINDINGS: There are small bilateral effusions. There is a left basilar infiltrate.  There is no pneumothorax. The heart is prominent. The upper abdomen is unremarkable. The extrathoracic soft tissues are unremarkable. Cardiomegaly and small bilateral effusions with adjacent left basilar infiltrate. Xr Chest Portable    Result Date: 8/20/2020  EXAMINATION: ONE XRAY VIEW OF THE CHEST 8/20/2020 7:38 am COMPARISON: July 18, 2020 chest exam HISTORY: ORDERING SYSTEM PROVIDED HISTORY: possible pneumonia on CT chest TECHNOLOGIST PROVIDED HISTORY: possible pneumonia on CT chest Reason for Exam: possible pneumonia on CT chest Acuity: Acute Type of Exam: Initial Additional signs and symptoms: possible pneumonia on CT chest FINDINGS: Expiratory exam with mild streaky bibasilar density. Normal cardiopericardial silhouette No significant pleural or mediastinal findings     Expiratory exam with mild streaky bibasilar atelectasis     Ct Chest Pulmonary Embolism W Contrast    Result Date: 8/26/2020  EXAMINATION: CTA OF THE CHEST; CT OF THE ABDOMEN AND PELVIS WITH CONTRAST 8/26/2020 10:34 am TECHNIQUE: CTA of the chest was performed after the administration of intravenous contrast.  Multiplanar reformatted images are provided for review. MIP images are provided for review. Dose modulation, iterative reconstruction, and/or weight based adjustment of the mA/kV was utilized to reduce the radiation dose to as low as reasonably achievable.; CT of the abdomen and pelvis was performed with the administration of intravenous contrast. Multiplanar reformatted images are provided for review. Dose modulation, iterative reconstruction, and/or weight based adjustment of the mA/kV was utilized to reduce the radiation dose to as low as reasonably achievable.  COMPARISON: CT chest May 26, 2019 CT abdomen and pelvis August 20, 2020 HISTORY: ORDERING SYSTEM PROVIDED HISTORY: Hypoxia, recurrent fevers TECHNOLOGIST PROVIDED HISTORY: Hypoxia, recurrent fevers Reason for Exam: Hypoxia, recurrent fevers, best images possible due to patient pulmonary embolus. Questionable right lower lobe pulmonary embolus. Bilateral lung opacities are likely pneumonia. Recommend follow-up to document resolution. Moderate left pleural effusion. Severe acute pancreatitis. No definite splenic venous thrombosis. Critical results were called by Dr. Eduin Slaughter MD to Dr. Yasmani Xavier on 8/26/2020 at 11:40. Us Retroperitoneal Limited    Result Date: 8/25/2020  EXAMINATION: ULTRASOUND OF THE KIDNEYS 8/25/2020 1:19 pm COMPARISON: August 20, 2020 CT abdomen and pelvis HISTORY: ORDERING SYSTEM PROVIDED HISTORY: Low UOP TECHNOLOGIST PROVIDED HISTORY: Bilateral Renal Ultrasound Low UOP Acuity: Acute Type of Exam: Initial FINDINGS: Exam is limited due to patient body habitus. The right kidney measures 15.7 centimetres in length and the left kidney measures 16 cm in length. There is no hydronephrosis in either kidney. New focal renal lesion. Diffuse hepatic steatosis. No hydronephrosis in either kidney. Physical Examination:        Physical Exam  Constitutional:       General: She is not in acute distress. Appearance: She is obese. She is ill-appearing. HENT:      Head: Normocephalic and atraumatic. Cardiovascular:      Rate and Rhythm: Normal rate and regular rhythm. Pulses: Normal pulses. Heart sounds: Normal heart sounds. No murmur. No gallop. Pulmonary:      Comments: Patient on 5L nasal cannula this AM.  Diminished breath sounds but clear to ausculation bilaterally. Increased work of breathing evident. O2 sat > 90%. Chest:      Comments: No reproducible chest pain on palpation. Abdominal:      Comments: Soft, tender to palpation. No significant distension. No erythema on exam.  Much softer compared to yesterday. Musculoskeletal:      Comments: Pitting edema on the bilateral lower extremities, significantly improved compared to yesterday's exam.   Neurological:      Mental Status: She is alert. Comments: On precedex.   Patient lorazapam q6h, has been tolerating longer nasal cannula trials; wean off precedex as able; mother informed us patient vapes everyday, possibly contributing to difficulty weaning off bipap  - COVID-19 negative 8/26  - Continue antibiotics: completed 7 day course of zosyn, ID switched to meropenem day 3 and vanco day 2  - Respiratory virus panel negative  - CT PE showed moderate left pleural effusion, plan for possible thoracocentesis today    Sepsis secondary to pancreatitis / pneumonia  - On 8/20 lactic acid 5.4; SIRS criteria of tachypnea, tachycardia, and elevated WBC 20.5 met  - Continue antibiotics: completed 7 day course of zosyn, ID switched to meropenem day 3 and vanco day 2  - UA normal; blood cultures done on 8/20 NGTD x 6 days; repeat blood cultures on 8/23 NGTD x 5 days  - Repeat acute phase reactants on 8/26: Procal elevated at 0.33, CRP elevated at 296.2; lactate dehydrogenase 429; ferritin 1141; lactic acid normal; COVID negative    HTN  - Continue IV vasotec 1.25 mg q6h and hydralazine PRN  - Currently off cardene drip    Hypocalcemia, resolved  - Corrected calcium is normal    Normocytic anemia  - Baseline around 13  - Iron studies low iron 20 and low TIBC 180 and B12/folate normal, received IV venofer for 3 days, will continue for 2 more    Anxiety/depression  - Buspar 15 mg nightly, cymbalta 60 mg BID, quetiapine 800 mg nightly    Restless leg syndrome  - Pramipexole 0.25 mg daily    PICC line in place 8/20  Diet: TPN  DVT prophylaxis: lovenox 30 mg BID    Will discuss plan with attending, Dr. Fransico Mendez.     Opal Riojas MD  8/28/2020  6:52 AM

## 2020-08-28 NOTE — PROGRESS NOTES
Dr García Aloe at bedside to assess, reviewed labs and imaging. Order placed for IR thoracentesis if patient tolerates.

## 2020-08-28 NOTE — PLAN OF CARE
Spoke with Santos Steve, patient's mom, for consent for a Thoracentesis. She spoke with Dr. Cuco Fraser and myself and agreed to proceed with procedure. IR phone number was given to patient's mom. Spine appears normal, range of motion is not limited, no muscle or joint tenderness

## 2020-08-28 NOTE — PROGRESS NOTES
Dr Vilma Avalos updated on patient status, labs, assessment, vitas and cxr reviewed. Orders received to recheck mag and check ABG due to worsening cxr and continued need for bipap use.

## 2020-08-28 NOTE — PROGRESS NOTES
[facility] -   Infectious diseases evaluation  admission date 8/19/2020    reason for consultation:   Fever    Impression :   · Fever /leukocytosis possible pneumonia. · Left pleural effusion   · Severe pancreatitis, no necrosis or pseudocyst seen on CT 8/26/2020  · Possible PE on Lovenox  · Acute hypoxic respiratory failure  · Skin rash, possible allergic reaction to Zosyn resolved  · Hypertension   · Obesity      Recommendations   Current:  · Continue IV meropenem and vancomycin  · COVID 19  test was negative on 8/26/2020  · Follow blood cultures from 8/23 negative to date  · MRSA nasal swab was negative 8/21/2020  · 8/27/2020 viral PCR negative  · Thoracentesis/will discuss with pulmonary  · Follow amylase and lipase  · Follow procalcitonin level  · Case was discussed with GI  · Continue supportive care  · Further recommendation as per hospital course. History of Present Illness:   Initial history:  Stella Gomez is a 54y.o.-year-old female presented to the hospital with epigastric abdominal pain and bloating for several days prior to admission, severe, no radiation, no alleviating or relieving factors. Pancreatic enzymes were elevated, imaging suggestive of severe pancreatitis. She was on IV Zosyn for 7 days, developed a rash on 8/26/2020, IV Zosyn was discontinued and the rash resolved. IV meropenem started 8/26/2020  Right arm PICC line was placed 8/20/2020 and has been on TPN  CT chest 8/26/2020 suggestive of questionable PE, bilateral consolidations and left pleural effusion  Interval changes  8/28/2020   She had a fever with a temperature max of 104 on 8/26/2020, temperature max 101.2 yesterday . She is complaining of diffuse abdominal pain, left-sided chest pain increased with deep breath. She denied any nausea or vomiting, no diarrhea.   Skin rash is resolving  WBC increased to 22, not on steroids  Lipase normal yesterday        I have personally reviewed the past medical history, past surgical history, medications, social history, and family history, and I haveupdated the database accordingly.   Past Medical History:     Past Medical History:   Diagnosis Date    Abnormal levels of other serum enzymes     Anxiety     Bilateral leg edema     Chronic kidney disease     right renal cyst    Depression     DVT (deep venous thrombosis) ([facility] [address] 1997    after delivery    Elevated liver enzymes     Fibromyalgia     Headache(784.0)     migraines    Hx of blood clots     1997 left calf    Hypertension     Kidney stone     Obstructive sleep apnea syndrome     Pancreatitis 2001    Pleural effusion 2001    SOB (shortness of breath)     Supraventricular tachycardia (HCC)     SVT (supraventricular tachycardia) ([facility] [address] 9/8/2015       Past Surgical  History:     Past Surgical History:   Procedure Laterality Date    ATRIAL ABLATION SURGERY      BACK SURGERY      L4-5    CHOLECYSTECTOMY  2001    ENDOMETRIAL ABLATION      e sure implants placed also    ENDOSCOPY, COLON, DIAGNOSTIC      EXCISION LESION HAND / FINGER Right 1/25/2019    HAND LESION BIOPSY EXCISION performed by Brooklynn Donnelly MD at [facility] Bilateral     left x2, right x1    FOOT SURGERY Right     x3    KNEE ARTHROSCOPY Left     x2    IL CYSTO/URETERO/PYELOSCOPY W/LITHOTRIPSY Left 9/20/2017    CYSTOSCOPY URETEROSCOPY HOLMIUM LASER LITHO WITH STONE BASKETING WITH  STENT  performed by Dwayne Gupta MD at [facility]        Medications:      enoxaparin  30 mg Subcutaneous BID    vancomycin (VANCOCIN) intermittent dosing (placeholder)   Other RX Placeholder    vancomycin  1,250 mg Intravenous Q8H    meropenem (MERREM) IVPB extended  1 g Intravenous Q8H    enalaprilat  1.25 mg Intravenous 4 times per day    insulin lispro  0-6 Units Subcutaneous TID WC    magnesium oxide  400 mg Oral Daily    sodium chloride flush  10 mL Intravenous 2 times per day    QUEtiapine  800 mg Oral Nightly    pramipexole  0.25 mg Oral Daily    pantoprazole  40 mg Intravenous Daily    And    sodium chloride (PF)  10 mL Intravenous Daily    busPIRone  15 mg Oral Nightly    DULoxetine  60 mg Oral BID    cetirizine  10 mg Oral Daily       Social History:     Social History     Socioeconomic History    Marital status:      Spouse name: Not on file    Number of children: Not on file    Years of education: Not on file    Highest education level: Not on file   Occupational History    Not on file   Social Needs    Financial resource strain: Not on file    Food insecurity     Worry: Not on file     Inability: Not on file    Transportation needs     Medical: Not on file     Non-medical: Not on file   Tobacco Use    Smoking status: Former Smoker     Packs/day: 1.00     Types: Cigarettes     Last attempt to quit: 2019     Years since quittin.7    Smokeless tobacco: Never Used    Tobacco comment: today last smoke   Substance and Sexual Activity    Alcohol use:  Yes     Alcohol/week: 0.0 standard drinks     Comment: rarely    Drug use: No    Sexual activity: Not on file   Lifestyle    Physical activity     Days per week: Not on file     Minutes per session: Not on file    Stress: Not on file   Relationships    Social connections     Talks on phone: Not on file     Gets together: Not on file     Attends Mu-ism service: Not on file     Active member of club or organization: Not on file     Attends meetings of clubs or organizations: Not on file     Relationship status: Not on file    Intimate partner violence     Fear of current or ex partner: Not on file     Emotionally abused: Not on file     Physically abused: Not on file     Forced sexual activity: Not on file   Other Topics Concern    Not on file   Social History Narrative    Not on file       Family History:     Family History   Problem Relation Age of Onset    Heart Disease Father  High Blood Pressure Brother         Allergies:   Aripiprazole; Eletriptan; Hydrocodone-acetaminophen; Oxycodone-acetaminophen; Sumatriptan; Triptans; Zosyn [piperacillin sod-tazobactam so]; and Lamotrigine     Review of Systems:     Review of Systems  As per history of present illness, other than above 14 systems reviewed were negative  Physical Examination :     Patient Vitals for the past 8 hrs:   BP Temp Temp src Pulse Resp SpO2   08/28/20 0805 -- -- -- -- 29 --   08/28/20 0730 131/63 -- -- 106 (!) 34 --   08/28/20 0715 129/69 -- -- 107 30 94 %   08/28/20 0700 131/74 -- -- 108 (!) 32 94 %   08/28/20 0645 134/78 -- -- 106 30 94 %   08/28/20 0630 (!) 140/67 -- -- 103 (!) 33 95 %   08/28/20 0615 130/65 -- -- 102 30 94 %   08/28/20 0600 (!) 145/69 -- -- 100 29 --   08/28/20 0545 131/65 -- -- 96 28 --   08/28/20 0530 133/68 -- -- 98 28 --   08/28/20 0515 137/63 -- -- 97 29 --   08/28/20 0500 132/70 -- -- 92 27 --   08/28/20 0445 135/67 -- -- 92 26 --   08/28/20 0430 136/64 -- -- 95 27 --   08/28/20 0415 122/67 -- -- 94 27 --   08/28/20 0400 (!) 106/58 100.1 °F (37.8 °C) Temporal 97 30 93 %   08/28/20 0345 136/69 -- -- 95 27 --   08/28/20 0330 128/66 -- -- 92 30 --   08/28/20 0315 114/64 -- -- 90 (!) 31 --   08/28/20 0300 (!) 137/90 -- -- 98 (!) 34 --   08/28/20 0252 -- -- -- -- 28 93 %   08/28/20 0245 127/70 -- -- 86 28 --       Physical Exam  Constitutional:       Comments: On oxygen per nasal cannula   HENT:      Head: Normocephalic and atraumatic. Neck:      Musculoskeletal: Neck supple. No neck rigidity. Cardiovascular:      Rate and Rhythm: Normal rate and regular rhythm. Heart sounds: No murmur. Pulmonary:      Breath sounds: No wheezing. Comments: Coarse breath sounds bilaterally  Decreased breath sounds on the left  Abdominal:      Tenderness: There is abdominal tenderness. There is no guarding or rebound.       Comments: Epigastric tenderness   Musculoskeletal:      Right lower leg: Edema

## 2020-08-28 NOTE — PROGRESS NOTES
Weight 243.2 %   · BMI: 53.6  · BMI Categories: Obese Class 3 (BMI 40.0 or greater)       Nutrition Diagnosis:   · Inadequate oral intake related to altered GI function as evidenced by NPO or clear liquid status due to medical condition, nutrition support - parenteral nutrition    Nutrition Interventions:   Food and/or Nutrient Delivery:  Continue NPO, Modify Parenteral Nutrition  Nutrition Education/Counseling:  No recommendation at this time   Coordination of Nutrition Care:  Continued Inpatient Monitoring    Goals:  Meet 75% of nutrient needs with TPN       Nutrition Monitoring and Evaluation:   Food/Nutrient Intake Outcomes:  Parenteral Nutrition Intake/Tolerance  Physical Signs/Symptoms Outcomes:  Biochemical Data, GI Status, Nausea or Vomiting, Fluid Status or Edema, Hemodynamic Status, Weight     Discharge Planning: Too soon to determine     Some areas of assessment may be incomplete due to standard COVID-19 Precautions. Monica Celaya R.D., L.D.   Phone: [phone]

## 2020-08-28 NOTE — PROGRESS NOTES
Recent Labs     08/27/20  0327 08/28/20  0352    140   K 3.5* 3.9    104   CO2 29 26   BUN 11 13   CREATININE 0.53 0.66   GLUCOSE 180* 126*   CALCIUM 8.0* 8.1*   MG 1.6 2.3   PHOS 4.5 4.8*   TRIG 133  --        Estimated Creatinine Clearance: 156 mL/min (based on SCr of 0.66 mg/dL). Wt Readings from Last 1 Encounters:   08/25/20 (!) 322 lb 8.5 oz (146.3 kg)       Central line access Yes     Insulin sliding scale Yes     Lipid volume 100  ml  Melony Solis. Danitza Michel. Ph.  8/28/2020  2:38 PM

## 2020-08-28 NOTE — PROGRESS NOTES
Total of 15 ml of serous fluid removed via thoracentesis and sent to lab for ordered testing. ICU RN at bedside.

## 2020-08-28 NOTE — PROGRESS NOTES
Pt was on 5 LPM NC for 2 hours due to nausea. Pt able to return to bipap at this time. IPAP titrated to 12 from 14 due to higher volumes and limit air to the stomach. O2 titrated to 30%. Pt tolerating changes and maintaining SpO2 in the low to mid 90s. Mask and alarms appropriate.

## 2020-08-28 NOTE — PLAN OF CARE
Problem: Falls - Risk of:  Goal: Will remain free from falls  Description: Will remain free from falls  8/28/2020 1548 by Radha Anderson RN  Outcome: Ongoing  No falls this shift. Patient uses call light for assistance. No attempts to get out of bed, alert and oriented. Problem: Pain:  Goal: Pain level will decrease  Description: Pain level will decrease  8/28/2020 1548 by Radha Anderson RN  Outcome: Ongoing  Pain assessed and medication given PRN. Positioned for comfort. Problem: Nausea/Vomiting:  Goal: Able to drink  Description: Able to drink  8/28/2020 1548 by Radha Anderson RN  Outcome: Ongoing  Patient able to tolerate ice chips and popsicles. Problem: Gas Exchange - Impaired:  Goal: Levels of oxygenation will improve  Description: Levels of oxygenation will improve  8/28/2020 1548 by Radha Anderson RN  Outcome: Ongoing  O2 sats maintained, per Dr Tammi Travis use bipap for distress. Problem: Skin Integrity:  Goal: Will show no infection signs and symptoms  Description: Will show no infection signs and symptoms  8/28/2020 1548 by Radha Anderson RN  Outcome: Ongoing  Skin integrity maintained, no new skin breakdown.

## 2020-08-28 NOTE — PROGRESS NOTES
Patient was seen and examined. Having temperatures. Vital signs are stable. Urine output is excellent. Still complaining of abdominal pain. On nasal oxygen. No pressors. Abdomen is soft morbidly obese tender upper abdomen no peritoneal signs. Extremity positive edema. Blood work reviewed. WBC count is elevated. Infectious diseases on the case. Possible tapping of the pleural effusion. On TPN. Continue supportive care. Discussed with nursing staff. Patient may have ice chips and popsicles.

## 2020-08-28 NOTE — CARE COORDINATION
ONGOING DISCHARGE PLAN:    Pt on bipap VS. 5L NC. Case Management continues to follow for discharge planning needs. PTA, pt was from home independently and she declined VNS. Her mother declined VNS for her as well. Active order for IV Merrem and Vanco.  Also on Precedex gtt. On TPN and lipids as well. Has PICC. Will continue to follow for additional discharge needs.     Electronically signed by Luis Li RN on 8/28/2020 at 9:03 AM

## 2020-08-28 NOTE — PROGRESS NOTES
Pt on hospital bipap set to 14/6, Back up RR 16, and 40% O2. Pt is significantly tachypneic. Mask and alarms appropriate. Pt is currently wearing a total mask which doesn't rest on the nose.

## 2020-08-29 LAB
ABSOLUTE BANDS #: 1.97 K/UL (ref 0–1)
ABSOLUTE EOS #: 0.22 K/UL (ref 0–0.4)
ABSOLUTE IMMATURE GRANULOCYTE: ABNORMAL K/UL (ref 0–0.3)
ABSOLUTE LYMPH #: 3.29 K/UL (ref 1–4.8)
ABSOLUTE MONO #: 1.1 K/UL (ref 0.1–1.3)
ALBUMIN SERPL-MCNC: 2.2 G/DL (ref 3.5–5.2)
ALBUMIN/GLOBULIN RATIO: ABNORMAL (ref 1–2.5)
ALP BLD-CCNC: 82 U/L (ref 35–104)
ALT SERPL-CCNC: 25 U/L (ref 5–33)
ANION GAP SERPL CALCULATED.3IONS-SCNC: 8 MMOL/L (ref 9–17)
AST SERPL-CCNC: 31 U/L
BANDS: 9 % (ref 0–10)
BASOPHILS # BLD: 0 % (ref 0–2)
BASOPHILS ABSOLUTE: 0 K/UL (ref 0–0.2)
BILIRUB SERPL-MCNC: 0.19 MG/DL (ref 0.3–1.2)
BUN BLDV-MCNC: 14 MG/DL (ref 6–20)
BUN/CREAT BLD: ABNORMAL (ref 9–20)
CALCIUM SERPL-MCNC: 8.4 MG/DL (ref 8.6–10.4)
CHLORIDE BLD-SCNC: 104 MMOL/L (ref 98–107)
CO2: 28 MMOL/L (ref 20–31)
CREAT SERPL-MCNC: 0.55 MG/DL (ref 0.5–0.9)
CULTURE: NORMAL
CULTURE: NORMAL
DIFFERENTIAL TYPE: ABNORMAL
EOSINOPHILS RELATIVE PERCENT: 1 % (ref 0–4)
GFR AFRICAN AMERICAN: >60 ML/MIN
GFR NON-AFRICAN AMERICAN: >60 ML/MIN
GFR SERPL CREATININE-BSD FRML MDRD: ABNORMAL ML/MIN/{1.73_M2}
GFR SERPL CREATININE-BSD FRML MDRD: ABNORMAL ML/MIN/{1.73_M2}
GLUCOSE BLD-MCNC: 115 MG/DL (ref 65–105)
GLUCOSE BLD-MCNC: 133 MG/DL (ref 65–105)
GLUCOSE BLD-MCNC: 147 MG/DL (ref 65–105)
GLUCOSE BLD-MCNC: 155 MG/DL (ref 70–99)
GLUCOSE BLD-MCNC: 96 MG/DL (ref 65–105)
HCT VFR BLD CALC: 27.4 % (ref 36–46)
HEMOGLOBIN: 8.9 G/DL (ref 12–16)
IMMATURE GRANULOCYTES: ABNORMAL %
LACTATE DEHYDROGENASE: 350 U/L (ref 135–214)
LIPASE: 76 U/L (ref 13–60)
LYMPHOCYTES # BLD: 15 % (ref 24–44)
Lab: NORMAL
Lab: NORMAL
MCH RBC QN AUTO: 30.5 PG (ref 26–34)
MCHC RBC AUTO-ENTMCNC: 32.3 G/DL (ref 31–37)
MCV RBC AUTO: 94.4 FL (ref 80–100)
METAMYELOCYTES ABSOLUTE COUNT: 0.44 K/UL
METAMYELOCYTES: 2 %
MONOCYTES # BLD: 5 % (ref 1–7)
MORPHOLOGY: ABNORMAL
MORPHOLOGY: ABNORMAL
NRBC AUTOMATED: ABNORMAL PER 100 WBC
PDW BLD-RTO: 14.7 % (ref 11.5–14.9)
PHOSPHORUS: 4.6 MG/DL (ref 2.6–4.5)
PLATELET # BLD: 262 K/UL (ref 150–450)
PLATELET ESTIMATE: ABNORMAL
PMV BLD AUTO: 8.3 FL (ref 6–12)
POTASSIUM SERPL-SCNC: 4.2 MMOL/L (ref 3.7–5.3)
PROCALCITONIN: 0.22 NG/ML
RBC # BLD: 2.91 M/UL (ref 4–5.2)
RBC # BLD: ABNORMAL 10*6/UL
SEG NEUTROPHILS: 68 % (ref 36–66)
SEGMENTED NEUTROPHILS ABSOLUTE COUNT: 14.88 K/UL (ref 1.3–9.1)
SODIUM BLD-SCNC: 140 MMOL/L (ref 135–144)
SPECIMEN DESCRIPTION: NORMAL
SPECIMEN DESCRIPTION: NORMAL
TOTAL PROTEIN: 6 G/DL (ref 6.4–8.3)
WBC # BLD: 21.9 K/UL (ref 3.5–11)
WBC # BLD: ABNORMAL 10*3/UL

## 2020-08-29 PROCEDURE — 84145 PROCALCITONIN (PCT): CPT

## 2020-08-29 PROCEDURE — 97161 PT EVAL LOW COMPLEX 20 MIN: CPT

## 2020-08-29 PROCEDURE — 2580000003 HC RX 258: Performed by: NURSE PRACTITIONER

## 2020-08-29 PROCEDURE — 6360000002 HC RX W HCPCS: Performed by: NURSE PRACTITIONER

## 2020-08-29 PROCEDURE — 6360000002 HC RX W HCPCS: Performed by: STUDENT IN AN ORGANIZED HEALTH CARE EDUCATION/TRAINING PROGRAM

## 2020-08-29 PROCEDURE — 6370000000 HC RX 637 (ALT 250 FOR IP): Performed by: NURSE PRACTITIONER

## 2020-08-29 PROCEDURE — 94761 N-INVAS EAR/PLS OXIMETRY MLT: CPT

## 2020-08-29 PROCEDURE — 84100 ASSAY OF PHOSPHORUS: CPT

## 2020-08-29 PROCEDURE — 2000000000 HC ICU R&B

## 2020-08-29 PROCEDURE — 99233 SBSQ HOSP IP/OBS HIGH 50: CPT | Performed by: INTERNAL MEDICINE

## 2020-08-29 PROCEDURE — 2500000003 HC RX 250 WO HCPCS: Performed by: SURGERY

## 2020-08-29 PROCEDURE — 2580000003 HC RX 258: Performed by: RADIOLOGY

## 2020-08-29 PROCEDURE — 2500000003 HC RX 250 WO HCPCS: Performed by: INTERNAL MEDICINE

## 2020-08-29 PROCEDURE — 2580000003 HC RX 258: Performed by: STUDENT IN AN ORGANIZED HEALTH CARE EDUCATION/TRAINING PROGRAM

## 2020-08-29 PROCEDURE — 83615 LACTATE (LD) (LDH) ENZYME: CPT

## 2020-08-29 PROCEDURE — 82947 ASSAY GLUCOSE BLOOD QUANT: CPT

## 2020-08-29 PROCEDURE — 83690 ASSAY OF LIPASE: CPT

## 2020-08-29 PROCEDURE — 80053 COMPREHEN METABOLIC PANEL: CPT

## 2020-08-29 PROCEDURE — 85025 COMPLETE CBC W/AUTO DIFF WBC: CPT

## 2020-08-29 PROCEDURE — 2580000003 HC RX 258: Performed by: INTERNAL MEDICINE

## 2020-08-29 PROCEDURE — 2500000003 HC RX 250 WO HCPCS: Performed by: STUDENT IN AN ORGANIZED HEALTH CARE EDUCATION/TRAINING PROGRAM

## 2020-08-29 PROCEDURE — 36415 COLL VENOUS BLD VENIPUNCTURE: CPT

## 2020-08-29 PROCEDURE — 6360000002 HC RX W HCPCS: Performed by: INTERNAL MEDICINE

## 2020-08-29 PROCEDURE — 94660 CPAP INITIATION&MGMT: CPT

## 2020-08-29 PROCEDURE — C9113 INJ PANTOPRAZOLE SODIUM, VIA: HCPCS | Performed by: NURSE PRACTITIONER

## 2020-08-29 PROCEDURE — 99232 SBSQ HOSP IP/OBS MODERATE 35: CPT | Performed by: INTERNAL MEDICINE

## 2020-08-29 RX ADMIN — DEXMEDETOMIDINE 1.1 MCG/KG/HR: 100 INJECTION, SOLUTION, CONCENTRATE INTRAVENOUS at 23:31

## 2020-08-29 RX ADMIN — ENOXAPARIN SODIUM 30 MG: 30 INJECTION SUBCUTANEOUS at 09:37

## 2020-08-29 RX ADMIN — DEXMEDETOMIDINE 1.1 MCG/KG/HR: 100 INJECTION, SOLUTION, CONCENTRATE INTRAVENOUS at 08:53

## 2020-08-29 RX ADMIN — DULOXETINE HYDROCHLORIDE 60 MG: 60 CAPSULE, DELAYED RELEASE ORAL at 20:35

## 2020-08-29 RX ADMIN — I.V. FAT EMULSION 100 ML: 20 EMULSION INTRAVENOUS at 17:55

## 2020-08-29 RX ADMIN — PANTOPRAZOLE SODIUM 40 MG: 40 INJECTION, POWDER, FOR SOLUTION INTRAVENOUS at 09:31

## 2020-08-29 RX ADMIN — DEXMEDETOMIDINE 1.1 MCG/KG/HR: 100 INJECTION, SOLUTION, CONCENTRATE INTRAVENOUS at 06:26

## 2020-08-29 RX ADMIN — CETIRIZINE HYDROCHLORIDE 10 MG: 10 TABLET, FILM COATED ORAL at 09:31

## 2020-08-29 RX ADMIN — BUSPIRONE HYDROCHLORIDE 15 MG: 15 TABLET ORAL at 20:35

## 2020-08-29 RX ADMIN — MEROPENEM 1 G: 1 INJECTION, POWDER, FOR SOLUTION INTRAVENOUS at 02:22

## 2020-08-29 RX ADMIN — PRAMIPEXOLE DIHYDROCHLORIDE 0.25 MG: 0.25 TABLET ORAL at 09:30

## 2020-08-29 RX ADMIN — DEXMEDETOMIDINE 1.4 MCG/KG/HR: 100 INJECTION, SOLUTION, CONCENTRATE INTRAVENOUS at 00:56

## 2020-08-29 RX ADMIN — DEXMEDETOMIDINE 1.1 MCG/KG/HR: 100 INJECTION, SOLUTION, CONCENTRATE INTRAVENOUS at 14:58

## 2020-08-29 RX ADMIN — ENALAPRILAT 1.25 MG: 1.25 INJECTION INTRAVENOUS at 11:59

## 2020-08-29 RX ADMIN — DEXMEDETOMIDINE 1.4 MCG/KG/HR: 100 INJECTION, SOLUTION, CONCENTRATE INTRAVENOUS at 03:07

## 2020-08-29 RX ADMIN — MEROPENEM 1 G: 1 INJECTION, POWDER, FOR SOLUTION INTRAVENOUS at 16:42

## 2020-08-29 RX ADMIN — IRON SUCROSE 200 MG: 20 INJECTION, SOLUTION INTRAVENOUS at 20:34

## 2020-08-29 RX ADMIN — ACETAMINOPHEN 650 MG: 325 TABLET, FILM COATED ORAL at 11:59

## 2020-08-29 RX ADMIN — DEXMEDETOMIDINE 1.1 MCG/KG/HR: 100 INJECTION, SOLUTION, CONCENTRATE INTRAVENOUS at 18:03

## 2020-08-29 RX ADMIN — DULOXETINE HYDROCHLORIDE 60 MG: 60 CAPSULE, DELAYED RELEASE ORAL at 09:31

## 2020-08-29 RX ADMIN — ACETAMINOPHEN 650 MG: 325 TABLET, FILM COATED ORAL at 20:35

## 2020-08-29 RX ADMIN — HYDROMORPHONE HYDROCHLORIDE 0.5 MG: 1 INJECTION, SOLUTION INTRAMUSCULAR; INTRAVENOUS; SUBCUTANEOUS at 17:39

## 2020-08-29 RX ADMIN — MEROPENEM 1 G: 1 INJECTION, POWDER, FOR SOLUTION INTRAVENOUS at 09:33

## 2020-08-29 RX ADMIN — PROMETHAZINE HYDROCHLORIDE 12.5 MG: 25 INJECTION INTRAMUSCULAR; INTRAVENOUS at 21:47

## 2020-08-29 RX ADMIN — HYDROMORPHONE HYDROCHLORIDE 0.5 MG: 1 INJECTION, SOLUTION INTRAMUSCULAR; INTRAVENOUS; SUBCUTANEOUS at 21:46

## 2020-08-29 RX ADMIN — Medication 10 ML: at 09:25

## 2020-08-29 RX ADMIN — QUETIAPINE FUMARATE 800 MG: 400 TABLET, EXTENDED RELEASE ORAL at 20:35

## 2020-08-29 RX ADMIN — ENALAPRILAT 1.25 MG: 1.25 INJECTION INTRAVENOUS at 06:24

## 2020-08-29 RX ADMIN — ENOXAPARIN SODIUM 30 MG: 30 INJECTION SUBCUTANEOUS at 20:35

## 2020-08-29 RX ADMIN — PROMETHAZINE HYDROCHLORIDE 12.5 MG: 25 INJECTION INTRAMUSCULAR; INTRAVENOUS at 13:37

## 2020-08-29 RX ADMIN — HYDROMORPHONE HYDROCHLORIDE 0.5 MG: 1 INJECTION, SOLUTION INTRAMUSCULAR; INTRAVENOUS; SUBCUTANEOUS at 03:04

## 2020-08-29 RX ADMIN — Medication 400 MG: at 09:30

## 2020-08-29 RX ADMIN — HYDROMORPHONE HYDROCHLORIDE 0.5 MG: 1 INJECTION, SOLUTION INTRAMUSCULAR; INTRAVENOUS; SUBCUTANEOUS at 09:28

## 2020-08-29 RX ADMIN — CALCIUM GLUCONATE: 94 INJECTION, SOLUTION INTRAVENOUS at 17:55

## 2020-08-29 RX ADMIN — Medication 10 ML: at 10:47

## 2020-08-29 RX ADMIN — ENALAPRILAT 1.25 MG: 1.25 INJECTION INTRAVENOUS at 18:11

## 2020-08-29 RX ADMIN — DEXMEDETOMIDINE 1.1 MCG/KG/HR: 100 INJECTION, SOLUTION, CONCENTRATE INTRAVENOUS at 11:52

## 2020-08-29 RX ADMIN — HYDROMORPHONE HYDROCHLORIDE 0.5 MG: 1 INJECTION, SOLUTION INTRAMUSCULAR; INTRAVENOUS; SUBCUTANEOUS at 13:37

## 2020-08-29 ASSESSMENT — ENCOUNTER SYMPTOMS
CONSTIPATION: 0
DIARRHEA: 0
SHORTNESS OF BREATH: 1
VOMITING: 0
NAUSEA: 0
EYE DISCHARGE: 0
COUGH: 0
ABDOMINAL PAIN: 1
SHORTNESS OF BREATH: 0

## 2020-08-29 ASSESSMENT — PAIN DESCRIPTION - PAIN TYPE: TYPE: ACUTE PAIN

## 2020-08-29 ASSESSMENT — PAIN DESCRIPTION - ONSET: ONSET: ON-GOING

## 2020-08-29 ASSESSMENT — PAIN SCALES - GENERAL
PAINLEVEL_OUTOF10: 7
PAINLEVEL_OUTOF10: 4
PAINLEVEL_OUTOF10: 9
PAINLEVEL_OUTOF10: 4
PAINLEVEL_OUTOF10: 6
PAINLEVEL_OUTOF10: 7
PAINLEVEL_OUTOF10: 9
PAINLEVEL_OUTOF10: 4
PAINLEVEL_OUTOF10: 9
PAINLEVEL_OUTOF10: 6
PAINLEVEL_OUTOF10: 7
PAINLEVEL_OUTOF10: 3
PAINLEVEL_OUTOF10: 4
PAINLEVEL_OUTOF10: 7
PAINLEVEL_OUTOF10: 4
PAINLEVEL_OUTOF10: 4
PAINLEVEL_OUTOF10: 9
PAINLEVEL_OUTOF10: 7
PAINLEVEL_OUTOF10: 4

## 2020-08-29 ASSESSMENT — PAIN DESCRIPTION - ORIENTATION: ORIENTATION: MID

## 2020-08-29 ASSESSMENT — PAIN DESCRIPTION - DESCRIPTORS: DESCRIPTORS: ACHING;CONSTANT

## 2020-08-29 ASSESSMENT — PAIN DESCRIPTION - LOCATION: LOCATION: ABDOMEN

## 2020-08-29 ASSESSMENT — PAIN DESCRIPTION - FREQUENCY: FREQUENCY: CONTINUOUS

## 2020-08-29 NOTE — PROGRESS NOTES
Physical Therapy    Facility/Department: [facility] ICU  Initial Assessment    NAME: Ash Jolley  : 1975  MRN: [id number]    Date of Service: 2020    Discharge Recommendations  Continue to assess pending progress     Assessment  Body structures, Functions, Activity limitations: Decreased functional mobility ; Increased pain  Treatment Diagnosis: Decreased functional mobility  Prognosis: Excellent  Decision Making: Low Complexity  History: No personal factors were apparent that may have an effect on the this patient's plan of care. Exam: NPRS - 7/10, decreased bed mobility, transfers and gait. Clinical Presentation: Patient's symptoms are evolving  PT Education: Goals;PT Role;Plan of Care    Activity Tolerance  Patient limited by endurance(Patient required numerous rest breaks and had signs of SOB throughout the evaluation.)       Patient Diagnosis(es): The encounter diagnosis was Acute pancreatitis, unspecified complication status, unspecified pancreatitis type. has a past medical history of Abnormal levels of other serum enzymes, Anxiety, Bilateral leg edema, Chronic kidney disease, Depression, DVT (deep venous thrombosis) (HCC), Elevated liver enzymes, Fibromyalgia, Headache(784.0), Hx of blood clots, Hypertension, Kidney stone, Obstructive sleep apnea syndrome, Pancreatitis, Pleural effusion, SOB (shortness of breath), Supraventricular tachycardia ([address] and SVT (supraventricular tachycardia) ([address] has a past surgical history that includes Thyroid lobectomy (Right); Cholecystectomy (); Knee arthroscopy (Left); Foot surgery (Right); Endometrial ablation; Atrial ablation surgery; eye surgery (Bilateral); Endoscopy, colon, diagnostic; back surgery; pr cysto/uretero/pyeloscopy w/lithotripsy (Left, 2017); and EXCISION LESION HAND / FINGER (Right, 2019).     Restrictions  Restrictions/Precautions  Restrictions/Precautions: Up as Tolerated  Vision/Hearing  Hearing: Within functional limits       General  Chart Reviewed: Yes  Patient assessed for rehabilitation services?: Yes  Response To Previous Treatment: Not applicable  Family / Caregiver Present: No  Referring Practitioner: José Miguel Sevilla MD  Referral Date : 08/29/20  Diagnosis: Pancreatitis  Follows Commands: Within Functional Limits    Subjective  Pain Screening  Patient Currently in Pain: Yes  Pain Assessment  Pain Assessment: 0-10  Pain Level: 7  Patient's Stated Pain Goal: No pain  Pain Type: Acute pain  Pain Location: Abdomen  Pain Orientation: Mid  Pain Descriptors: Aching;Constant  Pain Frequency: Continuous  Pain Onset: On-going  Multiple Pain Sites: No  Vital Signs  Patient Currently in Pain: Yes    Orientation  Overall Orientation Status: Within Normal Limits    Social/Functional History  Lives With: Son  Type of Home: Apartment(D[place]lex)  Home Layout: One level  Home Access: Stairs to enter without rails(Two steps)  Entrance Stairs - Number of Steps: Two  Ambulation Assistance: Independent  Transfer Assistance: Independent    Objective  Bed mobility  Rolling to Left: Modified independent  Rolling to Right: Modified independent  Supine to Sit: Modified independent  Sit to Supine: Modified independent  Scooting: Modified independent  Transfers  Sit to Stand: Stand by assistance  Stand to sit: Stand by assistance  Bed to Chair: Stand by assistance  Stand Pivot Transfers: Stand by assistance  Ambulation  No(Due to current medical status)    Plan  Times per week: 6-7x  Times per day: Daily  Current Treatment Recommendations: Transfer Training, Patient/Caregiver Education & Training, Gait Training, Functional Mobility Training  Safety Devices  Type of devices: Call light within reach, Left in bed    Goals  Short term goals  Time Frame for Short term goals: 6 visits  Short term goal 1: Pt will demonstrate independence with bed mobility sit to stand, stand to sit and rolling.   Short term goal 2: Pt will demonstrate independence with

## 2020-08-29 NOTE — PROGRESS NOTES
Patient was seen and examined. Awake alert. Some distress. Low-grade temp. Vital signs are stable. Urine output is excellent. Abdomen is soft obese nonspecific upper abdominal tenderness nothing acute. Extremity positive edema. Blood work reviewed. Overall stable. Up in the chair. Sips of clear liquids. Continue TPN. Conservative management.

## 2020-08-29 NOTE — PLAN OF CARE
Problem: Falls - Risk of:  Goal: Will remain free from falls  Description: Will remain free from falls  8/29/2020 1828 by Chris Arnold RN  Outcome: Ongoing  8/29/2020 0542 by Adrián Burton RN  Outcome: Ongoing  Goal: Absence of physical injury  Description: Absence of physical injury  8/29/2020 1828 by Chris Arnold RN  Outcome: Ongoing  8/29/2020 0542 by Adrián Burton RN  Outcome: Ongoing     Problem: Pain:  Goal: Pain level will decrease  Description: Pain level will decrease  8/29/2020 1828 by Chris Arnold RN  Outcome: Ongoing  8/29/2020 0542 by Adrián Burton RN  Outcome: Ongoing  Goal: Control of acute pain  Description: Control of acute pain  8/29/2020 1828 by Chris Arnold RN  Outcome: Ongoing  8/29/2020 0542 by Adrián Burton RN  Outcome: Ongoing  Goal: Control of chronic pain  Description: Control of chronic pain  8/29/2020 1828 by Chris Arnold RN  Outcome: Ongoing  8/29/2020 0542 by Adrián Burton RN  Outcome: Ongoing     Problem: Nausea/Vomiting:  Goal: Absence of nausea/vomiting  Description: Absence of nausea/vomiting  8/29/2020 1828 by Chris Arnold RN  Outcome: Ongoing  8/29/2020 0542 by Adrián Burton RN  Outcome: Ongoing  Goal: Able to drink  Description: Able to drink  8/29/2020 1828 by Chris Arnold RN  Outcome: Ongoing  8/29/2020 0542 by Adrián Burton RN  Outcome: Ongoing  Goal: Able to eat  Description: Able to eat  8/29/2020 1828 by Chris Arnold RN  Outcome: Ongoing  8/29/2020 0542 by Adrián Burton RN  Outcome: Ongoing  Goal: Ability to achieve adequate nutritional intake will improve  Description: Ability to achieve adequate nutritional intake will improve  8/29/2020 1828 by Chris Arnold RN  Outcome: Ongoing  8/29/2020 0542 by Adrián Burton RN  Outcome: Ongoing     Problem: Gas Exchange - Impaired:  Goal: Levels of oxygenation will improve  Description: Levels of oxygenation will improve  8/29/2020 1828 by Chris Arnold RN  Outcome: Alexander Guerrier RN  Outcome: Ongoing  8/29/2020 0542 by Tray Giles RN  Outcome: Ongoing

## 2020-08-29 NOTE — PROGRESS NOTES
Pulmonary Critical Care Progress Note  Chandrika Weiss MD     Patient seen for the follow up of atelectasis, pleural effusion, suspected obstructive sleep apnea, history of smoking, Pneumonia of both lungs due to infectious organism     Subjective:  He used BiPAP last night. She remains on Precedex drip. She is lying comfortably in the bedside recliner. She denies any chest pain. She has occasional cough. She remains n.p.o. she has good urine output. Examination:  Vitals: BP (!) 169/87   Pulse 113   Temp (P) 99.2 °F (37.3 °C) (Oral)   Resp 25   Ht 5' 5\" (1.651 m)   Wt (!) 322 lb 8.5 oz (146.3 kg)   SpO2 94%   BMI 53.67 kg/m²   General appearance:  alert and cooperative with exam  Neck: No JVD  Lungs: Bibasilar crackles, decreased air exchange  Heart: regular rate and rhythm, S1, S2 normal, no gallop  Abdomen: Soft, non tender, + BS  Extremities: no cyanosis or clubbing.   Positive edema    LABs:  CBC:   Recent Labs     08/28/20  0352 08/29/20  0401   WBC 22.4* 21.9*   HGB 9.3* 8.9*   HCT 28.4* 27.4*    262     BMP:   Recent Labs     08/28/20  0352 08/29/20  0401    140   K 3.9 4.2   CO2 26 28   BUN 13 14   CREATININE 0.66 0.55   LABGLOM >60 >60   GLUCOSE 126* 155*     LIVER PROFILE:  Recent Labs     08/28/20  0352 08/29/20  0401   AST 24 31   ALT 24 25   LABALBU 2.1* 2.2*     ABG:  Lab Results   Component Value Date    PHART 7.484 08/28/2020    DGP4HMI 37.2 08/28/2020    PO2ART 66.1 08/28/2020    YDX2HPP 27.9 08/28/2020    N8SKMMQC 92.1 08/28/2020    FIO2 NOT REPORTED 08/28/2020       Lab Results   Component Value Date    PHART 7.484 08/28/2020    RYJ1DRR 37.2 08/28/2020    PO2ART 66.1 08/28/2020    USJ1ICD 27.9 08/28/2020    NBEA NOT REPORTED 08/28/2020    PBEA 4.5 08/28/2020    V0ZEERMM 92.1 08/28/2020    FIO2 NOT REPORTED 08/28/2020     Radiology:  8/28/2020 8/26/2020      Impression:  · Acute respiratory insufficiency  · Acute pancreatitis  · Obesity/suspected obstructive sleep apnea  · Atelectasis  · Pleural effusion, s/p thoracentesis for 15 ml on 8/28/2020    Recommendations:  · Continue IV Merrem  · Continue TPN  · Albuterol and Ipratropium Q 4 hours and prn  · Incentive spirometry every hour while awake  · X-ray chest in am  · Labs: CBC and BMP in am  · BiPAP while asleep and as needed  · IV Precedex   · 2 liters/min via nasal cannula  · DVT prophylaxis with low molecular weight heparin  · Will follow with you    Fredi Berry MD, [facility]  Pulmonary Critical Care and Sleep Medicine,  [facility]  Cell: [phone]  Office: [phone]

## 2020-08-29 NOTE — PROGRESS NOTES
Comprehensive Nutrition Assessment    Type and Reason for Visit:  Reassess    Nutrition Recommendations/Plan: Continue NPO (except ice chips and popsicles). Continue lipids and TPN with following adjustments to additives: remove MVI and trace elements. Nutrition Assessment:  Pt has been taking some ice chips and popsicles. TPN and lipids to continue.     Malnutrition Assessment:  Malnutrition Status:  No malnutrition    Context:  Acute Illness     Findings of the 6 clinical characteristics of malnutrition:  Energy Intake:  No significant decrease in energy intake  Weight Loss:  No significant weight loss     Body Fat Loss:  No significant body fat loss     Muscle Mass Loss:  No significant muscle mass loss    Fluid Accumulation:  1 - Mild Extremities   Strength:  Not Performed    Estimated Daily Nutrient Needs:  Energy (kcal):  2020 based on [employer] with no additional factors; Weight Used for Energy Requirements:  Admission     Protein (g):  102-113 gm protein based on 1.8-2 gm/kg IBW; Weight Used for Protein Requirements:  Ideal            Nutrition Related Findings:  Edema: +1 RUE, LUE, LLE, RLE      Wounds:  None       Current Nutrition Therapies:    Current Parenteral Nutrition Orders:  · Type and Formula: 2-in-1 Custom   · Lipids: 100ml  · Duration: Continuous  · Rate/Volume: 75 ml/ 1800 ml  · Current PN Order Provides: 1784 kcal, 90 gm protein      Anthropometric Measures:  · Height: 5' 5\" (165.1 cm)  · Current Body Weight: 322 lb (146.1 kg)   · Admission Body Weight: 304 lb (137.9 kg)    · Ideal Body Weight: 125 lbs; % Ideal Body Weight 243.2 %   · BMI: 53.6  · BMI Categories: Obese Class 3 (BMI 40.0 or greater)       Nutrition Diagnosis:   · Inadequate oral intake related to altered GI function as evidenced by NPO or clear liquid status due to medical condition, nutrition support - parenteral nutrition      Nutrition Interventions:   Food and/or Nutrient Delivery:  Continue NPO, Modify Parenteral Nutrition  Nutrition Education/Counseling:  No recommendation at this time   Coordination of Nutrition Care:  Continued Inpatient Monitoring    Goals:  Meet 75% of nutrient needs with TPN       Nutrition Monitoring and Evaluation:   Food/Nutrient Intake Outcomes:  Parenteral Nutrition Intake/Tolerance  Physical Signs/Symptoms Outcomes:  Biochemical Data, GI Status, Nausea or Vomiting, Fluid Status or Edema, Hemodynamic Status, Weight     Discharge Planning: Too soon to determine     Romayne Rand, RD, LD  [phone]    Some areas of assessment may be incomplete due to standard COVID-19 Precautions.

## 2020-08-29 NOTE — PROGRESS NOTES
[address] [facility]    PROGRESS NOTE             8/29/2020    10:49 AM    Name:   Thirza Duverney  MRN:     [id number]     Acct:      [de-identified]   Room:   2003/2003-01  IP Day:  8  Admit Date:  8/19/2020  7:35 PM    PCP:  Franco Sow  Code Status:  Full Code    Subjective:     C/C:   Chief Complaint   Patient presents with    Abdominal Pain     RUQ     Interval History Status: improved. Patient seen and examined at bedside. No acute events overnight. Patient is wearing BiPAP overnight. She is on Precedex 2.7mcg/kg/hr. Currently on Merrem, Vancomycin has been held by pharmacy due to increased trough. Thoracentesis done yesterday yielded 15mL of dark yellow fluid and culture is pending. Patient says she does not have pleuritic chest pain today, she is breathing 94% on 4L of NC. She had a temp of 101.1 yesterday at 8PM and has not spiked any fevers since then. Has urinated 3765mL in past 24 hours and is +40 fluid balance. Lipase is trending up. Pro calcitonin is trending down. Patient still NPO and receiving TPN. Abdominal pain has improved but still present. Hemoglobin is stable. WBC is 21.9 today from 22.4. Brief History:     Refer to H&P. Review of Systems:     Review of Systems   Constitutional: Positive for fatigue. Negative for chills and fever. Respiratory: Positive for shortness of breath. Negative for cough. Cardiovascular: Negative for chest pain and leg swelling. Gastrointestinal: Positive for abdominal pain. Negative for constipation, diarrhea, nausea and vomiting. Psychiatric/Behavioral: The patient is nervous/anxious. All other systems reviewed and are negative. Medications: Allergies:     Allergies   Allergen Reactions    Aripiprazole      Bad reaction    Eletriptan      Other reaction(s): Swelling-throat    Hydrocodone-Acetaminophen      Other reaction(s): Unknown    Oxycodone-Acetaminophen Other reaction(s): Unknown    Sumatriptan Other (See Comments)    Triptans      Other reaction(s): Unknown    Zosyn [Piperacillin Sod-Tazobactam So]      Rash 8/26/20 possibley caused by zosyn    Lamotrigine Rash       Current Meds:   Scheduled Meds:    insulin lispro  0-6 Units Subcutaneous Q6H    iron sucrose  200 mg Intravenous Q24H    enoxaparin  30 mg Subcutaneous BID    meropenem (MERREM) IVPB extended  1 g Intravenous Q8H    enalaprilat  1.25 mg Intravenous 4 times per day    magnesium oxide  400 mg Oral Daily    sodium chloride flush  10 mL Intravenous 2 times per day    QUEtiapine  800 mg Oral Nightly    pramipexole  0.25 mg Oral Daily    pantoprazole  40 mg Intravenous Daily    And    sodium chloride (PF)  10 mL Intravenous Daily    busPIRone  15 mg Oral Nightly    DULoxetine  60 mg Oral BID    cetirizine  10 mg Oral Daily     Continuous Infusions:    PN-Adult 2-in-1 Central Line (Standard)      PN-Adult 2-in-1 [employer] (Standard) 75 mL/hr at 08/28/20 1751    fat emulsion Stopped (08/29/20 0600)    niCARdipine Stopped (08/27/20 2240)    dextrose      dexmedetomidine (PRECEDEX) IV infusion 1.1 mcg/kg/hr (08/29/20 0853)     PRN Meds: LORazepam, sodium chloride flush, acetaminophen, hydrALAZINE, HYDROmorphone, glucose, dextrose, glucagon (rDNA), dextrose, perflutren lipid microspheres, promethazine (PHENERGAN) in sodium chloride 0.9% IVPB, sodium chloride flush, magnesium sulfate, potassium chloride **OR** potassium alternative oral replacement **OR** potassium chloride, acetaminophen, [DISCONTINUED] promethazine **OR** ondansetron, melatonin ER    Data:     Past Medical History:   has a past medical history of Abnormal levels of other serum enzymes, Anxiety, Bilateral leg edema, Chronic kidney disease, Depression, DVT (deep venous thrombosis) (HCC), Elevated liver enzymes, Fibromyalgia, Headache(784.0), Hx of blood clots, Hypertension, Kidney stone, Obstructive sleep apnea syndrome, Pancreatitis, Pleural effusion, SOB (shortness of breath), Supraventricular tachycardia ([address] and SVT (supraventricular tachycardia) ([address] Social History:   reports that she quit smoking about 8 months ago. Her smoking use included cigarettes. She smoked 1.00 pack per day. She has never used smokeless tobacco. She reports current alcohol use. She reports that she does not use drugs. Family History:   Family History   Problem Relation Age of Onset    Heart Disease Father     High Blood Pressure Brother        Vitals:  /66   Pulse 108   Temp (P) 99.2 °F (37.3 °C) (Oral)   Resp 28   Ht 5' 5\" (1.651 m)   Wt (!) 322 lb 8.5 oz (146.3 kg)   SpO2 94%   BMI 53.67 kg/m²   Temp (24hrs), Av.1 °F (37.3 °C), Min:97.9 °F (36.6 °C), Max:100.1 °F (37.8 °C)    Recent Labs     20  1152 20  1709 20  1413 20  0753   POCGLU 169* 120* 120* 115*       I/O(24Hr):     Intake/Output Summary (Last 24 hours) at 2020 1049  Last data filed at 2020 0541  Gross per 24 hour   Intake 3804.3 ml   Output 3765 ml   Net 39.3 ml       Labs:    CBC with Differential:    Lab Results   Component Value Date    WBC 21.9 2020    RBC 2.91 2020    HGB 8.9 2020    HCT 27.4 2020     2020    MCV 94.4 2020    MCH 30.5 2020    MCHC 32.3 2020    RDW 14.7 2020    METASPCT 2 2020    LYMPHOPCT 15 2020    LYMPHOPCT 35.0 2016    MONOPCT 5 2020    MONOPCT 6.8 2016    EOSPCT 3.8 2016    BASOPCT 0 2020    BASOPCT 0.9 2016    MONOSABS 1.10 2020    MONOSABS 0.6 2016    LYMPHSABS 3.29 2020    LYMPHSABS 3.1 2016    EOSABS 0.22 2020    EOSABS 0.3 2016    BASOSABS 0.00 2020    DIFFTYPE NOT REPORTED 2020     CMP:    Lab Results   Component Value Date     2020    K 4.2 2020     2020    CO2 28 2020    BUN 14 2020 CREATININE 0.55 08/29/2020    GFRAA >60 08/29/2020    LABGLOM >60 08/29/2020    GLUCOSE 155 08/29/2020    PROT 6.0 08/29/2020    PROT 7.2 01/23/2015    LABALBU 2.2 08/29/2020    CALCIUM 8.4 08/29/2020    BILITOT 0.19 08/29/2020    ALKPHOS 82 08/29/2020    AST 31 08/29/2020    ALT 25 08/29/2020     AMYLASE:    Lab Results   Component Value Date    AMYLASE 39 08/28/2020     LIPASE:    Lab Results   Component Value Date    LIPASE 76 08/29/2020       Lab Results   Component Value Date/Time    SPECIAL NOT REPORTED 08/28/2020 05:30 PM     Lab Results   Component Value Date/Time    CULTURE PENDING 08/28/2020 05:30 PM         Radiology:    Ct Abdomen Pelvis Wo Contrast Additional Contrast? None    Result Date: 8/20/2020  EXAMINATION: CT OF THE ABDOMEN AND PELVIS WITHOUT CONTRAST 8/20/2020 10:22 pm TECHNIQUE: CT of the abdomen and pelvis was performed without the administration of intravenous contrast. Multiplanar reformatted images are provided for review. Dose modulation, iterative reconstruction, and/or weight based adjustment of the mA/kV was utilized to reduce the radiation dose to as low as reasonably achievable. COMPARISON: CT abdomen and pelvis with contrast August 19, 2020. HISTORY: ORDERING SYSTEM PROVIDED HISTORY: severe acute pain TECHNOLOGIST PROVIDED HISTORY: severe acute pain Is the patient pregnant? ->Yes Reason for Exam: worsening abdominal pain Acuity: Acute Type of Exam: Ongoing Relevant Medical/Surgical History: surg - gallbladder FINDINGS: Lower Chest: Persisting bilateral lower lung scattered consolidation and atelectasis is present with trace posterior left-sided pleural effusion identified. Collette Ruts Heart is normal in size and configuration.  Organs: Interval mild worsening of severe peripancreatic fat stranding and fluid is noted with intraperitoneal free fluid collecting within the bilateral pericolic gutters and collecting inferiorly within the pelvis with increased volume in comparison with the prior study. Diffuse fatty infiltration of the liver is again seen. The liver, gallbladder, spleen, pancreas, adrenal glands, kidneys, are otherwise unremarkable in appearance. GI/Bowel: The stomach is unremarkable without wall thickening or distention. Bowel loops are unremarkable in appearance without evidence of obstruction, distension or mucosal thickening. Pelvis: Berkowitz catheter is noted within the nondistended but grossly unremarkable urinary bladder. Bilateral fallopian tube Essure coils are seen without evidence of complication. The uterus is anteverted in position and is unremarkable in appearance there no evidence of pelvic free fluid is seen. Peritoneum/Retroperitoneum: No evidence of retroperitoneal or intraperitoneal lymphadenopathy is identified. . Bones/Soft Tissues: No evidence of retroperitoneal or intraperitoneal lymphadenopathy is identified. No evidence of intraperitoneal free fluid is seen. 1. Mild interval worsening of severe peripancreatic inflammation associated with acute pancreatitis, as discussed above. 2. No evidence of complication i.e. pseudocyst noted. 3. Stable bilateral lower lung multifocal consolidation atelectasis suggesting pneumonia versus alveolar edema. 4. Hepatic steatosis, unchanged. Xr Chest (single View Frontal)    Result Date: 8/28/2020  EXAMINATION: ONE XRAY VIEW OF THE CHEST 8/28/2020 6:20 am COMPARISON: 08/27/2020 HISTORY: ORDERING SYSTEM PROVIDED HISTORY: f/u pneumonia TECHNOLOGIST PROVIDED HISTORY: f/u pneumonia Reason for Exam: f/u pneumonia Acuity: Unknown Type of Exam: Subsequent/Follow-up Additional signs and symptoms: f/u pneumonia Relevant Medical/Surgical History: f/u pneumonia FINDINGS: Poor inspiration with decrease lung volumes worse on the prior. Right upper extremity PICC line remain in place. Cardiomediastinal silhouette stable accentuated by the low lung volumes. No focal consolidation.   Patchy airspace opacities in the left upper lung accentuated by low lung volume. Poor inspiration with low lung volumes worse than prior. Patchy airspace opacities in the left upper lung accentuated by low lung volume. Xr Chest (single View Frontal)    Result Date: 8/21/2020  EXAMINATION: ONE XRAY VIEW OF THE CHEST 8/21/2020 6:06 am COMPARISON: August 20, 2020 chest exam HISTORY: ORDERING SYSTEM PROVIDED HISTORY: f/u atelectasis TECHNOLOGIST PROVIDED HISTORY: f/u atelectasis Reason for Exam: F/U atelectasis. Acuity: Unknown Type of Exam: Unknown Additional signs and symptoms: F/U atelectasis. FINDINGS: Interval insertion of right PICC line catheter, the tip projected at the right atrium Mild cardiomegaly Limited extra sonya exam with low volume lungs, mild streaky, left greater than right, bibasilar atelectasis. Grossly clear upper lungs     Line placement as above Limited expiratory exam with mild streaky bibasilar atelectasis     Ct Abdomen Pelvis W Iv Contrast Additional Contrast? None    Result Date: 8/26/2020  EXAMINATION: CTA OF THE CHEST; CT OF THE ABDOMEN AND PELVIS WITH CONTRAST 8/26/2020 10:34 am TECHNIQUE: CTA of the chest was performed after the administration of intravenous contrast.  Multiplanar reformatted images are provided for review. MIP images are provided for review. Dose modulation, iterative reconstruction, and/or weight based adjustment of the mA/kV was utilized to reduce the radiation dose to as low as reasonably achievable.; CT of the abdomen and pelvis was performed with the administration of intravenous contrast. Multiplanar reformatted images are provided for review. Dose modulation, iterative reconstruction, and/or weight based adjustment of the mA/kV was utilized to reduce the radiation dose to as low as reasonably achievable.  COMPARISON: CT chest May 26, 2019 CT abdomen and pelvis August 20, 2020 HISTORY: ORDERING SYSTEM PROVIDED HISTORY: Hypoxia, recurrent fevers TECHNOLOGIST PROVIDED HISTORY: Hypoxia, recurrent fevers demonstrates the tip of the catheter at the cavo-atrial junction. Successful ultrasound and fluoroscopy guided right basilic vein 5 Hebrew power injectable dual-lumen PICC placement. Ready for use. Xr Chest Portable    Result Date: 8/27/2020  EXAMINATION: ONE XRAY VIEW OF THE CHEST 8/27/2020 6:15 pm COMPARISON: 08/26/2020 radiograph HISTORY: ORDERING SYSTEM PROVIDED HISTORY: Increased WOB TECHNOLOGIST PROVIDED HISTORY: Increased WOB Reason for Exam: Increased WOB Acuity: Unknown Type of Exam: Unknown Additional signs and symptoms: Increased WOB FINDINGS: Right PICC line stable. The heart is enlarged and there is severe perihilar opacification that is increased centrally. Diffuse ground-glass opacities are also increased bilaterally. No pneumothorax. No skeletal finding. Perihilar and diffuse ground-glass opacities have increased from prior imaging. Pattern suspected to represent pulmonary edema. Developing pneumonitis can not be excluded. Xr Chest Portable    Result Date: 8/26/2020  EXAMINATION: ONE XRAY VIEW OF THE CHEST 8/26/2020 9:07 am COMPARISON: August 24, 2020, chest exam HISTORY: ORDERING SYSTEM PROVIDED HISTORY: PNA TECHNOLOGIST PROVIDED HISTORY: PNA Reason for Exam: PT CO increased SOB and CP. Acuity: Acute Type of Exam: Initial FINDINGS: Right PICC line catheter tip is projected at the SVC right atrial junction Persistent mild cardiomegaly Expiratory exam with mild diffuse prominence of lung markings likely related to the low volume lungs. Mild vascular congestion is not reliably excluded. Interval increase in now mild patchy right upper lobe infiltrate. Moderate left basilar infiltrate     Interval increase in now mild patchy right upper lobe infiltrate with moderate left basilar infiltrate Line placement as above Expiratory exam, possible mild vascular congestion.   Repeat upright good inspiration PA view of the chest is suggested for more accurate assessment of the pulmonary vascularity RECOMMENDATION: Repeat upright good inspiration PA view of the chest is suggested for more accurate assessment of the pulmonary vascularity     Xr Chest Portable    Result Date: 8/24/2020  EXAMINATION: ONE XRAY VIEW OF THE CHEST 8/24/2020 9:10 am COMPARISON: August 21, 2020, chest exam HISTORY: ORDERING SYSTEM PROVIDED HISTORY: Resp failure TECHNOLOGIST PROVIDED HISTORY: Resp failure Reason for Exam: resp failure Acuity: Unknown Type of Exam: Unknown FINDINGS: Interval insertion of a right PICC line catheter, the tip is projected at the right atrium. Limited markedly rotated exam No obvious significant pleuroparenchymal or mediastinal findings. Limited assessment of the left lung base     Limited markedly rotated exam, limited left base assessment Line placement as above No obvious acute cardiopulmonary findings     Xr Chest Portable    Result Date: 8/22/2020  EXAMINATION: ONE XRAY VIEW OF THE CHEST 8/22/2020 8:41 am COMPARISON: 08/21/2020 HISTORY: ORDERING SYSTEM PROVIDED HISTORY: Difficulty breathing TECHNOLOGIST PROVIDED HISTORY: Difficulty breathing Reason for Exam: dyspnea Acuity: Acute Type of Exam: Initial FINDINGS: Cardiomegaly. Low lung volumes. Right-sided PICC line remains in place. No focal consolidation. No pleural effusion or pneumothorax. Low lung volumes. This accentuates cardiomegaly. No focal consolidation. Xr Chest Portable    Result Date: 8/21/2020  EXAMINATION: ONE XRAY VIEW OF THE CHEST 8/21/2020 9:27 am COMPARISON: Chest radiograph performed 08/21/2020. HISTORY: ORDERING SYSTEM PROVIDED HISTORY: Increased oxygen demand TECHNOLOGIST PROVIDED HISTORY: Increased oxygen demand Reason for Exam: Increased oxygen demand Acuity: Acute Type of Exam: Initial Additional signs and symptoms: Increased oxygen demand FINDINGS: There are small bilateral effusions. There is a left basilar infiltrate. There is no pneumothorax. The heart is prominent.   The upper abdomen is unremarkable. The extrathoracic soft tissues are unremarkable. Cardiomegaly and small bilateral effusions with adjacent left basilar infiltrate. Xr Chest Portable    Result Date: 8/20/2020  EXAMINATION: ONE XRAY VIEW OF THE CHEST 8/20/2020 7:38 am COMPARISON: July 18, 2020 chest exam HISTORY: ORDERING SYSTEM PROVIDED HISTORY: possible pneumonia on CT chest TECHNOLOGIST PROVIDED HISTORY: possible pneumonia on CT chest Reason for Exam: possible pneumonia on CT chest Acuity: Acute Type of Exam: Initial Additional signs and symptoms: possible pneumonia on CT chest FINDINGS: Expiratory exam with mild streaky bibasilar density. Normal cardiopericardial silhouette No significant pleural or mediastinal findings     Expiratory exam with mild streaky bibasilar atelectasis     Ct Chest Pulmonary Embolism W Contrast    Result Date: 8/26/2020  EXAMINATION: CTA OF THE CHEST; CT OF THE ABDOMEN AND PELVIS WITH CONTRAST 8/26/2020 10:34 am TECHNIQUE: CTA of the chest was performed after the administration of intravenous contrast.  Multiplanar reformatted images are provided for review. MIP images are provided for review. Dose modulation, iterative reconstruction, and/or weight based adjustment of the mA/kV was utilized to reduce the radiation dose to as low as reasonably achievable.; CT of the abdomen and pelvis was performed with the administration of intravenous contrast. Multiplanar reformatted images are provided for review. Dose modulation, iterative reconstruction, and/or weight based adjustment of the mA/kV was utilized to reduce the radiation dose to as low as reasonably achievable.  COMPARISON: CT chest May 26, 2019 CT abdomen and pelvis August 20, 2020 HISTORY: ORDERING SYSTEM PROVIDED HISTORY: Hypoxia, recurrent fevers TECHNOLOGIST PROVIDED HISTORY: Hypoxia, recurrent fevers Reason for Exam: Hypoxia, recurrent fevers, best images possible due to patient size 322 pounds Acuity: Acute Type of Exam: Initial; ORDERING SYSTEM PROVIDED HISTORY: Splenic vein thrombosis? TECHNOLOGIST PROVIDED HISTORY: Splenic vein thrombosis? Reason for Exam: Splenic vein thrombosis? best images possible due to patient size 322  poounds Acuity: Acute Type of Exam: Initial FINDINGS: CT CHEST: Chest wall: No axillary adenopathy. Mediastinum: Cardiomegaly. No pericardial effusion. No adenopathy. Pulmonary arteries: Significantly limited evaluation for pulmonary embolus due to bolus timing, motion, and patient body habitus. Questionable pulmonary embolus within the right lower lobe. Lungs: Moderate left pleural effusion. Small right pleural effusion. Left lower lobe consolidation versus atelectasis. Bilateral upper lobe areas of consolidation. Bones: No acute osseous abnormality. CT ABDOMEN-PELVIS: Organs: Decreased attenuation of the liver is consistent with hepatic steatosis. No focal liver lesion. Cholecystectomy. Severe pancreatitis with surrounding inflammatory changes and fluid. Mild splenomegaly. No adrenal lesion. No hydronephrosis. Right renal cyst.  Focal area of scarring within the right kidney is unchanged. GI/Bowel: No bowel obstruction. Secondary involvement of the descending colon adjacent to the peripancreatic inflammatory changes along the tail of the pancreas. Pelvis: Essure devices. No significant free fluid. No bladder calculus. Peritoneum/Retroperitoneum: Splenic artery is patent. No evidence of splenic artery thrombosis. No definite splenic venous thrombosis. A portion of the splenic vein as it courses along the tail of the pancreas is indistinct. However, proximal and distal to this, there is flow and this may be related to artifact. No abdominal aortic aneurysm. Prominent peripancreatic lymph nodes are likely reactive. Bones/Soft Tissues: No acute soft tissue abnormality. No acute osseous abnormality. Significantly limited evaluation for pulmonary embolus. Questionable right lower lobe pulmonary embolus. Bilateral lung opacities are likely pneumonia. Recommend follow-up to document resolution. Moderate left pleural effusion. Severe acute pancreatitis. No definite splenic venous thrombosis. Critical results were called by Dr. Amada Carson MD to Dr. Tammi Travis on 2020 at 11:40. Us Retroperitoneal Limited    Result Date: 2020  EXAMINATION: ULTRASOUND OF THE KIDNEYS 2020 1:19 pm COMPARISON: 2020 CT abdomen and pelvis HISTORY: ORDERING SYSTEM PROVIDED HISTORY: Low UOP TECHNOLOGIST PROVIDED HISTORY: Bilateral Renal Ultrasound Low UOP Acuity: Acute Type of Exam: Initial FINDINGS: Exam is limited due to patient body habitus. The right kidney measures 15.7 centimetres in length and the left kidney measures 16 cm in length. There is no hydronephrosis in either kidney. New focal renal lesion. Diffuse hepatic steatosis. No hydronephrosis in either kidney. Physical Examination:        Physical Exam  Vitals signs reviewed. Constitutional:       General: She is awake. She is not in acute distress. Appearance: She is morbidly obese. She is not ill-appearing. Interventions: Nasal cannula in place. Cardiovascular:      Rate and Rhythm: Normal rate and regular rhythm. Heart sounds: Normal heart sounds. Pulmonary:      Effort: Pulmonary effort is normal.      Breath sounds: Decreased air movement present. Abdominal:      General: Bowel sounds are normal. There is no distension. Palpations: Abdomen is soft. Tenderness: There is generalized abdominal tenderness and tenderness in the left upper quadrant. Musculoskeletal:      Right lower le+ Pitting Edema present. Left lower le+ Pitting Edema present. Neurological:      Mental Status: She is alert and easily aroused. Psychiatric:         Behavior: Behavior is cooperative.        Assessment:        Primary Problem  Pneumonia of both lungs due to infectious organism    Active Hospital Problems Diagnosis Date Noted    Pneumonia of both lungs due to infectious organism [J18.9] 08/27/2020    History of anxiety disorder [Z86.59]     Acute respiratory failure with hypoxia ([address] [J96.01] 08/21/2020    Hypocalcemia [E83.51] 08/21/2020    Sepsis ([facility] [A41.9] 08/20/2020    Morbid obesity ([facility] [E66.01]     Abdominal pain, epigastric [R10.13]     Nausea [R11.0]     Acute pancreatitis [K85.90] 08/19/2020    Fibromyalgia [M79.7]     HTN (hypertension) [I10] 12/15/2014    Major depression [F32.9] 10/30/2014    Restless leg syndrome [G25.81] 10/22/2013    Class 3 severe obesity due to excess calories with serious comorbidity in [facility]) [E66.01] 01/14/2013    Anxiety [F41.9] 01/14/2013       Plan:        1. Severe acute pancreatitis complicated by sepsis and possible ARDS, etiology unknown  - CT abdomen and pelvis on admission showed pancreatic edema and fluid consistent with pancreatitis and reactive lymph nodes; lipase 1,065 on admission, normalized  - Repeat CT abdomen and pelvis on 8/27 showed severe acute pancreatitis and no definite splenic venous thrombosis  - TGs 259 (H), repeat TG normal  - Etiology unknown: patient refused MRCP, autoimmune workup unremarkable  - Lipase trending up, 57 to 62 to 76 today   - Pro calcitonin is trending down, 0.22 today<0.25<0.32     2.  Acute hypoxic respiratory failure, originally secondary to sepsis vs. ARDS secondary to pancreatitis, now likely secondary to pneumonia vs. PE  - 8/19 CT abdomen and pelvis showed bilateral lower lobe consolidation which could be edema or pneumonia; CT PE 8/26 showed a questionable right lower lobe PE and bilateral lung opacities likely pneumonia; D-dimer over 20,000; patient continuing on prophylactic dosing due to pulm recs for low PE suspicion  - CXR 08/29: Patchy airspace opacities in left upper lung accentuated by low lung volume   - Pulmonology on board  - On bipap and precedex and lorazapam q6h, has been tolerating longer nasal cannula trials; wean off precedex as able; mother informed us patient vapes everyday, possibly contributing to difficulty weaning off bipap  - COVID-19 negative 8/26  - Continue antibiotics: completed 7 day course of zosyn  - On meropenem day 4   - Vancomycin day 3 today but on hold as per pharmacy   - Respiratory virus panel negative  - CT PE showed moderate left pleural effusion yesterday  - Breathing 94% on 4L NC  - Thoracentesis cultures pending  - Thoracentesis: pH 8.5, 49 Neutrophils, 40 Lymphocytes, Dark yellow, 3410 WBC, 3520 RBC     3. Sepsis secondary to pancreatitis / pneumonia  - On 8/20 lactic acid 5.4; SIRS criteria of tachypnea, tachycardia, and elevated WBC 20.5 met  - Continue antibiotics: completed 7 day course of zosyn  - On meropenem day 4   - Vancomycin day 3 today but on hold as per pharmacy   - UA normal; blood cultures done on 8/20 NGTD x 6 days; repeat blood cultures on 8/23 NGTD x 5 days  - Repeat acute phase reactants on 8/26: Procal elevated at 0.33, CRP elevated at 296.2; lactate dehydrogenase 429; ferritin 1141; lactic acid normal; COVID negative  - Continue antibiotics      4. HTN  - Continue IV vasotec 1.25 mg q6h and hydralazine PRN  - Currently off cardene drip     5. Normocytic anemia  - Baseline around 13  - Iron studies low iron 20 and low TIBC 180 and B12/folate normal, received IV venofer for 4 days, will continue for 1 more day      6. Anxiety/depression  - Buspar 15 mg nightly, cymbalta 60 mg BID, quetiapine 800 mg nightly     7. Restless leg syndrome  - Pramipexole 0.25 mg daily     PICC line in place 8/20  Diet: TPN  DVT prophylaxis: lovenox 30 mg BID    Kathy Gilliland MD  8/29/2020  10:49 AM   Attending Physician Statement  I have discussed the care of Thirza Duverney and I have examined the patient myselft and taken ros and hpi , including pertinent history and exam findings,  with the resident.  I have reviewed the key elements of all parts of the encounter with the resident. I agree with the assessment, plan and orders as documented by the resident.   Acute severe pancreatitis home meds reviewed no inflicting medication noted triglyceride was not high enough to justify  Blood calcium levels not significantly elevated idiopathic pancreatitis at this time conservative treatment on 2 L oxygen  Low-grade fever likely secondary to pancreatitis  Versus possible DVT will do a venous Doppler CT pulmonary embolism showed a questionable PE off of full dose anticoagulation now DVT prophylaxis with 30 mg twice a day  Morbid obesity BMI 53.67  Patient seen in ICU  ID following on IV meropenem  repeta ct to rule pancreatic necrosis  Electronically signed by Demetrius Mendes MD

## 2020-08-29 NOTE — CARE COORDINATION
DISCHARGE PLANNING NOTE:      LSW referred for patient to discharge to [facility] as she meets criteria. Precert was not started. Patient wants to speak with family members before making decision. Remains on IV merrem for pneumonia. IV vanco on hold. Additional orders include precedex and cardene gtt, TPN and lipids. GI is following. Started on clear liquids today. Patient had thoracentesis yesterday with 15ml's removed. Bilateral venous scan ordered. Will continue to follow for discharge needs. PT/OT on board.

## 2020-08-29 NOTE — PROGRESS NOTES
GI Progress notes    8/29/2020   8:59 AM    Name:  Evert Lai  MRN:    [id number]     Acct:     [de-identified]   Room:  2003/2003-01  IP Day: 8     Admit Date: 8/19/2020  7:35 PM  PCP: Zaria Guzman    Subjective:     C/C:   Chief Complaint   Patient presents with    Abdominal Pain     RUQ       Interval History: Status: not changed. Patient seen and examined. No acute events overnight. Patient currently on 4L per NC  Currently afebrile, VSS. No reported nausea, vomiting. Tolerating ice chips. LUQ pain present but imporoved. Has worsening leukocytosis, 21.9  Lipase 76    Labs and progress notes reviewed. ROS:  Constitutional: negative for fevers and sweats  Gastrointestinal: negative for constipation, diarrhea, nausea and vomiting      Medications: Allergies:    Allergies   Allergen Reactions    Aripiprazole      Bad reaction    Eletriptan      Other reaction(s): Swelling-throat    Hydrocodone-Acetaminophen      Other reaction(s): Unknown    Oxycodone-Acetaminophen      Other reaction(s): Unknown    Sumatriptan Other (See Comments)    Triptans      Other reaction(s): Unknown    Zosyn [Piperacillin Sod-Tazobactam So]      Rash 8/26/20 possibley caused by zosyn    Lamotrigine Rash       Current Meds: PN-Adult 2-in-1 Central Line (Standard), Continuous TPN  iron sucrose (VENOFER) 200 mg in sodium chloride 0.9 % 100 mL IVPB, Q24H  fat emulsion 20 % infusion 100 mL, Continuous TPN  enoxaparin (LOVENOX) injection 30 mg, BID  LORazepam (ATIVAN) injection 0.5 mg, Q6H PRN  sodium chloride flush 0.9 % injection 10 mL, PRN  acetaminophen (TYLENOL) suppository 650 mg, Q4H PRN  meropenem (MERREM) 1 g in sodium chloride 0.9 % 100 mL extended infusion IVPB, Q8H  hydrALAZINE (APRESOLINE) injection 10 mg, Q6H PRN  niCARdipine (CARDENE) 25 mg in sodium chloride 0.9 % 250 mL infusion, Continuous  enalaprilat (VASOTEC) injection 1.25 mg, 4 times per day  HYDROmorphone (DILAUDID) injection 0.5 mg, Q4H PRN  glucose (GLUTOSE) 40 % oral gel 15 g, PRN  dextrose 50 % IV solution, PRN  glucagon (rDNA) injection 1 mg, PRN  dextrose 5 % solution, PRN  insulin lispro (HUMALOG) injection vial 0-6 Units, TID WC  perflutren lipid microspheres (DEFINITY) injection 2.2 mg, ONCE PRN  dexmedetomidine (PRECEDEX) 400 mcg in sodium chloride 0.9 % 100 mL infusion, Continuous  magnesium oxide (MAG-OX) tablet 400 mg, Daily  promethazine (PHENERGAN) 12.5 mg in sodium chloride 0.9 % 50 mL IVPB, Q4H PRN  sodium chloride flush 0.9 % injection 10 mL, 2 times per day  sodium chloride flush 0.9 % injection 10 mL, PRN  magnesium sulfate 1 g in dextrose 5% 100 mL IVPB, PRN  potassium chloride (KLOR-CON M) extended release tablet 40 mEq, PRN    Or  potassium bicarb-citric acid (EFFER-K) effervescent tablet 40 mEq, PRN    Or  potassium chloride 10 mEq/100 mL IVPB (Peripheral Line), PRN  acetaminophen (TYLENOL) tablet 650 mg, Q4H PRN  ondansetron (ZOFRAN) injection 4 mg, Q6H PRN  QUEtiapine (SEROQUEL XR) extended release tablet 800 mg, Nightly  pramipexole (MIRAPEX) tablet 0.25 mg, Daily  pantoprazole (PROTONIX) injection 40 mg, Daily    And  sodium chloride (PF) 0.9 % injection 10 mL, Daily  melatonin ER tablet 5 mg, Nightly PRN  busPIRone (BUSPAR) tablet 15 mg, Nightly  DULoxetine (CYMBALTA) extended release capsule 60 mg, BID  cetirizine (ZYRTEC) tablet 10 mg, Daily        Data:     Code Status:  Full Code    Family History   Problem Relation Age of Onset    Heart Disease Father     High Blood Pressure Brother        Social History     Socioeconomic History    Marital status:      Spouse name: Not on file    Number of children: Not on file    Years of education: Not on file    Highest education level: Not on file   Occupational History    Not on file   Social Needs    Financial resource strain: Not on file    Food insecurity     Worry: Not on file     Inability: Not on file    Transportation needs     Medical: Not on file Non-medical: Not on file   Tobacco Use    Smoking status: Former Smoker     Packs/day: 1.00     Types: Cigarettes     Last attempt to quit: 2019     Years since quittin.7    Smokeless tobacco: Never Used    Tobacco comment: today last smoke   Substance and Sexual Activity    Alcohol use: Yes     Alcohol/week: 0.0 standard drinks     Comment: rarely    Drug use: No    Sexual activity: Not on file   Lifestyle    Physical activity     Days per week: Not on file     Minutes per session: Not on file    Stress: Not on file   Relationships    Social connections     Talks on phone: Not on file     Gets together: Not on file     Attends Shinto service: Not on file     Active member of club or organization: Not on file     Attends meetings of clubs or organizations: Not on file     Relationship status: Not on file    Intimate partner violence     Fear of current or ex partner: Not on file     Emotionally abused: Not on file     Physically abused: Not on file     Forced sexual activity: Not on file   Other Topics Concern    Not on file   Social History Narrative    Not on file       Vitals:  BP (!) 158/88   Pulse 95   Temp 97.9 °F (36.6 °C) (Axillary)   Resp 24   Ht 5' 5\" (1.651 m)   Wt (!) 322 lb 8.5 oz (146.3 kg)   SpO2 94%   BMI 53.67 kg/m²   Temp (24hrs), Av.1 °F (37.3 °C), Min:97.9 °F (36.6 °C), Max:100.1 °F (37.8 °C)    Recent Labs     20  1152 20  1709 20  1413 20  0753   POCGLU 169* 120* 120* 115*       I/O (24Hr):     Intake/Output Summary (Last 24 hours) at 2020 0859  Last data filed at 2020 0541  Gross per 24 hour   Intake 3804.3 ml   Output 3765 ml   Net 39.3 ml       Labs:      CBC:   Lab Results   Component Value Date    WBC 21.9 2020    RBC 2.91 2020    HGB 8.9 2020    HCT 27.4 2020    MCV 94.4 2020    MCH 30.5 2020    MCHC 32.3 2020    RDW 14.7 2020     2020    MPV 8.3 2020 CBC with Differential:    Lab Results   Component Value Date    WBC 21.9 08/29/2020    RBC 2.91 08/29/2020    HGB 8.9 08/29/2020    HCT 27.4 08/29/2020     08/29/2020    MCV 94.4 08/29/2020    MCH 30.5 08/29/2020    MCHC 32.3 08/29/2020    RDW 14.7 08/29/2020    METASPCT 2 08/29/2020    LYMPHOPCT 15 08/29/2020    LYMPHOPCT 35.0 03/22/2016    MONOPCT 5 08/29/2020    MONOPCT 6.8 03/22/2016    EOSPCT 3.8 03/22/2016    BASOPCT 0 08/29/2020    BASOPCT 0.9 03/22/2016    MONOSABS 1.10 08/29/2020    MONOSABS 0.6 03/22/2016    LYMPHSABS 3.29 08/29/2020    LYMPHSABS 3.1 03/22/2016    EOSABS 0.22 08/29/2020    EOSABS 0.3 03/22/2016    BASOSABS 0.00 08/29/2020    DIFFTYPE NOT REPORTED 08/29/2020     Hemoglobin/Hematocrit:    Lab Results   Component Value Date    HGB 8.9 08/29/2020    HCT 27.4 08/29/2020     CMP:    Lab Results   Component Value Date     08/29/2020    K 4.2 08/29/2020     08/29/2020    CO2 28 08/29/2020    BUN 14 08/29/2020    CREATININE 0.55 08/29/2020    GFRAA >60 08/29/2020    LABGLOM >60 08/29/2020    GLUCOSE 155 08/29/2020    PROT 6.0 08/29/2020    PROT 7.2 01/23/2015    LABALBU 2.2 08/29/2020    CALCIUM 8.4 08/29/2020    BILITOT 0.19 08/29/2020    ALKPHOS 82 08/29/2020    AST 31 08/29/2020    ALT 25 08/29/2020     BMP:    Lab Results   Component Value Date     08/29/2020    K 4.2 08/29/2020     08/29/2020    CO2 28 08/29/2020    BUN 14 08/29/2020    LABALBU 2.2 08/29/2020    CREATININE 0.55 08/29/2020    CALCIUM 8.4 08/29/2020    GFRAA >60 08/29/2020    LABGLOM >60 08/29/2020    GLUCOSE 155 08/29/2020     PT/INR:    Lab Results   Component Value Date    PROTIME 12.2 08/20/2020    INR 0.9 08/20/2020     PTT:    Lab Results   Component Value Date    APTT 24.4 08/20/2020   [APTT}    Physical Examination:        General appearance: alert, cooperative and no distress  Mental Status: oriented to person, place and time and normal affect  Lungs: clear to auscultation bilaterally, normal effort  Heart: regular rate and rhythm, no murmur,  Abdomen: soft, LUQ tender to palpation, nondistended, bowel sounds present, obese abdomen    Assessment:        Primary Problem  Pneumonia of both lungs due to infectious organism     Active Hospital Problems    Diagnosis Date Noted    Pneumonia of both lungs due to infectious organism [J18.9] 08/27/2020    History of anxiety disorder [Z86.59]     Acute respiratory failure with hypoxia ([address] [J96.01] 08/21/2020    Hypocalcemia [E83.51] 08/21/2020    Sepsis ([address] [A41.9] 08/20/2020    Morbid obesity ([address] [E66.01]     Abdominal pain, epigastric [R10.13]     Nausea [R11.0]     Acute pancreatitis [K85.90] 08/19/2020    Fibromyalgia [M79.7]     HTN (hypertension) [I10] 12/15/2014    Major depression [F32.9] 10/30/2014    Restless leg syndrome [G25.81] 10/22/2013    Class 3 severe obesity due to excess calories with serious comorbidity in adult [facility]) [E66.01] 01/14/2013    Anxiety [F41.9] 01/14/2013     Past Medical History:   Diagnosis Date    Abnormal levels of other serum enzymes     Anxiety     Bilateral leg edema     Chronic kidney disease     right renal cyst    Depression     DVT (deep venous thrombosis) ([address] 1997    after delivery    Elevated liver enzymes     Fibromyalgia     Headache(784.0)     migraines    Hx of blood clots     1997 left calf    Hypertension     Kidney stone     Obstructive sleep apnea syndrome     Pancreatitis 2001    Pleural effusion 2001    SOB (shortness of breath)     Supraventricular tachycardia (HCC)     SVT (supraventricular tachycardia) ([address] 9/8/2015        Plan:        1. Acute pancreatitis  1. Continue LUQ pain though improved  2. Continue TPN  3. Tolerating ice chips  4. Lipase 76, continue to trend  5. CT abd/pelvis 8/26 - no evidence of pseudocyst, necrosis, free air  2. Anemia  1. Hgb stable  2. No overt bleeding  3. Continue to trend  4. Transfuse for hgb<7  5.  IV venofer ordered x2    Explained to the patient and d/W Nursing Staff  Will F/U with you  Please call or Page for any issues or change in status  Thanks    Electronically signed by Jarrell Lefort, APRN - NP on 8/29/2020 at 8:59 AM       I independently performed an evaluation on Raul Gordon. I have reviewed the above documentation completed by the Nurse Practitioner and agree with the documented findings and plan of care. I have personally evaluated this patient with the APRN and have completed at least one if not all key elements of the E/M (history, physical exam, and MDM). Please see my additional contributions to the HPI, physical exam, assessment, and medical decision making. Patient seen in ICU around 10:30 AM.  Discussed with the nursing staff. Patient is sitting in a chair. Stated that she is feeling better than few days ago. Does not have flatus. Has nausea. Continues to have abdominal pain. Lipase levels minimally elevated. Continue to have respiratory issues. Abdominal examination revealed obese abdomen. Bowel sounds sluggish. Has tenderness all over the abdomen. Chart reviewed, imaging studies and labs noted. Recommendations:    Given that her lipase levels are mildly elevated, we will keep her n.p.o. status today. Can have ice sips. We will reevaluate tomorrow. Patient does not look toxic at this time. Discussed with the nursing staff regarding the care.

## 2020-08-29 NOTE — PLAN OF CARE
Problem: Falls - Risk of:  Goal: Will remain free from falls  Description: Will remain free from falls  8/29/2020 0542 by Cheri Abarca RN  Outcome: Ongoing  8/28/2020 1548 by Emmanuel Sorenson RN  Outcome: Ongoing  Goal: Absence of physical injury  Description: Absence of physical injury  Outcome: Ongoing     Problem: Pain:  Goal: Pain level will decrease  Description: Pain level will decrease  8/29/2020 0542 by Cheri Abarca RN  Outcome: Ongoing  8/28/2020 1548 by Emmanuel Sorenson RN  Outcome: Ongoing  Goal: Control of acute pain  Description: Control of acute pain  Outcome: Ongoing  Goal: Control of chronic pain  Description: Control of chronic pain  Outcome: Ongoing     Problem: Nausea/Vomiting:  Goal: Absence of nausea/vomiting  Description: Absence of nausea/vomiting  Outcome: Ongoing  Goal: Able to drink  Description: Able to drink  8/29/2020 0542 by Cheri Abarca RN  Outcome: Ongoing  8/28/2020 1548 by Emmanuel Sorenson RN  Outcome: Ongoing  Goal: Able to eat  Description: Able to eat  Outcome: Ongoing  Goal: Ability to achieve adequate nutritional intake will improve  Description: Ability to achieve adequate nutritional intake will improve  Outcome: Ongoing     Problem: Gas Exchange - Impaired:  Goal: Levels of oxygenation will improve  Description: Levels of oxygenation will improve  8/29/2020 0542 by Cheri Abarca RN  Outcome: Ongoing  8/28/2020 1548 by Emmanuel Sorenson RN  Outcome: Ongoing     Problem: Serum Glucose Level - Abnormal:  Goal: Ability to maintain appropriate glucose levels will improve  Description: Ability to maintain appropriate glucose levels will improve  Outcome: Ongoing     Problem: Tissue Perfusion, Altered:  Goal: Circulatory function within specified parameters  Description: Circulatory function within specified parameters  Outcome: Ongoing     Problem: Venous Thromboembolism:  Goal: Will show no signs or symptoms of venous thromboembolism  Description: Will show no signs or symptoms of venous thromboembolism  Outcome: Ongoing  Goal: Absence of signs or symptoms of impaired coagulation  Description: Absence of signs or symptoms of impaired coagulation  Outcome: Ongoing     Problem: Nutrition  Goal: Optimal nutrition therapy  Outcome: Ongoing     Problem: Skin Integrity:  Goal: Will show no infection signs and symptoms  Description: Will show no infection signs and symptoms  8/29/2020 0542 by Pretty Hylton RN  Outcome: Ongoing  8/28/2020 1548 by Jose Rafael Guallpa RN  Outcome: Ongoing  Goal: Absence of new skin breakdown  Description: Absence of new skin breakdown  Outcome: Ongoing

## 2020-08-29 NOTE — FLOWSHEET NOTE
It was good to see patient alert and in chair; writer told patient she had been prayed for over the past days. Pt noted she was beginning to feel somewhat better and was thankful for even a little progress. 08/29/20 1211   Encounter Summary   Services provided to: Patient   Referral/Consult From: [facility]   (8-29-20)   Complexity of Encounter Moderate   Length of Encounter 15 minutes   Spiritual Assessment Completed Yes   Spiritual/[employer]   Type Spiritual support   Assessment Approachable   Intervention Active listening;Explored feelings, thoughts, concerns;Prayer;Sustaining presence/ Ministry of presence; Discussed illness/injury and it's impact   Outcome Expressed gratitude;Engaged in conversation;Expressed feelings/needs/concerns;Receptive;Encouraged

## 2020-08-29 NOTE — PROGRESS NOTES
[facility] -   Infectious diseases evaluation  admission date 8/19/2020    reason for consultation:   Fever    Impression :   · Fever /leukocytosis possible pneumonia LLL  · Left pleural effusion -   · Centesis 8/28  · Severe pancreatitis, no necrosis or pseudocyst seen on CT 8/26/2020  · Possible PE on Lovenox  · Acute hypoxic respiratory failure  · Skin rash, possible allergic reaction to Zosyn resolved  · Hypertension   · Obesity      Recommendations   Picture of severe pancreatitis without necrosis,  Associated with left pleural effusion and left lower lobe consolidation possible pneumonia  · Continue IV meropenem   · Follow amylase and lipase  · Follow procalcitonin level  · Continue supportive care    History of Present Illness:   Initial history:  Isabel Chand is a 54y.o.-year-old female presented to the hospital with epigastric abdominal pain and bloating for several days prior to admission, severe, no radiation, no alleviating or relieving factors. Pancreatic enzymes were elevated, imaging suggestive of severe pancreatitis. She was on IV Zosyn for 7 days, developed a rash on 8/26/2020, IV Zosyn was discontinued and the rash resolved.   IV meropenem started 8/26/2020  Right arm PICC line was placed 8/20/2020 and has been on TPN  CT chest 8/26/2020 suggestive of questionable PE, bilateral consolidations and left pleural effusion      Interval changes  8/29/2020   Temperature max:    8/26/20 -104    8/28/20 - 101.1   8/29/20 - 98.5  left-sided pleuritic chest pain  Skin rash is resolving  WBC 8/28/20 22.4; 8.29/20 21.9; not on steroids  Procalcitonin - improving 8/26 0.32, 8/28 0.22  Lactic acid - resolved 8/19 5.3, 8/26 1.2    Microbiology:  Thoracentesis 8/28-PH 8.5 and fluid cloudy - cx pend (many neutrophils)  resp PCR panel neg  BC 8/23 x 2 neg        Imaging:  CT AP and chest 8/26 - LLL pneumonia and effusion  Severe pancreatitis    with surrounding inflammatory changes and fluid. I have personally reviewed the past medical history, past surgical history, medications, social history, and family history, and I haveupdated the database accordingly.   Past Medical History:     Past Medical History:   Diagnosis Date    Abnormal levels of other serum enzymes     Anxiety     Bilateral leg edema     Chronic kidney disease     right renal cyst    Depression     DVT (deep venous thrombosis) ([facility] [address] 1997    after delivery    Elevated liver enzymes     Fibromyalgia     Headache(784.0)     migraines    Hx of blood clots     1997 left calf    Hypertension     Kidney stone     Obstructive sleep apnea syndrome     Pancreatitis 2001    Pleural effusion 2001    SOB (shortness of breath)     Supraventricular tachycardia (HCC)     SVT (supraventricular tachycardia) ([facility] [address] 9/8/2015       Past Surgical  History:     Past Surgical History:   Procedure Laterality Date    ATRIAL ABLATION SURGERY      BACK SURGERY      L4-5    CHOLECYSTECTOMY  2001    ENDOMETRIAL ABLATION      e sure implants placed also    ENDOSCOPY, COLON, DIAGNOSTIC      EXCISION LESION HAND / FINGER Right 1/25/2019    HAND LESION BIOPSY EXCISION performed by Jovani Parr MD at [address] Bilateral     left x2, right x1    FOOT SURGERY Right     x3    KNEE ARTHROSCOPY Left     x2    [place] CYSTO/URETERO/PYELOSCOPY W/LITHOTRIPSY Left 9/20/2017    CYSTOSCOPY URETEROSCOPY HOLMIUM LASER LITHO WITH STONE BASKETING WITH  STENT  performed by Branden Mehta MD at [facility] Right        Medications:      iron sucrose  200 mg Intravenous Q24H    enoxaparin  30 mg Subcutaneous BID    meropenem (MERREM) IVPB extended  1 g Intravenous Q8H    enalaprilat  1.25 mg Intravenous 4 times per day    insulin lispro  0-6 Units Subcutaneous TID WC    magnesium oxide  400 mg Oral Daily    sodium chloride flush  10 mL Intravenous 2 times per day    QUEtiapine  800 mg Oral Nightly    pramipexole  0.25 mg Oral Daily    pantoprazole  40 mg Intravenous Daily    And    sodium chloride (PF)  10 mL Intravenous Daily    busPIRone  15 mg Oral Nightly    DULoxetine  60 mg Oral BID    cetirizine  10 mg Oral Daily       Social History:     Social History     Socioeconomic History    Marital status:      Spouse name: Not on file    Number of children: Not on file    Years of education: Not on file    Highest education level: Not on file   Occupational History    Not on file   Social Needs    Financial resource strain: Not on file    Food insecurity     Worry: Not on file     Inability: Not on file    Transportation needs     Medical: Not on file     Non-medical: Not on file   Tobacco Use    Smoking status: Former Smoker     Packs/day: 1.00     Types: Cigarettes     Last attempt to quit: 2019     Years since quittin.7    Smokeless tobacco: Never Used    Tobacco comment: today last smoke   Substance and Sexual Activity    Alcohol use:  Yes     Alcohol/week: 0.0 standard drinks     Comment: rarely    Drug use: No    Sexual activity: Not on file   Lifestyle    Physical activity     Days per week: Not on file     Minutes per session: Not on file    Stress: Not on file   Relationships    Social connections     Talks on phone: Not on file     Gets together: Not on file     Attends [employer] service: Not on file     Active member of club or organization: Not on file     Attends meetings of clubs or organizations: Not on file     Relationship status: Not on file    Intimate partner violence     Fear of current or ex partner: Not on file     Emotionally abused: Not on file     Physically abused: Not on file     Forced sexual activity: Not on file   Other Topics Concern    Not on file   Social History Narrative    Not on file       Family History:     Family History   Problem Relation Age of Onset    Heart Disease Father     High Blood Pressure Brother         Allergies:   Aripiprazole; Eletriptan; Hydrocodone-acetaminophen; Oxycodone-acetaminophen; Sumatriptan; Triptans; Zosyn [piperacillin sod-tazobactam so]; and Lamotrigine     Review of Systems:     Review of Systems   Constitutional: Negative for diaphoresis. HENT: Negative for congestion. Eyes: Negative for discharge. Respiratory: Negative for shortness of breath. Cardiovascular: Negative for chest pain. Genitourinary: Negative for difficulty urinating. As per history of present illness, other than above 14 systems reviewed were negative  Physical Examination :     Patient Vitals for the past 8 hrs:   BP Temp Temp src Pulse Resp SpO2   08/29/20 0707 -- -- -- -- 24 --   08/29/20 0630 (!) 158/88 -- -- 95 22 --   08/29/20 0600 (!) 150/83 -- -- 85 28 --   08/29/20 0530 133/74 -- -- 91 25 --   08/29/20 0500 (!) 116/59 -- -- 91 26 --   08/29/20 0430 122/67 -- -- 87 22 --   08/29/20 0400 116/69 97.9 °F (36.6 °C) Axillary 84 22 94 %   08/29/20 0330 120/66 -- -- 83 22 --   08/29/20 0304 -- -- -- -- 25 92 %   08/29/20 0200 124/71 -- -- 90 27 --   08/29/20 0130 97/63 -- -- 97 20 --   08/29/20 0100 (!) 98/50 -- -- 90 22 --   08/29/20 0030 (!) 105/47 -- -- 85 24 --   08/29/20 0000 (!) 97/37 98.5 °F (36.9 °C) Temporal 85 23 94 %       Physical Exam  HENT:      Head: Normocephalic and atraumatic. Nose: Nose normal.   Eyes:      Conjunctiva/sclera: Conjunctivae normal.   Neck:      Musculoskeletal: Neck supple. No neck rigidity. Cardiovascular:      Rate and Rhythm: Normal rate and regular rhythm. Pulses: Normal pulses. Heart sounds: No murmur. Pulmonary:      Breath sounds: No wheezing. Comments: O2 per NC; BS diminished  Abdominal:      Palpations: Abdomen is soft. Tenderness: There is abdominal tenderness. Comments: Epigastric tenderness   Genitourinary:     Comments: Berkowitz patent  Musculoskeletal:      Right lower leg: Edema present.       Left lower leg: Edema present. Lymphadenopathy:      Cervical: No cervical adenopathy. Skin:     General: Skin is warm. Coloration: Skin is not jaundiced. Findings: No rash. Neurological:      Mental Status: She is alert and oriented to person, place, and time. Medical Decision Making:   I have independently reviewed/ordered the following labs:    CBC with Differential:   Recent Labs     08/28/20  0352 08/29/20  0401   WBC 22.4* 21.9*   HGB 9.3* 8.9*   HCT 28.4* 27.4*    262   LYMPHOPCT 10* 15*   MONOPCT 7 5     BMP:  Recent Labs     08/27/20  0327 08/28/20  0352 08/29/20  0401    140 140   K 3.5* 3.9 4.2    104 104   CO2 29 26 28   BUN 11 13 14   CREATININE 0.53 0.66 0.55   MG 1.6 2.3  --      Hepatic Function Panel:   Recent Labs     08/28/20  0352 08/29/20  0401   PROT 5.7* 6.0*   LABALBU 2.1* 2.2*   BILITOT 0.18* 0.19*   ALKPHOS 89 82   ALT 24 25   AST 24 31       Lab Results   Component Value Date    CREATININE 0.55 08/29/2020    GLUCOSE 155 08/29/2020       Detailed results: Thank you for allowing us to participate in the care of this patient. Please call with questions. This note is created with the assistance of a speech recognition program.  While intending to generate adocument that actually reflects the content of the visit, the document can still have some errors including those of syntax and sound a like substitutions which may escape proof reading. It such instances, actual meaningcan be extrapolated by contextual diversion.     ANIBAL Paul - CNP  Office: [phone]  Perfect serve / office [phone]

## 2020-08-30 ENCOUNTER — APPOINTMENT (OUTPATIENT)
Dept: GENERAL RADIOLOGY | Age: 45
DRG: 871 | End: 2020-08-30
Payer: COMMERCIAL

## 2020-08-30 LAB
ABSOLUTE BANDS #: 2.73 K/UL (ref 0–1)
ABSOLUTE EOS #: 0.59 K/UL (ref 0–0.4)
ABSOLUTE IMMATURE GRANULOCYTE: ABNORMAL K/UL (ref 0–0.3)
ABSOLUTE LYMPH #: 2.93 K/UL (ref 1–4.8)
ABSOLUTE MONO #: 1.17 K/UL (ref 0.1–1.3)
ALBUMIN SERPL-MCNC: 2.3 G/DL (ref 3.5–5.2)
ALBUMIN/GLOBULIN RATIO: ABNORMAL (ref 1–2.5)
ALP BLD-CCNC: 83 U/L (ref 35–104)
ALT SERPL-CCNC: 25 U/L (ref 5–33)
ANION GAP SERPL CALCULATED.3IONS-SCNC: 10 MMOL/L (ref 9–17)
AST SERPL-CCNC: 24 U/L
BANDS: 14 % (ref 0–10)
BASOPHILS # BLD: 0 % (ref 0–2)
BASOPHILS ABSOLUTE: 0 K/UL (ref 0–0.2)
BILIRUB SERPL-MCNC: 0.17 MG/DL (ref 0.3–1.2)
BUN BLDV-MCNC: 11 MG/DL (ref 6–20)
BUN/CREAT BLD: ABNORMAL (ref 9–20)
CALCIUM SERPL-MCNC: 8.6 MG/DL (ref 8.6–10.4)
CHLORIDE BLD-SCNC: 101 MMOL/L (ref 98–107)
CO2: 27 MMOL/L (ref 20–31)
CREAT SERPL-MCNC: 0.48 MG/DL (ref 0.5–0.9)
DIFFERENTIAL TYPE: ABNORMAL
EOSINOPHILS RELATIVE PERCENT: 3 % (ref 0–4)
GFR AFRICAN AMERICAN: >60 ML/MIN
GFR NON-AFRICAN AMERICAN: >60 ML/MIN
GFR SERPL CREATININE-BSD FRML MDRD: ABNORMAL ML/MIN/{1.73_M2}
GFR SERPL CREATININE-BSD FRML MDRD: ABNORMAL ML/MIN/{1.73_M2}
GLUCOSE BLD-MCNC: 137 MG/DL (ref 65–105)
GLUCOSE BLD-MCNC: 138 MG/DL (ref 65–105)
GLUCOSE BLD-MCNC: 141 MG/DL (ref 65–105)
GLUCOSE BLD-MCNC: 145 MG/DL (ref 70–99)
HCT VFR BLD CALC: 27.7 % (ref 36–46)
HEMOGLOBIN: 8.9 G/DL (ref 12–16)
IMMATURE GRANULOCYTES: ABNORMAL %
LIPASE: 62 U/L (ref 13–60)
LYMPHOCYTES # BLD: 15 % (ref 24–44)
MCH RBC QN AUTO: 30.5 PG (ref 26–34)
MCHC RBC AUTO-ENTMCNC: 32.3 G/DL (ref 31–37)
MCV RBC AUTO: 94.3 FL (ref 80–100)
METAMYELOCYTES ABSOLUTE COUNT: 0.39 K/UL
METAMYELOCYTES: 2 %
MONOCYTES # BLD: 6 % (ref 1–7)
MORPHOLOGY: ABNORMAL
NRBC AUTOMATED: ABNORMAL PER 100 WBC
PDW BLD-RTO: 14.3 % (ref 11.5–14.9)
PHOSPHORUS: 4.4 MG/DL (ref 2.6–4.5)
PLATELET # BLD: 240 K/UL (ref 150–450)
PLATELET ESTIMATE: ABNORMAL
PMV BLD AUTO: 9 FL (ref 6–12)
POTASSIUM SERPL-SCNC: 4.2 MMOL/L (ref 3.7–5.3)
RBC # BLD: 2.93 M/UL (ref 4–5.2)
RBC # BLD: ABNORMAL 10*6/UL
SEG NEUTROPHILS: 60 % (ref 36–66)
SEGMENTED NEUTROPHILS ABSOLUTE COUNT: 11.69 K/UL (ref 1.3–9.1)
SODIUM BLD-SCNC: 138 MMOL/L (ref 135–144)
TOTAL PROTEIN: 6.1 G/DL (ref 6.4–8.3)
WBC # BLD: 19.5 K/UL (ref 3.5–11)
WBC # BLD: ABNORMAL 10*3/UL

## 2020-08-30 PROCEDURE — 2580000003 HC RX 258: Performed by: INTERNAL MEDICINE

## 2020-08-30 PROCEDURE — 99232 SBSQ HOSP IP/OBS MODERATE 35: CPT | Performed by: INTERNAL MEDICINE

## 2020-08-30 PROCEDURE — 6360000002 HC RX W HCPCS: Performed by: STUDENT IN AN ORGANIZED HEALTH CARE EDUCATION/TRAINING PROGRAM

## 2020-08-30 PROCEDURE — 85025 COMPLETE CBC W/AUTO DIFF WBC: CPT

## 2020-08-30 PROCEDURE — 6370000000 HC RX 637 (ALT 250 FOR IP): Performed by: STUDENT IN AN ORGANIZED HEALTH CARE EDUCATION/TRAINING PROGRAM

## 2020-08-30 PROCEDURE — 99223 1ST HOSP IP/OBS HIGH 75: CPT | Performed by: NURSE PRACTITIONER

## 2020-08-30 PROCEDURE — 36415 COLL VENOUS BLD VENIPUNCTURE: CPT

## 2020-08-30 PROCEDURE — 80053 COMPREHEN METABOLIC PANEL: CPT

## 2020-08-30 PROCEDURE — 2500000003 HC RX 250 WO HCPCS: Performed by: STUDENT IN AN ORGANIZED HEALTH CARE EDUCATION/TRAINING PROGRAM

## 2020-08-30 PROCEDURE — 94761 N-INVAS EAR/PLS OXIMETRY MLT: CPT

## 2020-08-30 PROCEDURE — 6370000000 HC RX 637 (ALT 250 FOR IP): Performed by: SURGERY

## 2020-08-30 PROCEDURE — 2700000000 HC OXYGEN THERAPY PER DAY

## 2020-08-30 PROCEDURE — 6360000002 HC RX W HCPCS: Performed by: NURSE PRACTITIONER

## 2020-08-30 PROCEDURE — 2500000003 HC RX 250 WO HCPCS: Performed by: INTERNAL MEDICINE

## 2020-08-30 PROCEDURE — 82947 ASSAY GLUCOSE BLOOD QUANT: CPT

## 2020-08-30 PROCEDURE — 6360000002 HC RX W HCPCS: Performed by: INTERNAL MEDICINE

## 2020-08-30 PROCEDURE — 84100 ASSAY OF PHOSPHORUS: CPT

## 2020-08-30 PROCEDURE — 6370000000 HC RX 637 (ALT 250 FOR IP): Performed by: NURSE PRACTITIONER

## 2020-08-30 PROCEDURE — 2500000003 HC RX 250 WO HCPCS: Performed by: SURGERY

## 2020-08-30 PROCEDURE — C9113 INJ PANTOPRAZOLE SODIUM, VIA: HCPCS | Performed by: NURSE PRACTITIONER

## 2020-08-30 PROCEDURE — 83690 ASSAY OF LIPASE: CPT

## 2020-08-30 PROCEDURE — 2000000000 HC ICU R&B

## 2020-08-30 PROCEDURE — 71045 X-RAY EXAM CHEST 1 VIEW: CPT

## 2020-08-30 PROCEDURE — 2580000003 HC RX 258: Performed by: NURSE PRACTITIONER

## 2020-08-30 PROCEDURE — 93970 EXTREMITY STUDY: CPT

## 2020-08-30 RX ORDER — LORAZEPAM 0.5 MG/1
0.5 TABLET ORAL EVERY 6 HOURS
Status: DISCONTINUED | OUTPATIENT
Start: 2020-08-30 | End: 2020-08-31

## 2020-08-30 RX ORDER — LISINOPRIL 20 MG/1
40 TABLET ORAL DAILY
Status: DISCONTINUED | OUTPATIENT
Start: 2020-08-30 | End: 2020-09-04 | Stop reason: HOSPADM

## 2020-08-30 RX ADMIN — MEROPENEM 1 G: 1 INJECTION, POWDER, FOR SOLUTION INTRAVENOUS at 16:14

## 2020-08-30 RX ADMIN — INSULIN LISPRO 1 UNITS: 100 INJECTION, SOLUTION INTRAVENOUS; SUBCUTANEOUS at 12:04

## 2020-08-30 RX ADMIN — HYDROMORPHONE HYDROCHLORIDE 0.5 MG: 1 INJECTION, SOLUTION INTRAMUSCULAR; INTRAVENOUS; SUBCUTANEOUS at 12:02

## 2020-08-30 RX ADMIN — DEXMEDETOMIDINE 1.4 MCG/KG/HR: 100 INJECTION, SOLUTION, CONCENTRATE INTRAVENOUS at 06:19

## 2020-08-30 RX ADMIN — DULOXETINE HYDROCHLORIDE 60 MG: 60 CAPSULE, DELAYED RELEASE ORAL at 07:33

## 2020-08-30 RX ADMIN — CETIRIZINE HYDROCHLORIDE 10 MG: 10 TABLET, FILM COATED ORAL at 07:34

## 2020-08-30 RX ADMIN — ENOXAPARIN SODIUM 30 MG: 30 INJECTION SUBCUTANEOUS at 07:33

## 2020-08-30 RX ADMIN — DEXMEDETOMIDINE 1 MCG/KG/HR: 100 INJECTION, SOLUTION, CONCENTRATE INTRAVENOUS at 23:52

## 2020-08-30 RX ADMIN — ENALAPRILAT 1.25 MG: 1.25 INJECTION INTRAVENOUS at 06:20

## 2020-08-30 RX ADMIN — DULOXETINE HYDROCHLORIDE 60 MG: 60 CAPSULE, DELAYED RELEASE ORAL at 21:46

## 2020-08-30 RX ADMIN — Medication 400 MG: at 07:34

## 2020-08-30 RX ADMIN — DEXMEDETOMIDINE 1.4 MCG/KG/HR: 100 INJECTION, SOLUTION, CONCENTRATE INTRAVENOUS at 04:13

## 2020-08-30 RX ADMIN — HYDROMORPHONE HYDROCHLORIDE 0.5 MG: 1 INJECTION, SOLUTION INTRAMUSCULAR; INTRAVENOUS; SUBCUTANEOUS at 16:14

## 2020-08-30 RX ADMIN — I.V. FAT EMULSION 100 ML: 20 EMULSION INTRAVENOUS at 18:42

## 2020-08-30 RX ADMIN — LISINOPRIL 40 MG: 20 TABLET ORAL at 22:02

## 2020-08-30 RX ADMIN — HYDROMORPHONE HYDROCHLORIDE 0.5 MG: 1 INJECTION, SOLUTION INTRAMUSCULAR; INTRAVENOUS; SUBCUTANEOUS at 03:25

## 2020-08-30 RX ADMIN — PANTOPRAZOLE SODIUM 40 MG: 40 INJECTION, POWDER, FOR SOLUTION INTRAVENOUS at 07:44

## 2020-08-30 RX ADMIN — QUETIAPINE FUMARATE 800 MG: 400 TABLET, EXTENDED RELEASE ORAL at 21:54

## 2020-08-30 RX ADMIN — ENOXAPARIN SODIUM 30 MG: 30 INJECTION SUBCUTANEOUS at 21:45

## 2020-08-30 RX ADMIN — LORAZEPAM 0.5 MG: 0.5 TABLET ORAL at 21:45

## 2020-08-30 RX ADMIN — HYDRALAZINE HYDROCHLORIDE 10 MG: 20 INJECTION, SOLUTION INTRAMUSCULAR; INTRAVENOUS at 18:04

## 2020-08-30 RX ADMIN — Medication 10 ML: at 07:32

## 2020-08-30 RX ADMIN — MEROPENEM 1 G: 1 INJECTION, POWDER, FOR SOLUTION INTRAVENOUS at 00:25

## 2020-08-30 RX ADMIN — DEXMEDETOMIDINE 1.4 MCG/KG/HR: 100 INJECTION, SOLUTION, CONCENTRATE INTRAVENOUS at 12:01

## 2020-08-30 RX ADMIN — CALCIUM GLUCONATE: 94 INJECTION, SOLUTION INTRAVENOUS at 18:43

## 2020-08-30 RX ADMIN — PROMETHAZINE HYDROCHLORIDE 12.5 MG: 25 INJECTION INTRAMUSCULAR; INTRAVENOUS at 18:34

## 2020-08-30 RX ADMIN — INSULIN LISPRO 1 UNITS: 100 INJECTION, SOLUTION INTRAVENOUS; SUBCUTANEOUS at 00:28

## 2020-08-30 RX ADMIN — PRAMIPEXOLE DIHYDROCHLORIDE 0.25 MG: 0.25 TABLET ORAL at 07:33

## 2020-08-30 RX ADMIN — ENALAPRILAT 1.25 MG: 1.25 INJECTION INTRAVENOUS at 12:02

## 2020-08-30 RX ADMIN — HYDROMORPHONE HYDROCHLORIDE 0.5 MG: 1 INJECTION, SOLUTION INTRAMUSCULAR; INTRAVENOUS; SUBCUTANEOUS at 07:33

## 2020-08-30 RX ADMIN — MEROPENEM 1 G: 1 INJECTION, POWDER, FOR SOLUTION INTRAVENOUS at 07:44

## 2020-08-30 RX ADMIN — DEXMEDETOMIDINE 1.4 MCG/KG/HR: 100 INJECTION, SOLUTION, CONCENTRATE INTRAVENOUS at 02:05

## 2020-08-30 RX ADMIN — BUSPIRONE HYDROCHLORIDE 15 MG: 15 TABLET ORAL at 21:45

## 2020-08-30 RX ADMIN — HYDROMORPHONE HYDROCHLORIDE 0.5 MG: 1 INJECTION, SOLUTION INTRAMUSCULAR; INTRAVENOUS; SUBCUTANEOUS at 20:56

## 2020-08-30 RX ADMIN — DEXMEDETOMIDINE 1.4 MCG/KG/HR: 100 INJECTION, SOLUTION, CONCENTRATE INTRAVENOUS at 08:29

## 2020-08-30 ASSESSMENT — PAIN SCALES - GENERAL
PAINLEVEL_OUTOF10: 8
PAINLEVEL_OUTOF10: 3
PAINLEVEL_OUTOF10: 3
PAINLEVEL_OUTOF10: 5
PAINLEVEL_OUTOF10: 7
PAINLEVEL_OUTOF10: 8
PAINLEVEL_OUTOF10: 5
PAINLEVEL_OUTOF10: 3
PAINLEVEL_OUTOF10: 0
PAINLEVEL_OUTOF10: 8
PAINLEVEL_OUTOF10: 8

## 2020-08-30 ASSESSMENT — ENCOUNTER SYMPTOMS
VOMITING: 0
ABDOMINAL PAIN: 1
NAUSEA: 0
COUGH: 0
EYE DISCHARGE: 0
SHORTNESS OF BREATH: 0
DIARRHEA: 1
CONSTIPATION: 0

## 2020-08-30 NOTE — PROGRESS NOTES
I found pt was sleeping on room air and tolerating this well with SpO2 of 94%. Pt placed on BiPAP for the night, per order. Pt tolerating 12/6, back up RR 16, and 28%. Mask and alarms appropriate. Pt confirmed mask comfort. Pt maintaining SpO2 96% at this time.

## 2020-08-30 NOTE — CARE COORDINATION
ONGOING DISCHARGE PLAN:    Spoke with patient regarding discharge plan and patient is agreeable to [facility]. Precert is needed. LSW is following. Remains on precedex, lipids, cardene gtt, TPN and tolerating clear liquids. On lovenox for possible PE. Infectious disease recommends continued IV merrem fpr pneumonia. PT continues to follow. JUAN FRANCISCO NEEDS SIGNED AND COMPLETED. Will continue to follow for additional discharge needs.     Electronically signed by Grisel Vasquez RN on 8/30/2020 at 3:44 PM

## 2020-08-30 NOTE — PROGRESS NOTES
Writer found pt sleeping on room air without resp distress, but SpO2 of 88%. Pt declines bipap at this time. NC was on face, but out of the nostrils. NC reapplied.

## 2020-08-30 NOTE — PROGRESS NOTES
Gastroenterology Progress Note    Raul Gordon is a 54 y.o. female patient. Hospitalization Day:11    I am seeing the patient today for pancreatitis    SUBJECTIVE:    Patient seen in ICU around 11 AM.  At that time she was doing reasonably well. She has minimal flatus. No nausea emesis. Abdominal pain minimally improved. Discussed with the nursing staff and they did not voice any concerns. She is breathing better than before. Overall her status is slowly improving. Lipase levels 62. Physical    VITALS:  BP (!) 149/83   Pulse 103   Temp 99.3 °F (37.4 °C) (Oral)   Resp 29   Ht 5' 5\" (1.651 m)   Wt (!) 322 lb 8.5 oz (146.3 kg)   SpO2 95%   BMI 53.67 kg/m²   TEMPERATURE:  Current - Temp: 99.3 °F (37.4 °C); Max - Temp  Av.3 °F (36.8 °C)  Min: 97.8 °F (36.6 °C)  Max: 99.3 °F (37.4 °C)    General:  Alert and oriented,  No apparent distress  Skin- without jaundice  Eyes: anicteric sclera  Cardiac: RRR, Nl s1s2, without murmurs  Lungs CTA Bilaterally, normal effort  Abdomen: Obese abdomen. Bowel sounds present but decreased. No acute rebound tenderness. Ext: Minimal edema. Neuro: no asterixis     Data    Data Review:    Recent Labs     20  0352 20  0401 20  0423   WBC 22.4* 21.9* 19.5*   HGB 9.3* 8.9* 8.9*   HCT 28.4* 27.4* 27.7*   MCV 95.4 94.4 94.3    262 240     Recent Labs     20  0352 20  0401 20  0423    140 138   K 3.9 4.2 4.2    104 101   CO2 26 28 27   PHOS 4.8* 4.6* 4.4   BUN 13 14 11   CREATININE 0.66 0.55 0.48*     Recent Labs     20  0352 20  0401 20  0423   AST 24 31 24   ALT 24 25 25   BILITOT 0.18* 0.19* 0.17*   ALKPHOS 89 82 83     Recent Labs     20  0352 20  1130 20  1548 20  0401 20  0423   LIPASE 62*  --   --  76* 62*   AMYLASE  --  37 39  --   --      No results for input(s): PROTIME, INR in the last 72 hours.   No results for input(s): PTT in the last 72 hours. Radiology Review:        ASSESSMENT:  Principal Problem:    Pneumonia of both lungs due to infectious organism  Active Problems:    Anxiety    Class 3 severe obesity due to excess calories with serious comorbidity in adult [facility])    Restless leg syndrome    Major depression    HTN (hypertension)    Fibromyalgia    Acute pancreatitis    Sepsis ([facility] [address]    Morbid obesity (HCC)    Abdominal pain, epigastric    Nausea    Acute respiratory failure with hypoxia (HCC)    Hypocalcemia    History of anxiety disorder  Resolved Problems:    * No resolved hospital problems. *      The conditions that I am treating are Stable and are improving    PLAN :  1. Minimal clear liquids. 2. Discussed with the patient regarding status, and slow improvement. Thank you for allowing me to participate in the care of your patient. Please feel free to contact me with any concerns. [phone]    Santa Santos MD    Note is dictated utilizing voice recognition software. Unfortunately this leads to occasional typographical errors. Please contact our office if you have any questions.

## 2020-08-30 NOTE — PROGRESS NOTES
Patient was seen and examined. Up in the chair. Doing much better. Tolerating clear liquids. Afebrile vital signs are stable. Abdomen is morbidly obese soft mild tenderness. Extremity positive edema. Blood work was reviewed. Overall doing much better. Continue TPN clear liquids. Definitely showing recovery.

## 2020-08-30 NOTE — PROGRESS NOTES
RN notified writer that the pt reported she didn't want to wear the bipap due to being hot. Pt provided two ice packs by the writer and returned to bipap with the patient's consent.

## 2020-08-30 NOTE — PLAN OF CARE
Circulatory function within specified parameters  Description: Circulatory function within specified parameters  Outcome: Ongoing     Problem: Venous Thromboembolism:  Goal: Will show no signs or symptoms of venous thromboembolism  Description: Will show no signs or symptoms of venous thromboembolism  Outcome: Ongoing  Goal: Absence of signs or symptoms of impaired coagulation  Description: Absence of signs or symptoms of impaired coagulation  Outcome: Ongoing     Problem: Nutrition  Goal: Optimal nutrition therapy  Outcome: Ongoing     Problem: Skin Integrity:  Goal: Will show no infection signs and symptoms  Description: Will show no infection signs and symptoms  Outcome: Ongoing  No new skin breakdown noted this shift. Feet elevated. Pt up to chair for several hours.    Goal: Absence of new skin breakdown  Description: Absence of new skin breakdown  Outcome: Ongoing

## 2020-08-30 NOTE — PROGRESS NOTES
Pt reported that she wanted another break from the bipap. I encouraged her to return to bipap and pt consented. Pt remains awake at this time and is fairly tolerating the bipap.

## 2020-08-30 NOTE — PROGRESS NOTES
Pulmonary Critical Care Progress Note  Myrna Brown MD     Patient seen for the follow up of atelectasis, pleural effusion, suspected obstructive sleep apnea, history of smoking, Pneumonia of both lungs due to infectious organism     Subjective:  She did not use BiPAP last night secondary to claustrophobia. She remains on Precedex drip. She was just moved to bedside chair. She has good urine output. She denies any change in shortness of breath. He does have occasional prior type chest pain on the left side. Examination:  Vitals: BP (!) 149/83   Pulse 103   Temp 99.3 °F (37.4 °C) (Oral)   Resp 30   Ht 5' 5\" (1.651 m)   Wt (!) 322 lb 8.5 oz (146.3 kg)   SpO2 95%   BMI 53.67 kg/m²   General appearance:  alert and cooperative with exam  Neck: No JVD  Lungs: Bibasilar crackles, decreased air exchange  Heart: regular rate and rhythm, S1, S2 normal, no gallop  Abdomen: Soft, non tender, + BS  Extremities: no cyanosis or clubbing.   Positive edema    LABs:  CBC:   Recent Labs     08/29/20  0401 08/30/20  0423   WBC 21.9* 19.5*   HGB 8.9* 8.9*   HCT 27.4* 27.7*    240     BMP:   Recent Labs     08/29/20 0401 08/30/20  0423    138   K 4.2 4.2   CO2 28 27   BUN 14 11   CREATININE 0.55 0.48*   LABGLOM >60 >60   GLUCOSE 155* 145*     LIVER PROFILE:  Recent Labs     08/29/20  0401 08/30/20  0423   AST 31 24   ALT 25 25   LABALBU 2.2* 2.3*     ABG:  Lab Results   Component Value Date    PHART 7.484 08/28/2020    KJK7TWG 37.2 08/28/2020    PO2ART 66.1 08/28/2020    BQX6BTX 27.9 08/28/2020    [id number] 92.1 08/28/2020    FIO2 NOT REPORTED 08/28/2020       Lab Results   Component Value Date    PHART 7.484 08/28/2020    [id number] 37.2 08/28/2020    PO2ART 66.1 08/28/2020    JAS5EAR 27.9 08/28/2020    NBEA NOT REPORTED 08/28/2020    PBEA 4.5 08/28/2020    [id number] 92.1 08/28/2020    FIO2 NOT REPORTED 08/28/2020     Radiology:  8/30/2020 8/26/2020      Impression:  · Acute respiratory insufficiency  · Acute pancreatitis  · Obesity/suspected obstructive sleep apnea  · Atelectasis  · Pleural effusion, s/p thoracentesis for 15 ml on 8/28/2020    Recommendations:  · Continue IV Merrem  · Continue TPN  · Albuterol and Ipratropium Q 4 hours and prn  · Incentive spirometry every hour while awake  · X-ray chest in am, she may need thoracentesis again  · Labs: CBC and BMP in am  · BiPAP while asleep and as needed, detailed discussion with patient.   · IV Precedex   · 2 liters/min via nasal cannula  · DVT prophylaxis with low molecular weight heparin  · Discussed with RN  · Will follow with you    Paula Ramirez MD, [facility]  Pulmonary Critical Care and Sleep Medicine,  [facility]  Cell: [phone]  Office: [phone]

## 2020-08-30 NOTE — PROGRESS NOTES
[id number] [facility]    PROGRESS NOTE             8/30/2020    10:19 AM    Name:   Jack Herron  MRN:     [id number]     Acct:      [de-identified]   Room:   2003/2003-01  IP Day:  6  Admit Date:  8/19/2020  7:35 PM    PCP:  Charissa Wang  Code Status:  Full Code    Subjective:     C/C:   Chief Complaint   Patient presents with    Abdominal Pain     RUQ     Interval History Status: improved. Patient seen and examined at bedside. No acute events overnight. States she still has pain, but patient does not seem anxious at all. Urine output is good and WBC and lipase are down trending. Still complaining of some left sided pleuritic chest pain. Denies cough or fever. Breathing 95% on 2L NC. CXR and Lower extremity doppler will be done today. Will try to wean precedex. Brief History:     Refer to H&P. Review of Systems:     Review of Systems   Constitutional: Negative for chills, diaphoresis, fatigue and fever. Respiratory: Negative for cough and shortness of breath. Cardiovascular: Positive for chest pain (pleuritic left). Negative for leg swelling. Gastrointestinal: Positive for abdominal pain and diarrhea (3 episodes yesterday). Negative for constipation, nausea and vomiting. All other systems reviewed and are negative. Medications: Allergies:     Allergies   Allergen Reactions    Aripiprazole      Bad reaction    Eletriptan      Other reaction(s): Swelling-throat    Hydrocodone-Acetaminophen      Other reaction(s): Unknown    Oxycodone-Acetaminophen      Other reaction(s): Unknown    Sumatriptan Other (See Comments)    Triptans      Other reaction(s): Unknown    Zosyn [Piperacillin Sod-Tazobactam So]      Rash 8/26/20 possibley caused by zosyn    Lamotrigine Rash       Current Meds:   Scheduled Meds:    insulin lispro  0-6 Units Subcutaneous Q6H    enoxaparin  30 mg Subcutaneous BID    meropenem (MERREM) IVPB extended  1 g Intravenous Q8H    enalaprilat  1.25 mg Intravenous 4 times per day    magnesium oxide  400 mg Oral Daily    sodium chloride flush  10 mL Intravenous 2 times per day    QUEtiapine  800 mg Oral Nightly    pramipexole  0.25 mg Oral Daily    pantoprazole  40 mg Intravenous Daily    And    sodium chloride (PF)  10 mL Intravenous Daily    busPIRone  15 mg Oral Nightly    DULoxetine  60 mg Oral BID    cetirizine  10 mg Oral Daily     Continuous Infusions:    PN-Adult 2-in-1 Central Line (Standard) 75 mL/hr at 08/29/20 1755    fat emulsion Stopped (08/30/20 0521)    niCARdipine Stopped (08/27/20 2240)    dextrose      dexmedetomidine (PRECEDEX) IV infusion 1.4 mcg/kg/hr (08/30/20 0829)     PRN Meds: LORazepam, sodium chloride flush, acetaminophen, hydrALAZINE, HYDROmorphone, glucose, dextrose, glucagon (rDNA), dextrose, perflutren lipid microspheres, promethazine (PHENERGAN) in sodium chloride 0.9% IVPB, sodium chloride flush, magnesium sulfate, potassium chloride **OR** potassium alternative oral replacement **OR** potassium chloride, acetaminophen, [DISCONTINUED] promethazine **OR** ondansetron, melatonin ER    Data:     Past Medical History:   has a past medical history of Abnormal levels of other serum enzymes, Anxiety, Bilateral leg edema, Chronic kidney disease, Depression, DVT (deep venous thrombosis) ([address] Elevated liver enzymes, Fibromyalgia, Headache(784.0), Hx of blood clots, Hypertension, Kidney stone, Obstructive sleep apnea syndrome, Pancreatitis, Pleural effusion, SOB (shortness of breath), Supraventricular tachycardia ([address] and SVT (supraventricular tachycardia) ([address] Social History:   reports that she quit smoking about 8 months ago. Her smoking use included cigarettes. She smoked 1.00 pack per day. She has never used smokeless tobacco. She reports current alcohol use. She reports that she does not use drugs.      Family History:   Family History   Problem Relation Age of Onset    Heart Disease Father     High Blood Pressure Brother        Vitals:  BP (!) 149/83   Pulse 103   Temp 99.3 °F (37.4 °C) (Oral)   Resp 30   Ht 5' 5\" (1.651 m)   Wt (!) 322 lb 8.5 oz (146.3 kg)   SpO2 95%   BMI 53.67 kg/m²   Temp (24hrs), Av.5 °F (36.9 °C), Min:97.8 °F (36.6 °C), Max:101 °F (38.3 °C)    Recent Labs     20  0753 20  1148 20  1703 20  0605   POCGLU 115* 133* 96 138*       I/O(24Hr):     Intake/Output Summary (Last 24 hours) at 2020 1019  Last data filed at 2020 0625  Gross per 24 hour   Intake 1871 ml   Output 7650 ml   Net -5779 ml       Labs:    CBC with Differential:    Lab Results   Component Value Date    WBC 19.5 2020    RBC 2.93 2020    HGB 8.9 2020    HCT 27.7 2020     2020    MCV 94.3 2020    MCH 30.5 2020    MCHC 32.3 2020    RDW 14.3 2020    METASPCT 2 2020    LYMPHOPCT 15 2020    LYMPHOPCT 35.0 2016    MONOPCT 6 2020    MONOPCT 6.8 2016    EOSPCT 3.8 2016    BASOPCT 0 2020    BASOPCT 0.9 2016    MONOSABS 1.17 2020    MONOSABS 0.6 2016    LYMPHSABS 2.93 2020    LYMPHSABS 3.1 2016    EOSABS 0.59 2020    EOSABS 0.3 2016    BASOSABS 0.00 2020    DIFFTYPE NOT REPORTED 2020     CMP:    Lab Results   Component Value Date     2020    K 4.2 2020     2020    CO2 27 2020    BUN 11 2020    CREATININE 0.48 2020    GFRAA >60 2020    LABGLOM >60 2020    GLUCOSE 145 2020    PROT 6.1 2020    PROT 7.2 2015    LABALBU 2.3 2020    CALCIUM 8.6 2020    BILITOT 0.17 2020    ALKPHOS 83 2020    AST 24 2020    ALT 25 2020     LIPASE:    Lab Results   Component Value Date    LIPASE 62 2020       Lab Results   Component Value Date/Time    SPECIAL NOT REPORTED 2020 05:30 PM     Lab Results   Component Value Date/Time    CULTURE NO GROWTH 2 DAYS 08/28/2020 05:30 PM         Radiology:    Ct Abdomen Pelvis Wo Contrast Additional Contrast? None    Result Date: 8/20/2020  EXAMINATION: CT OF THE ABDOMEN AND PELVIS WITHOUT CONTRAST 8/20/2020 10:22 pm TECHNIQUE: CT of the abdomen and pelvis was performed without the administration of intravenous contrast. Multiplanar reformatted images are provided for review. Dose modulation, iterative reconstruction, and/or weight based adjustment of the mA/kV was utilized to reduce the radiation dose to as low as reasonably achievable. COMPARISON: CT abdomen and pelvis with contrast August 19, 2020. HISTORY: ORDERING SYSTEM PROVIDED HISTORY: severe acute pain TECHNOLOGIST PROVIDED HISTORY: severe acute pain Is the patient pregnant? ->Yes Reason for Exam: worsening abdominal pain Acuity: Acute Type of Exam: Ongoing Relevant Medical/Surgical History: surg - gallbladder FINDINGS: Lower Chest: Persisting bilateral lower lung scattered consolidation and atelectasis is present with trace posterior left-sided pleural effusion identified. Catherine Aliya Heart is normal in size and configuration. Organs: Interval mild worsening of severe peripancreatic fat stranding and fluid is noted with intraperitoneal free fluid collecting within the bilateral pericolic gutters and collecting inferiorly within the pelvis with increased volume in comparison with the prior study. Diffuse fatty infiltration of the liver is again seen. The liver, gallbladder, spleen, pancreas, adrenal glands, kidneys, are otherwise unremarkable in appearance. GI/Bowel: The stomach is unremarkable without wall thickening or distention. Bowel loops are unremarkable in appearance without evidence of obstruction, distension or mucosal thickening. Pelvis: Berkowitz catheter is noted within the nondistended but grossly unremarkable urinary bladder. Bilateral fallopian tube Essure coils are seen without evidence of complication. The uterus is anteverted in position and is unremarkable in appearance there no evidence of pelvic free fluid is seen. Peritoneum/Retroperitoneum: No evidence of retroperitoneal or intraperitoneal lymphadenopathy is identified. . Bones/Soft Tissues: No evidence of retroperitoneal or intraperitoneal lymphadenopathy is identified. No evidence of intraperitoneal free fluid is seen. 1. Mild interval worsening of severe peripancreatic inflammation associated with acute pancreatitis, as discussed above. 2. No evidence of complication i.e. pseudocyst noted. 3. Stable bilateral lower lung multifocal consolidation atelectasis suggesting pneumonia versus alveolar edema. 4. Hepatic steatosis, unchanged. Xr Chest (single View Frontal)    Result Date: 8/28/2020  EXAMINATION: ONE XRAY VIEW OF THE CHEST 8/28/2020 6:20 am COMPARISON: 08/27/2020 HISTORY: ORDERING SYSTEM PROVIDED HISTORY: f/u pneumonia TECHNOLOGIST PROVIDED HISTORY: f/u pneumonia Reason for Exam: f/u pneumonia Acuity: Unknown Type of Exam: Subsequent/Follow-up Additional signs and symptoms: f/u pneumonia Relevant Medical/Surgical History: f/u pneumonia FINDINGS: Poor inspiration with decrease lung volumes worse on the prior. Right upper extremity PICC line remain in place. Cardiomediastinal silhouette stable accentuated by the low lung volumes. No focal consolidation. Patchy airspace opacities in the left upper lung accentuated by low lung volume. Poor inspiration with low lung volumes worse than prior. Patchy airspace opacities in the left upper lung accentuated by low lung volume. Xr Chest (single View Frontal)    Result Date: 8/21/2020  EXAMINATION: ONE XRAY VIEW OF THE CHEST 8/21/2020 6:06 am COMPARISON: August 20, 2020 chest exam HISTORY: ORDERING SYSTEM PROVIDED HISTORY: f/u atelectasis TECHNOLOGIST PROVIDED HISTORY: f/u atelectasis Reason for Exam: F/U atelectasis.  Acuity: Unknown Type of Exam: Unknown Additional signs and symptoms: F/U atelectasis. FINDINGS: Interval insertion of right PICC line catheter, the tip projected at the right atrium Mild cardiomegaly Limited extra sonya exam with low volume lungs, mild streaky, left greater than right, bibasilar atelectasis. Grossly clear upper lungs     Line placement as above Limited expiratory exam with mild streaky bibasilar atelectasis     Ct Abdomen Pelvis W Iv Contrast Additional Contrast? None    Result Date: 8/26/2020  EXAMINATION: CTA OF THE CHEST; CT OF THE ABDOMEN AND PELVIS WITH CONTRAST 8/26/2020 10:34 am TECHNIQUE: CTA of the chest was performed after the administration of intravenous contrast.  Multiplanar reformatted images are provided for review. MIP images are provided for review. Dose modulation, iterative reconstruction, and/or weight based adjustment of the mA/kV was utilized to reduce the radiation dose to as low as reasonably achievable.; CT of the abdomen and pelvis was performed with the administration of intravenous contrast. Multiplanar reformatted images are provided for review. Dose modulation, iterative reconstruction, and/or weight based adjustment of the mA/kV was utilized to reduce the radiation dose to as low as reasonably achievable. COMPARISON: CT chest May 26, 2019 CT abdomen and pelvis August 20, 2020 HISTORY: ORDERING SYSTEM PROVIDED HISTORY: Hypoxia, recurrent fevers TECHNOLOGIST PROVIDED HISTORY: Hypoxia, recurrent fevers Reason for Exam: Hypoxia, recurrent fevers, best images possible due to patient size 322 pounds Acuity: Acute Type of Exam: Initial; ORDERING SYSTEM PROVIDED HISTORY: Splenic vein thrombosis? TECHNOLOGIST PROVIDED HISTORY: Splenic vein thrombosis? Reason for Exam: Splenic vein thrombosis? best images possible due to patient size 322  poounds Acuity: Acute Type of Exam: Initial FINDINGS: CT CHEST: Chest wall: No axillary adenopathy. Mediastinum: Cardiomegaly. No pericardial effusion. No adenopathy.  Pulmonary arteries: Significantly limited evaluation for pulmonary embolus due to bolus timing, motion, and patient body habitus. Questionable pulmonary embolus within the right lower lobe. Lungs: Moderate left pleural effusion. Small right pleural effusion. Left lower lobe consolidation versus atelectasis. Bilateral upper lobe areas of consolidation. Bones: No acute osseous abnormality. CT ABDOMEN-PELVIS: Organs: Decreased attenuation of the liver is consistent with hepatic steatosis. No focal liver lesion. Cholecystectomy. Severe pancreatitis with surrounding inflammatory changes and fluid. Mild splenomegaly. No adrenal lesion. No hydronephrosis. Right renal cyst.  Focal area of scarring within the right kidney is unchanged. GI/Bowel: No bowel obstruction. Secondary involvement of the descending colon adjacent to the peripancreatic inflammatory changes along the tail of the pancreas. Pelvis: Essure devices. No significant free fluid. No bladder calculus. Peritoneum/Retroperitoneum: Splenic artery is patent. No evidence of splenic artery thrombosis. No definite splenic venous thrombosis. A portion of the splenic vein as it courses along the tail of the pancreas is indistinct. However, proximal and distal to this, there is flow and this may be related to artifact. No abdominal aortic aneurysm. Prominent peripancreatic lymph nodes are likely reactive. Bones/Soft Tissues: No acute soft tissue abnormality. No acute osseous abnormality. Significantly limited evaluation for pulmonary embolus. Questionable right lower lobe pulmonary embolus. Bilateral lung opacities are likely pneumonia. Recommend follow-up to document resolution. Moderate left pleural effusion. Severe acute pancreatitis. No definite splenic venous thrombosis. Critical results were called by Dr. Joya Ledezma MD to Dr. Charan Temple on 8/26/2020 at 11:40.      Ct Abdomen Pelvis W Iv Contrast Additional Contrast? None    Addendum Date: 8/20/2020    ADDENDUM: As stated in the obscured by additional overlying catheters. Low lung volume. Left basilar pleuroparenchymal opacity appears greater than prior. Aeration of the right lung appears slightly improved. No gross pneumothorax. Cardiac and mediastinal silhouettes are reflective of patient rotation. Left pleural effusion and left basilar airspace disease, slightly greater than prior. Improving aeration of the right lung as compared to prior. Xr Chest Portable    Result Date: 8/27/2020  EXAMINATION: ONE XRAY VIEW OF THE CHEST 8/27/2020 6:15 pm COMPARISON: 08/26/2020 radiograph HISTORY: ORDERING SYSTEM PROVIDED HISTORY: Increased WOB TECHNOLOGIST PROVIDED HISTORY: Increased WOB Reason for Exam: Increased WOB Acuity: Unknown Type of Exam: Unknown Additional signs and symptoms: Increased WOB FINDINGS: Right PICC line stable. The heart is enlarged and there is severe perihilar opacification that is increased centrally. Diffuse ground-glass opacities are also increased bilaterally. No pneumothorax. No skeletal finding. Perihilar and diffuse ground-glass opacities have increased from prior imaging. Pattern suspected to represent pulmonary edema. Developing pneumonitis can not be excluded. Xr Chest Portable    Result Date: 8/26/2020  EXAMINATION: ONE XRAY VIEW OF THE CHEST 8/26/2020 9:07 am COMPARISON: August 24, 2020, chest exam HISTORY: ORDERING SYSTEM PROVIDED HISTORY: PNA TECHNOLOGIST PROVIDED HISTORY: PNA Reason for Exam: PT CO increased SOB and CP. Acuity: Acute Type of Exam: Initial FINDINGS: Right PICC line catheter tip is projected at the SVC right atrial junction Persistent mild cardiomegaly Expiratory exam with mild diffuse prominence of lung markings likely related to the low volume lungs. Mild vascular congestion is not reliably excluded. Interval increase in now mild patchy right upper lobe infiltrate.   Moderate left basilar infiltrate     Interval increase in now mild patchy right upper lobe infiltrate with moderate left basilar infiltrate Line placement as above Expiratory exam, possible mild vascular congestion. Repeat upright good inspiration PA view of the chest is suggested for more accurate assessment of the pulmonary vascularity RECOMMENDATION: Repeat upright good inspiration PA view of the chest is suggested for more accurate assessment of the pulmonary vascularity     Xr Chest Portable    Result Date: 8/24/2020  EXAMINATION: ONE XRAY VIEW OF THE CHEST 8/24/2020 9:10 am COMPARISON: August 21, 2020, chest exam HISTORY: ORDERING SYSTEM PROVIDED HISTORY: Resp failure TECHNOLOGIST PROVIDED HISTORY: Resp failure Reason for Exam: resp failure Acuity: Unknown Type of Exam: Unknown FINDINGS: Interval insertion of a right PICC line catheter, the tip is projected at the right atrium. Limited markedly rotated exam No obvious significant pleuroparenchymal or mediastinal findings. Limited assessment of the left lung base     Limited markedly rotated exam, limited left base assessment Line placement as above No obvious acute cardiopulmonary findings     Xr Chest Portable    Result Date: 8/22/2020  EXAMINATION: ONE XRAY VIEW OF THE CHEST 8/22/2020 8:41 am COMPARISON: 08/21/2020 HISTORY: ORDERING SYSTEM PROVIDED HISTORY: Difficulty breathing TECHNOLOGIST PROVIDED HISTORY: Difficulty breathing Reason for Exam: dyspnea Acuity: Acute Type of Exam: Initial FINDINGS: Cardiomegaly. Low lung volumes. Right-sided PICC line remains in place. No focal consolidation. No pleural effusion or pneumothorax. Low lung volumes. This accentuates cardiomegaly. No focal consolidation. Xr Chest Portable    Result Date: 8/21/2020  EXAMINATION: ONE XRAY VIEW OF THE CHEST 8/21/2020 9:27 am COMPARISON: Chest radiograph performed 08/21/2020.  HISTORY: ORDERING SYSTEM PROVIDED HISTORY: Increased oxygen demand TECHNOLOGIST PROVIDED HISTORY: Increased oxygen demand Reason for Exam: Increased oxygen demand Acuity: Acute Type of Exam: Initial Additional signs and symptoms: Increased oxygen demand FINDINGS: There are small bilateral effusions. There is a left basilar infiltrate. There is no pneumothorax. The heart is prominent. The upper abdomen is unremarkable. The extrathoracic soft tissues are unremarkable. Cardiomegaly and small bilateral effusions with adjacent left basilar infiltrate. Xr Chest Portable    Result Date: 8/20/2020  EXAMINATION: ONE XRAY VIEW OF THE CHEST 8/20/2020 7:38 am COMPARISON: July 18, 2020 chest exam HISTORY: ORDERING SYSTEM PROVIDED HISTORY: possible pneumonia on CT chest TECHNOLOGIST PROVIDED HISTORY: possible pneumonia on CT chest Reason for Exam: possible pneumonia on CT chest Acuity: Acute Type of Exam: Initial Additional signs and symptoms: possible pneumonia on CT chest FINDINGS: Expiratory exam with mild streaky bibasilar density. Normal cardiopericardial silhouette No significant pleural or mediastinal findings     Expiratory exam with mild streaky bibasilar atelectasis     Ct Chest Pulmonary Embolism W Contrast    Result Date: 8/26/2020  EXAMINATION: CTA OF THE CHEST; CT OF THE ABDOMEN AND PELVIS WITH CONTRAST 8/26/2020 10:34 am TECHNIQUE: CTA of the chest was performed after the administration of intravenous contrast.  Multiplanar reformatted images are provided for review. MIP images are provided for review. Dose modulation, iterative reconstruction, and/or weight based adjustment of the mA/kV was utilized to reduce the radiation dose to as low as reasonably achievable.; CT of the abdomen and pelvis was performed with the administration of intravenous contrast. Multiplanar reformatted images are provided for review. Dose modulation, iterative reconstruction, and/or weight based adjustment of the mA/kV was utilized to reduce the radiation dose to as low as reasonably achievable.  COMPARISON: CT chest May 26, 2019 CT abdomen and pelvis August 20, 2020 HISTORY: ORDERING SYSTEM PROVIDED HISTORY: Hypoxia, recurrent fevers TECHNOLOGIST PROVIDED HISTORY: Hypoxia, recurrent fevers Reason for Exam: Hypoxia, recurrent fevers, best images possible due to patient size 322 pounds Acuity: Acute Type of Exam: Initial; ORDERING SYSTEM PROVIDED HISTORY: Splenic vein thrombosis? TECHNOLOGIST PROVIDED HISTORY: Splenic vein thrombosis? Reason for Exam: Splenic vein thrombosis? best images possible due to patient size 322  poounds Acuity: Acute Type of Exam: Initial FINDINGS: CT CHEST: Chest wall: No axillary adenopathy. Mediastinum: Cardiomegaly. No pericardial effusion. No adenopathy. Pulmonary arteries: Significantly limited evaluation for pulmonary embolus due to bolus timing, motion, and patient body habitus. Questionable pulmonary embolus within the right lower lobe. Lungs: Moderate left pleural effusion. Small right pleural effusion. Left lower lobe consolidation versus atelectasis. Bilateral upper lobe areas of consolidation. Bones: No acute osseous abnormality. CT ABDOMEN-PELVIS: Organs: Decreased attenuation of the liver is consistent with hepatic steatosis. No focal liver lesion. Cholecystectomy. Severe pancreatitis with surrounding inflammatory changes and fluid. Mild splenomegaly. No adrenal lesion. No hydronephrosis. Right renal cyst.  Focal area of scarring within the right kidney is unchanged. GI/Bowel: No bowel obstruction. Secondary involvement of the descending colon adjacent to the peripancreatic inflammatory changes along the tail of the pancreas. Pelvis: Essure devices. No significant free fluid. No bladder calculus. Peritoneum/Retroperitoneum: Splenic artery is patent. No evidence of splenic artery thrombosis. No definite splenic venous thrombosis. A portion of the splenic vein as it courses along the tail of the pancreas is indistinct. However, proximal and distal to this, there is flow and this may be related to artifact. No abdominal aortic aneurysm.   Prominent peripancreatic lymph nodes are likely reactive. Bones/Soft Tissues: No acute soft tissue abnormality. No acute osseous abnormality. Significantly limited evaluation for pulmonary embolus. Questionable right lower lobe pulmonary embolus. Bilateral lung opacities are likely pneumonia. Recommend follow-up to document resolution. Moderate left pleural effusion. Severe acute pancreatitis. No definite splenic venous thrombosis. Critical results were called by Dr. Danial Yanez MD to Dr. Miguel Copeland on 8/26/2020 at 11:40. Us Retroperitoneal Limited    Result Date: 8/25/2020  EXAMINATION: ULTRASOUND OF THE KIDNEYS 8/25/2020 1:19 pm COMPARISON: August 20, 2020 CT abdomen and pelvis HISTORY: ORDERING SYSTEM PROVIDED HISTORY: Low UOP TECHNOLOGIST PROVIDED HISTORY: Bilateral Renal Ultrasound Low UOP Acuity: Acute Type of Exam: Initial FINDINGS: Exam is limited due to patient body habitus. The right kidney measures 15.7 centimetres in length and the left kidney measures 16 cm in length. There is no hydronephrosis in either kidney. New focal renal lesion. Diffuse hepatic steatosis. No hydronephrosis in either kidney. Physical Examination:        Physical Exam  Vitals signs reviewed. Constitutional:       General: She is awake. She is not in acute distress. Appearance: Normal appearance. She is obese. She is not ill-appearing or toxic-appearing. Interventions: Nasal cannula in place. Comments: 2L NC   Cardiovascular:      Rate and Rhythm: Normal rate and regular rhythm. Heart sounds: Normal heart sounds. Pulmonary:      Effort: Pulmonary effort is normal.      Breath sounds: Decreased air movement present. Examination of the right-lower field reveals decreased breath sounds. Decreased breath sounds present. Abdominal:      Tenderness: There is generalized abdominal tenderness. Musculoskeletal:      Right lower leg: No edema. Left lower leg: No edema.    Neurological:      Mental Status: She is alert and easily aroused. Psychiatric:         Behavior: Behavior is cooperative. Assessment:        Primary Problem  Pneumonia of both lungs due to infectious organism    Active Hospital Problems    Diagnosis Date Noted    Pneumonia of both lungs due to infectious organism [J18.9] 08/27/2020    History of anxiety disorder [Z86.59]     Acute respiratory failure with hypoxia ([facility] [address] [J96.01] 08/21/2020    Hypocalcemia [E83.51] 08/21/2020    Sepsis ([address] [A41.9] 08/20/2020    Morbid obesity ([address] [E66.01]     Abdominal pain, epigastric [R10.13]     Nausea [R11.0]     Acute pancreatitis [K85.90] 08/19/2020    Fibromyalgia [M79.7]     HTN (hypertension) [I10] 12/15/2014    Major depression [F32.9] 10/30/2014    Restless leg syndrome [G25.81] 10/22/2013    Class 3 severe obesity due to excess calories with serious comorbidity in adult ([address] [E66.01] 01/14/2013    Anxiety [F41.9] 01/14/2013       Plan:        1. Severe acute pancreatitis complicated by sepsis, etiology unknown  - CT abdomen and pelvis on admission showed pancreatic edema and fluid consistent with pancreatitis and reactive lymph nodes; lipase 1,065 on admission, normalized  - Repeat CT abdomen and pelvis on 8/27 showed severe acute pancreatitis and no definite splenic venous thrombosis  - Lipase 62 today from 76  - Pro calcitonin is trending down, 0.22 yesterday <0.25<0.32  - WBC 21.9 today from 19.5    2.  Acute hypoxic respiratory failure 2/2 possible pneumonia  - 8/19 CT abdomen and pelvis showed bilateral lower lobe consolidation which could be edema or pneumonia; CT PE 8/26 showed a questionable right lower lobe PE and bilateral lung opacities likely pneumonia; D-dimer over 20,000; patient continuing on prophylactic dosing due to pulm recs for low PE suspicion  - CT Chest on 08/27 showed left pleural effusion  - Thoracentesis done on 08/28 for left pleural effusion, cultures pending  - Thoracentesis: pH 8.5, 49 Neutrophils, 40 Lymphocytes, Dark yellow, 3410 WBC, 3520 RBC  - Meet's lights criteria for exudative effusion   - CXR 08/30: Left pleural effusion and left basilar airspace disease, greater than prior effusion.   - Pulmonology on board  - On Precidex 1.4mcg  - Completed 7 day course of zosyn  - On meropenem day 5  - Vancomycin day 4 today but on hold as per pharmacy   - Respiratory virus panel negative  - Breathing 95% on 2L NC  - Lower extremity doppler today     3. Sepsis secondary to pancreatitis / pneumonia  - On 8/20 lactic acid 5.4; SIRS criteria of tachypnea, tachycardia, and elevated WBC 20.5 met  - Continue antibiotics: completed 7 day course of zosyn  - On meropenem day 5  - Vancomycin day 4 today but on hold as per pharmacy   - UA normal; blood cultures done on 8/20 NGTD x 6 days; repeat blood cultures on 8/23 NGTD x 5 days  - Repeat acute phase reactants on 8/26: Procal elevated at 0.33, CRP elevated at 296.2; lactate dehydrogenase 429; ferritin 1141; lactic acid normal; COVID negative  - Continue antibiotics      4. HTN  - Continue IV vasotec 1.25 mg q6h and hydralazine PRN  - Will restart home antihypertensive medication and dc vasotec     5. Normocytic anemia  - Baseline around 13  - Iron studies low iron 20 and low TIBC 180 and B12/folate normal, received IV venofer for 4 days, will continue for 1 more day      6. Anxiety/depression  - Buspar 15 mg nightly, cymbalta 60 mg BID, quetiapine 800 mg nightly     7. Restless leg syndrome  - Pramipexole 0.25 mg daily     PICC line in place 8/20  Diet: TPN  DVT prophylaxis: lovenox 30 mg BID    Mikie Guzmán MD  8/30/2020  10:19 AM   Attending Physician Statement  I have discussed the care of Jack Herron and I have examined the patient myselft and taken ros and hpi , including pertinent history and exam findings,  with the resident. I have reviewed the key elements of all parts of the encounter with the resident.   I agree with the assessment, plan and orders as documented by the resident.     Patient looks comfortable with that taper off Precedex and a switch to oral Ativan if does well okay for progressive floor later today  Electronically signed by Demetrius Mendes MD

## 2020-08-30 NOTE — PROGRESS NOTES
Comprehensive Nutrition Assessment    Type and Reason for Visit:  Reassess    Nutrition Recommendations/Plan: Continue NPO (except ice chips and popsicles). Continue lipids and TPN with following adjustments to additives: Insulin 20 to 17 units    Nutrition Assessment:  Pt has been taking some ice chips and popsicles. TPN and lipids to continue.     Malnutrition Assessment:  Malnutrition Status:  No malnutrition    Context:  Acute Illness     Findings of the 6 clinical characteristics of malnutrition:  Energy Intake:  No significant decrease in energy intake  Weight Loss:  No significant weight loss     Body Fat Loss:  No significant body fat loss     Muscle Mass Loss:  No significant muscle mass loss    Fluid Accumulation:  1 - Mild Extremities   Strength:  Not Performed    Estimated Daily Nutrient Needs:  Energy (kcal):  2020 based on [place] with no additional factors; Weight Used for Energy Requirements:  Admission     Protein (g):  102-113 gm protein based on 1.8-2 gm/kg IBW; Weight Used for Protein Requirements:  Ideal            Nutrition Related Findings:  Edema: +1 RUE, LUE; trace RLE, LLE      Wounds:  None       Current Nutrition Therapies:    Current Parenteral Nutrition Orders:  · Type and Formula: 2-in-1 Custom   · Lipids: 100ml  · Duration: Continuous  · Rate/Volume: 75 ml/ 1800 ml  · Current PN Order Provides: 1784 kcal, 90 gm protein      Anthropometric Measures:  · Height: 5' 5\" (165.1 cm)  · Current Body Weight: 322 lb (146.1 kg)   · Admission Body Weight: 304 lb (137.9 kg)    · Usual Body Weight:       · Ideal Body Weight: 125 lbs; % Ideal Body Weight 243.2 %   · BMI: 53.6  · BMI Categories: Obese Class 3 (BMI 40.0 or greater)       Nutrition Diagnosis:   · Inadequate oral intake related to altered GI function as evidenced by NPO or clear liquid status due to medical condition, nutrition support - parenteral nutrition      Nutrition Interventions:   Food and/or Nutrient Delivery: Continue NPO, Modify Parenteral Nutrition  Nutrition Education/Counseling:  No recommendation at this time   Coordination of Nutrition Care:  Continued Inpatient Monitoring    Goals:  Meet 75% of nutrient needs with TPN       Nutrition Monitoring and Evaluation:   Food/Nutrient Intake Outcomes:  Parenteral Nutrition Intake/Tolerance  Physical Signs/Symptoms Outcomes:  Biochemical Data, GI Status, Nausea or Vomiting, Fluid Status or Edema, Hemodynamic Status, Weight     Discharge Planning: Too soon to determine     Romayne Rand, RD, LD  [phone]    Some areas of assessment may be incomplete due to standard COVID-19 Precautions.

## 2020-08-30 NOTE — PROGRESS NOTES
Pt tolerating humidified nasal cannula set to 4 LPM. Pt was tachypneic, but declined the hospital bipap that was at the bedside. She reports that she was just up to the bathroom and was catching her breath.

## 2020-08-31 ENCOUNTER — APPOINTMENT (OUTPATIENT)
Dept: GENERAL RADIOLOGY | Age: 45
DRG: 871 | End: 2020-08-31
Payer: COMMERCIAL

## 2020-08-31 LAB
ABSOLUTE BANDS #: 0.68 K/UL (ref 0–1)
ABSOLUTE EOS #: 0.34 K/UL (ref 0–0.4)
ABSOLUTE IMMATURE GRANULOCYTE: ABNORMAL K/UL (ref 0–0.3)
ABSOLUTE LYMPH #: 2.39 K/UL (ref 1–4.8)
ABSOLUTE MONO #: 1.2 K/UL (ref 0.1–1.3)
ALBUMIN SERPL-MCNC: 2.5 G/DL (ref 3.5–5.2)
ALBUMIN/GLOBULIN RATIO: ABNORMAL (ref 1–2.5)
ALP BLD-CCNC: 83 U/L (ref 35–104)
ALT SERPL-CCNC: 26 U/L (ref 5–33)
ANION GAP SERPL CALCULATED.3IONS-SCNC: 11 MMOL/L (ref 9–17)
AST SERPL-CCNC: 25 U/L
BANDS: 4 % (ref 0–10)
BASOPHILS # BLD: 0 % (ref 0–2)
BASOPHILS ABSOLUTE: 0 K/UL (ref 0–0.2)
BILIRUB SERPL-MCNC: 0.17 MG/DL (ref 0.3–1.2)
BUN BLDV-MCNC: 10 MG/DL (ref 6–20)
BUN/CREAT BLD: ABNORMAL (ref 9–20)
C-REACTIVE PROTEIN: 282.3 MG/L (ref 0–5)
CALCIUM SERPL-MCNC: 8.6 MG/DL (ref 8.6–10.4)
CHLORIDE BLD-SCNC: 99 MMOL/L (ref 98–107)
CO2: 26 MMOL/L (ref 20–31)
CREAT SERPL-MCNC: 0.44 MG/DL (ref 0.5–0.9)
DIFFERENTIAL TYPE: ABNORMAL
EOSINOPHILS RELATIVE PERCENT: 2 % (ref 0–4)
GFR AFRICAN AMERICAN: >60 ML/MIN
GFR NON-AFRICAN AMERICAN: >60 ML/MIN
GFR SERPL CREATININE-BSD FRML MDRD: ABNORMAL ML/MIN/{1.73_M2}
GFR SERPL CREATININE-BSD FRML MDRD: ABNORMAL ML/MIN/{1.73_M2}
GLUCOSE BLD-MCNC: 143 MG/DL (ref 65–105)
GLUCOSE BLD-MCNC: 160 MG/DL (ref 65–105)
GLUCOSE BLD-MCNC: 175 MG/DL (ref 65–105)
GLUCOSE BLD-MCNC: 177 MG/DL (ref 65–105)
GLUCOSE BLD-MCNC: 182 MG/DL (ref 70–99)
HCT VFR BLD CALC: 27.7 % (ref 36–46)
HEMOGLOBIN: 9.3 G/DL (ref 12–16)
IMMATURE GRANULOCYTES: ABNORMAL %
LIPASE: 71 U/L (ref 13–60)
LYMPHOCYTES # BLD: 14 % (ref 24–44)
MAGNESIUM: 1.9 MG/DL (ref 1.6–2.6)
MCH RBC QN AUTO: 31.2 PG (ref 26–34)
MCHC RBC AUTO-ENTMCNC: 33.6 G/DL (ref 31–37)
MCV RBC AUTO: 92.8 FL (ref 80–100)
MONOCYTES # BLD: 7 % (ref 1–7)
MORPHOLOGY: ABNORMAL
NRBC AUTOMATED: ABNORMAL PER 100 WBC
PDW BLD-RTO: 14.8 % (ref 11.5–14.9)
PHOSPHORUS: 4.5 MG/DL (ref 2.6–4.5)
PLATELET # BLD: 313 K/UL (ref 150–450)
PLATELET ESTIMATE: ABNORMAL
PMV BLD AUTO: 9 FL (ref 6–12)
POTASSIUM SERPL-SCNC: 4.2 MMOL/L (ref 3.7–5.3)
PROCALCITONIN: 0.19 NG/ML
RBC # BLD: 2.98 M/UL (ref 4–5.2)
RBC # BLD: ABNORMAL 10*6/UL
SEG NEUTROPHILS: 73 % (ref 36–66)
SEGMENTED NEUTROPHILS ABSOLUTE COUNT: 12.49 K/UL (ref 1.3–9.1)
SODIUM BLD-SCNC: 136 MMOL/L (ref 135–144)
TOTAL PROTEIN: 6.5 G/DL (ref 6.4–8.3)
WBC # BLD: 17.1 K/UL (ref 3.5–11)
WBC # BLD: ABNORMAL 10*3/UL

## 2020-08-31 PROCEDURE — 2060000000 HC ICU INTERMEDIATE R&B

## 2020-08-31 PROCEDURE — 6370000000 HC RX 637 (ALT 250 FOR IP): Performed by: SURGERY

## 2020-08-31 PROCEDURE — 85025 COMPLETE CBC W/AUTO DIFF WBC: CPT

## 2020-08-31 PROCEDURE — 6360000002 HC RX W HCPCS: Performed by: INTERNAL MEDICINE

## 2020-08-31 PROCEDURE — 6360000002 HC RX W HCPCS: Performed by: NURSE PRACTITIONER

## 2020-08-31 PROCEDURE — 6370000000 HC RX 637 (ALT 250 FOR IP): Performed by: STUDENT IN AN ORGANIZED HEALTH CARE EDUCATION/TRAINING PROGRAM

## 2020-08-31 PROCEDURE — 2580000003 HC RX 258: Performed by: NURSE PRACTITIONER

## 2020-08-31 PROCEDURE — 83690 ASSAY OF LIPASE: CPT

## 2020-08-31 PROCEDURE — 2500000003 HC RX 250 WO HCPCS: Performed by: SURGERY

## 2020-08-31 PROCEDURE — 83735 ASSAY OF MAGNESIUM: CPT

## 2020-08-31 PROCEDURE — 6370000000 HC RX 637 (ALT 250 FOR IP): Performed by: NURSE PRACTITIONER

## 2020-08-31 PROCEDURE — 97530 THERAPEUTIC ACTIVITIES: CPT

## 2020-08-31 PROCEDURE — 2500000003 HC RX 250 WO HCPCS: Performed by: INTERNAL MEDICINE

## 2020-08-31 PROCEDURE — 6360000002 HC RX W HCPCS: Performed by: STUDENT IN AN ORGANIZED HEALTH CARE EDUCATION/TRAINING PROGRAM

## 2020-08-31 PROCEDURE — 94660 CPAP INITIATION&MGMT: CPT

## 2020-08-31 PROCEDURE — 97110 THERAPEUTIC EXERCISES: CPT

## 2020-08-31 PROCEDURE — 99222 1ST HOSP IP/OBS MODERATE 55: CPT | Performed by: INTERNAL MEDICINE

## 2020-08-31 PROCEDURE — 84100 ASSAY OF PHOSPHORUS: CPT

## 2020-08-31 PROCEDURE — 2580000003 HC RX 258: Performed by: INTERNAL MEDICINE

## 2020-08-31 PROCEDURE — 84145 PROCALCITONIN (PCT): CPT

## 2020-08-31 PROCEDURE — 71045 X-RAY EXAM CHEST 1 VIEW: CPT

## 2020-08-31 PROCEDURE — 99233 SBSQ HOSP IP/OBS HIGH 50: CPT | Performed by: INTERNAL MEDICINE

## 2020-08-31 PROCEDURE — 2700000000 HC OXYGEN THERAPY PER DAY

## 2020-08-31 PROCEDURE — 80053 COMPREHEN METABOLIC PANEL: CPT

## 2020-08-31 PROCEDURE — 86140 C-REACTIVE PROTEIN: CPT

## 2020-08-31 PROCEDURE — 82947 ASSAY GLUCOSE BLOOD QUANT: CPT

## 2020-08-31 PROCEDURE — 94761 N-INVAS EAR/PLS OXIMETRY MLT: CPT

## 2020-08-31 PROCEDURE — C9113 INJ PANTOPRAZOLE SODIUM, VIA: HCPCS | Performed by: NURSE PRACTITIONER

## 2020-08-31 PROCEDURE — 36415 COLL VENOUS BLD VENIPUNCTURE: CPT

## 2020-08-31 PROCEDURE — 99291 CRITICAL CARE FIRST HOUR: CPT | Performed by: INTERNAL MEDICINE

## 2020-08-31 RX ORDER — LORAZEPAM 1 MG/1
1 TABLET ORAL EVERY 6 HOURS
Status: DISCONTINUED | OUTPATIENT
Start: 2020-08-31 | End: 2020-09-04 | Stop reason: HOSPADM

## 2020-08-31 RX ADMIN — Medication 400 MG: at 08:43

## 2020-08-31 RX ADMIN — LORAZEPAM 0.5 MG: 0.5 TABLET ORAL at 01:18

## 2020-08-31 RX ADMIN — HYDROMORPHONE HYDROCHLORIDE 0.5 MG: 1 INJECTION, SOLUTION INTRAMUSCULAR; INTRAVENOUS; SUBCUTANEOUS at 18:49

## 2020-08-31 RX ADMIN — HYDROMORPHONE HYDROCHLORIDE 0.5 MG: 1 INJECTION, SOLUTION INTRAMUSCULAR; INTRAVENOUS; SUBCUTANEOUS at 14:39

## 2020-08-31 RX ADMIN — ACETAMINOPHEN 650 MG: 325 TABLET, FILM COATED ORAL at 13:26

## 2020-08-31 RX ADMIN — MEROPENEM 1 G: 1 INJECTION, POWDER, FOR SOLUTION INTRAVENOUS at 17:42

## 2020-08-31 RX ADMIN — INSULIN LISPRO 1 UNITS: 100 INJECTION, SOLUTION INTRAVENOUS; SUBCUTANEOUS at 13:26

## 2020-08-31 RX ADMIN — LORAZEPAM 1 MG: 1 TABLET ORAL at 13:26

## 2020-08-31 RX ADMIN — DULOXETINE HYDROCHLORIDE 60 MG: 60 CAPSULE, DELAYED RELEASE ORAL at 23:04

## 2020-08-31 RX ADMIN — HYDROMORPHONE HYDROCHLORIDE 0.5 MG: 1 INJECTION, SOLUTION INTRAMUSCULAR; INTRAVENOUS; SUBCUTANEOUS at 01:18

## 2020-08-31 RX ADMIN — INSULIN LISPRO 1 UNITS: 100 INJECTION, SOLUTION INTRAVENOUS; SUBCUTANEOUS at 05:01

## 2020-08-31 RX ADMIN — CETIRIZINE HYDROCHLORIDE 10 MG: 10 TABLET, FILM COATED ORAL at 08:43

## 2020-08-31 RX ADMIN — LORAZEPAM 0.5 MG: 0.5 TABLET ORAL at 08:43

## 2020-08-31 RX ADMIN — DEXMEDETOMIDINE HYDROCHLORIDE 1 MCG/KG/HR: 400 INJECTION INTRAVENOUS at 02:47

## 2020-08-31 RX ADMIN — INSULIN LISPRO 1 UNITS: 100 INJECTION, SOLUTION INTRAVENOUS; SUBCUTANEOUS at 23:46

## 2020-08-31 RX ADMIN — QUETIAPINE FUMARATE 800 MG: 400 TABLET, EXTENDED RELEASE ORAL at 23:04

## 2020-08-31 RX ADMIN — BUSPIRONE HYDROCHLORIDE 15 MG: 15 TABLET ORAL at 23:04

## 2020-08-31 RX ADMIN — ONDANSETRON 4 MG: 2 INJECTION INTRAMUSCULAR; INTRAVENOUS at 18:14

## 2020-08-31 RX ADMIN — PRAMIPEXOLE DIHYDROCHLORIDE 0.25 MG: 0.25 TABLET ORAL at 08:46

## 2020-08-31 RX ADMIN — HYDROMORPHONE HYDROCHLORIDE 0.5 MG: 1 INJECTION, SOLUTION INTRAMUSCULAR; INTRAVENOUS; SUBCUTANEOUS at 22:59

## 2020-08-31 RX ADMIN — ACETAMINOPHEN 650 MG: 325 TABLET, FILM COATED ORAL at 20:19

## 2020-08-31 RX ADMIN — PANTOPRAZOLE SODIUM 40 MG: 40 INJECTION, POWDER, FOR SOLUTION INTRAVENOUS at 08:43

## 2020-08-31 RX ADMIN — DEXMEDETOMIDINE HYDROCHLORIDE 0.7 MCG/KG/HR: 400 INJECTION INTRAVENOUS at 09:56

## 2020-08-31 RX ADMIN — Medication 10 ML: at 08:44

## 2020-08-31 RX ADMIN — ENOXAPARIN SODIUM 30 MG: 30 INJECTION SUBCUTANEOUS at 21:38

## 2020-08-31 RX ADMIN — HYDROMORPHONE HYDROCHLORIDE 0.5 MG: 1 INJECTION, SOLUTION INTRAMUSCULAR; INTRAVENOUS; SUBCUTANEOUS at 10:05

## 2020-08-31 RX ADMIN — INSULIN LISPRO 1 UNITS: 100 INJECTION, SOLUTION INTRAVENOUS; SUBCUTANEOUS at 17:42

## 2020-08-31 RX ADMIN — LORAZEPAM 1 MG: 1 TABLET ORAL at 20:01

## 2020-08-31 RX ADMIN — I.V. FAT EMULSION 100 ML: 20 EMULSION INTRAVENOUS at 19:33

## 2020-08-31 RX ADMIN — PROMETHAZINE HYDROCHLORIDE 12.5 MG: 25 INJECTION INTRAMUSCULAR; INTRAVENOUS at 22:06

## 2020-08-31 RX ADMIN — ENOXAPARIN SODIUM 30 MG: 30 INJECTION SUBCUTANEOUS at 08:45

## 2020-08-31 RX ADMIN — DEXMEDETOMIDINE HYDROCHLORIDE 1 MCG/KG/HR: 400 INJECTION INTRAVENOUS at 06:14

## 2020-08-31 RX ADMIN — ACETAMINOPHEN 650 MG: 325 TABLET, FILM COATED ORAL at 08:43

## 2020-08-31 RX ADMIN — LISINOPRIL 40 MG: 20 TABLET ORAL at 08:43

## 2020-08-31 RX ADMIN — DULOXETINE HYDROCHLORIDE 60 MG: 60 CAPSULE, DELAYED RELEASE ORAL at 08:43

## 2020-08-31 RX ADMIN — INSULIN LISPRO 1 UNITS: 100 INJECTION, SOLUTION INTRAVENOUS; SUBCUTANEOUS at 00:30

## 2020-08-31 RX ADMIN — Medication 5 MG: at 23:03

## 2020-08-31 RX ADMIN — MEROPENEM 1 G: 1 INJECTION, POWDER, FOR SOLUTION INTRAVENOUS at 01:18

## 2020-08-31 RX ADMIN — MEROPENEM 1 G: 1 INJECTION, POWDER, FOR SOLUTION INTRAVENOUS at 09:56

## 2020-08-31 RX ADMIN — CALCIUM GLUCONATE: 94 INJECTION, SOLUTION INTRAVENOUS at 18:54

## 2020-08-31 ASSESSMENT — ENCOUNTER SYMPTOMS
SHORTNESS OF BREATH: 0
CONSTIPATION: 0
NAUSEA: 0
DIARRHEA: 1
ABDOMINAL PAIN: 0
VOMITING: 0
COUGH: 0

## 2020-08-31 ASSESSMENT — PAIN DESCRIPTION - DESCRIPTORS
DESCRIPTORS: CONSTANT;SHARP
DESCRIPTORS: ACHING
DESCRIPTORS: CONSTANT;SHARP
DESCRIPTORS: DISCOMFORT;ACHING
DESCRIPTORS: ACHING;SHARP
DESCRIPTORS: ACHING
DESCRIPTORS: ACHING;SHARP

## 2020-08-31 ASSESSMENT — PAIN DESCRIPTION - PAIN TYPE
TYPE: ACUTE PAIN

## 2020-08-31 ASSESSMENT — PAIN SCALES - GENERAL
PAINLEVEL_OUTOF10: 7
PAINLEVEL_OUTOF10: 4
PAINLEVEL_OUTOF10: 8
PAINLEVEL_OUTOF10: 8
PAINLEVEL_OUTOF10: 7
PAINLEVEL_OUTOF10: 3
PAINLEVEL_OUTOF10: 6
PAINLEVEL_OUTOF10: 8
PAINLEVEL_OUTOF10: 4
PAINLEVEL_OUTOF10: 8
PAINLEVEL_OUTOF10: 8
PAINLEVEL_OUTOF10: 7
PAINLEVEL_OUTOF10: 7
PAINLEVEL_OUTOF10: 4
PAINLEVEL_OUTOF10: 8
PAINLEVEL_OUTOF10: 4
PAINLEVEL_OUTOF10: 4
PAINLEVEL_OUTOF10: 0
PAINLEVEL_OUTOF10: 7
PAINLEVEL_OUTOF10: 4
PAINLEVEL_OUTOF10: 7
PAINLEVEL_OUTOF10: 5

## 2020-08-31 ASSESSMENT — PAIN DESCRIPTION - ORIENTATION
ORIENTATION: LEFT

## 2020-08-31 ASSESSMENT — PAIN DESCRIPTION - LOCATION
LOCATION: ARM
LOCATION: ARM
LOCATION: COCCYX
LOCATION: CHEST
LOCATION: ARM
LOCATION: ARM;SHOULDER
LOCATION: ARM
LOCATION: HEAD
LOCATION: ARM

## 2020-08-31 ASSESSMENT — PAIN DESCRIPTION - FREQUENCY
FREQUENCY: INTERMITTENT
FREQUENCY: CONTINUOUS
FREQUENCY: INTERMITTENT
FREQUENCY: CONTINUOUS
FREQUENCY: OTHER (COMMENT)
FREQUENCY: INTERMITTENT

## 2020-08-31 ASSESSMENT — PAIN - FUNCTIONAL ASSESSMENT
PAIN_FUNCTIONAL_ASSESSMENT: PREVENTS OR INTERFERES SOME ACTIVE ACTIVITIES AND ADLS
PAIN_FUNCTIONAL_ASSESSMENT: ACTIVITIES ARE NOT PREVENTED
PAIN_FUNCTIONAL_ASSESSMENT: PREVENTS OR INTERFERES SOME ACTIVE ACTIVITIES AND ADLS
PAIN_FUNCTIONAL_ASSESSMENT: ACTIVITIES ARE NOT PREVENTED
PAIN_FUNCTIONAL_ASSESSMENT: ACTIVITIES ARE NOT PREVENTED
PAIN_FUNCTIONAL_ASSESSMENT: PREVENTS OR INTERFERES SOME ACTIVE ACTIVITIES AND ADLS

## 2020-08-31 ASSESSMENT — PAIN DESCRIPTION - PROGRESSION
CLINICAL_PROGRESSION: GRADUALLY WORSENING

## 2020-08-31 ASSESSMENT — PAIN DESCRIPTION - ONSET
ONSET: ON-GOING

## 2020-08-31 NOTE — PLAN OF CARE
Problem: Falls - Risk of:  Goal: Will remain free from falls  Description: Will remain free from falls  8/31/2020 0624 by Terrance Aquino RN  Outcome: Ongoing  8/30/2020 1743 by Dickson Herrera RN  Outcome: Ongoing  Goal: Absence of physical injury  Description: Absence of physical injury  8/31/2020 0624 by Terrance Aquino RN  Outcome: Ongoing  8/30/2020 1743 by Dickson Herrera RN  Outcome: Ongoing     Problem: Pain:  Goal: Pain level will decrease  Description: Pain level will decrease  8/31/2020 0624 by Terrance Aquino RN  Outcome: Ongoing  8/30/2020 1743 by Dickson Herrera RN  Outcome: Ongoing  Goal: Control of acute pain  Description: Control of acute pain  8/31/2020 0624 by Terrance Aquino RN  Outcome: Ongoing  8/30/2020 1743 by Dickson Herrera RN  Outcome: Ongoing  Goal: Control of chronic pain  Description: Control of chronic pain  8/31/2020 0624 by Terrance Aquino RN  Outcome: Ongoing  8/30/2020 1743 by Dickson Herrera RN  Outcome: Ongoing     Problem: Nausea/Vomiting:  Goal: Absence of nausea/vomiting  Description: Absence of nausea/vomiting  8/31/2020 0624 by Terrance Aquino RN  Outcome: Ongoing  8/30/2020 1743 by Dickson Herrera RN  Outcome: Ongoing  Goal: Able to drink  Description: Able to drink  8/31/2020 0624 by Terrance Aquino RN  Outcome: Ongoing  8/30/2020 1743 by Dickson Herrera RN  Outcome: Ongoing  Goal: Able to eat  Description: Able to eat  8/31/2020 0624 by Terrance Aquino RN  Outcome: Ongoing  8/30/2020 1743 by Dickson Herrera RN  Outcome: Ongoing  Goal: Ability to achieve adequate nutritional intake will improve  Description: Ability to achieve adequate nutritional intake will improve  8/31/2020 0624 by Terrance Aquino RN  Outcome: Ongoing  8/30/2020 1743 by Dickson Herrera RN  Outcome: Ongoing     Problem: Gas Exchange - Impaired:  Goal: Levels of oxygenation will improve  Description: Levels of oxygenation will improve  8/31/2020 0624 by Santa Ibarra RN  Outcome: Ongoing  8/30/2020 1743 by Raymond Trinh RN  Outcome: Ongoing     Problem: Infection, Septic Shock:  Goal: Will show no infection signs and symptoms  Description: Will show no infection signs and symptoms  8/31/2020 0624 by Santa Ibarra RN  Outcome: Ongoing  8/30/2020 1743 by Raymond Trinh RN  Outcome: Ongoing     Problem: Serum Glucose Level - Abnormal:  Goal: Ability to maintain appropriate glucose levels will improve  Description: Ability to maintain appropriate glucose levels will improve  8/31/2020 0624 by Santa Ibarra RN  Outcome: Ongoing  8/30/2020 1743 by Raymond Trinh RN  Outcome: Ongoing     Problem: Tissue Perfusion, Altered:  Goal: Circulatory function within specified parameters  Description: Circulatory function within specified parameters  8/31/2020 0624 by Santa Ibarra RN  Outcome: Ongoing  8/30/2020 1743 by Raymond Trinh RN  Outcome: Ongoing     Problem: Venous Thromboembolism:  Goal: Will show no signs or symptoms of venous thromboembolism  Description: Will show no signs or symptoms of venous thromboembolism  8/31/2020 0624 by Santa Ibarra RN  Outcome: Ongoing  8/30/2020 1743 by Raymond Trinh RN  Outcome: Ongoing  Goal: Absence of signs or symptoms of impaired coagulation  Description: Absence of signs or symptoms of impaired coagulation  8/31/2020 0624 by Santa Ibarra RN  Outcome: Ongoing  8/30/2020 1743 by Raymond Trinh RN  Outcome: Ongoing     Problem: Nutrition  Goal: Optimal nutrition therapy  8/31/2020 0624 by Santa Ibarra RN  Outcome: Ongoing  8/30/2020 1743 by Raymond Trinh RN  Outcome: Ongoing     Problem: Skin Integrity:  Goal: Will show no infection signs and symptoms  Description: Will show no infection signs and symptoms  8/31/2020 0624 by Santa Ibarra RN  Outcome: Ongoing  8/30/2020 1743 by Raymond Trinh RN  Outcome: Ongoing  Goal: Absence of new skin breakdown  Description: Absence of new skin breakdown  8/31/2020 0624 by Camilla Garcia RN  Outcome: Ongoing  8/30/2020 1743 by Santino More RN  Outcome: Ongoing

## 2020-08-31 NOTE — PROGRESS NOTES
mg, Q6H PRN  niCARdipine (CARDENE) 25 mg in sodium chloride 0.9 % 250 mL infusion, Continuous  HYDROmorphone (DILAUDID) injection 0.5 mg, Q4H PRN  glucose (GLUTOSE) 40 % oral gel 15 g, PRN  dextrose 50 % IV solution, PRN  glucagon (rDNA) injection 1 mg, PRN  dextrose 5 % solution, PRN  perflutren lipid microspheres (DEFINITY) injection 2.2 mg, ONCE PRN  magnesium oxide (MAG-OX) tablet 400 mg, Daily  promethazine (PHENERGAN) 12.5 mg in sodium chloride 0.9 % 50 mL IVPB, Q4H PRN  sodium chloride flush 0.9 % injection 10 mL, 2 times per day  sodium chloride flush 0.9 % injection 10 mL, PRN  magnesium sulfate 1 g in dextrose 5% 100 mL IVPB, PRN  potassium chloride (KLOR-CON M) extended release tablet 40 mEq, PRN    Or  potassium bicarb-citric acid (EFFER-K) effervescent tablet 40 mEq, PRN    Or  potassium chloride 10 mEq/100 mL IVPB (Peripheral Line), PRN  acetaminophen (TYLENOL) tablet 650 mg, Q4H PRN  ondansetron (ZOFRAN) injection 4 mg, Q6H PRN  QUEtiapine (SEROQUEL XR) extended release tablet 800 mg, Nightly  pramipexole (MIRAPEX) tablet 0.25 mg, Daily  pantoprazole (PROTONIX) injection 40 mg, Daily    And  sodium chloride (PF) 0.9 % injection 10 mL, Daily  melatonin ER tablet 5 mg, Nightly PRN  busPIRone (BUSPAR) tablet 15 mg, Nightly  DULoxetine (CYMBALTA) extended release capsule 60 mg, BID  cetirizine (ZYRTEC) tablet 10 mg, Daily        Data:     Code Status:  Full Code    Family History   Problem Relation Age of Onset    Heart Disease Father     High Blood Pressure Brother        Social History     Socioeconomic History    Marital status:      Spouse name: Not on file    Number of children: Not on file    Years of education: Not on file    Highest education level: Not on file   Occupational History    Not on file   Social Needs    Financial resource strain: Not on file    Food insecurity     Worry: Not on file     Inability: Not on file    Transportation needs     Medical: Not on file Non-medical: Not on file   Tobacco Use    Smoking status: Former Smoker     Packs/day: 1.00     Types: Cigarettes     Last attempt to quit: 2019     Years since quittin.7    Smokeless tobacco: Never Used    Tobacco comment: today last smoke   Substance and Sexual Activity    Alcohol use: Yes     Alcohol/week: 0.0 standard drinks     Comment: rarely    Drug use: No    Sexual activity: Not on file   Lifestyle    Physical activity     Days per week: Not on file     Minutes per session: Not on file    Stress: Not on file   Relationships    Social connections     Talks on phone: Not on file     Gets together: Not on file     Attends Alevism service: Not on file     Active member of club or organization: Not on file     Attends meetings of clubs or organizations: Not on file     Relationship status: Not on file    Intimate partner violence     Fear of current or ex partner: Not on file     Emotionally abused: Not on file     Physically abused: Not on file     Forced sexual activity: Not on file   Other Topics Concern    Not on file   Social History Narrative    Not on file       Vitals:  BP (!) 121/98   Pulse 102   Temp 99.3 °F (37.4 °C) (Oral)   Resp 25   Ht 5' 5\" (1.651 m)   Wt (!) 322 lb 8.5 oz (146.3 kg)   SpO2 95%   BMI 53.67 kg/m²   Temp (24hrs), Av.9 °F (37.2 °C), Min:97.3 °F (36.3 °C), Max:99.8 °F (37.7 °C)    Recent Labs     20  0027 20  0458 20  0806 20  1127   POCGLU 177* 160* 160* 160*       I/O (24Hr):     Intake/Output Summary (Last 24 hours) at 2020 1255  Last data filed at 2020 1057  Gross per 24 hour   Intake 5477.82 ml   Output 5400 ml   Net 77.82 ml       Labs:      CBC:   Lab Results   Component Value Date    WBC 17.1 2020    RBC 2.98 2020    HGB 9.3 2020    HCT 27.7 2020    MCV 92.8 2020    MCH 31.2 2020    MCHC 33.6 2020    RDW 14.8 2020     2020    MPV 9.0 2020 CBC with Differential:    Lab Results   Component Value Date    WBC 17.1 08/31/2020    RBC 2.98 08/31/2020    HGB 9.3 08/31/2020    HCT 27.7 08/31/2020     08/31/2020    MCV 92.8 08/31/2020    MCH 31.2 08/31/2020    MCHC 33.6 08/31/2020    RDW 14.8 08/31/2020    METASPCT 2 08/30/2020    LYMPHOPCT 14 08/31/2020    LYMPHOPCT 35.0 03/22/2016    MONOPCT 7 08/31/2020    MONOPCT 6.8 03/22/2016    EOSPCT 3.8 03/22/2016    BASOPCT 0 08/31/2020    BASOPCT 0.9 03/22/2016    MONOSABS 1.20 08/31/2020    MONOSABS 0.6 03/22/2016    LYMPHSABS 2.39 08/31/2020    LYMPHSABS 3.1 03/22/2016    EOSABS 0.34 08/31/2020    EOSABS 0.3 03/22/2016    BASOSABS 0.00 08/31/2020    DIFFTYPE NOT REPORTED 08/31/2020     Hemoglobin/Hematocrit:    Lab Results   Component Value Date    HGB 9.3 08/31/2020    HCT 27.7 08/31/2020     CMP:    Lab Results   Component Value Date     08/31/2020    K 4.2 08/31/2020    CL 99 08/31/2020    CO2 26 08/31/2020    BUN 10 08/31/2020    CREATININE 0.44 08/31/2020    GFRAA >60 08/31/2020    LABGLOM >60 08/31/2020    GLUCOSE 182 08/31/2020    PROT 6.5 08/31/2020    PROT 7.2 01/23/2015    LABALBU 2.5 08/31/2020    CALCIUM 8.6 08/31/2020    BILITOT 0.17 08/31/2020    ALKPHOS 83 08/31/2020    AST 25 08/31/2020    ALT 26 08/31/2020     BMP:    Lab Results   Component Value Date     08/31/2020    K 4.2 08/31/2020    CL 99 08/31/2020    CO2 26 08/31/2020    BUN 10 08/31/2020    LABALBU 2.5 08/31/2020    CREATININE 0.44 08/31/2020    CALCIUM 8.6 08/31/2020    GFRAA >60 08/31/2020    LABGLOM >60 08/31/2020    GLUCOSE 182 08/31/2020     PT/INR:    Lab Results   Component Value Date    PROTIME 12.2 08/20/2020    INR 0.9 08/20/2020     PTT:    Lab Results   Component Value Date    APTT 24.4 08/20/2020   [APTT}    Physical Examination:        General appearance: alert, cooperative and no distress  Mental Status: oriented to person, place and time and normal affect  Abdomen: soft, nontender, nondistended, bowel sounds present     Assessment:        Primary Problem  Pneumonia of both lungs due to infectious organism     Active Hospital Problems    Diagnosis Date Noted    Pneumonia of both lungs due to infectious organism [J18.9] 08/27/2020    History of anxiety disorder [Z86.59]     Acute respiratory failure with hypoxia ([address] [J96.01] 08/21/2020    Hypocalcemia [E83.51] 08/21/2020    Sepsis ([address] [A41.9] 08/20/2020    Morbid obesity ([address] [E66.01]     Abdominal pain, epigastric [R10.13]     Nausea [R11.0]     Acute pancreatitis [K85.90] 08/19/2020    Fibromyalgia [M79.7]     HTN (hypertension) [I10] 12/15/2014    Major depression [F32.9] 10/30/2014    Restless leg syndrome [G25.81] 10/22/2013    Class 3 severe obesity due to excess calories with serious comorbidity in adult [facility]) [E66.01] 01/14/2013    Anxiety [F41.9] 01/14/2013     Past Medical History:   Diagnosis Date    Abnormal levels of other serum enzymes     Anxiety     Bilateral leg edema     Chronic kidney disease     right renal cyst    Depression     DVT (deep venous thrombosis) ([address] 1997    after delivery    Elevated liver enzymes     Fibromyalgia     Headache(784.0)     migraines    Hx of blood clots     1997 left calf    Hypertension     Kidney stone     Obstructive sleep apnea syndrome     Pancreatitis 2001    Pleural effusion 2001    SOB (shortness of breath)     Supraventricular tachycardia (HCC)     SVT (supraventricular tachycardia) ([facility] [facility] 9/8/2015        Plan:        1. Acute pancreatitis  1. Continue LUQ pain, though improved  2. Continue TPN  3. Continue clears  4. Lipase 71, continue to trend  5. CT abd/pelvis 8/26 - no evidence of pseudocyst, necrosis, free air  2. Anemia  1. Hgb stable  2. No overt bleeding  3. Continue to trend  4. Transfuse for hgb<7  5.  IV venofer ordered x2    Explained to the patient and d/W Nursing Staff  Will F/U with you  Please call or Page for any issues or change in status  Thanks  . Electronically signed by ANIBAL Sanchez NP on 8/31/2020 at 12:55 PM       I independently performed an evaluation on Kendy Kaur. I have reviewed the above documentation completed by the Nurse Practitioner and agree with the documented findings and plan of care. I have personally evaluated this patient with the APRN and have completed at least one if not all key elements of the E/M (history, physical exam, and MDM). Please see my additional contributions to the HPI, physical exam, assessment, and medical decision making. Patient appears stable. She has a flatus. Able to tolerate clear liquids. Lipase levels minimally elevated. Discussed with the patient and nursing staff regarding the care.

## 2020-08-31 NOTE — PROGRESS NOTES
RN notified writer that the bipap was consistently alarming. Writer found the pt on the bipap with it continuously alarming. Writer tightened mask to decreased the high leak. Alarming resolved with mask adjustment. Pt tolerating fairly at this time.

## 2020-08-31 NOTE — PROGRESS NOTES
Comprehensive Nutrition Assessment    Type and Reason for Visit:  Reassess    Nutrition Recommendations/Plan: Following changes made in additives: NaCl- 15 to 25 mEq, K acetate- 70 to 40 mEq, KCl- 65 to 95 mEq, insulin- 17 to 20 units, add MVI & trace elements. Nutrition Assessment:  Patient has been on clear liquid diet since 8-29 with minimal intake, TPN & lipids to continue. Monitor labs and adjust additives accordingly.     Malnutrition Assessment:  Malnutrition Status:  No malnutrition    Context:  Acute Illness     Findings of the 6 clinical characteristics of malnutrition:  Energy Intake:  No significant decrease in energy intake  Weight Loss:  No significant weight loss     Body Fat Loss:  No significant body fat loss     Muscle Mass Loss:  No significant muscle mass loss    Fluid Accumulation:  1 - Mild Extremities   Strength:  Not Performed    Estimated Daily Nutrient Needs:  Energy (kcal):  2020 based on [facility] with no additional factors; Weight Used for Energy Requirements:  Admission     Protein (g):  102-113 gm protein based on 1.8-2 gm/kg IBW; Weight Used for Protein Requirements:  Ideal          Nutrition Related Findings:  Edema: +1 RUE, LUE; trace RLE, LLE      Wounds:  None       Current Nutrition Therapies:    Current Parenteral Nutrition Orders:  · Type and Formula: 2-in-1 Custom   · Lipids: 100ml  · Duration: Continuous  · Rate/Volume: 75 ml/ 1800 ml  · Current PN Order Provides: 1784 kcal, 90 gm protein  · Goal PN Orders Provides:        Anthropometric Measures:  · Height: 5' 5\" (165.1 cm)  · Current Body Weight: (no recent weights-last bedscale wt done 8-25)   · Admission Body Weight: 304 lb (137.9 kg)    · Ideal Body Weight: 125 lbs; % Ideal Body Weight 243.2 %   · BMI: 53.6  · BMI Categories: Obese Class 3 (BMI 40.0 or greater)       Nutrition Diagnosis:   · Inadequate oral intake related to altered GI function as evidenced by NPO or clear liquid status due to medical condition, nutrition support - parenteral nutrition, intake 0-25%      Nutrition Interventions:   Food and/or Nutrient Delivery:  Continue Current Diet, Start Oral Nutrition Supplement, Modify Parenteral Nutrition  Nutrition Education/Counseling:  No recommendation at this time   Coordination of Nutrition Care:  Continued Inpatient Monitoring    Goals:  Meet 75% of nutrient needs with TPN       Nutrition Monitoring and Evaluation:   Food/Nutrient Intake Outcomes:  Food and Nutrient Intake, Supplement Intake, Parenteral Nutrition Intake/Tolerance, Diet Advancement/Tolerance  Physical Signs/Symptoms Outcomes:  Biochemical Data, GI Status, Nausea or Vomiting, Fluid Status or Edema, Hemodynamic Status, Weight     Discharge Planning:    Continue current diet, Parenteral Nutrition     Some areas of assessment may be incomplete due to COVID-19 precautions. Bhavik Driver R.D., L.D.   Clinical Dietitian  Office: [phone]

## 2020-08-31 NOTE — PROGRESS NOTES
Patient seen and examined. Still having intermittent low grade fevers. LFT's trending down, very mild increase in lipase 62 to 71. Patient reports episode of non-bloody emesis yesterday evening. Feeling better this morning; no N/V currently. Otherwise tolerating clear liquids although with marginal PO intake. Abdomen soft, generalized tenderness. Continue TPN and clear liquids. Antibiotics per ID. Surgically stable. Continue medical management. Patient was seen and examined. Afebrile vital signs are stable. Voiding well. Had emesis yesterday but tolerating clears today. Abdomen is soft obese tender epigastric nothing acute. Extremity positive edema. Blood work was reviewed. Lipase minimally elevated. WBC count is improved. Continue liquid diet. Continue TPN. Increased activity. DVT prophylaxis.     Jodie Mac PA-C  [address] [facility]

## 2020-08-31 NOTE — PROGRESS NOTES
2 times per day    QUEtiapine  800 mg Oral Nightly    pramipexole  0.25 mg Oral Daily    pantoprazole  40 mg Intravenous Daily    And    sodium chloride (PF)  10 mL Intravenous Daily    busPIRone  15 mg Oral Nightly    DULoxetine  60 mg Oral BID    cetirizine  10 mg Oral Daily     Continuous Infusions:    dexmedetomidine (PRECEDEX) IV infusion 1 mcg/kg/hr (08/31/20 3896)    PN-Adult 2-in-1 Central Line (Standard) 75 mL/hr at 08/30/20 1843    fat emulsion 100 mL (08/30/20 1842)    niCARdipine Stopped (08/27/20 2240)    dextrose       PRN Meds: sodium chloride flush, acetaminophen, hydrALAZINE, HYDROmorphone, glucose, dextrose, glucagon (rDNA), dextrose, perflutren lipid microspheres, promethazine (PHENERGAN) in sodium chloride 0.9% IVPB, sodium chloride flush, magnesium sulfate, potassium chloride **OR** potassium alternative oral replacement **OR** potassium chloride, acetaminophen, [DISCONTINUED] promethazine **OR** ondansetron, melatonin ER    Data:     Past Medical History:   has a past medical history of Abnormal levels of other serum enzymes, Anxiety, Bilateral leg edema, Chronic kidney disease, Depression, DVT (deep venous thrombosis) (HCC), Elevated liver enzymes, Fibromyalgia, Headache(784.0), Hx of blood clots, Hypertension, Kidney stone, Obstructive sleep apnea syndrome, Pancreatitis, Pleural effusion, SOB (shortness of breath), Supraventricular tachycardia ([address] and SVT (supraventricular tachycardia) ([address] Social History:   reports that she quit smoking about 9 months ago. Her smoking use included cigarettes. She smoked 1.00 pack per day. She has never used smokeless tobacco. She reports current alcohol use. She reports that she does not use drugs.      Family History:   Family History   Problem Relation Age of Onset    Heart Disease Father     High Blood Pressure Brother        Vitals:  BP (!) 146/82   Pulse 101   Temp 99.7 °F (37.6 °C) (Oral)   Resp 23   Ht 5' 5\" (1.651 m)   Wt (!) 322 lb 8.5 oz (146.3 kg)   SpO2 92%   BMI 53.67 kg/m²   Temp (24hrs), Av.9 °F (37.2 °C), Min:97.3 °F (36.3 °C), Max:99.8 °F (37.7 °C)    Recent Labs     20  1204 20  1802 20  0027 20  0458   POCGLU 141* 137* 177* 160*       I/O(24Hr):     Intake/Output Summary (Last 24 hours) at 2020 0725  Last data filed at 2020 0600  Gross per 24 hour   Intake 5237.82 ml   Output 4500 ml   Net 737.82 ml       Labs:    CBC with Differential:    Lab Results   Component Value Date    WBC 17.1 2020    RBC 2.98 2020    HGB 9.3 2020    HCT 27.7 2020     2020    MCV 92.8 2020    MCH 31.2 2020    MCHC 33.6 2020    RDW 14.8 2020    METASPCT 2 2020    LYMPHOPCT 14 2020    LYMPHOPCT 35.0 2016    MONOPCT 7 2020    MONOPCT 6.8 2016    EOSPCT 3.8 2016    BASOPCT 0 2020    BASOPCT 0.9 2016    MONOSABS 1.20 2020    MONOSABS 0.6 2016    LYMPHSABS 2.39 2020    LYMPHSABS 3.1 2016    EOSABS 0.34 2020    EOSABS 0.3 2016    BASOSABS 0.00 2020    DIFFTYPE NOT REPORTED 2020     CMP:    Lab Results   Component Value Date     2020    K 4.2 2020    CL 99 2020    CO2 26 2020    BUN 10 2020    CREATININE 0.44 2020    GFRAA >60 2020    LABGLOM >60 2020    GLUCOSE 182 2020    PROT 6.5 2020    PROT 7.2 2015    LABALBU 2.5 2020    CALCIUM 8.6 2020    BILITOT 0.17 2020    ALKPHOS 83 2020    AST 25 2020    ALT 26 2020       Lab Results   Component Value Date/Time    SPECIAL NOT REPORTED 2020 05:30 PM     Lab Results   Component Value Date/Time    CULTURE NO GROWTH 3 DAYS 2020 05:30 PM         Radiology:    Ct Abdomen Pelvis Wo Contrast Additional Contrast? None    Result Date: 2020  EXAMINATION: CT OF THE ABDOMEN AND PELVIS WITHOUT CONTRAST Tissues: No evidence of retroperitoneal or intraperitoneal lymphadenopathy is identified. No evidence of intraperitoneal free fluid is seen. 1. Mild interval worsening of severe peripancreatic inflammation associated with acute pancreatitis, as discussed above. 2. No evidence of complication i.e. pseudocyst noted. 3. Stable bilateral lower lung multifocal consolidation atelectasis suggesting pneumonia versus alveolar edema. 4. Hepatic steatosis, unchanged. Xr Chest (single View Frontal)    Result Date: 8/28/2020  EXAMINATION: ONE XRAY VIEW OF THE CHEST 8/28/2020 6:20 am COMPARISON: 08/27/2020 HISTORY: ORDERING SYSTEM PROVIDED HISTORY: f/u pneumonia TECHNOLOGIST PROVIDED HISTORY: f/u pneumonia Reason for Exam: f/u pneumonia Acuity: Unknown Type of Exam: Subsequent/Follow-up Additional signs and symptoms: f/u pneumonia Relevant Medical/Surgical History: f/u pneumonia FINDINGS: Poor inspiration with decrease lung volumes worse on the prior. Right upper extremity PICC line remain in place. Cardiomediastinal silhouette stable accentuated by the low lung volumes. No focal consolidation. Patchy airspace opacities in the left upper lung accentuated by low lung volume. Poor inspiration with low lung volumes worse than prior. Patchy airspace opacities in the left upper lung accentuated by low lung volume. Xr Chest (single View Frontal)    Result Date: 8/21/2020  EXAMINATION: ONE XRAY VIEW OF THE CHEST 8/21/2020 6:06 am COMPARISON: August 20, 2020 chest exam HISTORY: ORDERING SYSTEM PROVIDED HISTORY: f/u atelectasis TECHNOLOGIST PROVIDED HISTORY: f/u atelectasis Reason for Exam: F/U atelectasis. Acuity: Unknown Type of Exam: Unknown Additional signs and symptoms: F/U atelectasis. FINDINGS: Interval insertion of right PICC line catheter, the tip projected at the right atrium Mild cardiomegaly Limited extra sonya exam with low volume lungs, mild streaky, left greater than right, bibasilar atelectasis. Grossly clear upper lungs     Line placement as above Limited expiratory exam with mild streaky bibasilar atelectasis     Ct Abdomen Pelvis W Iv Contrast Additional Contrast? None    Result Date: 8/26/2020  EXAMINATION: CTA OF THE CHEST; CT OF THE ABDOMEN AND PELVIS WITH CONTRAST 8/26/2020 10:34 am TECHNIQUE: CTA of the chest was performed after the administration of intravenous contrast.  Multiplanar reformatted images are provided for review. MIP images are provided for review. Dose modulation, iterative reconstruction, and/or weight based adjustment of the mA/kV was utilized to reduce the radiation dose to as low as reasonably achievable.; CT of the abdomen and pelvis was performed with the administration of intravenous contrast. Multiplanar reformatted images are provided for review. Dose modulation, iterative reconstruction, and/or weight based adjustment of the mA/kV was utilized to reduce the radiation dose to as low as reasonably achievable. COMPARISON: CT chest May 26, 2019 CT abdomen and pelvis August 20, 2020 HISTORY: ORDERING SYSTEM PROVIDED HISTORY: Hypoxia, recurrent fevers TECHNOLOGIST PROVIDED HISTORY: Hypoxia, recurrent fevers Reason for Exam: Hypoxia, recurrent fevers, best images possible due to patient size 322 pounds Acuity: Acute Type of Exam: Initial; ORDERING SYSTEM PROVIDED HISTORY: Splenic vein thrombosis? TECHNOLOGIST PROVIDED HISTORY: Splenic vein thrombosis? Reason for Exam: Splenic vein thrombosis? best images possible due to patient size 322  poounds Acuity: Acute Type of Exam: Initial FINDINGS: CT CHEST: Chest wall: No axillary adenopathy. Mediastinum: Cardiomegaly. No pericardial effusion. No adenopathy. Pulmonary arteries: Significantly limited evaluation for pulmonary embolus due to bolus timing, motion, and patient body habitus. Questionable pulmonary embolus within the right lower lobe. Lungs: Moderate left pleural effusion. Small right pleural effusion.   Left lower lobe consolidation versus atelectasis. Bilateral upper lobe areas of consolidation. Bones: No acute osseous abnormality. CT ABDOMEN-PELVIS: Organs: Decreased attenuation of the liver is consistent with hepatic steatosis. No focal liver lesion. Cholecystectomy. Severe pancreatitis with surrounding inflammatory changes and fluid. Mild splenomegaly. No adrenal lesion. No hydronephrosis. Right renal cyst.  Focal area of scarring within the right kidney is unchanged. GI/Bowel: No bowel obstruction. Secondary involvement of the descending colon adjacent to the peripancreatic inflammatory changes along the tail of the pancreas. Pelvis: Essure devices. No significant free fluid. No bladder calculus. Peritoneum/Retroperitoneum: Splenic artery is patent. No evidence of splenic artery thrombosis. No definite splenic venous thrombosis. A portion of the splenic vein as it courses along the tail of the pancreas is indistinct. However, proximal and distal to this, there is flow and this may be related to artifact. No abdominal aortic aneurysm. Prominent peripancreatic lymph nodes are likely reactive. Bones/Soft Tissues: No acute soft tissue abnormality. No acute osseous abnormality. Significantly limited evaluation for pulmonary embolus. Questionable right lower lobe pulmonary embolus. Bilateral lung opacities are likely pneumonia. Recommend follow-up to document resolution. Moderate left pleural effusion. Severe acute pancreatitis. No definite splenic venous thrombosis. Critical results were called by Dr. Lashell Vazquez MD to Dr. Roc Ortiz on 8/26/2020 at 11:40. Ct Abdomen Pelvis W Iv Contrast Additional Contrast? None    Addendum Date: 8/20/2020    ADDENDUM: As stated in the body of the report, the abdominal aorta is normal in size. There is no significant atherosclerosis.      Result Date: 8/20/2020  EXAMINATION: CT OF THE ABDOMEN AND PELVIS WITH CONTRAST 8/19/2020 10:11 pm TECHNIQUE: CT of the abdomen and pelvis was performed with the administration of intravenous contrast. Multiplanar reformatted images are provided for review. Dose modulation, iterative reconstruction, and/or weight based adjustment of the mA/kV was utilized to reduce the radiation dose to as low as reasonably achievable. COMPARISON: None. HISTORY: ORDERING SYSTEM PROVIDED HISTORY: pain TECHNOLOGIST PROVIDED HISTORY: pain Reason for Exam: pt c/o all over abdominal pain with nausea for a few hours Acuity: Acute Type of Exam: Initial Relevant Medical/Surgical History: surg - gallbladder FINDINGS: Lower Chest: Consolidation within both lower lobes and the lingula. Scattered ground-glass opacity. No pleural effusion. Organs: Liver is diffusely hypoattenuating. No acute abnormality within the spleen, adrenals, or left kidney. There is right renal cortical scarring and calcification. Subcentimeter hypoattenuating bilateral renal lesions are too small to accurately characterize, likely cysts. Prior cholecystectomy. There is diffuse peripancreatic stranding and fluid. No loculated fluid collection. GI/Bowel: Stomach is partially distended. Small bowel is nondilated. Colon is nondilated. Pelvis: Urinary bladder is partially distended without vesicular stones. Uterus is present. Peritoneum/Retroperitoneum: Shotty retroperitoneal lymph nodes, likely reactive. Abdominal aorta is normal in caliber. No pneumoperitoneum. Bones/Soft Tissues: No acute osseous abnormality. 1. Pancreatic edema and associated fluid is consistent with pancreatitis. No loculated fluid collection/pseudocyst. 2. Shotty retroperitoneal lymph nodes are likely reactive. 3. Hepatic steatosis. 4. Bilateral lower lobe consolidation, which could be due to edema or pneumonia. Ir Mihaela Alamin Device Plmt/replace/removal    Result Date: 8/20/2020  PROCEDURE: ULTRASOUND GUIDED VASCULAR ACCESS. FLUOROSCOPY GUIDED PICC PLACEMENT 8/20/2020.  HISTORY: ORDERING SYSTEM PROVIDED HISTORY: fluid resusitation TECHNOLOGIST PROVIDED HISTORY: fluid resusitation Lumen?->Double Lumen Is the patient pregnant? ->Yes Acuity: Unknown Type of Exam: Unknown Sepsis SEDATION: None FLUOROSCOPY TIME: DAP 63 cGy cm squared TECHNIQUE: Informed consent was obtained after a detailed explanation of the procedure including risks, benefits, and alternatives. Universal protocol was observed. The right arm was prepped and draped in sterile fashion using maximum sterile barrier technique. Local anesthesia was achieved with lidocaine. A micropuncture needle was used to access the right basilic vein using ultrasound guidance. An ultrasound image demonstrating patency of the vein with needle tip located within it. An image was obtained and stored in PACs. A 0.018 guidewire was used to place a peel-a-way sheath and a 5 [place] power injectable dual-lumen PICC was advanced with fluoroscopic guidance with the tip at the cavo-atrial junction. The catheter flushed easily and there was a good blood return. The catheter was secured to the skin. The patient tolerated the procedure well and there were no immediate complications. FINDINGS: Fluoroscopic image demonstrates the tip of the catheter at the cavo-atrial junction. Successful ultrasound and fluoroscopy guided right basilic vein 5 [place] power injectable dual-lumen PICC placement. Ready for use. Xr Chest Portable    Result Date: 8/30/2020  EXAMINATION: ONE XRAY VIEW OF THE CHEST 8/30/2020 6:07 am COMPARISON: 08/28/2020 HISTORY: ORDERING SYSTEM PROVIDED HISTORY: infiltrate TECHNOLOGIST PROVIDED HISTORY: infiltrate Reason for Exam: infiltrate Acuity: Unknown Type of Exam: Unknown FINDINGS: Right upper extremity PICC line is seen with tip obscured by additional overlying catheters. Low lung volume. Left basilar pleuroparenchymal opacity appears greater than prior. Aeration of the right lung appears slightly improved. No gross pneumothorax.   Cardiac and mediastinal silhouettes are reflective of patient rotation. Left pleural effusion and left basilar airspace disease, slightly greater than prior. Improving aeration of the right lung as compared to prior. Xr Chest Portable    Result Date: 8/27/2020  EXAMINATION: ONE XRAY VIEW OF THE CHEST 8/27/2020 6:15 pm COMPARISON: 08/26/2020 radiograph HISTORY: ORDERING SYSTEM PROVIDED HISTORY: Increased WOB TECHNOLOGIST PROVIDED HISTORY: Increased WOB Reason for Exam: Increased WOB Acuity: Unknown Type of Exam: Unknown Additional signs and symptoms: Increased WOB FINDINGS: Right PICC line stable. The heart is enlarged and there is severe perihilar opacification that is increased centrally. Diffuse ground-glass opacities are also increased bilaterally. No pneumothorax. No skeletal finding. Perihilar and diffuse ground-glass opacities have increased from prior imaging. Pattern suspected to represent pulmonary edema. Developing pneumonitis can not be excluded. Xr Chest Portable    Result Date: 8/26/2020  EXAMINATION: ONE XRAY VIEW OF THE CHEST 8/26/2020 9:07 am COMPARISON: August 24, 2020, chest exam HISTORY: ORDERING SYSTEM PROVIDED HISTORY: PNA TECHNOLOGIST PROVIDED HISTORY: PNA Reason for Exam: PT CO increased SOB and CP. Acuity: Acute Type of Exam: Initial FINDINGS: Right PICC line catheter tip is projected at the SVC right atrial junction Persistent mild cardiomegaly Expiratory exam with mild diffuse prominence of lung markings likely related to the low volume lungs. Mild vascular congestion is not reliably excluded. Interval increase in now mild patchy right upper lobe infiltrate. Moderate left basilar infiltrate     Interval increase in now mild patchy right upper lobe infiltrate with moderate left basilar infiltrate Line placement as above Expiratory exam, possible mild vascular congestion.   Repeat upright good inspiration PA view of the chest is suggested for more accurate assessment of the pulmonary vascularity RECOMMENDATION: Repeat upright good inspiration PA view of the chest is suggested for more accurate assessment of the pulmonary vascularity     Xr Chest Portable    Result Date: 8/24/2020  EXAMINATION: ONE XRAY VIEW OF THE CHEST 8/24/2020 9:10 am COMPARISON: August 21, 2020, chest exam HISTORY: ORDERING SYSTEM PROVIDED HISTORY: Resp failure TECHNOLOGIST PROVIDED HISTORY: Resp failure Reason for Exam: resp failure Acuity: Unknown Type of Exam: Unknown FINDINGS: Interval insertion of a right PICC line catheter, the tip is projected at the right atrium. Limited markedly rotated exam No obvious significant pleuroparenchymal or mediastinal findings. Limited assessment of the left lung base     Limited markedly rotated exam, limited left base assessment Line placement as above No obvious acute cardiopulmonary findings     Xr Chest Portable    Result Date: 8/22/2020  EXAMINATION: ONE XRAY VIEW OF THE CHEST 8/22/2020 8:41 am COMPARISON: 08/21/2020 HISTORY: ORDERING SYSTEM PROVIDED HISTORY: Difficulty breathing TECHNOLOGIST PROVIDED HISTORY: Difficulty breathing Reason for Exam: dyspnea Acuity: Acute Type of Exam: Initial FINDINGS: Cardiomegaly. Low lung volumes. Right-sided PICC line remains in place. No focal consolidation. No pleural effusion or pneumothorax. Low lung volumes. This accentuates cardiomegaly. No focal consolidation. Xr Chest Portable    Result Date: 8/21/2020  EXAMINATION: ONE XRAY VIEW OF THE CHEST 8/21/2020 9:27 am COMPARISON: Chest radiograph performed 08/21/2020. HISTORY: ORDERING SYSTEM PROVIDED HISTORY: Increased oxygen demand TECHNOLOGIST PROVIDED HISTORY: Increased oxygen demand Reason for Exam: Increased oxygen demand Acuity: Acute Type of Exam: Initial Additional signs and symptoms: Increased oxygen demand FINDINGS: There are small bilateral effusions. There is a left basilar infiltrate. There is no pneumothorax. The heart is prominent.   The upper abdomen is unremarkable. The extrathoracic soft tissues are unremarkable. Cardiomegaly and small bilateral effusions with adjacent left basilar infiltrate. Xr Chest Portable    Result Date: 8/20/2020  EXAMINATION: ONE XRAY VIEW OF THE CHEST 8/20/2020 7:38 am COMPARISON: July 18, 2020 chest exam HISTORY: ORDERING SYSTEM PROVIDED HISTORY: possible pneumonia on CT chest TECHNOLOGIST PROVIDED HISTORY: possible pneumonia on CT chest Reason for Exam: possible pneumonia on CT chest Acuity: Acute Type of Exam: Initial Additional signs and symptoms: possible pneumonia on CT chest FINDINGS: Expiratory exam with mild streaky bibasilar density. Normal cardiopericardial silhouette No significant pleural or mediastinal findings     Expiratory exam with mild streaky bibasilar atelectasis     Ct Chest Pulmonary Embolism W Contrast    Result Date: 8/26/2020  EXAMINATION: CTA OF THE CHEST; CT OF THE ABDOMEN AND PELVIS WITH CONTRAST 8/26/2020 10:34 am TECHNIQUE: CTA of the chest was performed after the administration of intravenous contrast.  Multiplanar reformatted images are provided for review. MIP images are provided for review. Dose modulation, iterative reconstruction, and/or weight based adjustment of the mA/kV was utilized to reduce the radiation dose to as low as reasonably achievable.; CT of the abdomen and pelvis was performed with the administration of intravenous contrast. Multiplanar reformatted images are provided for review. Dose modulation, iterative reconstruction, and/or weight based adjustment of the mA/kV was utilized to reduce the radiation dose to as low as reasonably achievable.  COMPARISON: CT chest May 26, 2019 CT abdomen and pelvis August 20, 2020 HISTORY: ORDERING SYSTEM PROVIDED HISTORY: Hypoxia, recurrent fevers TECHNOLOGIST PROVIDED HISTORY: Hypoxia, recurrent fevers Reason for Exam: Hypoxia, recurrent fevers, best images possible due to patient size 322 pounds Acuity: Acute Type of Exam: Initial; ORDERING SYSTEM PROVIDED HISTORY: Splenic vein thrombosis? TECHNOLOGIST PROVIDED HISTORY: Splenic vein thrombosis? Reason for Exam: Splenic vein thrombosis? best images possible due to patient size 322  poounds Acuity: Acute Type of Exam: Initial FINDINGS: CT CHEST: Chest wall: No axillary adenopathy. Mediastinum: Cardiomegaly. No pericardial effusion. No adenopathy. Pulmonary arteries: Significantly limited evaluation for pulmonary embolus due to bolus timing, motion, and patient body habitus. Questionable pulmonary embolus within the right lower lobe. Lungs: Moderate left pleural effusion. Small right pleural effusion. Left lower lobe consolidation versus atelectasis. Bilateral upper lobe areas of consolidation. Bones: No acute osseous abnormality. CT ABDOMEN-PELVIS: Organs: Decreased attenuation of the liver is consistent with hepatic steatosis. No focal liver lesion. Cholecystectomy. Severe pancreatitis with surrounding inflammatory changes and fluid. Mild splenomegaly. No adrenal lesion. No hydronephrosis. Right renal cyst.  Focal area of scarring within the right kidney is unchanged. GI/Bowel: No bowel obstruction. Secondary involvement of the descending colon adjacent to the peripancreatic inflammatory changes along the tail of the pancreas. Pelvis: Essure devices. No significant free fluid. No bladder calculus. Peritoneum/Retroperitoneum: Splenic artery is patent. No evidence of splenic artery thrombosis. No definite splenic venous thrombosis. A portion of the splenic vein as it courses along the tail of the pancreas is indistinct. However, proximal and distal to this, there is flow and this may be related to artifact. No abdominal aortic aneurysm. Prominent peripancreatic lymph nodes are likely reactive. Bones/Soft Tissues: No acute soft tissue abnormality. No acute osseous abnormality. Significantly limited evaluation for pulmonary embolus. Questionable right lower lobe pulmonary embolus. Bilateral lung opacities are likely pneumonia. Recommend follow-up to document resolution. Moderate left pleural effusion. Severe acute pancreatitis. No definite splenic venous thrombosis. Critical results were called by Dr. Nieves Ferrari MD to Dr. Sara William on 8/26/2020 at 11:40. Vl Lower Extremity Bilateral Venous Duplex    Result Date: 8/30/2020    [facility]  Vascular Lower Extremities DVT Study Procedure   Patient Name  Prabhakar Mcginnis [id number]   Date of Study           08/30/2020                [facility]   Date of Birth 1975  Gender                  Female   Age           54 year(s)  Race                       Room Number   2003        Height:                 64.96 inch, 165 cm   Corporate ID  [id number]    Weight:                 304 pounds, 137.9 kg  #   Patient Acct  [de-identified]   BSA:        2.36 m^2    BMI:       50.65 kg/m^2  #   MR #          [id number]      Sonographer             Simeonmaxwell Sewell   Accession #   [id number]  Interpreting Physician  Ortiz Toro   Referring                 Referring Physician     Ray Parekh  Nurse  Practitioner  Procedure Type of Study:   Veins: Lower Extremities DVT Study, Venous Scan Lower Bilateral.  Indications for Study:Shortness of breath. Patient Status: In Patient. Technical Quality:Limited visualization. Limitation reason:Bedside edema. Conclusions   Summary   No evidence of superficial or deep venous thrombosis in both lower  extremities.    Signature   ----------------------------------------------------------------  Electronically signed by Brown Escobar(Sonographer) on  08/30/2020 04:00 PM  ----------------------------------------------------------------   ----------------------------------------------------------------  Electronically signed by Ramesh ToroInterpreting physician)  on 08/30/2020 08:16 PM  ----------------------------------------------------------------  Findings:   Right Impression:                    Left Impression:  The common femoral, femoral,         The common femoral, femoral,  popliteal and tibial veins           popliteal and tibial veins  demonstrate normal compressibility   demonstrate normal compressibility  and augmentation. and augmentation. Normal compressibility of the great  Normal compressibility of the great  saphenous vein. saphenous vein. Normal compressibility of the small  Normal compressibility of the small  saphenous vein. saphenous vein. Velocities are measured in cm/s ; Diameters are measured in cm Right Lower Extremities DVT Study Measurements Right 2D Measurements +------------------------------------+----------+---------------+----------+ ! Location                            ! Visualized! Compressibility! Thrombosis! +------------------------------------+----------+---------------+----------+ ! Common Femoral                      !Yes       ! Yes            ! None      ! +------------------------------------+----------+---------------+----------+ ! Prox Femoral                        !Yes       ! Yes            ! None      ! +------------------------------------+----------+---------------+----------+ ! Mid Femoral                         !Yes       ! Yes            ! None      ! +------------------------------------+----------+---------------+----------+ ! Dist Femoral                        !Yes       ! Yes            ! None      ! +------------------------------------+----------+---------------+----------+ ! Popliteal                           !Yes       ! Yes            ! None      ! +------------------------------------+----------+---------------+----------+ ! Sapheno Femoral Junction            ! Yes       ! Yes            ! None      ! +------------------------------------+----------+---------------+----------+ ! PTV                                 ! Yes       ! Yes            ! None      ! +------------------------------------+----------+---------------+----------+ Femoral                      !Yes       ! Yes            ! None      ! +------------------------------------+----------+---------------+----------+ ! Prox Femoral                        !Yes       ! Yes            ! None      ! +------------------------------------+----------+---------------+----------+ ! Mid Femoral                         !Yes       ! Yes            ! None      ! +------------------------------------+----------+---------------+----------+ ! Dist Femoral                        !Yes       ! Yes            ! None      ! +------------------------------------+----------+---------------+----------+ ! Popliteal                           !Yes       ! Yes            ! None      ! +------------------------------------+----------+---------------+----------+ ! Sapheno Femoral Junction            ! Yes       ! Yes            ! None      ! +------------------------------------+----------+---------------+----------+ ! PTV                                 ! Yes       ! Yes            ! None      ! +------------------------------------+----------+---------------+----------+ ! Peroneal                            !Yes       ! Yes            ! None      ! +------------------------------------+----------+---------------+----------+ ! Gastroc                             ! Yes       ! Yes            ! None      ! +------------------------------------+----------+---------------+----------+ ! GSV Thigh                           ! Yes       ! Yes            ! None      ! +------------------------------------+----------+---------------+----------+ ! GSV Knee                            ! Yes       ! Yes            ! None      ! +------------------------------------+----------+---------------+----------+ ! GSV Ankle                           ! Yes       ! Yes            ! None      ! +------------------------------------+----------+---------------+----------+ ! SSV                                 ! Yes       ! Yes            ! None      ! +------------------------------------+----------+---------------+----------+ Left Doppler Measurements +---------------------------+------+------+--------------------------------+ ! Location                   ! Signal!Reflux! Reflux (msec)                   ! +---------------------------+------+------+--------------------------------+ ! Common Femoral             !Phasic!      !                                ! +---------------------------+------+------+--------------------------------+ ! Prox Femoral               !Phasic!      !                                ! +---------------------------+------+------+--------------------------------+ ! Popliteal                  !Phasic!      !                                ! +---------------------------+------+------+--------------------------------+    Us Retroperitoneal Limited    Result Date: 8/25/2020  EXAMINATION: ULTRASOUND OF THE KIDNEYS 8/25/2020 1:19 pm COMPARISON: August 20, 2020 CT abdomen and pelvis HISTORY: ORDERING SYSTEM PROVIDED HISTORY: Low UOP TECHNOLOGIST PROVIDED HISTORY: Bilateral Renal Ultrasound Low UOP Acuity: Acute Type of Exam: Initial FINDINGS: Exam is limited due to patient body habitus. The right kidney measures 15.7 centimetres in length and the left kidney measures 16 cm in length. There is no hydronephrosis in either kidney. New focal renal lesion. Diffuse hepatic steatosis. No hydronephrosis in either kidney. Physical Examination:        Physical Exam  Vitals signs reviewed. Constitutional:       General: She is awake. She is not in acute distress. Appearance: Normal appearance. She is morbidly obese. Cardiovascular:      Rate and Rhythm: Normal rate and regular rhythm. Heart sounds: Normal heart sounds. Pulmonary:      Effort: Pulmonary effort is normal.      Breath sounds: Examination of the left-lower field reveals decreased breath sounds. Decreased breath sounds present. Abdominal:      Tenderness:  There is generalized abdominal tenderness (mild). Musculoskeletal:      Right lower leg: No edema. Left lower leg: No edema. Neurological:      Mental Status: She is alert and easily aroused. Psychiatric:         Behavior: Behavior is cooperative. Assessment:        Primary Problem  Pneumonia of both lungs due to infectious organism    Active Hospital Problems    Diagnosis Date Noted    Pneumonia of both lungs due to infectious organism [J18.9] 08/27/2020    History of anxiety disorder [Z86.59]     Acute respiratory failure with hypoxia ([address] [J96.01] 08/21/2020    Hypocalcemia [E83.51] 08/21/2020    Sepsis ([address] [A41.9] 08/20/2020    Morbid obesity ([address] [E66.01]     Abdominal pain, epigastric [R10.13]     Nausea [R11.0]     Acute pancreatitis [K85.90] 08/19/2020    Fibromyalgia [M79.7]     HTN (hypertension) [I10] 12/15/2014    Major depression [F32.9] 10/30/2014    Restless leg syndrome [G25.81] 10/22/2013    Class 3 severe obesity due to excess calories with serious comorbidity in adult ([address] [E66.01] 01/14/2013    Anxiety [F41.9] 01/14/2013       Plan:        1. Severe acute pancreatitis complicated by sepsis, etiology unknown  - CT abdomen and pelvis on admission showed pancreatic edema and fluid consistent with pancreatitis and reactive lymph nodes; lipase 1,065 on admission, normalized  - Repeat CT abdomen and pelvis on 8/27 showed severe acute pancreatitis and no definite splenic venous thrombosis  - Lipase 71 today  - Pro calcitonin is trending down, 0.19 today<0.25<0.32  - WBC down trending, 17 today from 19.5     2.  Acute hypoxic respiratory failure 2/2 possible pneumonia  - 8/19 CT abdomen and pelvis showed bilateral lower lobe consolidation which could be edema or pneumonia; CT PE 8/26 showed a questionable right lower lobe PE and bilateral lung opacities likely pneumonia; D-dimer over 20,000; patient continuing on prophylactic dosing due to pulm recs for low PE suspicion  - CT Chest on 08/27 showed left pleural effusion  - Thoracentesis done on 08/28 for left pleural effusion, cultures pending  - Thoracentesis: pH 8.5, 49 Neutrophils, 40 Lymphocytes, Dark yellow, 3410 WBC, 3520 RBC  - Meet's lights criteria for exudative effusion   - CXR 08/30: Left pleural effusion and left basilar airspace disease, greater than prior effusion.   - Pulmonology on board, stated they may want to do another thoracentesis, no confirmation as of now   -  Weaning Precidex, 0.9mcg currently   - Completed 7 day course of zosyn  - On meropenem day 6  - Respiratory virus panel negative  - Breathing 95% on 2L NC  - Lower extremity doppler negative     3. Sepsis secondary to pancreatitis / pneumonia  - On 8/20 lactic acid 5.4; SIRS criteria of tachypnea, tachycardia, and elevated WBC 20.5 met  - Continue antibiotics: completed 7 day course of zosyn  - On meropenem day 6  - UA normal; blood cultures done on 8/20 NGTD x 6 days; repeat blood cultures on 8/23 NGTD x 5 days  - Repeat acute phase reactants on 8/26: Procal elevated at 0.33, CRP elevated at 296.2; lactate dehydrogenase 429; ferritin 1141; lactic acid normal; COVID negative  - Continue antibiotics      4. HTN  - Lisinopril 40mg     5. Normocytic anemia  - Baseline around 13  - Iron studies low iron 20 and low TIBC 180 and B12/folate normal, received IV venofer for 4 days, will continue for 1 more day      6. Anxiety/depression  - Buspar 15 mg nightly, cymbalta 60 mg BID, quetiapine 800 mg nightly     7. Restless leg syndrome  - Pramipexole 0.25 mg daily     PICC line in place 8/20, Berkowitz removed   PT/OT  Diet: TPN  DVT prophylaxis: lovenox 30 mg BID    Haim Arguello MD  8/31/2020  7:25 AM     Attending Physician Statement  I have discussed the care of Brigida Garvey and I have examined the patient myselft and taken ros and hpi , including pertinent history and exam findings,  with the resident.  I have reviewed the key elements of all parts of

## 2020-08-31 NOTE — PLAN OF CARE
Glucose Level - Abnormal:  Goal: Ability to maintain appropriate glucose levels will improve  8/31/2020 1844 by Alexey Tan RN  Outcome: Met This Shift  8/31/2020 0624 by Constantine Cao RN  Outcome: Ongoing     Problem: Tissue Perfusion, Altered:  Goal: Circulatory function within specified parameters  8/31/2020 1844 by Alexey Tan RN  Outcome: Met This Shift  8/31/2020 0624 by Constantine Cao RN  Outcome: Ongoing     Problem: Venous Thromboembolism:  Goal: Will show no signs or symptoms of venous thromboembolism  8/31/2020 1844 by Alexey Tan RN  Outcome: Met This Shift  8/31/2020 0624 by Constantine Cao RN  Outcome: Ongoing  Goal: Absence of signs or symptoms of impaired coagulation  8/31/2020 1844 by Alexey Tan RN  Outcome: Met This Shift  8/31/2020 0624 by Constantine Cao RN  Outcome: Ongoing     Problem: Nutrition  Goal: Optimal nutrition therapy  8/31/2020 1844 by Alexey Tan RN  Outcome: Ongoing  8/31/2020 0624 by Constantine Cao RN  Outcome: Ongoing     Problem: Skin Integrity:  Goal: Will show no infection signs and symptoms  8/31/2020 1844 by Alexey Tan RN  Outcome: Met This Shift  8/31/2020 0624 by Constantine Cao RN  Outcome: Ongoing  Goal: Absence of new skin breakdown  8/31/2020 1844 by Alexey Tan RN  Outcome: Ongoing  8/31/2020 0624 by Constantine Cao RN  Outcome: Ongoing

## 2020-08-31 NOTE — PROGRESS NOTES
PULMONARY PROGRESS NOTE:      Interval History: pancreatitis, resp failure    Shortness of Breath: yes  Cough: no  Sputum: no          Hemoptysis: no  Chest Pain: no  Fever: yes                  Swelling Feet: no  Headache: no                                           Nausea - yes  Emesis - overnight x 1  Abdominal Pain: abd pain +  Diarrhea: no         Constipation: no    Events since last visit: only wore BIPAP a few hrs overnight - very sleepy this morning - BIPAP applied    PAST MEDICAL HISTORY:      Scheduled Meds:   meropenem  1 g Intravenous Q8H    LORazepam  1 mg Oral Q6H    fat emulsion  100 mL Intravenous Daily    lisinopril  40 mg Oral Daily    insulin lispro  0-6 Units Subcutaneous Q6H    enoxaparin  30 mg Subcutaneous BID    magnesium oxide  400 mg Oral Daily    sodium chloride flush  10 mL Intravenous 2 times per day    QUEtiapine  800 mg Oral Nightly    pramipexole  0.25 mg Oral Daily    pantoprazole  40 mg Intravenous Daily    And    sodium chloride (PF)  10 mL Intravenous Daily    busPIRone  15 mg Oral Nightly    DULoxetine  60 mg Oral BID    cetirizine  10 mg Oral Daily     Continuous Infusions:   dexmedetomidine (PRECEDEX) IV infusion 0.2 mcg/kg/hr (08/31/20 1443)    PN-Adult 2-in-1 Central Line (Standard) 75 mL/hr at 08/31/20 1854    niCARdipine Stopped (08/27/20 2240)    dextrose       PRN Meds:sodium chloride flush, acetaminophen, hydrALAZINE, HYDROmorphone, glucose, dextrose, glucagon (rDNA), dextrose, perflutren lipid microspheres, promethazine (PHENERGAN) in sodium chloride 0.9% IVPB, sodium chloride flush, magnesium sulfate, potassium chloride **OR** potassium alternative oral replacement **OR** potassium chloride, acetaminophen, [DISCONTINUED] promethazine **OR** ondansetron, melatonin ER        PHYSICAL EXAMINATION:  BP (!) 161/91   Pulse 126   Temp 97.1 °F (36.2 °C) (Oral)   Resp (!) 31   Ht 5' 5\" (1.651 m)   Wt (!) 322 lb 8.5 oz (146.3 kg)   SpO2 96%   BMI

## 2020-08-31 NOTE — PROGRESS NOTES
PULMONARY PROGRESS NOTE:      Interval History: pancreatitis, resp failure    Shortness of Breath: yes  Cough: no  Sputum: no          Hemoptysis: no  Chest Pain: no  Fever: yes                  Swelling Feet: no  Headache: no                                           Nausea - yes  Emesis - overnight x 1  Abdominal Pain: abd pain +  Diarrhea: no         Constipation: no    Events since last visit: only wore BIPAP a few hrs overnight - very sleepy this morning - BIPAP applied    PAST MEDICAL HISTORY:      Scheduled Meds:   meropenem  1 g Intravenous Q8H    LORazepam  1 mg Oral Q6H    lisinopril  40 mg Oral Daily    insulin lispro  0-6 Units Subcutaneous Q6H    enoxaparin  30 mg Subcutaneous BID    magnesium oxide  400 mg Oral Daily    sodium chloride flush  10 mL Intravenous 2 times per day    QUEtiapine  800 mg Oral Nightly    pramipexole  0.25 mg Oral Daily    pantoprazole  40 mg Intravenous Daily    And    sodium chloride (PF)  10 mL Intravenous Daily    busPIRone  15 mg Oral Nightly    DULoxetine  60 mg Oral BID    cetirizine  10 mg Oral Daily     Continuous Infusions:   dexmedetomidine (PRECEDEX) IV infusion 0.6 mcg/kg/hr (08/31/20 1050)    PN-Adult 2-in-1 Central Line (Standard)      PN-Adult 2-in-1 Central Line (Standard) 75 mL/hr at 08/30/20 1843    fat emulsion Stopped (08/31/20 2881)    niCARdipine Stopped (08/27/20 2240)    dextrose       PRN Meds:sodium chloride flush, acetaminophen, hydrALAZINE, HYDROmorphone, glucose, dextrose, glucagon (rDNA), dextrose, perflutren lipid microspheres, promethazine (PHENERGAN) in sodium chloride 0.9% IVPB, sodium chloride flush, magnesium sulfate, potassium chloride **OR** potassium alternative oral replacement **OR** potassium chloride, acetaminophen, [DISCONTINUED] promethazine **OR** ondansetron, melatonin ER        PHYSICAL EXAMINATION:  BP (!) 121/98   Pulse 102   Temp 99.3 °F (37.4 °C) (Oral)   Resp 25   Ht 5' 5\" (1.651 m)   Wt (!) 322 lb 8.5 oz (146.3 kg)   SpO2 95%   BMI 53.67 kg/m²   precedex at 0.7  tmax 99.7  General : Awake, alert, NAD   Neck - supple, no lymphadenopathy, JVD not raised  Heart -   Lungs -  fair to good Air Entry  bilaterally; breath sounds : vesicular, 95% on 2 l nc  Abdomen - obese, soft  Upper Extremities  - no cyanosis, mottling; edema : absent  Lower Extremities: no cyanosis, mottling; edema : absent    Current Laboratory, Radiologic, Microbiologic, and Diagnostic studies reviewed  Data ReviewCBC:   Recent Labs     08/29/20  0401 08/30/20  0423 08/31/20  0439   WBC 21.9* 19.5* 17.1*   RBC 2.91* 2.93* 2.98*   HGB 8.9* 8.9* 9.3*   HCT 27.4* 27.7* 27.7*    240 313     BMP:   Recent Labs     08/29/20  0401 08/30/20  0423 08/31/20  0439   GLUCOSE 155* 145* 182*    138 136   K 4.2 4.2 4.2   BUN 14 11 10   CREATININE 0.55 0.48* 0.44*   CALCIUM 8.4* 8.6 8.6     ABGs:   No results for input(s): PHART, PO2ART, HUC2ZGA, OTS3OHA, BEART, T0MZIBNE, TVU6XCX in the last 72 hours.    PT/INR:  No results found for: PTINR    ASSESSMENT / PLAN:  · Acute pancreatitis- GI consulted, monitor amylase lipase - improving  · Lactic acidosis- resolved  · Possible pneumonia- ABx  · Pleural effusion - s/p left thoracentesis with 15 ml exudative fluid removed on 8/28, no growth x 3 days  · Febrile, Leukocytosis- Abx changed per ID, UA- unremarkable, COVID negative, lactate 1.2  · Hypertension - improved   · Possible PE- CTA - no obvious PE  · Monitor end tidal C02 with pain meds   · acute hypoxic resp failure - O2/ NIPPV   · precedex for restlessness-  Ativan, wean precedex   · Encourage IS     Plan of care discussed with Dr Xavier Varner  Electronically signed by Blanco Mathew on 08/31/20 at 12:54 PM

## 2020-08-31 NOTE — PROGRESS NOTES
Pt remains very anxious about wearing the bipap. Pt remains tachypneic. Pt is cleaned up, but still declining the bipap.

## 2020-08-31 NOTE — PROGRESS NOTES
Pt consented to wearing the bipap. I encouraged her and prayed with the pt for peace and rest. Pt placed on hospital bipap set to 14/6, back up RR 16, and 35% O2. Pt remains tachypneic in the 30s and is tachycardic in the 120s. Mask and alarms are appropriate. Pt is maintaining saturation in the 90s at this time.

## 2020-08-31 NOTE — PROGRESS NOTES
Physical Therapy  Facility/Department: [facility] ICU  Daily Treatment Note  NAME: Quincy Whitt  : 1975  MRN: [id number]    Date of Service: 2020    Discharge Recommendations:  Patient would benefit from continued therapy after discharge        Assessment   Body structures, Functions, Activity limitations: Decreased functional mobility ; Increased pain  Assessment: Pt with impaired mobility/decreased activity tolerance from prolonged hospitilization. Will beenfit from physical therapy while in acute care as well as discharge. Treatment Diagnosis: Decreased functional mobility  Specific instructions for Next Treatment: Increase ambulation as able. Prognosis: Excellent  Decision Making: Low Complexity  History: No personal factors were apparent that may have an effect on the this patient's plan of care. Clinical Presentation: Patient's symptoms are evolving  REQUIRES PT FOLLOW UP: Yes  Activity Tolerance  Activity Tolerance: Patient limited by endurance(Patient required numerous rest breaks and had signs of SOB throughout the evaluation.)     Patient Diagnosis(es): The encounter diagnosis was Acute pancreatitis, unspecified complication status, unspecified pancreatitis type. has a past medical history of Abnormal levels of other serum enzymes, Anxiety, Bilateral leg edema, Chronic kidney disease, Depression, DVT (deep venous thrombosis) (HCC), Elevated liver enzymes, Fibromyalgia, Headache(784.0), Hx of blood clots, Hypertension, Kidney stone, Obstructive sleep apnea syndrome, Pancreatitis, Pleural effusion, SOB (shortness of breath), Supraventricular tachycardia ([address] and SVT (supraventricular tachycardia) ([address] has a past surgical history that includes Thyroid lobectomy (Right); Cholecystectomy (); Knee arthroscopy (Left); Foot surgery (Right); Endometrial ablation; Atrial ablation surgery; eye surgery (Bilateral);  Endoscopy, colon, diagnostic; back surgery; pr cysto/uretero/pyeloscopy w/lithotripsy (Left, 9/20/2017); and EXCISION LESION HAND / FINGER (Right, 1/25/2019). Restrictions  Restrictions/Precautions  Restrictions/Precautions: Up as Tolerated  Position Activity Restriction  Other position/activity restrictions: Right arm PICC line was placed and has been on TPN  Subjective   General  Chart Reviewed: Yes  Additional Pertinent Hx: Brigida Garvey is a 54y.o.-year-old female presented to the hospital 8/20/20 with epigastric abdominal pain and bloating for several days prior to admission, severe, no radiation, no alleviating or relieving factors. Pancreatic enzymes are elevated, imaging suggestive of pancreatitis. .CT chest  suggestive of questionable PE, bilateral consolidations. Had Diagnostic left thoracentesis on 8/28/20. Response To Previous Treatment: Not applicable  Family / Caregiver Present: No  Referring Practitioner: Susie Sevilla MD  Subjective  Subjective: OMK per FRANKO Glass for OOB ativity  Pain Screening  Patient Currently in Pain: Yes  Pain Assessment  Pain Assessment: 0-10  Pain Level: 5  Patient's Stated Pain Goal: No pain  Pain Type: Acute pain  Pain Location: Arm; Shoulder  Pain Orientation: Left  Pain Descriptors: Discomfort;Aching  Pain Frequency: Intermittent  Clinical Progression: Gradually worsening  Response to Pain Intervention: Patient Satisfied  Vital Signs  BP Location: Left lower arm  Level of Consciousness: Alert  Patient Currently in Pain: Yes  Oxygen Therapy  O2 Device: Nasal cannula  O2 Flow Rate (L/min): 2 L/min       Orientation  Orientation  Overall Orientation Status: Within Normal Limits  Cognition      Objective   Bed mobility  Rolling to Left: Stand by assistance  Rolling to Right: Stand by assistance  Supine to Sit: Stand by assistance  Sit to Supine: Unable to assess(left in a recliner chair)  Scooting: Stand by assistance  Transfers  Sit to Stand: Stand by assistance;Contact guard assistance(Pain with L UE use for sit to stnad)  Stand to sit: Stand by assistance  Bed to Chair: Stand by assistance;Contact guard assistance  Stand Pivot Transfers: Stand by assistance  Comment: CGA for BSC commode use  Ambulation  Ambulation?: Yes  Ambulation 1  Surface: level tile  Device: No Device  Other Apparatus: O2(2 lt)  Assistance: Contact guard assistance  Quality of Gait: Slight unsteady during steps  Distance: 4 steps bed> BSC, 6 steps from [facility] to recliner     Balance  Posture: Good  Sitting - Static: Good  Sitting - Dynamic: Fair;+  Standing - Static: Fair;+  Standing - Dynamic: Fair  Comments: Needs UE support during standing. Other exercises  Other exercises?: Yes  Other exercises 1: Seated B LE/ B UE ex's active except assisted Left shoulder, 10 reps eah  Other exercises 2: Sit to stand x 2 from  at [facility], 5 reps  Other exercises 3: Standing tolerance for 35 to 40 secs, for pericare after use of BSC, CGA to maintain static balance. G-Code     OutComes Score                                                     AM-PAC Score             Goals  Short term goals  Time Frame for Short term goals: 6 visits  Short term goal 1: Pt will demonstrate independence with bed mobility sit to stand, stand to sit and rolling. Short term goal 2: Pt will demonstrate SBA with transfers sit to stand, stand to sit and bed and chair. Short term goal 3: Pt will stand/ambulate 50 ft with RW/SBA. Short term goal 4: Pt will be able to tolerate a 30 minute treatment session without a change in symptoms. Patient Goals   Patient goals : To get better and go home    Plan    Plan  Times per week: 5 to 6 x/ week  Times per day: Daily  Specific instructions for Next Treatment: Increase ambulation as able.   Current Treatment Recommendations: Transfer Training, Patient/Caregiver Education & Training, Gait Training, Functional Mobility Training, Endurance Training, Strengthening, Balance Training, Safety Education & Training  Safety Devices  Type of devices:

## 2020-08-31 NOTE — PROGRESS NOTES
[facility] -   Infectious diseases evaluation  admission date 8/19/2020    reason for consultation:   Fever    Impression :   · Fever /leukocytosis possible pneumonia. · Left pleural effusion status post arthrocentesis 8/28/2020, no growth on cultures to date. · Severe pancreatitis, no necrosis or pseudocyst seen on CT 8/26/2020  · Possible PE on Lovenox  · Acute hypoxic respiratory failure  · Skin rash, possible allergic reaction to Zosyn resolved  · Hypertension   · Obesity      Recommendations   Current:  · Continue IV meropenem   · COVID 19  test was negative on 8/26/2020  · Blood cultures from 8/23 negative to date  · MRSA nasal swab was negative 8/21/2020  · 8/27/2020 viral PCR negative  · Follow amylase and lipase  · Follow procalcitonin level  · Continue supportive care          History of Present Illness:   Initial history:  Bianca Ponce is a 54y.o.-year-old female presented to the hospital with epigastric abdominal pain and bloating for several days prior to admission, severe, no radiation, no alleviating or relieving factors. Pancreatic enzymes were elevated, imaging suggestive of severe pancreatitis. She was on IV Zosyn for 7 days, developed a rash on 8/26/2020, IV Zosyn was discontinued and the rash resolved. IV meropenem started 8/26/2020  Right arm PICC line was placed 8/20/2020 and has been on TPN  CT chest 8/26/2020 suggestive of questionable PE, bilateral consolidations and left pleural effusion  Interval changes  8/31/2020   She had a fever with a temperature max of 104 on 8/26/2020, last fever was 8/29/2020 with a temperature of 101, has been afebrile for more than 24 hours, episodes of tachycardia, on oxygen per nasal cannula, less abdominal pain, denied any diarrhea, tolerating clear liquid diet.   WBC 17.1      I have personally reviewed the past medical history, past surgical history, medications, social history, and family history, and I haveupdated the database accordingly.   Past Medical History:     Past Medical History:   Diagnosis Date    Abnormal levels of other serum enzymes     Anxiety     Bilateral leg edema     Chronic kidney disease     right renal cyst    Depression     DVT (deep venous thrombosis) (HCC) 1997    after delivery    Elevated liver enzymes     Fibromyalgia     Headache(784.0)     migraines    Hx of blood clots     1997 left calf    Hypertension     Kidney stone     Obstructive sleep apnea syndrome     Pancreatitis 2001    Pleural effusion 2001    SOB (shortness of breath)     Supraventricular tachycardia (HCC)     SVT (supraventricular tachycardia) ([address] 9/8/2015       Past Surgical  History:     Past Surgical History:   Procedure Laterality Date    ATRIAL ABLATION SURGERY      BACK SURGERY      L4-5    CHOLECYSTECTOMY  2001    ENDOMETRIAL ABLATION      e sure implants placed also    ENDOSCOPY, COLON, DIAGNOSTIC      EXCISION LESION HAND / FINGER Right 1/25/2019    HAND LESION BIOPSY EXCISION performed by Betzaida Mayorga MD at [facility] Bilateral     left x2, right x1    FOOT SURGERY Right     x3    KNEE ARTHROSCOPY Left     x2    SD CYSTO/URETERO/PYELOSCOPY W/LITHOTRIPSY Left 9/20/2017    CYSTOSCOPY URETEROSCOPY HOLMIUM LASER LITHO WITH STONE BASKETING WITH  STENT  performed by Kayla Severino MD at [facility] Right        Medications:      meropenem  1 g Intravenous Q8H    lisinopril  40 mg Oral Daily    LORazepam  0.5 mg Oral Q6H    insulin lispro  0-6 Units Subcutaneous Q6H    enoxaparin  30 mg Subcutaneous BID    magnesium oxide  400 mg Oral Daily    sodium chloride flush  10 mL Intravenous 2 times per day    QUEtiapine  800 mg Oral Nightly    pramipexole  0.25 mg Oral Daily    pantoprazole  40 mg Intravenous Daily    And    sodium chloride (PF)  10 mL Intravenous Daily    busPIRone  15 mg Oral Nightly    DULoxetine  60 mg Oral BID    Systems  As per history of present illness, other than above 14 systems reviewed were negative  Physical Examination :     Patient Vitals for the past 8 hrs:   BP Temp Temp src Pulse Resp SpO2   08/31/20 0814 -- -- -- -- 29 95 %   08/31/20 0800 -- 99.3 °F (37.4 °C) Oral -- -- --   08/31/20 0600 (!) 146/82 -- -- 101 23 92 %   08/31/20 0500 139/70 -- -- 101 26 92 %   08/31/20 0400 134/61 99.7 °F (37.6 °C) Oral 102 25 96 %   08/31/20 0300 (!) 144/79 -- -- 106 25 93 %   08/31/20 0257 -- -- -- -- 30 92 %   08/31/20 0200 (!) 144/83 -- -- 110 29 90 %   08/31/20 0100 -- -- -- 119 28 --       Physical Exam  Constitutional:       Comments: On oxygen per nasal cannula   HENT:      Head: Normocephalic and atraumatic. Neck:      Musculoskeletal: Neck supple. No neck rigidity. Cardiovascular:      Rate and Rhythm: Normal rate and regular rhythm. Heart sounds: No murmur. Pulmonary:      Breath sounds: No wheezing. Comments: Coarse breath sounds bilaterally  Decreased breath sounds on the left  Abdominal:      Tenderness: There is abdominal tenderness. There is no guarding or rebound. Comments: Epigastric tenderness   Musculoskeletal:      Right lower leg: Edema present. Left lower leg: Edema present. Skin:     General: Skin is warm. Coloration: Skin is not jaundiced. Comments: Erythematous rash to the lower and upper extremities resolved. Neurological:      Mental Status: She is alert and oriented to person, place, and time.            Medical Decision Making:   I have independently reviewed/ordered the following labs:    CBC with Differential:   Recent Labs     08/30/20 0423 08/31/20 0439   WBC 19.5* 17.1*   HGB 8.9* 9.3*   HCT 27.7* 27.7*    313   LYMPHOPCT 15* 14*   MONOPCT 6 7     BMP:  Recent Labs     08/30/20 0423 08/31/20 0439    136   K 4.2 4.2    99   CO2 27 26   BUN 11 10   CREATININE 0.48* 0.44*     Hepatic Function Panel:   Recent Labs     08/30/20  2506 08/31/20  0439   PROT 6.1* 6.5   LABALBU 2.3* 2.5*   BILITOT 0.17* 0.17*   ALKPHOS 83 83   ALT 25 26   AST 24 25       Lab Results   Component Value Date    CREATININE 0.44 08/31/2020    GLUCOSE 182 08/31/2020       Detailed results: Thank you for allowing us to participate in the care of this patient. Please call with questions. This note is created with the assistance of a speech recognition program.  While intending to generate adocument that actually reflects the content of the visit, the document can still have some errors including those of syntax and sound a like substitutions which may escape proof reading. It such instances, actual meaningcan be extrapolated by contextual diversion.     Jr Cheek MD  Office: [phone]  Perfect serve / office [phone]

## 2020-09-01 LAB
ABSOLUTE EOS #: 0 K/UL (ref 0–0.4)
ABSOLUTE IMMATURE GRANULOCYTE: ABNORMAL K/UL (ref 0–0.3)
ABSOLUTE LYMPH #: 1.74 K/UL (ref 1–4.8)
ABSOLUTE MONO #: 0.7 K/UL (ref 0.1–1.3)
ALBUMIN SERPL-MCNC: 3 G/DL (ref 3.5–5.2)
ALBUMIN/GLOBULIN RATIO: ABNORMAL (ref 1–2.5)
ALP BLD-CCNC: 95 U/L (ref 35–104)
ALT SERPL-CCNC: 30 U/L (ref 5–33)
ANION GAP SERPL CALCULATED.3IONS-SCNC: 10 MMOL/L (ref 9–17)
AST SERPL-CCNC: 21 U/L
BASOPHILS # BLD: 0 % (ref 0–2)
BASOPHILS ABSOLUTE: 0 K/UL (ref 0–0.2)
BILIRUB SERPL-MCNC: 0.17 MG/DL (ref 0.3–1.2)
BUN BLDV-MCNC: 9 MG/DL (ref 6–20)
BUN/CREAT BLD: ABNORMAL (ref 9–20)
CALCIUM SERPL-MCNC: 8.8 MG/DL (ref 8.6–10.4)
CHLORIDE BLD-SCNC: 99 MMOL/L (ref 98–107)
CO2: 26 MMOL/L (ref 20–31)
CREAT SERPL-MCNC: <0.4 MG/DL (ref 0.5–0.9)
DIFFERENTIAL TYPE: ABNORMAL
EOSINOPHILS RELATIVE PERCENT: 0 % (ref 0–4)
GFR AFRICAN AMERICAN: ABNORMAL ML/MIN
GFR NON-AFRICAN AMERICAN: ABNORMAL ML/MIN
GFR SERPL CREATININE-BSD FRML MDRD: ABNORMAL ML/MIN/{1.73_M2}
GFR SERPL CREATININE-BSD FRML MDRD: ABNORMAL ML/MIN/{1.73_M2}
GLUCOSE BLD-MCNC: 129 MG/DL (ref 65–105)
GLUCOSE BLD-MCNC: 174 MG/DL (ref 65–105)
GLUCOSE BLD-MCNC: 201 MG/DL (ref 65–105)
GLUCOSE BLD-MCNC: 219 MG/DL (ref 70–99)
GLUCOSE BLD-MCNC: 232 MG/DL (ref 65–105)
HCT VFR BLD CALC: 31.6 % (ref 36–46)
HEMOGLOBIN: 10.4 G/DL (ref 12–16)
IMMATURE GRANULOCYTES: ABNORMAL %
LIPASE: 56 U/L (ref 13–60)
LYMPHOCYTES # BLD: 10 % (ref 24–44)
MAGNESIUM: 2 MG/DL (ref 1.6–2.6)
MCH RBC QN AUTO: 30.8 PG (ref 26–34)
MCHC RBC AUTO-ENTMCNC: 32.9 G/DL (ref 31–37)
MCV RBC AUTO: 93.7 FL (ref 80–100)
MONOCYTES # BLD: 4 % (ref 1–7)
MORPHOLOGY: ABNORMAL
NRBC AUTOMATED: ABNORMAL PER 100 WBC
PDW BLD-RTO: 14.4 % (ref 11.5–14.9)
PHOSPHORUS: 4.1 MG/DL (ref 2.6–4.5)
PLATELET # BLD: 411 K/UL (ref 150–450)
PLATELET ESTIMATE: ABNORMAL
PMV BLD AUTO: 9.4 FL (ref 6–12)
POTASSIUM SERPL-SCNC: 4.6 MMOL/L (ref 3.7–5.3)
RBC # BLD: 3.37 M/UL (ref 4–5.2)
RBC # BLD: ABNORMAL 10*6/UL
SEG NEUTROPHILS: 86 % (ref 36–66)
SEGMENTED NEUTROPHILS ABSOLUTE COUNT: 14.96 K/UL (ref 1.3–9.1)
SODIUM BLD-SCNC: 135 MMOL/L (ref 135–144)
TOTAL PROTEIN: 7.4 G/DL (ref 6.4–8.3)
WBC # BLD: 17.4 K/UL (ref 3.5–11)
WBC # BLD: ABNORMAL 10*3/UL

## 2020-09-01 PROCEDURE — 6360000002 HC RX W HCPCS: Performed by: STUDENT IN AN ORGANIZED HEALTH CARE EDUCATION/TRAINING PROGRAM

## 2020-09-01 PROCEDURE — 6360000002 HC RX W HCPCS: Performed by: NURSE PRACTITIONER

## 2020-09-01 PROCEDURE — 2580000003 HC RX 258: Performed by: INTERNAL MEDICINE

## 2020-09-01 PROCEDURE — 2500000003 HC RX 250 WO HCPCS: Performed by: SURGERY

## 2020-09-01 PROCEDURE — 6370000000 HC RX 637 (ALT 250 FOR IP): Performed by: INTERNAL MEDICINE

## 2020-09-01 PROCEDURE — 6370000000 HC RX 637 (ALT 250 FOR IP): Performed by: NURSE PRACTITIONER

## 2020-09-01 PROCEDURE — C9113 INJ PANTOPRAZOLE SODIUM, VIA: HCPCS | Performed by: NURSE PRACTITIONER

## 2020-09-01 PROCEDURE — 99232 SBSQ HOSP IP/OBS MODERATE 35: CPT | Performed by: INTERNAL MEDICINE

## 2020-09-01 PROCEDURE — 6370000000 HC RX 637 (ALT 250 FOR IP): Performed by: SURGERY

## 2020-09-01 PROCEDURE — 6360000002 HC RX W HCPCS: Performed by: INTERNAL MEDICINE

## 2020-09-01 PROCEDURE — 6370000000 HC RX 637 (ALT 250 FOR IP): Performed by: STUDENT IN AN ORGANIZED HEALTH CARE EDUCATION/TRAINING PROGRAM

## 2020-09-01 PROCEDURE — 99233 SBSQ HOSP IP/OBS HIGH 50: CPT | Performed by: INTERNAL MEDICINE

## 2020-09-01 PROCEDURE — 82947 ASSAY GLUCOSE BLOOD QUANT: CPT

## 2020-09-01 PROCEDURE — 2700000000 HC OXYGEN THERAPY PER DAY

## 2020-09-01 PROCEDURE — 94770 HC ETCO2 MONITOR DAILY: CPT

## 2020-09-01 PROCEDURE — 84100 ASSAY OF PHOSPHORUS: CPT

## 2020-09-01 PROCEDURE — 80053 COMPREHEN METABOLIC PANEL: CPT

## 2020-09-01 PROCEDURE — 83735 ASSAY OF MAGNESIUM: CPT

## 2020-09-01 PROCEDURE — 2580000003 HC RX 258: Performed by: RADIOLOGY

## 2020-09-01 PROCEDURE — 85025 COMPLETE CBC W/AUTO DIFF WBC: CPT

## 2020-09-01 PROCEDURE — 92557 COMPREHENSIVE HEARING TEST: CPT | Performed by: INTERNAL MEDICINE

## 2020-09-01 PROCEDURE — 83690 ASSAY OF LIPASE: CPT

## 2020-09-01 PROCEDURE — 2500000003 HC RX 250 WO HCPCS: Performed by: STUDENT IN AN ORGANIZED HEALTH CARE EDUCATION/TRAINING PROGRAM

## 2020-09-01 PROCEDURE — 36415 COLL VENOUS BLD VENIPUNCTURE: CPT

## 2020-09-01 PROCEDURE — 94660 CPAP INITIATION&MGMT: CPT

## 2020-09-01 PROCEDURE — 2580000003 HC RX 258: Performed by: NURSE PRACTITIONER

## 2020-09-01 PROCEDURE — 2060000000 HC ICU INTERMEDIATE R&B

## 2020-09-01 PROCEDURE — 2500000003 HC RX 250 WO HCPCS: Performed by: INTERNAL MEDICINE

## 2020-09-01 PROCEDURE — 94761 N-INVAS EAR/PLS OXIMETRY MLT: CPT

## 2020-09-01 RX ORDER — METOPROLOL TARTRATE 5 MG/5ML
5 INJECTION INTRAVENOUS EVERY 6 HOURS PRN
Status: DISPENSED | OUTPATIENT
Start: 2020-09-01 | End: 2020-09-02

## 2020-09-01 RX ORDER — AMLODIPINE BESYLATE 10 MG/1
10 TABLET ORAL DAILY
Status: DISCONTINUED | OUTPATIENT
Start: 2020-09-01 | End: 2020-09-04 | Stop reason: HOSPADM

## 2020-09-01 RX ADMIN — I.V. FAT EMULSION 100 ML: 20 EMULSION INTRAVENOUS at 16:51

## 2020-09-01 RX ADMIN — ENOXAPARIN SODIUM 30 MG: 30 INJECTION SUBCUTANEOUS at 08:40

## 2020-09-01 RX ADMIN — HYDROMORPHONE HYDROCHLORIDE 0.5 MG: 1 INJECTION, SOLUTION INTRAMUSCULAR; INTRAVENOUS; SUBCUTANEOUS at 22:47

## 2020-09-01 RX ADMIN — PRAMIPEXOLE DIHYDROCHLORIDE 0.25 MG: 0.25 TABLET ORAL at 08:43

## 2020-09-01 RX ADMIN — METOPROLOL TARTRATE 25 MG: 25 TABLET, FILM COATED ORAL at 20:24

## 2020-09-01 RX ADMIN — METOPROLOL TARTRATE 5 MG: 5 INJECTION, SOLUTION INTRAVENOUS at 18:42

## 2020-09-01 RX ADMIN — HYDROMORPHONE HYDROCHLORIDE 0.5 MG: 1 INJECTION, SOLUTION INTRAMUSCULAR; INTRAVENOUS; SUBCUTANEOUS at 18:42

## 2020-09-01 RX ADMIN — PANTOPRAZOLE SODIUM 40 MG: 40 INJECTION, POWDER, FOR SOLUTION INTRAVENOUS at 08:40

## 2020-09-01 RX ADMIN — METOPROLOL TARTRATE 25 MG: 25 TABLET, FILM COATED ORAL at 12:29

## 2020-09-01 RX ADMIN — MEROPENEM 1 G: 1 INJECTION, POWDER, FOR SOLUTION INTRAVENOUS at 09:32

## 2020-09-01 RX ADMIN — DEXMEDETOMIDINE HYDROCHLORIDE 0.2 MCG/KG/HR: 400 INJECTION INTRAVENOUS at 02:47

## 2020-09-01 RX ADMIN — INSULIN LISPRO 1 UNITS: 100 INJECTION, SOLUTION INTRAVENOUS; SUBCUTANEOUS at 18:42

## 2020-09-01 RX ADMIN — LORAZEPAM 1 MG: 1 TABLET ORAL at 20:24

## 2020-09-01 RX ADMIN — LISINOPRIL 40 MG: 20 TABLET ORAL at 08:41

## 2020-09-01 RX ADMIN — HYDROMORPHONE HYDROCHLORIDE 0.5 MG: 1 INJECTION, SOLUTION INTRAMUSCULAR; INTRAVENOUS; SUBCUTANEOUS at 03:38

## 2020-09-01 RX ADMIN — ONDANSETRON 4 MG: 2 INJECTION INTRAMUSCULAR; INTRAVENOUS at 00:57

## 2020-09-01 RX ADMIN — MEROPENEM 1 G: 1 INJECTION, POWDER, FOR SOLUTION INTRAVENOUS at 01:33

## 2020-09-01 RX ADMIN — Medication 10 ML: at 08:42

## 2020-09-01 RX ADMIN — DULOXETINE HYDROCHLORIDE 60 MG: 60 CAPSULE, DELAYED RELEASE ORAL at 20:24

## 2020-09-01 RX ADMIN — DULOXETINE HYDROCHLORIDE 60 MG: 60 CAPSULE, DELAYED RELEASE ORAL at 08:40

## 2020-09-01 RX ADMIN — PANCRELIPASE 24000 UNITS: 24000; 76000; 120000 CAPSULE, DELAYED RELEASE PELLETS ORAL at 18:49

## 2020-09-01 RX ADMIN — Medication 400 MG: at 08:41

## 2020-09-01 RX ADMIN — LORAZEPAM 1 MG: 1 TABLET ORAL at 14:38

## 2020-09-01 RX ADMIN — HYDRALAZINE HYDROCHLORIDE 10 MG: 20 INJECTION, SOLUTION INTRAMUSCULAR; INTRAVENOUS at 05:47

## 2020-09-01 RX ADMIN — ENOXAPARIN SODIUM 30 MG: 30 INJECTION SUBCUTANEOUS at 20:24

## 2020-09-01 RX ADMIN — CETIRIZINE HYDROCHLORIDE 10 MG: 10 TABLET, FILM COATED ORAL at 08:41

## 2020-09-01 RX ADMIN — LORAZEPAM 1 MG: 1 TABLET ORAL at 01:43

## 2020-09-01 RX ADMIN — INSULIN LISPRO 2 UNITS: 100 INJECTION, SOLUTION INTRAVENOUS; SUBCUTANEOUS at 05:18

## 2020-09-01 RX ADMIN — LORAZEPAM 1 MG: 1 TABLET ORAL at 08:40

## 2020-09-01 RX ADMIN — Medication 10 ML: at 08:41

## 2020-09-01 RX ADMIN — BUSPIRONE HYDROCHLORIDE 15 MG: 15 TABLET ORAL at 20:24

## 2020-09-01 RX ADMIN — HYDROMORPHONE HYDROCHLORIDE 0.5 MG: 1 INJECTION, SOLUTION INTRAMUSCULAR; INTRAVENOUS; SUBCUTANEOUS at 14:38

## 2020-09-01 RX ADMIN — INSULIN LISPRO 2 UNITS: 100 INJECTION, SOLUTION INTRAVENOUS; SUBCUTANEOUS at 12:15

## 2020-09-01 RX ADMIN — CALCIUM GLUCONATE: 94 INJECTION, SOLUTION INTRAVENOUS at 16:51

## 2020-09-01 RX ADMIN — QUETIAPINE FUMARATE 800 MG: 400 TABLET, EXTENDED RELEASE ORAL at 23:33

## 2020-09-01 ASSESSMENT — ENCOUNTER SYMPTOMS
RESPIRATORY NEGATIVE: 1
ALLERGIC/IMMUNOLOGIC NEGATIVE: 1
EYES NEGATIVE: 1
GASTROINTESTINAL NEGATIVE: 1

## 2020-09-01 ASSESSMENT — PAIN SCALES - GENERAL
PAINLEVEL_OUTOF10: 7
PAINLEVEL_OUTOF10: 0
PAINLEVEL_OUTOF10: 0
PAINLEVEL_OUTOF10: 6
PAINLEVEL_OUTOF10: 0
PAINLEVEL_OUTOF10: 0
PAINLEVEL_OUTOF10: 7
PAINLEVEL_OUTOF10: 0
PAINLEVEL_OUTOF10: 0
PAINLEVEL_OUTOF10: 2
PAINLEVEL_OUTOF10: 6
PAINLEVEL_OUTOF10: 0
PAINLEVEL_OUTOF10: 0
PAINLEVEL_OUTOF10: 3

## 2020-09-01 ASSESSMENT — PAIN DESCRIPTION - ORIENTATION: ORIENTATION: LEFT

## 2020-09-01 ASSESSMENT — PAIN DESCRIPTION - PAIN TYPE
TYPE: ACUTE PAIN
TYPE: ACUTE PAIN

## 2020-09-01 ASSESSMENT — PAIN DESCRIPTION - LOCATION
LOCATION: CHEST;SHOULDER
LOCATION: ARM;SHOULDER

## 2020-09-01 ASSESSMENT — PAIN DESCRIPTION - DESCRIPTORS
DESCRIPTORS: ACHING;SHARP
DESCRIPTORS: ACHING;SHARP

## 2020-09-01 ASSESSMENT — PAIN - FUNCTIONAL ASSESSMENT: PAIN_FUNCTIONAL_ASSESSMENT: ACTIVITIES ARE NOT PREVENTED

## 2020-09-01 ASSESSMENT — PAIN DESCRIPTION - ONSET: ONSET: SUDDEN

## 2020-09-01 ASSESSMENT — PAIN DESCRIPTION - FREQUENCY: FREQUENCY: INTERMITTENT

## 2020-09-01 NOTE — PROGRESS NOTES
Comprehensive Nutrition Assessment    Type and Reason for Visit:  Reassess    Nutrition Recommendations/Plan: Start Full Liquid-Low Fat diet. Change supplements from Ensure Clear to Ensure High Protein. Following changes made in additives: NaCl- 25 to 35 mEq, K acetate- 40 to 0 mEq, KCl- 95 to 90 mEq, insulin- 20 to 32 units, no MVI or trace elements. Nutrition Assessment:  GI advanced diet to full liquid, low fat adding Creon with meals. TPN & lipids to continue. Monitor labs and adjust additives accordingly.     Malnutrition Assessment:  Malnutrition Status:  No malnutrition    Context:  Acute Illness     Findings of the 6 clinical characteristics of malnutrition:  Energy Intake:  No significant decrease in energy intake  Weight Loss:  No significant weight loss     Body Fat Loss:  No significant body fat loss     Muscle Mass Loss:  No significant muscle mass loss    Fluid Accumulation:  1 - Mild Extremities   Strength:  Not Performed    Estimated Daily Nutrient Needs:  Energy (kcal):  2020 based on [facility] with no additional factors; Weight Used for Energy Requirements:  Admission     Protein (g):  102-113 gm protein based on 1.8-2 gm/kg IBW; Weight Used for Protein Requirements:  Ideal           Nutrition Related Findings:  Edema: +1 RUE, LUE; trace RLE, LLE      Wounds:  None       Current Nutrition Therapies:    PN-Adult 2-in-1 Central Line (Standard)  DIET FULL LIQUID; Low Fat  Dietary Nutrition Supplements: Low Calorie High Protein Supplement  Current Parenteral Nutrition Orders:  · Type and Formula: 2-in-1 Custom   · Lipids: 100ml  · Duration: Continuous  · Rate/Volume: 75 ml/ 1800 ml  · Current PN Order Provides: 1784 kcal, 90 gm protein    Anthropometric Measures:  · Height: 5' 5\" (165.1 cm)  · Current Body Weight: 322 lb (146.1 kg)(8-25 -no recent wts)   · Admission Body Weight: 304 lb (137.9 kg)    · Ideal Body Weight: 125 lbs; % Ideal Body Weight 243.2 %   · BMI: 53.6  · BMI Categories: Obese Class 3 (BMI 40.0 or greater)       Nutrition Diagnosis:   · Inadequate oral intake related to altered GI function as evidenced by NPO or clear liquid status due to medical condition, nutrition support - parenteral nutrition, intake 0-25%      Nutrition Interventions:   Food and/or Nutrient Delivery:  Continue Current Diet, Modify Oral Nutrition Supplement, Modify Parenteral Nutrition  Nutrition Education/Counseling:  No recommendation at this time   Coordination of Nutrition Care:  Continued Inpatient Monitoring    Goals:  Meet % of nutrient needs with PO intake, supplements and TPN. Nutrition Monitoring and Evaluation:   Food/Nutrient Intake Outcomes:  Food and Nutrient Intake, Supplement Intake, Parenteral Nutrition Intake/Tolerance, Diet Advancement/Tolerance  Physical Signs/Symptoms Outcomes:  Biochemical Data, GI Status, Nausea or Vomiting, Fluid Status or Edema, Hemodynamic Status, Weight     Discharge Planning:    Continue Oral Nutrition Supplement, Continue current diet, Parenteral Nutrition     Some areas of assessment may be incomplete due to COVID-19 precautions. Eleni Hernandez R.D., L.D.   Clinical Dietitian  Office: [phone]

## 2020-09-01 NOTE — FLOWSHEET NOTE
Patient had reported feeling nauseated to unit clerk, writer checked on patient, denied nausea at this time. Denied need for intervention.

## 2020-09-01 NOTE — FLOWSHEET NOTE
09/01/20 1713   Encounter Summary   Services provided to: Patient   Referral/Consult From: Rounding   Complexity of Encounter Low   Length of Encounter 15 minutes   Spiritual/Taoism   Type Spiritual support   Assessment Sleeping   Intervention Prayer   Outcome Did not respond

## 2020-09-01 NOTE — DISCHARGE INSTR - COC
Continuity of Care Form    Patient Name: Brigida Garvey   :  1975  MRN:  [id number]    Admit date:  2020  Discharge date:  2020    Code Status Order: Full Code   Advance Directives:   [facility] Documentation     Date/Time Healthcare Directive Type of Healthcare Directive Copy in [facility] [address] Agent's Name Healthcare Agent's Phone Number    20 0232  No, patient does not have an advance directive for healthcare treatment -- -- -- -- --          Admitting Physician:  Mehnaz Berumen MD  PCP: Nathan Bamberger    Discharging Nurse: [facility] Unit/Room#:   Discharging Unit Phone Number: [phone]    Emergency Contact:   Extended Emergency Contact Information  Primary Emergency Contact: Rachael De Los Santos   [address] Phone: [phone]  Mobile Phone: [phone]  Relation: Parent  Preferred language: English   needed?  No    Past Surgical History:  Past Surgical History:   Procedure Laterality Date    ATRIAL ABLATION SURGERY      BACK SURGERY      L4-5    CHOLECYSTECTOMY      ENDOMETRIAL ABLATION      e sure implants placed also    ENDOSCOPY, COLON, DIAGNOSTIC      EXCISION LESION HAND / FINGER Right 2019    HAND LESION BIOPSY EXCISION performed by Fabiana Zhao MD at [address] Bilateral     left x2, right x1    FOOT SURGERY Right     x3    KNEE ARTHROSCOPY Left     x2    CA CYSTO/URETERO/PYELOSCOPY W/LITHOTRIPSY Left 2017    CYSTOSCOPY URETEROSCOPY HOLMIUM LASER LITHO WITH STONE BASKETING WITH  STENT  performed by Sony Denney MD at [facility]        Immunization History:   Immunization History   Administered Date(s) Administered    Hepatitis A 2019    Influenza Virus Vaccine 2013    Influenza, [place] Generous, IM, PF (6 mo and older Fluzone, Flulaval, Fluarix, and 3 yrs and older Afluria) 2016, 2018    Tdap (Boostrix, Adacel) 09/15/2017       Active Problems:  Patient Active Problem List   Diagnosis Code    Anxiety F41.9    Class 3 severe obesity due to excess calories with serious comorbidity in adult ([facility]) E66.01    Restless leg syndrome G25.81    Previous back surgery Z98.890    Major depression F32.9    HTN (hypertension) I10    FHx: rheumatoid arthritis Z82.61    SVT (supraventricular tachycardia) ([facility]) I47.1    Right elbow pain M25.521    Pain of right lower extremity M79.604    Cough with sputum R05    Varicose vein of leg I83.90    Leg swelling M79.89    Herpes zoster without complication M77.1    Chest pain R07.9    Fibromyalgia M79.7    Acute pancreatitis K85.90    Sepsis ([facility]) A41.9    Morbid obesity ([facility]) E66.01    Abdominal pain, epigastric R10.13    Nausea R11.0    Acute respiratory failure with hypoxia ([facility]) J96.01    Hypocalcemia E83.51    History of anxiety disorder Z86.59    Abnormal levels of other serum enzymes R74.8    Elevated liver enzymes R74.8    Bilateral leg edema R60.0    Smoker F17.200    Severe episode of recurrent major depressive disorder, without psychotic features ([facility] [address] [address] F33.2    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    Presbyopia H52.4    Pseudotumor cerebri syndrome G93.2    Insomnia due to psychological stress F51.02    Astigmatism H52.209    Generalized anxiety disorder F41.1    Carpal tunnel syndrome of right wrist G56.01    Pneumonia of both lungs due to infectious organism J18.9       Isolation/Infection:   Isolation          No Isolation        Patient Infection Status     Infection Onset Added Last Indicated Last Indicated By Review Planned Expiration Resolved Resolved By    None active    Resolved    COVID-19 Rule Out 08/26/20 08/26/20 08/26/20 COVID-19 (Ordered)   08/26/20 Rule-Out Test Resulted          Nurse Assessment:  Last Vital Signs: BP (!) 152/85   Pulse 108   Temp 97.8 °F (36.6 °C) (Oral)   Resp 29   Ht 5' 5\" (1.651 m)   Wt (!) 322 lb 8.5 oz (146.3 kg)   SpO2 92%   BMI 53.67 kg/m²     Last documented pain score (0-10 scale): Pain Level: 7  Last Weight:   Wt Readings from Last 1 Encounters:   08/25/20 (!) 322 lb 8.5 oz (146.3 kg)     Mental Status:  alert, coherent, logical and thought processes intact    IV Access:  - None    Nursing Mobility/ADLs:  Walking   Independent  Transfer  Independent  Bathing  Independent  Dressing  Independent  Toileting  Independent  Feeding  Independent  Med Admin  Independent  Med Delivery   whole    Wound Care Documentation and Therapy:  Wound 08/22/20 Nose Mid (Active)   Wound Pressure Stage  2 09/01/20 1200   Dressing Status Other (Comment) 08/28/20 0400   Dressing/Treatment Open to air 09/01/20 1200   Wound Assessment Pink 08/30/20 0400   Drainage Amount None 08/28/20 0400   Drainage Description Serosanguinous 08/24/20 0400   Odor None 08/28/20 0400   Margins Attached edges; Defined edges 08/28/20 0400   Emily-wound Assessment Pink 08/28/20 0400   Number of days: 9        Elimination:  Continence:   · Bowel: Yes  · Bladder: Yes  Urinary Catheter: None   Colostomy/Ileostomy/Ileal Conduit: No  [REMOVED] Rectal Tube With balloon-Stool Appearance: Loose  [REMOVED] Rectal Tube With balloon-Stool Color: Brown  [REMOVED] Rectal Tube With balloon-Stool Amount: Smear    Date of Last BM: 9/4/2020    Intake/Output Summary (Last 24 hours) at 9/1/2020 1453  Last data filed at 9/1/2020 0938  Gross per 24 hour   Intake 2816 ml   Output 3475 ml   Net -659 ml     I/O last 3 completed shifts: In: 2816 [P.O.:480; I.V.:1136; IV Piggyback:200]  Out: 0928 [Urine:3875]    Safety Concerns:      At Risk for Falls    Impairments/Disabilities:      None    Nutrition Therapy:  Current Nutrition Therapy:   - Oral Diet:  Low Fat    Routes of Feeding: Oral  Liquids: No Restrictions  Daily Fluid Restriction: no  Last Modified Barium Swallow with Video (Video Swallowing Test): not done    Treatments at the Time of Hospital Discharge:   Respiratory Treatments: n/a  Oxygen Therapy:  is not on home oxygen therapy. Ventilator:    - BiPAP   IPAP: 14 cmH20, CPAP/EPAP: 6 cmH2O only when sleeping    Rehab Therapies: Physical Therapy and Occupational Therapy  Weight Bearing Status/Restrictions: No weight bearing restirctions  Other Medical Equipment (for information only, NOT a DME order):  walker  Other Treatments: Skilled Nursing Assessment, Medication Education and Monitoring. Pt [facility] [address] [facility] [place] [facility] Patient's personal belongings (please select all that are sent with patient):  Glasses, clothing    RN SIGNATURE:  Electronically signed by Génesis Ashley RN on 9/4/20 at 3:02 PM EDT    CASE MANAGEMENT/SOCIAL WORK SECTION    Inpatient Status Date: 8/19/2020    Readmission Risk Assessment Score:  Readmission Risk              Risk of Unplanned Readmission:        22           Discharging to Facility/ [facility]  [address] [address]  Phone [phone]  Fax  [phone]        Dialysis Facility (if applicable)   · Name:  · Address:  · Dialysis Schedule:  · Phone:  · Fax:    / signature: Electronically signed by Maris Martínez RN on 9/1/20 at 2:54 PM EDT    PHYSICIAN SECTION    Prognosis: Good    Condition at Discharge: Stable    Rehab Potential (if transferring to Rehab): Good    Recommended Labs or Other Treatments After Discharge:     Physician Certification: I certify the above information and transfer of Jarrett Santiago  is necessary for the continuing treatment of the diagnosis listed and that she requires Home Care for less 30 days.      Update Admission H&P: No change in H&P    PHYSICIAN SIGNATURE:  Electronically signed by Obdulio Godinez MD on 9/4/20 at 1:40 PM EDT

## 2020-09-01 NOTE — PROGRESS NOTES
Pt voiced concern that she has not been getting her full dose of PRN dilaudid throughout today. Pt stated \"I didn't feel that rush when it was given today; I don't think she was pushing it fast enough or something. \"  Pt educated that IV pain meds are to be given slowly over a set period of time per policy.  Pt replied \"well, I don't see what the big deal is; everyone else has been pushing it fast.\"

## 2020-09-01 NOTE — PROGRESS NOTES
[facility] [address]                 PATIENT NAME: Loly Rascon     TODAY'S DATE: 9/1/2020, 3:55 PM    SUBJECTIVE:    Pt seen and examined. Afebrile, VSS. No fevers overnight, lipase improved. Patient sitting up in chair today. Still having abdominal pain but improved. Bowels are moving. Tolerating clear liquid diet, no N/V.      OBJECTIVE:   VITALS:  BP (!) 150/76   Pulse 108   Temp 97.8 °F (36.6 °C) (Oral)   Resp 26   Ht 5' 5\" (1.651 m)   Wt (!) 322 lb 8.5 oz (146.3 kg)   SpO2 91%   BMI 53.67 kg/m²      INTAKE/OUTPUT:      Intake/Output Summary (Last 24 hours) at 9/1/2020 1555  Last data filed at 9/1/2020 0938  Gross per 24 hour   Intake 2816 ml   Output 3475 ml   Net -659 ml                 CONSTITUTIONAL:  awake and alert.   No acute distress  HEART:   RRR  LUNGS:   Decreased air entry at bases  ABDOMEN:   Abdomen soft, mild generalized tender, non-distended  EXTREMITIES:   Pedal edema, nontender    Data:  CBC:   Lab Results   Component Value Date    WBC 17.4 09/01/2020    RBC 3.37 09/01/2020    HGB 10.4 09/01/2020    HCT 31.6 09/01/2020    MCV 93.7 09/01/2020    MCH 30.8 09/01/2020    MCHC 32.9 09/01/2020    RDW 14.4 09/01/2020     09/01/2020    MPV 9.4 09/01/2020     BMP:    Lab Results   Component Value Date     09/01/2020    K 4.6 09/01/2020    CL 99 09/01/2020    CO2 26 09/01/2020    BUN 9 09/01/2020    LABALBU 3.0 09/01/2020    CREATININE <0.40 09/01/2020    CALCIUM 8.8 09/01/2020    GFRAA CANNOT BE CALCULATED 09/01/2020    LABGLOM CANNOT BE CALCULATED 09/01/2020    GLUCOSE 219 09/01/2020     Hepatic Function Panel:    Lab Results   Component Value Date    ALKPHOS 95 09/01/2020    ALT 30 09/01/2020    AST 21 09/01/2020    PROT 7.4 09/01/2020    PROT 7.2 01/23/2015    BILITOT 0.17 09/01/2020    BILIDIR 0.14 08/20/2020    IBILI 0.21 08/20/2020    LABALBU 3.0 09/01/2020     LIPASE:    Lab Results   Component Value Date    LIPASE 56 09/01/2020 Radiology Review:  No new images to review      ASSESSMENT     Principal Problem:    Pneumonia of both lungs due to infectious organism  Active Problems:    Anxiety    Class 3 severe obesity due to excess calories with serious comorbidity in adult [facility])    Restless leg syndrome    Major depression    HTN (hypertension)    Fibromyalgia    Acute pancreatitis    Sepsis ([facility] [address]    Morbid obesity (HCC)    Abdominal pain, epigastric    Nausea    Acute respiratory failure with hypoxia (HCC)    Hypocalcemia    History of anxiety disorder  Resolved Problems:    * No resolved hospital problems. *      Plan  1. Full liquid diet  2. Continue TPN until PO intake is adequate  3. Antibiotics per ID  4. Increase activity  5. Surgically stable continue medical management  6. Patient was seen and examined. Looking and feels much better. Tolerating full liquids. Abdomen is soft. Obese. No significant tenderness. Nothing acute. Extremities some edema. Blood work was reviewed. Surgically stable. 7. Advance diet slowly. Monitor labs. Good clinical progress so far.       Electronically signed by Colby Spangler PA-C  [address] [address]

## 2020-09-01 NOTE — PROGRESS NOTES
PRN  dextrose 5 % solution, PRN  perflutren lipid microspheres (DEFINITY) injection 2.2 mg, ONCE PRN  magnesium oxide (MAG-OX) tablet 400 mg, Daily  promethazine (PHENERGAN) 12.5 mg in sodium chloride 0.9 % 50 mL IVPB, Q4H PRN  sodium chloride flush 0.9 % injection 10 mL, 2 times per day  sodium chloride flush 0.9 % injection 10 mL, PRN  magnesium sulfate 1 g in dextrose 5% 100 mL IVPB, PRN  potassium chloride (KLOR-CON M) extended release tablet 40 mEq, PRN    Or  potassium bicarb-citric acid (EFFER-K) effervescent tablet 40 mEq, PRN    Or  potassium chloride 10 mEq/100 mL IVPB (Peripheral Line), PRN  acetaminophen (TYLENOL) tablet 650 mg, Q4H PRN  ondansetron (ZOFRAN) injection 4 mg, Q6H PRN  QUEtiapine (SEROQUEL XR) extended release tablet 800 mg, Nightly  pramipexole (MIRAPEX) tablet 0.25 mg, Daily  pantoprazole (PROTONIX) injection 40 mg, Daily    And  sodium chloride (PF) 0.9 % injection 10 mL, Daily  melatonin ER tablet 5 mg, Nightly PRN  busPIRone (BUSPAR) tablet 15 mg, Nightly  DULoxetine (CYMBALTA) extended release capsule 60 mg, BID  cetirizine (ZYRTEC) tablet 10 mg, Daily        Data:     Code Status:  Full Code    Family History   Problem Relation Age of Onset    Heart Disease Father     High Blood Pressure Brother        Social History     Socioeconomic History    Marital status:      Spouse name: Not on file    Number of children: Not on file    Years of education: Not on file    Highest education level: Not on file   Occupational History    Not on file   Social Needs    Financial resource strain: Not on file    Food insecurity     Worry: Not on file     Inability: Not on file    Transportation needs     Medical: Not on file     Non-medical: Not on file   Tobacco Use    Smoking status: Former Smoker     Packs/day: 1.00     Types: Cigarettes     Last attempt to quit: 2019     Years since quittin.7    Smokeless tobacco: Never Used    Tobacco comment: today last smoke Substance and Sexual Activity    Alcohol use: Yes     Alcohol/week: 0.0 standard drinks     Comment: rarely    Drug use: No    Sexual activity: Not on file   Lifestyle    Physical activity     Days per week: Not on file     Minutes per session: Not on file    Stress: Not on file   Relationships    Social connections     Talks on phone: Not on file     Gets together: Not on file     Attends Confucianist service: Not on file     Active member of club or organization: Not on file     Attends meetings of clubs or organizations: Not on file     Relationship status: Not on file    Intimate partner violence     Fear of current or ex partner: Not on file     Emotionally abused: Not on file     Physically abused: Not on file     Forced sexual activity: Not on file   Other Topics Concern    Not on file   Social History Narrative    Not on file       Vitals:  BP (!) 159/82   Pulse 117   Temp 97.5 °F (36.4 °C) (Oral)   Resp 24   Ht 5' 5\" (1.651 m)   Wt (!) 322 lb 8.5 oz (146.3 kg)   SpO2 95%   BMI 53.67 kg/m²   Temp (24hrs), Av.5 °F (36.4 °C), Min:97.1 °F (36.2 °C), Max:98.2 °F (36.8 °C)    Recent Labs     20  1127 20  1622 20  2332 20  0506   POCGLU 160* 143* 175* 201*       I/O (24Hr):     Intake/Output Summary (Last 24 hours) at 2020 1022  Last data filed at 2020 0938  Gross per 24 hour   Intake 2816 ml   Output 4925 ml   Net -2109 ml       Labs:      CBC:   Lab Results   Component Value Date    WBC 17.4 2020    RBC 3.37 2020    HGB 10.4 2020    HCT 31.6 2020    MCV 93.7 2020    MCH 30.8 2020    MCHC 32.9 2020    RDW 14.4 2020     2020    MPV 9.4 2020     CBC with Differential:    Lab Results   Component Value Date    WBC 17.4 2020    RBC 3.37 2020    HGB 10.4 2020    HCT 31.6 2020     2020    MCV 93.7 2020    MCH 30.8 2020    MCHC 32.9 2020    RDW 14.4 09/01/2020    METASPCT 2 08/30/2020    LYMPHOPCT 10 09/01/2020    LYMPHOPCT 35.0 03/22/2016    MONOPCT 4 09/01/2020    MONOPCT 6.8 03/22/2016    EOSPCT 3.8 03/22/2016    BASOPCT 0 09/01/2020    BASOPCT 0.9 03/22/2016    MONOSABS 0.70 09/01/2020    MONOSABS 0.6 03/22/2016    LYMPHSABS 1.74 09/01/2020    LYMPHSABS 3.1 03/22/2016    EOSABS 0.00 09/01/2020    EOSABS 0.3 03/22/2016    BASOSABS 0.00 09/01/2020    DIFFTYPE NOT REPORTED 09/01/2020     Hemoglobin/Hematocrit:    Lab Results   Component Value Date    HGB 10.4 09/01/2020    HCT 31.6 09/01/2020     CMP:    Lab Results   Component Value Date     09/01/2020    K 4.6 09/01/2020    CL 99 09/01/2020    CO2 26 09/01/2020    BUN 9 09/01/2020    CREATININE <0.40 09/01/2020    GFRAA CANNOT BE CALCULATED 09/01/2020    LABGLOM CANNOT BE CALCULATED 09/01/2020    GLUCOSE 219 09/01/2020    PROT 7.4 09/01/2020    PROT 7.2 01/23/2015    LABALBU 3.0 09/01/2020    CALCIUM 8.8 09/01/2020    BILITOT 0.17 09/01/2020    ALKPHOS 95 09/01/2020    AST 21 09/01/2020    ALT 30 09/01/2020     BMP:    Lab Results   Component Value Date     09/01/2020    K 4.6 09/01/2020    CL 99 09/01/2020    CO2 26 09/01/2020    BUN 9 09/01/2020    LABALBU 3.0 09/01/2020    CREATININE <0.40 09/01/2020    CALCIUM 8.8 09/01/2020    GFRAA CANNOT BE CALCULATED 09/01/2020    LABGLOM CANNOT BE CALCULATED 09/01/2020    GLUCOSE 219 09/01/2020     PT/INR:    Lab Results   Component Value Date    PROTIME 12.2 08/20/2020    INR 0.9 08/20/2020     PTT:    Lab Results   Component Value Date    APTT 24.4 08/20/2020   [APTT}    Physical Examination:        General appearance: alert, cooperative and no distress  Mental Status: oriented to person, place and time and normal affectr,  Abdomen: soft, nontender, nondistended, bowel sounds present  Assessment:        Primary Problem  Pneumonia of both lungs due to infectious organism     Active Hospital Problems    Diagnosis Date Noted    Pneumonia of both lungs due to infectious organism [J18.9] 08/27/2020    History of anxiety disorder [Z86.59]     Acute respiratory failure with hypoxia ([facility] [J96.01] 08/21/2020    Hypocalcemia [E83.51] 08/21/2020    Sepsis ([facility] [address] [A41.9] 08/20/2020    Morbid obesity ([facility] [address] [E66.01]     Abdominal pain, epigastric [R10.13]     Nausea [R11.0]     Acute pancreatitis [K85.90] 08/19/2020    Fibromyalgia [M79.7]     HTN (hypertension) [I10] 12/15/2014    Major depression [F32.9] 10/30/2014    Restless leg syndrome [G25.81] 10/22/2013    Class 3 severe obesity due to excess calories with serious comorbidity in adult [facility]) [E66.01] 01/14/2013    Anxiety [F41.9] 01/14/2013     Past Medical History:   Diagnosis Date    Abnormal levels of other serum enzymes     Anxiety     Bilateral leg edema     Chronic kidney disease     right renal cyst    Depression     DVT (deep venous thrombosis) ([facility] [address] 1997    after delivery    Elevated liver enzymes     Fibromyalgia     Headache(784.0)     migraines    Hx of blood clots     1997 left calf    Hypertension     Kidney stone     Obstructive sleep apnea syndrome     Pancreatitis 2001    Pleural effusion 2001    SOB (shortness of breath)     Supraventricular tachycardia (HCC)     SVT (supraventricular tachycardia) ([facility] [address] 9/8/2015        Plan:        1. Acute pancreatitis  1. Abdominal pain improved  2. Advance diet to full liquid  3. Creon with meals  4. Trend Lipase  5. CT abd/pelvis 8/26 - no evidence of pseudocyst, necrosis, free air  2. Anemia  1. Hgb stable  2. No overt bleeding  3. Continue to trend  4. Transfuse for hgb<7    Explained to the patient and d/W Nursing Staff  Will F/U with you  Please call or Page for any issues or change in status  Thanks    Electronically signed by ANIBAL Stark NP on 9/1/2020 at 10:22 AM     I independently performed an evaluation on [place].   I have reviewed the above documentation completed by the Nurse Practitioner

## 2020-09-01 NOTE — PROGRESS NOTES
PULMONARY PROGRESS NOTE:      Interval History:pancreatitis, resp failure    Shortness of Breath: improved. Cough: no  Sputum: no          Hemoptysis: no  Chest Pain: no  Fever: no                   Swelling Feet: no  Headache: no                                           Nausea, Emesis,- no, Abdominal Pain:+  Diarrhea: no         Constipation: no    Events since last visit: On clear liquid diet - poor intake but nursing reports she doesn't like the choices. Remains ST. Shortness of breath improved. Nursing reports diet to be advanced to full liquids.      PAST MEDICAL HISTORY:      Scheduled Meds:   lipase-protease-amylase  24,000 Units Oral TID WC    amLODIPine  10 mg Oral Daily    LORazepam  1 mg Oral Q6H    fat emulsion  100 mL Intravenous Daily    lisinopril  40 mg Oral Daily    insulin lispro  0-6 Units Subcutaneous Q6H    enoxaparin  30 mg Subcutaneous BID    magnesium oxide  400 mg Oral Daily    sodium chloride flush  10 mL Intravenous 2 times per day    QUEtiapine  800 mg Oral Nightly    pramipexole  0.25 mg Oral Daily    pantoprazole  40 mg Intravenous Daily    And    sodium chloride (PF)  10 mL Intravenous Daily    busPIRone  15 mg Oral Nightly    DULoxetine  60 mg Oral BID    cetirizine  10 mg Oral Daily     Continuous Infusions:   PN-Adult 2-in-1 Central Line (Standard) 75 mL/hr at 08/31/20 1854    dextrose       PRN Meds:sodium chloride flush, acetaminophen, hydrALAZINE, HYDROmorphone, glucose, dextrose, glucagon (rDNA), dextrose, perflutren lipid microspheres, promethazine (PHENERGAN) in sodium chloride 0.9% IVPB, sodium chloride flush, magnesium sulfate, potassium chloride **OR** potassium alternative oral replacement **OR** potassium chloride, acetaminophen, [DISCONTINUED] promethazine **OR** ondansetron, melatonin ER        PHYSICAL EXAMINATION:  BP (!) 167/89   Pulse 124   Temp 97.5 °F (36.4 °C) (Oral)   Resp 16   Ht 5' 5\" (1.651 m)   Wt (!) 322 lb 8.5 oz (146.3 kg) SpO2 (!) 89%   BMI 53.67 kg/m²     General : Awake, alert, oriented to time, place, and person  Neck - supple, no lymphadenopathy, JVD not raised  Heart -    Lungs - Air Entry- fair bilaterally; breath sounds : vesicular;   rales/crackles - absent, 89% on 2L  Abdomen - soft,obese   Upper Extremities  - no cyanosis, mottling; edema : absent  Lower Extremities: no cyanosis, mottling; edema : absent    Current Laboratory, Radiologic, Microbiologic, and Diagnostic studies reviewed  Data ReviewCBC:   Recent Labs     08/30/20 0423 08/31/20 0439 09/01/20  0513   WBC 19.5* 17.1* 17.4*   RBC 2.93* 2.98* 3.37*   HGB 8.9* 9.3* 10.4*   HCT 27.7* 27.7* 31.6*    313 411     BMP:   Recent Labs     08/30/20 0423 08/31/20 0439 09/01/20  0513   GLUCOSE 145* 182* 219*    136 135   K 4.2 4.2 4.6   BUN 11 10 9   CREATININE 0.48* 0.44* <0.40*   CALCIUM 8.6 8.6 8.8     ABGs: No results for input(s): PHART, PO2ART, GRZ6PEE, YWS7CQV, BEART, S4GGIILR, APL7YJT in the last 72 hours.    PT/INR:  No results found for: PTINR    ASSESSMENT / PLAN:  · Acute pancreatitis- GI consulted, monitor amylase lipase - improving  · Lactic acidosis- resolved  · Possible pneumonia- ABx  · Pleural effusion - s/p left thoracentesis with 15 ml exudative fluid removed on 8/28, no growth x 3 days  · Febrile, Leukocytosis- Abx changed per ID, UA- unremarkable, COVID negative, lactate 1.2  · Hypertension - improved   · Possible PE- CTA - no obvious PE  · Monitor end tidal C02 with pain meds   · acute hypoxic resp failure - O2/ NIPPV   · D/C precedex   · Encourage IS   · Discussed with Dr. Roc Ortiz       Electronically signed by Terri Adjutant on 09/01/20 at 11:37 AM.

## 2020-09-01 NOTE — PROGRESS NOTES
Physical Therapy  Facility/Department: [facility] PROGRESSIVE CARE  Daily Treatment Note  NAME: Elise Vasquez  : 1975  MRN: [id number]    Date of Service: 2020    Discharge Recommendations:  Patient would benefit from continued therapy after discharge        Assessment   Body structures, Functions, Activity limitations: Decreased functional mobility ; Increased pain  Treatment Diagnosis: Decreased functional mobility  Specific instructions for Next Treatment: Increase ambulation as able. Prognosis: Excellent  REQUIRES PT FOLLOW UP: Yes  Activity Tolerance  Activity Tolerance: Patient limited by endurance; Patient limited by fatigue     Patient Diagnosis(es): The encounter diagnosis was Acute pancreatitis, unspecified complication status, unspecified pancreatitis type. has a past medical history of Abnormal levels of other serum enzymes, Anxiety, Bilateral leg edema, Chronic kidney disease, Depression, DVT (deep venous thrombosis) (HCC), Elevated liver enzymes, Fibromyalgia, Headache(784.0), Hx of blood clots, Hypertension, Kidney stone, Obstructive sleep apnea syndrome, Pancreatitis, Pleural effusion, SOB (shortness of breath), Supraventricular tachycardia ([address] and SVT (supraventricular tachycardia) ([address] has a past surgical history that includes Thyroid lobectomy (Right); Cholecystectomy (); Knee arthroscopy (Left); Foot surgery (Right); Endometrial ablation; Atrial ablation surgery; eye surgery (Bilateral); Endoscopy, colon, diagnostic; back surgery; pr cysto/uretero/pyeloscopy w/lithotripsy (Left, 2017); and EXCISION LESION HAND / FINGER (Right, 2019). Restrictions  Restrictions/Precautions  Restrictions/Precautions: Up as Tolerated  Position Activity Restriction  Other position/activity restrictions: Right arm PICC line was placed and has been on TPN  Subjective   General  Chart Reviewed:  Yes  Additional Pertinent Hx: Elise Vasquez is a 54y.o.-year-old female presented to the hospital 8/20/20 with epigastric abdominal pain and bloating for several days prior to admission, severe, no radiation, no alleviating or relieving factors. Pancreatic enzymes are elevated, imaging suggestive of pancreatitis. .CT chest  suggestive of questionable PE, bilateral consolidations. Had Diagnostic left thoracentesis on 8/28/20. Response To Previous Treatment: Not applicable  Family / Caregiver Present: No  Referring Practitioner: Gunner Sevilla MD  Subjective  Subjective: Pt in bed upon entry, agreeable to therapy. Pt stated that she was tired of bed. General Comment  Comments: Pt very cooperative throughout treatment. Pain Screening  Patient Currently in Pain: Yes  Pain Assessment  Pain Assessment: 0-10  Pain Level: 0  Patient's Stated Pain Goal: No pain  Response to Pain Intervention: Patient Satisfied  Vital Signs  BP Location: Left lower arm  Level of Consciousness: Alert  Patient Currently in Pain: Yes  Oxygen Therapy  O2 Device: Nasal cannula       Orientation  Orientation  Overall Orientation Status: Within Normal Limits  Cognition      Objective   Bed mobility  Rolling to Right: Stand by assistance(Uses bedrail)  Supine to Sit: Stand by assistance(HOB elevated)  Sit to Supine: Unable to assess  Scooting: Stand by assistance  Transfers  Sit to Stand: Stand by assistance;Contact guard assistance(Pain with L UE use for sit to stnad)  Stand to sit: Stand by assistance  Bed to Chair: Stand by assistance;Contact guard assistance  Ambulation  Ambulation?: Yes  Ambulation 1  Surface: level tile  Device: Rolling Walker  Assistance: Contact guard assistance  Quality of Gait:  Slow, no LOB  Gait Deviations: Slow Reyna; Increased HAFSA; Decreased step length;Decreased step height  Distance: 30' x 2, 35' x 2  Comments: Instructed pt to take rest breaks prn.   Stairs/Curb  Stairs?: No     Balance  Posture: Good  Sitting - Static: Good  Sitting - Dynamic: Fair;+  Standing - Static: Fair;+(w/RW)  Standing - Dynamic: Fair(w/RW)  Other exercises  Other exercises?: Yes  Other exercises 1: glut/quadsets 10 x hold 5 sec  Other exercises 2: Sit<>stand x 3(cues for hand placement)                        G-Code     OutComes Score                                                     AM-PAC Score             Goals  Short term goals  Time Frame for Short term goals: 6 visits  Short term goal 1: Pt will demonstrate independence with bed mobility sit to stand, stand to sit and rolling. Short term goal 2: Pt will demonstrate SBA with transfers sit to stand, stand to sit and bed and chair. Short term goal 3: Pt will stand/ambulate 50 ft with RW/SBA. Short term goal 4: Pt will be able to tolerate a 30 minute treatment session without a change in symptoms. Patient Goals   Patient goals : To get better and go home    Plan    Plan  Times per week: 5 to 6 x/ week  Times per day: Daily  Specific instructions for Next Treatment: Increase ambulation as able.   Current Treatment Recommendations: Transfer Training, Patient/Caregiver Education & Training, Gait Training, Functional Mobility Training, Endurance Training, Strengthening, Balance Training, Safety Education & Training  Safety Devices  Type of devices: Call light within reach, Left in bed, Gait belt, Patient at risk for falls, Left in chair, Nurse notified(Bedside recliner)     Therapy Time   Individual Concurrent Group Co-treatment   Time In 1400         Time Out 1432         Minutes 1440 [facility]   Electronically signed by Mckayla Albert PTA on 9/1/2020 at 6:09 PM

## 2020-09-01 NOTE — PROGRESS NOTES
PULMONARY PROGRESS NOTE:      Interval History:pancreatitis, resp failure    Shortness of Breath: improved. Cough: no  Sputum: no          Hemoptysis: no  Chest Pain: no  Fever: no                   Swelling Feet: no  Headache: no                                           Nausea, Emesis,- no, Abdominal Pain:+  Diarrhea: no         Constipation: no    Events since last visit: On clear liquid diet - poor intake but nursing reports she doesn't like the choices. Remains ST. Shortness of breath improved. Nursing reports diet to be advanced to full liquids.      PAST MEDICAL HISTORY:      Scheduled Meds:   lipase-protease-amylase  24,000 Units Oral TID WC    amLODIPine  10 mg Oral Daily    metoprolol tartrate  25 mg Oral BID    LORazepam  1 mg Oral Q6H    fat emulsion  100 mL Intravenous Daily    lisinopril  40 mg Oral Daily    insulin lispro  0-6 Units Subcutaneous Q6H    enoxaparin  30 mg Subcutaneous BID    magnesium oxide  400 mg Oral Daily    sodium chloride flush  10 mL Intravenous 2 times per day    QUEtiapine  800 mg Oral Nightly    pramipexole  0.25 mg Oral Daily    pantoprazole  40 mg Intravenous Daily    And    sodium chloride (PF)  10 mL Intravenous Daily    busPIRone  15 mg Oral Nightly    DULoxetine  60 mg Oral BID    cetirizine  10 mg Oral Daily     Continuous Infusions:   PN-Adult 2-in-1 Central Line (Standard) 75 mL/hr at 09/01/20 1651    dextrose       PRN Meds:metoprolol, sodium chloride flush, acetaminophen, hydrALAZINE, HYDROmorphone, glucose, dextrose, glucagon (rDNA), dextrose, perflutren lipid microspheres, promethazine (PHENERGAN) in sodium chloride 0.9% IVPB, sodium chloride flush, magnesium sulfate, potassium chloride **OR** potassium alternative oral replacement **OR** potassium chloride, acetaminophen, [DISCONTINUED] promethazine **OR** ondansetron, melatonin ER        PHYSICAL EXAMINATION:  BP (!) 163/100   Pulse 113   Temp 97.4 °F (36.3 °C) (Oral)   Resp 24   Ht

## 2020-09-01 NOTE — PROGRESS NOTES
Resident team updated on patient  and heart rate 110 sinus tach. Patient stable and transferring to PCU but only has hydralazine ordered. Will assess and place orders for IV metoprolol (restarted on po today).

## 2020-09-01 NOTE — CARE COORDINATION
DISCHARGE PLANNING NOTE:    Plan is for this patient to return to home and she would like services through [facility] - Referral sent. LSW following for [facility] is still being worked on in case patient needs to go there. Remains on TPN, however was started on full liquid diet and are hoping to start weaning TPN if she tolerates diet. IV merrem. Will continue to follow along.      Electronically signed by Melissa Medina RN on 9/1/2020 at 2:53 PM

## 2020-09-01 NOTE — PROGRESS NOTES
Pt is refusing the ordered hospital bipap due to concerns that she may have to vomit. Pt tolerating 2 LPM NC at this time.

## 2020-09-01 NOTE — PROGRESS NOTES
[address] [employer]    PROGRESS NOTE             9/1/2020    9:22 AM    Name:   Margarito Cao  MRN:     [id number]     Acct:      [de-identified]   Room:   2003/2003-01  IP Day:  15  Admit Date:  8/19/2020  7:35 PM    PCP:  Andrews Bruno  Code Status:  Full Code    Subjective:     C/C:   Chief Complaint   Patient presents with    Abdominal Pain     RUQ   Pt. Was seen and examined on bedside. She felt anxious and sleep deprived last night HR was 120-130's given ativan and she felt better. No acute events occurred. Interval History Status:   Improved. Vitals signs are WNL  I/O 4626.73/6300= -1673.25   Thoracocentesis was done:    · fluid removed on 8/28, no growth x 3 days  · Febrile, Leukocytosis- Abx changed per ID, UA- unremarkable, COVID negative, lactate 1.2    Brief History:     Please see H&P. Review of Systems:     Review of Systems   Constitutional: Negative. HENT: Negative. Eyes: Negative. Respiratory: Negative. Cardiovascular: Negative. Gastrointestinal: Negative. Musculoskeletal: Negative. Skin: Negative. Allergic/Immunologic: Negative. Neurological: Negative. Hematological: Negative. Psychiatric/Behavioral: Positive for sleep disturbance. Medications: Allergies:     Allergies   Allergen Reactions    Aripiprazole      Bad reaction    Eletriptan      Other reaction(s): Swelling-throat    Hydrocodone-Acetaminophen      Other reaction(s): Unknown    Oxycodone-Acetaminophen      Other reaction(s): Unknown    Sumatriptan Other (See Comments)    Triptans      Other reaction(s): Unknown    Zosyn [Piperacillin Sod-Tazobactam So]      Rash 8/26/20 possibley caused by zosyn    Lamotrigine Rash       Current Meds:   Scheduled Meds:    meropenem  1 g Intravenous Q8H    LORazepam  1 mg Oral Q6H    fat emulsion  100 mL Intravenous Daily    lisinopril  40 mg Oral Daily    insulin lispro 0-6 Units Subcutaneous Q6H    enoxaparin  30 mg Subcutaneous BID    magnesium oxide  400 mg Oral Daily    sodium chloride flush  10 mL Intravenous 2 times per day    QUEtiapine  800 mg Oral Nightly    pramipexole  0.25 mg Oral Daily    pantoprazole  40 mg Intravenous Daily    And    sodium chloride (PF)  10 mL Intravenous Daily    busPIRone  15 mg Oral Nightly    DULoxetine  60 mg Oral BID    cetirizine  10 mg Oral Daily     Continuous Infusions:    dexmedetomidine (PRECEDEX) IV infusion 0.5 mcg/kg/hr (09/01/20 0548)    PN-Adult 2-in-1 Central Line (Standard) 75 mL/hr at 08/31/20 1854    niCARdipine Stopped (08/27/20 2240)    dextrose       PRN Meds: sodium chloride flush, acetaminophen, hydrALAZINE, HYDROmorphone, glucose, dextrose, glucagon (rDNA), dextrose, perflutren lipid microspheres, promethazine (PHENERGAN) in sodium chloride 0.9% IVPB, sodium chloride flush, magnesium sulfate, potassium chloride **OR** potassium alternative oral replacement **OR** potassium chloride, acetaminophen, [DISCONTINUED] promethazine **OR** ondansetron, melatonin ER    Data:     Past Medical History:   has a past medical history of Abnormal levels of other serum enzymes, Anxiety, Bilateral leg edema, Chronic kidney disease, Depression, DVT (deep venous thrombosis) ([address] Elevated liver enzymes, Fibromyalgia, Headache(784.0), Hx of blood clots, Hypertension, Kidney stone, Obstructive sleep apnea syndrome, Pancreatitis, Pleural effusion, SOB (shortness of breath), Supraventricular tachycardia ([address] and SVT (supraventricular tachycardia) ([address] Social History:   reports that she quit smoking about 9 months ago. Her smoking use included cigarettes. She smoked 1.00 pack per day. She has never used smokeless tobacco. She reports current alcohol use. She reports that she does not use drugs.      Family History:   Family History   Problem Relation Age of Onset    Heart Disease Father     High Blood Pressure Brother collecting within the bilateral pericolic gutters and collecting inferiorly within the pelvis with increased volume in comparison with the prior study. Diffuse fatty infiltration of the liver is again seen. The liver, gallbladder, spleen, pancreas, adrenal glands, kidneys, are otherwise unremarkable in appearance. GI/Bowel: The stomach is unremarkable without wall thickening or distention. Bowel loops are unremarkable in appearance without evidence of obstruction, distension or mucosal thickening. Pelvis: Berkowitz catheter is noted within the nondistended but grossly unremarkable urinary bladder. Bilateral fallopian tube Essure coils are seen without evidence of complication. The uterus is anteverted in position and is unremarkable in appearance there no evidence of pelvic free fluid is seen. Peritoneum/Retroperitoneum: No evidence of retroperitoneal or intraperitoneal lymphadenopathy is identified. . Bones/Soft Tissues: No evidence of retroperitoneal or intraperitoneal lymphadenopathy is identified. No evidence of intraperitoneal free fluid is seen. 1. Mild interval worsening of severe peripancreatic inflammation associated with acute pancreatitis, as discussed above. 2. No evidence of complication i.e. pseudocyst noted. 3. Stable bilateral lower lung multifocal consolidation atelectasis suggesting pneumonia versus alveolar edema. 4. Hepatic steatosis, unchanged. Xr Chest (single View Frontal)    Result Date: 8/28/2020  EXAMINATION: ONE XRAY VIEW OF THE CHEST 8/28/2020 6:20 am COMPARISON: 08/27/2020 HISTORY: ORDERING SYSTEM PROVIDED HISTORY: f/u pneumonia TECHNOLOGIST PROVIDED HISTORY: f/u pneumonia Reason for Exam: f/u pneumonia Acuity: Unknown Type of Exam: Subsequent/Follow-up Additional signs and symptoms: f/u pneumonia Relevant Medical/Surgical History: f/u pneumonia FINDINGS: Poor inspiration with decrease lung volumes worse on the prior. Right upper extremity PICC line remain in place. Cardiomediastinal silhouette stable accentuated by the low lung volumes. No focal consolidation. Patchy airspace opacities in the left upper lung accentuated by low lung volume. Poor inspiration with low lung volumes worse than prior. Patchy airspace opacities in the left upper lung accentuated by low lung volume. Xr Chest (single View Frontal)    Result Date: 8/21/2020  EXAMINATION: ONE XRAY VIEW OF THE CHEST 8/21/2020 6:06 am COMPARISON: August 20, 2020 chest exam HISTORY: ORDERING SYSTEM PROVIDED HISTORY: f/u atelectasis TECHNOLOGIST PROVIDED HISTORY: f/u atelectasis Reason for Exam: F/U atelectasis. Acuity: Unknown Type of Exam: Unknown Additional signs and symptoms: F/U atelectasis. FINDINGS: Interval insertion of right PICC line catheter, the tip projected at the right atrium Mild cardiomegaly Limited extra sonya exam with low volume lungs, mild streaky, left greater than right, bibasilar atelectasis. Grossly clear upper lungs     Line placement as above Limited expiratory exam with mild streaky bibasilar atelectasis     Ct Abdomen Pelvis W Iv Contrast Additional Contrast? None    Result Date: 8/26/2020  EXAMINATION: CTA OF THE CHEST; CT OF THE ABDOMEN AND PELVIS WITH CONTRAST 8/26/2020 10:34 am TECHNIQUE: CTA of the chest was performed after the administration of intravenous contrast.  Multiplanar reformatted images are provided for review. MIP images are provided for review. Dose modulation, iterative reconstruction, and/or weight based adjustment of the mA/kV was utilized to reduce the radiation dose to as low as reasonably achievable.; CT of the abdomen and pelvis was performed with the administration of intravenous contrast. Multiplanar reformatted images are provided for review. Dose modulation, iterative reconstruction, and/or weight based adjustment of the mA/kV was utilized to reduce the radiation dose to as low as reasonably achievable.  COMPARISON: CT chest May 26, 2019 CT abdomen and pelvis August 20, 2020 HISTORY: ORDERING SYSTEM PROVIDED HISTORY: Hypoxia, recurrent fevers TECHNOLOGIST PROVIDED HISTORY: Hypoxia, recurrent fevers Reason for Exam: Hypoxia, recurrent fevers, best images possible due to patient size 322 pounds Acuity: Acute Type of Exam: Initial; ORDERING SYSTEM PROVIDED HISTORY: Splenic vein thrombosis? TECHNOLOGIST PROVIDED HISTORY: Splenic vein thrombosis? Reason for Exam: Splenic vein thrombosis? best images possible due to patient size 322  poounds Acuity: Acute Type of Exam: Initial FINDINGS: CT CHEST: Chest wall: No axillary adenopathy. Mediastinum: Cardiomegaly. No pericardial effusion. No adenopathy. Pulmonary arteries: Significantly limited evaluation for pulmonary embolus due to bolus timing, motion, and patient body habitus. Questionable pulmonary embolus within the right lower lobe. Lungs: Moderate left pleural effusion. Small right pleural effusion. Left lower lobe consolidation versus atelectasis. Bilateral upper lobe areas of consolidation. Bones: No acute osseous abnormality. CT ABDOMEN-PELVIS: Organs: Decreased attenuation of the liver is consistent with hepatic steatosis. No focal liver lesion. Cholecystectomy. Severe pancreatitis with surrounding inflammatory changes and fluid. Mild splenomegaly. No adrenal lesion. No hydronephrosis. Right renal cyst.  Focal area of scarring within the right kidney is unchanged. GI/Bowel: No bowel obstruction. Secondary involvement of the descending colon adjacent to the peripancreatic inflammatory changes along the tail of the pancreas. Pelvis: Essure devices. No significant free fluid. No bladder calculus. Peritoneum/Retroperitoneum: Splenic artery is patent. No evidence of splenic artery thrombosis. No definite splenic venous thrombosis. A portion of the splenic vein as it courses along the tail of the pancreas is indistinct.  However, proximal and distal to this, there is flow and this may be related to peripancreatic stranding and fluid. No loculated fluid collection. GI/Bowel: Stomach is partially distended. Small bowel is nondilated. Colon is nondilated. Pelvis: Urinary bladder is partially distended without vesicular stones. Uterus is present. Peritoneum/Retroperitoneum: Shotty retroperitoneal lymph nodes, likely reactive. Abdominal aorta is normal in caliber. No pneumoperitoneum. Bones/Soft Tissues: No acute osseous abnormality. 1. Pancreatic edema and associated fluid is consistent with pancreatitis. No loculated fluid collection/pseudocyst. 2. Shotty retroperitoneal lymph nodes are likely reactive. 3. Hepatic steatosis. 4. Bilateral lower lobe consolidation, which could be due to edema or pneumonia. Ir Redd Flow Device Plmt/replace/removal    Result Date: 8/20/2020  PROCEDURE: ULTRASOUND GUIDED VASCULAR ACCESS. FLUOROSCOPY GUIDED PICC PLACEMENT 8/20/2020. HISTORY: ORDERING SYSTEM PROVIDED HISTORY: fluid resusitation TECHNOLOGIST PROVIDED HISTORY: fluid resusitation Lumen?->Double Lumen Is the patient pregnant? ->Yes Acuity: Unknown Type of Exam: Unknown Sepsis SEDATION: None FLUOROSCOPY TIME: DAP 63 cGy cm squared TECHNIQUE: Informed consent was obtained after a detailed explanation of the procedure including risks, benefits, and alternatives. Universal protocol was observed. The right arm was prepped and draped in sterile fashion using maximum sterile barrier technique. Local anesthesia was achieved with lidocaine. A micropuncture needle was used to access the right basilic vein using ultrasound guidance. An ultrasound image demonstrating patency of the vein with needle tip located within it. An image was obtained and stored in PACs. A 0.018 guidewire was used to place a peel-a-way sheath and a 5 [place] power injectable dual-lumen PICC was advanced with fluoroscopic guidance with the tip at the cavo-atrial junction. The catheter flushed easily and there was a good blood return.  The catheter was secured to the skin. The patient tolerated the procedure well and there were no immediate complications. FINDINGS: Fluoroscopic image demonstrates the tip of the catheter at the cavo-atrial junction. Successful ultrasound and fluoroscopy guided right basilic vein 5 [place] power injectable dual-lumen PICC placement. Ready for use. Xr Chest Portable    Result Date: 8/31/2020  EXAMINATION: ONE XRAY VIEW OF THE CHEST 8/31/2020 6:09 am COMPARISON: 08/30/2020 HISTORY: ORDERING SYSTEM PROVIDED HISTORY: infiltrate TECHNOLOGIST PROVIDED HISTORY: infiltrate Reason for Exam: infiltrate Acuity: Unknown Type of Exam: Unknown FINDINGS: A right upper extremity PICC line distal tip is at the cavoatrial junction. There is improved aeration of the left lower lung, with a small pleural effusion with overlying atelectasis/infiltrate remaining. No pneumothorax is demonstrated. The heart is enlarged. No acute osseous abnormality is seen. Improved aeration of the left lower lung, with a small left pleural effusion with overlying atelectasis/pneumonia remaining. Xr Chest Portable    Result Date: 8/30/2020  EXAMINATION: ONE XRAY VIEW OF THE CHEST 8/30/2020 6:07 am COMPARISON: 08/28/2020 HISTORY: ORDERING SYSTEM PROVIDED HISTORY: infiltrate TECHNOLOGIST PROVIDED HISTORY: infiltrate Reason for Exam: infiltrate Acuity: Unknown Type of Exam: Unknown FINDINGS: Right upper extremity PICC line is seen with tip obscured by additional overlying catheters. Low lung volume. Left basilar pleuroparenchymal opacity appears greater than prior. Aeration of the right lung appears slightly improved. No gross pneumothorax. Cardiac and mediastinal silhouettes are reflective of patient rotation. Left pleural effusion and left basilar airspace disease, slightly greater than prior. Improving aeration of the right lung as compared to prior.      Xr Chest Portable    Result Date: 8/27/2020  EXAMINATION: ONE XRAY VIEW OF THE CHEST 8/27/2020 6:15 pm COMPARISON: 08/26/2020 radiograph HISTORY: ORDERING SYSTEM PROVIDED HISTORY: Increased WOB TECHNOLOGIST PROVIDED HISTORY: Increased WOB Reason for Exam: Increased WOB Acuity: Unknown Type of Exam: Unknown Additional signs and symptoms: Increased WOB FINDINGS: Right PICC line stable. The heart is enlarged and there is severe perihilar opacification that is increased centrally. Diffuse ground-glass opacities are also increased bilaterally. No pneumothorax. No skeletal finding. Perihilar and diffuse ground-glass opacities have increased from prior imaging. Pattern suspected to represent pulmonary edema. Developing pneumonitis can not be excluded. Xr Chest Portable    Result Date: 8/26/2020  EXAMINATION: ONE XRAY VIEW OF THE CHEST 8/26/2020 9:07 am COMPARISON: August 24, 2020, chest exam HISTORY: ORDERING SYSTEM PROVIDED HISTORY: PNA TECHNOLOGIST PROVIDED HISTORY: PNA Reason for Exam: PT CO increased SOB and CP. Acuity: Acute Type of Exam: Initial FINDINGS: Right PICC line catheter tip is projected at the SVC right atrial junction Persistent mild cardiomegaly Expiratory exam with mild diffuse prominence of lung markings likely related to the low volume lungs. Mild vascular congestion is not reliably excluded. Interval increase in now mild patchy right upper lobe infiltrate. Moderate left basilar infiltrate     Interval increase in now mild patchy right upper lobe infiltrate with moderate left basilar infiltrate Line placement as above Expiratory exam, possible mild vascular congestion.   Repeat upright good inspiration PA view of the chest is suggested for more accurate assessment of the pulmonary vascularity RECOMMENDATION: Repeat upright good inspiration PA view of the chest is suggested for more accurate assessment of the pulmonary vascularity     Xr Chest Portable    Result Date: 8/24/2020  EXAMINATION: ONE XRAY VIEW OF THE CHEST 8/24/2020 9:10 am COMPARISON: August 21, 2020, chest exam HISTORY: ORDERING SYSTEM PROVIDED HISTORY: Resp failure TECHNOLOGIST PROVIDED HISTORY: Resp failure Reason for Exam: resp failure Acuity: Unknown Type of Exam: Unknown FINDINGS: Interval insertion of a right PICC line catheter, the tip is projected at the right atrium. Limited markedly rotated exam No obvious significant pleuroparenchymal or mediastinal findings. Limited assessment of the left lung base     Limited markedly rotated exam, limited left base assessment Line placement as above No obvious acute cardiopulmonary findings     Xr Chest Portable    Result Date: 8/22/2020  EXAMINATION: ONE XRAY VIEW OF THE CHEST 8/22/2020 8:41 am COMPARISON: 08/21/2020 HISTORY: ORDERING SYSTEM PROVIDED HISTORY: Difficulty breathing TECHNOLOGIST PROVIDED HISTORY: Difficulty breathing Reason for Exam: dyspnea Acuity: Acute Type of Exam: Initial FINDINGS: Cardiomegaly. Low lung volumes. Right-sided PICC line remains in place. No focal consolidation. No pleural effusion or pneumothorax. Low lung volumes. This accentuates cardiomegaly. No focal consolidation. Xr Chest Portable    Result Date: 8/21/2020  EXAMINATION: ONE XRAY VIEW OF THE CHEST 8/21/2020 9:27 am COMPARISON: Chest radiograph performed 08/21/2020. HISTORY: ORDERING SYSTEM PROVIDED HISTORY: Increased oxygen demand TECHNOLOGIST PROVIDED HISTORY: Increased oxygen demand Reason for Exam: Increased oxygen demand Acuity: Acute Type of Exam: Initial Additional signs and symptoms: Increased oxygen demand FINDINGS: There are small bilateral effusions. There is a left basilar infiltrate. There is no pneumothorax. The heart is prominent. The upper abdomen is unremarkable. The extrathoracic soft tissues are unremarkable. Cardiomegaly and small bilateral effusions with adjacent left basilar infiltrate.      Xr Chest Portable    Result Date: 8/20/2020  EXAMINATION: ONE XRAY VIEW OF THE CHEST 8/20/2020 7:38 am COMPARISON: July 18, 2020 chest exam HISTORY: ORDERING SYSTEM PROVIDED HISTORY: possible pneumonia on CT chest TECHNOLOGIST PROVIDED HISTORY: possible pneumonia on CT chest Reason for Exam: possible pneumonia on CT chest Acuity: Acute Type of Exam: Initial Additional signs and symptoms: possible pneumonia on CT chest FINDINGS: Expiratory exam with mild streaky bibasilar density. Normal cardiopericardial silhouette No significant pleural or mediastinal findings     Expiratory exam with mild streaky bibasilar atelectasis     Ct Chest Pulmonary Embolism W Contrast    Result Date: 8/26/2020  EXAMINATION: CTA OF THE CHEST; CT OF THE ABDOMEN AND PELVIS WITH CONTRAST 8/26/2020 10:34 am TECHNIQUE: CTA of the chest was performed after the administration of intravenous contrast.  Multiplanar reformatted images are provided for review. MIP images are provided for review. Dose modulation, iterative reconstruction, and/or weight based adjustment of the mA/kV was utilized to reduce the radiation dose to as low as reasonably achievable.; CT of the abdomen and pelvis was performed with the administration of intravenous contrast. Multiplanar reformatted images are provided for review. Dose modulation, iterative reconstruction, and/or weight based adjustment of the mA/kV was utilized to reduce the radiation dose to as low as reasonably achievable. COMPARISON: CT chest May 26, 2019 CT abdomen and pelvis August 20, 2020 HISTORY: ORDERING SYSTEM PROVIDED HISTORY: Hypoxia, recurrent fevers TECHNOLOGIST PROVIDED HISTORY: Hypoxia, recurrent fevers Reason for Exam: Hypoxia, recurrent fevers, best images possible due to patient size 322 pounds Acuity: Acute Type of Exam: Initial; ORDERING SYSTEM PROVIDED HISTORY: Splenic vein thrombosis? TECHNOLOGIST PROVIDED HISTORY: Splenic vein thrombosis? Reason for Exam: Splenic vein thrombosis?  best images possible due to patient size 322  poounds Acuity: Acute Type of Exam: Initial FINDINGS: CT CHEST: Chest wall: No axillary great  Normal compressibility of the great  saphenous vein. saphenous vein. Normal compressibility of the small  Normal compressibility of the small  saphenous vein. saphenous vein. Velocities are measured in cm/s ; Diameters are measured in cm Right Lower Extremities DVT Study Measurements Right 2D Measurements +------------------------------------+----------+---------------+----------+ ! Location                            ! Visualized! Compressibility! Thrombosis! +------------------------------------+----------+---------------+----------+ ! Common Femoral                      !Yes       ! Yes            ! None      ! +------------------------------------+----------+---------------+----------+ ! Prox Femoral                        !Yes       ! Yes            ! None      ! +------------------------------------+----------+---------------+----------+ ! Mid Femoral                         !Yes       ! Yes            ! None      ! +------------------------------------+----------+---------------+----------+ ! Dist Femoral                        !Yes       ! Yes            ! None      ! +------------------------------------+----------+---------------+----------+ ! Popliteal                           !Yes       ! Yes            ! None      ! +------------------------------------+----------+---------------+----------+ ! Sapheno Femoral Junction            ! Yes       ! Yes            ! None      ! +------------------------------------+----------+---------------+----------+ ! PTV                                 ! Yes       ! Yes            ! None      ! +------------------------------------+----------+---------------+----------+ ! Peroneal                            !Yes       ! Yes            ! None      ! +------------------------------------+----------+---------------+----------+ ! Gastroc                             ! Yes       ! Yes            ! None      ! ! +---------------------------+------+------+--------------------------------+ ! Common Femoral             !Phasic!      !                                ! +---------------------------+------+------+--------------------------------+ ! Prox Femoral               !Phasic!      !                                ! +---------------------------+------+------+--------------------------------+ ! Popliteal                  !Phasic!      !                                ! +---------------------------+------+------+--------------------------------+    Us Retroperitoneal Limited    Result Date: 8/25/2020  EXAMINATION: ULTRASOUND OF THE KIDNEYS 8/25/2020 1:19 pm COMPARISON: August 20, 2020 CT abdomen and pelvis HISTORY: ORDERING SYSTEM PROVIDED HISTORY: Low UOP TECHNOLOGIST PROVIDED HISTORY: Bilateral Renal Ultrasound Low UOP Acuity: Acute Type of Exam: Initial FINDINGS: Exam is limited due to patient body habitus. The right kidney measures 15.7 centimetres in length and the left kidney measures 16 cm in length. There is no hydronephrosis in either kidney. New focal renal lesion. Diffuse hepatic steatosis. No hydronephrosis in either kidney. Ir Guided Thoracentesis Pleural    Result Date: 8/31/2020  PROCEDURE: Cain Rodrigues LEFT DIAGNOSTIC THORACENTESIS 8/28/2020 HISTORY: ORDERING SYSTEM PROVIDED HISTORY: pleural effusion left TECHNOLOGIST PROVIDED HISTORY: pleural effusion left Is the patient pregnant? ->Yes TECHNIQUE: Informed consent was obtained after a detailed explanation of the procedure including risks, benefits, and alternatives. Universal protocol was performed. The left posterior chest was prepped and draped in sterile fashion and local anesthesia was achieved with lidocaine. An 5 Armenian needle sheath was advanced under ultrasound guidance into the small pleural effusion and diagnostic thoracentesis was performed. The patient tolerated the procedure well. Fluid was provided for further analysis.  FINDINGS: Only a small amount of accessible left pleural fluid demonstrated sonographically. A total of 15 mL cloudy yellow fluid was removed. Successful ultrasound guided diagnostic left thoracentesis. Physical Examination:        Physical Exam      Assessment:        Primary Problem  Pneumonia of both lungs due to infectious organism    Active Hospital Problems    Diagnosis Date Noted    Pneumonia of both lungs due to infectious organism [J18.9] 08/27/2020    History of anxiety disorder [Z86.59]     Acute respiratory failure with hypoxia ([facility] [address] [J96.01] 08/21/2020    Hypocalcemia [E83.51] 08/21/2020    Sepsis ([address] [A41.9] 08/20/2020    Morbid obesity ([address] [E66.01]     Abdominal pain, epigastric [R10.13]     Nausea [R11.0]     Acute pancreatitis [K85.90] 08/19/2020    Fibromyalgia [M79.7]     HTN (hypertension) [I10] 12/15/2014    Major depression [F32.9] 10/30/2014    Restless leg syndrome [G25.81] 10/22/2013    Class 3 severe obesity due to excess calories with serious comorbidity in adult ([address] [E66.01] 01/14/2013    Anxiety [F41.9] 01/14/2013       Plan:        1. Continue liquid diet. Continue TPN. Increased activity. DVT prophylaxis. 2. Stop Abx  3. Stop Precedex. 4. Transfer step down. Yuriy Watkins MD  9/1/2020  9:22 AM   Attending Physician Statement  I have discussed the care of Homero Weaver and I have examined the patient myselft and taken ros and hpi , including pertinent history and exam findings,  with the resident. I have reviewed the key elements of all parts of the encounter with the resident. I agree with the assessment, plan and orders as documented by the resident.   Stop meropenem 7 days antibiotics completed stop Precedex drip  Okay to stepdown  strt norvasc      Electronically signed by Korin Yeh MD       .

## 2020-09-01 NOTE — PROGRESS NOTES
RN notified Clayton Lobato that pt was returned to regular 2 LPM NC. Pt removed the EtCO2 cannula due to not tolerating it.

## 2020-09-01 NOTE — PLAN OF CARE
Safety maintained, call light is within reach, bed is low/locked, side rails are up x2, bed alarm remains on, pt medicated as ordered and PRN  Problem: Falls - Risk of:  Goal: Will remain free from falls  Description: Will remain free from falls  9/1/2020 0540 by Marc Burrows RN  Outcome: Ongoing  8/31/2020 1844 by Adelaide Avendano RN  Outcome: Met This Shift  Goal: Absence of physical injury  Description: Absence of physical injury  9/1/2020 0540 by Marc Burrows RN  Outcome: Ongoing  8/31/2020 1844 by Adelaide Avendano RN  Outcome: Met This Shift     Problem: Pain:  Goal: Pain level will decrease  Description: Pain level will decrease  9/1/2020 0540 by Marc Burrows RN  Outcome: Ongoing  8/31/2020 1844 by Adelaide Avendano RN  Outcome: Ongoing  Goal: Control of acute pain  Description: Control of acute pain  9/1/2020 0540 by Marc Burrows RN  Outcome: Ongoing  8/31/2020 1844 by Adelaide Avendano RN  Outcome: Ongoing  Goal: Control of chronic pain  Description: Control of chronic pain  9/1/2020 0540 by Marc Burrows RN  Outcome: Ongoing  8/31/2020 1844 by Adelaide Avendano RN  Outcome: Ongoing     Problem: Nausea/Vomiting:  Goal: Absence of nausea/vomiting  Description: Absence of nausea/vomiting  9/1/2020 0540 by Marc Burrows RN  Outcome: Ongoing  8/31/2020 1844 by Adelaide Avendano RN  Outcome: Ongoing  Goal: Able to drink  Description: Able to drink  9/1/2020 0540 by Marc Burrows RN  Outcome: Ongoing  8/31/2020 1844 by Adelaide Avendano RN  Outcome: Met This Shift  Goal: Able to eat  Description: Able to eat  9/1/2020 0540 by Marc Burrows RN  Outcome: Ongoing  8/31/2020 1844 by Adelaide Avendano RN  Outcome: Met This Shift  Goal: Ability to achieve adequate nutritional intake will improve  Description: Ability to achieve adequate nutritional intake will improve  9/1/2020 0540 by Marc Burrows RN  Outcome: Ongoing  8/31/2020 1844 by Adelaide Avendano RN  Outcome: Met This Shift Problem: Gas Exchange - Impaired:  Goal: Levels of oxygenation will improve  Description: Levels of oxygenation will improve  9/1/2020 0540 by Dee Muhammad RN  Outcome: Ongoing  8/31/2020 1844 by Padmini Correia RN  Outcome: Met This Shift     Problem: Infection, Septic Shock:  Goal: Will show no infection signs and symptoms  Description: Will show no infection signs and symptoms  9/1/2020 0540 by Dee Muhammad RN  Outcome: Ongoing  8/31/2020 1844 by Padmini Correia RN  Outcome: Met This Shift     Problem: Serum Glucose Level - Abnormal:  Goal: Ability to maintain appropriate glucose levels will improve  Description: Ability to maintain appropriate glucose levels will improve  9/1/2020 0540 by Dee Muhammad RN  Outcome: Ongoing  8/31/2020 1844 by Padmini Correia RN  Outcome: Met This Shift     Problem: Tissue Perfusion, Altered:  Goal: Circulatory function within specified parameters  Description: Circulatory function within specified parameters  9/1/2020 0540 by Dee Muhammad RN  Outcome: Ongoing  8/31/2020 1844 by Padmini Correia RN  Outcome: Met This Shift     Problem: Venous Thromboembolism:  Goal: Will show no signs or symptoms of venous thromboembolism  Description: Will show no signs or symptoms of venous thromboembolism  9/1/2020 0540 by Dee Muhammad RN  Outcome: Ongoing  8/31/2020 1844 by Padmini Correia RN  Outcome: Met This Shift  Goal: Absence of signs or symptoms of impaired coagulation  Description: Absence of signs or symptoms of impaired coagulation  9/1/2020 0540 by Dee Muhammad RN  Outcome: Ongoing  8/31/2020 1844 by Padmini Correia RN  Outcome: Met This Shift     Problem: Nutrition  Goal: Optimal nutrition therapy  9/1/2020 0540 by Dee Muhammad RN  Outcome: Ongoing  8/31/2020 1844 by Padmini Correia RN  Outcome: Ongoing     Problem: Skin Integrity:  Goal: Will show no infection signs and symptoms  Description: Will show no infection signs and symptoms  9/1/2020 0540 by Osiel Glasgow RN  Outcome: Ongoing  8/31/2020 1844 by Violetta Bhatia RN  Outcome: Met This Shift  Goal: Absence of new skin breakdown  Description: Absence of new skin breakdown  9/1/2020 0540 by Osiel Glasgow RN  Outcome: Ongoing  8/31/2020 1844 by Violetta Bhatia RN  Outcome: Ongoing

## 2020-09-01 NOTE — PROGRESS NOTES
EtCO2 Cannula applied to pt, per order. CNP progress note states the EtCO2 is to be monitored with pain meds. Pt remains on 2 LPM flow through the new cannula. Initial EtCO2 reading is 36mmHg and SpO2 is 95% at this time.

## 2020-09-01 NOTE — PROGRESS NOTES
Patient transferred from ICU 2003 to U 2109 by wheelchair with all belongings on room air. Patient transferred to bed per self, stand by assist. Call light within reach, no distress noted. Patient mother Abdirahman Maki called and updated on transfer and new room number.

## 2020-09-01 NOTE — PLAN OF CARE
Nutrition Problem #1: Inadequate oral intake  Intervention: Food and/or Nutrient Delivery: Continue Current Diet, Modify Oral Nutrition Supplement, Modify Parenteral Nutrition  Nutritional Goals: Meet % of nutrient needs with PO intake, supplements and TPN.

## 2020-09-01 NOTE — PROGRESS NOTES
Dr. Priyanka Hernandez notified of high blood pressure, heart rate, Order received for Metoprolol 25 BID.

## 2020-09-01 NOTE — PROGRESS NOTES
Pt was on amlodipine 10 mg but she refuses to take it because of peripheral edema. She should continue on Metoprolol 25 mg.

## 2020-09-01 NOTE — PROGRESS NOTES
[facility] -   Infectious diseases evaluation  admission date 8/19/2020    reason for consultation:   Fever    Impression :   · Fever /leukocytosis possible pneumonia. · Left pleural effusion status post arthrocentesis 8/28/2020, no growth on cultures to date. · Severe pancreatitis, no necrosis or pseudocyst seen on CT 8/26/2020  · Possible PE on Lovenox  · Acute hypoxic respiratory failure  · Skin rash, possible allergic reaction to Zosyn resolved  · Hypertension   · Obesity      Recommendations   Current:  · IV meropenem was discontinued  · COVID 19  test was negative on 8/26/2020  · Blood cultures from 8/23 negative   · MRSA nasal swab was negative 8/21/2020  · 8/27/2020 viral PCR negative            History of Present Illness:   Initial history:  Terrance Barger is a 54y.o.-year-old female presented to the hospital with epigastric abdominal pain and bloating for several days prior to admission, severe, no radiation, no alleviating or relieving factors. Pancreatic enzymes were elevated, imaging suggestive of severe pancreatitis. She was on IV Zosyn for 7 days, developed a rash on 8/26/2020, IV Zosyn was discontinued and the rash resolved. IV meropenem started 8/26/2020  Right arm PICC line was placed 8/20/2020 and has been on TPN  CT chest 8/26/2020 suggestive of questionable PE, bilateral consolidations and left pleural effusion  Interval changes  9/1/2020   She had a fever with a temperature max of 104 on 8/26/2020, last fever was 8/29/2020 with a temperature of 101, has been afebrile for more than 48 hours. She is feeling better, less abdominal pain, tolerating diet, denied any cough, denied shortness of breath, off oxygen, no other complaints. WBC 17.1      I have personally reviewed the past medical history, past surgical history, medications, social history, and family history, and I haveupdated the database accordingly.   Past Medical History: so]; and Lamotrigine     Review of Systems:     Review of Systems  As per history of present illness, other than above 14 systems reviewed were negative  Physical Examination :     Patient Vitals for the past 8 hrs:   BP Temp Temp src Pulse Resp SpO2   09/01/20 1500 (!) 150/76 -- -- 108 26 91 %   09/01/20 1444 (!) 152/85 -- -- 108 29 92 %   09/01/20 1400 (!) 163/78 -- -- 105 25 92 %   09/01/20 1330 (!) 152/84 -- -- 107 27 --   09/01/20 1300 (!) 188/92 -- -- 116 25 90 %   09/01/20 1200 (!) 181/88 97.8 °F (36.6 °C) Oral 125 (!) 31 92 %   09/01/20 1110 (!) 167/89 -- -- 124 16 (!) 89 %   09/01/20 1000 (!) 168/86 -- -- 119 (!) 31 91 %   09/01/20 0900 (!) 159/82 -- -- -- -- --   09/01/20 0800 (!) 133/55 97.5 °F (36.4 °C) Oral 117 24 95 %       Physical Exam  HENT:      Head: Normocephalic and atraumatic. Neck:      Musculoskeletal: Neck supple. No neck rigidity. Cardiovascular:      Rate and Rhythm: Normal rate and regular rhythm. Heart sounds: No murmur. Pulmonary:      Breath sounds: Normal breath sounds. Abdominal:      Tenderness: There is abdominal tenderness. There is no guarding or rebound. Comments: Epigastric tenderness   Musculoskeletal:      Right lower leg: Edema present. Left lower leg: Edema present. Skin:     General: Skin is warm. Coloration: Skin is not jaundiced. Comments: Erythematous rash to the lower and upper extremities resolved. Neurological:      Mental Status: She is alert and oriented to person, place, and time.            Medical Decision Making:   I have independently reviewed/ordered the following labs:    CBC with Differential:   Recent Labs     08/31/20  0439 09/01/20  0513   WBC 17.1* 17.4*   HGB 9.3* 10.4*   HCT 27.7* 31.6*    411   LYMPHOPCT 14* 10*   MONOPCT 7 4     BMP:  Recent Labs     08/31/20  0439 09/01/20  0513    135   K 4.2 4.6   CL 99 99   CO2 26 26   BUN 10 9   CREATININE 0.44* <0.40*   MG 1.9 2.0     Hepatic Function Panel:

## 2020-09-01 NOTE — PLAN OF CARE
Problem: Falls - Risk of:  Goal: Will remain free from falls  9/1/2020 1850 by Saniya Ruano RN  Outcome: Ongoing     Problem: Pain:  Goal: Pain level will decrease  9/1/2020 1850 by Saniya Ruano RN  Outcome: Ongoing  9/1/2020 0540 by Osiel Glasgow RN  Outcome: Ongoing     Problem: Pain:  Goal: Control of chronic pain  9/1/2020 1850 by Saniya Ruano RN  Outcome: Ongoing     Problem: Nausea/Vomiting:  Goal: Able to drink  9/1/2020 1850 by Saniya Ruano RN  Outcome: Ongoing     Problem: Gas Exchange - Impaired:  Goal: Levels of oxygenation will improve  9/1/2020 1850 by Saniya Ruano RN  Outcome: Ongoing     Problem: Infection, Septic Shock:  Goal: Will show no infection signs and symptoms  9/1/2020 1850 by Saniya Ruano RN  Outcome: Ongoing     Problem: Venous Thromboembolism:  Goal: Will show no signs or symptoms of venous thromboembolism  9/1/2020 1850 by Saniya Ruano RN  Outcome: Ongoing     Problem: Nutrition  Goal: Optimal nutrition therapy  9/1/2020 1850 by Saniya Ruano RN  Outcome: Ongoing     Problem: Skin Integrity:  Goal: Will show no infection signs and symptoms  9/1/2020 1850 by Saniya Ruano RN  Outcome: Ongoing  9/1/2020 0540 by Osiel Glasgow RN  Outcome: Ongoing

## 2020-09-02 ENCOUNTER — TELEPHONE (OUTPATIENT)
Dept: GASTROENTEROLOGY | Age: 45
End: 2020-09-02

## 2020-09-02 LAB
ABSOLUTE EOS #: 0.5 K/UL (ref 0–0.4)
ABSOLUTE IMMATURE GRANULOCYTE: ABNORMAL K/UL (ref 0–0.3)
ABSOLUTE LYMPH #: 2.1 K/UL (ref 1–4.8)
ABSOLUTE MONO #: 0.6 K/UL (ref 0.1–1.3)
ALBUMIN SERPL-MCNC: 2.8 G/DL (ref 3.5–5.2)
ALBUMIN SERPL-MCNC: 2.9 G/DL (ref 3.5–5.2)
ALBUMIN/GLOBULIN RATIO: ABNORMAL (ref 1–2.5)
ALBUMIN/GLOBULIN RATIO: ABNORMAL (ref 1–2.5)
ALP BLD-CCNC: 206 U/L (ref 35–104)
ALP BLD-CCNC: 218 U/L (ref 35–104)
ALT SERPL-CCNC: 46 U/L (ref 5–33)
ALT SERPL-CCNC: 51 U/L (ref 5–33)
ANION GAP SERPL CALCULATED.3IONS-SCNC: 12 MMOL/L (ref 9–17)
AST SERPL-CCNC: 31 U/L
AST SERPL-CCNC: 42 U/L
BASOPHILS # BLD: 1 % (ref 0–2)
BASOPHILS ABSOLUTE: 0.2 K/UL (ref 0–0.2)
BILIRUB SERPL-MCNC: 0.16 MG/DL (ref 0.3–1.2)
BILIRUB SERPL-MCNC: <0.15 MG/DL (ref 0.3–1.2)
BILIRUBIN DIRECT: 0.08 MG/DL
BILIRUBIN DIRECT: <0.08 MG/DL
BILIRUBIN, INDIRECT: 0.08 MG/DL (ref 0–1)
BUN BLDV-MCNC: 12 MG/DL (ref 6–20)
BUN/CREAT BLD: ABNORMAL (ref 9–20)
CALCIUM SERPL-MCNC: 9.2 MG/DL (ref 8.6–10.4)
CHLORIDE BLD-SCNC: 98 MMOL/L (ref 98–107)
CO2: 26 MMOL/L (ref 20–31)
CREAT SERPL-MCNC: <0.4 MG/DL (ref 0.5–0.9)
DIFFERENTIAL TYPE: ABNORMAL
EOSINOPHILS RELATIVE PERCENT: 4 % (ref 0–4)
GFR AFRICAN AMERICAN: ABNORMAL ML/MIN
GFR NON-AFRICAN AMERICAN: ABNORMAL ML/MIN
GFR SERPL CREATININE-BSD FRML MDRD: ABNORMAL ML/MIN/{1.73_M2}
GFR SERPL CREATININE-BSD FRML MDRD: ABNORMAL ML/MIN/{1.73_M2}
GLOBULIN: ABNORMAL G/DL (ref 1.5–3.8)
GLUCOSE BLD-MCNC: 137 MG/DL (ref 65–105)
GLUCOSE BLD-MCNC: 140 MG/DL (ref 65–105)
GLUCOSE BLD-MCNC: 144 MG/DL (ref 65–105)
GLUCOSE BLD-MCNC: 152 MG/DL (ref 65–105)
GLUCOSE BLD-MCNC: 175 MG/DL (ref 70–99)
HCT VFR BLD CALC: 32 % (ref 36–46)
HEMOGLOBIN: 10.7 G/DL (ref 12–16)
IMMATURE GRANULOCYTES: ABNORMAL %
LIPASE: 55 U/L (ref 13–60)
LYMPHOCYTES # BLD: 16 % (ref 24–44)
MAGNESIUM: 2.1 MG/DL (ref 1.6–2.6)
MCH RBC QN AUTO: 31.3 PG (ref 26–34)
MCHC RBC AUTO-ENTMCNC: 33.4 G/DL (ref 31–37)
MCV RBC AUTO: 93.9 FL (ref 80–100)
MONOCYTES # BLD: 5 % (ref 1–7)
NRBC AUTOMATED: ABNORMAL PER 100 WBC
PDW BLD-RTO: 14.5 % (ref 11.5–14.9)
PHOSPHORUS: 4 MG/DL (ref 2.6–4.5)
PLATELET # BLD: 467 K/UL (ref 150–450)
PLATELET ESTIMATE: ABNORMAL
PMV BLD AUTO: 9.4 FL (ref 6–12)
POTASSIUM SERPL-SCNC: 4.4 MMOL/L (ref 3.7–5.3)
RBC # BLD: 3.41 M/UL (ref 4–5.2)
RBC # BLD: ABNORMAL 10*6/UL
SEG NEUTROPHILS: 74 % (ref 36–66)
SEGMENTED NEUTROPHILS ABSOLUTE COUNT: 9.8 K/UL (ref 1.3–9.1)
SODIUM BLD-SCNC: 136 MMOL/L (ref 135–144)
TOTAL PROTEIN: 7.2 G/DL (ref 6.4–8.3)
TOTAL PROTEIN: 7.3 G/DL (ref 6.4–8.3)
WBC # BLD: 13.2 K/UL (ref 3.5–11)
WBC # BLD: ABNORMAL 10*3/UL

## 2020-09-02 PROCEDURE — 99232 SBSQ HOSP IP/OBS MODERATE 35: CPT | Performed by: INTERNAL MEDICINE

## 2020-09-02 PROCEDURE — 6370000000 HC RX 637 (ALT 250 FOR IP): Performed by: SURGERY

## 2020-09-02 PROCEDURE — 6370000000 HC RX 637 (ALT 250 FOR IP): Performed by: NURSE PRACTITIONER

## 2020-09-02 PROCEDURE — 6370000000 HC RX 637 (ALT 250 FOR IP): Performed by: INTERNAL MEDICINE

## 2020-09-02 PROCEDURE — 36415 COLL VENOUS BLD VENIPUNCTURE: CPT

## 2020-09-02 PROCEDURE — 80076 HEPATIC FUNCTION PANEL: CPT

## 2020-09-02 PROCEDURE — 83690 ASSAY OF LIPASE: CPT

## 2020-09-02 PROCEDURE — 6370000000 HC RX 637 (ALT 250 FOR IP): Performed by: STUDENT IN AN ORGANIZED HEALTH CARE EDUCATION/TRAINING PROGRAM

## 2020-09-02 PROCEDURE — 85025 COMPLETE CBC W/AUTO DIFF WBC: CPT

## 2020-09-02 PROCEDURE — 84100 ASSAY OF PHOSPHORUS: CPT

## 2020-09-02 PROCEDURE — 6360000002 HC RX W HCPCS: Performed by: STUDENT IN AN ORGANIZED HEALTH CARE EDUCATION/TRAINING PROGRAM

## 2020-09-02 PROCEDURE — 2580000003 HC RX 258: Performed by: STUDENT IN AN ORGANIZED HEALTH CARE EDUCATION/TRAINING PROGRAM

## 2020-09-02 PROCEDURE — C9113 INJ PANTOPRAZOLE SODIUM, VIA: HCPCS | Performed by: NURSE PRACTITIONER

## 2020-09-02 PROCEDURE — 82248 BILIRUBIN DIRECT: CPT

## 2020-09-02 PROCEDURE — 6360000002 HC RX W HCPCS: Performed by: NURSE PRACTITIONER

## 2020-09-02 PROCEDURE — 2500000003 HC RX 250 WO HCPCS: Performed by: SURGERY

## 2020-09-02 PROCEDURE — 2060000000 HC ICU INTERMEDIATE R&B

## 2020-09-02 PROCEDURE — 2580000003 HC RX 258: Performed by: RADIOLOGY

## 2020-09-02 PROCEDURE — 80053 COMPREHEN METABOLIC PANEL: CPT

## 2020-09-02 PROCEDURE — 82947 ASSAY GLUCOSE BLOOD QUANT: CPT

## 2020-09-02 PROCEDURE — 83735 ASSAY OF MAGNESIUM: CPT

## 2020-09-02 PROCEDURE — 2580000003 HC RX 258: Performed by: NURSE PRACTITIONER

## 2020-09-02 PROCEDURE — 94660 CPAP INITIATION&MGMT: CPT

## 2020-09-02 RX ADMIN — DULOXETINE HYDROCHLORIDE 60 MG: 60 CAPSULE, DELAYED RELEASE ORAL at 22:04

## 2020-09-02 RX ADMIN — Medication 10 ML: at 20:06

## 2020-09-02 RX ADMIN — PANTOPRAZOLE SODIUM 40 MG: 40 INJECTION, POWDER, FOR SOLUTION INTRAVENOUS at 07:44

## 2020-09-02 RX ADMIN — Medication 10 ML: at 07:51

## 2020-09-02 RX ADMIN — ENOXAPARIN SODIUM 30 MG: 30 INJECTION SUBCUTANEOUS at 22:04

## 2020-09-02 RX ADMIN — ONDANSETRON 4 MG: 2 INJECTION INTRAMUSCULAR; INTRAVENOUS at 17:13

## 2020-09-02 RX ADMIN — LORAZEPAM 1 MG: 1 TABLET ORAL at 09:21

## 2020-09-02 RX ADMIN — DULOXETINE HYDROCHLORIDE 60 MG: 60 CAPSULE, DELAYED RELEASE ORAL at 07:44

## 2020-09-02 RX ADMIN — HYDROMORPHONE HYDROCHLORIDE 0.5 MG: 1 INJECTION, SOLUTION INTRAMUSCULAR; INTRAVENOUS; SUBCUTANEOUS at 07:44

## 2020-09-02 RX ADMIN — QUETIAPINE FUMARATE 800 MG: 400 TABLET, EXTENDED RELEASE ORAL at 22:08

## 2020-09-02 RX ADMIN — PANCRELIPASE 24000 UNITS: 24000; 76000; 120000 CAPSULE, DELAYED RELEASE PELLETS ORAL at 14:13

## 2020-09-02 RX ADMIN — AMLODIPINE BESYLATE 10 MG: 10 TABLET ORAL at 07:44

## 2020-09-02 RX ADMIN — Medication 5 MG: at 22:04

## 2020-09-02 RX ADMIN — METOPROLOL TARTRATE 25 MG: 25 TABLET, FILM COATED ORAL at 22:04

## 2020-09-02 RX ADMIN — INSULIN LISPRO 1 UNITS: 100 INJECTION, SOLUTION INTRAVENOUS; SUBCUTANEOUS at 17:18

## 2020-09-02 RX ADMIN — Medication 10 ML: at 17:13

## 2020-09-02 RX ADMIN — INSULIN LISPRO 1 UNITS: 100 INJECTION, SOLUTION INTRAVENOUS; SUBCUTANEOUS at 06:11

## 2020-09-02 RX ADMIN — HYDRALAZINE HYDROCHLORIDE 10 MG: 20 INJECTION, SOLUTION INTRAMUSCULAR; INTRAVENOUS at 20:05

## 2020-09-02 RX ADMIN — Medication 400 MG: at 07:44

## 2020-09-02 RX ADMIN — PANCRELIPASE 24000 UNITS: 24000; 76000; 120000 CAPSULE, DELAYED RELEASE PELLETS ORAL at 17:13

## 2020-09-02 RX ADMIN — HYDROMORPHONE HYDROCHLORIDE 0.5 MG: 1 INJECTION, SOLUTION INTRAMUSCULAR; INTRAVENOUS; SUBCUTANEOUS at 18:23

## 2020-09-02 RX ADMIN — PRAMIPEXOLE DIHYDROCHLORIDE 0.25 MG: 0.25 TABLET ORAL at 07:43

## 2020-09-02 RX ADMIN — CALCIUM GLUCONATE: 94 INJECTION, SOLUTION INTRAVENOUS at 18:21

## 2020-09-02 RX ADMIN — Medication 10 ML: at 22:25

## 2020-09-02 RX ADMIN — BUSPIRONE HYDROCHLORIDE 15 MG: 15 TABLET ORAL at 22:04

## 2020-09-02 RX ADMIN — METOPROLOL TARTRATE 25 MG: 25 TABLET, FILM COATED ORAL at 07:45

## 2020-09-02 RX ADMIN — CETIRIZINE HYDROCHLORIDE 10 MG: 10 TABLET, FILM COATED ORAL at 07:44

## 2020-09-02 RX ADMIN — PROMETHAZINE HYDROCHLORIDE 12.5 MG: 25 INJECTION INTRAMUSCULAR; INTRAVENOUS at 21:41

## 2020-09-02 RX ADMIN — HYDROMORPHONE HYDROCHLORIDE 0.5 MG: 1 INJECTION, SOLUTION INTRAMUSCULAR; INTRAVENOUS; SUBCUTANEOUS at 22:24

## 2020-09-02 RX ADMIN — Medication 10 ML: at 07:44

## 2020-09-02 RX ADMIN — LISINOPRIL 40 MG: 20 TABLET ORAL at 07:44

## 2020-09-02 RX ADMIN — I.V. FAT EMULSION 100 ML: 20 EMULSION INTRAVENOUS at 18:22

## 2020-09-02 RX ADMIN — PANCRELIPASE 24000 UNITS: 24000; 76000; 120000 CAPSULE, DELAYED RELEASE PELLETS ORAL at 09:47

## 2020-09-02 RX ADMIN — LORAZEPAM 1 MG: 1 TABLET ORAL at 02:31

## 2020-09-02 RX ADMIN — ENOXAPARIN SODIUM 30 MG: 30 INJECTION SUBCUTANEOUS at 07:43

## 2020-09-02 RX ADMIN — ACETAMINOPHEN 650 MG: 325 TABLET, FILM COATED ORAL at 17:18

## 2020-09-02 RX ADMIN — HYDROMORPHONE HYDROCHLORIDE 0.5 MG: 1 INJECTION, SOLUTION INTRAMUSCULAR; INTRAVENOUS; SUBCUTANEOUS at 14:13

## 2020-09-02 RX ADMIN — LORAZEPAM 1 MG: 1 TABLET ORAL at 17:14

## 2020-09-02 RX ADMIN — HYDROMORPHONE HYDROCHLORIDE 0.5 MG: 1 INJECTION, SOLUTION INTRAMUSCULAR; INTRAVENOUS; SUBCUTANEOUS at 03:32

## 2020-09-02 ASSESSMENT — PAIN DESCRIPTION - PAIN TYPE
TYPE: ACUTE PAIN

## 2020-09-02 ASSESSMENT — PAIN DESCRIPTION - ORIENTATION
ORIENTATION: LEFT

## 2020-09-02 ASSESSMENT — PAIN SCALES - GENERAL
PAINLEVEL_OUTOF10: 0
PAINLEVEL_OUTOF10: 7
PAINLEVEL_OUTOF10: 8
PAINLEVEL_OUTOF10: 0
PAINLEVEL_OUTOF10: 7
PAINLEVEL_OUTOF10: 8
PAINLEVEL_OUTOF10: 6
PAINLEVEL_OUTOF10: 8
PAINLEVEL_OUTOF10: 5
PAINLEVEL_OUTOF10: 7
PAINLEVEL_OUTOF10: 0

## 2020-09-02 ASSESSMENT — PAIN DESCRIPTION - LOCATION
LOCATION: CHEST
LOCATION: CHEST;BACK
LOCATION: CHEST
LOCATION: ARM;SHOULDER

## 2020-09-02 ASSESSMENT — ENCOUNTER SYMPTOMS
VOMITING: 0
CHEST TIGHTNESS: 0
EYES NEGATIVE: 1
NAUSEA: 0
COUGH: 0
SHORTNESS OF BREATH: 0
ABDOMINAL PAIN: 0

## 2020-09-02 ASSESSMENT — PAIN DESCRIPTION - DESCRIPTORS: DESCRIPTORS: ACHING

## 2020-09-02 NOTE — CARE COORDINATION
LEATHA spoke with Sophie Cole with [facility] about this patient. SW attempted to answer all of the questions that he had. Then a bit later, LEATHA received info that this patient's insurance did deny the patient [address] admission. If the decision is to complete a peer to peer, this will need to be completed within 3 days. LEATHA will follow up with the RN clinical lead tomorrow morning to determine what the plan will be for this patient. LEATHA will continue to follow.

## 2020-09-02 NOTE — PROGRESS NOTES
GI Progress notes    9/2/2020   9:19 AM    Name:  Jack Herron  MRN:    [id number]     Acct:     [de-identified]   Room:  210/210101   Day: 15     Admit Date: 8/19/2020  7:35 PM  PCP: Charissa Wang    Subjective:     C/C:   Chief Complaint   Patient presents with    Abdominal Pain     RUQ       Interval History: Status: improved. Patient seen and examined. No acute events overnight. Afebrile, noted to be hypertensive. Patient reports mild increase in LUQ pain. Tolerating full liquid diet. Mild nausea, typically towards the evening. No emesis. +flatus, + BM  No overt bleeding  Lipase 55    Labs and progress notes reviewed. ROS:  Constitutional: negative for chills, fevers and sweats  Gastrointestinal: negative for abdominal pain, constipation, diarrhea, nausea and vomiting      Medications: Allergies:    Allergies   Allergen Reactions    Aripiprazole      Bad reaction    Eletriptan      Other reaction(s): Swelling-throat    Hydrocodone-Acetaminophen      Other reaction(s): Unknown    Oxycodone-Acetaminophen      Other reaction(s): Unknown    Sumatriptan Other (See Comments)    Triptans      Other reaction(s): Unknown    Zosyn [Piperacillin Sod-Tazobactam So]      Rash 8/26/20 possibley caused by zosyn    Lamotrigine Rash       Current Meds: lipase-protease-amylase (CREON) delayed release capsule 24,000 Units, TID WC  amLODIPine (NORVASC) tablet 10 mg, Daily  metoprolol tartrate (LOPRESSOR) tablet 25 mg, BID  PN-Adult 2-in-1 Central Line (Standard), Continuous TPN  LORazepam (ATIVAN) tablet 1 mg, Q6H  fat emulsion 20 % infusion 100 mL, Daily  lisinopril (PRINIVIL;ZESTRIL) tablet 40 mg, Daily  insulin lispro (HUMALOG) injection vial 0-6 Units, Q6H  enoxaparin (LOVENOX) injection 30 mg, BID  sodium chloride flush 0.9 % injection 10 mL, PRN  acetaminophen (TYLENOL) suppository 650 mg, Q4H PRN  hydrALAZINE (APRESOLINE) injection 10 mg, Q6H PRN  HYDROmorphone (DILAUDID) injection 0.5 mg, Q4H 2019     Years since quittin.7    Smokeless tobacco: Never Used    Tobacco comment: today last smoke   Substance and Sexual Activity    Alcohol use: Yes     Alcohol/week: 0.0 standard drinks     Comment: rarely    Drug use: No    Sexual activity: Not on file   Lifestyle    Physical activity     Days per week: Not on file     Minutes per session: Not on file    Stress: Not on file   Relationships    Social connections     Talks on phone: Not on file     Gets together: Not on file     Attends [employer] service: Not on file     Active member of club or organization: Not on file     Attends meetings of clubs or organizations: Not on file     Relationship status: Not on file    Intimate partner violence     Fear of current or ex partner: Not on file     Emotionally abused: Not on file     Physically abused: Not on file     Forced sexual activity: Not on file   Other Topics Concern    Not on file   Social History Narrative    Not on file       Vitals:  BP (!) 168/91   Pulse 114   Temp 97.7 °F (36.5 °C) (Oral)   Resp 21   Ht 5' 5\" (1.651 m)   Wt 274 lb 14.6 oz (124.7 kg)   SpO2 93%   BMI 45.75 kg/m²   Temp (24hrs), Av.8 °F (36.6 °C), Min:97.4 °F (36.3 °C), Max:98.1 °F (36.7 °C)    Recent Labs     20  1202 20  1811 20  2338 20  0526   POCGLU 232* 174* 129* 152*       I/O (24Hr):     Intake/Output Summary (Last 24 hours) at 2020 09  Last data filed at 2020 0903  Gross per 24 hour   Intake 1426 ml   Output 150 ml   Net 1276 ml       Labs:      CBC:   Lab Results   Component Value Date    WBC 13.2 2020    RBC 3.41 2020    HGB 10.7 2020    HCT 32.0 2020    MCV 93.9 2020    MCH 31.3 2020    MCHC 33.4 2020    RDW 14.5 2020     2020    MPV 9.4 2020     CBC with Differential:    Lab Results   Component Value Date    WBC 13.2 2020    RBC 3.41 2020    HGB 10.7 2020    HCT 32.0 2020  09/02/2020    MCV 93.9 09/02/2020    MCH 31.3 09/02/2020    MCHC 33.4 09/02/2020    RDW 14.5 09/02/2020    METASPCT 2 08/30/2020    LYMPHOPCT 16 09/02/2020    LYMPHOPCT 35.0 03/22/2016    MONOPCT 5 09/02/2020    MONOPCT 6.8 03/22/2016    EOSPCT 3.8 03/22/2016    BASOPCT 1 09/02/2020    BASOPCT 0.9 03/22/2016    MONOSABS 0.60 09/02/2020    MONOSABS 0.6 03/22/2016    LYMPHSABS 2.10 09/02/2020    LYMPHSABS 3.1 03/22/2016    EOSABS 0.50 09/02/2020    EOSABS 0.3 03/22/2016    BASOSABS 0.20 09/02/2020    DIFFTYPE NOT REPORTED 09/02/2020     Hemoglobin/Hematocrit:    Lab Results   Component Value Date    HGB 10.7 09/02/2020    HCT 32.0 09/02/2020     CMP:    Lab Results   Component Value Date     09/02/2020    K 4.4 09/02/2020    CL 98 09/02/2020    CO2 26 09/02/2020    BUN 12 09/02/2020    CREATININE <0.40 09/02/2020    GFRAA CANNOT BE CALCULATED 09/02/2020    LABGLOM CANNOT BE CALCULATED 09/02/2020    GLUCOSE 175 09/02/2020    PROT 7.3 09/02/2020    PROT 7.2 01/23/2015    LABALBU 2.8 09/02/2020    CALCIUM 9.2 09/02/2020    BILITOT <0.15 09/02/2020    ALKPHOS 218 09/02/2020    AST 42 09/02/2020    ALT 51 09/02/2020     BMP:    Lab Results   Component Value Date     09/02/2020    K 4.4 09/02/2020    CL 98 09/02/2020    CO2 26 09/02/2020    BUN 12 09/02/2020    LABALBU 2.8 09/02/2020    CREATININE <0.40 09/02/2020    CALCIUM 9.2 09/02/2020    GFRAA CANNOT BE CALCULATED 09/02/2020    LABGLOM CANNOT BE CALCULATED 09/02/2020    GLUCOSE 175 09/02/2020     PT/INR:    Lab Results   Component Value Date    PROTIME 12.2 08/20/2020    INR 0.9 08/20/2020     PTT:    Lab Results   Component Value Date    APTT 24.4 08/20/2020   [APTT}    Physical Examination:        General appearance: alert, cooperative and no distress  Mental Status: oriented to person, place and time and normal affect  Abdomen: soft, mild LUQ tenderness, nondistended, bowel sounds present, obese    Assessment:        Primary Problem  Pneumonia of both lungs due to infectious organism     Active Hospital Problems    Diagnosis Date Noted    Pneumonia of both lungs due to infectious organism [J18.9] 08/27/2020    History of anxiety disorder [Z86.59]     Acute respiratory failure with hypoxia ([address] [J96.01] 08/21/2020    Hypocalcemia [E83.51] 08/21/2020    Sepsis ([address] [A41.9] 08/20/2020    Morbid obesity ([address] [E66.01]     Abdominal pain, epigastric [R10.13]     Nausea [R11.0]     Acute pancreatitis [K85.90] 08/19/2020    Fibromyalgia [M79.7]     HTN (hypertension) [I10] 12/15/2014    Major depression [F32.9] 10/30/2014    Restless leg syndrome [G25.81] 10/22/2013    Class 3 severe obesity due to excess calories with serious comorbidity in adult [facility]) [E66.01] 01/14/2013    Anxiety [F41.9] 01/14/2013     Past Medical History:   Diagnosis Date    Abnormal levels of other serum enzymes     Anxiety     Bilateral leg edema     Chronic kidney disease     right renal cyst    Depression     DVT (deep venous thrombosis) ([address] 1997    after delivery    Elevated liver enzymes     Fibromyalgia     Headache(784.0)     migraines    Hx of blood clots     1997 left calf    Hypertension     Kidney stone     Obstructive sleep apnea syndrome     Pancreatitis 2001    Pleural effusion 2001    SOB (shortness of breath)     Supraventricular tachycardia (HCC)     SVT (supraventricular tachycardia) ([facility] 9/8/2015        Plan:        1. Acute pancreatitis  1. Abdominal pain improved  2. Continue full liquid diet  3. Creon with meals  4. Trend Lipase  5. CT abd/pelvis 8/26 - no evidence of pseudocyst, necrosis, free air  6. Advised patient do cut down on pain medications - she is still taking Dilaudid [id number]  7. Reassess in am  2. Anemia  1. Hgb stable  2. No overt bleeding  3. Continue to trend  4.  Transfuse for hgb<7    Explained to the patient and d/W Nursing Staff  Will F/U with you  Please call or Page for any issues or change in status  Thanks    Electronically signed by ANIBAL Allan NP on 9/2/2020 at 9:19 AM     I independently performed an evaluation on [facility]. I have reviewed the above documentation completed by the Nurse Practitioner and agree with the documented findings and plan of care. I have personally evaluated this patient with the APRN and have completed at least one if not all key elements of the E/M (history, physical exam, and MDM). Please see my additional contributions to the HPI, physical exam, assessment, and medical decision making. Patient seen around 9:30 AM.  At that time she was doing well. Denies nausea vomiting. Abdominal pain markedly decreased. Her lipase level is normal in the last 48 hours. She is tolerating low-fat diet. Abdominal examination benign. Obese abdomen. Bowel sounds active. Nonspecific tenderness. Recommendations: To continue low-fat diet. If patient is discharged advised to see in the office in the next 2 to 3 weeks.

## 2020-09-02 NOTE — CARE COORDINATION
ONGOING DISCHARGE PLAN:    Spoke with patient regarding discharge plan and patient confirms that plan is still to return to home w/ family w/ VNS, Bill's. Pt. Remains on TPN, attempting to wean. Lipase today 55. Soft diet started today. Rechecking LFT's, GI continues to follow. ? MRI/MRCP. WBC 13.2, ID continues to follow. On no antibiotics . Will continue to follow for additional discharge needs.     Electronically signed by Gabriella Larson RN on 9/2/2020 at 3:10 PM

## 2020-09-02 NOTE — PLAN OF CARE
Nutrition Problem #1: Inadequate oral intake  Intervention: Food and/or Nutrient Delivery: Continue Current Diet, Discontinue Oral Nutrition Supplement, Modify Parenteral Nutrition  Nutritional Goals: Meet % of nutrient needs with PO intake, supplements and TPN.

## 2020-09-02 NOTE — PROGRESS NOTES
[address]    PROGRESS NOTE             9/2/2020    7:52 AM    [facility]  MRN:     [id number]     Acct:      [de-identified]   Room:   2101/2101-01  IP Day:  15  Admit Date:  8/19/2020  7:35 PM    PCP:  Cal Pagan  Code Status:  Full Code    Subjective:     C/C:   Chief Complaint   Patient presents with    Abdominal Pain     RUQ     Interval History Status: improved. Pt. Was seen and examined on bedside. She feels the same. She had two watery bowel movements, stool was mixed semi-solid and no blood is stool was noticed. Left side of chest is painful 6/10 in intensity, non-radiating, no relieving and aggravating factors. No complains of CP, SOB, abdominal pain, nausea, vomiting. Yesterday switched the Norvasc 10 mg to Metoprolol 25 mg. Last BP is 168/91      Brief History:     Please see H&P. Review of Systems:     Review of Systems   Constitutional: Negative. Negative for fever. HENT: Negative. Eyes: Negative. Respiratory: Negative for cough, chest tightness and shortness of breath. Cardiovascular: Negative for chest pain and palpitations. Gastrointestinal: Negative for abdominal pain, nausea and vomiting. Endocrine: Negative. Genitourinary: Negative for difficulty urinating, dysuria and hematuria. Musculoskeletal: Negative. Skin: Negative. Neurological: Negative. Hematological: Negative. Psychiatric/Behavioral: Negative. Medications: Allergies:     Allergies   Allergen Reactions    Aripiprazole      Bad reaction    Eletriptan      Other reaction(s): Swelling-throat    Hydrocodone-Acetaminophen      Other reaction(s): Unknown    Oxycodone-Acetaminophen      Other reaction(s): Unknown    Sumatriptan Other (See Comments)    Triptans      Other reaction(s): Unknown    Zosyn [Piperacillin Sod-Tazobactam So]      Rash 8/26/20 possibley caused by zosyn    Lamotrigine Rash       Current Meds:   Scheduled Meds:    lipase-protease-amylase  24,000 Units Oral TID WC    amLODIPine  10 mg Oral Daily    metoprolol tartrate  25 mg Oral BID    LORazepam  1 mg Oral Q6H    fat emulsion  100 mL Intravenous Daily    lisinopril  40 mg Oral Daily    insulin lispro  0-6 Units Subcutaneous Q6H    enoxaparin  30 mg Subcutaneous BID    magnesium oxide  400 mg Oral Daily    sodium chloride flush  10 mL Intravenous 2 times per day    QUEtiapine  800 mg Oral Nightly    pramipexole  0.25 mg Oral Daily    pantoprazole  40 mg Intravenous Daily    And    sodium chloride (PF)  10 mL Intravenous Daily    busPIRone  15 mg Oral Nightly    DULoxetine  60 mg Oral BID    cetirizine  10 mg Oral Daily     Continuous Infusions:    PN-Adult 2-in-1 Central Line (Standard) 75 mL/hr at 09/01/20 1651    dextrose       PRN Meds: metoprolol, sodium chloride flush, acetaminophen, hydrALAZINE, HYDROmorphone, glucose, dextrose, glucagon (rDNA), dextrose, perflutren lipid microspheres, promethazine (PHENERGAN) in sodium chloride 0.9% IVPB, sodium chloride flush, magnesium sulfate, potassium chloride **OR** potassium alternative oral replacement **OR** potassium chloride, acetaminophen, [DISCONTINUED] promethazine **OR** ondansetron, melatonin ER    Data:     Past Medical History:   has a past medical history of Abnormal levels of other serum enzymes, Anxiety, Bilateral leg edema, Chronic kidney disease, Depression, DVT (deep venous thrombosis) (HCC), Elevated liver enzymes, Fibromyalgia, Headache(784.0), Hx of blood clots, Hypertension, Kidney stone, Obstructive sleep apnea syndrome, Pancreatitis, Pleural effusion, SOB (shortness of breath), Supraventricular tachycardia ([address] and SVT (supraventricular tachycardia) ([address] Social History:   reports that she quit smoking about 9 months ago. Her smoking use included cigarettes. She smoked 1.00 pack per day.  She has never used smokeless tobacco. She reports current alcohol use. She reports that she does not use drugs. Family History:   Family History   Problem Relation Age of Onset    Heart Disease Father     High Blood Pressure Brother        Vitals:  BP (!) 168/91   Pulse 114   Temp 98.1 °F (36.7 °C) (Oral)   Resp 18   Ht 5' 5\" (1.651 m)   Wt 274 lb 14.6 oz (124.7 kg)   SpO2 93%   BMI 45.75 kg/m²   Temp (24hrs), Av.7 °F (36.5 °C), Min:97.4 °F (36.3 °C), Max:98.1 °F (36.7 °C)    Recent Labs     20  1202 20  1811 20  2338 20  0526   POCGLU 232* 174* 129* 152*       I/O(24Hr): Intake/Output Summary (Last 24 hours) at 2020 0752  Last data filed at 2020 2943  Gross per 24 hour   Intake 1306 ml   Output 1050 ml   Net 256 ml       Labs:  [unfilled]    Lab Results   Component Value Date/Time    SPECIAL NOT REPORTED 2020 05:30 PM     Lab Results   Component Value Date/Time    CULTURE NO GROWTH 4 DAYS 2020 05:30 PM       [facility]    Radiology:    Ct Abdomen Pelvis Wo Contrast Additional Contrast? None    Result Date: 2020  EXAMINATION: CT OF THE ABDOMEN AND PELVIS WITHOUT CONTRAST 2020 10:22 pm TECHNIQUE: CT of the abdomen and pelvis was performed without the administration of intravenous contrast. Multiplanar reformatted images are provided for review. Dose modulation, iterative reconstruction, and/or weight based adjustment of the mA/kV was utilized to reduce the radiation dose to as low as reasonably achievable. COMPARISON: CT abdomen and pelvis with contrast 2020. HISTORY: ORDERING SYSTEM PROVIDED HISTORY: severe acute pain TECHNOLOGIST PROVIDED HISTORY: severe acute pain Is the patient pregnant? ->Yes Reason for Exam: worsening abdominal pain Acuity: Acute Type of Exam: Ongoing Relevant Medical/Surgical History: surg - gallbladder FINDINGS: Lower Chest: Persisting bilateral lower lung scattered consolidation and atelectasis is present with trace posterior left-sided pleural pneumonia Relevant Medical/Surgical History: f/u pneumonia FINDINGS: Poor inspiration with decrease lung volumes worse on the prior. Right upper extremity PICC line remain in place. Cardiomediastinal silhouette stable accentuated by the low lung volumes. No focal consolidation. Patchy airspace opacities in the left upper lung accentuated by low lung volume. Poor inspiration with low lung volumes worse than prior. Patchy airspace opacities in the left upper lung accentuated by low lung volume. Xr Chest (single View Frontal)    Result Date: 8/21/2020  EXAMINATION: ONE XRAY VIEW OF THE CHEST 8/21/2020 6:06 am COMPARISON: August 20, 2020 chest exam HISTORY: ORDERING SYSTEM PROVIDED HISTORY: f/u atelectasis TECHNOLOGIST PROVIDED HISTORY: f/u atelectasis Reason for Exam: F/U atelectasis. Acuity: Unknown Type of Exam: Unknown Additional signs and symptoms: F/U atelectasis. FINDINGS: Interval insertion of right PICC line catheter, the tip projected at the right atrium Mild cardiomegaly Limited extra sonya exam with low volume lungs, mild streaky, left greater than right, bibasilar atelectasis. Grossly clear upper lungs     Line placement as above Limited expiratory exam with mild streaky bibasilar atelectasis     Ct Abdomen Pelvis W Iv Contrast Additional Contrast? None    Result Date: 8/26/2020  EXAMINATION: CTA OF THE CHEST; CT OF THE ABDOMEN AND PELVIS WITH CONTRAST 8/26/2020 10:34 am TECHNIQUE: CTA of the chest was performed after the administration of intravenous contrast.  Multiplanar reformatted images are provided for review. MIP images are provided for review. Dose modulation, iterative reconstruction, and/or weight based adjustment of the mA/kV was utilized to reduce the radiation dose to as low as reasonably achievable.; CT of the abdomen and pelvis was performed with the administration of intravenous contrast. Multiplanar reformatted images are provided for review.  Dose modulation, iterative reconstruction, and/or weight based adjustment of the mA/kV was utilized to reduce the radiation dose to as low as reasonably achievable. COMPARISON: CT chest May 26, 2019 CT abdomen and pelvis August 20, 2020 HISTORY: ORDERING SYSTEM PROVIDED HISTORY: Hypoxia, recurrent fevers TECHNOLOGIST PROVIDED HISTORY: Hypoxia, recurrent fevers Reason for Exam: Hypoxia, recurrent fevers, best images possible due to patient size 322 pounds Acuity: Acute Type of Exam: Initial; ORDERING SYSTEM PROVIDED HISTORY: Splenic vein thrombosis? TECHNOLOGIST PROVIDED HISTORY: Splenic vein thrombosis? Reason for Exam: Splenic vein thrombosis? best images possible due to patient size 322  poounds Acuity: Acute Type of Exam: Initial FINDINGS: CT CHEST: Chest wall: No axillary adenopathy. Mediastinum: Cardiomegaly. No pericardial effusion. No adenopathy. Pulmonary arteries: Significantly limited evaluation for pulmonary embolus due to bolus timing, motion, and patient body habitus. Questionable pulmonary embolus within the right lower lobe. Lungs: Moderate left pleural effusion. Small right pleural effusion. Left lower lobe consolidation versus atelectasis. Bilateral upper lobe areas of consolidation. Bones: No acute osseous abnormality. CT ABDOMEN-PELVIS: Organs: Decreased attenuation of the liver is consistent with hepatic steatosis. No focal liver lesion. Cholecystectomy. Severe pancreatitis with surrounding inflammatory changes and fluid. Mild splenomegaly. No adrenal lesion. No hydronephrosis. Right renal cyst.  Focal area of scarring within the right kidney is unchanged. GI/Bowel: No bowel obstruction. Secondary involvement of the descending colon adjacent to the peripancreatic inflammatory changes along the tail of the pancreas. Pelvis: Essure devices. No significant free fluid. No bladder calculus. Peritoneum/Retroperitoneum: Splenic artery is patent. No evidence of splenic artery thrombosis.   No definite splenic cortical scarring and calcification. Subcentimeter hypoattenuating bilateral renal lesions are too small to accurately characterize, likely cysts. Prior cholecystectomy. There is diffuse peripancreatic stranding and fluid. No loculated fluid collection. GI/Bowel: Stomach is partially distended. Small bowel is nondilated. Colon is nondilated. Pelvis: Urinary bladder is partially distended without vesicular stones. Uterus is present. Peritoneum/Retroperitoneum: Shotty retroperitoneal lymph nodes, likely reactive. Abdominal aorta is normal in caliber. No pneumoperitoneum. Bones/Soft Tissues: No acute osseous abnormality. 1. Pancreatic edema and associated fluid is consistent with pancreatitis. No loculated fluid collection/pseudocyst. 2. Shotty retroperitoneal lymph nodes are likely reactive. 3. Hepatic steatosis. 4. Bilateral lower lobe consolidation, which could be due to edema or pneumonia. Ir Awanda Peeks Device Plmt/replace/removal    Result Date: 8/20/2020  PROCEDURE: ULTRASOUND GUIDED VASCULAR ACCESS. FLUOROSCOPY GUIDED PICC PLACEMENT 8/20/2020. HISTORY: ORDERING SYSTEM PROVIDED HISTORY: fluid resusitation TECHNOLOGIST PROVIDED HISTORY: fluid resusitation Lumen?->Double Lumen Is the patient pregnant? ->Yes Acuity: Unknown Type of Exam: Unknown Sepsis SEDATION: None FLUOROSCOPY TIME: DAP 63 cGy cm squared TECHNIQUE: Informed consent was obtained after a detailed explanation of the procedure including risks, benefits, and alternatives. Universal protocol was observed. The right arm was prepped and draped in sterile fashion using maximum sterile barrier technique. Local anesthesia was achieved with lidocaine. A micropuncture needle was used to access the right basilic vein using ultrasound guidance. An ultrasound image demonstrating patency of the vein with needle tip located within it. An image was obtained and stored in PACs.  A 0.018 guidewire was used to place a peel-a-way sheath and a 5 [facility] power injectable dual-lumen PICC was advanced with fluoroscopic guidance with the tip at the cavo-atrial junction. The catheter flushed easily and there was a good blood return. The catheter was secured to the skin. The patient tolerated the procedure well and there were no immediate complications. FINDINGS: Fluoroscopic image demonstrates the tip of the catheter at the cavo-atrial junction. Successful ultrasound and fluoroscopy guided right basilic vein 5 [place] power injectable dual-lumen PICC placement. Ready for use. Xr Chest Portable    Result Date: 8/31/2020  EXAMINATION: ONE XRAY VIEW OF THE CHEST 8/31/2020 6:09 am COMPARISON: 08/30/2020 HISTORY: ORDERING SYSTEM PROVIDED HISTORY: infiltrate TECHNOLOGIST PROVIDED HISTORY: infiltrate Reason for Exam: infiltrate Acuity: Unknown Type of Exam: Unknown FINDINGS: A right upper extremity PICC line distal tip is at the cavoatrial junction. There is improved aeration of the left lower lung, with a small pleural effusion with overlying atelectasis/infiltrate remaining. No pneumothorax is demonstrated. The heart is enlarged. No acute osseous abnormality is seen. Improved aeration of the left lower lung, with a small left pleural effusion with overlying atelectasis/pneumonia remaining. Xr Chest Portable    Result Date: 8/30/2020  EXAMINATION: ONE XRAY VIEW OF THE CHEST 8/30/2020 6:07 am COMPARISON: 08/28/2020 HISTORY: ORDERING SYSTEM PROVIDED HISTORY: infiltrate TECHNOLOGIST PROVIDED HISTORY: infiltrate Reason for Exam: infiltrate Acuity: Unknown Type of Exam: Unknown FINDINGS: Right upper extremity PICC line is seen with tip obscured by additional overlying catheters. Low lung volume. Left basilar pleuroparenchymal opacity appears greater than prior. Aeration of the right lung appears slightly improved. No gross pneumothorax. Cardiac and mediastinal silhouettes are reflective of patient rotation.      Left pleural effusion and left basilar airspace disease, slightly greater than prior. Improving aeration of the right lung as compared to prior. Xr Chest Portable    Result Date: 8/27/2020  EXAMINATION: ONE XRAY VIEW OF THE CHEST 8/27/2020 6:15 pm COMPARISON: 08/26/2020 radiograph HISTORY: ORDERING SYSTEM PROVIDED HISTORY: Increased WOB TECHNOLOGIST PROVIDED HISTORY: Increased WOB Reason for Exam: Increased WOB Acuity: Unknown Type of Exam: Unknown Additional signs and symptoms: Increased WOB FINDINGS: Right PICC line stable. The heart is enlarged and there is severe perihilar opacification that is increased centrally. Diffuse ground-glass opacities are also increased bilaterally. No pneumothorax. No skeletal finding. Perihilar and diffuse ground-glass opacities have increased from prior imaging. Pattern suspected to represent pulmonary edema. Developing pneumonitis can not be excluded. Xr Chest Portable    Result Date: 8/26/2020  EXAMINATION: ONE XRAY VIEW OF THE CHEST 8/26/2020 9:07 am COMPARISON: August 24, 2020, chest exam HISTORY: ORDERING SYSTEM PROVIDED HISTORY: PNA TECHNOLOGIST PROVIDED HISTORY: PNA Reason for Exam: PT CO increased SOB and CP. Acuity: Acute Type of Exam: Initial FINDINGS: Right PICC line catheter tip is projected at the SVC right atrial junction Persistent mild cardiomegaly Expiratory exam with mild diffuse prominence of lung markings likely related to the low volume lungs. Mild vascular congestion is not reliably excluded. Interval increase in now mild patchy right upper lobe infiltrate. Moderate left basilar infiltrate     Interval increase in now mild patchy right upper lobe infiltrate with moderate left basilar infiltrate Line placement as above Expiratory exam, possible mild vascular congestion.   Repeat upright good inspiration PA view of the chest is suggested for more accurate assessment of the pulmonary vascularity RECOMMENDATION: Repeat upright good inspiration PA view of the chest is suggested for more accurate assessment of the pulmonary vascularity     Xr Chest Portable    Result Date: 8/24/2020  EXAMINATION: ONE XRAY VIEW OF THE CHEST 8/24/2020 9:10 am COMPARISON: August 21, 2020, chest exam HISTORY: ORDERING SYSTEM PROVIDED HISTORY: Resp failure TECHNOLOGIST PROVIDED HISTORY: Resp failure Reason for Exam: resp failure Acuity: Unknown Type of Exam: Unknown FINDINGS: Interval insertion of a right PICC line catheter, the tip is projected at the right atrium. Limited markedly rotated exam No obvious significant pleuroparenchymal or mediastinal findings. Limited assessment of the left lung base     Limited markedly rotated exam, limited left base assessment Line placement as above No obvious acute cardiopulmonary findings     Xr Chest Portable    Result Date: 8/22/2020  EXAMINATION: ONE XRAY VIEW OF THE CHEST 8/22/2020 8:41 am COMPARISON: 08/21/2020 HISTORY: ORDERING SYSTEM PROVIDED HISTORY: Difficulty breathing TECHNOLOGIST PROVIDED HISTORY: Difficulty breathing Reason for Exam: dyspnea Acuity: Acute Type of Exam: Initial FINDINGS: Cardiomegaly. Low lung volumes. Right-sided PICC line remains in place. No focal consolidation. No pleural effusion or pneumothorax. Low lung volumes. This accentuates cardiomegaly. No focal consolidation. Xr Chest Portable    Result Date: 8/21/2020  EXAMINATION: ONE XRAY VIEW OF THE CHEST 8/21/2020 9:27 am COMPARISON: Chest radiograph performed 08/21/2020. HISTORY: ORDERING SYSTEM PROVIDED HISTORY: Increased oxygen demand TECHNOLOGIST PROVIDED HISTORY: Increased oxygen demand Reason for Exam: Increased oxygen demand Acuity: Acute Type of Exam: Initial Additional signs and symptoms: Increased oxygen demand FINDINGS: There are small bilateral effusions. There is a left basilar infiltrate. There is no pneumothorax. The heart is prominent. The upper abdomen is unremarkable. The extrathoracic soft tissues are unremarkable.      Cardiomegaly and small bilateral effusions with adjacent left basilar infiltrate. Xr Chest Portable    Result Date: 8/20/2020  EXAMINATION: ONE XRAY VIEW OF THE CHEST 8/20/2020 7:38 am COMPARISON: July 18, 2020 chest exam HISTORY: ORDERING SYSTEM PROVIDED HISTORY: possible pneumonia on CT chest TECHNOLOGIST PROVIDED HISTORY: possible pneumonia on CT chest Reason for Exam: possible pneumonia on CT chest Acuity: Acute Type of Exam: Initial Additional signs and symptoms: possible pneumonia on CT chest FINDINGS: Expiratory exam with mild streaky bibasilar density. Normal cardiopericardial silhouette No significant pleural or mediastinal findings     Expiratory exam with mild streaky bibasilar atelectasis     Ct Chest Pulmonary Embolism W Contrast    Result Date: 8/26/2020  EXAMINATION: CTA OF THE CHEST; CT OF THE ABDOMEN AND PELVIS WITH CONTRAST 8/26/2020 10:34 am TECHNIQUE: CTA of the chest was performed after the administration of intravenous contrast.  Multiplanar reformatted images are provided for review. MIP images are provided for review. Dose modulation, iterative reconstruction, and/or weight based adjustment of the mA/kV was utilized to reduce the radiation dose to as low as reasonably achievable.; CT of the abdomen and pelvis was performed with the administration of intravenous contrast. Multiplanar reformatted images are provided for review. Dose modulation, iterative reconstruction, and/or weight based adjustment of the mA/kV was utilized to reduce the radiation dose to as low as reasonably achievable. COMPARISON: CT chest May 26, 2019 CT abdomen and pelvis August 20, 2020 HISTORY: ORDERING SYSTEM PROVIDED HISTORY: Hypoxia, recurrent fevers TECHNOLOGIST PROVIDED HISTORY: Hypoxia, recurrent fevers Reason for Exam: Hypoxia, recurrent fevers, best images possible due to patient size 322 pounds Acuity: Acute Type of Exam: Initial; ORDERING SYSTEM PROVIDED HISTORY: Splenic vein thrombosis?  TECHNOLOGIST PROVIDED HISTORY: Splenic vein thrombosis? Reason for Exam: Splenic vein thrombosis? best images possible due to patient size 322  poounds Acuity: Acute Type of Exam: Initial FINDINGS: CT CHEST: Chest wall: No axillary adenopathy. Mediastinum: Cardiomegaly. No pericardial effusion. No adenopathy. Pulmonary arteries: Significantly limited evaluation for pulmonary embolus due to bolus timing, motion, and patient body habitus. Questionable pulmonary embolus within the right lower lobe. Lungs: Moderate left pleural effusion. Small right pleural effusion. Left lower lobe consolidation versus atelectasis. Bilateral upper lobe areas of consolidation. Bones: No acute osseous abnormality. CT ABDOMEN-PELVIS: Organs: Decreased attenuation of the liver is consistent with hepatic steatosis. No focal liver lesion. Cholecystectomy. Severe pancreatitis with surrounding inflammatory changes and fluid. Mild splenomegaly. No adrenal lesion. No hydronephrosis. Right renal cyst.  Focal area of scarring within the right kidney is unchanged. GI/Bowel: No bowel obstruction. Secondary involvement of the descending colon adjacent to the peripancreatic inflammatory changes along the tail of the pancreas. Pelvis: Essure devices. No significant free fluid. No bladder calculus. Peritoneum/Retroperitoneum: Splenic artery is patent. No evidence of splenic artery thrombosis. No definite splenic venous thrombosis. A portion of the splenic vein as it courses along the tail of the pancreas is indistinct. However, proximal and distal to this, there is flow and this may be related to artifact. No abdominal aortic aneurysm. Prominent peripancreatic lymph nodes are likely reactive. Bones/Soft Tissues: No acute soft tissue abnormality. No acute osseous abnormality. Significantly limited evaluation for pulmonary embolus. Questionable right lower lobe pulmonary embolus. Bilateral lung opacities are likely pneumonia.   Recommend follow-up to document resolution. Moderate left pleural effusion. Severe acute pancreatitis. No definite splenic venous thrombosis. Critical results were called by Dr. Eun Zhao MD to Dr. Todd Marin on 8/26/2020 at 11:40. Vl Lower Extremity Bilateral Venous Duplex    Result Date: 8/30/2020    [facility]  Vascular Lower Extremities DVT Study Procedure   Patient Name  Prabhakar Mcginnis [id number]   Date of Study           08/30/2020                [facility]   Date of Birth 1975  Gender                  Female   Age           54 year(s)  Race                       Room Number   2003        Height:                 64.96 inch, 165 cm   Corporate ID  [id number]    Weight:                 304 pounds, 137.9 kg  #   Patient Acct  [de-identified]   BSA:        2.36 m^2    BMI:       50.65 kg/m^2  #   MR #          [id number]      Sonographer             Clayton Jane   Accession #   [id number]  Interpreting Physician  Ortiz Toro   Referring                 Referring Physician     Srivastava Ramming  Nurse  Practitioner  Procedure Type of Study:   Veins: Lower Extremities DVT Study, Venous Scan Lower Bilateral.  Indications for Study:Shortness of breath. Patient Status: In Patient. Technical Quality:Limited visualization. Limitation reason:Bedside edema. Conclusions   Summary   No evidence of superficial or deep venous thrombosis in both lower  extremities.    Signature   ----------------------------------------------------------------  Electronically signed by Brown Nowak(Sonographer) on  08/30/2020 04:00 PM  ----------------------------------------------------------------   ----------------------------------------------------------------  Electronically signed by Ortiz Toro(Interpreting physician)  on 08/30/2020 08:16 PM  ----------------------------------------------------------------  Findings:   Right Impression:                    Left Impression:  The common femoral, femoral,         The common femoral, femoral,  popliteal and tibial veins           popliteal and tibial veins  demonstrate normal compressibility   demonstrate normal compressibility  and augmentation. and augmentation. Normal compressibility of the great  Normal compressibility of the great  saphenous vein. saphenous vein. Normal compressibility of the small  Normal compressibility of the small  saphenous vein. saphenous vein. Velocities are measured in cm/s ; Diameters are measured in cm Right Lower Extremities DVT Study Measurements Right 2D Measurements +------------------------------------+----------+---------------+----------+ ! Location                            ! Visualized! Compressibility! Thrombosis! +------------------------------------+----------+---------------+----------+ ! Common Femoral                      !Yes       ! Yes            ! None      ! +------------------------------------+----------+---------------+----------+ ! Prox Femoral                        !Yes       ! Yes            ! None      ! +------------------------------------+----------+---------------+----------+ ! Mid Femoral                         !Yes       ! Yes            ! None      ! +------------------------------------+----------+---------------+----------+ ! Dist Femoral                        !Yes       ! Yes            ! None      ! +------------------------------------+----------+---------------+----------+ ! Popliteal                           !Yes       ! Yes            ! None      ! +------------------------------------+----------+---------------+----------+ ! Sapheno Femoral Junction            ! Yes       ! Yes            ! None      ! +------------------------------------+----------+---------------+----------+ ! PTV                                 ! Yes       ! Yes            ! None      ! +------------------------------------+----------+---------------+----------+ ! Peroneal                            !Yes       ! Yes            ! None      ! +------------------------------------+----------+---------------+----------+ ! Gastroc                             ! Yes       ! Yes            ! None      ! +------------------------------------+----------+---------------+----------+ ! GSV Thigh                           ! Yes       ! Yes            ! None      ! +------------------------------------+----------+---------------+----------+ ! GSV Knee                            ! Yes       ! Yes            ! None      ! +------------------------------------+----------+---------------+----------+ ! GSV Ankle                           ! Yes       ! Yes            ! None      ! +------------------------------------+----------+---------------+----------+ ! SSV                                 ! Yes       ! Yes            ! None      ! +------------------------------------+----------+---------------+----------+ Right Doppler Measurements +---------------------------+------+------+--------------------------------+ ! Location                   ! Signal!Reflux! Reflux (msec)                   ! +---------------------------+------+------+--------------------------------+ ! Common Femoral             !Phasic!      !                                ! +---------------------------+------+------+--------------------------------+ ! Prox Femoral               !Phasic!      !                                ! +---------------------------+------+------+--------------------------------+ ! Popliteal                  !Phasic!      !                                ! +---------------------------+------+------+--------------------------------+ Left Lower Extremities DVT Study Measurements Left 2D Measurements +------------------------------------+----------+---------------+----------+ ! Location                            ! Visualized! Compressibility! Thrombosis! +------------------------------------+----------+---------------+----------+ ! Common Femoral                      !Yes       ! Yes            ! None      ! +------------------------------------+----------+---------------+----------+ ! Prox Femoral                        !Yes       ! Yes            ! None      ! +------------------------------------+----------+---------------+----------+ ! Mid Femoral                         !Yes       ! Yes            ! None      ! +------------------------------------+----------+---------------+----------+ ! Dist Femoral                        !Yes       ! Yes            ! None      ! +------------------------------------+----------+---------------+----------+ ! Popliteal                           !Yes       ! Yes            ! None      ! +------------------------------------+----------+---------------+----------+ ! Sapheno Femoral Junction            ! Yes       ! Yes            ! None      ! +------------------------------------+----------+---------------+----------+ ! PTV                                 ! Yes       ! Yes            ! None      ! +------------------------------------+----------+---------------+----------+ ! Peroneal                            !Yes       ! Yes            ! None      ! +------------------------------------+----------+---------------+----------+ ! Gastroc                             ! Yes       ! Yes            ! None      ! +------------------------------------+----------+---------------+----------+ ! GSV Thigh                           ! Yes       ! Yes            ! None      ! +------------------------------------+----------+---------------+----------+ ! GSV Knee                            ! Yes       ! Yes            ! None      ! +------------------------------------+----------+---------------+----------+ ! GSV Ankle                           ! Yes       ! Yes            ! None      ! +------------------------------------+----------+---------------+----------+ ! SSV                                 ! Yes       ! Yes            ! None      ! +------------------------------------+----------+---------------+----------+ Left Doppler Measurements +---------------------------+------+------+--------------------------------+ ! Location                   ! Signal!Reflux! Reflux (msec)                   ! +---------------------------+------+------+--------------------------------+ ! Common Femoral             !Phasic!      !                                ! +---------------------------+------+------+--------------------------------+ ! Prox Femoral               !Phasic!      !                                ! +---------------------------+------+------+--------------------------------+ ! Popliteal                  !Phasic!      !                                ! +---------------------------+------+------+--------------------------------+    Us Retroperitoneal Limited    Result Date: 8/25/2020  EXAMINATION: ULTRASOUND OF THE KIDNEYS 8/25/2020 1:19 pm COMPARISON: August 20, 2020 CT abdomen and pelvis HISTORY: ORDERING SYSTEM PROVIDED HISTORY: Low UOP TECHNOLOGIST PROVIDED HISTORY: Bilateral Renal Ultrasound Low UOP Acuity: Acute Type of Exam: Initial FINDINGS: Exam is limited due to patient body habitus. The right kidney measures 15.7 centimetres in length and the left kidney measures 16 cm in length. There is no hydronephrosis in either kidney. New focal renal lesion. Diffuse hepatic steatosis. No hydronephrosis in either kidney. Ir Guided Thoracentesis Pleural    Result Date: 8/31/2020  PROCEDURE: [place] DASILVA DIAGNOSTIC THORACENTESIS 8/28/2020 HISTORY: ORDERING SYSTEM PROVIDED HISTORY: pleural effusion left TECHNOLOGIST PROVIDED HISTORY: pleural effusion left Is the patient pregnant? ->Yes TECHNIQUE: Informed consent was obtained after a detailed explanation of the procedure including risks, benefits, and alternatives. Universal protocol was performed. The left posterior chest was prepped and draped in sterile fashion and local anesthesia was achieved with lidocaine.   An 5 [place] needle sheath was advanced under ultrasound guidance into the small pleural effusion and diagnostic thoracentesis was performed. The patient tolerated the procedure well. Fluid was provided for further analysis. FINDINGS: Only a small amount of accessible left pleural fluid demonstrated sonographically. A total of 15 mL cloudy yellow fluid was removed. Successful ultrasound guided diagnostic left thoracentesis. Physical Examination:        Physical Exam  Vitals signs reviewed. Constitutional:       Appearance: She is obese. HENT:      Head: Normocephalic and atraumatic. Cardiovascular:      Rate and Rhythm: Normal rate and regular rhythm. Pulmonary:      Effort: Pulmonary effort is normal.      Comments: Crackle on left side of chest.  Normal breathing sound on th right side. Abdominal:      Palpations: Abdomen is soft. Tenderness: There is no abdominal tenderness. Neurological:      Mental Status: She is alert. Assessment:        Primary Problem  Pneumonia of both lungs due to infectious organism    Active Hospital Problems    Diagnosis Date Noted    Pneumonia of both lungs due to infectious organism [J18.9] 08/27/2020    History of anxiety disorder [Z86.59]     Acute respiratory failure with hypoxia ([address] [J96.01] 08/21/2020    Hypocalcemia [E83.51] 08/21/2020    Sepsis ([address] [A41.9] 08/20/2020    Morbid obesity ([address] [E66.01]     Abdominal pain, epigastric [R10.13]     Nausea [R11.0]     Acute pancreatitis [K85.90] 08/19/2020    Fibromyalgia [M79.7]     HTN (hypertension) [I10] 12/15/2014    Major depression [F32.9] 10/30/2014    Restless leg syndrome [G25.81] 10/22/2013    Class 3 severe obesity due to excess calories with serious comorbidity in adult ([address] [E66.01] 01/14/2013    Anxiety [F41.9] 01/14/2013       Plan:        1. Continue liquid diet. 2. DVT prophylaxis. 3. Continue activity as per patient tolerance.       Ella Bynum MD  9/2/2020  7:52 AM     Attending Physician Statement  I have discussed the care of Debbie Rizvi and I have examined the patient myselft and taken ros and hpi , including pertinent history and exam findings,  with the resident. I have reviewed the key elements of all parts of the encounter with the resident. I agree with the assessment, plan and orders as documented by the resident.   Acute severe pancreatitis unknown etiology idiopathic in nature on TPN no nausea vomiting today loss of appetite  Diet advance as toleated  Patient on TPN for over 7 days dietary consult and taper of the TPN discharge planning in day or 2    Electronically signed by Leigh Mcnair MD

## 2020-09-02 NOTE — PROGRESS NOTES
45.75 kg/m²   afebrile  General : Awake, alert, oriented to time, place, and person, sitting up at side of bed - no guarding or grimacing  Neck - supple, no lymphadenopathy, JVD not raised  Heart -   Lungs - Air Entry- fair bilaterally; breath sounds : vesicular;   rales/crackles - absent, 98% on RA  Abdomen - soft,obese   Upper Extremities  - no cyanosis, mottling; edema : absent  Lower Extremities: no cyanosis, mottling; edema : absent    Current Laboratory, Radiologic, Microbiologic, and Diagnostic studies reviewed  Data ReviewCBC:   Recent Labs     08/31/20 0439 09/01/20  0513 09/02/20  0509   WBC 17.1* 17.4* 13.2*   RBC 2.98* 3.37* 3.41*   HGB 9.3* 10.4* 10.7*   HCT 27.7* 31.6* 32.0*    411 467*     BMP:   Recent Labs     08/31/20 0439 09/01/20 0513 09/02/20  0509   GLUCOSE 182* 219* 175*    135 136   K 4.2 4.6 4.4   BUN 10 9 12   CREATININE 0.44* <0.40* <0.40*   CALCIUM 8.6 8.8 9.2     ABGs: No results for input(s): PHART, PO2ART, MSY9GQR, KRO1CAN, BEART, J2QEMKPE, MCO5WEM in the last 72 hours.    PT/INR:  No results found for: PTINR    ASSESSMENT / PLAN:  · Acute pancreatitis- GI consulted, monitor amylase lipase - improving  · Lactic acidosis- resolved  · Possible pneumonia- ABx  · Pleural effusion - s/p left thoracentesis with 15 ml exudative fluid removed on 8/28, no growth x 5 days  · Febrile, Leukocytosis- Abx changed per ID, UA- unremarkable, COVID negative, lactate 1.2  · Hypertension - improved   · Possible PE- CTA - no obvious PE  · acute hypoxic resp failure - resolved - off 02     Plan of care discussed with Dr Roc Ortiz  Electronically signed by Sheridan Murguia on 09/02/20 at 4:06 PM.

## 2020-09-02 NOTE — PROGRESS NOTES
[facility] [address]                 PATIENT NAME: Debbie Rizvi     TODAY'S DATE: 9/2/2020, 9:27 AM    SUBJECTIVE:    Pt seen and examined. Afebrile, VSS. Patient states she is feeling better. Abdominal pain controlled. Bowels are moving. Tolerating full liquid diet, no N/V. Lipase WNL. ALP increased however rest of LFT's stable. OBJECTIVE:   VITALS:  BP (!) 168/91   Pulse 114   Temp 97.7 °F (36.5 °C) (Oral)   Resp 21   Ht 5' 5\" (1.651 m)   Wt 274 lb 14.6 oz (124.7 kg)   SpO2 93%   BMI 45.75 kg/m²      INTAKE/OUTPUT:      Intake/Output Summary (Last 24 hours) at 9/2/2020 0927  Last data filed at 9/2/2020 0903  Gross per 24 hour   Intake 1426 ml   Output 150 ml   Net 1276 ml                 CONSTITUTIONAL:  awake and alert.   No acute distress  HEART:   RRR  LUNGS:   Decreased air entry at bases  ABDOMEN:   Abdomen soft, LUQ tender, non-distended  EXTREMITIES:   Pedal edema b/l    Data:  CBC:   Lab Results   Component Value Date    WBC 13.2 09/02/2020    RBC 3.41 09/02/2020    HGB 10.7 09/02/2020    HCT 32.0 09/02/2020    MCV 93.9 09/02/2020    MCH 31.3 09/02/2020    MCHC 33.4 09/02/2020    RDW 14.5 09/02/2020     09/02/2020    MPV 9.4 09/02/2020     CMP:    Lab Results   Component Value Date     09/02/2020    K 4.4 09/02/2020    CL 98 09/02/2020    CO2 26 09/02/2020    BUN 12 09/02/2020    CREATININE <0.40 09/02/2020    GFRAA CANNOT BE CALCULATED 09/02/2020    LABGLOM CANNOT BE CALCULATED 09/02/2020    GLUCOSE 175 09/02/2020    PROT 7.3 09/02/2020    PROT 7.2 01/23/2015    LABALBU 2.8 09/02/2020    CALCIUM 9.2 09/02/2020    BILITOT <0.15 09/02/2020    ALKPHOS 218 09/02/2020    AST 42 09/02/2020    ALT 51 09/02/2020     LIPASE:    Lab Results   Component Value Date    LIPASE 55 09/02/2020       Radiology Review:  No new images to review      ASSESSMENT     Principal Problem:    Pneumonia of both lungs due to infectious organism  Active Problems:    Anxiety Class 3 severe obesity due to excess calories with serious comorbidity in adult [facility])    Restless leg syndrome    Major depression    HTN (hypertension)    Fibromyalgia    Acute pancreatitis    Sepsis ([address]    Morbid obesity (HCC)    Abdominal pain, epigastric    Nausea    Acute respiratory failure with hypoxia (HCC)    Hypocalcemia    History of anxiety disorder  Resolved Problems:    * No resolved hospital problems. *      Plan  1. Advance to low fat diet  2. Continue TPN until PO intake in adequate  3. Recheck LFT's  4. Antibiotics per ID  5. Surgically stable  6. Continue medical management  7. Patient was seen and examined. No significant new change. Tolerating full liquids. Blood work was reviewed. Abdomen is morbidly obese soft mildly tender. Nothing acute. Repeat blood work shows LFTs are about the same. Once patient is tolerating low-fat diet then will wean and DC TPN. Likely elevation of liver enzymes because of TPN.       Electronically signed by Eladio Crawley PA-C  [id number] [address]

## 2020-09-02 NOTE — PROGRESS NOTES
[facility] -   Infectious diseases evaluation  admission date 8/19/2020    reason for consultation:   Fever    Impression :   · Fever /leukocytosis possible pneumonia. · Left pleural effusion status post arthrocentesis 8/28/2020, no growth on cultures to date. · Severe pancreatitis, no necrosis or pseudocyst seen on CT 8/26/2020  · Possible PE on Lovenox  · Acute hypoxic respiratory failure  · Skin rash, possible allergic reaction to Zosyn resolved  · Hypertension   · Obesity      Recommendations   Current:  · IV meropenem was discontinued 9/1/2020  · COVID 19  test was negative on 8/26/2020  · Blood cultures from 8/23 negative   · MRSA nasal swab was negative 8/21/2020  · 8/27/2020 viral PCR negative            History of Present Illness:   Initial history:  Mere Ramos is a 54y.o.-year-old female presented to the hospital with epigastric abdominal pain and bloating for several days prior to admission, severe, no radiation, no alleviating or relieving factors. Pancreatic enzymes were elevated, imaging suggestive of severe pancreatitis. She was on IV Zosyn for 7 days, developed a rash on 8/26/2020, IV Zosyn was discontinued and the rash resolved. IV meropenem started 8/26/2020  Right arm PICC line was placed 8/20/2020 and has been on TPN  CT chest 8/26/2020 suggestive of questionable PE, bilateral consolidations and left pleural effusion  Interval changes  9/2/2020   She had a fever with a temperature max of 104 on 8/26/2020, last fever was 8/29/2020 with a temperature of 101, has been afebrile since. She is feeling better today, less abdominal pain, denied any cough or shortness of breath, remains on TPN through right arm PICC line, tolerating oral.  WBC 13      I have personally reviewed the past medical history, past surgical history, medications, social history, and family history, and I haveupdated the database accordingly.   Past Medical History: Past Medical History:   Diagnosis Date    Abnormal levels of other serum enzymes     Anxiety     Bilateral leg edema     Chronic kidney disease     right renal cyst    Depression     DVT (deep venous thrombosis) [facility] 1997    after delivery    Elevated liver enzymes     Fibromyalgia     Headache(784.0)     migraines    Hx of blood clots     1997 left calf    Hypertension     Kidney stone     Obstructive sleep apnea syndrome     Pancreatitis 2001    Pleural effusion 2001    SOB (shortness of breath)     Supraventricular tachycardia (HCC)     SVT (supraventricular tachycardia) ([facility] 9/8/2015       Past Surgical  History:     Past Surgical History:   Procedure Laterality Date    ATRIAL ABLATION SURGERY      BACK SURGERY      L4-5    CHOLECYSTECTOMY  2001    ENDOMETRIAL ABLATION      e sure implants placed also    ENDOSCOPY, COLON, DIAGNOSTIC      EXCISION LESION HAND / FINGER Right 1/25/2019    HAND LESION BIOPSY EXCISION performed by Hitesh Fowler MD at [place] [id number] Bilateral     left x2, right x1    FOOT SURGERY Right     x3    KNEE ARTHROSCOPY Left     x2    LA CYSTO/URETERO/PYELOSCOPY W/LITHOTRIPSY Left 9/20/2017    CYSTOSCOPY URETEROSCOPY HOLMIUM LASER LITHO WITH STONE BASKETING WITH  STENT  performed by Bharat Howard MD at [facility] Right        Medications:      lipase-protease-amylase  24,000 Units Oral TID WC    amLODIPine  10 mg Oral Daily    metoprolol tartrate  25 mg Oral BID    LORazepam  1 mg Oral Q6H    fat emulsion  100 mL Intravenous Daily    lisinopril  40 mg Oral Daily    insulin lispro  0-6 Units Subcutaneous Q6H    enoxaparin  30 mg Subcutaneous BID    magnesium oxide  400 mg Oral Daily    sodium chloride flush  10 mL Intravenous 2 times per day    QUEtiapine  800 mg Oral Nightly    pramipexole  0.25 mg Oral Daily    pantoprazole  40 mg Intravenous Daily    And    sodium chloride (PF)  10 mL Intravenous Daily    so]; and Lamotrigine     Review of Systems:     Review of Systems  As per history of present illness, other than above 14 systems reviewed were negative  Physical Examination :     Patient Vitals for the past 8 hrs:   BP Temp Temp src Pulse Resp SpO2 Weight   09/02/20 0743 (!) 168/91 97.7 °F (36.5 °C) Oral 114 21 93 % --   09/02/20 0622 -- -- -- -- -- -- 274 lb 14.6 oz (124.7 kg)       Physical Exam  HENT:      Head: Normocephalic and atraumatic. Neck:      Musculoskeletal: Neck supple. No neck rigidity. Cardiovascular:      Rate and Rhythm: Normal rate and regular rhythm. Heart sounds: No murmur. Pulmonary:      Breath sounds: Normal breath sounds. Abdominal:      Tenderness: There is no guarding or rebound. Musculoskeletal:      Right lower leg: Edema present. Left lower leg: Edema present. Skin:     General: Skin is warm. Coloration: Skin is not jaundiced. Comments: Erythematous rash to the lower and upper extremities resolved. Neurological:      Mental Status: She is alert and oriented to person, place, and time. Medical Decision Making:   I have independently reviewed/ordered the following labs:    CBC with Differential:   Recent Labs     09/01/20  0513 09/02/20  0509   WBC 17.4* 13.2*   HGB 10.4* 10.7*   HCT 31.6* 32.0*    467*   LYMPHOPCT 10* 16*   MONOPCT 4 5     BMP:  Recent Labs     09/01/20  0513 09/02/20  0509    136   K 4.6 4.4   CL 99 98   CO2 26 26   BUN 9 12   CREATININE <0.40* <0.40*   MG 2.0 2.1     Hepatic Function Panel:   Recent Labs     09/02/20  0509 09/02/20  0943   PROT 7.3 7.2   LABALBU 2.8* 2.9*   BILIDIR <0.08 0.08   IBILI  --  0.08   BILITOT <0.15* 0.16*   ALKPHOS 218* 206*   ALT 51* 46*   AST 42* 31       Lab Results   Component Value Date    CREATININE <0.40 09/02/2020    GLUCOSE 175 09/02/2020       Detailed results: Thank you for allowing us to participate in the care of this patient. Please call with questions.     This note is created with the assistance of a speech recognition program.  While intending to generate adocument that actually reflects the content of the visit, the document can still have some errors including those of syntax and sound a like substitutions which may escape proof reading. It such instances, actual meaningcan be extrapolated by contextual diversion.     Janey Garduno MD  Office: [phone]  Perfect serve / office [phone]

## 2020-09-02 NOTE — PROGRESS NOTES
Comprehensive Nutrition Assessment    Type and Reason for Visit:  Reassess    Nutrition Recommendations/Plan: Continue Low Fat diet, discontinue supplements per pt request. Following changes made in additives: KCl- 90 to 80 mEq, Ca+- 9 to 7 mEq, Mg- 9 to 7 mEq, insulin- 32 to 36 units, add MVI & trace elements. Nutrition Assessment:  Diet advanced at lunch to low fat, went to see how pt was eating noting her in a deep sleep. Nurse states she sleeps a lot due to pain medications which trying to reduce. Surgery notes TPN & lipids to continue until PO intake is adequate.     Malnutrition Assessment:  Malnutrition Status:  No malnutrition    Context:  Acute Illness     Findings of the 6 clinical characteristics of malnutrition:  Energy Intake:  No significant decrease in energy intake  Weight Loss:  No significant weight loss     Body Fat Loss:  No significant body fat loss     Muscle Mass Loss:  No significant muscle mass loss    Fluid Accumulation:  1 - Mild Extremities   Strength:  Not Performed    Estimated Daily Nutrient Needs:  Energy (kcal):  2020 based on Ortiz Rueda with no additional factors; Weight Used for Energy Requirements:  Admission     Protein (g):  102-113 gm protein based on 1.8-2 gm/kg IBW; Weight Used for Protein Requirements:  Ideal          Nutrition Related Findings:  no changes in edema: +1 BUE's, trace BLE's      Wounds:  None       Current Nutrition Therapies:    PN-Adult 2-in-1 Central Line (Standard)  DIET LOW FAT; Low Fat  PN-Adult 2-in-1 Central Line (Standard)  Current Parenteral Nutrition Orders:  · Type and Formula: 2-in-1 Custom   · Lipids: 100ml  · Duration: Continuous  · Rate/Volume: 75 ml/ 1800 ml  · Current PN Order Provides: 1784 kcal, 90 gm protein      Anthropometric Measures:  · Height: 5' 5\" (165.1 cm)  · Current Body Weight: 274 lb (124.3 kg)(question accuracy since went from ICU to PCU)   · Admission Body Weight: 304 lb (137.9 kg)    · Ideal Body Weight: 125 lbs; % Ideal Body Weight 243.2 %   · BMI: 45.6  · BMI Categories: Obese Class 3 (BMI 40.0 or greater)       Nutrition Diagnosis:   · Inadequate oral intake related to altered GI function as evidenced by nutrition support - parenteral nutrition, intake 0-25%      Nutrition Interventions:   Food and/or Nutrient Delivery:  Continue Current Diet, Discontinue Oral Nutrition Supplement, Modify Parenteral Nutrition  Nutrition Education/Counseling:  No recommendation at this time   Coordination of Nutrition Care:  Continued Inpatient Monitoring    Goals:  Meet % of nutrient needs with PO intake, supplements and TPN. Nutrition Monitoring and Evaluation:   Food/Nutrient Intake Outcomes:  Food and Nutrient Intake, Parenteral Nutrition Intake/Tolerance  Physical Signs/Symptoms Outcomes:  Biochemical Data, GI Status, Nausea or Vomiting, Fluid Status or Edema, Hemodynamic Status, Weight     Discharge Planning:    Continue current diet, Parenteral Nutrition     Some areas of assessment may be incomplete due to COVID-19 precautions. Reina Trinidad R.D., L.D.   Clinical Dietitian  Office: [phone]

## 2020-09-02 NOTE — PLAN OF CARE
Problem: Falls - Risk of:  Goal: Will remain free from falls  Description: Will remain free from falls  9/2/2020 0358 by Darby Meier RN  Outcome: Ongoing   The patient remained free from falls this shift, call light within reach, bed in locked and lowest position. Side rails up x2. Continue to monitor closely.    Problem: Pain:  Goal: Pain level will decrease  Description: Pain level will decrease  9/2/2020 0358 by Darby Meier RN  Outcome: Ongoing   Pt has had complaints of pain but states the current pain meds are helping to control the pain when taken every 3 hours   Problem: Nausea/Vomiting:  Goal: Absence of nausea/vomiting  Description: Absence of nausea/vomiting  9/2/2020 0358 by Darby Meier RN  Outcome: Ongoing   Pt has had no nausea or vomiting this shift    Problem: Serum Glucose Level - Abnormal:  Goal: Ability to maintain appropriate glucose levels will improve  Description: Ability to maintain appropriate glucose levels will improve  9/2/2020 0358 by Darby Meier RN  Outcome: Ongoing   Blood sugar remains WNL

## 2020-09-02 NOTE — PROGRESS NOTES
Patient refusing BiPAP at this time, not ready for bed yet. Pt on RA with SpO2 of 95%. Will continue to monitor patient.

## 2020-09-02 NOTE — PLAN OF CARE
Problem: Falls - Risk of:  Goal: Will remain free from falls  Outcome: Ongoing  Note: No falls noted this shift. Patient ambulates up per self  without difficulty. Bed kept in low position. Safe environment maintained. Bedside table & call light in reach. Uses call light appropriately when needing assistance. Problem: Pain:  Goal: Pain level will decrease  Outcome: Ongoing  Note: Pt medicated with pain medication prn. Assessed all pain characteristics including level, type, location, frequency, and onset. Non-pharmacologic interventions offered to pt as well. Pt states pain is tolerable at this time. Will continue to monitor      Problem: Pain:  Goal: Control of chronic pain  Outcome: Ongoing     Problem: Nausea/Vomiting:  Goal: Absence of nausea/vomiting  Outcome: Ongoing  Note: Zofran used for nausea control.      Problem: Nausea/Vomiting:  Goal: Ability to achieve adequate nutritional intake will improve  Outcome: Ongoing     Problem: Gas Exchange - Impaired:  Goal: Levels of oxygenation will improve  Outcome: Ongoing     Problem: Infection, Septic Shock:  Goal: Will show no infection signs and symptoms  Outcome: Ongoing     Problem: Nutrition  Goal: Optimal nutrition therapy  Outcome: Ongoing     Problem: Skin Integrity:  Goal: Absence of new skin breakdown  Outcome: Ongoing

## 2020-09-03 LAB
ABSOLUTE BANDS #: 1.19 K/UL (ref 0–1)
ABSOLUTE EOS #: 0.26 K/UL (ref 0–0.4)
ABSOLUTE IMMATURE GRANULOCYTE: ABNORMAL K/UL (ref 0–0.3)
ABSOLUTE LYMPH #: 2.38 K/UL (ref 1–4.8)
ABSOLUTE MONO #: 0.26 K/UL (ref 0.1–1.3)
ALBUMIN SERPL-MCNC: 2.8 G/DL (ref 3.5–5.2)
ALBUMIN/GLOBULIN RATIO: ABNORMAL (ref 1–2.5)
ALP BLD-CCNC: 169 U/L (ref 35–104)
ALT SERPL-CCNC: 36 U/L (ref 5–33)
ANION GAP SERPL CALCULATED.3IONS-SCNC: 13 MMOL/L (ref 9–17)
AST SERPL-CCNC: 19 U/L
BANDS: 9 % (ref 0–10)
BASOPHILS # BLD: 0 % (ref 0–2)
BASOPHILS ABSOLUTE: 0 K/UL (ref 0–0.2)
BILIRUB SERPL-MCNC: 0.16 MG/DL (ref 0.3–1.2)
BUN BLDV-MCNC: 16 MG/DL (ref 6–20)
BUN/CREAT BLD: ABNORMAL (ref 9–20)
CALCIUM SERPL-MCNC: 9.1 MG/DL (ref 8.6–10.4)
CHLORIDE BLD-SCNC: 98 MMOL/L (ref 98–107)
CO2: 23 MMOL/L (ref 20–31)
CREAT SERPL-MCNC: 0.43 MG/DL (ref 0.5–0.9)
CULTURE: ABNORMAL
DIFFERENTIAL TYPE: ABNORMAL
DIRECT EXAM: ABNORMAL
EOSINOPHILS RELATIVE PERCENT: 2 % (ref 0–4)
GFR AFRICAN AMERICAN: >60 ML/MIN
GFR NON-AFRICAN AMERICAN: >60 ML/MIN
GFR SERPL CREATININE-BSD FRML MDRD: ABNORMAL ML/MIN/{1.73_M2}
GFR SERPL CREATININE-BSD FRML MDRD: ABNORMAL ML/MIN/{1.73_M2}
GLUCOSE BLD-MCNC: 137 MG/DL (ref 65–105)
GLUCOSE BLD-MCNC: 160 MG/DL (ref 65–105)
GLUCOSE BLD-MCNC: 164 MG/DL (ref 65–105)
GLUCOSE BLD-MCNC: 171 MG/DL (ref 65–105)
GLUCOSE BLD-MCNC: 178 MG/DL (ref 70–99)
HCT VFR BLD CALC: 31.2 % (ref 36–46)
HEMOGLOBIN: 10.4 G/DL (ref 12–16)
IMMATURE GRANULOCYTES: ABNORMAL %
LIPASE: 57 U/L (ref 13–60)
LYMPHOCYTES # BLD: 18 % (ref 24–44)
Lab: ABNORMAL
MAGNESIUM: 2.1 MG/DL (ref 1.6–2.6)
MCH RBC QN AUTO: 31.3 PG (ref 26–34)
MCHC RBC AUTO-ENTMCNC: 33.4 G/DL (ref 31–37)
MCV RBC AUTO: 93.8 FL (ref 80–100)
METAMYELOCYTES ABSOLUTE COUNT: 0.13 K/UL
METAMYELOCYTES: 1 %
MONOCYTES # BLD: 2 % (ref 1–7)
MORPHOLOGY: ABNORMAL
NRBC AUTOMATED: ABNORMAL PER 100 WBC
PDW BLD-RTO: 14.3 % (ref 11.5–14.9)
PHOSPHORUS: 5.2 MG/DL (ref 2.6–4.5)
PLATELET # BLD: 504 K/UL (ref 150–450)
PLATELET ESTIMATE: ABNORMAL
PMV BLD AUTO: 9.1 FL (ref 6–12)
POTASSIUM SERPL-SCNC: 4.5 MMOL/L (ref 3.7–5.3)
RBC # BLD: 3.33 M/UL (ref 4–5.2)
RBC # BLD: ABNORMAL 10*6/UL
SEG NEUTROPHILS: 68 % (ref 36–66)
SEGMENTED NEUTROPHILS ABSOLUTE COUNT: 8.98 K/UL (ref 1.3–9.1)
SODIUM BLD-SCNC: 134 MMOL/L (ref 135–144)
SPECIMEN DESCRIPTION: ABNORMAL
TOTAL PROTEIN: 7.3 G/DL (ref 6.4–8.3)
WBC # BLD: 13.2 K/UL (ref 3.5–11)
WBC # BLD: ABNORMAL 10*3/UL

## 2020-09-03 PROCEDURE — 97116 GAIT TRAINING THERAPY: CPT

## 2020-09-03 PROCEDURE — 2060000000 HC ICU INTERMEDIATE R&B

## 2020-09-03 PROCEDURE — 6370000000 HC RX 637 (ALT 250 FOR IP): Performed by: INTERNAL MEDICINE

## 2020-09-03 PROCEDURE — 97110 THERAPEUTIC EXERCISES: CPT

## 2020-09-03 PROCEDURE — 99232 SBSQ HOSP IP/OBS MODERATE 35: CPT | Performed by: INTERNAL MEDICINE

## 2020-09-03 PROCEDURE — 99231 SBSQ HOSP IP/OBS SF/LOW 25: CPT | Performed by: INTERNAL MEDICINE

## 2020-09-03 PROCEDURE — 6370000000 HC RX 637 (ALT 250 FOR IP): Performed by: NURSE PRACTITIONER

## 2020-09-03 PROCEDURE — 82947 ASSAY GLUCOSE BLOOD QUANT: CPT

## 2020-09-03 PROCEDURE — 6360000002 HC RX W HCPCS: Performed by: NURSE PRACTITIONER

## 2020-09-03 PROCEDURE — 83735 ASSAY OF MAGNESIUM: CPT

## 2020-09-03 PROCEDURE — 83690 ASSAY OF LIPASE: CPT

## 2020-09-03 PROCEDURE — 84100 ASSAY OF PHOSPHORUS: CPT

## 2020-09-03 PROCEDURE — 6360000002 HC RX W HCPCS: Performed by: STUDENT IN AN ORGANIZED HEALTH CARE EDUCATION/TRAINING PROGRAM

## 2020-09-03 PROCEDURE — 80053 COMPREHEN METABOLIC PANEL: CPT

## 2020-09-03 PROCEDURE — 2580000003 HC RX 258: Performed by: RADIOLOGY

## 2020-09-03 PROCEDURE — 6370000000 HC RX 637 (ALT 250 FOR IP): Performed by: STUDENT IN AN ORGANIZED HEALTH CARE EDUCATION/TRAINING PROGRAM

## 2020-09-03 PROCEDURE — 6370000000 HC RX 637 (ALT 250 FOR IP): Performed by: SURGERY

## 2020-09-03 PROCEDURE — 94660 CPAP INITIATION&MGMT: CPT

## 2020-09-03 PROCEDURE — 2580000003 HC RX 258: Performed by: NURSE PRACTITIONER

## 2020-09-03 PROCEDURE — 85025 COMPLETE CBC W/AUTO DIFF WBC: CPT

## 2020-09-03 PROCEDURE — 36415 COLL VENOUS BLD VENIPUNCTURE: CPT

## 2020-09-03 PROCEDURE — 2580000003 HC RX 258: Performed by: STUDENT IN AN ORGANIZED HEALTH CARE EDUCATION/TRAINING PROGRAM

## 2020-09-03 PROCEDURE — C9113 INJ PANTOPRAZOLE SODIUM, VIA: HCPCS | Performed by: NURSE PRACTITIONER

## 2020-09-03 RX ORDER — PRAZOSIN HYDROCHLORIDE 1 MG/1
1 CAPSULE ORAL NIGHTLY
Status: DISCONTINUED | OUTPATIENT
Start: 2020-09-03 | End: 2020-09-04 | Stop reason: HOSPADM

## 2020-09-03 RX ORDER — PRAMIPEXOLE DIHYDROCHLORIDE 0.12 MG/1
0.25 TABLET ORAL DAILY
Status: DISCONTINUED | OUTPATIENT
Start: 2020-09-03 | End: 2020-09-04 | Stop reason: HOSPADM

## 2020-09-03 RX ORDER — PROMETHAZINE HYDROCHLORIDE 25 MG/ML
12.5 INJECTION, SOLUTION INTRAMUSCULAR; INTRAVENOUS ONCE
Status: DISCONTINUED | OUTPATIENT
Start: 2020-09-03 | End: 2020-09-04 | Stop reason: HOSPADM

## 2020-09-03 RX ORDER — ACETAMINOPHEN 325 MG/1
650 TABLET ORAL ONCE
Status: DISCONTINUED | OUTPATIENT
Start: 2020-09-03 | End: 2020-09-04 | Stop reason: HOSPADM

## 2020-09-03 RX ORDER — LIDOCAINE 4 G/G
1 PATCH TOPICAL DAILY
Status: DISCONTINUED | OUTPATIENT
Start: 2020-09-03 | End: 2020-09-04 | Stop reason: HOSPADM

## 2020-09-03 RX ORDER — PRAZOSIN HYDROCHLORIDE 1 MG/1
2 CAPSULE ORAL NIGHTLY
COMMUNITY

## 2020-09-03 RX ADMIN — DULOXETINE HYDROCHLORIDE 60 MG: 60 CAPSULE, DELAYED RELEASE ORAL at 21:02

## 2020-09-03 RX ADMIN — HYDROMORPHONE HYDROCHLORIDE 0.25 MG: 1 INJECTION, SOLUTION INTRAMUSCULAR; INTRAVENOUS; SUBCUTANEOUS at 23:11

## 2020-09-03 RX ADMIN — INSULIN LISPRO 1 UNITS: 100 INJECTION, SOLUTION INTRAVENOUS; SUBCUTANEOUS at 06:05

## 2020-09-03 RX ADMIN — INSULIN LISPRO 1 UNITS: 100 INJECTION, SOLUTION INTRAVENOUS; SUBCUTANEOUS at 14:02

## 2020-09-03 RX ADMIN — HYDROMORPHONE HYDROCHLORIDE 0.5 MG: 1 INJECTION, SOLUTION INTRAMUSCULAR; INTRAVENOUS; SUBCUTANEOUS at 02:35

## 2020-09-03 RX ADMIN — QUETIAPINE FUMARATE 800 MG: 400 TABLET, EXTENDED RELEASE ORAL at 21:02

## 2020-09-03 RX ADMIN — Medication 10 ML: at 21:03

## 2020-09-03 RX ADMIN — AMLODIPINE BESYLATE 10 MG: 10 TABLET ORAL at 08:25

## 2020-09-03 RX ADMIN — PANCRELIPASE 24000 UNITS: 24000; 76000; 120000 CAPSULE, DELAYED RELEASE PELLETS ORAL at 08:26

## 2020-09-03 RX ADMIN — METOPROLOL TARTRATE 25 MG: 25 TABLET, FILM COATED ORAL at 08:25

## 2020-09-03 RX ADMIN — ENOXAPARIN SODIUM 30 MG: 30 INJECTION SUBCUTANEOUS at 21:02

## 2020-09-03 RX ADMIN — HYDROMORPHONE HYDROCHLORIDE 0.25 MG: 1 INJECTION, SOLUTION INTRAMUSCULAR; INTRAVENOUS; SUBCUTANEOUS at 18:13

## 2020-09-03 RX ADMIN — METOPROLOL TARTRATE 25 MG: 25 TABLET, FILM COATED ORAL at 21:02

## 2020-09-03 RX ADMIN — PANTOPRAZOLE SODIUM 40 MG: 40 INJECTION, POWDER, FOR SOLUTION INTRAVENOUS at 08:25

## 2020-09-03 RX ADMIN — PRAMIPEXOLE DIHYDROCHLORIDE 0.25 MG: 0.12 TABLET ORAL at 11:20

## 2020-09-03 RX ADMIN — LORAZEPAM 1 MG: 1 TABLET ORAL at 14:01

## 2020-09-03 RX ADMIN — Medication 400 MG: at 08:25

## 2020-09-03 RX ADMIN — HYDROMORPHONE HYDROCHLORIDE 0.5 MG: 1 INJECTION, SOLUTION INTRAMUSCULAR; INTRAVENOUS; SUBCUTANEOUS at 14:01

## 2020-09-03 RX ADMIN — LISINOPRIL 40 MG: 20 TABLET ORAL at 08:25

## 2020-09-03 RX ADMIN — ENOXAPARIN SODIUM 30 MG: 30 INJECTION SUBCUTANEOUS at 08:19

## 2020-09-03 RX ADMIN — LORAZEPAM 1 MG: 1 TABLET ORAL at 21:02

## 2020-09-03 RX ADMIN — INSULIN LISPRO 1 UNITS: 100 INJECTION, SOLUTION INTRAVENOUS; SUBCUTANEOUS at 00:36

## 2020-09-03 RX ADMIN — CETIRIZINE HYDROCHLORIDE 10 MG: 10 TABLET, FILM COATED ORAL at 08:25

## 2020-09-03 RX ADMIN — BUSPIRONE HYDROCHLORIDE 15 MG: 15 TABLET ORAL at 21:02

## 2020-09-03 RX ADMIN — LORAZEPAM 1 MG: 1 TABLET ORAL at 01:31

## 2020-09-03 RX ADMIN — Medication 10 ML: at 08:25

## 2020-09-03 RX ADMIN — Medication 10 ML: at 08:21

## 2020-09-03 RX ADMIN — Medication 10 ML: at 02:35

## 2020-09-03 RX ADMIN — PRAZOSIN HYDROCHLORIDE 1 MG: 1 CAPSULE ORAL at 21:02

## 2020-09-03 RX ADMIN — DULOXETINE HYDROCHLORIDE 60 MG: 60 CAPSULE, DELAYED RELEASE ORAL at 08:25

## 2020-09-03 RX ADMIN — HYDROMORPHONE HYDROCHLORIDE 0.5 MG: 1 INJECTION, SOLUTION INTRAMUSCULAR; INTRAVENOUS; SUBCUTANEOUS at 08:19

## 2020-09-03 RX ADMIN — LORAZEPAM 1 MG: 1 TABLET ORAL at 08:25

## 2020-09-03 RX ADMIN — PRAZOSIN HYDROCHLORIDE 1 MG: 1 CAPSULE ORAL at 00:18

## 2020-09-03 RX ADMIN — PANCRELIPASE 24000 UNITS: 24000; 76000; 120000 CAPSULE, DELAYED RELEASE PELLETS ORAL at 16:34

## 2020-09-03 RX ADMIN — PANCRELIPASE 24000 UNITS: 24000; 76000; 120000 CAPSULE, DELAYED RELEASE PELLETS ORAL at 14:03

## 2020-09-03 ASSESSMENT — PAIN SCALES - GENERAL
PAINLEVEL_OUTOF10: 5
PAINLEVEL_OUTOF10: 4
PAINLEVEL_OUTOF10: 5
PAINLEVEL_OUTOF10: 0
PAINLEVEL_OUTOF10: 4
PAINLEVEL_OUTOF10: 6
PAINLEVEL_OUTOF10: 0
PAINLEVEL_OUTOF10: 8
PAINLEVEL_OUTOF10: 7
PAINLEVEL_OUTOF10: 5
PAINLEVEL_OUTOF10: 2

## 2020-09-03 ASSESSMENT — PAIN DESCRIPTION - LOCATION
LOCATION: CHEST;BACK

## 2020-09-03 ASSESSMENT — ENCOUNTER SYMPTOMS
ABDOMINAL PAIN: 1
CONSTIPATION: 0
VOMITING: 0
NAUSEA: 1
ALLERGIC/IMMUNOLOGIC NEGATIVE: 1
SHORTNESS OF BREATH: 0
DIARRHEA: 0
EYES NEGATIVE: 1
BLOOD IN STOOL: 0

## 2020-09-03 ASSESSMENT — PAIN DESCRIPTION - PAIN TYPE
TYPE: ACUTE PAIN

## 2020-09-03 ASSESSMENT — PAIN DESCRIPTION - ORIENTATION
ORIENTATION: LEFT

## 2020-09-03 NOTE — PROGRESS NOTES
[facility] [address]                 PATIENT NAME: Isabel Chand     TODAY'S DATE: 9/3/2020, 2:00 PM    SUBJECTIVE:    Pt seen and examined. Afebrile, mild tachycardia but otherwise VSS. LFT's stable, lipase WNL. Patient states pain is controlled at this time. Bowels are moving. Tolerating diet however only eating about 25-50% of meals. No N/V currently however c/o occasional nausea. OBJECTIVE:   VITALS:  /68   Pulse 108   Temp 97.7 °F (36.5 °C) (Oral)   Resp 16   Ht 5' 5\" (1.651 m)   Wt 275 lb 2.2 oz (124.8 kg)   SpO2 94%   BMI 45.78 kg/m²      INTAKE/OUTPUT:      Intake/Output Summary (Last 24 hours) at 9/3/2020 1400  Last data filed at 9/3/2020 0840  Gross per 24 hour   Intake 2737 ml   Output 4050 ml   Net -1313 ml                 CONSTITUTIONAL:  awake and alert.   No acute distress  HEART:   Mild tachycardia, regular rhythm   LUNGS:   Decreased air entry at bases  ABDOMEN:   Abdomen soft, mild upper abdomen tender, non-distended  EXTREMITIES:   Trace pedal edema    Data:  CBC:   Lab Results   Component Value Date    WBC 13.2 09/03/2020    RBC 3.33 09/03/2020    HGB 10.4 09/03/2020    HCT 31.2 09/03/2020    MCV 93.8 09/03/2020    MCH 31.3 09/03/2020    MCHC 33.4 09/03/2020    RDW 14.3 09/03/2020     09/03/2020    MPV 9.1 09/03/2020     BMP:    Lab Results   Component Value Date     09/03/2020    K 4.5 09/03/2020    CL 98 09/03/2020    CO2 23 09/03/2020    BUN 16 09/03/2020    LABALBU 2.8 09/03/2020    CREATININE 0.43 09/03/2020    CALCIUM 9.1 09/03/2020    GFRAA >60 09/03/2020    LABGLOM >60 09/03/2020    GLUCOSE 178 09/03/2020     Hepatic Function Panel:    Lab Results   Component Value Date    ALKPHOS 169 09/03/2020    ALT 36 09/03/2020    AST 19 09/03/2020    PROT 7.3 09/03/2020    PROT 7.2 01/23/2015    BILITOT 0.16 09/03/2020    BILIDIR 0.08 09/02/2020    IBILI 0.08 09/02/2020    LABALBU 2.8 09/03/2020     LIPASE:    Lab Results   Component Value Date    LIPASE 57 09/03/2020       Radiology Review:  No new images to review      ASSESSMENT     Principal Problem:    Pneumonia of both lungs due to infectious organism  Active Problems:    Anxiety    Class 3 severe obesity due to excess calories with serious comorbidity in adult [facility])    Restless leg syndrome    Major depression    HTN (hypertension)    Fibromyalgia    Acute pancreatitis    Sepsis ([facility] [address]    Morbid obesity (HCC)    Abdominal pain, epigastric    Nausea    Acute respiratory failure with hypoxia (HCC)    Hypocalcemia    History of anxiety disorder  Resolved Problems:    * No resolved hospital problems. *      Plan  1. Low fat diet as tolerated  2. Wean and DC TPN  3. Pain and nausea management  4. Surgically stable  5. Discharge planning  6. Continue medical management  7. Patient was seen and examined. Surgically stable. On low-fat diet. Wean and DC TPN. Surgically stable. Discharge planning.       Electronically signed by Mechelle Degroot PA-C  [id number] [address]

## 2020-09-03 NOTE — FLOWSHEET NOTE
09/02/20 7628   Provider Notification   Reason for Communication Review case   Provider Name Bertrand Berry   Provider Notification Advance Practice Clinician (CNS, NP, CNM, CRNA, PA)   Method of Communication Call   Response No new orders   Notification Time 0000     RN notified Bertrand Berry that pt wanting to take a home med (Prezosyn? ?). RN asked pt to hold off and talk with NP before taking just to make sure pill does not interact/contraindicated with current meds taken. Bertrand Berry in pt's room at 2400 to discuss med.

## 2020-09-03 NOTE — PROGRESS NOTES
Comprehensive Nutrition Assessment    Type and Reason for Visit:  Reassess    Nutrition Recommendations/Plan: Continue Low Fat diet as ordered. Pt does not like the nutritional supplement. Nutrition Assessment:  Patient still not with a big appetite, eating 25-50% with TPN to be weaned down and discontinued. Appetite may improve with TPN being discontinued.     Malnutrition Assessment:  Malnutrition Status:  No malnutrition    Context:  Acute Illness     Findings of the 6 clinical characteristics of malnutrition:  Energy Intake:  No significant decrease in energy intake  Weight Loss:  No significant weight loss     Body Fat Loss:  No significant body fat loss     Muscle Mass Loss:  No significant muscle mass loss    Fluid Accumulation:  1 - Mild Extremities   Strength:  Not Performed    Estimated Daily Nutrient Needs:  Energy (kcal):  2020 based on Jeremías Harding with no additional factors; Weight Used for Energy Requirements:  Admission     Protein (g):  102-113 gm protein based on 1.8-2 gm/kg IBW; Weight Used for Protein Requirements:  Ideal            Nutrition Related Findings:  no changes in edema: +1 BUE's, trace BLE's      Wounds:  None       Current Nutrition Therapies:    DIET LOW FAT; Low Fat    Anthropometric Measures:  · Height: 5' 5\" (165.1 cm)  · Current Body Weight: 275 lb (124.7 kg)   · Admission Body Weight: 304 lb (137.9 kg)    · Ideal Body Weight: 125 lbs; % Ideal Body Weight 243.2 %   · BMI: 45.8  · BMI Categories: Obese Class 3 (BMI 40.0 or greater)       Nutrition Diagnosis:   · Inadequate oral intake related to altered GI function as evidenced by nutrition support - parenteral nutrition, intake 0-25%      Nutrition Interventions:   Food and/or Nutrient Delivery:  Continue Current Diet, Discontinue Parenteral Nutrition  Nutrition Education/Counseling:  No recommendation at this time   Coordination of Nutrition Care:  Continued Inpatient Monitoring    Goals:  Meet 75% of nutrient needs with PO diet. Nutrition Monitoring and Evaluation:   Food/Nutrient Intake Outcomes:  Food and Nutrient Intake  Physical Signs/Symptoms Outcomes:  Biochemical Data, GI Status, Nausea or Vomiting, Fluid Status or Edema, Hemodynamic Status, Weight     Discharge Planning:    Continue current diet     Some areas of assessment may be incomplete due to COVID-19 precautions. MARY Kessler R.D..   Clinical Dietitian  Office: [phone]

## 2020-09-03 NOTE — PROGRESS NOTES
Pt tolerating room air at this time. Bipap at bedside. Pt reported that she does not want to wear the bipap tonight. Pt aware that writer will come by later to offer the bipap in case she changes her mind. Pt confirmed understanding.

## 2020-09-03 NOTE — PROGRESS NOTES
[address]    PROGRESS NOTE             9/3/2020    8:10 AM    Name:   Elvia Shepherd  MRN:     [id number]     Acct:      [de-identified]   Room:   [facility] [place]/[address] [place] [address] Day:  13  Admit Date:  8/19/2020  7:35 PM    PCP:  Livan Rodriguez  Code Status:  Full Code    Subjective:     C/C:   Chief Complaint   Patient presents with    Abdominal Pain     RUQ     Interval History Status: improved. Pt was seen and examined. Pt complain of diffuse and dull chest and abdominal pain, 6/10 in nature, pain does not increase with deep breathe, no relieving and aggravating factor. Patient also complaint about occasional nausea. Patient is tolerating liquid and semi-solid diet. Pt did not have complains of SOB, palpitations, loose stools, blood in stools and difficulty urination. I/O= 2730/3500=-770  VS are WNL  Brief History:     Please see H & P    Review of Systems:     Review of Systems   Constitutional: Negative. HENT: Negative. Eyes: Negative. Respiratory: Negative for shortness of breath. Cardiovascular: Positive for chest pain. Negative for palpitations. Gastrointestinal: Positive for abdominal pain and nausea. Negative for blood in stool, constipation, diarrhea and vomiting. Endocrine: Negative. Genitourinary: Negative for difficulty urinating and hematuria. Musculoskeletal: Negative. Skin: Negative. Allergic/Immunologic: Negative. Neurological: Negative. Hematological: Negative. Psychiatric/Behavioral: Negative. Medications: Allergies:     Allergies   Allergen Reactions    Aripiprazole      Bad reaction    Eletriptan      Other reaction(s): Swelling-throat    Hydrocodone-Acetaminophen      Other reaction(s): Unknown    Oxycodone-Acetaminophen      Other reaction(s): Unknown    Sumatriptan Other (See Comments)    Triptans      Other reaction(s): Unknown    Zosyn [Piperacillin Sod-Tazobactam cigarettes. She smoked 1.00 pack per day. She has never used smokeless tobacco. She reports current alcohol use. She reports that she does not use drugs. Family History:   Family History   Problem Relation Age of Onset    Heart Disease Father     High Blood Pressure Brother        Vitals:  /64   Pulse 104   Temp 97.5 °F (36.4 °C) (Axillary)   Resp 20   Ht 5' 5\" (1.651 m)   Wt 275 lb 2.2 oz (124.8 kg)   SpO2 93%   BMI 45.78 kg/m²   Temp (24hrs), Av.7 °F (36.5 °C), Min:96.8 °F (36 °C), Max:98.2 °F (36.8 °C)    Recent Labs     20  1636 20  1925 20  0014 20  0559   POCGLU 144* 140* 160* 164*       I/O(24Hr): Intake/Output Summary (Last 24 hours) at 9/3/2020 0810  Last data filed at 9/3/2020 0546  Gross per 24 hour   Intake 2737 ml   Output 3500 ml   Net -763 ml       Labs:  [unfilled]    Lab Results   Component Value Date/Time    SPECIAL NOT REPORTED 2020 05:30 PM     Lab Results   Component Value Date/Time    CULTURE NO GROWTH 5 DAYS 2020 05:30 PM       [facility]    Radiology:    Ct Abdomen Pelvis Wo Contrast Additional Contrast? None    Result Date: 2020  EXAMINATION: CT OF THE ABDOMEN AND PELVIS WITHOUT CONTRAST 2020 10:22 pm TECHNIQUE: CT of the abdomen and pelvis was performed without the administration of intravenous contrast. Multiplanar reformatted images are provided for review. Dose modulation, iterative reconstruction, and/or weight based adjustment of the mA/kV was utilized to reduce the radiation dose to as low as reasonably achievable. COMPARISON: CT abdomen and pelvis with contrast 2020. HISTORY: ORDERING SYSTEM PROVIDED HISTORY: severe acute pain TECHNOLOGIST PROVIDED HISTORY: severe acute pain Is the patient pregnant? ->Yes Reason for Exam: worsening abdominal pain Acuity: Acute Type of Exam: Ongoing Relevant Medical/Surgical History: surg - gallbladder FINDINGS: Lower Chest: Persisting bilateral lower lung scattered consolidation and atelectasis is present with trace posterior left-sided pleural effusion identified. He[place] Nance Heart is normal in size and configuration. Organs: Interval mild worsening of severe peripancreatic fat stranding and fluid is noted with intraperitoneal free fluid collecting within the bilateral pericolic gutters and collecting inferiorly within the pelvis with increased volume in comparison with the prior study. Diffuse fatty infiltration of the liver is again seen. The liver, gallbladder, spleen, pancreas, adrenal glands, kidneys, are otherwise unremarkable in appearance. GI/Bowel: The stomach is unremarkable without wall thickening or distention. Bowel loops are unremarkable in appearance without evidence of obstruction, distension or mucosal thickening. Pelvis: Berkowitz catheter is noted within the nondistended but grossly unremarkable urinary bladder. Bilateral fallopian tube Essure coils are seen without evidence of complication. The uterus is anteverted in position and is unremarkable in appearance there no evidence of pelvic free fluid is seen. Peritoneum/Retroperitoneum: No evidence of retroperitoneal or intraperitoneal lymphadenopathy is identified. . Bones/Soft Tissues: No evidence of retroperitoneal or intraperitoneal lymphadenopathy is identified. No evidence of intraperitoneal free fluid is seen. 1. Mild interval worsening of severe peripancreatic inflammation associated with acute pancreatitis, as discussed above. 2. No evidence of complication i.e. pseudocyst noted. 3. Stable bilateral lower lung multifocal consolidation atelectasis suggesting pneumonia versus alveolar edema. 4. Hepatic steatosis, unchanged.      Xr Chest (single View Frontal)    Result Date: 8/28/2020  EXAMINATION: ONE XRAY VIEW OF THE CHEST 8/28/2020 6:20 am COMPARISON: 08/27/2020 HISTORY: ORDERING SYSTEM PROVIDED HISTORY: f/u pneumonia TECHNOLOGIST PROVIDED HISTORY: f/u pneumonia Reason for Exam: f/u pneumonia Acuity: Unknown Type of Exam: Subsequent/Follow-up Additional signs and symptoms: f/u pneumonia Relevant Medical/Surgical History: f/u pneumonia FINDINGS: Poor inspiration with decrease lung volumes worse on the prior. Right upper extremity PICC line remain in place. Cardiomediastinal silhouette stable accentuated by the low lung volumes. No focal consolidation. Patchy airspace opacities in the left upper lung accentuated by low lung volume. Poor inspiration with low lung volumes worse than prior. Patchy airspace opacities in the left upper lung accentuated by low lung volume. Xr Chest (single View Frontal)    Result Date: 8/21/2020  EXAMINATION: ONE XRAY VIEW OF THE CHEST 8/21/2020 6:06 am COMPARISON: August 20, 2020 chest exam HISTORY: ORDERING SYSTEM PROVIDED HISTORY: f/u atelectasis TECHNOLOGIST PROVIDED HISTORY: f/u atelectasis Reason for Exam: F/U atelectasis. Acuity: Unknown Type of Exam: Unknown Additional signs and symptoms: F/U atelectasis. FINDINGS: Interval insertion of right PICC line catheter, the tip projected at the right atrium Mild cardiomegaly Limited extra sonya exam with low volume lungs, mild streaky, left greater than right, bibasilar atelectasis. Grossly clear upper lungs     Line placement as above Limited expiratory exam with mild streaky bibasilar atelectasis     Ct Abdomen Pelvis W Iv Contrast Additional Contrast? None    Result Date: 8/26/2020  EXAMINATION: CTA OF THE CHEST; CT OF THE ABDOMEN AND PELVIS WITH CONTRAST 8/26/2020 10:34 am TECHNIQUE: CTA of the chest was performed after the administration of intravenous contrast.  Multiplanar reformatted images are provided for review. MIP images are provided for review.  Dose modulation, iterative reconstruction, and/or weight based adjustment of the mA/kV was utilized to reduce the radiation dose to as low as reasonably achievable.; CT of the abdomen and pelvis was performed with the administration of intravenous contrast. Multiplanar reformatted images are provided for review. Dose modulation, iterative reconstruction, and/or weight based adjustment of the mA/kV was utilized to reduce the radiation dose to as low as reasonably achievable. COMPARISON: CT chest May 26, 2019 CT abdomen and pelvis August 20, 2020 HISTORY: ORDERING SYSTEM PROVIDED HISTORY: Hypoxia, recurrent fevers TECHNOLOGIST PROVIDED HISTORY: Hypoxia, recurrent fevers Reason for Exam: Hypoxia, recurrent fevers, best images possible due to patient size 322 pounds Acuity: Acute Type of Exam: Initial; ORDERING SYSTEM PROVIDED HISTORY: Splenic vein thrombosis? TECHNOLOGIST PROVIDED HISTORY: Splenic vein thrombosis? Reason for Exam: Splenic vein thrombosis? best images possible due to patient size 322  poounds Acuity: Acute Type of Exam: Initial FINDINGS: CT CHEST: Chest wall: No axillary adenopathy. Mediastinum: Cardiomegaly. No pericardial effusion. No adenopathy. Pulmonary arteries: Significantly limited evaluation for pulmonary embolus due to bolus timing, motion, and patient body habitus. Questionable pulmonary embolus within the right lower lobe. Lungs: Moderate left pleural effusion. Small right pleural effusion. Left lower lobe consolidation versus atelectasis. Bilateral upper lobe areas of consolidation. Bones: No acute osseous abnormality. CT ABDOMEN-PELVIS: Organs: Decreased attenuation of the liver is consistent with hepatic steatosis. No focal liver lesion. Cholecystectomy. Severe pancreatitis with surrounding inflammatory changes and fluid. Mild splenomegaly. No adrenal lesion. No hydronephrosis. Right renal cyst.  Focal area of scarring within the right kidney is unchanged. GI/Bowel: No bowel obstruction. Secondary involvement of the descending colon adjacent to the peripancreatic inflammatory changes along the tail of the pancreas. Pelvis: Essure devices. No significant free fluid. No bladder calculus.  Peritoneum/Retroperitoneum: Splenic artery is patent. No evidence of splenic artery thrombosis. No definite splenic venous thrombosis. A portion of the splenic vein as it courses along the tail of the pancreas is indistinct. However, proximal and distal to this, there is flow and this may be related to artifact. No abdominal aortic aneurysm. Prominent peripancreatic lymph nodes are likely reactive. Bones/Soft Tissues: No acute soft tissue abnormality. No acute osseous abnormality. Significantly limited evaluation for pulmonary embolus. Questionable right lower lobe pulmonary embolus. Bilateral lung opacities are likely pneumonia. Recommend follow-up to document resolution. Moderate left pleural effusion. Severe acute pancreatitis. No definite splenic venous thrombosis. Critical results were called by Dr. Niall Bridges MD to Dr. Venkata Lazo on 8/26/2020 at 11:40. Ct Abdomen Pelvis W Iv Contrast Additional Contrast? None    Addendum Date: 8/20/2020    ADDENDUM: As stated in the body of the report, the abdominal aorta is normal in size. There is no significant atherosclerosis. Result Date: 8/20/2020  EXAMINATION: CT OF THE ABDOMEN AND PELVIS WITH CONTRAST 8/19/2020 10:11 pm TECHNIQUE: CT of the abdomen and pelvis was performed with the administration of intravenous contrast. Multiplanar reformatted images are provided for review. Dose modulation, iterative reconstruction, and/or weight based adjustment of the mA/kV was utilized to reduce the radiation dose to as low as reasonably achievable. COMPARISON: None. HISTORY: ORDERING SYSTEM PROVIDED HISTORY: pain TECHNOLOGIST PROVIDED HISTORY: pain Reason for Exam: pt c/o all over abdominal pain with nausea for a few hours Acuity: Acute Type of Exam: Initial Relevant Medical/Surgical History: surg - gallbladder FINDINGS: Lower Chest: Consolidation within both lower lobes and the lingula. Scattered ground-glass opacity. No pleural effusion. Organs: Liver is diffusely hypoattenuating.   No acute abnormality within the spleen, adrenals, or left kidney. There is right renal cortical scarring and calcification. Subcentimeter hypoattenuating bilateral renal lesions are too small to accurately characterize, likely cysts. Prior cholecystectomy. There is diffuse peripancreatic stranding and fluid. No loculated fluid collection. GI/Bowel: Stomach is partially distended. Small bowel is nondilated. Colon is nondilated. Pelvis: Urinary bladder is partially distended without vesicular stones. Uterus is present. Peritoneum/Retroperitoneum: Shotty retroperitoneal lymph nodes, likely reactive. Abdominal aorta is normal in caliber. No pneumoperitoneum. Bones/Soft Tissues: No acute osseous abnormality. 1. Pancreatic edema and associated fluid is consistent with pancreatitis. No loculated fluid collection/pseudocyst. 2. Shotty retroperitoneal lymph nodes are likely reactive. 3. Hepatic steatosis. 4. Bilateral lower lobe consolidation, which could be due to edema or pneumonia. Ir [place] Marker Device Plmt/replace/removal    Result Date: 8/20/2020  PROCEDURE: ULTRASOUND GUIDED VASCULAR ACCESS. FLUOROSCOPY GUIDED PICC PLACEMENT 8/20/2020. HISTORY: ORDERING SYSTEM PROVIDED HISTORY: fluid resusitation TECHNOLOGIST PROVIDED HISTORY: fluid resusitation Lumen?->Double Lumen Is the patient pregnant? ->Yes Acuity: Unknown Type of Exam: Unknown Sepsis SEDATION: None FLUOROSCOPY TIME: DAP 63 cGy cm squared TECHNIQUE: Informed consent was obtained after a detailed explanation of the procedure including risks, benefits, and alternatives. Universal protocol was observed. The right arm was prepped and draped in sterile fashion using maximum sterile barrier technique. Local anesthesia was achieved with lidocaine. A micropuncture needle was used to access the right basilic vein using ultrasound guidance. An ultrasound image demonstrating patency of the vein with needle tip located within it.  An image was obtained and stored in PACs. A 0.018 guidewire was used to place a peel-a-way sheath and a 5 [place] power injectable dual-lumen PICC was advanced with fluoroscopic guidance with the tip at the cavo-atrial junction. The catheter flushed easily and there was a good blood return. The catheter was secured to the skin. The patient tolerated the procedure well and there were no immediate complications. FINDINGS: Fluoroscopic image demonstrates the tip of the catheter at the cavo-atrial junction. Successful ultrasound and fluoroscopy guided right basilic vein 5 [place] power injectable dual-lumen PICC placement. Ready for use. Xr Chest Portable    Result Date: 8/31/2020  EXAMINATION: ONE XRAY VIEW OF THE CHEST 8/31/2020 6:09 am COMPARISON: 08/30/2020 HISTORY: ORDERING SYSTEM PROVIDED HISTORY: infiltrate TECHNOLOGIST PROVIDED HISTORY: infiltrate Reason for Exam: infiltrate Acuity: Unknown Type of Exam: Unknown FINDINGS: A right upper extremity PICC line distal tip is at the cavoatrial junction. There is improved aeration of the left lower lung, with a small pleural effusion with overlying atelectasis/infiltrate remaining. No pneumothorax is demonstrated. The heart is enlarged. No acute osseous abnormality is seen. Improved aeration of the left lower lung, with a small left pleural effusion with overlying atelectasis/pneumonia remaining. Xr Chest Portable    Result Date: 8/30/2020  EXAMINATION: ONE XRAY VIEW OF THE CHEST 8/30/2020 6:07 am COMPARISON: 08/28/2020 HISTORY: ORDERING SYSTEM PROVIDED HISTORY: infiltrate TECHNOLOGIST PROVIDED HISTORY: infiltrate Reason for Exam: infiltrate Acuity: Unknown Type of Exam: Unknown FINDINGS: Right upper extremity PICC line is seen with tip obscured by additional overlying catheters. Low lung volume. Left basilar pleuroparenchymal opacity appears greater than prior. Aeration of the right lung appears slightly improved. No gross pneumothorax.   Cardiac and mediastinal vascularity RECOMMENDATION: Repeat upright good inspiration PA view of the chest is suggested for more accurate assessment of the pulmonary vascularity     Xr Chest Portable    Result Date: 8/24/2020  EXAMINATION: ONE XRAY VIEW OF THE CHEST 8/24/2020 9:10 am COMPARISON: August 21, 2020, chest exam HISTORY: ORDERING SYSTEM PROVIDED HISTORY: Resp failure TECHNOLOGIST PROVIDED HISTORY: Resp failure Reason for Exam: resp failure Acuity: Unknown Type of Exam: Unknown FINDINGS: Interval insertion of a right PICC line catheter, the tip is projected at the right atrium. Limited markedly rotated exam No obvious significant pleuroparenchymal or mediastinal findings. Limited assessment of the left lung base     Limited markedly rotated exam, limited left base assessment Line placement as above No obvious acute cardiopulmonary findings     Xr Chest Portable    Result Date: 8/22/2020  EXAMINATION: ONE XRAY VIEW OF THE CHEST 8/22/2020 8:41 am COMPARISON: 08/21/2020 HISTORY: ORDERING SYSTEM PROVIDED HISTORY: Difficulty breathing TECHNOLOGIST PROVIDED HISTORY: Difficulty breathing Reason for Exam: dyspnea Acuity: Acute Type of Exam: Initial FINDINGS: Cardiomegaly. Low lung volumes. Right-sided PICC line remains in place. No focal consolidation. No pleural effusion or pneumothorax. Low lung volumes. This accentuates cardiomegaly. No focal consolidation. Xr Chest Portable    Result Date: 8/21/2020  EXAMINATION: ONE XRAY VIEW OF THE CHEST 8/21/2020 9:27 am COMPARISON: Chest radiograph performed 08/21/2020. HISTORY: ORDERING SYSTEM PROVIDED HISTORY: Increased oxygen demand TECHNOLOGIST PROVIDED HISTORY: Increased oxygen demand Reason for Exam: Increased oxygen demand Acuity: Acute Type of Exam: Initial Additional signs and symptoms: Increased oxygen demand FINDINGS: There are small bilateral effusions. There is a left basilar infiltrate. There is no pneumothorax. The heart is prominent.   The upper abdomen is unremarkable. The extrathoracic soft tissues are unremarkable. Cardiomegaly and small bilateral effusions with adjacent left basilar infiltrate. Xr Chest Portable    Result Date: 8/20/2020  EXAMINATION: ONE XRAY VIEW OF THE CHEST 8/20/2020 7:38 am COMPARISON: July 18, 2020 chest exam HISTORY: ORDERING SYSTEM PROVIDED HISTORY: possible pneumonia on CT chest TECHNOLOGIST PROVIDED HISTORY: possible pneumonia on CT chest Reason for Exam: possible pneumonia on CT chest Acuity: Acute Type of Exam: Initial Additional signs and symptoms: possible pneumonia on CT chest FINDINGS: Expiratory exam with mild streaky bibasilar density. Normal cardiopericardial silhouette No significant pleural or mediastinal findings     Expiratory exam with mild streaky bibasilar atelectasis     Ct Chest Pulmonary Embolism W Contrast    Result Date: 8/26/2020  EXAMINATION: CTA OF THE CHEST; CT OF THE ABDOMEN AND PELVIS WITH CONTRAST 8/26/2020 10:34 am TECHNIQUE: CTA of the chest was performed after the administration of intravenous contrast.  Multiplanar reformatted images are provided for review. MIP images are provided for review. Dose modulation, iterative reconstruction, and/or weight based adjustment of the mA/kV was utilized to reduce the radiation dose to as low as reasonably achievable.; CT of the abdomen and pelvis was performed with the administration of intravenous contrast. Multiplanar reformatted images are provided for review. Dose modulation, iterative reconstruction, and/or weight based adjustment of the mA/kV was utilized to reduce the radiation dose to as low as reasonably achievable.  COMPARISON: CT chest May 26, 2019 CT abdomen and pelvis August 20, 2020 HISTORY: ORDERING SYSTEM PROVIDED HISTORY: Hypoxia, recurrent fevers TECHNOLOGIST PROVIDED HISTORY: Hypoxia, recurrent fevers Reason for Exam: Hypoxia, recurrent fevers, best images possible due to patient size 322 pounds Acuity: Acute Type of Exam: Initial; ORDERING SYSTEM PROVIDED HISTORY: Splenic vein thrombosis? TECHNOLOGIST PROVIDED HISTORY: Splenic vein thrombosis? Reason for Exam: Splenic vein thrombosis? best images possible due to patient size 322  poounds Acuity: Acute Type of Exam: Initial FINDINGS: CT CHEST: Chest wall: No axillary adenopathy. Mediastinum: Cardiomegaly. No pericardial effusion. No adenopathy. Pulmonary arteries: Significantly limited evaluation for pulmonary embolus due to bolus timing, motion, and patient body habitus. Questionable pulmonary embolus within the right lower lobe. Lungs: Moderate left pleural effusion. Small right pleural effusion. Left lower lobe consolidation versus atelectasis. Bilateral upper lobe areas of consolidation. Bones: No acute osseous abnormality. CT ABDOMEN-PELVIS: Organs: Decreased attenuation of the liver is consistent with hepatic steatosis. No focal liver lesion. Cholecystectomy. Severe pancreatitis with surrounding inflammatory changes and fluid. Mild splenomegaly. No adrenal lesion. No hydronephrosis. Right renal cyst.  Focal area of scarring within the right kidney is unchanged. GI/Bowel: No bowel obstruction. Secondary involvement of the descending colon adjacent to the peripancreatic inflammatory changes along the tail of the pancreas. Pelvis: Essure devices. No significant free fluid. No bladder calculus. Peritoneum/Retroperitoneum: Splenic artery is patent. No evidence of splenic artery thrombosis. No definite splenic venous thrombosis. A portion of the splenic vein as it courses along the tail of the pancreas is indistinct. However, proximal and distal to this, there is flow and this may be related to artifact. No abdominal aortic aneurysm. Prominent peripancreatic lymph nodes are likely reactive. Bones/Soft Tissues: No acute soft tissue abnormality. No acute osseous abnormality. Significantly limited evaluation for pulmonary embolus. Questionable right lower lobe pulmonary embolus. Bilateral lung opacities are likely pneumonia. Recommend follow-up to document resolution. Moderate left pleural effusion. Severe acute pancreatitis. No definite splenic venous thrombosis. Critical results were called by Dr. Drew Cordoba MD to Dr. Ilan Mart on 8/26/2020 at 11:40. Vl Lower Extremity Bilateral Venous Duplex    Result Date: 8/30/2020    [facility]  Vascular Lower Extremities DVT Study Procedure   Patient Name  Prabhakar Mcginnis [id number]   Date of Study           08/30/2020                [facility]   Date of Birth 1975  Gender                  Female   Age           54 year(s)  Race                       Room Number   2003        Height:                 64.96 inch, 165 cm   Corporate ID  [id number]    Weight:                 304 pounds, 137.9 kg  #   Patient Acct  [de-identified]   BSA:        2.36 m^2    BMI:       50.65 kg/m^2  #   MR #          [id number]      Sonographer             Susanne Taylor   Accession #   [id number]  Interpreting Physician  Ortiz Toro   Referring                 Referring Physician     Korey Cuevas  Nurse  Practitioner  Procedure Type of Study:   Veins: Lower Extremities DVT Study, Venous Scan Lower Bilateral.  Indications for Study:Shortness of breath. Patient Status: In Patient. Technical Quality:Limited visualization. Limitation reason:Bedside edema. Conclusions   Summary   No evidence of superficial or deep venous thrombosis in both lower  extremities.    Signature   ----------------------------------------------------------------  Electronically signed by Major DavidSonographer) on  08/30/2020 04:00 PM  ----------------------------------------------------------------   ----------------------------------------------------------------  Electronically signed by Ramesh ToroInterpreting physician)  on 08/30/2020 08:16 PM  ----------------------------------------------------------------  Findings:   Right Impression:                    Left Impression:  The common femoral, femoral,         The common femoral, femoral,  popliteal and tibial veins           popliteal and tibial veins  demonstrate normal compressibility   demonstrate normal compressibility  and augmentation. and augmentation. Normal compressibility of the great  Normal compressibility of the great  saphenous vein. saphenous vein. Normal compressibility of the small  Normal compressibility of the small  saphenous vein. saphenous vein. Velocities are measured in cm/s ; Diameters are measured in cm Right Lower Extremities DVT Study Measurements Right 2D Measurements +------------------------------------+----------+---------------+----------+ ! Location                            ! Visualized! Compressibility! Thrombosis! +------------------------------------+----------+---------------+----------+ ! Common Femoral                      !Yes       ! Yes            ! None      ! +------------------------------------+----------+---------------+----------+ ! Prox Femoral                        !Yes       ! Yes            ! None      ! +------------------------------------+----------+---------------+----------+ ! Mid Femoral                         !Yes       ! Yes            ! None      ! +------------------------------------+----------+---------------+----------+ ! Dist Femoral                        !Yes       ! Yes            ! None      ! +------------------------------------+----------+---------------+----------+ ! Popliteal                           !Yes       ! Yes            ! None      ! +------------------------------------+----------+---------------+----------+ ! Sapheno Femoral Junction            ! Yes       ! Yes            ! None      ! +------------------------------------+----------+---------------+----------+ ! PTV                                 ! Yes       ! Yes            ! None      ! +------------------------------------+----------+---------------+----------+ !Peroneal                            !Yes       ! Yes            ! None      ! +------------------------------------+----------+---------------+----------+ ! Gastroc                             ! Yes       ! Yes            ! None      ! +------------------------------------+----------+---------------+----------+ ! GSV Thigh                           ! Yes       ! Yes            ! None      ! +------------------------------------+----------+---------------+----------+ ! GSV Knee                            ! Yes       ! Yes            ! None      ! +------------------------------------+----------+---------------+----------+ ! GSV Ankle                           ! Yes       ! Yes            ! None      ! +------------------------------------+----------+---------------+----------+ ! SSV                                 ! Yes       ! Yes            ! None      ! +------------------------------------+----------+---------------+----------+ Right Doppler Measurements +---------------------------+------+------+--------------------------------+ ! Location                   ! Signal!Reflux! Reflux (msec)                   ! +---------------------------+------+------+--------------------------------+ ! Common Femoral             !Phasic!      !                                ! +---------------------------+------+------+--------------------------------+ ! Prox Femoral               !Phasic!      !                                ! +---------------------------+------+------+--------------------------------+ ! Popliteal                  !Phasic!      !                                ! +---------------------------+------+------+--------------------------------+ Left Lower Extremities DVT Study Measurements Left 2D Measurements +------------------------------------+----------+---------------+----------+ ! Location                            ! Visualized! Compressibility! Thrombosis! +------------------------------------+----------+---------------+----------+ ! Common Femoral                      !Yes       ! Yes            ! None      ! +------------------------------------+----------+---------------+----------+ ! Prox Femoral                        !Yes       ! Yes            ! None      ! +------------------------------------+----------+---------------+----------+ ! Mid Femoral                         !Yes       ! Yes            ! None      ! +------------------------------------+----------+---------------+----------+ ! Dist Femoral                        !Yes       ! Yes            ! None      ! +------------------------------------+----------+---------------+----------+ ! Popliteal                           !Yes       ! Yes            ! None      ! +------------------------------------+----------+---------------+----------+ ! Sapheno Femoral Junction            ! Yes       ! Yes            ! None      ! +------------------------------------+----------+---------------+----------+ ! PTV                                 ! Yes       ! Yes            ! None      ! +------------------------------------+----------+---------------+----------+ ! Peroneal                            !Yes       ! Yes            ! None      ! +------------------------------------+----------+---------------+----------+ ! Gastroc                             ! Yes       ! Yes            ! None      ! +------------------------------------+----------+---------------+----------+ ! GSV Thigh                           ! Yes       ! Yes            ! None      ! +------------------------------------+----------+---------------+----------+ ! GSV Knee                            ! Yes       ! Yes            ! None      ! +------------------------------------+----------+---------------+----------+ ! GSV Ankle                           ! Yes       ! Yes            ! None      ! +------------------------------------+----------+---------------+----------+ ! SSV                                 ! Yes       ! Yes            ! None      ! +------------------------------------+----------+---------------+----------+ Left Doppler Measurements +---------------------------+------+------+--------------------------------+ ! Location                   ! Signal!Reflux! Reflux (msec)                   ! +---------------------------+------+------+--------------------------------+ ! Common Femoral             !Phasic!      !                                ! +---------------------------+------+------+--------------------------------+ ! Prox Femoral               !Phasic!      !                                ! +---------------------------+------+------+--------------------------------+ ! Popliteal                  !Phasic!      !                                ! +---------------------------+------+------+--------------------------------+    Us Retroperitoneal Limited    Result Date: 8/25/2020  EXAMINATION: ULTRASOUND OF THE KIDNEYS 8/25/2020 1:19 pm COMPARISON: August 20, 2020 CT abdomen and pelvis HISTORY: ORDERING SYSTEM PROVIDED HISTORY: Low UOP TECHNOLOGIST PROVIDED HISTORY: Bilateral Renal Ultrasound Low UOP Acuity: Acute Type of Exam: Initial FINDINGS: Exam is limited due to patient body habitus. The right kidney measures 15.7 centimetres in length and the left kidney measures 16 cm in length. There is no hydronephrosis in either kidney. New focal renal lesion. Diffuse hepatic steatosis. No hydronephrosis in either kidney. Ir Guided Thoracentesis Pleural    Result Date: 8/31/2020  PROCEDURE: Majel Lento LEFT DIAGNOSTIC THORACENTESIS 8/28/2020 HISTORY: ORDERING SYSTEM PROVIDED HISTORY: pleural effusion left TECHNOLOGIST PROVIDED HISTORY: pleural effusion left Is the patient pregnant? ->Yes TECHNIQUE: Informed consent was obtained after a detailed explanation of the procedure including risks, benefits, and alternatives. Universal protocol was performed.  The left posterior chest was prepped and draped in sterile fashion and local anesthesia was achieved with lidocaine. An 5 Slovak needle sheath was advanced under ultrasound guidance into the small pleural effusion and diagnostic thoracentesis was performed. The patient tolerated the procedure well. Fluid was provided for further analysis. FINDINGS: Only a small amount of accessible left pleural fluid demonstrated sonographically. A total of 15 mL cloudy yellow fluid was removed. Successful ultrasound guided diagnostic left thoracentesis. Physical Examination:        Physical Exam  Constitutional:       Appearance: She is obese. HENT:      Head: Normocephalic and atraumatic. Cardiovascular:      Rate and Rhythm: Normal rate and regular rhythm. Pulmonary:      Effort: Pulmonary effort is normal.      Breath sounds: Rales present. Abdominal:      Palpations: Abdomen is soft. Tenderness: There is no abdominal tenderness. Neurological:      Mental Status: She is alert. Assessment:        Primary Problem  Pneumonia of both lungs due to infectious organism    Active Hospital Problems    Diagnosis Date Noted    Pneumonia of both lungs due to infectious organism [J18.9] 08/27/2020    History of anxiety disorder [Z86.59]     Acute respiratory failure with hypoxia ([address] [J96.01] 08/21/2020    Hypocalcemia [E83.51] 08/21/2020    Sepsis ([address] [A41.9] 08/20/2020    Morbid obesity ([address] [E66.01]     Abdominal pain, epigastric [R10.13]     Nausea [R11.0]     Acute pancreatitis [K85.90] 08/19/2020    Fibromyalgia [M79.7]     HTN (hypertension) [I10] 12/15/2014    Major depression [F32.9] 10/30/2014    Restless leg syndrome [G25.81] 10/22/2013    Class 3 severe obesity due to excess calories with serious comorbidity in adult [facility]) [E66.01] 01/14/2013    Anxiety [F41.9] 01/14/2013       Plan:      Diet:  -Continue low fat diet PO. Nausea and Pain:  -Tylenol 625 mg PO for pain and Phenergan 12.5 mg IM 4 mg for nausea once.         Shannan Gillis MD  9/3/2020  8:10 AM Attending Physician Statement  I have discussed the care of Stella Gomez and I have examined the patient myselft and taken ros and hpi , including pertinent history and exam findings,  with the resident. I have reviewed the key elements of all parts of the encounter with the resident. I agree with the assessment, plan and orders as documented by the resident.   Advance the diet low-fat patient still complains of pain leading to the prolonged length of stay TPN is on because of inadequate oral nutrition we will continue supportive care keep advancing the diet    Electronically signed by Darby Jasso MD

## 2020-09-03 NOTE — ADT AUTH CERT
Utilization Reviews         Pancreatitis - Care Day 15 (9/2/2020) by Nasir Yanez RN         Review Status  Review Entered    Completed  9/2/2020 14:43        Criteria Review       Care Day: 15 Care Date: 9/2/2020 Level of Care: Inpatient Floor    Guideline Day 3    Level Of Care    ( ) Floor to discharge    9/2/2020 2:43 PM EDT by Henry Mejia      9/2 - remains on tele unit    Clinical Status    ( ) * Hemodynamic stability    9/2/2020 2:43 PM EDT by Henry Mejia      hypertnsive 168/91,  508/666    ( ) * No evidence of infection requiring inpatient care    (X) * Amylase level normal or improved    9/2/2020 2:43 PM EDT by Henry Mejia      amylase 55    ( ) * Pain absent or managed    9/2/2020 2:43 PM EDT by Henry Mejia      mild increase in LUQ pain    (X) * Diet tolerated    ( ) * Renal function at baseline or acceptable for next level of care    ( ) * Electrolyte abnormalities absent or acceptable for next level of care    ( ) * Discharge plans and education understood    Activity    ( ) * Ambulatory or acceptable for next level of care [I]    Routes    ( ) * Oral hydration, medications, and diet    9/2/2020 2:43 PM EDT by Henry Mejia      tolerating full liquids, advance to low fat diet    Interventions    (X) Laboratory tests    9/2/2020 2:43 PM EDT by Henry Mejia      wbc 13.2  h&h 10.7/32.0   plts 467   lipase 55  bs 175  alk phos 218, 206  alt 51, 46  ast 42, 31   alb. 2.8, 2.9    Medications    ( ) Oral analgesics    9/2/2020 2:43 PM EDT by Henry Mejia      cont iv dilaudid 0.5mg q4h prn pain . Derril Shereen x3 doses at time of review    * Milestone    Additional Notes    9/2    remains on telemetry unit . . since tx for ICU 9/1    98.0 21 100, 114 & 111  128/88, 168/91 & 145/121      94% ra  ST on monitor       wbc 13.2    h&h 10.7/32.0    plts 467    lipase 55    bs 175    alk phos 218, 206    alt 51, 46    ast 42, 31    alb. 2.8, 2.9       +6-7/10 acute LUQ pain: cont iv dilaudid 0.5mg q4h prn    pain . Derricarie Holma

## 2020-09-03 NOTE — PLAN OF CARE
Problem: Falls - Risk of:  Goal: Will remain free from falls  Description: Will remain free from falls  Outcome: Ongoing  Note: The patient remained free from falls this shift, call light within reach, bed in locked and lowest position. Side rails up x2. Continue to monitor closely.        Problem: Pain:  Goal: Pain level will decrease  Description: Pain level will decrease  Outcome: Ongoing  Note: Pain meds decreased today, still requesting dilaudid Q4     Problem: Nausea/Vomiting:  Goal: Absence of nausea/vomiting  Description: Absence of nausea/vomiting  Outcome: Ongoing  Note: Pt reported nausea this shift but no witnessed vomiting

## 2020-09-03 NOTE — PROGRESS NOTES
PULMONARY PROGRESS NOTE:      Interval History:pancreatitis, resp failure    Shortness of Breath: no  Cough: no  Sputum: no          Hemoptysis: no  Chest Pain: no  Fever: no                   Swelling Feet: no  Headache: no                                           Nausea- yes   Emesis-no, Abdominal Pain: yes, reports LUQ  Diarrhea: no         Constipation: no    Events since last visit: Remains on RA. Falling asleep during conversation. On low fat diet- reports some nausea after eating.  Tolerating low fat diet - remains on TPN    PAST MEDICAL HISTORY:      Scheduled Meds:   prazosin  1 mg Oral Nightly    promethazine  12.5 mg Intramuscular Once    acetaminophen  650 mg Oral Once    pramipexole  0.25 mg Oral Daily    lipase-protease-amylase  24,000 Units Oral TID WC    amLODIPine  10 mg Oral Daily    metoprolol tartrate  25 mg Oral BID    LORazepam  1 mg Oral Q6H    fat emulsion  100 mL Intravenous Daily    lisinopril  40 mg Oral Daily    insulin lispro  0-6 Units Subcutaneous Q6H    enoxaparin  30 mg Subcutaneous BID    magnesium oxide  400 mg Oral Daily    sodium chloride flush  10 mL Intravenous 2 times per day    QUEtiapine  800 mg Oral Nightly    pantoprazole  40 mg Intravenous Daily    And    sodium chloride (PF)  10 mL Intravenous Daily    busPIRone  15 mg Oral Nightly    DULoxetine  60 mg Oral BID    cetirizine  10 mg Oral Daily     Continuous Infusions:   PN-Adult 2-in-1 Central Line (Standard) 75 mL/hr at 09/02/20 1821    dextrose       PRN Meds:sodium chloride flush, acetaminophen, hydrALAZINE, HYDROmorphone, glucose, dextrose, glucagon (rDNA), dextrose, perflutren lipid microspheres, promethazine (PHENERGAN) in sodium chloride 0.9% IVPB, sodium chloride flush, magnesium sulfate, potassium chloride **OR** potassium alternative oral replacement **OR** potassium chloride, acetaminophen, [DISCONTINUED] promethazine **OR** ondansetron, melatonin ER        PHYSICAL EXAMINATION:  BP 111/68   Pulse 108   Temp 97.7 °F (36.5 °C) (Oral)   Resp 16   Ht 5' 5\" (1.651 m)   Wt 275 lb 2.2 oz (124.8 kg)   SpO2 94%   BMI 45.78 kg/m²     General : Sleepy, responds to voice, oriented x3  Neck - supple, no lymphadenopathy, JVD not raised  Heart -   Lungs - Air Entry- fair bilaterally; breath sounds : vesicular;   rales/crackles - absent, 94% on RA   Abdomen - soft, no obese   Upper Extremities  - no cyanosis, mottling; edema : absent  Lower Extremities: no cyanosis, mottling; edema : absent    Current Laboratory, Radiologic, Microbiologic, and Diagnostic studies reviewed  Data ReviewCBC:   Recent Labs     09/01/20 0513 09/02/20  0509 09/03/20  0451   WBC 17.4* 13.2* 13.2*   RBC 3.37* 3.41* 3.33*   HGB 10.4* 10.7* 10.4*   HCT 31.6* 32.0* 31.2*    467* 504*     BMP:   Recent Labs     09/01/20 0513 09/02/20  0509 09/03/20  0451   GLUCOSE 219* 175* 178*    136 134*   K 4.6 4.4 4.5   BUN 9 12 16   CREATININE <0.40* <0.40* 0.43*   CALCIUM 8.8 9.2 9.1     ABGs: No results for input(s): PHART, PO2ART, PVJ7GYP, GKO4RAO, BEART, Y8EKMPUT, UTO1URR in the last 72 hours.    PT/INR:  No results found for: PTINR    ASSESSMENT / PLAN:  · Acute pancreatitis- GI consulted, monitor amylase lipase - improving  · Lactic acidosis- resolved  · Possible pneumonia- ABx  · Pleural effusion - s/p left thoracentesis with 15 ml exudative fluid removed on 8/28, no growth x 5 days  · Febrile, Leukocytosis- Abx changed per ID, UA- unremarkable, COVID negative, lactate 1.2  · Hypertension - improved   · Possible PE- CTA - no obvious PE  · acute hypoxic resp failure - resolved - off 02  · On low fat diet- poor appetite   · Discussed with Dr. Tate Shah       Electronically signed by Francesca Gonzalez on 09/03/20 at 11:12 AM.

## 2020-09-03 NOTE — PROGRESS NOTES
Pt sleeping on left side on room air at this time. Per RN, pt wasn't tolerating the hospital bipap. Pt took of the bipap. Pt will call if she changes her mind.

## 2020-09-03 NOTE — CARE COORDINATION
ONGOING DISCHARGE PLAN:    Spoke with patient regarding discharge plan and patient confirms that plan is still return to home w/ family w/ VNS, [facility].     Pt. Remains on TPN, weaning today. Lipase today 57. Low fat diet.     LFT's improving. GI following.     WBC 13.2, ID continues to follow. On no antibiotics .    ?DC nabil. Will continue to follow for additional discharge needs.     Electronically signed by Bryanna Rowley RN on 9/3/2020 at 12:11 PM

## 2020-09-03 NOTE — PLAN OF CARE
Nutrition Problem #1: Inadequate oral intake  Intervention: Food and/or Nutrient Delivery: Continue Current Diet, Discontinue Parenteral Nutrition  Nutritional Goals: Meet 75% of nutrient needs with PO diet.

## 2020-09-03 NOTE — PLAN OF CARE
Problem: Falls - Risk of:  Goal: Will remain free from falls  Description: Will remain free from falls  9/3/2020 0336 by Echo Vale RN  Outcome: Ongoing  Note: No falls noted this shift. Patient ambulates with x1 standby staff assistance without difficulty. Bed kept in low position. Safe environment maintained. Bedside table & call light in reach. Uses call light appropriately when needing assistance. Problem: Pain:  Goal: Pain level will decrease  Description: Pain level will decrease  9/3/2020 0336 by Echo Vale RN  Outcome: Ongoing  Note: Pt medicated with pain medication prn. Assessed all pain characteristics including level, type, location, frequency, and onset. Non-pharmacologic interventions offered to pt as well. Pt states pain is tolerable at this time. Will continue to monitor. Problem: Nausea/Vomiting:  Goal: Absence of nausea/vomiting  Description: Absence of nausea/vomiting  9/3/2020 0336 by Echo Vale RN  Outcome: Ongoing  Note: Pt nausea and requesting anti-emetics. RN admin anti-emetics as per [facility]. Will continue to monitor for nausea/vomiting. Problem: Gas Exchange - Impaired:  Goal: Levels of oxygenation will improve  Description: Levels of oxygenation will improve  9/3/2020 0336 by Echo Vale RN  Outcome: Ongoing  Note: Patient resp rate 16-22 breath/min; pulse ox 91-92% on RA, pt refusing to wear Bipap for sleep; lung sounds clear with diminished left lung base; will continue to monitor oxygenation. Problem: Infection, Septic Shock:  Goal: Will show no infection signs and symptoms  Description: Will show no infection signs and symptoms  9/3/2020 0336 by Echo Vale RN  Outcome: Ongoing  Note: Patient remains afebrile; WBC count is 13.2; no signs of erythema, edema, or warmth. Will continue to monitor for signs/symptoms of infection.        Problem: Serum Glucose Level - Abnormal:  Goal: Ability to maintain appropriate glucose levels will improve  Description: Ability to maintain appropriate glucose levels will improve  9/3/2020 0336 by Radha Harrison RN  Outcome: Ongoing  Note: Pt blood glucose 160 with Q6 hour checks. RN admin insulin per MAR sliding scale. Will continue to monitor blood glucose. Problem: Skin Integrity:  Goal: Will show no infection signs and symptoms  Description: Will show no infection signs and symptoms  9/3/2020 0336 by Radha Harrison RN  Outcome: Ongoing  Note: Skin assessment complete. Turned and repositioned every two hours per self. Area kept free from moisture. Proper nourishment and fluids encouraged, as appropriate. Will continue to monitor for additional needs and changes in skin breakdown.

## 2020-09-03 NOTE — PROGRESS NOTES
[facility] -   Infectious diseases evaluation  admission date 8/19/2020    reason for consultation:   Fever    Impression :   · Fever /leukocytosis possible pneumonia treated. · Left pleural effusion status post arthrocentesis 8/28/2020, no growth on cultures. · Severe pancreatitis, no necrosis or pseudocyst seen on CT 8/26/2020  · Possible PE on Lovenox  · Acute hypoxic respiratory failure  · Skin rash, possible allergic reaction to Zosyn resolved  · Hypertension   · Obesity      Recommendations   Current:  · IV meropenem was discontinued 9/1/2020  · COVID 19  test was negative on 8/26/2020  · Blood cultures from 8/23 negative   · MRSA nasal swab was negative 8/21/2020  · 8/27/2020 viral PCR negative            History of Present Illness:   Initial history:  Rachel Yates is a 54y.o.-year-old female presented to the hospital with epigastric abdominal pain and bloating for several days prior to admission, severe, no radiation, no alleviating or relieving factors. Pancreatic enzymes were elevated, imaging suggestive of severe pancreatitis. She was on IV Zosyn for 7 days, developed a rash on 8/26/2020, IV Zosyn was discontinued and the rash resolved. IV meropenem started 8/26/2020  Right arm PICC line was placed 8/20/2020 and has been on TPN  CT chest 8/26/2020 suggestive of questionable PE, bilateral consolidations and left pleural effusion  Interval changes  9/3/2020   She had a fever with a temperature max of 104 on 8/26/2020, last fever was 8/29/2020 with a temperature of 101, has been afebrile since.   She continues to improve, denied any abdominal pain, no cough or shortness of breath, denied nausea or vomiting ,no other complaints, remains on TPN through right arm PICC line, tolerating oral.  WBC 13      I have personally reviewed the past medical history, past surgical history, medications, social history, and family history, and I haveupdated the database accordingly.   Past Medical History:     Past Medical History:   Diagnosis Date    Abnormal levels of other serum enzymes     Anxiety     Bilateral leg edema     Chronic kidney disease     right renal cyst    Depression     DVT (deep venous thrombosis) (HCC) 1997    after delivery    Elevated liver enzymes     Fibromyalgia     Headache(784.0)     migraines    Hx of blood clots     1997 left calf    Hypertension     Kidney stone     Obstructive sleep apnea syndrome     Pancreatitis 2001    Pleural effusion 2001    SOB (shortness of breath)     Supraventricular tachycardia (HCC)     SVT (supraventricular tachycardia) ([address] 9/8/2015       Past Surgical  History:     Past Surgical History:   Procedure Laterality Date    ATRIAL ABLATION SURGERY      BACK SURGERY      L4-5    CHOLECYSTECTOMY  2001    ENDOMETRIAL ABLATION      e sure implants placed also    ENDOSCOPY, COLON, DIAGNOSTIC      EXCISION LESION HAND / FINGER Right 1/25/2019    HAND LESION BIOPSY EXCISION performed by Cam Gibson MD at [address] [place] [address] Bilateral     left x2, right x1    FOOT SURGERY Right     x3    KNEE ARTHROSCOPY Left     x2    TX CYSTO/URETERO/PYELOSCOPY W/LITHOTRIPSY Left 9/20/2017    CYSTOSCOPY URETEROSCOPY HOLMIUM LASER LITHO WITH STONE BASKETING WITH  STENT  performed by Margaret Zarco MD at [facility] Right        Medications:      prazosin  1 mg Oral Nightly    promethazine  12.5 mg Intramuscular Once    acetaminophen  650 mg Oral Once    pramipexole  0.25 mg Oral Daily    lidocaine  1 patch Transdermal Daily    lipase-protease-amylase  24,000 Units Oral TID WC    amLODIPine  10 mg Oral Daily    metoprolol tartrate  25 mg Oral BID    LORazepam  1 mg Oral Q6H    lisinopril  40 mg Oral Daily    insulin lispro  0-6 Units Subcutaneous Q6H    enoxaparin  30 mg Subcutaneous BID    magnesium oxide  400 mg Oral Daily    sodium chloride flush  10 mL Intravenous 2 times per day    QUEtiapine  800 mg Oral Nightly    pantoprazole  40 mg Intravenous Daily    And    sodium chloride (PF)  10 mL Intravenous Daily    busPIRone  15 mg Oral Nightly    DULoxetine  60 mg Oral BID    cetirizine  10 mg Oral Daily       Social History:     Social History     Socioeconomic History    Marital status:      Spouse name: Not on file    Number of children: Not on file    Years of education: Not on file    Highest education level: Not on file   Occupational History    Not on file   Social Needs    Financial resource strain: Not on file    Food insecurity     Worry: Not on file     Inability: Not on file    Transportation needs     Medical: Not on file     Non-medical: Not on file   Tobacco Use    Smoking status: Former Smoker     Packs/day: 1.00     Types: Cigarettes     Last attempt to quit: 2019     Years since quittin.7    Smokeless tobacco: Never Used    Tobacco comment: today last smoke   Substance and Sexual Activity    Alcohol use:  Yes     Alcohol/week: 0.0 standard drinks     Comment: rarely    Drug use: No    Sexual activity: Not on file   Lifestyle    Physical activity     Days per week: Not on file     Minutes per session: Not on file    Stress: Not on file   Relationships    Social connections     Talks on phone: Not on file     Gets together: Not on file     Attends Voodoo service: Not on file     Active member of club or organization: Not on file     Attends meetings of clubs or organizations: Not on file     Relationship status: Not on file    Intimate partner violence     Fear of current or ex partner: Not on file     Emotionally abused: Not on file     Physically abused: Not on file     Forced sexual activity: Not on file   Other Topics Concern    Not on file   Social History Narrative    Not on file       Family History:     Family History   Problem Relation Age of Onset    Heart Disease Father     High Blood Pressure Brother Allergies:   Aripiprazole; Eletriptan; Hydrocodone-acetaminophen; Oxycodone-acetaminophen; Sumatriptan; Triptans; Zosyn [piperacillin sod-tazobactam so]; and Lamotrigine     Review of Systems:     Review of Systems  As per history of present illness, other than above 12 systems reviewed were negative  Physical Examination :     Patient Vitals for the past 8 hrs:   BP Temp Temp src Pulse Resp SpO2   09/03/20 1624 (!) 104/58 97.5 °F (36.4 °C) Oral 102 18 94 %   09/03/20 1609 110/69 98.2 °F (36.8 °C) Axillary 106 20 92 %   09/03/20 1406 121/81 97.5 °F (36.4 °C) Oral 103 24 94 %   09/03/20 1359 -- -- -- -- -- 96 %       Physical Exam  HENT:      Head: Normocephalic and atraumatic. Neck:      Musculoskeletal: Neck supple. No neck rigidity. Cardiovascular:      Rate and Rhythm: Normal rate and regular rhythm. Heart sounds: No murmur. Pulmonary:      Breath sounds: Normal breath sounds. Abdominal:      Tenderness: There is no guarding or rebound. Musculoskeletal:      Right lower leg: Edema present. Left lower leg: Edema present. Skin:     General: Skin is warm. Coloration: Skin is not jaundiced. Neurological:      Mental Status: She is alert and oriented to person, place, and time.            Medical Decision Making:   I have independently reviewed/ordered the following labs:    CBC with Differential:   Recent Labs     09/02/20  0509 09/03/20  0451   WBC 13.2* 13.2*   HGB 10.7* 10.4*   HCT 32.0* 31.2*   * 504*   LYMPHOPCT 16* 18*   MONOPCT 5 2     BMP:  Recent Labs     09/02/20  0509 09/03/20  0451    134*   K 4.4 4.5   CL 98 98   CO2 26 23   BUN 12 16   CREATININE <0.40* 0.43*   MG 2.1 2.1     Hepatic Function Panel:   Recent Labs     09/02/20  0509 09/02/20  0943 09/03/20  0451   PROT 7.3 7.2 7.3   LABALBU 2.8* 2.9* 2.8*   BILIDIR <0.08 0.08  --    IBILI  --  0.08  --    BILITOT <0.15* 0.16* 0.16*   ALKPHOS 218* 206* 169*   ALT 51* 46* 36*   AST 42* 31 19       Lab Results Component Value Date    CREATININE 0.43 09/03/2020    GLUCOSE 178 09/03/2020       Detailed results: Thank you for allowing us to participate in the care of this patient. Please call with questions. This note is created with the assistance of a speech recognition program.  While intending to generate adocument that actually reflects the content of the visit, the document can still have some errors including those of syntax and sound a like substitutions which may escape proof reading. It such instances, actual meaningcan be extrapolated by contextual diversion.     Casey Montoya MD  Office: [phone]  Perfect serve / office [phone]

## 2020-09-03 NOTE — PROGRESS NOTES
Physical Therapy  Facility/Department: [facility]  Daily Treatment Note  NAME: Yazmin Ruffin  : 1975  MRN: [id number]    Date of Service: 9/3/2020    Discharge Recommendations:  Patient would benefit from continued therapy after discharge        Assessment   Body structures, Functions, Activity limitations: Decreased functional mobility ; Increased pain  Treatment Diagnosis: Decreased functional mobility  Specific instructions for Next Treatment: Increase ambulation as able. Prognosis: Excellent  REQUIRES PT FOLLOW UP: Yes  Activity Tolerance  Activity Tolerance: Patient limited by endurance; Patient limited by fatigue     Patient Diagnosis(es): The encounter diagnosis was Acute pancreatitis, unspecified complication status, unspecified pancreatitis type. has a past medical history of Abnormal levels of other serum enzymes, Anxiety, Bilateral leg edema, Chronic kidney disease, Depression, DVT (deep venous thrombosis) (HCC), Elevated liver enzymes, Fibromyalgia, Headache(784.0), Hx of blood clots, Hypertension, Kidney stone, Obstructive sleep apnea syndrome, Pancreatitis, Pleural effusion, SOB (shortness of breath), Supraventricular tachycardia ([address] and SVT (supraventricular tachycardia) ([address] has a past surgical history that includes Thyroid lobectomy (Right); Cholecystectomy (); Knee arthroscopy (Left); Foot surgery (Right); Endometrial ablation; Atrial ablation surgery; eye surgery (Bilateral); Endoscopy, colon, diagnostic; back surgery; pr cysto/uretero/pyeloscopy w/lithotripsy (Left, 2017); and EXCISION LESION HAND / FINGER (Right, 2019). Restrictions  Restrictions/Precautions  Restrictions/Precautions: Up as Tolerated  Position Activity Restriction  Other position/activity restrictions: Right arm PICC line was placed and has been on TPN  Subjective   Subjective  Subjective: Pt sitting @ the EOB upon entry, agreeable to therpy.   General Comment  Comments: Pt continue to be cooperative and anxious to go home. Pt stated that she will be going to her home w/ assist from family. Pain Assessment  Pain Assessment: 0-10  Pain Level: 4  Patient's Stated Pain Goal: No pain  Pain Type: Acute pain  Pain Location: Chest;Back  Pain Orientation: Left  Response to Pain Intervention: Patient Satisfied  Oxygen Therapy  SpO2: 96 %  Pulse Oximeter Device Mode: Intermittent  Pulse Oximeter Device Location: Finger  O2 Device: Nasal cannula       Orientation  Orientation  Overall Orientation Status: Within Normal Limits  Cognition      Objective      Transfers  Sit to Stand: Stand by assistance  Stand to sit: Supervision  Comment: Pt is much more comfortable sitting @ EOB. Ambulation  Ambulation?: Yes  Ambulation 1  Surface: level tile  Device: Rolling Walker  Assistance: Contact guard assistance;Stand by assistance  Quality of Gait:  Slow, no LOB  Gait Deviations: Slow Reyna; Increased HAFSA; Decreased step length;Decreased step height  Distance: 75' x 2(I/S pt to take Rest break prn)  Comments: Pt report feeling secure when amb w/ RW. Stairs/Curb  Stairs?: No     Balance  Posture: Good  Sitting - Static: Good  Sitting - Dynamic: Fair;+  Standing - Static: Fair;+(w/RW)  Standing - Dynamic: Fair(w/RW)  Other exercises  Other exercises?: Yes  Other exercises 1: Ther ex both L/E standing 2 set's 5's w/ rest breaks prn  Other exercises 2: Sit<>stand x 3(cues for hand placement)                        G-Code     OutComes Score                                                     AM-PAC Score             Goals  Short term goals  Time Frame for Short term goals: 6 visits  Short term goal 1: Pt will demonstrate independence with bed mobility sit to stand, stand to sit and rolling. Short term goal 2: Pt will demonstrate SBA with transfers sit to stand, stand to sit and bed and chair. Short term goal 3: Pt will stand/ambulate 50 ft with RW/SBA.   Short term goal 4: Pt will be able to tolerate a 30 minute treatment session without a change in symptoms. Patient Goals   Patient goals : To get better and go home    Plan    Plan  Times per week: 5 to 6 x/ week  Times per day: Daily  Specific instructions for Next Treatment: Increase ambulation as able.   Current Treatment Recommendations: Transfer Training, Patient/Caregiver Education & Training, Gait Training, Functional Mobility Training, Endurance Training, Strengthening, Balance Training, Safety Education & Training  Safety Devices  Type of devices: Call light within reach, Gait belt, Patient at risk for falls, Left in chair, Nurse notified     Therapy Time   Individual Concurrent Group Co-treatment   Time In 1359         Time Out [address] [place] [address], [facility]   Electronically signed by Pearl Chowdhury PTA on 9/3/2020 at 5:07 PM

## 2020-09-03 NOTE — FLOWSHEET NOTE
Pt refusing Bipap, RN entered room and found pt had removed Bipap per self and told RN she did not want to wear it anymore.

## 2020-09-03 NOTE — PROGRESS NOTES
Writer found pt on hospital bipap set to 14/6, back up RR 16, and 40%. Pt tolerating far better than on previous nights. Mask and alarms appropriate.

## 2020-09-04 VITALS
HEIGHT: 65 IN | RESPIRATION RATE: 20 BRPM | HEART RATE: 120 BPM | SYSTOLIC BLOOD PRESSURE: 122 MMHG | TEMPERATURE: 97 F | BODY MASS INDEX: 46.17 KG/M2 | OXYGEN SATURATION: 93 % | DIASTOLIC BLOOD PRESSURE: 52 MMHG | WEIGHT: 277.12 LBS

## 2020-09-04 LAB
ABSOLUTE EOS #: 0.48 K/UL (ref 0–0.4)
ABSOLUTE IMMATURE GRANULOCYTE: ABNORMAL K/UL (ref 0–0.3)
ABSOLUTE LYMPH #: 1.2 K/UL (ref 1–4.8)
ABSOLUTE MONO #: 0.84 K/UL (ref 0.1–1.3)
ALBUMIN SERPL-MCNC: 3 G/DL (ref 3.5–5.2)
ALBUMIN/GLOBULIN RATIO: ABNORMAL (ref 1–2.5)
ALP BLD-CCNC: 150 U/L (ref 35–104)
ALT SERPL-CCNC: 32 U/L (ref 5–33)
ANION GAP SERPL CALCULATED.3IONS-SCNC: 15 MMOL/L (ref 9–17)
AST SERPL-CCNC: 19 U/L
BASOPHILS # BLD: 0 % (ref 0–2)
BASOPHILS ABSOLUTE: 0 K/UL (ref 0–0.2)
BILIRUB SERPL-MCNC: 0.18 MG/DL (ref 0.3–1.2)
BUN BLDV-MCNC: 28 MG/DL (ref 6–20)
BUN/CREAT BLD: ABNORMAL (ref 9–20)
CALCIUM SERPL-MCNC: 9.1 MG/DL (ref 8.6–10.4)
CHLORIDE BLD-SCNC: 96 MMOL/L (ref 98–107)
CO2: 21 MMOL/L (ref 20–31)
CREAT SERPL-MCNC: 0.83 MG/DL (ref 0.5–0.9)
DIFFERENTIAL TYPE: ABNORMAL
EKG ATRIAL RATE: 137 BPM
EKG P AXIS: 50 DEGREES
EKG P-R INTERVAL: 126 MS
EKG Q-T INTERVAL: 296 MS
EKG QRS DURATION: 74 MS
EKG QTC CALCULATION (BAZETT): 446 MS
EKG R AXIS: 30 DEGREES
EKG T AXIS: 19 DEGREES
EKG VENTRICULAR RATE: 137 BPM
EOSINOPHILS RELATIVE PERCENT: 4 % (ref 0–4)
GFR AFRICAN AMERICAN: >60 ML/MIN
GFR NON-AFRICAN AMERICAN: >60 ML/MIN
GFR SERPL CREATININE-BSD FRML MDRD: ABNORMAL ML/MIN/{1.73_M2}
GFR SERPL CREATININE-BSD FRML MDRD: ABNORMAL ML/MIN/{1.73_M2}
GLUCOSE BLD-MCNC: 108 MG/DL (ref 65–105)
GLUCOSE BLD-MCNC: 115 MG/DL (ref 65–105)
GLUCOSE BLD-MCNC: 137 MG/DL (ref 70–99)
GLUCOSE BLD-MCNC: 138 MG/DL (ref 65–105)
HCT VFR BLD CALC: 31.4 % (ref 36–46)
HEMOGLOBIN: 10.5 G/DL (ref 12–16)
IMMATURE GRANULOCYTES: ABNORMAL %
LIPASE: 66 U/L (ref 13–60)
LYMPHOCYTES # BLD: 10 % (ref 24–44)
MAGNESIUM: 2.1 MG/DL (ref 1.6–2.6)
MCH RBC QN AUTO: 31.3 PG (ref 26–34)
MCHC RBC AUTO-ENTMCNC: 33.5 G/DL (ref 31–37)
MCV RBC AUTO: 93.4 FL (ref 80–100)
METAMYELOCYTES ABSOLUTE COUNT: 0.12 K/UL
METAMYELOCYTES: 1 %
MONOCYTES # BLD: 7 % (ref 1–7)
MORPHOLOGY: ABNORMAL
MORPHOLOGY: ABNORMAL
NRBC AUTOMATED: ABNORMAL PER 100 WBC
PDW BLD-RTO: 14.5 % (ref 11.5–14.9)
PHOSPHORUS: 6.4 MG/DL (ref 2.6–4.5)
PLATELET # BLD: 571 K/UL (ref 150–450)
PLATELET ESTIMATE: ABNORMAL
PMV BLD AUTO: 9.3 FL (ref 6–12)
POTASSIUM SERPL-SCNC: 4.8 MMOL/L (ref 3.7–5.3)
RBC # BLD: 3.37 M/UL (ref 4–5.2)
RBC # BLD: ABNORMAL 10*6/UL
SEG NEUTROPHILS: 78 % (ref 36–66)
SEGMENTED NEUTROPHILS ABSOLUTE COUNT: 9.36 K/UL (ref 1.3–9.1)
SODIUM BLD-SCNC: 132 MMOL/L (ref 135–144)
TOTAL PROTEIN: 7.4 G/DL (ref 6.4–8.3)
WBC # BLD: 12 K/UL (ref 3.5–11)
WBC # BLD: ABNORMAL 10*3/UL

## 2020-09-04 PROCEDURE — 82947 ASSAY GLUCOSE BLOOD QUANT: CPT

## 2020-09-04 PROCEDURE — 83690 ASSAY OF LIPASE: CPT

## 2020-09-04 PROCEDURE — 6370000000 HC RX 637 (ALT 250 FOR IP): Performed by: NURSE PRACTITIONER

## 2020-09-04 PROCEDURE — 6370000000 HC RX 637 (ALT 250 FOR IP): Performed by: STUDENT IN AN ORGANIZED HEALTH CARE EDUCATION/TRAINING PROGRAM

## 2020-09-04 PROCEDURE — 6370000000 HC RX 637 (ALT 250 FOR IP): Performed by: INTERNAL MEDICINE

## 2020-09-04 PROCEDURE — 6360000002 HC RX W HCPCS: Performed by: STUDENT IN AN ORGANIZED HEALTH CARE EDUCATION/TRAINING PROGRAM

## 2020-09-04 PROCEDURE — 85025 COMPLETE CBC W/AUTO DIFF WBC: CPT

## 2020-09-04 PROCEDURE — 99239 HOSP IP/OBS DSCHRG MGMT >30: CPT | Performed by: INTERNAL MEDICINE

## 2020-09-04 PROCEDURE — 99231 SBSQ HOSP IP/OBS SF/LOW 25: CPT | Performed by: INTERNAL MEDICINE

## 2020-09-04 PROCEDURE — 84100 ASSAY OF PHOSPHORUS: CPT

## 2020-09-04 PROCEDURE — 2580000003 HC RX 258: Performed by: RADIOLOGY

## 2020-09-04 PROCEDURE — 2580000003 HC RX 258: Performed by: NURSE PRACTITIONER

## 2020-09-04 PROCEDURE — 36415 COLL VENOUS BLD VENIPUNCTURE: CPT

## 2020-09-04 PROCEDURE — C9113 INJ PANTOPRAZOLE SODIUM, VIA: HCPCS | Performed by: NURSE PRACTITIONER

## 2020-09-04 PROCEDURE — 83735 ASSAY OF MAGNESIUM: CPT

## 2020-09-04 PROCEDURE — 94660 CPAP INITIATION&MGMT: CPT

## 2020-09-04 PROCEDURE — 80053 COMPREHEN METABOLIC PANEL: CPT

## 2020-09-04 PROCEDURE — 6370000000 HC RX 637 (ALT 250 FOR IP)

## 2020-09-04 PROCEDURE — 99232 SBSQ HOSP IP/OBS MODERATE 35: CPT | Performed by: INTERNAL MEDICINE

## 2020-09-04 PROCEDURE — 6360000002 HC RX W HCPCS: Performed by: NURSE PRACTITIONER

## 2020-09-04 RX ORDER — IBUPROFEN 600 MG/1
600 TABLET ORAL EVERY 6 HOURS PRN
Qty: 120 TABLET | Refills: 3 | Status: SHIPPED | OUTPATIENT
Start: 2020-09-04 | End: 2020-11-10

## 2020-09-04 RX ORDER — IBUPROFEN 600 MG/1
600 TABLET ORAL EVERY 6 HOURS PRN
Status: DISCONTINUED | OUTPATIENT
Start: 2020-09-04 | End: 2020-09-04 | Stop reason: HOSPADM

## 2020-09-04 RX ADMIN — HYDROMORPHONE HYDROCHLORIDE 0.5 MG: 1 INJECTION, SOLUTION INTRAMUSCULAR; INTRAVENOUS; SUBCUTANEOUS at 09:29

## 2020-09-04 RX ADMIN — AMLODIPINE BESYLATE 10 MG: 10 TABLET ORAL at 09:29

## 2020-09-04 RX ADMIN — HYDROMORPHONE HYDROCHLORIDE 0.25 MG: 1 INJECTION, SOLUTION INTRAMUSCULAR; INTRAVENOUS; SUBCUTANEOUS at 03:53

## 2020-09-04 RX ADMIN — ENOXAPARIN SODIUM 30 MG: 30 INJECTION SUBCUTANEOUS at 09:29

## 2020-09-04 RX ADMIN — CETIRIZINE HYDROCHLORIDE 10 MG: 10 TABLET, FILM COATED ORAL at 09:29

## 2020-09-04 RX ADMIN — LORAZEPAM 1 MG: 1 TABLET ORAL at 01:56

## 2020-09-04 RX ADMIN — Medication 10 ML: at 09:31

## 2020-09-04 RX ADMIN — PANCRELIPASE 24000 UNITS: 24000; 76000; 120000 CAPSULE, DELAYED RELEASE PELLETS ORAL at 09:33

## 2020-09-04 RX ADMIN — METOPROLOL TARTRATE 25 MG: 25 TABLET, FILM COATED ORAL at 09:30

## 2020-09-04 RX ADMIN — PANTOPRAZOLE SODIUM 40 MG: 40 INJECTION, POWDER, FOR SOLUTION INTRAVENOUS at 09:31

## 2020-09-04 RX ADMIN — ACETAMINOPHEN 650 MG: 325 TABLET, FILM COATED ORAL at 12:12

## 2020-09-04 RX ADMIN — ACETAMINOPHEN 650 MG: 325 TABLET, FILM COATED ORAL at 16:22

## 2020-09-04 RX ADMIN — PANCRELIPASE 24000 UNITS: 24000; 76000; 120000 CAPSULE, DELAYED RELEASE PELLETS ORAL at 12:18

## 2020-09-04 RX ADMIN — LORAZEPAM 1 MG: 1 TABLET ORAL at 09:29

## 2020-09-04 RX ADMIN — Medication 400 MG: at 09:29

## 2020-09-04 RX ADMIN — PRAMIPEXOLE DIHYDROCHLORIDE 0.25 MG: 0.12 TABLET ORAL at 09:33

## 2020-09-04 RX ADMIN — IBUPROFEN 600 MG: 600 TABLET, FILM COATED ORAL at 15:19

## 2020-09-04 RX ADMIN — LISINOPRIL 40 MG: 20 TABLET ORAL at 09:30

## 2020-09-04 RX ADMIN — DULOXETINE HYDROCHLORIDE 60 MG: 60 CAPSULE, DELAYED RELEASE ORAL at 09:30

## 2020-09-04 ASSESSMENT — PAIN SCALES - GENERAL
PAINLEVEL_OUTOF10: 7
PAINLEVEL_OUTOF10: 5
PAINLEVEL_OUTOF10: 7
PAINLEVEL_OUTOF10: 6
PAINLEVEL_OUTOF10: 6
PAINLEVEL_OUTOF10: 7
PAINLEVEL_OUTOF10: 2

## 2020-09-04 ASSESSMENT — ENCOUNTER SYMPTOMS
VOMITING: 0
SHORTNESS OF BREATH: 0
EYES NEGATIVE: 1
DIARRHEA: 0
ALLERGIC/IMMUNOLOGIC NEGATIVE: 1
BLOOD IN STOOL: 0
NAUSEA: 0
CHEST TIGHTNESS: 0

## 2020-09-04 NOTE — CARE COORDINATION
ONGOING DISCHARGE PLAN:    Spoke with patient regarding discharge plan and patient confirms that plan is still to Return to home w/ Family w/ VNS, Bill's. They can see her this weekend. Writer spoke to Glo, in regards to this. Lipase today 66, WBC 12.0. GI/ID following. Low Fat diet. Anticipate DC today. Will continue to follow for additional discharge needs.     Electronically signed by Chio Jackson RN on 9/4/2020 at 11:06 AM

## 2020-09-04 NOTE — PROGRESS NOTES
Pt discharged with all belongings. RN reviewed all discharge paperwork. Telemetry removed from pt, cleaned and locked in nurse . meds returned to pharmacy. Pt stable at time of discharge. Pt picked up by her mom. Pt declined any issues.      Electronically signed by Agus Leon RN on 9/4/2020 at 5:45 PM

## 2020-09-04 NOTE — PROGRESS NOTES
PULMONARY PROGRESS NOTE:      Interval History:pancreatitis, resp failure    Shortness of Breath: no  Cough: no  Sputum: no          Hemoptysis: no  Chest Pain: no  Fever: no                   Swelling Feet: no  Headache: no                                           Nausea- yes   Emesis-no, Abdominal Pain: yes, reports LUQ  Diarrhea: no         Constipation: no    Events since last visit: Remains on RA. Falling asleep during conversation. On low fat diet- reports some nausea after eating.  Tolerating low fat diet - remains on TPN    PAST MEDICAL HISTORY:      Scheduled Meds:   prazosin  1 mg Oral Nightly    promethazine  12.5 mg Intramuscular Once    acetaminophen  650 mg Oral Once    pramipexole  0.25 mg Oral Daily    lidocaine  1 patch Transdermal Daily    lipase-protease-amylase  24,000 Units Oral TID WC    amLODIPine  10 mg Oral Daily    metoprolol tartrate  25 mg Oral BID    LORazepam  1 mg Oral Q6H    lisinopril  40 mg Oral Daily    insulin lispro  0-6 Units Subcutaneous Q6H    enoxaparin  30 mg Subcutaneous BID    magnesium oxide  400 mg Oral Daily    sodium chloride flush  10 mL Intravenous 2 times per day    QUEtiapine  800 mg Oral Nightly    pantoprazole  40 mg Intravenous Daily    And    sodium chloride (PF)  10 mL Intravenous Daily    busPIRone  15 mg Oral Nightly    DULoxetine  60 mg Oral BID    cetirizine  10 mg Oral Daily     Continuous Infusions:   dextrose       PRN Meds:HYDROmorphone, sodium chloride flush, acetaminophen, hydrALAZINE, glucose, dextrose, glucagon (rDNA), dextrose, perflutren lipid microspheres, promethazine (PHENERGAN) in sodium chloride 0.9% IVPB, sodium chloride flush, magnesium sulfate, potassium chloride **OR** potassium alternative oral replacement **OR** potassium chloride, acetaminophen, [DISCONTINUED] promethazine **OR** ondansetron, melatonin ER        PHYSICAL EXAMINATION:  BP (!) 105/52   Pulse 107   Temp 98.1 °F (36.7 °C) (Oral)   Resp 16   Ht 5' 5\" (1.651 m)   Wt 275 lb 2.2 oz (124.8 kg)   SpO2 95%   BMI 45.78 kg/m²     General : Sleepy, responds to voice, oriented x3  Neck - supple, no lymphadenopathy, JVD not raised  Heart -   Lungs - Air Entry- fair bilaterally; breath sounds : vesicular;   rales/crackles - absent, 94% on RA   Abdomen - soft, no obese   Upper Extremities  - no cyanosis, mottling; edema : absent  Lower Extremities: no cyanosis, mottling; edema : absent    Current Laboratory, Radiologic, Microbiologic, and Diagnostic studies reviewed  Data ReviewCBC:   Recent Labs     09/01/20 0513 09/02/20  0509 09/03/20  0451   WBC 17.4* 13.2* 13.2*   RBC 3.37* 3.41* 3.33*   HGB 10.4* 10.7* 10.4*   HCT 31.6* 32.0* 31.2*    467* 504*     BMP:   Recent Labs     09/01/20 0513 09/02/20  0509 09/03/20  0451   GLUCOSE 219* 175* 178*    136 134*   K 4.6 4.4 4.5   BUN 9 12 16   CREATININE <0.40* <0.40* 0.43*   CALCIUM 8.8 9.2 9.1     ABGs: No results for input(s): PHART, PO2ART, WTL2IBM, LLV6ISC, BEART, I9YWPFOW, WAJ1CRN in the last 72 hours. PT/INR:  No results found for: PTINR    ASSESSMENT / PLAN:  · Acute pancreatitis- GI consulted, monitor amylase lipase - improving  · Lactic acidosis- resolved  · Possible pneumonia- ABx  · Pleural effusion - s/p left thoracentesis with 15 ml exudative fluid removed on 8/28, no growth x 5 days  · Febrile, Leukocytosis- Abx changed per ID, UA- unremarkable, COVID negative, lactate 1.2  · Hypertension - improved   · Possible PE- CTA - no obvious PE  · acute hypoxic resp failure - resolved - off 02  · On low fat diet- poor appetite     This is a late note on patient seen by me earlier today.   Electronically signed by Magdiel Osorio on 09/03/20 at 9:57 PM

## 2020-09-04 NOTE — DISCHARGE SUMMARY
[address]    Discharge Summary     Patient ID: Arcelia Torres  :  1975   MRN: [id number]     ACCOUNT:  [de-identified]   Patient's PCP: Jensen Fulton  Admit Date: 2020   Discharge Date: 2020     Length of Stay: 16  Code Status:  Full Code  Admitting Physician: Reanna Sharma MD  Discharge Physician: Zaida Hayden MD     Active Discharge Diagnoses:       Primary Problem  Pneumonia of both lungs due to infectious organism      [place] Problems    Diagnosis Date Noted    Pneumonia of both lungs due to infectious organism [J18.9] 2020    History of anxiety disorder [Z86.59]     Acute respiratory failure with hypoxia ([address] [J96.01] 2020    Hypocalcemia [E83.51] 2020    Sepsis ([facility] [A41.9] 2020    Morbid obesity ([facility] [E66.01]     Abdominal pain, epigastric [R10.13]     Nausea [R11.0]     Acute pancreatitis [K85.90] 2020    Fibromyalgia [M79.7]     HTN (hypertension) [I10] 12/15/2014    Major depression [F32.9] 10/30/2014    Restless leg syndrome [G25.81] 10/22/2013    Class 3 severe obesity due to excess calories with serious comorbidity in adult [facility]) [E66.01] 2013    Anxiety [F41.9] 2013       Admission Condition:  crtical    Discharged Condition: good    Hospital Stay:       Hospital Course:  Arcelia Torres is a 54 y.o. female who was admitted for the management of  Acute pancreatitis which later lead to compliaction. 59-year-old female presents with chief complaint of midepigastric and right upper quadrant pain. Patient states pain started approximately 2 hours ago while she was sitting and watching TV. Patient states that the pain is gotten progressively worse since then describes it as a sharp aching burning pain. Patient denies radiation of pain, admits nausea associate with pain.   Patient with similar episode of pain prior which she states she was diagnosed with pancreatitis. Patient is unsure of the etiology of pancreatitis. Patient denies alcohol use at this time, denies any medication or other injuries denies recent trauma. Patient states she had prior cholecystectomy      CT abdomen and pelvis showed pancreatic edema and associated fluid, hepatic steatosis, and bilateral lower lobe consolidation. This morning WBC increased to 20.5, triglycerides 259, and lactic acid 5.4. She is being transferred to ICU due to elevated lactic acid and for closer monitoring.        Significant therapeutic interventions: Overnight on 08/21 patient was transferred to the ICU for sinus tachycardia and a BP of 180/90. Patient was started on IV lopressor and cardene drip. Overnight she also needed to be placed on BiPaP. Her pain is being controlled with dilaudid q2h PRN. Overnight patient remained tachycardic in the 130s but her BP was controlled with SBP in the 130s. Remains afebrile. Berkowitz placed overnight and UO in the last shift was 0.6 ml/kr/hr. Lactic acid remains stable at 2.1. Unable to thoroughly interview patient since she was repeatedly moaning during exam this AM.  BiPap started, antibiotics started and she completed the course of 10 days. On 09/01 she is transferred to [facility] and since then she was on TPN, oral diet started on 09/03 and she tolerated well, her LFTs and amylase are trending downward. She does not complain of any AP after eating, nausea, vomiting, diarrhea, fever and chills.             Significant Diagnostic Studies:   Labs / Micro:  CBC:   Lab Results   Component Value Date    WBC 12.0 09/04/2020    RBC 3.37 09/04/2020    HGB 10.5 09/04/2020    HCT 31.4 09/04/2020    MCV 93.4 09/04/2020    MCH 31.3 09/04/2020    MCHC 33.5 09/04/2020    RDW 14.5 09/04/2020     09/04/2020     BMP:    Lab Results   Component Value Date    GLUCOSE 137 09/04/2020     09/04/2020    K 4.8 09/04/2020    CL 96 09/04/2020    CO2 21 09/04/2020    ANIONGAP 15 09/04/2020    BUN 28 09/04/2020    CREATININE 0.83 09/04/2020    BUNCRER NOT REPORTED 09/04/2020    CALCIUM 9.1 09/04/2020    LABGLOM >60 09/04/2020    GFRAA >60 09/04/2020    GFR      09/04/2020    GFR NOT REPORTED 09/04/2020     HFP:    Lab Results   Component Value Date    PROT 7.4 09/04/2020    PROT 7.2 01/23/2015     CMP:    Lab Results   Component Value Date    GLUCOSE 137 09/04/2020     09/04/2020    K 4.8 09/04/2020    CL 96 09/04/2020    CO2 21 09/04/2020    BUN 28 09/04/2020    CREATININE 0.83 09/04/2020    ANIONGAP 15 09/04/2020    ALKPHOS 150 09/04/2020    ALT 32 09/04/2020    AST 19 09/04/2020    BILITOT 0.18 09/04/2020    LABALBU 3.0 09/04/2020    ALBUMIN NOT REPORTED 09/04/2020    LABGLOM >60 09/04/2020    GFRAA >60 09/04/2020    GFR      09/04/2020    GFR NOT REPORTED 09/04/2020    PROT 7.4 09/04/2020    PROT 7.2 01/23/2015    CALCIUM 9.1 09/04/2020     PT/INR:    Lab Results   Component Value Date    PROTIME 12.2 08/20/2020    INR 0.9 08/20/2020     PTT:   Lab Results   Component Value Date    APTT 24.4 08/20/2020     FLP:    Lab Results   Component Value Date    CHOL 206 03/22/2016    TRIG 133 08/27/2020    HDL 46 03/22/2016     U/A:    Lab Results   Component Value Date    COLORU YELLOW 08/26/2020    TURBIDITY CLEAR 08/26/2020    SPECGRAV 1.008 08/26/2020    HGBUR SMALL 08/26/2020    PHUR 7.5 08/26/2020    PROTEINU NEGATIVE 08/26/2020    GLUCOSEU NEGATIVE 08/26/2020    KETUA NEGATIVE 08/26/2020    BILIRUBINUR NEGATIVE 08/26/2020    UROBILINOGEN Normal 08/26/2020    NITRU NEGATIVE 08/26/2020    LEUKOCYTESUR NEGATIVE 08/26/2020     TSH:    Lab Results   Component Value Date    TSH 0.96 05/26/2019     Radiology:    Ct Abdomen Pelvis Wo Contrast Additional Contrast? None    Result Date: 8/20/2020  EXAMINATION: CT OF THE ABDOMEN AND PELVIS WITHOUT CONTRAST 8/20/2020 10:22 pm TECHNIQUE: CT of the abdomen and pelvis was performed without the administration of intravenous contrast. Multiplanar reformatted images are provided for review. Dose modulation, iterative reconstruction, and/or weight based adjustment of the mA/kV was utilized to reduce the radiation dose to as low as reasonably achievable. COMPARISON: CT abdomen and pelvis with contrast August 19, 2020. HISTORY: ORDERING SYSTEM PROVIDED HISTORY: severe acute pain TECHNOLOGIST PROVIDED HISTORY: severe acute pain Is the patient pregnant? ->Yes Reason for Exam: worsening abdominal pain Acuity: Acute Type of Exam: Ongoing Relevant Medical/Surgical History: surg - gallbladder FINDINGS: Lower Chest: Persisting bilateral lower lung scattered consolidation and atelectasis is present with trace posterior left-sided pleural effusion identified. Demi Messing Heart is normal in size and configuration. Organs: Interval mild worsening of severe peripancreatic fat stranding and fluid is noted with intraperitoneal free fluid collecting within the bilateral pericolic gutters and collecting inferiorly within the pelvis with increased volume in comparison with the prior study. Diffuse fatty infiltration of the liver is again seen. The liver, gallbladder, spleen, pancreas, adrenal glands, kidneys, are otherwise unremarkable in appearance. GI/Bowel: The stomach is unremarkable without wall thickening or distention. Bowel loops are unremarkable in appearance without evidence of obstruction, distension or mucosal thickening. Pelvis: Berkowitz catheter is noted within the nondistended but grossly unremarkable urinary bladder. Bilateral fallopian tube Essure coils are seen without evidence of complication. The uterus is anteverted in position and is unremarkable in appearance there no evidence of pelvic free fluid is seen. Peritoneum/Retroperitoneum: No evidence of retroperitoneal or intraperitoneal lymphadenopathy is identified. . Bones/Soft Tissues: No evidence of retroperitoneal or intraperitoneal lymphadenopathy is identified. bibasilar atelectasis     Ct Abdomen Pelvis W Iv Contrast Additional Contrast? None    Result Date: 8/26/2020  EXAMINATION: CTA OF THE CHEST; CT OF THE ABDOMEN AND PELVIS WITH CONTRAST 8/26/2020 10:34 am TECHNIQUE: CTA of the chest was performed after the administration of intravenous contrast.  Multiplanar reformatted images are provided for review. MIP images are provided for review. Dose modulation, iterative reconstruction, and/or weight based adjustment of the mA/kV was utilized to reduce the radiation dose to as low as reasonably achievable.; CT of the abdomen and pelvis was performed with the administration of intravenous contrast. Multiplanar reformatted images are provided for review. Dose modulation, iterative reconstruction, and/or weight based adjustment of the mA/kV was utilized to reduce the radiation dose to as low as reasonably achievable. COMPARISON: CT chest May 26, 2019 CT abdomen and pelvis August 20, 2020 HISTORY: ORDERING SYSTEM PROVIDED HISTORY: Hypoxia, recurrent fevers TECHNOLOGIST PROVIDED HISTORY: Hypoxia, recurrent fevers Reason for Exam: Hypoxia, recurrent fevers, best images possible due to patient size 322 pounds Acuity: Acute Type of Exam: Initial; ORDERING SYSTEM PROVIDED HISTORY: Splenic vein thrombosis? TECHNOLOGIST PROVIDED HISTORY: Splenic vein thrombosis? Reason for Exam: Splenic vein thrombosis? best images possible due to patient size 322  poounds Acuity: Acute Type of Exam: Initial FINDINGS: CT CHEST: Chest wall: No axillary adenopathy. Mediastinum: Cardiomegaly. No pericardial effusion. No adenopathy. Pulmonary arteries: Significantly limited evaluation for pulmonary embolus due to bolus timing, motion, and patient body habitus. Questionable pulmonary embolus within the right lower lobe. Lungs: Moderate left pleural effusion. Small right pleural effusion. Left lower lobe consolidation versus atelectasis. Bilateral upper lobe areas of consolidation.  Bones: No acute osseous abnormality. CT ABDOMEN-PELVIS: Organs: Decreased attenuation of the liver is consistent with hepatic steatosis. No focal liver lesion. Cholecystectomy. Severe pancreatitis with surrounding inflammatory changes and fluid. Mild splenomegaly. No adrenal lesion. No hydronephrosis. Right renal cyst.  Focal area of scarring within the right kidney is unchanged. GI/Bowel: No bowel obstruction. Secondary involvement of the descending colon adjacent to the peripancreatic inflammatory changes along the tail of the pancreas. Pelvis: Essure devices. No significant free fluid. No bladder calculus. Peritoneum/Retroperitoneum: Splenic artery is patent. No evidence of splenic artery thrombosis. No definite splenic venous thrombosis. A portion of the splenic vein as it courses along the tail of the pancreas is indistinct. However, proximal and distal to this, there is flow and this may be related to artifact. No abdominal aortic aneurysm. Prominent peripancreatic lymph nodes are likely reactive. Bones/Soft Tissues: No acute soft tissue abnormality. No acute osseous abnormality. Significantly limited evaluation for pulmonary embolus. Questionable right lower lobe pulmonary embolus. Bilateral lung opacities are likely pneumonia. Recommend follow-up to document resolution. Moderate left pleural effusion. Severe acute pancreatitis. No definite splenic venous thrombosis. Critical results were called by Dr. Dayton Mirza MD to Dr. Eugenia Salamanca on 8/26/2020 at 11:40. Ct Abdomen Pelvis W Iv Contrast Additional Contrast? None    Addendum Date: 8/20/2020    ADDENDUM: As stated in the body of the report, the abdominal aorta is normal in size. There is no significant atherosclerosis.      Result Date: 8/20/2020  EXAMINATION: CT OF THE ABDOMEN AND PELVIS WITH CONTRAST 8/19/2020 10:11 pm TECHNIQUE: CT of the abdomen and pelvis was performed with the administration of intravenous contrast. Multiplanar reformatted images are provided for review. Dose modulation, iterative reconstruction, and/or weight based adjustment of the mA/kV was utilized to reduce the radiation dose to as low as reasonably achievable. COMPARISON: None. HISTORY: ORDERING SYSTEM PROVIDED HISTORY: pain TECHNOLOGIST PROVIDED HISTORY: pain Reason for Exam: pt c/o all over abdominal pain with nausea for a few hours Acuity: Acute Type of Exam: Initial Relevant Medical/Surgical History: surg - gallbladder FINDINGS: Lower Chest: Consolidation within both lower lobes and the lingula. Scattered ground-glass opacity. No pleural effusion. Organs: Liver is diffusely hypoattenuating. No acute abnormality within the spleen, adrenals, or left kidney. There is right renal cortical scarring and calcification. Subcentimeter hypoattenuating bilateral renal lesions are too small to accurately characterize, likely cysts. Prior cholecystectomy. There is diffuse peripancreatic stranding and fluid. No loculated fluid collection. GI/Bowel: Stomach is partially distended. Small bowel is nondilated. Colon is nondilated. Pelvis: Urinary bladder is partially distended without vesicular stones. Uterus is present. Peritoneum/Retroperitoneum: Shotty retroperitoneal lymph nodes, likely reactive. Abdominal aorta is normal in caliber. No pneumoperitoneum. Bones/Soft Tissues: No acute osseous abnormality. 1. Pancreatic edema and associated fluid is consistent with pancreatitis. No loculated fluid collection/pseudocyst. 2. Shotty retroperitoneal lymph nodes are likely reactive. 3. Hepatic steatosis. 4. Bilateral lower lobe consolidation, which could be due to edema or pneumonia. [employer] Speaker Device Plmt/replace/removal    Result Date: 8/20/2020  PROCEDURE: ULTRASOUND GUIDED VASCULAR ACCESS. FLUOROSCOPY GUIDED PICC PLACEMENT 8/20/2020.  HISTORY: ORDERING SYSTEM PROVIDED HISTORY: fluid resusitation TECHNOLOGIST PROVIDED HISTORY: fluid resusitation Lumen?->Double Lumen Is the patient pregnant? ->Yes Acuity: Unknown Type of Exam: Unknown Sepsis SEDATION: None FLUOROSCOPY TIME: DAP 63 cGy cm squared TECHNIQUE: Informed consent was obtained after a detailed explanation of the procedure including risks, benefits, and alternatives. Universal protocol was observed. The right arm was prepped and draped in sterile fashion using maximum sterile barrier technique. Local anesthesia was achieved with lidocaine. A micropuncture needle was used to access the right basilic vein using ultrasound guidance. An ultrasound image demonstrating patency of the vein with needle tip located within it. An image was obtained and stored in PACs. A 0.018 guidewire was used to place a peel-a-way sheath and a 5 [place] power injectable dual-lumen PICC was advanced with fluoroscopic guidance with the tip at the cavo-atrial junction. The catheter flushed easily and there was a good blood return. The catheter was secured to the skin. The patient tolerated the procedure well and there were no immediate complications. FINDINGS: Fluoroscopic image demonstrates the tip of the catheter at the cavo-atrial junction. Successful ultrasound and fluoroscopy guided right basilic vein 5 [place] power injectable dual-lumen PICC placement. Ready for use. Xr Chest Portable    Result Date: 8/31/2020  EXAMINATION: ONE XRAY VIEW OF THE CHEST 8/31/2020 6:09 am COMPARISON: 08/30/2020 HISTORY: ORDERING SYSTEM PROVIDED HISTORY: infiltrate TECHNOLOGIST PROVIDED HISTORY: infiltrate Reason for Exam: infiltrate Acuity: Unknown Type of Exam: Unknown FINDINGS: A right upper extremity PICC line distal tip is at the cavoatrial junction. There is improved aeration of the left lower lung, with a small pleural effusion with overlying atelectasis/infiltrate remaining. No pneumothorax is demonstrated. The heart is enlarged. No acute osseous abnormality is seen.      Improved aeration of the left lower lung, with a small left pleural effusion with overlying atelectasis/pneumonia remaining. Xr Chest Portable    Result Date: 8/30/2020  EXAMINATION: ONE XRAY VIEW OF THE CHEST 8/30/2020 6:07 am COMPARISON: 08/28/2020 HISTORY: ORDERING SYSTEM PROVIDED HISTORY: infiltrate TECHNOLOGIST PROVIDED HISTORY: infiltrate Reason for Exam: infiltrate Acuity: Unknown Type of Exam: Unknown FINDINGS: Right upper extremity PICC line is seen with tip obscured by additional overlying catheters. Low lung volume. Left basilar pleuroparenchymal opacity appears greater than prior. Aeration of the right lung appears slightly improved. No gross pneumothorax. Cardiac and mediastinal silhouettes are reflective of patient rotation. Left pleural effusion and left basilar airspace disease, slightly greater than prior. Improving aeration of the right lung as compared to prior. Xr Chest Portable    Result Date: 8/27/2020  EXAMINATION: ONE XRAY VIEW OF THE CHEST 8/27/2020 6:15 pm COMPARISON: 08/26/2020 radiograph HISTORY: ORDERING SYSTEM PROVIDED HISTORY: Increased WOB TECHNOLOGIST PROVIDED HISTORY: Increased WOB Reason for Exam: Increased WOB Acuity: Unknown Type of Exam: Unknown Additional signs and symptoms: Increased WOB FINDINGS: Right PICC line stable. The heart is enlarged and there is severe perihilar opacification that is increased centrally. Diffuse ground-glass opacities are also increased bilaterally. No pneumothorax. No skeletal finding. Perihilar and diffuse ground-glass opacities have increased from prior imaging. Pattern suspected to represent pulmonary edema. Developing pneumonitis can not be excluded. Xr Chest Portable    Result Date: 8/26/2020  EXAMINATION: ONE XRAY VIEW OF THE CHEST 8/26/2020 9:07 am COMPARISON: August 24, 2020, chest exam HISTORY: ORDERING SYSTEM PROVIDED HISTORY: PNA TECHNOLOGIST PROVIDED HISTORY: PNA Reason for Exam: PT CO increased SOB and CP.  Acuity: Acute Type of Exam: Initial FINDINGS: Right PICC line catheter tip is projected at the SVC right atrial junction Persistent mild cardiomegaly Expiratory exam with mild diffuse prominence of lung markings likely related to the low volume lungs. Mild vascular congestion is not reliably excluded. Interval increase in now mild patchy right upper lobe infiltrate. Moderate left basilar infiltrate     Interval increase in now mild patchy right upper lobe infiltrate with moderate left basilar infiltrate Line placement as above Expiratory exam, possible mild vascular congestion. Repeat upright good inspiration PA view of the chest is suggested for more accurate assessment of the pulmonary vascularity RECOMMENDATION: Repeat upright good inspiration PA view of the chest is suggested for more accurate assessment of the pulmonary vascularity     Xr Chest Portable    Result Date: 8/24/2020  EXAMINATION: ONE XRAY VIEW OF THE CHEST 8/24/2020 9:10 am COMPARISON: August 21, 2020, chest exam HISTORY: ORDERING SYSTEM PROVIDED HISTORY: Resp failure TECHNOLOGIST PROVIDED HISTORY: Resp failure Reason for Exam: resp failure Acuity: Unknown Type of Exam: Unknown FINDINGS: Interval insertion of a right PICC line catheter, the tip is projected at the right atrium. Limited markedly rotated exam No obvious significant pleuroparenchymal or mediastinal findings. Limited assessment of the left lung base     Limited markedly rotated exam, limited left base assessment Line placement as above No obvious acute cardiopulmonary findings     Xr Chest Portable    Result Date: 8/22/2020  EXAMINATION: ONE XRAY VIEW OF THE CHEST 8/22/2020 8:41 am COMPARISON: 08/21/2020 HISTORY: ORDERING SYSTEM PROVIDED HISTORY: Difficulty breathing TECHNOLOGIST PROVIDED HISTORY: Difficulty breathing Reason for Exam: dyspnea Acuity: Acute Type of Exam: Initial FINDINGS: Cardiomegaly. Low lung volumes. Right-sided PICC line remains in place. No focal consolidation. No pleural effusion or pneumothorax.      Low lung volumes. This accentuates cardiomegaly. No focal consolidation. Xr Chest Portable    Result Date: 8/21/2020  EXAMINATION: ONE XRAY VIEW OF THE CHEST 8/21/2020 9:27 am COMPARISON: Chest radiograph performed 08/21/2020. HISTORY: ORDERING SYSTEM PROVIDED HISTORY: Increased oxygen demand TECHNOLOGIST PROVIDED HISTORY: Increased oxygen demand Reason for Exam: Increased oxygen demand Acuity: Acute Type of Exam: Initial Additional signs and symptoms: Increased oxygen demand FINDINGS: There are small bilateral effusions. There is a left basilar infiltrate. There is no pneumothorax. The heart is prominent. The upper abdomen is unremarkable. The extrathoracic soft tissues are unremarkable. Cardiomegaly and small bilateral effusions with adjacent left basilar infiltrate. Xr Chest Portable    Result Date: 8/20/2020  EXAMINATION: ONE XRAY VIEW OF THE CHEST 8/20/2020 7:38 am COMPARISON: July 18, 2020 chest exam HISTORY: ORDERING SYSTEM PROVIDED HISTORY: possible pneumonia on CT chest TECHNOLOGIST PROVIDED HISTORY: possible pneumonia on CT chest Reason for Exam: possible pneumonia on CT chest Acuity: Acute Type of Exam: Initial Additional signs and symptoms: possible pneumonia on CT chest FINDINGS: Expiratory exam with mild streaky bibasilar density. Normal cardiopericardial silhouette No significant pleural or mediastinal findings     Expiratory exam with mild streaky bibasilar atelectasis     Ct Chest Pulmonary Embolism W Contrast    Result Date: 8/26/2020  EXAMINATION: CTA OF THE CHEST; CT OF THE ABDOMEN AND PELVIS WITH CONTRAST 8/26/2020 10:34 am TECHNIQUE: CTA of the chest was performed after the administration of intravenous contrast.  Multiplanar reformatted images are provided for review. MIP images are provided for review.  Dose modulation, iterative reconstruction, and/or weight based adjustment of the mA/kV was utilized to reduce the radiation dose to as low as reasonably achievable.; CT of the abdomen edema. Conclusions   Summary   No evidence of superficial or deep venous thrombosis in both lower  extremities. Signature   ----------------------------------------------------------------  Electronically signed by Brown Cevallos(Sonographer) on  08/30/2020 04:00 PM  ----------------------------------------------------------------   ----------------------------------------------------------------  Electronically signed by Ortiz Toro(Interpreting physician)  on 08/30/2020 08:16 PM  ----------------------------------------------------------------  Findings:   Right Impression:                    Left Impression:  The common femoral, femoral,         The common femoral, femoral,  popliteal and tibial veins           popliteal and tibial veins  demonstrate normal compressibility   demonstrate normal compressibility  and augmentation. and augmentation. Normal compressibility of the great  Normal compressibility of the great  saphenous vein. saphenous vein. Normal compressibility of the small  Normal compressibility of the small  saphenous vein. saphenous vein. Velocities are measured in cm/s ; Diameters are measured in cm Right Lower Extremities DVT Study Measurements Right 2D Measurements +------------------------------------+----------+---------------+----------+ ! Location                            ! Visualized! Compressibility! Thrombosis! +------------------------------------+----------+---------------+----------+ ! Common Femoral                      !Yes       ! Yes            ! None      ! +------------------------------------+----------+---------------+----------+ ! Prox Femoral                        !Yes       ! Yes            ! None      ! +------------------------------------+----------+---------------+----------+ ! Mid Femoral                         !Yes       ! Yes            ! None      ! +------------------------------------+----------+---------------+----------+ ! Dist Femoral                        !Yes       ! Yes            ! None      ! +------------------------------------+----------+---------------+----------+ ! Popliteal                           !Yes       ! Yes            ! None      ! +------------------------------------+----------+---------------+----------+ ! Sapheno Femoral Junction            ! Yes       ! Yes            ! None      ! +------------------------------------+----------+---------------+----------+ ! PTV                                 ! Yes       ! Yes            ! None      ! +------------------------------------+----------+---------------+----------+ ! Peroneal                            !Yes       ! Yes            ! None      ! +------------------------------------+----------+---------------+----------+ ! Gastroc                             ! Yes       ! Yes            ! None      ! +------------------------------------+----------+---------------+----------+ ! GSV Thigh                           ! Yes       ! Yes            ! None      ! +------------------------------------+----------+---------------+----------+ ! GSV Knee                            ! Yes       ! Yes            ! None      ! +------------------------------------+----------+---------------+----------+ ! GSV Ankle                           ! Yes       ! Yes            ! None      ! +------------------------------------+----------+---------------+----------+ ! SSV                                 ! Yes       ! Yes            ! None      ! +------------------------------------+----------+---------------+----------+ Right Doppler Measurements +---------------------------+------+------+--------------------------------+ ! Location                   ! Signal!Reflux! Reflux (msec)                   ! +---------------------------+------+------+--------------------------------+ ! Common Femoral             !Phasic!      ! ! +---------------------------+------+------+--------------------------------+ ! Prox Femoral               !Phasic!      !                                ! +---------------------------+------+------+--------------------------------+ ! Popliteal                  !Phasic!      !                                ! +---------------------------+------+------+--------------------------------+ Left Lower Extremities DVT Study Measurements Left 2D Measurements +------------------------------------+----------+---------------+----------+ ! Location                            ! Visualized! Compressibility! Thrombosis! +------------------------------------+----------+---------------+----------+ ! Common Femoral                      !Yes       ! Yes            ! None      ! +------------------------------------+----------+---------------+----------+ ! Prox Femoral                        !Yes       ! Yes            ! None      ! +------------------------------------+----------+---------------+----------+ ! Mid Femoral                         !Yes       ! Yes            ! None      ! +------------------------------------+----------+---------------+----------+ ! Dist Femoral                        !Yes       ! Yes            ! None      ! +------------------------------------+----------+---------------+----------+ ! Popliteal                           !Yes       ! Yes            ! None      ! +------------------------------------+----------+---------------+----------+ ! Sapheno Femoral Junction            ! Yes       ! Yes            ! None      ! +------------------------------------+----------+---------------+----------+ ! PTV                                 ! Yes       ! Yes            ! None      ! +------------------------------------+----------+---------------+----------+ ! Perbony                            !Yes       ! Yes            ! None      ! +------------------------------------+----------+---------------+----------+ ! Gastroc !Yes       !Yes            ! None      ! +------------------------------------+----------+---------------+----------+ ! GSV Thigh                           ! Yes       ! Yes            ! None      ! +------------------------------------+----------+---------------+----------+ ! GSV Knee                            ! Yes       ! Yes            ! None      ! +------------------------------------+----------+---------------+----------+ ! GSV Ankle                           ! Yes       ! Yes            ! None      ! +------------------------------------+----------+---------------+----------+ ! SSV                                 ! Yes       ! Yes            ! None      ! +------------------------------------+----------+---------------+----------+ Left Doppler Measurements +---------------------------+------+------+--------------------------------+ ! Location                   ! Signal!Reflux! Reflux (msec)                   ! +---------------------------+------+------+--------------------------------+ ! Common Femoral             !Phasic!      !                                ! +---------------------------+------+------+--------------------------------+ ! Prox Femoral               !Phasic!      !                                ! +---------------------------+------+------+--------------------------------+ ! Popliteal                  !Phasic!      !                                ! +---------------------------+------+------+--------------------------------+    Us Retroperitoneal Limited    Result Date: 8/25/2020  EXAMINATION: ULTRASOUND OF THE KIDNEYS 8/25/2020 1:19 pm COMPARISON: August 20, 2020 CT abdomen and pelvis HISTORY: ORDERING SYSTEM PROVIDED HISTORY: Low UOP TECHNOLOGIST PROVIDED HISTORY: Bilateral Renal Ultrasound Low UOP Acuity: Acute Type of Exam: Initial FINDINGS: Exam is limited due to patient body habitus. The right kidney measures 15.7 centimetres in length and the left kidney measures 16 cm in length.  There is no hydronephrosis in either kidney. New focal renal lesion. Diffuse hepatic steatosis. No hydronephrosis in either kidney. Ir Guided Thoracentesis Pleural    Result Date: 8/31/2020  PROCEDURE: Tarik Trammell LEFT DIAGNOSTIC THORACENTESIS 8/28/2020 HISTORY: ORDERING SYSTEM PROVIDED HISTORY: pleural effusion left TECHNOLOGIST PROVIDED HISTORY: pleural effusion left Is the patient pregnant? ->Yes TECHNIQUE: Informed consent was obtained after a detailed explanation of the procedure including risks, benefits, and alternatives. Universal protocol was performed. The left posterior chest was prepped and draped in sterile fashion and local anesthesia was achieved with lidocaine. An 5 [place] needle sheath was advanced under ultrasound guidance into the small pleural effusion and diagnostic thoracentesis was performed. The patient tolerated the procedure well. Fluid was provided for further analysis. FINDINGS: Only a small amount of accessible left pleural fluid demonstrated sonographically. A total of 15 mL cloudy yellow fluid was removed. Successful ultrasound guided diagnostic left thoracentesis. Consultations:    Consults:     Final Specialist Recommendations/Findings:   IP CONSULT TO GI  IP CONSULT TO PULMONOLOGY  IP CONSULT TO GENERAL SURGERY  IP CONSULT TO DIETITIAN  IP CONSULT TO PHARMACY  IP CONSULT TO PSYCHIATRY  IP CONSULT TO INFECTIOUS DISEASES      The patient was seen and examined on day of discharge and this discharge summary is in conjunction with any daily progress note from day of discharge. Discharge plan:   She is fairly stable and should follow up with his PCP. If she  Feels symptoms are worsening, she should come ASAP.     Disposition: Home    Physician Follow Up:     Zaria Guzman  [address] [phone]          [facility] [address] [address] Mehdi Campos - They will call you to schedule a good time to visit       Requiring Further Evaluation/Follow Up POST HOSPITALIZATION/Incidental Findings:     Diet: regular diet    Activity: As tolerated    Instructions to Patient:     Discharge Medications:      Medication List      ASK your doctor about these medications    acetaminophen 500 MG tablet  Commonly known as:  APAP Extra Strength  Take 2 tablets by mouth every 6 hours as needed for Pain     busPIRone 15 MG tablet  Commonly known as:  BUSPAR     butalbital-APAP-caffeine -40 MG Caps per capsule  Commonly known as:  Fioricet  Take 1 capsule by mouth every 6 hours as needed for Headaches  Ask about: Which instructions should I use? DULoxetine 60 MG extended release capsule  Commonly known as:  CYMBALTA  TAKE 1 CAPSULE BY MOUTH TWICE A DAY     fexofenadine 180 MG tablet  Commonly known as:  RA Allergy Relief  take 1 tablet by mouth once daily     furosemide 20 MG tablet  Commonly known as:  Lasix  Take 1 tablet by mouth 2 times daily     ibuprofen 200 MG tablet  Commonly known as:   Advil  Take 2 tablets by mouth every 6 hours as needed for Pain     magnesium oxide 400 MG tablet  Commonly known as:  MAG-OX     melatonin 5 MG Tabs tablet     metoprolol tartrate 25 MG tablet  Commonly known as:  LOPRESSOR     omeprazole 40 MG delayed release capsule  Commonly known as:  PRILOSEC     potassium chloride 10 MEQ extended release tablet  Commonly known as:  KLOR-CON M  Take 1 tablet by mouth 2 times daily     pramipexole 0.5 MG tablet  Commonly known as:  MIRAPEX  take 1 tablet by mouth twice a day     prazosin 1 MG capsule  Commonly known as:  MINIPRESS     Proventil  (90 Base) MCG/ACT inhaler  Generic drug:  albuterol sulfate HFA     SEROquel  MG extended release tablet  Generic drug:  QUEtiapine     vitamin D 50 MCG (2000 UT) Caps capsule            Time Spent on discharge is  40 mins in patient examination, evaluation, counseling as well as medication reconciliation, prescriptions for

## 2020-09-04 NOTE — CARE COORDINATION
Continuity of Care Form    Patient Name: Raul Gordon   :  1975  MRN:  [id number]    Admit date:  2020  Discharge date:  2020    Code Status Order: Full Code   Advance Directives:   [facility] Documentation     Date/Time Healthcare Directive Type of Healthcare Directive Copy in [facility] [address] Agent's Name Healthcare Agent's Phone Number    20 0232  No, patient does not have an advance directive for healthcare treatment -- -- -- -- --          Admitting Physician:  Nafisa Cotter MD  PCP: Hans Porter    Discharging Nurse: [facility] Unit/Room#:   Discharging Unit Phone Number: [phone]    Emergency Contact:   Extended Emergency Contact Information  Primary Emergency Contact: Rachael De Los Santos   [address] Phone: [phone]  Mobile Phone: [phone]  Relation: Parent  Preferred language: English   needed?  No    Past Surgical History:  Past Surgical History:   Procedure Laterality Date    ATRIAL ABLATION SURGERY      BACK SURGERY      L4-5    CHOLECYSTECTOMY      ENDOMETRIAL ABLATION      e sure implants placed also    ENDOSCOPY, COLON, DIAGNOSTIC      EXCISION LESION HAND / FINGER Right 2019    HAND LESION BIOPSY EXCISION performed by Zaida Mcdaniel MD at [facility] Bilateral     left x2, right x1    FOOT SURGERY Right     x3    KNEE ARTHROSCOPY Left     x2    NC CYSTO/URETERO/PYELOSCOPY W/LITHOTRIPSY Left 2017    CYSTOSCOPY URETEROSCOPY HOLMIUM LASER LITHO WITH STONE BASKETING WITH  STENT  performed by Breonna Blount MD at [facility] Right        Immunization History:   Immunization History   Administered Date(s) Administered    Hepatitis A 2019    Influenza Virus Vaccine 2013    Influenza, [place], IM, PF (6 mo and older Fluzone, Flulaval, Fluarix, and 3 yrs and older Afluria) 2016, 2018    Tdap (Boostrix, Adacel) 09/15/2017       Active Problems:  Patient Active Problem List   Diagnosis Code    Anxiety F41.9    Class 3 severe obesity due to excess calories with serious comorbidity in adult ([facility]) E66.01    Restless leg syndrome G25.81    Previous back surgery Z98.890    Major depression F32.9    HTN (hypertension) I10    FHx: rheumatoid arthritis Z82.61    SVT (supraventricular tachycardia) ([facility]) I47.1    Right elbow pain M25.521    Pain of right lower extremity M79.604    Cough with sputum R05    Varicose vein of leg I83.90    Leg swelling M79.89    Herpes zoster without complication Z24.7    Chest pain R07.9    Fibromyalgia M79.7    Acute pancreatitis K85.90    Sepsis ([facility]) A41.9    Morbid obesity ([facility]) E66.01    Abdominal pain, epigastric R10.13    Nausea R11.0    Acute respiratory failure with hypoxia ([facility]) J96.01    Hypocalcemia E83.51    History of anxiety disorder Z86.59    Abnormal levels of other serum enzymes R74.8    Elevated liver enzymes R74.8    Bilateral leg edema R60.0    Smoker F17.200    Severe episode of recurrent major depressive disorder, without psychotic features ([facility] [address] F33.2    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    Presbyopia H52.4    Pseudotumor cerebri syndrome G93.2    Insomnia due to psychological stress F51.02    Astigmatism H52.209    Generalized anxiety disorder F41.1    Carpal tunnel syndrome of right wrist G56.01    Pneumonia of both lungs due to infectious organism J18.9       Isolation/Infection:   Isolation          No Isolation        Patient Infection Status     Infection Onset Added Last Indicated Last Indicated By Review Planned Expiration Resolved Resolved By    None active    Resolved    COVID-19 Rule Out 08/26/20 08/26/20 08/26/20 COVID-19 (Ordered)   08/26/20 Rule-Out Test Resulted          Nurse Assessment:  Last Vital Signs: BP (!) 152/85   Pulse 108   Temp 97.8 °F (36.6 °C) (Oral)   Resp 29   Ht 5' 5\" (1.651 m)   Wt (!) 322 lb 8.5 oz (146.3 kg)   SpO2 92%   BMI 53.67 kg/m²     Last documented pain score (0-10 scale): Pain Level: 7  Last Weight:   Wt Readings from Last 1 Encounters:   08/25/20 (!) 322 lb 8.5 oz (146.3 kg)     Mental Status:  alert, coherent, logical and thought processes intact    IV Access:  - None    Nursing Mobility/ADLs:  Walking   Independent  Transfer  Independent  Bathing  Independent  Dressing  Independent  Toileting  Independent  Feeding  Independent  Med Admin  Independent  Med Delivery   whole    Wound Care Documentation and Therapy:  Wound 08/22/20 Nose Mid (Active)   Wound Pressure Stage  2 09/01/20 1200   Dressing Status Other (Comment) 08/28/20 0400   Dressing/Treatment Open to air 09/01/20 1200   Wound Assessment Pink 08/30/20 0400   Drainage Amount None 08/28/20 0400   Drainage Description Serosanguinous 08/24/20 0400   Odor None 08/28/20 0400   Margins Attached edges; Defined edges 08/28/20 0400   Emily-wound Assessment Pink 08/28/20 0400   Number of days: 9        Elimination:  Continence:   · Bowel: Yes  · Bladder: Yes  Urinary Catheter: None   Colostomy/Ileostomy/Ileal Conduit: No  [REMOVED] Rectal Tube With balloon-Stool Appearance: Loose  [REMOVED] Rectal Tube With balloon-Stool Color: Brown  [REMOVED] Rectal Tube With balloon-Stool Amount: Smear    Date of Last BM: 9/4/2020    Intake/Output Summary (Last 24 hours) at 9/1/2020 1453  Last data filed at 9/1/2020 0938  Gross per 24 hour   Intake 2816 ml   Output 3475 ml   Net -659 ml     I/O last 3 completed shifts: In: 2816 [P.O.:480; I.V.:1136; IV Piggyback:200]  Out: 6291 [Urine:3875]    Safety Concerns:      At Risk for Falls    Impairments/Disabilities:      None    Nutrition Therapy:  Current Nutrition Therapy:   - Oral Diet:  Low Fat    Routes of Feeding: Oral  Liquids: No Restrictions  Daily Fluid Restriction: no  Last Modified Barium Swallow with Video (Video Swallowing Test): not done    Treatments at the Time of Hospital Discharge:   Respiratory Treatments: n/a  Oxygen Therapy:  is not on home oxygen therapy. Ventilator:    - BiPAP   IPAP: 14 cmH20, CPAP/EPAP: 6 cmH2O only when sleeping    Rehab Therapies: Physical Therapy and Occupational Therapy  Weight Bearing Status/Restrictions: No weight bearing restirctions  Other Medical Equipment (for information only, NOT a DME order):  walker  Other Treatments: Skilled Nursing Assessment, Medication Education and Monitoring. Pt [facility] her [facility] [address] [facility] [place] Patient's personal belongings (please select all that are sent with patient):  Glasses, clothing    RN SIGNATURE:  Electronically signed by General Ernst RN on 9/4/20 at 3:02 PM EDT    CASE MANAGEMENT/SOCIAL WORK SECTION    Inpatient Status Date: 8/19/2020    Readmission Risk Assessment Score:  Readmission Risk              Risk of Unplanned Readmission:        22           Discharging to Facility/ [facility]  [address] [address]  Phone [phone]  Fax  [phone]        Dialysis Facility (if applicable)   · Name:  · Address:  · Dialysis Schedule:  · Phone:  · Fax:    / signature: Electronically signed by Kayla Scott RN on 9/1/20 at 2:54 PM EDT    PHYSICIAN SECTION    Prognosis: Good    Condition at Discharge: Stable    Rehab Potential (if transferring to Rehab): Good    Recommended Labs or Other Treatments After Discharge:     Physician Certification: I certify the above information and transfer of Julia Dumont  is necessary for the continuing treatment of the diagnosis listed and that she requires Home Care for less 30 days.      Update Admission H&P: No change in H&P    PHYSICIAN SIGNATURE:  Electronically signed by Mac Khan MD on 9/4/20 at 1:40 PM EDT    Current Discharge Medication List     START taking these medications     Medication  Dose    !! ibuprofen (ADVIL;MOTRIN) Quantity: 60 tablet  Refills: 0        metoprolol (LOPRESSOR) 25 MG tablet  25 mg    Take 25 mg by mouth daily    Refills: 0         butalbital-APAP-caffeine (FIORICET) -40 MG CAPS per capsule  1 capsule    Take 1 capsule by mouth every 6 hours as needed for Headaches    Quantity: 28 capsule  Refills: 0         !! Potential duplicate medications found. Please discuss with provider.     STOP taking these previous medications     Medication  Dose  Reason for Stopping  Comments    (STOP TAKING) tiZANidine (ZANAFLEX) 4 MG tablet  4 mg            (STOP TAKING) NIFEdipine (ADALAT CC) 30 MG extended release tablet  30 mg            (STOP TAKING) butalbital-APAP-caffeine (FIORICET) -40 MG CAPS per capsule  1 capsule            (STOP TAKING) hydrOXYzine (VISTARIL) 25 MG capsule  25 mg            Printing Report     Report ID  Report Name  Print    [id number]  Discharge Meds  Print

## 2020-09-04 NOTE — CARE COORDINATION
Writer spoke to Letcher", from [facility]. She informed writer that they will be able to follow pt & will be able to see pt. This weekend. Informed Her, that writer is anticipating DC today. She will follow.

## 2020-09-04 NOTE — PLAN OF CARE
Problem: Falls - Risk of:  Goal: Will remain free from falls  Description: Will remain free from falls  9/4/2020 0338 by David Jolley RN  Outcome: Ongoing  Note: Patient remained free from falls. Call light within reach. Problem: Pain:  Goal: Pain level will decrease  Description: Pain level will decrease  9/4/2020 0338 by David Jolley RN  Outcome: Ongoing  Note: Patient given pain medication as ordered. Problem: Nausea/Vomiting:  Goal: Absence of nausea/vomiting  Description: Absence of nausea/vomiting  9/4/2020 0338 by David Jolley RN  Outcome: Ongoing  Note: No nausea or vomiting this shift. Problem: Gas Exchange - Impaired:  Goal: Levels of oxygenation will improve  Description: Levels of oxygenation will improve  Outcome: Ongoing  Note: Stable levels of oxygenation this shift. Problem: Infection, Septic Shock:  Goal: Will show no infection signs and symptoms  Description: Will show no infection signs and symptoms  Outcome: Ongoing  Note: No new signs or symptoms of infection noted this shift. Problem: Skin Integrity:  Goal: Will show no infection signs and symptoms  Description: Will show no infection signs and symptoms  Outcome: Ongoing  Note: No new alterations in skin integrity.

## 2020-09-04 NOTE — PROGRESS NOTES
Gastroenterology Progress Note    Josey Madden is a 54 y.o. female patient. Hospitalization Day:16    I am seeing the patient today for pancreatitis. SUBJECTIVE:    Patient looks stable. Tolerating soft diet. Has a bowel movements. Has flatus. No nausea vomiting. Has minimal abdominal discomfort. Lipase levels about 66. No fever. Breathing is better. Physical    VITALS:  /67   Pulse 121   Temp 99 °F (37.2 °C) (Axillary)   Resp 18   Ht 5' 5\" (1.651 m)   Wt 277 lb 1.9 oz (125.7 kg)   SpO2 97%   BMI 46.11 kg/m²   TEMPERATURE:  Current - Temp: 99 °F (37.2 °C); Max - Temp  Av.1 °F (36.7 °C)  Min: 97.5 °F (36.4 °C)  Max: 99 °F (37.2 °C)    General:  Alert and oriented,  No apparent distress  Skin- without jaundice  Eyes: anicteric sclera  Cardiac: RRR, Nl s1s2, without murmurs  Lungs CTA Bilaterally, normal effort, has rales and rhonchi. Abdomen soft, ND, NT, no HSM, Bowel sounds normal, slightly obese abdomen. Ext: without edema  Neuro: no asterixis     Data    Data Review:    Recent Labs     20  05020  04520  0500   WBC 13.2* 13.2* 12.0*   HGB 10.7* 10.4* 10.5*   HCT 32.0* 31.2* 31.4*   MCV 93.9 93.8 93.4   * 504* 571*     Recent Labs     20  0509 20  0451 20  0500    134* 132*   K 4.4 4.5 4.8   CL 98 98 96*   CO2 26 23 21   PHOS 4.0 5.2* 6.4*   BUN 12 16 28*   CREATININE <0.40* 0.43* 0.83     Recent Labs     20  0509 20  0943 20  0451 20  0500   AST 42* 31 19 19   ALT 51* 46* 36* 32   BILIDIR <0.08 0.08  --   --    BILITOT <0.15* 0.16* 0.16* 0.18*   ALKPHOS 218* 206* 169* 150*     Recent Labs     20  0509 20  0451 20  0500   LIPASE 55 57 66*     No results for input(s): PROTIME, INR in the last 72 hours. No results for input(s): PTT in the last 72 hours.     Radiology Review:        ASSESSMENT:  Principal Problem:    Pneumonia of both lungs due to infectious organism  Active Problems:    Anxiety    Class 3 severe obesity due to excess calories with serious comorbidity in adult [facility])    Restless leg syndrome    Major depression    HTN (hypertension)    Fibromyalgia    Acute pancreatitis    Sepsis ([address]    Morbid obesity (HCC)    Abdominal pain, epigastric    Nausea    Acute respiratory failure with hypoxia (HCC)    Hypocalcemia    History of anxiety disorder  Resolved Problems:    * No resolved hospital problems. *      The conditions that I am treating are Stable and are improving    PLAN :  1. To stay on low-fat diet. 2. From GI point of view may be discharged. 3. Discussed with the patient to see in the office in the next 2 to 3 weeks. Thank you for allowing me to participate in the care of your patient. Please feel free to contact me with any concerns. [phone]    Eleanor Freedman MD    Note is dictated utilizing voice recognition software. Unfortunately this leads to occasional typographical errors. Please contact our office if you have any questions.

## 2020-09-04 NOTE — PROGRESS NOTES
[facility] -   Infectious diseases evaluation  admission date 8/19/2020    reason for consultation:   Fever    Impression :   · Fever /leukocytosis possible pneumonia treated. · Left pleural effusion status post arthrocentesis 8/28/2020, no growth on cultures. · Severe pancreatitis, no necrosis or pseudocyst seen on CT 8/26/2020  · Possible PE on Lovenox  · Acute hypoxic respiratory failure  · Skin rash, possible allergic reaction to Zosyn resolved  · Hypertension   · Obesity      Recommendations   Current:  · IV meropenem was discontinued 9/1/2020  · COVID 19  test was negative on 8/26/2020  · Blood cultures from 8/23 negative   · MRSA nasal swab was negative 8/21/2020  · 8/27/2020 viral PCR negative  · Remove PICC line            History of Present Illness:   Initial history:  Bianca Ponce is a 54y.o.-year-old female presented to the hospital with epigastric abdominal pain and bloating for several days prior to admission, severe, no radiation, no alleviating or relieving factors. Pancreatic enzymes were elevated, imaging suggestive of severe pancreatitis. She was on IV Zosyn for 7 days, developed a rash on 8/26/2020, IV Zosyn was discontinued and the rash resolved. IV meropenem started 8/26/2020  Right arm PICC line was placed 8/20/2020 and has been on TPN  CT chest 8/26/2020 suggestive of questionable PE, bilateral consolidations and left pleural effusion  Interval changes  9/4/2020   She had a fever with a temperature max of 104 on 8/26/2020, last fever was 8/29/2020 with a temperature of 101, has been afebrile since. She is sleeping,  reportedly improving, tolerating food with no abdominal pain. Lipase 66  WBC 12    I have personally reviewed the past medical history, past surgical history, medications, social history, and family history, and I haveupdated the database accordingly.   Past Medical History:     Past Medical History:   Diagnosis Date  Abnormal levels of other serum enzymes     Anxiety     Bilateral leg edema     Chronic kidney disease     right renal cyst    Depression     DVT (deep venous thrombosis) (HCC) 1997    after delivery    Elevated liver enzymes     Fibromyalgia     Headache(784.0)     migraines    Hx of blood clots     1997 left calf    Hypertension     Kidney stone     Obstructive sleep apnea syndrome     Pancreatitis 2001    Pleural effusion 2001    SOB (shortness of breath)     Supraventricular tachycardia (HCC)     SVT (supraventricular tachycardia) ([address] 9/8/2015       Past Surgical  History:     Past Surgical History:   Procedure Laterality Date    ATRIAL ABLATION SURGERY      BACK SURGERY      L4-5    CHOLECYSTECTOMY  2001    ENDOMETRIAL ABLATION      e sure implants placed also    ENDOSCOPY, COLON, DIAGNOSTIC      EXCISION LESION HAND / FINGER Right 1/25/2019    HAND LESION BIOPSY EXCISION performed by [facility] Bilateral     left x2, right x1    FOOT SURGERY Right     x3    KNEE ARTHROSCOPY Left     x2    NE CYSTO/URETERO/PYELOSCOPY W/LITHOTRIPSY Left 9/20/2017    CYSTOSCOPY URETEROSCOPY HOLMIUM LASER LITHO WITH STONE BASKETING WITH  STENT  performed by [facility]ly[facility] Austin [facility] at [facility] Right        Medications:      prazosin  1 mg Oral Nightly    promethazine  12.5 mg Intramuscular Once    acetaminophen  650 mg Oral Once    pramipexole  0.25 mg Oral Daily    lidocaine  1 patch Transdermal Daily    lipase-protease-amylase  24,000 Units Oral TID WC    amLODIPine  10 mg Oral Daily    metoprolol tartrate  25 mg Oral BID    LORazepam  1 mg Oral Q6H    lisinopril  40 mg Oral Daily    insulin lispro  0-6 Units Subcutaneous Q6H    enoxaparin  30 mg Subcutaneous BID    magnesium oxide  400 mg Oral Daily    sodium chloride flush  10 mL Intravenous 2 times per day    QUEtiapine  800 mg Oral Nightly    pantoprazole  40 mg Intravenous Daily    And    sodium chloride (PF)  10 mL Intravenous Daily    busPIRone  15 mg Oral Nightly    DULoxetine  60 mg Oral BID    cetirizine  10 mg Oral Daily       Social History:     Social History     Socioeconomic History    Marital status:      Spouse name: Not on file    Number of children: Not on file    Years of education: Not on file    Highest education level: Not on file   Occupational History    Not on file   Social Needs    Financial resource strain: Not on file    Food insecurity     Worry: Not on file     Inability: Not on file    Transportation needs     Medical: Not on file     Non-medical: Not on file   Tobacco Use    Smoking status: Former Smoker     Packs/day: 1.00     Types: Cigarettes     Last attempt to quit: 2019     Years since quittin.7    Smokeless tobacco: Never Used    Tobacco comment: today last smoke   Substance and Sexual Activity    Alcohol use:  Yes     Alcohol/week: 0.0 standard drinks     Comment: rarely    Drug use: No    Sexual activity: Not on file   Lifestyle    Physical activity     Days per week: Not on file     Minutes per session: Not on file    Stress: Not on file   Relationships    Social connections     Talks on phone: Not on file     Gets together: Not on file     Attends Denominational service: Not on file     Active member of club or organization: Not on file     Attends meetings of clubs or organizations: Not on file     Relationship status: Not on file    Intimate partner violence     Fear of current or ex partner: Not on file     Emotionally abused: Not on file     Physically abused: Not on file     Forced sexual activity: Not on file   Other Topics Concern    Not on file   Social History Narrative    Not on file       Family History:     Family History   Problem Relation Age of Onset    Heart Disease Father     High Blood Pressure Brother         Allergies:   Aripiprazole; Eletriptan; Hydrocodone-acetaminophen; Oxycodone-acetaminophen; Sumatriptan; Triptans; Zosyn [piperacillin sod-tazobactam so]; and Lamotrigine     Review of Systems:     Review of Systems  Sleeping unable to provide  Physical Examination :     Patient Vitals for the past 8 hrs:   BP Temp Temp src Pulse Resp SpO2 Weight   09/04/20 1016 122/67 99 °F (37.2 °C) Axillary 121 18 97 % --   09/04/20 0815 121/74 97.9 °F (36.6 °C) Oral 118 16 97 % --   09/04/20 0415 (!) 95/55 98.6 °F (37 °C) Oral 117 18 95 % --   09/04/20 0346 -- -- -- -- -- -- 277 lb 1.9 oz (125.7 kg)       Physical Exam  Constitutional:       Comments: Sleeping, comfortable   HENT:      Head: Normocephalic and atraumatic. Cardiovascular:      Rate and Rhythm: Normal rate and regular rhythm. Heart sounds: No murmur. Pulmonary:      Breath sounds: Normal breath sounds. Abdominal:      General: There is no distension. Palpations: Abdomen is soft. Musculoskeletal:      Right lower leg: Edema present. Left lower leg: Edema present. Skin:     General: Skin is warm. Coloration: Skin is not jaundiced. Medical Decision Making:   I have independently reviewed/ordered the following labs:    CBC with Differential:   Recent Labs     09/03/20  0451 09/04/20  0500   WBC 13.2* 12.0*   HGB 10.4* 10.5*   HCT 31.2* 31.4*   * 571*   LYMPHOPCT 18* 10*   MONOPCT 2 7     BMP:  Recent Labs     09/03/20  0451 09/04/20  0500   * 132*   K 4.5 4.8   CL 98 96*   CO2 23 21   BUN 16 28*   CREATININE 0.43* 0.83   MG 2.1 2.1     Hepatic Function Panel:   Recent Labs     09/02/20  0509 09/02/20  0943 09/03/20  0451 09/04/20  0500   PROT 7.3 7.2 7.3 7.4   LABALBU 2.8* 2.9* 2.8* 3.0*   BILIDIR <0.08 0.08  --   --    IBILI  --  0.08  --   --    BILITOT <0.15* 0.16* 0.16* 0.18*   ALKPHOS 218* 206* 169* 150*   ALT 51* 46* 36* 32   AST 42* 31 19 19       Lab Results   Component Value Date    CREATININE 0.83 09/04/2020    GLUCOSE 137 09/04/2020       Detailed results:         Thank

## 2020-09-04 NOTE — CARE COORDINATION
Writer notified, Mindy Leo, from [facility], [facility], that pt. Will DC to home today. Faxed [facility]/DC med list to office & instructed to call writer w/ any questions.

## 2020-09-04 NOTE — PROGRESS NOTES
[address]    PROGRESS NOTE             9/4/2020    8:33 AM    Name:   Loly Rascon  MRN:     [id number]     Acct:      [de-identified]   Room:   [facility] [place] [address] Day:  12  Admit Date:  8/19/2020  7:35 PM    PCP:  Pam Solares  Code Status:  Full Code    Subjective:     C/C:   Chief Complaint   Patient presents with    Abdominal Pain     RUQ     Interval History Status: significantly improved. Pt was seen and examined. Pt feels better than yesterday. She ate the whole meal  Last night without any complains. She is having her breakfast.     She complains about diffuse abdominal and chest pain. Pain does not increase with deep inspiration, no releiving and aggravating factors. She did not take tylenol which was prescribed yesterday. No complains of fever, chills, N, V, diarrhea, SOB, pain in the legs. No acute events occurred overnight. Brief History:     Please se H & P    Review of Systems:     Review of Systems   Constitutional: Negative. HENT: Negative. Eyes: Negative. Respiratory: Negative for chest tightness and shortness of breath. Cardiovascular: Negative for palpitations. Gastrointestinal: Negative for blood in stool, diarrhea, nausea and vomiting. Genitourinary: Negative for difficulty urinating. Musculoskeletal: Negative. Skin: Negative. Allergic/Immunologic: Negative. Neurological: Negative. Hematological: Negative. Psychiatric/Behavioral: Negative. Medications: Allergies:     Allergies   Allergen Reactions    Aripiprazole      Bad reaction    Eletriptan      Other reaction(s): Swelling-throat    Hydrocodone-Acetaminophen      Other reaction(s): Unknown    Oxycodone-Acetaminophen      Other reaction(s): Unknown    Sumatriptan Other (See Comments)    Triptans      Other reaction(s): Unknown    Zosyn [Piperacillin Sod-Tazobactam So]      Rash 8/26/20 possibley caused by zosyn    Lamotrigine Rash       Current Meds:   Scheduled Meds:    HYDROmorphone  0.5 mg Intravenous Once    prazosin  1 mg Oral Nightly    promethazine  12.5 mg Intramuscular Once    acetaminophen  650 mg Oral Once    pramipexole  0.25 mg Oral Daily    lidocaine  1 patch Transdermal Daily    lipase-protease-amylase  24,000 Units Oral TID WC    amLODIPine  10 mg Oral Daily    metoprolol tartrate  25 mg Oral BID    LORazepam  1 mg Oral Q6H    lisinopril  40 mg Oral Daily    insulin lispro  0-6 Units Subcutaneous Q6H    enoxaparin  30 mg Subcutaneous BID    magnesium oxide  400 mg Oral Daily    sodium chloride flush  10 mL Intravenous 2 times per day    QUEtiapine  800 mg Oral Nightly    pantoprazole  40 mg Intravenous Daily    And    sodium chloride (PF)  10 mL Intravenous Daily    busPIRone  15 mg Oral Nightly    DULoxetine  60 mg Oral BID    cetirizine  10 mg Oral Daily     Continuous Infusions:    dextrose       PRN Meds: sodium chloride flush, acetaminophen, hydrALAZINE, glucose, dextrose, glucagon (rDNA), dextrose, perflutren lipid microspheres, promethazine (PHENERGAN) in sodium chloride 0.9% IVPB, sodium chloride flush, magnesium sulfate, potassium chloride **OR** potassium alternative oral replacement **OR** potassium chloride, acetaminophen, [DISCONTINUED] promethazine **OR** ondansetron, melatonin ER    Data:     Past Medical History:   has a past medical history of Abnormal levels of other serum enzymes, Anxiety, Bilateral leg edema, Chronic kidney disease, Depression, DVT (deep venous thrombosis) (HCC), Elevated liver enzymes, Fibromyalgia, Headache(784.0), Hx of blood clots, Hypertension, Kidney stone, Obstructive sleep apnea syndrome, Pancreatitis, Pleural effusion, SOB (shortness of breath), Supraventricular tachycardia ([address] and SVT (supraventricular tachycardia) ([address] Social History:   reports that she quit smoking about 9 months ago.  Her smoking use included Unknown Type of Exam: Subsequent/Follow-up Additional signs and symptoms: f/u pneumonia Relevant Medical/Surgical History: f/u pneumonia FINDINGS: Poor inspiration with decrease lung volumes worse on the prior. Right upper extremity PICC line remain in place. Cardiomediastinal silhouette stable accentuated by the low lung volumes. No focal consolidation. Patchy airspace opacities in the left upper lung accentuated by low lung volume. Poor inspiration with low lung volumes worse than prior. Patchy airspace opacities in the left upper lung accentuated by low lung volume. Xr Chest (single View Frontal)    Result Date: 8/21/2020  EXAMINATION: ONE XRAY VIEW OF THE CHEST 8/21/2020 6:06 am COMPARISON: August 20, 2020 chest exam HISTORY: ORDERING SYSTEM PROVIDED HISTORY: f/u atelectasis TECHNOLOGIST PROVIDED HISTORY: f/u atelectasis Reason for Exam: F/U atelectasis. Acuity: Unknown Type of Exam: Unknown Additional signs and symptoms: F/U atelectasis. FINDINGS: Interval insertion of right PICC line catheter, the tip projected at the right atrium Mild cardiomegaly Limited extra sonya exam with low volume lungs, mild streaky, left greater than right, bibasilar atelectasis. Grossly clear upper lungs     Line placement as above Limited expiratory exam with mild streaky bibasilar atelectasis     Ct Abdomen Pelvis W Iv Contrast Additional Contrast? None    Result Date: 8/26/2020  EXAMINATION: CTA OF THE CHEST; CT OF THE ABDOMEN AND PELVIS WITH CONTRAST 8/26/2020 10:34 am TECHNIQUE: CTA of the chest was performed after the administration of intravenous contrast.  Multiplanar reformatted images are provided for review. MIP images are provided for review.  Dose modulation, iterative reconstruction, and/or weight based adjustment of the mA/kV was utilized to reduce the radiation dose to as low as reasonably achievable.; CT of the abdomen and pelvis was performed with the administration of intravenous contrast. Multiplanar reformatted images are provided for review. Dose modulation, iterative reconstruction, and/or weight based adjustment of the mA/kV was utilized to reduce the radiation dose to as low as reasonably achievable. COMPARISON: CT chest May 26, 2019 CT abdomen and pelvis August 20, 2020 HISTORY: ORDERING SYSTEM PROVIDED HISTORY: Hypoxia, recurrent fevers TECHNOLOGIST PROVIDED HISTORY: Hypoxia, recurrent fevers Reason for Exam: Hypoxia, recurrent fevers, best images possible due to patient size 322 pounds Acuity: Acute Type of Exam: Initial; ORDERING SYSTEM PROVIDED HISTORY: Splenic vein thrombosis? TECHNOLOGIST PROVIDED HISTORY: Splenic vein thrombosis? Reason for Exam: Splenic vein thrombosis? best images possible due to patient size 322  poounds Acuity: Acute Type of Exam: Initial FINDINGS: CT CHEST: Chest wall: No axillary adenopathy. Mediastinum: Cardiomegaly. No pericardial effusion. No adenopathy. Pulmonary arteries: Significantly limited evaluation for pulmonary embolus due to bolus timing, motion, and patient body habitus. Questionable pulmonary embolus within the right lower lobe. Lungs: Moderate left pleural effusion. Small right pleural effusion. Left lower lobe consolidation versus atelectasis. Bilateral upper lobe areas of consolidation. Bones: No acute osseous abnormality. CT ABDOMEN-PELVIS: Organs: Decreased attenuation of the liver is consistent with hepatic steatosis. No focal liver lesion. Cholecystectomy. Severe pancreatitis with surrounding inflammatory changes and fluid. Mild splenomegaly. No adrenal lesion. No hydronephrosis. Right renal cyst.  Focal area of scarring within the right kidney is unchanged. GI/Bowel: No bowel obstruction. Secondary involvement of the descending colon adjacent to the peripancreatic inflammatory changes along the tail of the pancreas. Pelvis: Essure devices. No significant free fluid. No bladder calculus.  Peritoneum/Retroperitoneum: Splenic artery is patent. No evidence of splenic artery thrombosis. No definite splenic venous thrombosis. A portion of the splenic vein as it courses along the tail of the pancreas is indistinct. However, proximal and distal to this, there is flow and this may be related to artifact. No abdominal aortic aneurysm. Prominent peripancreatic lymph nodes are likely reactive. Bones/Soft Tissues: No acute soft tissue abnormality. No acute osseous abnormality. Significantly limited evaluation for pulmonary embolus. Questionable right lower lobe pulmonary embolus. Bilateral lung opacities are likely pneumonia. Recommend follow-up to document resolution. Moderate left pleural effusion. Severe acute pancreatitis. No definite splenic venous thrombosis. Critical results were called by Dr. Dayton Mirza MD to Dr. Eugenia Salamanca on 8/26/2020 at 11:40. Ct Abdomen Pelvis W Iv Contrast Additional Contrast? None    Addendum Date: 8/20/2020    ADDENDUM: As stated in the body of the report, the abdominal aorta is normal in size. There is no significant atherosclerosis. Result Date: 8/20/2020  EXAMINATION: CT OF THE ABDOMEN AND PELVIS WITH CONTRAST 8/19/2020 10:11 pm TECHNIQUE: CT of the abdomen and pelvis was performed with the administration of intravenous contrast. Multiplanar reformatted images are provided for review. Dose modulation, iterative reconstruction, and/or weight based adjustment of the mA/kV was utilized to reduce the radiation dose to as low as reasonably achievable. COMPARISON: None. HISTORY: ORDERING SYSTEM PROVIDED HISTORY: pain TECHNOLOGIST PROVIDED HISTORY: pain Reason for Exam: pt c/o all over abdominal pain with nausea for a few hours Acuity: Acute Type of Exam: Initial Relevant Medical/Surgical History: surg - gallbladder FINDINGS: Lower Chest: Consolidation within both lower lobes and the lingula. Scattered ground-glass opacity. No pleural effusion. Organs: Liver is diffusely hypoattenuating.   No acute abnormality within the spleen, adrenals, or left kidney. There is right renal cortical scarring and calcification. Subcentimeter hypoattenuating bilateral renal lesions are too small to accurately characterize, likely cysts. Prior cholecystectomy. There is diffuse peripancreatic stranding and fluid. No loculated fluid collection. GI/Bowel: Stomach is partially distended. Small bowel is nondilated. Colon is nondilated. Pelvis: Urinary bladder is partially distended without vesicular stones. Uterus is present. Peritoneum/Retroperitoneum: Shotty retroperitoneal lymph nodes, likely reactive. Abdominal aorta is normal in caliber. No pneumoperitoneum. Bones/Soft Tissues: No acute osseous abnormality. 1. Pancreatic edema and associated fluid is consistent with pancreatitis. No loculated fluid collection/pseudocyst. 2. Shotty retroperitoneal lymph nodes are likely reactive. 3. Hepatic steatosis. 4. Bilateral lower lobe consolidation, which could be due to edema or pneumonia. [employer] Device Plmt/replace/removal    Result Date: 8/20/2020  PROCEDURE: ULTRASOUND GUIDED VASCULAR ACCESS. FLUOROSCOPY GUIDED PICC PLACEMENT 8/20/2020. HISTORY: ORDERING SYSTEM PROVIDED HISTORY: fluid resusitation TECHNOLOGIST PROVIDED HISTORY: fluid resusitation Lumen?->Double Lumen Is the patient pregnant? ->Yes Acuity: Unknown Type of Exam: Unknown Sepsis SEDATION: None FLUOROSCOPY TIME: DAP 63 cGy cm squared TECHNIQUE: Informed consent was obtained after a detailed explanation of the procedure including risks, benefits, and alternatives. Universal protocol was observed. The right arm was prepped and draped in sterile fashion using maximum sterile barrier technique. Local anesthesia was achieved with lidocaine. A micropuncture needle was used to access the right basilic vein using ultrasound guidance. An ultrasound image demonstrating patency of the vein with needle tip located within it.  An image was obtained and vascularity RECOMMENDATION: Repeat upright good inspiration PA view of the chest is suggested for more accurate assessment of the pulmonary vascularity     Xr Chest Portable    Result Date: 8/24/2020  EXAMINATION: ONE XRAY VIEW OF THE CHEST 8/24/2020 9:10 am COMPARISON: August 21, 2020, chest exam HISTORY: ORDERING SYSTEM PROVIDED HISTORY: Resp failure TECHNOLOGIST PROVIDED HISTORY: Resp failure Reason for Exam: resp failure Acuity: Unknown Type of Exam: Unknown FINDINGS: Interval insertion of a right PICC line catheter, the tip is projected at the right atrium. Limited markedly rotated exam No obvious significant pleuroparenchymal or mediastinal findings. Limited assessment of the left lung base     Limited markedly rotated exam, limited left base assessment Line placement as above No obvious acute cardiopulmonary findings     Xr Chest Portable    Result Date: 8/22/2020  EXAMINATION: ONE XRAY VIEW OF THE CHEST 8/22/2020 8:41 am COMPARISON: 08/21/2020 HISTORY: ORDERING SYSTEM PROVIDED HISTORY: Difficulty breathing TECHNOLOGIST PROVIDED HISTORY: Difficulty breathing Reason for Exam: dyspnea Acuity: Acute Type of Exam: Initial FINDINGS: Cardiomegaly. Low lung volumes. Right-sided PICC line remains in place. No focal consolidation. No pleural effusion or pneumothorax. Low lung volumes. This accentuates cardiomegaly. No focal consolidation. Xr Chest Portable    Result Date: 8/21/2020  EXAMINATION: ONE XRAY VIEW OF THE CHEST 8/21/2020 9:27 am COMPARISON: Chest radiograph performed 08/21/2020. HISTORY: ORDERING SYSTEM PROVIDED HISTORY: Increased oxygen demand TECHNOLOGIST PROVIDED HISTORY: Increased oxygen demand Reason for Exam: Increased oxygen demand Acuity: Acute Type of Exam: Initial Additional signs and symptoms: Increased oxygen demand FINDINGS: There are small bilateral effusions. There is a left basilar infiltrate. There is no pneumothorax. The heart is prominent.   The upper abdomen is unremarkable. The extrathoracic soft tissues are unremarkable. Cardiomegaly and small bilateral effusions with adjacent left basilar infiltrate. Xr Chest Portable    Result Date: 8/20/2020  EXAMINATION: ONE XRAY VIEW OF THE CHEST 8/20/2020 7:38 am COMPARISON: July 18, 2020 chest exam HISTORY: ORDERING SYSTEM PROVIDED HISTORY: possible pneumonia on CT chest TECHNOLOGIST PROVIDED HISTORY: possible pneumonia on CT chest Reason for Exam: possible pneumonia on CT chest Acuity: Acute Type of Exam: Initial Additional signs and symptoms: possible pneumonia on CT chest FINDINGS: Expiratory exam with mild streaky bibasilar density. Normal cardiopericardial silhouette No significant pleural or mediastinal findings     Expiratory exam with mild streaky bibasilar atelectasis     Ct Chest Pulmonary Embolism W Contrast    Result Date: 8/26/2020  EXAMINATION: CTA OF THE CHEST; CT OF THE ABDOMEN AND PELVIS WITH CONTRAST 8/26/2020 10:34 am TECHNIQUE: CTA of the chest was performed after the administration of intravenous contrast.  Multiplanar reformatted images are provided for review. MIP images are provided for review. Dose modulation, iterative reconstruction, and/or weight based adjustment of the mA/kV was utilized to reduce the radiation dose to as low as reasonably achievable.; CT of the abdomen and pelvis was performed with the administration of intravenous contrast. Multiplanar reformatted images are provided for review. Dose modulation, iterative reconstruction, and/or weight based adjustment of the mA/kV was utilized to reduce the radiation dose to as low as reasonably achievable.  COMPARISON: CT chest May 26, 2019 CT abdomen and pelvis August 20, 2020 HISTORY: ORDERING SYSTEM PROVIDED HISTORY: Hypoxia, recurrent fevers TECHNOLOGIST PROVIDED HISTORY: Hypoxia, recurrent fevers Reason for Exam: Hypoxia, recurrent fevers, best images possible due to patient size 322 pounds Acuity: Acute Type of Exam: Initial; ORDERING SYSTEM PROVIDED HISTORY: Splenic vein thrombosis? TECHNOLOGIST PROVIDED HISTORY: Splenic vein thrombosis? Reason for Exam: Splenic vein thrombosis? best images possible due to patient size 322  poounds Acuity: Acute Type of Exam: Initial FINDINGS: CT CHEST: Chest wall: No axillary adenopathy. Mediastinum: Cardiomegaly. No pericardial effusion. No adenopathy. Pulmonary arteries: Significantly limited evaluation for pulmonary embolus due to bolus timing, motion, and patient body habitus. Questionable pulmonary embolus within the right lower lobe. Lungs: Moderate left pleural effusion. Small right pleural effusion. Left lower lobe consolidation versus atelectasis. Bilateral upper lobe areas of consolidation. Bones: No acute osseous abnormality. CT ABDOMEN-PELVIS: Organs: Decreased attenuation of the liver is consistent with hepatic steatosis. No focal liver lesion. Cholecystectomy. Severe pancreatitis with surrounding inflammatory changes and fluid. Mild splenomegaly. No adrenal lesion. No hydronephrosis. Right renal cyst.  Focal area of scarring within the right kidney is unchanged. GI/Bowel: No bowel obstruction. Secondary involvement of the descending colon adjacent to the peripancreatic inflammatory changes along the tail of the pancreas. Pelvis: Essure devices. No significant free fluid. No bladder calculus. Peritoneum/Retroperitoneum: Splenic artery is patent. No evidence of splenic artery thrombosis. No definite splenic venous thrombosis. A portion of the splenic vein as it courses along the tail of the pancreas is indistinct. However, proximal and distal to this, there is flow and this may be related to artifact. No abdominal aortic aneurysm. Prominent peripancreatic lymph nodes are likely reactive. Bones/Soft Tissues: No acute soft tissue abnormality. No acute osseous abnormality. Significantly limited evaluation for pulmonary embolus. Questionable right lower lobe pulmonary embolus. femoral, femoral,         The common femoral, femoral,  popliteal and tibial veins           popliteal and tibial veins  demonstrate normal compressibility   demonstrate normal compressibility  and augmentation. and augmentation. Normal compressibility of the great  Normal compressibility of the great  saphenous vein. saphenous vein. Normal compressibility of the small  Normal compressibility of the small  saphenous vein. saphenous vein. Velocities are measured in cm/s ; Diameters are measured in cm Right Lower Extremities DVT Study Measurements Right 2D Measurements +------------------------------------+----------+---------------+----------+ ! Location                            ! Visualized! Compressibility! Thrombosis! +------------------------------------+----------+---------------+----------+ ! Common Femoral                      !Yes       ! Yes            ! None      ! +------------------------------------+----------+---------------+----------+ ! Prox Femoral                        !Yes       ! Yes            ! None      ! +------------------------------------+----------+---------------+----------+ ! Mid Femoral                         !Yes       ! Yes            ! None      ! +------------------------------------+----------+---------------+----------+ ! Dist Femoral                        !Yes       ! Yes            ! None      ! +------------------------------------+----------+---------------+----------+ ! Popliteal                           !Yes       ! Yes            ! None      ! +------------------------------------+----------+---------------+----------+ ! Sapheno Femoral Junction            ! Yes       ! Yes            ! None      ! +------------------------------------+----------+---------------+----------+ ! PTV                                 ! Yes       ! Yes            ! None      ! +------------------------------------+----------+---------------+----------+ !Peroneal                            !Yes       ! Yes            ! None      ! +------------------------------------+----------+---------------+----------+ ! Gastroc                             ! Yes       ! Yes            ! None      ! +------------------------------------+----------+---------------+----------+ ! GSV Thigh                           ! Yes       ! Yes            ! None      ! +------------------------------------+----------+---------------+----------+ ! GSV Knee                            ! Yes       ! Yes            ! None      ! +------------------------------------+----------+---------------+----------+ ! GSV Ankle                           ! Yes       ! Yes            ! None      ! +------------------------------------+----------+---------------+----------+ ! SSV                                 ! Yes       ! Yes            ! None      ! +------------------------------------+----------+---------------+----------+ Right Doppler Measurements +---------------------------+------+------+--------------------------------+ ! Location                   ! Signal!Reflux! Reflux (msec)                   ! +---------------------------+------+------+--------------------------------+ ! Common Femoral             !Phasic!      !                                ! +---------------------------+------+------+--------------------------------+ ! Prox Femoral               !Phasic!      !                                ! +---------------------------+------+------+--------------------------------+ ! Popliteal                  !Phasic!      !                                ! +---------------------------+------+------+--------------------------------+ Left Lower Extremities DVT Study Measurements Left 2D Measurements +------------------------------------+----------+---------------+----------+ ! Location                            ! Visualized! Compressibility! Thrombosis! +------------------------------------+----------+---------------+----------+ ! Common Femoral                      !Yes       ! Yes            ! None      ! +------------------------------------+----------+---------------+----------+ ! Prox Femoral                        !Yes       ! Yes            ! None      ! +------------------------------------+----------+---------------+----------+ ! Mid Femoral                         !Yes       ! Yes            ! None      ! +------------------------------------+----------+---------------+----------+ ! Dist Femoral                        !Yes       ! Yes            ! None      ! +------------------------------------+----------+---------------+----------+ ! Popliteal                           !Yes       ! Yes            ! None      ! +------------------------------------+----------+---------------+----------+ ! Sapheno Femoral Junction            ! Yes       ! Yes            ! None      ! +------------------------------------+----------+---------------+----------+ ! PTV                                 ! Yes       ! Yes            ! None      ! +------------------------------------+----------+---------------+----------+ ! Peroneal                            !Yes       ! Yes            ! None      ! +------------------------------------+----------+---------------+----------+ ! Gastroc                             ! Yes       ! Yes            ! None      ! +------------------------------------+----------+---------------+----------+ ! GSV Thigh                           ! Yes       ! Yes            ! None      ! +------------------------------------+----------+---------------+----------+ ! GSV Knee                            ! Yes       ! Yes            ! None      ! +------------------------------------+----------+---------------+----------+ ! GSV Ankle                           ! Yes       ! Yes            ! None      ! +------------------------------------+----------+---------------+----------+ ! SSV                                 ! Yes       ! Yes            ! None      ! achieved with lidocaine. An 5 Sami needle sheath was advanced under ultrasound guidance into the small pleural effusion and diagnostic thoracentesis was performed. The patient tolerated the procedure well. Fluid was provided for further analysis. FINDINGS: Only a small amount of accessible left pleural fluid demonstrated sonographically. A total of 15 mL cloudy yellow fluid was removed. Successful ultrasound guided diagnostic left thoracentesis. Physical Examination:        Physical Exam  Vitals signs reviewed. Constitutional:       Appearance: She is obese. Cardiovascular:      Rate and Rhythm: Normal rate and regular rhythm. Pulmonary:      Effort: Pulmonary effort is normal.   Abdominal:      Palpations: Abdomen is soft. Tenderness: There is no abdominal tenderness. Neurological:      Mental Status: She is alert. Assessment:        Primary Problem  Pneumonia of both lungs due to infectious organism    Active Hospital Problems    Diagnosis Date Noted    Pneumonia of both lungs due to infectious organism [J18.9] 08/27/2020    History of anxiety disorder [Z86.59]     Acute respiratory failure with hypoxia ([address] [J96.01] 08/21/2020    Hypocalcemia [E83.51] 08/21/2020    Sepsis ([address] [A41.9] 08/20/2020    Morbid obesity ([address] [E66.01]     Abdominal pain, epigastric [R10.13]     Nausea [R11.0]     Acute pancreatitis [K85.90] 08/19/2020    Fibromyalgia [M79.7]     HTN (hypertension) [I10] 12/15/2014    Major depression [F32.9] 10/30/2014    Restless leg syndrome [G25.81] 10/22/2013    Class 3 severe obesity due to excess calories with serious comorbidity in adult ([address] [E66.01] 01/14/2013    Anxiety [F41.9] 01/14/2013       Plan:        1.  Pneumonia:   - Abx stopped on 09/01 and WBC are trending downward with last count 12.0 on 09/04 at 0500.    2. Acute pancreatitis:   Pt was switched from TPN to oral food and is tolerating food without nausea, vomiting and AP after eating food. Last lipase is 66 on 09/04 at 0500. Surgery is on board in plan of discharge. Possible discharge today. Ela Hahn MD  9/4/2020  8:33 AM     Attending Physician Statement  I have discussed the care of Yazmin Ruffin and I have examined the patient myselft and taken ros and hpi , including pertinent history and exam findings,  with the resident. I have reviewed the key elements of all parts of the encounter with the resident. I agree with the assessment, plan and orders as documented by the resident.   Switch off TPN continue advance diet patient tolerating well okay to discharge off antibiotics    Electronically signed by Clement Perez MD

## 2020-09-04 NOTE — CARE COORDINATION
Continuity of Care Form    Patient Name: Yazmin Ruffin   :  1975  MRN:  [id number]    Admit date:  2020  Discharge date:  2020    Code Status Order: Full Code   Advance Directives:   [facility] Documentation     Date/Time Healthcare Directive Type of Healthcare Directive Copy in [facility] [address] Agent's Name Healthcare Agent's Phone Number    20 0232  No, patient does not have an advance directive for healthcare treatment -- -- -- -- --          Admitting Physician:  Clement Perez MD  PCP: Sheryle Snell    Discharging Nurse: [facility] Unit/Room#:   Discharging Unit Phone Number: [phone]    Emergency Contact:   Extended Emergency Contact Information  Primary Emergency Contact: Rachael De Los Santos   [address] Phone: [phone]  Mobile Phone: [phone]  Relation: Parent  Preferred language: English   needed?  No    Past Surgical History:  Past Surgical History:   Procedure Laterality Date    ATRIAL ABLATION SURGERY      BACK SURGERY      L4-5    CHOLECYSTECTOMY      ENDOMETRIAL ABLATION      e sure implants placed also    ENDOSCOPY, COLON, DIAGNOSTIC      EXCISION LESION HAND / FINGER Right 2019    HAND LESION BIOPSY EXCISION performed by Corin Felton MD at [address] [place] [address] 140 Bilateral     left x2, right x1    FOOT SURGERY Right     x3    KNEE ARTHROSCOPY Left     x2    TN CYSTO/URETERO/PYELOSCOPY W/LITHOTRIPSY Left 2017    CYSTOSCOPY URETEROSCOPY HOLMIUM LASER LITHO WITH STONE BASKETING WITH  STENT  performed by Maria Esther Hickey MD at [facility] Right        Immunization History:   Immunization History   Administered Date(s) Administered    Hepatitis A 2019    Influenza Virus Vaccine 2013    Influenza, Martita Beans, IM, PF (6 mo and older Fluzone, Flulaval, Fluarix, and 3 yrs and older Afluria) 2016, 2018    Tdap (Boostrix, Adacel) 09/15/2017       Active Problems:  Patient Active Problem List   Diagnosis Code    Anxiety F41.9    Class 3 severe obesity due to excess calories with serious comorbidity in adult ([facility]) E66.01    Restless leg syndrome G25.81    Previous back surgery Z98.890    Major depression F32.9    HTN (hypertension) I10    FHx: rheumatoid arthritis Z82.61    SVT (supraventricular tachycardia) ([facility]) I47.1    Right elbow pain M25.521    Pain of right lower extremity M79.604    Cough with sputum R05    Varicose vein of leg I83.90    Leg swelling M79.89    Herpes zoster without complication R54.9    Chest pain R07.9    Fibromyalgia M79.7    Acute pancreatitis K85.90    Sepsis ([facility]) A41.9    Morbid obesity ([facility]) E66.01    Abdominal pain, epigastric R10.13    Nausea R11.0    Acute respiratory failure with hypoxia ([facility]) J96.01    Hypocalcemia E83.51    History of anxiety disorder Z86.59    Abnormal levels of other serum enzymes R74.8    Elevated liver enzymes R74.8    Bilateral leg edema R60.0    Smoker F17.200    Severe episode of recurrent major depressive disorder, without psychotic features ([facility] F33.2    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    Presbyopia H52.4    Pseudotumor cerebri syndrome G93.2    Insomnia due to psychological stress F51.02    Astigmatism H52.209    Generalized anxiety disorder F41.1    Carpal tunnel syndrome of right wrist G56.01    Pneumonia of both lungs due to infectious organism J18.9       Isolation/Infection:   Isolation          No Isolation        Patient Infection Status     Infection Onset Added Last Indicated Last Indicated By Review Planned Expiration Resolved Resolved By    None active    Resolved    COVID-19 Rule Out 08/26/20 08/26/20 08/26/20 COVID-19 (Ordered)   08/26/20 Rule-Out Test Resulted          Nurse Assessment:  Last Vital Signs: BP (!) 152/85   Pulse 108   Temp 97.8 °F (36.6 °C) (Oral)   Resp 29   Ht 5' 5\" (1.651 m)   Wt (!) 322 lb 8.5 oz (146.3 kg)   SpO2 92%   BMI 53.67 kg/m²     Last documented pain score (0-10 scale): Pain Level: 7  Last Weight:   Wt Readings from Last 1 Encounters:   08/25/20 (!) 322 lb 8.5 oz (146.3 kg)     Mental Status:  alert, coherent, logical and thought processes intact    IV Access:  - None    Nursing Mobility/ADLs:  Walking   Independent  Transfer  Independent  Bathing  Independent  Dressing  Independent  Toileting  Independent  Feeding  Independent  Med Admin  Independent  Med Delivery   whole    Wound Care Documentation and Therapy:  Wound 08/22/20 Nose Mid (Active)   Wound Pressure Stage  2 09/01/20 1200   Dressing Status Other (Comment) 08/28/20 0400   Dressing/Treatment Open to air 09/01/20 1200   Wound Assessment Pink 08/30/20 0400   Drainage Amount None 08/28/20 0400   Drainage Description Serosanguinous 08/24/20 0400   Odor None 08/28/20 0400   Margins Attached edges; Defined edges 08/28/20 0400   Emily-wound Assessment Pink 08/28/20 0400   Number of days: 9        Elimination:  Continence:   · Bowel: Yes  · Bladder: Yes  Urinary Catheter: None   Colostomy/Ileostomy/Ileal Conduit: No  [REMOVED] Rectal Tube With balloon-Stool Appearance: Loose  [REMOVED] Rectal Tube With balloon-Stool Color: Brown  [REMOVED] Rectal Tube With balloon-Stool Amount: Smear    Date of Last BM: 9/4/2020    Intake/Output Summary (Last 24 hours) at 9/1/2020 1453  Last data filed at 9/1/2020 0938  Gross per 24 hour   Intake 2816 ml   Output 3475 ml   Net -659 ml     I/O last 3 completed shifts: In: 2816 [P.O.:480; I.V.:1136; IV Piggyback:200]  Out: 6285 [Urine:3875]    Safety Concerns:      At Risk for Falls    Impairments/Disabilities:      None    Nutrition Therapy:  Current Nutrition Therapy:   - Oral Diet:  Low Fat    Routes of Feeding: Oral  Liquids: No Restrictions  Daily Fluid Restriction: no  Last Modified Barium Swallow with Video (Video Swallowing Test): not done    Treatments at the Time 600 MG tablet  600 mg    Take 1 tablet by mouth every 6 hours as needed for Pain    Quantity: 120 tablet  Refills: 3         !! Potential duplicate medications found. Please discuss with provider.     CONTINUE these medications which have NOT CHANGED     Medication  Dose    prazosin (MINIPRESS) 1 MG capsule  1 mg    Take 1 mg by mouth nightly        melatonin 5 MG TABS tablet  5 mg    Take 5 mg by mouth daily        potassium chloride (KLOR-CON M) 10 MEQ extended release tablet  10 mEq    Take 1 tablet by mouth 2 times daily    Quantity: 8 tablet  Refills: 0        furosemide (LASIX) 20 MG tablet  20 mg    Take 1 tablet by mouth 2 times daily    Quantity: 8 tablet  Refills: 0        QUEtiapine (SEROQUEL XR) 400 MG extended release tablet  800 mg    Take 800 mg by mouth nightly        busPIRone (BUSPAR) 15 MG tablet  15 mg    Take 15 mg by mouth nightly Can take once in morning PRN        omeprazole (PRILOSEC) 40 MG delayed release capsule  40 mg    Take 40 mg by mouth daily        Cholecalciferol (VITAMIN D) 50 MCG (2000 UT) CAPS capsule     Take by mouth daily        albuterol sulfate HFA (PROVENTIL HFA) 108 (90 Base) MCG/ACT inhaler  2 puffs    Inhale 2 puffs into the lungs as needed        magnesium oxide (MAG-OX) 400 MG tablet  400 mg    Take 400 mg by mouth daily        fexofenadine (RA ALLERGY RELIEF) 180 MG tablet     take 1 tablet by mouth once daily    Quantity: 30 tablet  Refills: 0        pramipexole (MIRAPEX) 0.5 MG tablet     take 1 tablet by mouth twice a day    Quantity: 60 tablet  Refills: 0        DULoxetine (CYMBALTA) 60 MG extended release capsule     TAKE 1 CAPSULE BY MOUTH TWICE A DAY    Quantity: 60 capsule  Refills: 3        !! ibuprofen (ADVIL) 200 MG tablet  400 mg    Take 2 tablets by mouth every 6 hours as needed for Pain    Quantity: 60 tablet  Refills: 0        acetaminophen (APAP EXTRA STRENGTH) 500 MG tablet  1,000 mg    Take 2 tablets by mouth every 6 hours as needed for Pain Quantity: 60 tablet  Refills: 0        metoprolol (LOPRESSOR) 25 MG tablet  25 mg    Take 25 mg by mouth daily    Refills: 0         butalbital-APAP-caffeine (FIORICET) -40 MG CAPS per capsule  1 capsule    Take 1 capsule by mouth every 6 hours as needed for Headaches    Quantity: 28 capsule  Refills: 0         !! Potential duplicate medications found. Please discuss with provider.     STOP taking these previous medications     Medication  Dose  Reason for Stopping  Comments    (STOP TAKING) tiZANidine (ZANAFLEX) 4 MG tablet  4 mg            (STOP TAKING) NIFEdipine (ADALAT CC) 30 MG extended release tablet  30 mg            (STOP TAKING) butalbital-APAP-caffeine (FIORICET) -40 MG CAPS per capsule  1 capsule            (STOP TAKING) hydrOXYzine (VISTARIL) 25 MG capsule  25 mg            Printing Report     Report ID  Report Name  Print    [id number]  Discharge Meds  Print

## 2020-09-04 NOTE — PROGRESS NOTES
Patient was seen and examined at bedside due to nurse report of hypotension. On examination patient was alert and oriented and laying on her left side in bed stating that she was in moderate to severe pain. Patient denied any dizziness, lightheadedness, chest pain, or change in abdominal pain. Patient stated that the abdominal pain was independent of position. On examination the patient's abdomen was soft, tender in the left upper quadrant and epigastrium, without guarding or rebound tenderness. Capillary refill was less than 2 seconds in right and left hands. Blood pressure was rechecked with a forearm cuff on the right arm and found to be 108/50. Plan is to observe patient and recheck blood pressure with a larger cuff on the upper arm.

## 2020-09-04 NOTE — CARE COORDINATION
DISHA Khan order for walker & face sheet to [facility]. Informed Annie, from [facility] [place], that pt. Will be staying at her Mom's address listed on Face sheet & to deliver to that address. She will follow.

## 2020-09-04 NOTE — PROGRESS NOTES
[facility] [address]                 PATIENT NAME: Joao Garza     TODAY'S DATE: 9/4/2020, 1:06 PM    SUBJECTIVE:    Pt seen and examined. Afebrile, VSS. Leukocytosis improved. Patient is doing well today, sitting up in bed. Pain is controlled. Bowels are moving. Tolerating diet, no N/V. TPn weaned and off. PICC line removed per ID. OBJECTIVE:   VITALS:  /74   Pulse 112   Temp 98.7 °F (37.1 °C) (Oral)   Resp 22   Ht 5' 5\" (1.651 m)   Wt 277 lb 1.9 oz (125.7 kg)   SpO2 96%   BMI 46.11 kg/m²      INTAKE/OUTPUT:      Intake/Output Summary (Last 24 hours) at 9/4/2020 1306  Last data filed at 9/4/2020 0934  Gross per 24 hour   Intake 1250 ml   Output 750 ml   Net 500 ml                 CONSTITUTIONAL:  awake and alert.   No acute distress  HEART:   RRR  LUNGS:   Decreased air entry at bases, no wheezing  ABDOMEN:   Abdomen soft, mild upper abdomen tender, non-distended  EXTREMITIES:   Trace pedal edema    Data:  CBC:   Lab Results   Component Value Date    WBC 12.0 09/04/2020    RBC 3.37 09/04/2020    HGB 10.5 09/04/2020    HCT 31.4 09/04/2020    MCV 93.4 09/04/2020    MCH 31.3 09/04/2020    MCHC 33.5 09/04/2020    RDW 14.5 09/04/2020     09/04/2020    MPV 9.3 09/04/2020     BMP:    Lab Results   Component Value Date     09/04/2020    K 4.8 09/04/2020    CL 96 09/04/2020    CO2 21 09/04/2020    BUN 28 09/04/2020    LABALBU 3.0 09/04/2020    CREATININE 0.83 09/04/2020    CALCIUM 9.1 09/04/2020    GFRAA >60 09/04/2020    LABGLOM >60 09/04/2020    GLUCOSE 137 09/04/2020     Hepatic Function Panel:    Lab Results   Component Value Date    ALKPHOS 150 09/04/2020    ALT 32 09/04/2020    AST 19 09/04/2020    PROT 7.4 09/04/2020    PROT 7.2 01/23/2015    BILITOT 0.18 09/04/2020    BILIDIR 0.08 09/02/2020    IBILI 0.08 09/02/2020    LABALBU 3.0 09/04/2020     LIPASE:    Lab Results   Component Value Date    LIPASE 66 09/04/2020       Radiology Review:  No new images to review      ASSESSMENT     Principal Problem:    Pneumonia of both lungs due to infectious organism  Active Problems:    Anxiety    Class 3 severe obesity due to excess calories with serious comorbidity in adult [facility])    Restless leg syndrome    Major depression    HTN (hypertension)    Fibromyalgia    Acute pancreatitis    Sepsis ([facility]    Morbid obesity (HCC)    Abdominal pain, epigastric    Nausea    Acute respiratory failure with hypoxia (HCC)    Hypocalcemia    History of anxiety disorder  Resolved Problems:    * No resolved hospital problems. *      Plan  1. Diet as tolerated  2. TPN weaned and completed  3. Surgically stable  4. Anticipate discharge today  5. Continue medical management  6. Patient was seen and examined. Nonspecific abdominal pain. Tolerating diet bowels are moving. Blood work reviewed. Surgically stable. Discharge later today.       Electronically signed by Alexandr Hughes PA-C  [id number] [address]

## 2020-09-04 NOTE — PROGRESS NOTES
Physical Therapy  DATE: 2020    NAME: Quincy Whitt  MRN: [id number]   : 1975    Patient not seen this date for Physical Therapy due to:  [] Blood transfusion in progress  [] Hemodialysis  []  Patient Declined  [] Spine Precautions   [] Strict Bedrest  [] Surgery/ Procedure  [] Testing      [x] Other Cx, doctor talking with pt upon arrival, FRANKO Simpson reports okay to \"cancel, pt's going to leave shortly anyway\". [] PT being discontinued at this time. Patient independent. No further needs. [] PT being discontinued at this time as the patient has been transferred to palliative care. No further needs.     Sindi Friedman, PTA

## 2020-09-05 NOTE — PROGRESS NOTES
PULMONARY PROGRESS NOTE:      Interval History:pancreatitis, resp failure    Shortness of Breath: no  Cough: no  Sputum: no          Hemoptysis: no  Chest Pain: no  Fever: no                   Swelling Feet: no  Headache: no                                           Nausea- yes   Emesis-no, Abdominal Pain: yes, reports LUQ  Diarrhea: no         Constipation: no    Events since last visit: Remains on RA. Falling asleep during conversation. On low fat diet- reports some nausea after eating. Tolerating low fat diet - remains on TPN    PAST MEDICAL HISTORY:      Scheduled Meds:    Continuous Infusions:    PRN Meds:        PHYSICAL EXAMINATION:  BP (!) 122/52   Pulse 120   Temp 97 °F (36.1 °C) (Oral)   Resp 20   Ht 5' 5\" (1.651 m)   Wt 277 lb 1.9 oz (125.7 kg)   SpO2 93%   BMI 46.11 kg/m²     General : Sleepy, responds to voice, oriented x3  Neck - supple, no lymphadenopathy, JVD not raised  Heart -   Lungs - Air Entry- fair bilaterally; breath sounds : vesicular;   rales/crackles - absent, 94% on RA   Abdomen - soft, no obese   Upper Extremities  - no cyanosis, mottling; edema : absent  Lower Extremities: no cyanosis, mottling; edema : absent    Current Laboratory, Radiologic, Microbiologic, and Diagnostic studies reviewed  Data ReviewCBC:   Recent Labs     09/02/20  0509 09/03/20  0451 09/04/20  0500   WBC 13.2* 13.2* 12.0*   RBC 3.41* 3.33* 3.37*   HGB 10.7* 10.4* 10.5*   HCT 32.0* 31.2* 31.4*   * 504* 571*     BMP:   Recent Labs     09/02/20  0509 09/03/20  0451 09/04/20  0500   GLUCOSE 175* 178* 137*    134* 132*   K 4.4 4.5 4.8   BUN 12 16 28*   CREATININE <0.40* 0.43* 0.83   CALCIUM 9.2 9.1 9.1     ABGs: No results for input(s): PHART, PO2ART, ZMC5KQH, KDH8HHW, BEART, G1ASMSCU, LKS5RGJ in the last 72 hours.    PT/INR:  No results found for: PTINR    ASSESSMENT / PLAN:  · Acute pancreatitis- GI consulted, monitor amylase lipase - improving  · Lactic acidosis- resolved  · Possible pneumonia- ABx  · Pleural effusion - s/p left thoracentesis with 15 ml exudative fluid removed on 8/28, no growth x 5 days  · Febrile, Leukocytosis- Abx changed per ID, UA- unremarkable, COVID negative, lactate 1.2  · Hypertension - improved   · Possible PE- CTA - no obvious PE  · acute hypoxic resp failure - resolved - off 02  · On low fat diet- poor appetite   Stable pulm wise  This is a late note on patient seen by me earlier today.   Electronically signed by Jennifer Lopez on 09/04/20 at 10:11 PM

## 2020-09-08 NOTE — TELEPHONE ENCOUNTER
Patient called the office to reschedule. New appointment is for 9/9/20 1 pm at [facility]. Writer thanked and call ended.

## 2020-09-09 ENCOUNTER — OFFICE VISIT (OUTPATIENT)
Dept: GASTROENTEROLOGY | Age: 45
End: 2020-09-09
Payer: COMMERCIAL

## 2020-09-09 VITALS — BODY MASS INDEX: 44.53 KG/M2 | WEIGHT: 267.6 LBS

## 2020-09-09 PROBLEM — K76.0 FATTY LIVER: Status: ACTIVE | Noted: 2020-09-09

## 2020-09-09 PROCEDURE — 1036F TOBACCO NON-USER: CPT | Performed by: INTERNAL MEDICINE

## 2020-09-09 PROCEDURE — G8417 CALC BMI ABV UP PARAM F/U: HCPCS | Performed by: INTERNAL MEDICINE

## 2020-09-09 PROCEDURE — G8427 DOCREV CUR MEDS BY ELIG CLIN: HCPCS | Performed by: INTERNAL MEDICINE

## 2020-09-09 PROCEDURE — 99214 OFFICE O/P EST MOD 30 MIN: CPT | Performed by: INTERNAL MEDICINE

## 2020-09-09 PROCEDURE — 1111F DSCHRG MED/CURRENT MED MERGE: CPT | Performed by: INTERNAL MEDICINE

## 2020-09-09 ASSESSMENT — ENCOUNTER SYMPTOMS
COUGH: 0
ABDOMINAL PAIN: 1
SORE THROAT: 0
VOICE CHANGE: 0
CHOKING: 0
VOMITING: 0
RECTAL PAIN: 0
ANAL BLEEDING: 0
DIARRHEA: 1
SINUS PRESSURE: 0
NAUSEA: 0
WHEEZING: 0
BLOOD IN STOOL: 0
CONSTIPATION: 0
TROUBLE SWALLOWING: 0
ABDOMINAL DISTENTION: 0
BACK PAIN: 0

## 2020-09-09 NOTE — PROGRESS NOTES
GI OFFICE FOLLOW UP    Terrance Barger is a 54 y.o. female evaluated via on 9/9/2020. Consent:  She and/or health care decision maker is aware that that she may receive a bill for this telephone service, depending on her insurance coverage, and has provided verbal consent to proceed: YES      INTERVAL HISTORY:   Larisa Elizabeth, [address] [id number]  [place], [address] [place] [address]    Chief Complaint   Patient presents with    Abdominal Pain     hosp f/u, c/o abd pain lower left upper right with diarrhea 2-3x a day       1. Idiopathic acute pancreatitis without infection or necrosis    2. Fatty liver    3. Morbid obesity ([address]      Especially recently hospitalized with severe pancreatitis of uncertain etiology was treated symptomatically    She also had some pulmonary issues has been seen and followed by pulmonologist    Patient was treated and clinically feeling better now has nonspecific left upper quadrant abdominal pain    An MRCP was ordered but apparently was not done    She has morbid obesity    Her triglycerides are however normal    Denies any history for alcohol abuse    Imaging studies also revealed evidence of fatty infiltration of the liver      HISTORY OF PRESENT ILLNESS: Ms.Kimberly Shree Emerson is a 54 y.o. female with a past history remarkable for , referred for evaluation of   Chief Complaint   Patient presents with    Abdominal Pain     hosp f/u, c/o abd pain lower left upper right with diarrhea 2-3x a day   . Past Medical,Family, and Social History reviewed and does contribute to the patient presenting condition. Patient's PMH/PSH,SH,PSYCH Hx, MEDs, ALLERGIES, and ROS were all reviewed and updated in the appropriate sections.     PAST MEDICAL HISTORY:  Past Medical History:   Diagnosis Date    Abnormal levels of other serum enzymes     Anxiety     Bilateral leg edema     Chronic   omeprazole (PRILOSEC) 40 MG delayed release capsule, Take 40 mg by mouth daily, Disp: , Rfl:     Cholecalciferol (VITAMIN D) 50 MCG (2000 UT) CAPS capsule, Take by mouth daily, Disp: , Rfl:     butalbital-APAP-caffeine (FIORICET) -40 MG CAPS per capsule, Take 1 capsule by mouth every 6 hours as needed for Headaches, Disp: 28 capsule, Rfl: 0    albuterol sulfate HFA (PROVENTIL HFA) 108 (90 Base) MCG/ACT inhaler, Inhale 2 puffs into the lungs as needed, Disp: , Rfl:     magnesium oxide (MAG-OX) 400 MG tablet, Take 400 mg by mouth daily, Disp: , Rfl:     fexofenadine (RA ALLERGY RELIEF) 180 MG tablet, take 1 tablet by mouth once daily (Patient taking differently: Take 180 mg by mouth daily as needed take 1 tablet by mouth once daily), Disp: 30 tablet, Rfl: 0    pramipexole (MIRAPEX) 0.5 MG tablet, take 1 tablet by mouth twice a day (Patient taking differently: Take 0.25 mg by mouth daily take 1 tablet by mouth twice a day), Disp: 60 tablet, Rfl: 0    DULoxetine (CYMBALTA) 60 MG extended release capsule, TAKE 1 CAPSULE BY MOUTH TWICE A DAY, Disp: 60 capsule, Rfl: 3    ibuprofen (ADVIL) 200 MG tablet, Take 2 tablets by mouth every 6 hours as needed for Pain (Patient taking differently: Take 600 mg by mouth every 6 hours as needed for Pain ), Disp: 60 tablet, Rfl: 0    acetaminophen (APAP EXTRA STRENGTH) 500 MG tablet, Take 2 tablets by mouth every 6 hours as needed for Pain, Disp: 60 tablet, Rfl: 0    metoprolol (LOPRESSOR) 25 MG tablet, Take 25 mg by mouth daily , Disp: , Rfl: 0    ALLERGIES:   Allergies   Allergen Reactions    Aripiprazole      Bad reaction    Eletriptan      Other reaction(s): Swelling-throat    Hydrocodone-Acetaminophen      Other reaction(s): Unknown    Oxycodone-Acetaminophen      Other reaction(s): Unknown    Sumatriptan Other (See Comments)    Triptans      Other reaction(s): Unknown    Zosyn [Piperacillin Sod-Tazobactam So]      Rash 8/26/20 possibley caused by zosyn  Lamotrigine Rash       FAMILY HISTORY:       Problem Relation Age of Onset    Heart Disease Father     High Blood Pressure Brother          SOCIAL HISTORY:   Social History     Socioeconomic History    Marital status:      Spouse name: Not on file    Number of children: Not on file    Years of education: Not on file    Highest education level: Not on file   Occupational History    Not on file   Social Needs    Financial resource strain: Not on file    Food insecurity     Worry: Not on file     Inability: Not on file    Transportation needs     Medical: Not on file     Non-medical: Not on file   Tobacco Use    Smoking status: Former Smoker     Packs/day: 1.00     Types: Cigarettes     Last attempt to quit: 2019     Years since quittin.7    Smokeless tobacco: Never Used    Tobacco comment: today last smoke   Substance and Sexual Activity    Alcohol use: Yes     Alcohol/week: 0.0 standard drinks     Comment: rarely    Drug use: No    Sexual activity: Not on file   Lifestyle    Physical activity     Days per week: Not on file     Minutes per session: Not on file    Stress: Not on file   Relationships    Social connections     Talks on phone: Not on file     Gets together: Not on file     Attends Spiritism service: Not on file     Active member of club or organization: Not on file     Attends meetings of clubs or organizations: Not on file     Relationship status: Not on file    Intimate partner violence     Fear of current or ex partner: Not on file     Emotionally abused: Not on file     Physically abused: Not on file     Forced sexual activity: Not on file   Other Topics Concern    Not on file   Social History Narrative    Not on file         REVIEW OF SYSTEMS:         Review of Systems   Constitutional: Negative for appetite change, fatigue and unexpected weight change. HENT: Negative.   Negative for dental problem, postnasal drip, sinus pressure, sore throat, trouble swallowing and voice change. Eyes: Positive for visual disturbance. Respiratory: Negative for cough, choking and wheezing. Cardiovascular: Negative for chest pain, palpitations and leg swelling. Gastrointestinal: Positive for abdominal pain and diarrhea. Negative for abdominal distention, anal bleeding, blood in stool, constipation, nausea, rectal pain and vomiting. Genitourinary: Negative for difficulty urinating. Musculoskeletal: Negative for arthralgias, back pain, gait problem and myalgias. Allergic/Immunologic: Negative for environmental allergies and food allergies. Neurological: Negative for dizziness, weakness, light-headedness, numbness and headaches. Hematological: Does not bruise/bleed easily. Psychiatric/Behavioral: Negative for sleep disturbance. The patient is not nervous/anxious. Reviewed and agree    PHYSICAL EXAMINATION: Vital signs reviewed per the nursing documentation. Wt 267 lb 9.6 oz (121.4 kg)   BMI 44.53 kg/m²   Body mass index is 44.53 kg/m². Physical Exam  Nursing note reviewed. Constitutional:       Appearance: She is well-developed. Comments: Obesity   Anxious    HENT:      Head: Normocephalic and atraumatic. Eyes:      Conjunctiva/sclera: Conjunctivae normal.      Pupils: Pupils are equal, round, and reactive to light. Neck:      Musculoskeletal: Normal range of motion and neck supple. Cardiovascular:      Heart sounds: Normal heart sounds. Pulmonary:      Effort: Pulmonary effort is normal.      Breath sounds: Normal breath sounds. Abdominal:      General: Bowel sounds are normal.      Palpations: Abdomen is soft. Comments: Mild TENDER, NON DISTENTED  LIVER SPLEEN AND HERNIAS ARE NOT  PALPABLE  BOWEL SOUNDS ARE POSITIVE   Obese belly    Musculoskeletal: Normal range of motion. Skin:     General: Skin is warm. Neurological:      Mental Status: She is alert and oriented to person, place, and time.    Psychiatric:         Behavior: Behavior normal.           LABORATORY DATA: Reviewed  Lab Results   Component Value Date    WBC 12.0 (H) 09/04/2020    HGB 10.5 (L) 09/04/2020    HCT 31.4 (L) 09/04/2020    MCV 93.4 09/04/2020     (H) 09/04/2020     (L) 09/04/2020    K 4.8 09/04/2020    CL 96 (L) 09/04/2020    CO2 21 09/04/2020    BUN 28 (H) 09/04/2020    CREATININE 0.83 09/04/2020    LABALBU 3.0 (L) 09/04/2020    BILITOT 0.18 (L) 09/04/2020    ALKPHOS 150 (H) 09/04/2020    AST 19 09/04/2020    ALT 32 09/04/2020    INR 0.9 08/20/2020         Lab Results   Component Value Date    RBC 3.37 (L) 09/04/2020    HGB 10.5 (L) 09/04/2020    MCV 93.4 09/04/2020    MCH 31.3 09/04/2020    MCHC 33.5 09/04/2020    RDW 14.5 09/04/2020    MPV 9.3 09/04/2020    BASOPCT 0 09/04/2020    LYMPHSABS 1.20 09/04/2020    MONOSABS 0.84 09/04/2020    NEUTROABS 9.36 (H) 09/04/2020    EOSABS 0.48 (H) 09/04/2020    BASOSABS 0.00 09/04/2020         DIAGNOSTIC TESTING:     Ct Abdomen Pelvis Wo Contrast Additional Contrast? None    Result Date: 8/20/2020  EXAMINATION: CT OF THE ABDOMEN AND PELVIS WITHOUT CONTRAST 8/20/2020 10:22 pm TECHNIQUE: CT of the abdomen and pelvis was performed without the administration of intravenous contrast. Multiplanar reformatted images are provided for review. Dose modulation, iterative reconstruction, and/or weight based adjustment of the mA/kV was utilized to reduce the radiation dose to as low as reasonably achievable. COMPARISON: CT abdomen and pelvis with contrast August 19, 2020. HISTORY: ORDERING SYSTEM PROVIDED HISTORY: severe acute pain TECHNOLOGIST PROVIDED HISTORY: severe acute pain Is the patient pregnant? ->Yes Reason for Exam: worsening abdominal pain Acuity: Acute Type of Exam: Ongoing Relevant Medical/Surgical History: surg - gallbladder FINDINGS: Lower Chest: Persisting bilateral lower lung scattered consolidation and atelectasis is present with trace posterior left-sided pleural effusion identified. Delwyn Scarce   Heart is normal in size and configuration. Organs: Interval mild worsening of severe peripancreatic fat stranding and fluid is noted with intraperitoneal free fluid collecting within the bilateral pericolic gutters and collecting inferiorly within the pelvis with increased volume in comparison with the prior study. Diffuse fatty infiltration of the liver is again seen. The liver, gallbladder, spleen, pancreas, adrenal glands, kidneys, are otherwise unremarkable in appearance. GI/Bowel: The stomach is unremarkable without wall thickening or distention. Bowel loops are unremarkable in appearance without evidence of obstruction, distension or mucosal thickening. Pelvis: Berkowitz catheter is noted within the nondistended but grossly unremarkable urinary bladder. Bilateral fallopian tube Essure coils are seen without evidence of complication. The uterus is anteverted in position and is unremarkable in appearance there no evidence of pelvic free fluid is seen. Peritoneum/Retroperitoneum: No evidence of retroperitoneal or intraperitoneal lymphadenopathy is identified. . Bones/Soft Tissues: No evidence of retroperitoneal or intraperitoneal lymphadenopathy is identified. No evidence of intraperitoneal free fluid is seen. 1. Mild interval worsening of severe peripancreatic inflammation associated with acute pancreatitis, as discussed above. 2. No evidence of complication i.e. pseudocyst noted. 3. Stable bilateral lower lung multifocal consolidation atelectasis suggesting pneumonia versus alveolar edema. 4. Hepatic steatosis, unchanged.      Xr Chest (single View Frontal)    Result Date: 8/28/2020  EXAMINATION: ONE XRAY VIEW OF THE CHEST 8/28/2020 6:20 am COMPARISON: 08/27/2020 HISTORY: ORDERING SYSTEM PROVIDED HISTORY: f/u pneumonia TECHNOLOGIST PROVIDED HISTORY: f/u pneumonia Reason for Exam: f/u pneumonia Acuity: Unknown Type of Exam: Subsequent/Follow-up Additional signs and symptoms: f/u pneumonia Relevant Medical/Surgical History: f/u pneumonia FINDINGS: Poor inspiration with decrease lung volumes worse on the prior. Right upper extremity PICC line remain in place. Cardiomediastinal silhouette stable accentuated by the low lung volumes. No focal consolidation. Patchy airspace opacities in the left upper lung accentuated by low lung volume. Poor inspiration with low lung volumes worse than prior. Patchy airspace opacities in the left upper lung accentuated by low lung volume. Xr Chest (single View Frontal)    Result Date: 8/21/2020  EXAMINATION: ONE XRAY VIEW OF THE CHEST 8/21/2020 6:06 am COMPARISON: August 20, 2020 chest exam HISTORY: ORDERING SYSTEM PROVIDED HISTORY: f/u atelectasis TECHNOLOGIST PROVIDED HISTORY: f/u atelectasis Reason for Exam: F/U atelectasis. Acuity: Unknown Type of Exam: Unknown Additional signs and symptoms: F/U atelectasis. FINDINGS: Interval insertion of right PICC line catheter, the tip projected at the right atrium Mild cardiomegaly Limited extra sonya exam with low volume lungs, mild streaky, left greater than right, bibasilar atelectasis. Grossly clear upper lungs     Line placement as above Limited expiratory exam with mild streaky bibasilar atelectasis     Ct Abdomen Pelvis W Iv Contrast Additional Contrast? None    Result Date: 8/26/2020  EXAMINATION: CTA OF THE CHEST; CT OF THE ABDOMEN AND PELVIS WITH CONTRAST 8/26/2020 10:34 am TECHNIQUE: CTA of the chest was performed after the administration of intravenous contrast.  Multiplanar reformatted images are provided for review. MIP images are provided for review. Dose modulation, iterative reconstruction, and/or weight based adjustment of the mA/kV was utilized to reduce the radiation dose to as low as reasonably achievable.; CT of the abdomen and pelvis was performed with the administration of intravenous contrast. Multiplanar reformatted images are provided for review.  Dose modulation, iterative reconstruction, and/or weight based adjustment of the mA/kV was utilized to reduce the radiation dose to as low as reasonably achievable. COMPARISON: CT chest May 26, 2019 CT abdomen and pelvis August 20, 2020 HISTORY: ORDERING SYSTEM PROVIDED HISTORY: Hypoxia, recurrent fevers TECHNOLOGIST PROVIDED HISTORY: Hypoxia, recurrent fevers Reason for Exam: Hypoxia, recurrent fevers, best images possible due to patient size 322 pounds Acuity: Acute Type of Exam: Initial; ORDERING SYSTEM PROVIDED HISTORY: Splenic vein thrombosis? TECHNOLOGIST PROVIDED HISTORY: Splenic vein thrombosis? Reason for Exam: Splenic vein thrombosis? best images possible due to patient size 322  poounds Acuity: Acute Type of Exam: Initial FINDINGS: CT CHEST: Chest wall: No axillary adenopathy. Mediastinum: Cardiomegaly. No pericardial effusion. No adenopathy. Pulmonary arteries: Significantly limited evaluation for pulmonary embolus due to bolus timing, motion, and patient body habitus. Questionable pulmonary embolus within the right lower lobe. Lungs: Moderate left pleural effusion. Small right pleural effusion. Left lower lobe consolidation versus atelectasis. Bilateral upper lobe areas of consolidation. Bones: No acute osseous abnormality. CT ABDOMEN-PELVIS: Organs: Decreased attenuation of the liver is consistent with hepatic steatosis. No focal liver lesion. Cholecystectomy. Severe pancreatitis with surrounding inflammatory changes and fluid. Mild splenomegaly. No adrenal lesion. No hydronephrosis. Right renal cyst.  Focal area of scarring within the right kidney is unchanged. GI/Bowel: No bowel obstruction. Secondary involvement of the descending colon adjacent to the peripancreatic inflammatory changes along the tail of the pancreas. Pelvis: Essure devices. No significant free fluid. No bladder calculus. Peritoneum/Retroperitoneum: Splenic artery is patent. No evidence of splenic artery thrombosis. No definite splenic venous thrombosis.   A portion of the splenic vein as it courses along the tail of the pancreas is indistinct. However, proximal and distal to this, there is flow and this may be related to artifact. No abdominal aortic aneurysm. Prominent peripancreatic lymph nodes are likely reactive. Bones/Soft Tissues: No acute soft tissue abnormality. No acute osseous abnormality. Significantly limited evaluation for pulmonary embolus. Questionable right lower lobe pulmonary embolus. Bilateral lung opacities are likely pneumonia. Recommend follow-up to document resolution. Moderate left pleural effusion. Severe acute pancreatitis. No definite splenic venous thrombosis. Critical results were called by Dr. Nieves Ferrari MD to Dr. Sara William on 8/26/2020 at 11:40. Ct Abdomen Pelvis W Iv Contrast Additional Contrast? None    Addendum Date: 8/20/2020    ADDENDUM: As stated in the body of the report, the abdominal aorta is normal in size. There is no significant atherosclerosis. Result Date: 8/20/2020  EXAMINATION: CT OF THE ABDOMEN AND PELVIS WITH CONTRAST 8/19/2020 10:11 pm TECHNIQUE: CT of the abdomen and pelvis was performed with the administration of intravenous contrast. Multiplanar reformatted images are provided for review. Dose modulation, iterative reconstruction, and/or weight based adjustment of the mA/kV was utilized to reduce the radiation dose to as low as reasonably achievable. COMPARISON: None. HISTORY: ORDERING SYSTEM PROVIDED HISTORY: pain TECHNOLOGIST PROVIDED HISTORY: pain Reason for Exam: pt c/o all over abdominal pain with nausea for a few hours Acuity: Acute Type of Exam: Initial Relevant Medical/Surgical History: surg - gallbladder FINDINGS: Lower Chest: Consolidation within both lower lobes and the lingula. Scattered ground-glass opacity. No pleural effusion. Organs: Liver is diffusely hypoattenuating. No acute abnormality within the spleen, adrenals, or left kidney. There is right renal cortical scarring and calcification.   Subcentimeter hypoattenuating bilateral renal lesions are too small to accurately characterize, likely cysts. Prior cholecystectomy. There is diffuse peripancreatic stranding and fluid. No loculated fluid collection. GI/Bowel: Stomach is partially distended. Small bowel is nondilated. Colon is nondilated. Pelvis: Urinary bladder is partially distended without vesicular stones. Uterus is present. Peritoneum/Retroperitoneum: Shotty retroperitoneal lymph nodes, likely reactive. Abdominal aorta is normal in caliber. No pneumoperitoneum. Bones/Soft Tissues: No acute osseous abnormality. 1. Pancreatic edema and associated fluid is consistent with pancreatitis. No loculated fluid collection/pseudocyst. 2. Shotty retroperitoneal lymph nodes are likely reactive. 3. Hepatic steatosis. 4. Bilateral lower lobe consolidation, which could be due to edema or pneumonia. Ir Remedios Gallus Device Plmt/replace/removal    Result Date: 8/20/2020  PROCEDURE: ULTRASOUND GUIDED VASCULAR ACCESS. FLUOROSCOPY GUIDED PICC PLACEMENT 8/20/2020. HISTORY: ORDERING SYSTEM PROVIDED HISTORY: fluid resusitation TECHNOLOGIST PROVIDED HISTORY: fluid resusitation Lumen?->Double Lumen Is the patient pregnant? ->Yes Acuity: Unknown Type of Exam: Unknown Sepsis SEDATION: None FLUOROSCOPY TIME: DAP 63 cGy cm squared TECHNIQUE: Informed consent was obtained after a detailed explanation of the procedure including risks, benefits, and alternatives. Universal protocol was observed. The right arm was prepped and draped in sterile fashion using maximum sterile barrier technique. Local anesthesia was achieved with lidocaine. A micropuncture needle was used to access the right basilic vein using ultrasound guidance. An ultrasound image demonstrating patency of the vein with needle tip located within it. An image was obtained and stored in PACs.  A 0.018 guidewire was used to place a peel-a-way sheath and a 5 [place] power injectable dual-lumen PICC was advanced with fluoroscopic guidance with the tip at the cavo-atrial junction. The catheter flushed easily and there was a good blood return. The catheter was secured to the skin. The patient tolerated the procedure well and there were no immediate complications. FINDINGS: Fluoroscopic image demonstrates the tip of the catheter at the cavo-atrial junction. Successful ultrasound and fluoroscopy guided right basilic vein 5 Egyptian power injectable dual-lumen PICC placement. Ready for use. Xr Chest Portable    Result Date: 8/31/2020  EXAMINATION: ONE XRAY VIEW OF THE CHEST 8/31/2020 6:09 am COMPARISON: 08/30/2020 HISTORY: ORDERING SYSTEM PROVIDED HISTORY: infiltrate TECHNOLOGIST PROVIDED HISTORY: infiltrate Reason for Exam: infiltrate Acuity: Unknown Type of Exam: Unknown FINDINGS: A right upper extremity PICC line distal tip is at the cavoatrial junction. There is improved aeration of the left lower lung, with a small pleural effusion with overlying atelectasis/infiltrate remaining. No pneumothorax is demonstrated. The heart is enlarged. No acute osseous abnormality is seen. Improved aeration of the left lower lung, with a small left pleural effusion with overlying atelectasis/pneumonia remaining. Xr Chest Portable    Result Date: 8/30/2020  EXAMINATION: ONE XRAY VIEW OF THE CHEST 8/30/2020 6:07 am COMPARISON: 08/28/2020 HISTORY: ORDERING SYSTEM PROVIDED HISTORY: infiltrate TECHNOLOGIST PROVIDED HISTORY: infiltrate Reason for Exam: infiltrate Acuity: Unknown Type of Exam: Unknown FINDINGS: Right upper extremity PICC line is seen with tip obscured by additional overlying catheters. Low lung volume. Left basilar pleuroparenchymal opacity appears greater than prior. Aeration of the right lung appears slightly improved. No gross pneumothorax. Cardiac and mediastinal silhouettes are reflective of patient rotation. Left pleural effusion and left basilar airspace disease, slightly greater than prior. Improving aeration of the right lung as compared to prior. Xr Chest Portable    Result Date: 8/27/2020  EXAMINATION: ONE XRAY VIEW OF THE CHEST 8/27/2020 6:15 pm COMPARISON: 08/26/2020 radiograph HISTORY: ORDERING SYSTEM PROVIDED HISTORY: Increased WOB TECHNOLOGIST PROVIDED HISTORY: Increased WOB Reason for Exam: Increased WOB Acuity: Unknown Type of Exam: Unknown Additional signs and symptoms: Increased WOB FINDINGS: Right PICC line stable. The heart is enlarged and there is severe perihilar opacification that is increased centrally. Diffuse ground-glass opacities are also increased bilaterally. No pneumothorax. No skeletal finding. Perihilar and diffuse ground-glass opacities have increased from prior imaging. Pattern suspected to represent pulmonary edema. Developing pneumonitis can not be excluded. Xr Chest Portable    Result Date: 8/26/2020  EXAMINATION: ONE XRAY VIEW OF THE CHEST 8/26/2020 9:07 am COMPARISON: August 24, 2020, chest exam HISTORY: ORDERING SYSTEM PROVIDED HISTORY: PNA TECHNOLOGIST PROVIDED HISTORY: PNA Reason for Exam: PT CO increased SOB and CP. Acuity: Acute Type of Exam: Initial FINDINGS: Right PICC line catheter tip is projected at the SVC right atrial junction Persistent mild cardiomegaly Expiratory exam with mild diffuse prominence of lung markings likely related to the low volume lungs. Mild vascular congestion is not reliably excluded. Interval increase in now mild patchy right upper lobe infiltrate. Moderate left basilar infiltrate     Interval increase in now mild patchy right upper lobe infiltrate with moderate left basilar infiltrate Line placement as above Expiratory exam, possible mild vascular congestion.   Repeat upright good inspiration PA view of the chest is suggested for more accurate assessment of the pulmonary vascularity RECOMMENDATION: Repeat upright good inspiration PA view of the chest is suggested for more accurate assessment of the pulmonary vascularity     Xr Chest Portable    Result Date: 8/24/2020  EXAMINATION: ONE XRAY VIEW OF THE CHEST 8/24/2020 9:10 am COMPARISON: August 21, 2020, chest exam HISTORY: ORDERING SYSTEM PROVIDED HISTORY: Resp failure TECHNOLOGIST PROVIDED HISTORY: Resp failure Reason for Exam: resp failure Acuity: Unknown Type of Exam: Unknown FINDINGS: Interval insertion of a right PICC line catheter, the tip is projected at the right atrium. Limited markedly rotated exam No obvious significant pleuroparenchymal or mediastinal findings. Limited assessment of the left lung base     Limited markedly rotated exam, limited left base assessment Line placement as above No obvious acute cardiopulmonary findings     Xr Chest Portable    Result Date: 8/22/2020  EXAMINATION: ONE XRAY VIEW OF THE CHEST 8/22/2020 8:41 am COMPARISON: 08/21/2020 HISTORY: ORDERING SYSTEM PROVIDED HISTORY: Difficulty breathing TECHNOLOGIST PROVIDED HISTORY: Difficulty breathing Reason for Exam: dyspnea Acuity: Acute Type of Exam: Initial FINDINGS: Cardiomegaly. Low lung volumes. Right-sided PICC line remains in place. No focal consolidation. No pleural effusion or pneumothorax. Low lung volumes. This accentuates cardiomegaly. No focal consolidation. Xr Chest Portable    Result Date: 8/21/2020  EXAMINATION: ONE XRAY VIEW OF THE CHEST 8/21/2020 9:27 am COMPARISON: Chest radiograph performed 08/21/2020. HISTORY: ORDERING SYSTEM PROVIDED HISTORY: Increased oxygen demand TECHNOLOGIST PROVIDED HISTORY: Increased oxygen demand Reason for Exam: Increased oxygen demand Acuity: Acute Type of Exam: Initial Additional signs and symptoms: Increased oxygen demand FINDINGS: There are small bilateral effusions. There is a left basilar infiltrate. There is no pneumothorax. The heart is prominent. The upper abdomen is unremarkable. The extrathoracic soft tissues are unremarkable. Cardiomegaly and small bilateral effusions with adjacent left basilar infiltrate. Xr Chest Portable    Result Date: 8/20/2020  EXAMINATION: ONE XRAY VIEW OF THE CHEST 8/20/2020 7:38 am COMPARISON: July 18, 2020 chest exam HISTORY: ORDERING SYSTEM PROVIDED HISTORY: possible pneumonia on CT chest TECHNOLOGIST PROVIDED HISTORY: possible pneumonia on CT chest Reason for Exam: possible pneumonia on CT chest Acuity: Acute Type of Exam: Initial Additional signs and symptoms: possible pneumonia on CT chest FINDINGS: Expiratory exam with mild streaky bibasilar density. Normal cardiopericardial silhouette No significant pleural or mediastinal findings     Expiratory exam with mild streaky bibasilar atelectasis     Ct Chest Pulmonary Embolism W Contrast    Result Date: 8/26/2020  EXAMINATION: CTA OF THE CHEST; CT OF THE ABDOMEN AND PELVIS WITH CONTRAST 8/26/2020 10:34 am TECHNIQUE: CTA of the chest was performed after the administration of intravenous contrast.  Multiplanar reformatted images are provided for review. MIP images are provided for review. Dose modulation, iterative reconstruction, and/or weight based adjustment of the mA/kV was utilized to reduce the radiation dose to as low as reasonably achievable.; CT of the abdomen and pelvis was performed with the administration of intravenous contrast. Multiplanar reformatted images are provided for review. Dose modulation, iterative reconstruction, and/or weight based adjustment of the mA/kV was utilized to reduce the radiation dose to as low as reasonably achievable. COMPARISON: CT chest May 26, 2019 CT abdomen and pelvis August 20, 2020 HISTORY: ORDERING SYSTEM PROVIDED HISTORY: Hypoxia, recurrent fevers TECHNOLOGIST PROVIDED HISTORY: Hypoxia, recurrent fevers Reason for Exam: Hypoxia, recurrent fevers, best images possible due to patient size 322 pounds Acuity: Acute Type of Exam: Initial; ORDERING SYSTEM PROVIDED HISTORY: Splenic vein thrombosis? TECHNOLOGIST PROVIDED HISTORY: Splenic vein thrombosis? Reason for Exam: Splenic vein thrombosis? pancreatitis. No definite splenic venous thrombosis. Critical results were called by Dr. Danial Yanez MD to Dr. Miguel Copeland on 8/26/2020 at 11:40. Vl Lower Extremity Bilateral Venous Duplex    Result Date: 8/30/2020    [address] [place] [address]  Vascular Lower Extremities DVT Study Procedure   Patient Name  Prabhakar Mcginnis [id number]   Date of Study           08/30/2020                [facility]   Date of Birth 1975  Gender                  Female   Age           54 year(s)  Race                       Room Number   2003        Height:                 64.96 inch, 165 cm   Corporate ID  [id number]    Weight:                 304 pounds, 137.9 kg  #   Patient Acct  [de-identified]   BSA:        2.36 m^2    BMI:       50.65 kg/m^2  #   MR #          [id number]      Sonographer             Maryse Ambrose   Accession #   [id number]  Interpreting Physician  Ortiz Toro   Referring                 Referring Physician     Barron Kolb  Nurse  Practitioner  Procedure Type of Study:   Veins: Lower Extremities DVT Study, Venous Scan Lower Bilateral.  Indications for Study:Shortness of breath. Patient Status: In Patient. Technical Quality:Limited visualization. Limitation reason:Bedside edema. Conclusions   Summary   No evidence of superficial or deep venous thrombosis in both lower  extremities.    Signature   ----------------------------------------------------------------  Electronically signed by Brown Bledsoe(Sonographer) on  08/30/2020 04:00 PM  ----------------------------------------------------------------   ----------------------------------------------------------------  Electronically signed by Ortiz Toro(Interpreting physician)  on 08/30/2020 08:16 PM  ----------------------------------------------------------------  Findings:   Right Impression:                    Left Impression:  The common femoral, femoral,         The common femoral, femoral,  popliteal and tibial veins           popliteal and tibial veins  demonstrate normal compressibility   demonstrate normal compressibility  and augmentation. and augmentation. Normal compressibility of the great  Normal compressibility of the great  saphenous vein. saphenous vein. Normal compressibility of the small  Normal compressibility of the small  saphenous vein. saphenous vein. Velocities are measured in cm/s ; Diameters are measured in cm Right Lower Extremities DVT Study Measurements Right 2D Measurements +------------------------------------+----------+---------------+----------+ ! Location                            ! Visualized! Compressibility! Thrombosis! +------------------------------------+----------+---------------+----------+ ! Common Femoral                      !Yes       ! Yes            ! None      ! +------------------------------------+----------+---------------+----------+ ! Prox Femoral                        !Yes       ! Yes            ! None      ! +------------------------------------+----------+---------------+----------+ ! Mid Femoral                         !Yes       ! Yes            ! None      ! +------------------------------------+----------+---------------+----------+ ! Dist Femoral                        !Yes       ! Yes            ! None      ! +------------------------------------+----------+---------------+----------+ ! Popliteal                           !Yes       ! Yes            ! None      ! +------------------------------------+----------+---------------+----------+ ! Sapheno Femoral Junction            ! Yes       ! Yes            ! None      ! +------------------------------------+----------+---------------+----------+ ! PTV                                 ! Yes       ! Yes            ! None      ! +------------------------------------+----------+---------------+----------+ ! Peroneal                            !Yes       ! Yes            ! None      ! +------------------------------------+----------+---------------+----------+ ! Gastroc                             ! Yes       ! Yes            ! None      ! +------------------------------------+----------+---------------+----------+ ! GSV Thigh                           ! Yes       ! Yes            ! None      ! +------------------------------------+----------+---------------+----------+ ! GSV Knee                            ! Yes       ! Yes            ! None      ! +------------------------------------+----------+---------------+----------+ ! GSV Ankle                           ! Yes       ! Yes            ! None      ! +------------------------------------+----------+---------------+----------+ ! SSV                                 ! Yes       ! Yes            ! None      ! +------------------------------------+----------+---------------+----------+ Right Doppler Measurements +---------------------------+------+------+--------------------------------+ ! Location                   ! Signal!Reflux! Reflux (msec)                   ! +---------------------------+------+------+--------------------------------+ ! Common Femoral             !Phasic!      !                                ! +---------------------------+------+------+--------------------------------+ ! Prox Femoral               !Phasic!      !                                ! +---------------------------+------+------+--------------------------------+ ! Popliteal                  !Phasic!      !                                ! +---------------------------+------+------+--------------------------------+ Left Lower Extremities DVT Study Measurements Left 2D Measurements +------------------------------------+----------+---------------+----------+ ! Location                            ! Visualized! Compressibility! Thrombosis! +------------------------------------+----------+---------------+----------+ ! Common Femoral                      !Yes       ! Yes            ! None      ! the small pleural effusion and diagnostic thoracentesis was performed. The patient tolerated the procedure well. Fluid was provided for further analysis. FINDINGS: Only a small amount of accessible left pleural fluid demonstrated sonographically. A total of 15 mL cloudy yellow fluid was removed. Successful ultrasound guided diagnostic left thoracentesis. Assessment  1. Idiopathic acute pancreatitis without infection or necrosis    2. Fatty liver    3. Morbid obesity ([address]        Plan    Will order MRCP   R/o pancreatic divisum  Low-fat soft diet    Small frequent meals    Pt was advised in detail about some life style and dietary modifications. She was advised about avoidance of caffeine, nicotine and chocolate. Pt was also told to stay away from any kind of fast foods, soda pops. She was also advised to avoid lots of spices, grease and fried food etc.     Instructions were also given about trying to arrange the timing, quality and quantity of food. Instructions were given about using ample amount of fiber including dietary and supplemental fiber either metamucil, bennafiber or citrucell etc.  Pt was advised about drinking ample amount of water without any colors or chemicals. Stress was given about regular exercise. Pt has verbalized understanding and agreement to these modifications. Pt was given advices and instructions about weight loss. She was advised about the significance of exercise at least three times a week . Dietary advices were also given about the avoidance of fast food, fried food and lots of spices and grease. nstructions were given about using ample amount of fiber including dietary and supplemental fiber either metamucil, bennafiber or citrucell etc.  Pt was advised about drinking ample amount of water without any colors or chemicals. Stress was given about regular exercise. Pt was told to stay way from soda pops.     Pt has verbalized understanding and agrrement    Follow blood work-up liver enzymes    More than half of patient's clinic visit time was spent in counseling about lifestyle and dietary modifications  Patient's  questions were answered in this regard as well  The patient has verbalized understanding and agreement     I communicated with the patient and/or health care decision maker about   Details of this discussion including any medical advice provided:YES      I affirm this is a Patient Initiated Episode with an Established Patient who has not had a related appointment within my department in the past 7 days or scheduled within the next 24 hours. Total Time: minutes: 21-30 minutes    Note: not billable if this call serves to triage the patient into an appointment for the relevant concern      Thank you for allowing me to participate in the care of Ms. De Los Santos. For any further questions please do not hesitate to contact me. I have reviewed and agree with the ROS entered by the MA/ANJANAN.          Olivier Oleary MD, [facility]  Board Certified in Gastroenterology and [address] [place] [address] Gastroenterology  Office #: [phone]

## 2020-09-10 ENCOUNTER — HOSPITAL ENCOUNTER (OUTPATIENT)
Age: 45
Setting detail: SPECIMEN
Discharge: HOME | End: 2020-09-10
Payer: COMMERCIAL

## 2020-09-10 LAB
ALBUMIN SERPL-MCNC: 4 G/DL (ref 3.5–5.2)
ALBUMIN/GLOBULIN RATIO: ABNORMAL (ref 1–2.5)
ALP BLD-CCNC: 123 U/L (ref 35–104)
ALT SERPL-CCNC: 27 U/L (ref 5–33)
AMYLASE: 51 U/L (ref 28–100)
AST SERPL-CCNC: 18 U/L
BILIRUB SERPL-MCNC: 0.19 MG/DL (ref 0.3–1.2)
BILIRUBIN DIRECT: 0.09 MG/DL
BILIRUBIN, INDIRECT: 0.1 MG/DL (ref 0–1)
GLOBULIN: ABNORMAL G/DL (ref 1.5–3.8)
LIPASE: 64 U/L (ref 13–60)
TOTAL PROTEIN: 7.8 G/DL (ref 6.4–8.3)

## 2020-09-10 PROCEDURE — 80076 HEPATIC FUNCTION PANEL: CPT

## 2020-09-10 PROCEDURE — 82150 ASSAY OF AMYLASE: CPT

## 2020-09-10 PROCEDURE — 83690 ASSAY OF LIPASE: CPT

## 2020-09-14 NOTE — TELEPHONE ENCOUNTER
Pt calling , she had appt on 09/09 but is still having constipation, weakness,fever,fullness in stomach, Pt would like to know what she can do , otc meds are not working on the constipation. Please call.

## 2020-09-16 ENCOUNTER — HOSPITAL ENCOUNTER (INPATIENT)
Age: 45
LOS: 8 days | Discharge: ANOTHER HOSPITAL | DRG: 438 | End: 2020-09-24
Attending: INTERNAL MEDICINE | Admitting: INTERNAL MEDICINE
Payer: COMMERCIAL

## 2020-09-16 PROBLEM — K86.3 PANCREATIC PSEUDOCYST: Status: ACTIVE | Noted: 2020-09-16

## 2020-09-16 PROCEDURE — 6360000002 HC RX W HCPCS: Performed by: NURSE PRACTITIONER

## 2020-09-16 PROCEDURE — 2580000003 HC RX 258: Performed by: NURSE PRACTITIONER

## 2020-09-16 PROCEDURE — 99223 1ST HOSP IP/OBS HIGH 75: CPT | Performed by: INTERNAL MEDICINE

## 2020-09-16 PROCEDURE — 1200000000 HC SEMI PRIVATE

## 2020-09-16 PROCEDURE — 6360000002 HC RX W HCPCS: Performed by: INTERNAL MEDICINE

## 2020-09-16 PROCEDURE — 6370000000 HC RX 637 (ALT 250 FOR IP): Performed by: INTERNAL MEDICINE

## 2020-09-16 RX ORDER — ONDANSETRON 2 MG/ML
4 INJECTION INTRAMUSCULAR; INTRAVENOUS EVERY 6 HOURS PRN
Status: DISCONTINUED | OUTPATIENT
Start: 2020-09-16 | End: 2020-09-24 | Stop reason: HOSPADM

## 2020-09-16 RX ORDER — SODIUM CHLORIDE 0.9 % (FLUSH) 0.9 %
10 SYRINGE (ML) INJECTION EVERY 12 HOURS SCHEDULED
Status: DISCONTINUED | OUTPATIENT
Start: 2020-09-16 | End: 2020-09-24 | Stop reason: HOSPADM

## 2020-09-16 RX ORDER — MORPHINE SULFATE 2 MG/ML
1 INJECTION, SOLUTION INTRAMUSCULAR; INTRAVENOUS EVERY 4 HOURS PRN
Status: DISCONTINUED | OUTPATIENT
Start: 2020-09-16 | End: 2020-09-24 | Stop reason: HOSPADM

## 2020-09-16 RX ORDER — DULOXETIN HYDROCHLORIDE 60 MG/1
60 CAPSULE, DELAYED RELEASE ORAL DAILY
Status: DISCONTINUED | OUTPATIENT
Start: 2020-09-16 | End: 2020-09-22

## 2020-09-16 RX ORDER — PRAMIPEXOLE DIHYDROCHLORIDE 0.25 MG/1
0.25 TABLET ORAL DAILY
Status: DISCONTINUED | OUTPATIENT
Start: 2020-09-16 | End: 2020-09-24 | Stop reason: HOSPADM

## 2020-09-16 RX ORDER — ACETAMINOPHEN 325 MG/1
650 TABLET ORAL EVERY 6 HOURS PRN
Status: DISCONTINUED | OUTPATIENT
Start: 2020-09-16 | End: 2020-09-24 | Stop reason: HOSPADM

## 2020-09-16 RX ORDER — FUROSEMIDE 20 MG/1
20 TABLET ORAL 2 TIMES DAILY
Status: DISCONTINUED | OUTPATIENT
Start: 2020-09-16 | End: 2020-09-24 | Stop reason: HOSPADM

## 2020-09-16 RX ORDER — MORPHINE SULFATE 2 MG/ML
2 INJECTION, SOLUTION INTRAMUSCULAR; INTRAVENOUS EVERY 4 HOURS PRN
Status: DISCONTINUED | OUTPATIENT
Start: 2020-09-16 | End: 2020-09-24 | Stop reason: HOSPADM

## 2020-09-16 RX ORDER — SODIUM CHLORIDE 0.9 % (FLUSH) 0.9 %
10 SYRINGE (ML) INJECTION PRN
Status: DISCONTINUED | OUTPATIENT
Start: 2020-09-16 | End: 2020-09-24 | Stop reason: HOSPADM

## 2020-09-16 RX ORDER — MAGNESIUM SULFATE 1 G/100ML
1 INJECTION INTRAVENOUS PRN
Status: DISCONTINUED | OUTPATIENT
Start: 2020-09-16 | End: 2020-09-24 | Stop reason: HOSPADM

## 2020-09-16 RX ORDER — BISACODYL 10 MG
10 SUPPOSITORY, RECTAL RECTAL DAILY PRN
Status: DISCONTINUED | OUTPATIENT
Start: 2020-09-16 | End: 2020-09-24 | Stop reason: HOSPADM

## 2020-09-16 RX ORDER — PRAZOSIN HYDROCHLORIDE 1 MG/1
1 CAPSULE ORAL NIGHTLY
Status: DISCONTINUED | OUTPATIENT
Start: 2020-09-16 | End: 2020-09-24 | Stop reason: HOSPADM

## 2020-09-16 RX ORDER — POTASSIUM CHLORIDE 7.45 MG/ML
10 INJECTION INTRAVENOUS PRN
Status: DISCONTINUED | OUTPATIENT
Start: 2020-09-16 | End: 2020-09-24 | Stop reason: HOSPADM

## 2020-09-16 RX ORDER — PANTOPRAZOLE SODIUM 40 MG/1
40 TABLET, DELAYED RELEASE ORAL
Status: DISCONTINUED | OUTPATIENT
Start: 2020-09-17 | End: 2020-09-24 | Stop reason: HOSPADM

## 2020-09-16 RX ORDER — ACETAMINOPHEN 650 MG/1
650 SUPPOSITORY RECTAL EVERY 6 HOURS PRN
Status: DISCONTINUED | OUTPATIENT
Start: 2020-09-16 | End: 2020-09-24 | Stop reason: HOSPADM

## 2020-09-16 RX ORDER — QUETIAPINE 400 MG/1
800 TABLET, FILM COATED, EXTENDED RELEASE ORAL NIGHTLY
Status: DISCONTINUED | OUTPATIENT
Start: 2020-09-16 | End: 2020-09-24 | Stop reason: HOSPADM

## 2020-09-16 RX ORDER — POTASSIUM CHLORIDE 20 MEQ/1
40 TABLET, EXTENDED RELEASE ORAL PRN
Status: DISCONTINUED | OUTPATIENT
Start: 2020-09-16 | End: 2020-09-24 | Stop reason: HOSPADM

## 2020-09-16 RX ORDER — POTASSIUM CHLORIDE 20 MEQ/1
10 TABLET, EXTENDED RELEASE ORAL 2 TIMES DAILY
Status: DISCONTINUED | OUTPATIENT
Start: 2020-09-16 | End: 2020-09-24 | Stop reason: HOSPADM

## 2020-09-16 RX ORDER — ALBUTEROL SULFATE 90 UG/1
2 AEROSOL, METERED RESPIRATORY (INHALATION) EVERY 4 HOURS PRN
Status: DISCONTINUED | OUTPATIENT
Start: 2020-09-16 | End: 2020-09-24 | Stop reason: HOSPADM

## 2020-09-16 RX ORDER — BUSPIRONE HYDROCHLORIDE 15 MG/1
15 TABLET ORAL NIGHTLY
Status: DISCONTINUED | OUTPATIENT
Start: 2020-09-16 | End: 2020-09-24 | Stop reason: HOSPADM

## 2020-09-16 RX ORDER — SODIUM CHLORIDE 9 MG/ML
INJECTION, SOLUTION INTRAVENOUS CONTINUOUS
Status: DISCONTINUED | OUTPATIENT
Start: 2020-09-16 | End: 2020-09-24 | Stop reason: HOSPADM

## 2020-09-16 RX ORDER — VITAMIN B COMPLEX
2 TABLET ORAL DAILY
Status: DISCONTINUED | OUTPATIENT
Start: 2020-09-16 | End: 2020-09-24 | Stop reason: HOSPADM

## 2020-09-16 RX ORDER — LORAZEPAM 2 MG/ML
1 INJECTION INTRAMUSCULAR ONCE
Status: COMPLETED | OUTPATIENT
Start: 2020-09-16 | End: 2020-09-16

## 2020-09-16 RX ADMIN — PROMETHAZINE HYDROCHLORIDE 12.5 MG: 25 INJECTION INTRAMUSCULAR; INTRAVENOUS at 21:14

## 2020-09-16 RX ADMIN — HYDROMORPHONE HYDROCHLORIDE 1 MG: 1 INJECTION, SOLUTION INTRAMUSCULAR; INTRAVENOUS; SUBCUTANEOUS at 23:52

## 2020-09-16 RX ADMIN — Medication 10 ML: at 21:00

## 2020-09-16 RX ADMIN — LORAZEPAM 1 MG: 2 INJECTION INTRAMUSCULAR; INTRAVENOUS at 23:52

## 2020-09-16 RX ADMIN — HYDROMORPHONE HYDROCHLORIDE 1 MG: 1 INJECTION, SOLUTION INTRAMUSCULAR; INTRAVENOUS; SUBCUTANEOUS at 20:15

## 2020-09-16 RX ADMIN — Medication 2 MG: at 19:20

## 2020-09-16 RX ADMIN — SODIUM CHLORIDE: 9 INJECTION, SOLUTION INTRAVENOUS at 20:45

## 2020-09-16 RX ADMIN — DULOXETINE 60 MG: 60 CAPSULE, DELAYED RELEASE ORAL at 00:00

## 2020-09-16 ASSESSMENT — PAIN SCALES - GENERAL
PAINLEVEL_OUTOF10: 10

## 2020-09-16 ASSESSMENT — ENCOUNTER SYMPTOMS
VOMITING: 1
SHORTNESS OF BREATH: 0
SORE THROAT: 0
BACK PAIN: 0
DIARRHEA: 0
ABDOMINAL PAIN: 1
CONSTIPATION: 1
WHEEZING: 0
NAUSEA: 1
COUGH: 0

## 2020-09-16 ASSESSMENT — PAIN DESCRIPTION - PAIN TYPE: TYPE: ACUTE PAIN

## 2020-09-16 ASSESSMENT — PAIN DESCRIPTION - ORIENTATION: ORIENTATION: UPPER

## 2020-09-16 ASSESSMENT — PAIN DESCRIPTION - DESCRIPTORS: DESCRIPTORS: CONSTANT;SHARP;STABBING

## 2020-09-16 ASSESSMENT — PAIN DESCRIPTION - LOCATION: LOCATION: ABDOMEN

## 2020-09-16 NOTE — H&P
Take 1 capsule by mouth every 6 hours as needed for Headaches 5/19/20 5/26/20  Mandie Huggins,    albuterol sulfate HFA (PROVENTIL HFA) 108 (90 Base) MCG/ACT inhaler Inhale 2 puffs into the lungs as needed 12/28/18   Historical Provider, MD   magnesium oxide (MAG-OX) 400 MG tablet Take 400 mg by mouth daily    Historical Provider, MD   fexofenadine (RA ALLERGY RELIEF) 180 MG tablet take 1 tablet by mouth once daily  Patient taking differently: Take 180 mg by mouth daily as needed take 1 tablet by mouth once daily 11/7/18   Best Laughlin MD   pramipexole (MIRAPEX) 0.5 MG tablet take 1 tablet by mouth twice a day  Patient taking differently: Take 0.25 mg by mouth daily take 1 tablet by mouth twice a day 11/7/18   Best Laughlin MD   DULoxetine (CYMBALTA) 60 MG extended release capsule TAKE 1 CAPSULE BY MOUTH TWICE A DAY 8/3/18   Best Laughlin MD   ibuprofen (ADVIL) 200 MG tablet Take 2 tablets by mouth every 6 hours as needed for Pain  Patient taking differently: Take 600 mg by mouth every 6 hours as needed for Pain  9/15/17   Donovan Martinez MD   acetaminophen (APAP EXTRA STRENGTH) 500 MG tablet Take 2 tablets by mouth every 6 hours as needed for Pain 9/15/17   Dnoovan Martinez MD   metoprolol (LOPRESSOR) 25 MG tablet Take 25 mg by mouth daily  1/6/16   Historical Provider, MD       ALLERGIES      Aripiprazole; Eletriptan; Hydrocodone-acetaminophen; Oxycodone-acetaminophen; Sumatriptan; Triptans; Zosyn [piperacillin sod-tazobactam so]; and Lamotrigine    REVIEW OF SYSTEMS     Review of Systems   Constitutional: Positive for chills and fever. Negative for diaphoresis. HENT: Negative for congestion and sore throat. Respiratory: Negative for cough, shortness of breath and wheezing. Cardiovascular: Negative for chest pain, palpitations and leg swelling. Gastrointestinal: Positive for abdominal pain, constipation, nausea and vomiting. Negative for diarrhea.    Genitourinary: Negative for dysuria, frequency and urgency. Musculoskeletal: Negative for back pain and myalgias. Skin: Negative for rash. Neurological: Negative for dizziness, weakness and headaches. Psychiatric/Behavioral: The patient is nervous/anxious. PHYSICAL EXAM      /67   Pulse 109   Temp 97.9 °F (36.6 °C) (Oral)   Resp 18   Ht 5' 5\" (1.651 m)   Wt 275 lb 9.2 oz (125 kg)   SpO2 93%   BMI 45.86 kg/m²  Body mass index is 45.86 kg/m². Physical Exam  Constitutional:       General: She is in acute distress. Appearance: She is well-developed. She is obese. She is not diaphoretic. HENT:      Head: Normocephalic and atraumatic. Eyes:      Conjunctiva/sclera: Conjunctivae normal.      Pupils: Pupils are equal, round, and reactive to light. Neck:      Musculoskeletal: Normal range of motion and neck supple. Trachea: No tracheal deviation. Cardiovascular:      Rate and Rhythm: Regular rhythm. Tachycardia present. Heart sounds: Normal heart sounds. No murmur. No friction rub. No gallop. Pulmonary:      Effort: Pulmonary effort is normal. No respiratory distress. Breath sounds: Normal breath sounds. No wheezing or rales. Chest:      Chest wall: No tenderness. Abdominal:      General: Bowel sounds are normal. There is no distension. Palpations: Abdomen is soft. Tenderness: There is abdominal tenderness. There is no guarding. Musculoskeletal: Normal range of motion. General: No tenderness. Lymphadenopathy:      Cervical: No cervical adenopathy. Skin:     General: Skin is warm and dry. Coloration: Skin is not pale. Findings: No erythema or rash. Neurological:      Mental Status: She is alert and oriented to person, place, and time. Motor: No seizure activity. Coordination: Coordination normal.   Psychiatric:         Behavior: Behavior normal.         Thought Content:  Thought content normal.       DIAGNOSTICS      EKG: none    Labs:  CBC:   Recent Labs 09/17/20  0553   WBC 6.8   HGB 10.2*        BMP:    Recent Labs     09/17/20  0553      K 3.6*      CO2 21   BUN 6   CREATININE 0.71   GLUCOSE 139*     S. Calcium:  Recent Labs     09/17/20  0553   CALCIUM 9.1     S. Ionized Calcium:No results for input(s): IONCA in the last 72 hours. S. Magnesium:No results for input(s): MG in the last 72 hours. S. Phosphorus:No results for input(s): PHOS in the last 72 hours. S. Glucose:No results for input(s): POCGLU in the last 72 hours. Glycosylated hemoglobin A1C:   Lab Results   Component Value Date    LABA1C 6.3 08/20/2020     Hepatic:   Recent Labs     09/17/20  0553   *   ALT 93*   ALKPHOS 225*     CARDIAC ENZY: No results for input(s): CKTOTAL, CKMB, CKMBINDEX, TROPHS, MYOGLOBIN in the last 72 hours. INR:   Recent Labs     09/17/20  0553   INR 1.1     BNP: No results for input(s): PROBNP in the last 72 hours. ABGs: No results for input(s): PH, PCO2, PO2, HCO3, O2SAT in the last 72 hours. Lipids: No results for input(s): CHOL, TRIG, HDL, LDLCALC in the last 72 hours. Invalid input(s): LDL  Pancreatic functions:  Recent Labs     09/17/20  0553   LIPASE 46     S. LacticAcid: No results for input(s): LACTA in the last 72 hours. Thyroid functions:   Lab Results   Component Value Date    TSH 0.96 05/26/2019      U/A:No results for input(s): NITRITE, COLORU, WBCUA, RBCUA, MUCUS, BACTERIA, CLARITYU, SPECGRAV, LEUKOCYTESUR, BLOODU, GLUCOSEU, AMORPHOUS in the last 72 hours.     Invalid input(s): Meldon Boom    Imaging/Diagonstics:  Imaging Results  - documented in this encounter    CT abdomen and pelvis with contrast (09/16/2020 2:55 PM EDT)  CT abdomen and pelvis with contrast (09/16/2020 2:55 PM EDT)   Specimen         CT abdomen and pelvis with contrast (09/16/2020 2:55 PM EDT)   Narrative Performed At       ABDOMEN AND PELVIS CT WITH CONTRAST         HISTORY: Abdomen pain and vomiting        COMPARISON: August 25, 2016        TECHNIQUE: CT TECHNOLOGIST PROVIDED HISTORY: severe acute pain Is the patient pregnant? ->Yes Reason for Exam: worsening abdominal pain Acuity: Acute Type of Exam: Ongoing Relevant Medical/Surgical History: surg - gallbladder FINDINGS: Lower Chest: Persisting bilateral lower lung scattered consolidation and atelectasis is present with trace posterior left-sided pleural effusion identified. Jorge Hein Heart is normal in size and configuration. Organs: Interval mild worsening of severe peripancreatic fat stranding and fluid is noted with intraperitoneal free fluid collecting within the bilateral pericolic gutters and collecting inferiorly within the pelvis with increased volume in comparison with the prior study. Diffuse fatty infiltration of the liver is again seen. The liver, gallbladder, spleen, pancreas, adrenal glands, kidneys, are otherwise unremarkable in appearance. GI/Bowel: The stomach is unremarkable without wall thickening or distention. Bowel loops are unremarkable in appearance without evidence of obstruction, distension or mucosal thickening. Pelvis: Berkowitz catheter is noted within the nondistended but grossly unremarkable urinary bladder. Bilateral fallopian tube Essure coils are seen without evidence of complication. The uterus is anteverted in position and is unremarkable in appearance there no evidence of pelvic free fluid is seen. Peritoneum/Retroperitoneum: No evidence of retroperitoneal or intraperitoneal lymphadenopathy is identified. . Bones/Soft Tissues: No evidence of retroperitoneal or intraperitoneal lymphadenopathy is identified. No evidence of intraperitoneal free fluid is seen. 1. Mild interval worsening of severe peripancreatic inflammation associated with acute pancreatitis, as discussed above. 2. No evidence of complication i.e. pseudocyst noted. 3. Stable bilateral lower lung multifocal consolidation atelectasis suggesting pneumonia versus alveolar edema. 4. Hepatic steatosis, unchanged.      Xr Chest (single View Frontal)    Result Date: 8/28/2020  EXAMINATION: ONE XRAY VIEW OF THE CHEST 8/28/2020 6:20 am COMPARISON: 08/27/2020 HISTORY: ORDERING SYSTEM PROVIDED HISTORY: f/u pneumonia TECHNOLOGIST PROVIDED HISTORY: f/u pneumonia Reason for Exam: f/u pneumonia Acuity: Unknown Type of Exam: Subsequent/Follow-up Additional signs and symptoms: f/u pneumonia Relevant Medical/Surgical History: f/u pneumonia FINDINGS: Poor inspiration with decrease lung volumes worse on the prior. Right upper extremity PICC line remain in place. Cardiomediastinal silhouette stable accentuated by the low lung volumes. No focal consolidation. Patchy airspace opacities in the left upper lung accentuated by low lung volume. Poor inspiration with low lung volumes worse than prior. Patchy airspace opacities in the left upper lung accentuated by low lung volume. Xr Chest (single View Frontal)    Result Date: 8/21/2020  EXAMINATION: ONE XRAY VIEW OF THE CHEST 8/21/2020 6:06 am COMPARISON: August 20, 2020 chest exam HISTORY: ORDERING SYSTEM PROVIDED HISTORY: f/u atelectasis TECHNOLOGIST PROVIDED HISTORY: f/u atelectasis Reason for Exam: F/U atelectasis. Acuity: Unknown Type of Exam: Unknown Additional signs and symptoms: F/U atelectasis. FINDINGS: Interval insertion of right PICC line catheter, the tip projected at the right atrium Mild cardiomegaly Limited extra sonya exam with low volume lungs, mild streaky, left greater than right, bibasilar atelectasis. Grossly clear upper lungs     Line placement as above Limited expiratory exam with mild streaky bibasilar atelectasis     Ct Abdomen Pelvis W Iv Contrast Additional Contrast? None    Result Date: 8/26/2020  EXAMINATION: CTA OF THE CHEST; CT OF THE ABDOMEN AND PELVIS WITH CONTRAST 8/26/2020 10:34 am TECHNIQUE: CTA of the chest was performed after the administration of intravenous contrast.  Multiplanar reformatted images are provided for review.   MIP images are provided for review. Dose modulation, iterative reconstruction, and/or weight based adjustment of the mA/kV was utilized to reduce the radiation dose to as low as reasonably achievable.; CT of the abdomen and pelvis was performed with the administration of intravenous contrast. Multiplanar reformatted images are provided for review. Dose modulation, iterative reconstruction, and/or weight based adjustment of the mA/kV was utilized to reduce the radiation dose to as low as reasonably achievable. COMPARISON: CT chest May 26, 2019 CT abdomen and pelvis August 20, 2020 HISTORY: ORDERING SYSTEM PROVIDED HISTORY: Hypoxia, recurrent fevers TECHNOLOGIST PROVIDED HISTORY: Hypoxia, recurrent fevers Reason for Exam: Hypoxia, recurrent fevers, best images possible due to patient size 322 pounds Acuity: Acute Type of Exam: Initial; ORDERING SYSTEM PROVIDED HISTORY: Splenic vein thrombosis? TECHNOLOGIST PROVIDED HISTORY: Splenic vein thrombosis? Reason for Exam: Splenic vein thrombosis? best images possible due to patient size 322  poounds Acuity: Acute Type of Exam: Initial FINDINGS: CT CHEST: Chest wall: No axillary adenopathy. Mediastinum: Cardiomegaly. No pericardial effusion. No adenopathy. Pulmonary arteries: Significantly limited evaluation for pulmonary embolus due to bolus timing, motion, and patient body habitus. Questionable pulmonary embolus within the right lower lobe. Lungs: Moderate left pleural effusion. Small right pleural effusion. Left lower lobe consolidation versus atelectasis. Bilateral upper lobe areas of consolidation. Bones: No acute osseous abnormality. CT ABDOMEN-PELVIS: Organs: Decreased attenuation of the liver is consistent with hepatic steatosis. No focal liver lesion. Cholecystectomy. Severe pancreatitis with surrounding inflammatory changes and fluid. Mild splenomegaly. No adrenal lesion. No hydronephrosis. Right renal cyst.  Focal area of scarring within the right kidney is unchanged.  GI/Bowel: No bowel obstruction. Secondary involvement of the descending colon adjacent to the peripancreatic inflammatory changes along the tail of the pancreas. Pelvis: Essure devices. No significant free fluid. No bladder calculus. Peritoneum/Retroperitoneum: Splenic artery is patent. No evidence of splenic artery thrombosis. No definite splenic venous thrombosis. A portion of the splenic vein as it courses along the tail of the pancreas is indistinct. However, proximal and distal to this, there is flow and this may be related to artifact. No abdominal aortic aneurysm. Prominent peripancreatic lymph nodes are likely reactive. Bones/Soft Tissues: No acute soft tissue abnormality. No acute osseous abnormality. Significantly limited evaluation for pulmonary embolus. Questionable right lower lobe pulmonary embolus. Bilateral lung opacities are likely pneumonia. Recommend follow-up to document resolution. Moderate left pleural effusion. Severe acute pancreatitis. No definite splenic venous thrombosis. Critical results were called by Dr. Eduin Slaughter MD to Dr. Yasmani Xavier on 8/26/2020 at 11:40. Ct Abdomen Pelvis W Iv Contrast Additional Contrast? None    Addendum Date: 8/20/2020    ADDENDUM: As stated in the body of the report, the abdominal aorta is normal in size. There is no significant atherosclerosis. Result Date: 8/20/2020  EXAMINATION: CT OF THE ABDOMEN AND PELVIS WITH CONTRAST 8/19/2020 10:11 pm TECHNIQUE: CT of the abdomen and pelvis was performed with the administration of intravenous contrast. Multiplanar reformatted images are provided for review. Dose modulation, iterative reconstruction, and/or weight based adjustment of the mA/kV was utilized to reduce the radiation dose to as low as reasonably achievable. COMPARISON: None.  HISTORY: ORDERING SYSTEM PROVIDED HISTORY: pain TECHNOLOGIST PROVIDED HISTORY: pain Reason for Exam: pt c/o all over abdominal pain with nausea for a few hours Acuity: Acute Type of Exam: Initial Relevant Medical/Surgical History: surg - gallbladder FINDINGS: Lower Chest: Consolidation within both lower lobes and the lingula. Scattered ground-glass opacity. No pleural effusion. Organs: Liver is diffusely hypoattenuating. No acute abnormality within the spleen, adrenals, or left kidney. There is right renal cortical scarring and calcification. Subcentimeter hypoattenuating bilateral renal lesions are too small to accurately characterize, likely cysts. Prior cholecystectomy. There is diffuse peripancreatic stranding and fluid. No loculated fluid collection. GI/Bowel: Stomach is partially distended. Small bowel is nondilated. Colon is nondilated. Pelvis: Urinary bladder is partially distended without vesicular stones. Uterus is present. Peritoneum/Retroperitoneum: Shotty retroperitoneal lymph nodes, likely reactive. Abdominal aorta is normal in caliber. No pneumoperitoneum. Bones/Soft Tissues: No acute osseous abnormality. 1. Pancreatic edema and associated fluid is consistent with pancreatitis. No loculated fluid collection/pseudocyst. 2. Shotty retroperitoneal lymph nodes are likely reactive. 3. Hepatic steatosis. 4. Bilateral lower lobe consolidation, which could be due to edema or pneumonia. [place] [employer] Pickle Device Plmt/replace/removal    Result Date: 8/20/2020  PROCEDURE: ULTRASOUND GUIDED VASCULAR ACCESS. FLUOROSCOPY GUIDED PICC PLACEMENT 8/20/2020. HISTORY: ORDERING SYSTEM PROVIDED HISTORY: fluid resusitation TECHNOLOGIST PROVIDED HISTORY: fluid resusitation Lumen?->Double Lumen Is the patient pregnant? ->Yes Acuity: Unknown Type of Exam: Unknown Sepsis SEDATION: None FLUOROSCOPY TIME: DAP 63 cGy cm squared TECHNIQUE: Informed consent was obtained after a detailed explanation of the procedure including risks, benefits, and alternatives. Universal protocol was observed.  The right arm was prepped and draped in sterile fashion using maximum sterile barrier line is seen with tip obscured by additional overlying catheters. Low lung volume. Left basilar pleuroparenchymal opacity appears greater than prior. Aeration of the right lung appears slightly improved. No gross pneumothorax. Cardiac and mediastinal silhouettes are reflective of patient rotation. Left pleural effusion and left basilar airspace disease, slightly greater than prior. Improving aeration of the right lung as compared to prior. Xr Chest Portable    Result Date: 8/27/2020  EXAMINATION: ONE XRAY VIEW OF THE CHEST 8/27/2020 6:15 pm COMPARISON: 08/26/2020 radiograph HISTORY: ORDERING SYSTEM PROVIDED HISTORY: Increased WOB TECHNOLOGIST PROVIDED HISTORY: Increased WOB Reason for Exam: Increased WOB Acuity: Unknown Type of Exam: Unknown Additional signs and symptoms: Increased WOB FINDINGS: Right PICC line stable. The heart is enlarged and there is severe perihilar opacification that is increased centrally. Diffuse ground-glass opacities are also increased bilaterally. No pneumothorax. No skeletal finding. Perihilar and diffuse ground-glass opacities have increased from prior imaging. Pattern suspected to represent pulmonary edema. Developing pneumonitis can not be excluded. Xr Chest Portable    Result Date: 8/26/2020  EXAMINATION: ONE XRAY VIEW OF THE CHEST 8/26/2020 9:07 am COMPARISON: August 24, 2020, chest exam HISTORY: ORDERING SYSTEM PROVIDED HISTORY: PNA TECHNOLOGIST PROVIDED HISTORY: PNA Reason for Exam: PT CO increased SOB and CP. Acuity: Acute Type of Exam: Initial FINDINGS: Right PICC line catheter tip is projected at the SVC right atrial junction Persistent mild cardiomegaly Expiratory exam with mild diffuse prominence of lung markings likely related to the low volume lungs. Mild vascular congestion is not reliably excluded. Interval increase in now mild patchy right upper lobe infiltrate.   Moderate left basilar infiltrate     Interval increase in now mild patchy right upper lobe infiltrate with moderate left basilar infiltrate Line placement as above Expiratory exam, possible mild vascular congestion. Repeat upright good inspiration PA view of the chest is suggested for more accurate assessment of the pulmonary vascularity RECOMMENDATION: Repeat upright good inspiration PA view of the chest is suggested for more accurate assessment of the pulmonary vascularity     Xr Chest Portable    Result Date: 8/24/2020  EXAMINATION: ONE XRAY VIEW OF THE CHEST 8/24/2020 9:10 am COMPARISON: August 21, 2020, chest exam HISTORY: ORDERING SYSTEM PROVIDED HISTORY: Resp failure TECHNOLOGIST PROVIDED HISTORY: Resp failure Reason for Exam: resp failure Acuity: Unknown Type of Exam: Unknown FINDINGS: Interval insertion of a right PICC line catheter, the tip is projected at the right atrium. Limited markedly rotated exam No obvious significant pleuroparenchymal or mediastinal findings. Limited assessment of the left lung base     Limited markedly rotated exam, limited left base assessment Line placement as above No obvious acute cardiopulmonary findings     Xr Chest Portable    Result Date: 8/22/2020  EXAMINATION: ONE XRAY VIEW OF THE CHEST 8/22/2020 8:41 am COMPARISON: 08/21/2020 HISTORY: ORDERING SYSTEM PROVIDED HISTORY: Difficulty breathing TECHNOLOGIST PROVIDED HISTORY: Difficulty breathing Reason for Exam: dyspnea Acuity: Acute Type of Exam: Initial FINDINGS: Cardiomegaly. Low lung volumes. Right-sided PICC line remains in place. No focal consolidation. No pleural effusion or pneumothorax. Low lung volumes. This accentuates cardiomegaly. No focal consolidation. Xr Chest Portable    Result Date: 8/21/2020  EXAMINATION: ONE XRAY VIEW OF THE CHEST 8/21/2020 9:27 am COMPARISON: Chest radiograph performed 08/21/2020.  HISTORY: ORDERING SYSTEM PROVIDED HISTORY: Increased oxygen demand TECHNOLOGIST PROVIDED HISTORY: Increased oxygen demand Reason for Exam: Increased oxygen demand Acuity: Acute Type of Exam: Initial Additional signs and symptoms: Increased oxygen demand FINDINGS: There are small bilateral effusions. There is a left basilar infiltrate. There is no pneumothorax. The heart is prominent. The upper abdomen is unremarkable. The extrathoracic soft tissues are unremarkable. Cardiomegaly and small bilateral effusions with adjacent left basilar infiltrate. Xr Chest Portable    Result Date: 8/20/2020  EXAMINATION: ONE XRAY VIEW OF THE CHEST 8/20/2020 7:38 am COMPARISON: July 18, 2020 chest exam HISTORY: ORDERING SYSTEM PROVIDED HISTORY: possible pneumonia on CT chest TECHNOLOGIST PROVIDED HISTORY: possible pneumonia on CT chest Reason for Exam: possible pneumonia on CT chest Acuity: Acute Type of Exam: Initial Additional signs and symptoms: possible pneumonia on CT chest FINDINGS: Expiratory exam with mild streaky bibasilar density. Normal cardiopericardial silhouette No significant pleural or mediastinal findings     Expiratory exam with mild streaky bibasilar atelectasis     Ct Chest Pulmonary Embolism W Contrast    Result Date: 8/26/2020  EXAMINATION: CTA OF THE CHEST; CT OF THE ABDOMEN AND PELVIS WITH CONTRAST 8/26/2020 10:34 am TECHNIQUE: CTA of the chest was performed after the administration of intravenous contrast.  Multiplanar reformatted images are provided for review. MIP images are provided for review. Dose modulation, iterative reconstruction, and/or weight based adjustment of the mA/kV was utilized to reduce the radiation dose to as low as reasonably achievable.; CT of the abdomen and pelvis was performed with the administration of intravenous contrast. Multiplanar reformatted images are provided for review. Dose modulation, iterative reconstruction, and/or weight based adjustment of the mA/kV was utilized to reduce the radiation dose to as low as reasonably achievable.  COMPARISON: CT chest May 26, 2019 CT abdomen and pelvis August 20, 2020 HISTORY: [id number] PROVIDED HISTORY: Hypoxia, recurrent fevers TECHNOLOGIST PROVIDED HISTORY: Hypoxia, recurrent fevers Reason for Exam: Hypoxia, recurrent fevers, best images possible due to patient size 322 pounds Acuity: Acute Type of Exam: Initial; ORDERING SYSTEM PROVIDED HISTORY: Splenic vein thrombosis? TECHNOLOGIST PROVIDED HISTORY: Splenic vein thrombosis? Reason for Exam: Splenic vein thrombosis? best images possible due to patient size 322  poounds Acuity: Acute Type of Exam: Initial FINDINGS: CT CHEST: Chest wall: No axillary adenopathy. Mediastinum: Cardiomegaly. No pericardial effusion. No adenopathy. Pulmonary arteries: Significantly limited evaluation for pulmonary embolus due to bolus timing, motion, and patient body habitus. Questionable pulmonary embolus within the right lower lobe. Lungs: Moderate left pleural effusion. Small right pleural effusion. Left lower lobe consolidation versus atelectasis. Bilateral upper lobe areas of consolidation. Bones: No acute osseous abnormality. CT ABDOMEN-PELVIS: Organs: Decreased attenuation of the liver is consistent with hepatic steatosis. No focal liver lesion. Cholecystectomy. Severe pancreatitis with surrounding inflammatory changes and fluid. Mild splenomegaly. No adrenal lesion. No hydronephrosis. Right renal cyst.  Focal area of scarring within the right kidney is unchanged. GI/Bowel: No bowel obstruction. Secondary involvement of the descending colon adjacent to the peripancreatic inflammatory changes along the tail of the pancreas. Pelvis: Essure devices. No significant free fluid. No bladder calculus. Peritoneum/Retroperitoneum: Splenic artery is patent. No evidence of splenic artery thrombosis. No definite splenic venous thrombosis. A portion of the splenic vein as it courses along the tail of the pancreas is indistinct. However, proximal and distal to this, there is flow and this may be related to artifact. No abdominal aortic aneurysm.   Prominent peripancreatic lymph nodes are likely reactive. Bones/Soft Tissues: No acute soft tissue abnormality. No acute osseous abnormality. Significantly limited evaluation for pulmonary embolus. Questionable right lower lobe pulmonary embolus. Bilateral lung opacities are likely pneumonia. Recommend follow-up to document resolution. Moderate left pleural effusion. Severe acute pancreatitis. No definite splenic venous thrombosis. Critical results were called by Dr. Salima Resendez MD to Dr. Tanna Padron on 8/26/2020 at 11:40. Vl Lower Extremity Bilateral Venous Duplex    Result Date: 8/30/2020    [facility]  Vascular Lower Extremities DVT Study Procedure   Patient Name  Prabhakar Mcginnis [id number]   Date of Study           08/30/2020                [facility]   Date of Birth 1975  Gender                  Female   Age           54 year(s)  Race                       Room Number   2003        Height:                 64.96 inch, 165 cm   Corporate ID  [id number]    Weight:                 304 pounds, 137.9 kg  #   Patient Acct  [de-identified]   BSA:        2.36 m^2    BMI:       50.65 kg/m^2  #   MR #          [id number]      Sonographer             Kiara Essex   Accession #   [id number]  Interpreting Physician  Ortiz Toro   Referring                 Referring Physician     Hanna Solis  Nurse  Practitioner  Procedure Type of Study:   Veins: Lower Extremities DVT Study, Venous Scan Lower Bilateral.  Indications for Study:Shortness of breath. Patient Status: In Patient. Technical Quality:Limited visualization. Limitation reason:Bedside edema. Conclusions   Summary   No evidence of superficial or deep venous thrombosis in both lower  extremities.    Signature   ----------------------------------------------------------------  Electronically signed by Brown Goff(Sonographer) on  08/30/2020 04:00 PM  ---------------------------------------------------------------- Femoral Junction            ! Yes       ! Yes            ! None      ! +------------------------------------+----------+---------------+----------+ ! PTV                                 ! Yes       ! Yes            ! None      ! +------------------------------------+----------+---------------+----------+ ! Peroneal                            !Yes       ! Yes            ! None      ! +------------------------------------+----------+---------------+----------+ ! Gastroc                             ! Yes       ! Yes            ! None      ! +------------------------------------+----------+---------------+----------+ ! GSV Thigh                           ! Yes       ! Yes            ! None      ! +------------------------------------+----------+---------------+----------+ ! GSV Knee                            ! Yes       ! Yes            ! None      ! +------------------------------------+----------+---------------+----------+ ! GSV Ankle                           ! Yes       ! Yes            ! None      ! +------------------------------------+----------+---------------+----------+ ! SSV                                 ! Yes       ! Yes            ! None      ! +------------------------------------+----------+---------------+----------+ Right Doppler Measurements +---------------------------+------+------+--------------------------------+ ! Location                   ! Signal!Reflux! Reflux (msec)                   ! +---------------------------+------+------+--------------------------------+ ! Common Femoral             !Phasic!      !                                ! +---------------------------+------+------+--------------------------------+ ! Prox Femoral               !Phasic!      !                                ! +---------------------------+------+------+--------------------------------+ ! Popliteal                  !Phasic!      !                                ! +---------------------------+------+------+--------------------------------+ Left Lower Extremities DVT Study Measurements Left 2D Measurements +------------------------------------+----------+---------------+----------+ ! Location                            ! Visualized! Compressibility! Thrombosis! +------------------------------------+----------+---------------+----------+ ! Common Femoral                      !Yes       ! Yes            ! None      ! +------------------------------------+----------+---------------+----------+ ! Prox Femoral                        !Yes       ! Yes            ! None      ! +------------------------------------+----------+---------------+----------+ ! Mid Femoral                         !Yes       ! Yes            ! None      ! +------------------------------------+----------+---------------+----------+ ! Dist Femoral                        !Yes       ! Yes            ! None      ! +------------------------------------+----------+---------------+----------+ ! Popliteal                           !Yes       ! Yes            ! None      ! +------------------------------------+----------+---------------+----------+ ! Sapheno Femoral Junction            ! Yes       ! Yes            ! None      ! +------------------------------------+----------+---------------+----------+ ! PTV                                 ! Yes       ! Yes            ! None      ! +------------------------------------+----------+---------------+----------+ ! Peroneal                            !Yes       ! Yes            ! None      ! +------------------------------------+----------+---------------+----------+ ! Gastroc                             ! Yes       ! Yes            ! None      ! +------------------------------------+----------+---------------+----------+ ! GSV Thigh                           ! Yes       ! Yes            ! None      ! +------------------------------------+----------+---------------+----------+ ! GSV Knee                            ! Yes       ! Yes            ! None      ! +------------------------------------+----------+---------------+----------+ ! GSV Ankle                           ! Yes       ! Yes            ! None      ! +------------------------------------+----------+---------------+----------+ ! SSV                                 ! Yes       ! Yes            ! None      ! +------------------------------------+----------+---------------+----------+ Left Doppler Measurements +---------------------------+------+------+--------------------------------+ ! Location                   ! Signal!Reflux! Reflux (msec)                   ! +---------------------------+------+------+--------------------------------+ ! Common Femoral             !Phasic!      !                                ! +---------------------------+------+------+--------------------------------+ ! Prox Femoral               !Phasic!      !                                ! +---------------------------+------+------+--------------------------------+ ! Popliteal                  !Phasic!      !                                ! +---------------------------+------+------+--------------------------------+    Us Retroperitoneal Limited    Result Date: 8/25/2020  EXAMINATION: ULTRASOUND OF THE KIDNEYS 8/25/2020 1:19 pm COMPARISON: August 20, 2020 CT abdomen and pelvis HISTORY: ORDERING SYSTEM PROVIDED HISTORY: Low UOP TECHNOLOGIST PROVIDED HISTORY: Bilateral Renal Ultrasound Low UOP Acuity: Acute Type of Exam: Initial FINDINGS: Exam is limited due to patient body habitus. The right kidney measures 15.7 centimetres in length and the left kidney measures 16 cm in length. There is no hydronephrosis in either kidney. New focal renal lesion. Diffuse hepatic steatosis. No hydronephrosis in either kidney.      Ir Guided Thoracentesis Pleural    Result Date: 8/31/2020  PROCEDURE: ULTRASOUNDGUIDED LEFT DIAGNOSTIC THORACENTESIS 8/28/2020 HISTORY: ORDERING SYSTEM PROVIDED HISTORY: pleural effusion left TECHNOLOGIST PROVIDED HISTORY: pleural effusion left Is the patient pregnant? ->Yes TECHNIQUE: Informed consent was obtained after a detailed explanation of the procedure including risks, benefits, and alternatives. Universal protocol was performed. The left posterior chest was prepped and draped in sterile fashion and local anesthesia was achieved with lidocaine. An 5 K[place] needle sheath was advanced under ultrasound guidance into the small pleural effusion and diagnostic thoracentesis was performed. The patient tolerated the procedure well. Fluid was provided for further analysis. FINDINGS: Only a small amount of accessible left pleural fluid demonstrated sonographically. A total of 15 mL cloudy yellow fluid was removed. Successful ultrasound guided diagnostic left thoracentesis. ASSESSMENT  and  PLAN     Principal Problem:    Pancreatic pseudocyst  Active Problems:    Anxiety    Class 3 severe obesity due to excess calories with serious comorbidity in adult (HCC)    HTN (hypertension)    Smoker    History of depression  Resolved Problems:    * No resolved hospital problems. *    Plan:    Pancreatitic Pseudocysts (Multiple)  -Lipase 32 in ED before transfer  -Triglycerides WNL during last visit  -Clear liquid diet  -1 Litre fluid bolus in ED  -IVF @100 ml/hr  -Pain and nausea control  -Consult GI  -Consult surgery  -Monitor labs    Anxiety  -Continue home meds  -One time dose Ativan IV to augment pain meds    Consultations:     Cristina Swenson MD   9/17/2020  11:15 AM    [address] [place] [address] [place] [address]. Phone [phone] and add on       I have discussed the care of Jack Herron , including pertinent history and exam findings,      9/17/20    with the resident. I have seen and examined the patient and the key elements of all parts of the encounter have been performed by me .    I agree with the assessment, plan and bisacodyl, ondansetron, promethazine (PHENERGAN) in sodium chloride 0.9% [address]    Phone [phone]   Fax: [phone]  Answering Service: [phone]

## 2020-09-17 PROBLEM — Z86.59 HISTORY OF DEPRESSION: Status: ACTIVE | Noted: 2020-09-17

## 2020-09-17 LAB
ALBUMIN SERPL-MCNC: 3.4 G/DL (ref 3.5–5.2)
ALBUMIN/GLOBULIN RATIO: ABNORMAL (ref 1–2.5)
ALP BLD-CCNC: 225 U/L (ref 35–104)
ALT SERPL-CCNC: 93 U/L (ref 5–33)
ANION GAP SERPL CALCULATED.3IONS-SCNC: 14 MMOL/L (ref 9–17)
AST SERPL-CCNC: 138 U/L
BILIRUB SERPL-MCNC: 0.5 MG/DL (ref 0.3–1.2)
BILIRUBIN DIRECT: 0.27 MG/DL
BILIRUBIN, INDIRECT: 0.23 MG/DL (ref 0–1)
BUN BLDV-MCNC: 6 MG/DL (ref 6–20)
BUN/CREAT BLD: ABNORMAL (ref 9–20)
CALCIUM SERPL-MCNC: 9.1 MG/DL (ref 8.6–10.4)
CHLORIDE BLD-SCNC: 104 MMOL/L (ref 98–107)
CO2: 21 MMOL/L (ref 20–31)
CREAT SERPL-MCNC: 0.71 MG/DL (ref 0.5–0.9)
GFR AFRICAN AMERICAN: >60 ML/MIN
GFR NON-AFRICAN AMERICAN: >60 ML/MIN
GFR SERPL CREATININE-BSD FRML MDRD: ABNORMAL ML/MIN/{1.73_M2}
GFR SERPL CREATININE-BSD FRML MDRD: ABNORMAL ML/MIN/{1.73_M2}
GLOBULIN: ABNORMAL G/DL (ref 1.5–3.8)
GLUCOSE BLD-MCNC: 139 MG/DL (ref 70–99)
HCT VFR BLD CALC: 30.6 % (ref 36–46)
HEMOGLOBIN: 10.2 G/DL (ref 12–16)
INR BLD: 1.1
LIPASE: 46 U/L (ref 13–60)
MCH RBC QN AUTO: 30.8 PG (ref 26–34)
MCHC RBC AUTO-ENTMCNC: 33.5 G/DL (ref 31–37)
MCV RBC AUTO: 91.9 FL (ref 80–100)
NRBC AUTOMATED: ABNORMAL PER 100 WBC
PDW BLD-RTO: 14.8 % (ref 11.5–14.9)
PLATELET # BLD: 216 K/UL (ref 150–450)
PMV BLD AUTO: 9.9 FL (ref 6–12)
POTASSIUM SERPL-SCNC: 3.6 MMOL/L (ref 3.7–5.3)
PROTHROMBIN TIME: 14.2 SEC (ref 11.8–14.6)
RBC # BLD: 3.33 M/UL (ref 4–5.2)
SODIUM BLD-SCNC: 139 MMOL/L (ref 135–144)
TOTAL PROTEIN: 7.3 G/DL (ref 6.4–8.3)
WBC # BLD: 6.8 K/UL (ref 3.5–11)

## 2020-09-17 PROCEDURE — 6370000000 HC RX 637 (ALT 250 FOR IP): Performed by: INTERNAL MEDICINE

## 2020-09-17 PROCEDURE — 85027 COMPLETE CBC AUTOMATED: CPT

## 2020-09-17 PROCEDURE — 80076 HEPATIC FUNCTION PANEL: CPT

## 2020-09-17 PROCEDURE — 6360000002 HC RX W HCPCS: Performed by: INTERNAL MEDICINE

## 2020-09-17 PROCEDURE — 6370000000 HC RX 637 (ALT 250 FOR IP): Performed by: NURSE PRACTITIONER

## 2020-09-17 PROCEDURE — 2580000003 HC RX 258: Performed by: NURSE PRACTITIONER

## 2020-09-17 PROCEDURE — 36415 COLL VENOUS BLD VENIPUNCTURE: CPT

## 2020-09-17 PROCEDURE — 1200000000 HC SEMI PRIVATE

## 2020-09-17 PROCEDURE — 83690 ASSAY OF LIPASE: CPT

## 2020-09-17 PROCEDURE — 6360000002 HC RX W HCPCS: Performed by: NURSE PRACTITIONER

## 2020-09-17 PROCEDURE — 85610 PROTHROMBIN TIME: CPT

## 2020-09-17 PROCEDURE — 80048 BASIC METABOLIC PNL TOTAL CA: CPT

## 2020-09-17 PROCEDURE — 99223 1ST HOSP IP/OBS HIGH 75: CPT | Performed by: INTERNAL MEDICINE

## 2020-09-17 RX ORDER — LORAZEPAM 2 MG/ML
1 INJECTION INTRAMUSCULAR ONCE
Status: COMPLETED | OUTPATIENT
Start: 2020-09-17 | End: 2020-09-17

## 2020-09-17 RX ORDER — LORAZEPAM 1 MG/1
1 TABLET ORAL NIGHTLY PRN
Status: DISCONTINUED | OUTPATIENT
Start: 2020-09-17 | End: 2020-09-23

## 2020-09-17 RX ADMIN — Medication 2 MG: at 20:07

## 2020-09-17 RX ADMIN — FUROSEMIDE 20 MG: 20 TABLET ORAL at 20:07

## 2020-09-17 RX ADMIN — LORAZEPAM 1 MG: 1 TABLET ORAL at 20:27

## 2020-09-17 RX ADMIN — ACETAMINOPHEN 650 MG: 325 TABLET, FILM COATED ORAL at 16:02

## 2020-09-17 RX ADMIN — LORAZEPAM 1 MG: 2 INJECTION INTRAMUSCULAR; INTRAVENOUS at 23:01

## 2020-09-17 RX ADMIN — Medication 2 MG: at 07:56

## 2020-09-17 RX ADMIN — ONDANSETRON 4 MG: 2 INJECTION INTRAMUSCULAR; INTRAVENOUS at 14:48

## 2020-09-17 RX ADMIN — Medication 2 MG: at 11:45

## 2020-09-17 RX ADMIN — PROMETHAZINE HYDROCHLORIDE 12.5 MG: 25 INJECTION INTRAMUSCULAR; INTRAVENOUS at 20:09

## 2020-09-17 RX ADMIN — Medication 2000 UNITS: at 07:45

## 2020-09-17 RX ADMIN — ONDANSETRON 4 MG: 2 INJECTION INTRAMUSCULAR; INTRAVENOUS at 00:00

## 2020-09-17 RX ADMIN — ENOXAPARIN SODIUM 30 MG: 30 INJECTION SUBCUTANEOUS at 07:45

## 2020-09-17 RX ADMIN — ACETAMINOPHEN 650 MG: 325 TABLET, FILM COATED ORAL at 23:07

## 2020-09-17 RX ADMIN — DULOXETINE 60 MG: 60 CAPSULE, DELAYED RELEASE ORAL at 07:45

## 2020-09-17 RX ADMIN — PANTOPRAZOLE SODIUM 40 MG: 40 TABLET, DELAYED RELEASE ORAL at 06:04

## 2020-09-17 RX ADMIN — QUETIAPINE FUMARATE 800 MG: 400 TABLET, EXTENDED RELEASE ORAL at 00:13

## 2020-09-17 RX ADMIN — PRAZOSIN HYDROCHLORIDE 1 MG: 1 CAPSULE ORAL at 20:14

## 2020-09-17 RX ADMIN — FUROSEMIDE 20 MG: 20 TABLET ORAL at 07:45

## 2020-09-17 RX ADMIN — PRAZOSIN HYDROCHLORIDE 1 MG: 1 CAPSULE ORAL at 00:13

## 2020-09-17 RX ADMIN — Medication 1 MG: at 20:07

## 2020-09-17 RX ADMIN — ACETAMINOPHEN 650 MG: 325 TABLET, FILM COATED ORAL at 10:03

## 2020-09-17 RX ADMIN — METOPROLOL TARTRATE 25 MG: 25 TABLET, FILM COATED ORAL at 07:45

## 2020-09-17 RX ADMIN — PIPERACILLIN AND TAZOBACTAM 4.5 G: 4; .5 INJECTION, POWDER, LYOPHILIZED, FOR SOLUTION INTRAVENOUS; PARENTERAL at 00:13

## 2020-09-17 RX ADMIN — ENOXAPARIN SODIUM 30 MG: 30 INJECTION SUBCUTANEOUS at 20:07

## 2020-09-17 RX ADMIN — POTASSIUM CHLORIDE 10 MEQ: 1500 TABLET, EXTENDED RELEASE ORAL at 20:07

## 2020-09-17 RX ADMIN — BUSPIRONE HYDROCHLORIDE 15 MG: 15 TABLET ORAL at 20:07

## 2020-09-17 RX ADMIN — Medication 10 ML: at 07:45

## 2020-09-17 RX ADMIN — PIPERACILLIN SODIUM AND TAZOBACTAM SODIUM 3.38 G: 3; .375 INJECTION, POWDER, LYOPHILIZED, FOR SOLUTION INTRAVENOUS at 06:05

## 2020-09-17 RX ADMIN — POTASSIUM CHLORIDE 10 MEQ: 1500 TABLET, EXTENDED RELEASE ORAL at 07:45

## 2020-09-17 RX ADMIN — PIPERACILLIN SODIUM AND TAZOBACTAM SODIUM 3.38 G: 3; .375 INJECTION, POWDER, LYOPHILIZED, FOR SOLUTION INTRAVENOUS at 21:30

## 2020-09-17 RX ADMIN — QUETIAPINE FUMARATE 800 MG: 400 TABLET, EXTENDED RELEASE ORAL at 20:10

## 2020-09-17 RX ADMIN — PRAMIPEXOLE DIHYDROCHLORIDE 0.25 MG: 0.25 TABLET ORAL at 07:45

## 2020-09-17 RX ADMIN — PIPERACILLIN SODIUM AND TAZOBACTAM SODIUM 3.38 G: 3; .375 INJECTION, POWDER, LYOPHILIZED, FOR SOLUTION INTRAVENOUS at 14:25

## 2020-09-17 RX ADMIN — Medication 2 MG: at 03:01

## 2020-09-17 RX ADMIN — Medication 400 MG: at 07:45

## 2020-09-17 RX ADMIN — Medication 2 MG: at 15:54

## 2020-09-17 RX ADMIN — METOPROLOL TARTRATE 25 MG: 25 TABLET, FILM COATED ORAL at 00:13

## 2020-09-17 ASSESSMENT — PAIN SCALES - GENERAL
PAINLEVEL_OUTOF10: 6
PAINLEVEL_OUTOF10: 6
PAINLEVEL_OUTOF10: 8
PAINLEVEL_OUTOF10: 4
PAINLEVEL_OUTOF10: 3
PAINLEVEL_OUTOF10: 7
PAINLEVEL_OUTOF10: 4
PAINLEVEL_OUTOF10: 0
PAINLEVEL_OUTOF10: 7
PAINLEVEL_OUTOF10: 4
PAINLEVEL_OUTOF10: 7
PAINLEVEL_OUTOF10: 3
PAINLEVEL_OUTOF10: 7

## 2020-09-17 ASSESSMENT — PAIN DESCRIPTION - LOCATION
LOCATION: ABDOMEN

## 2020-09-17 ASSESSMENT — PAIN DESCRIPTION - PAIN TYPE
TYPE: ACUTE PAIN

## 2020-09-17 ASSESSMENT — ENCOUNTER SYMPTOMS
ABDOMINAL PAIN: 1
DIARRHEA: 0
CHOKING: 0
BLOOD IN STOOL: 0
VOICE CHANGE: 0
EYES NEGATIVE: 1
COUGH: 0
TROUBLE SWALLOWING: 0
BACK PAIN: 0
SHORTNESS OF BREATH: 0
WHEEZING: 0
COLOR CHANGE: 0
NAUSEA: 1
CONSTIPATION: 1
VOMITING: 1
SORE THROAT: 0

## 2020-09-17 NOTE — PLAN OF CARE
Problem: Pain:  Goal: Pain level will decrease  Outcome: Ongoing  Note: Full pain assessment completed this shift. Pt continuing to have pain even with PRN medication. RN educated pt on nonpharmacologic interventions to help decrease pain. Will continue to monitor. Goal: Control of acute pain  Outcome: Ongoing  Goal: Control of chronic pain  Outcome: Ongoing     Problem: Falls - Risk of:  Goal: Will remain free from falls  Outcome: Ongoing  Note: Pt remains free from falls this shift. Bed in lowest position with wheels locked and 2/4 siderails up. Call light and bedside table within reach. Hourly rounding to ensure pt safety. Will continue to monitor.    Goal: Absence of physical injury  Outcome: Ongoing     Problem: Physical Regulation:  Goal: Will remain free from infection  Outcome: Ongoing

## 2020-09-17 NOTE — FLOWSHEET NOTE
Patient expressed her hopes that condition doesn't get as bad as it was a couple weeks ago. She talked about how weak she had been and was progressing a bit with walking and then started feeling badly again. She appreciated listening presence and prayer. 09/17/20 1948   Encounter Summary   Services provided to: Patient   Referral/Consult From: [facility]   [employer] Parent   Continue Visiting   (9-17-20)   Complexity of Encounter Moderate   Length of Encounter 30 minutes   Spiritual Assessment Completed Yes   Routine   Type Initial   Spiritual/Islam   Type Spiritual support   Assessment Calm; Approachable   Intervention Active listening;Explored feelings, thoughts, concerns;Explored coping resources;Nurtured hope;Prayer;Sustaining presence/ Ministry of presence; Discussed illness/injury and it's impact   Outcome Expressed gratitude;Engaged in conversation;Expressed feelings/needs/concerns;Receptive

## 2020-09-17 NOTE — PROGRESS NOTES
Perfect[employer] message sent to Dr. Candi Barkley, GI doctor on call for Dr. Summer Nunes regarding new consult.

## 2020-09-17 NOTE — CONSULTS
General Surgery Consult      Pt Name: Rachel Yates  MRN: [id number]  YOB: 1975  Date of evaluation: 9/17/2020  Primary Care Physician: Jania Fregoso   Patient evaluated at the request of  Dr. Johnye Spatz  Reason for evaluation: Abdominal pain    SUBJECTIVE:   History of Chief Complaint:    Rachel Yates is a 54 y.o. female with PMHx CKD, DVT, HTN and SVT who presents with epigastric abdominal pain x 2 days. Patient known to us from previous admission for severe idiopathic pancreatitis. Pain is sharp, constant, does not radiate. Associated with fever/chills, constipation, nausea, several episodes of non-bloody emesis. Denies SOB, chest pain, hematemesis, melena, hematochezia, dysuria. Last normal BM 4 days ago. She was a direct admit from [facility]. CT abdomen pelvis performed 9/16 revealed pancreatitis with multiple pseudocysts. Lipase 46. LFT's mildly elevated. Previous abdominal surgeries include cholecystectomy. Symptom onset has been acute for a time period of 2 day(s). Severity is described as moderate. Course of her symptoms over time is recurrent. Past Medical History   has a past medical history of Abnormal levels of other serum enzymes, Anxiety, Bilateral leg edema, Chronic kidney disease, Depression, DVT (deep venous thrombosis) (HCC), Elevated liver enzymes, Fibromyalgia, Headache(784.0), Hx of blood clots, Hypertension, Kidney stone, Obstructive sleep apnea syndrome, Pancreatitis, Pleural effusion, SOB (shortness of breath), Supraventricular tachycardia ([address] and SVT (supraventricular tachycardia) ([facility] [address] [facility] Past Surgical History   has a past surgical history that includes Thyroid lobectomy (Right); Cholecystectomy (2001); Knee arthroscopy (Left); Foot surgery (Right); Endometrial ablation; Atrial ablation surgery; eye surgery (Bilateral);  Endoscopy, colon, diagnostic; back surgery; pr cysto/uretero/pyeloscopy w/lithotripsy (Left, 9/20/2017); and EXCISION LESION HAND / FINGER (Right, 1/25/2019). Medications  Prior to Admission medications    Medication Sig Start Date End Date Taking?  Authorizing Provider   ibuprofen (ADVIL;MOTRIN) 600 MG tablet Take 1 tablet by mouth every 6 hours as needed for Pain 9/4/20   Mihaela Millan MD   prazosin (MINIPRESS) 1 MG capsule Take 1 mg by mouth nightly    Historical Provider, MD   melatonin 5 MG TABS tablet Take 5 mg by mouth daily    Historical Provider, MD   potassium chloride (KLOR-CON M) 10 MEQ extended release tablet Take 1 tablet by mouth 2 times daily 7/18/20   Toby Gregory MD   furosemide (LASIX) 20 MG tablet Take 1 tablet by mouth 2 times daily 7/18/20   Toby Gregory MD   QUEtiapine (SEROQUEL XR) 400 MG extended release tablet Take 800 mg by mouth nightly    Historical Provider, MD   busPIRone (BUSPAR) 15 MG tablet Take 15 mg by mouth nightly Can take once in morning PRN    Historical Provider, MD   omeprazole (PRILOSEC) 40 MG delayed release capsule Take 40 mg by mouth daily    Historical Provider, MD   Cholecalciferol (VITAMIN D) 50 MCG (2000 UT) CAPS capsule Take by mouth daily    Historical Provider, MD   butalbital-APAP-caffeine (FIORICET) -40 MG CAPS per capsule Take 1 capsule by mouth every 6 hours as needed for Headaches 5/19/20 5/26/20  Day Lopez DO   albuterol sulfate HFA (PROVENTIL HFA) 108 (90 Base) MCG/ACT inhaler Inhale 2 puffs into the lungs as needed 12/28/18   Historical Provider, MD   magnesium oxide (MAG-OX) 400 MG tablet Take 400 mg by mouth daily    Historical Provider, MD   fexofenadine (RA ALLERGY RELIEF) 180 MG tablet take 1 tablet by mouth once daily  Patient taking differently: Take 180 mg by mouth daily as needed take 1 tablet by mouth once daily 11/7/18   Fouzia Juarez MD   pramipexole (MIRAPEX) 0.5 MG tablet take 1 tablet by mouth twice a day  Patient taking differently: Take 0.25 mg by mouth daily take 1 tablet by mouth twice a day 11/7/18   Fouzia Juarez MD   DULoxetine (CYMBALTA) 60 MG extended release capsule TAKE 1 CAPSULE BY MOUTH TWICE A DAY 8/3/18   Best Laughlin MD   ibuprofen (ADVIL) 200 MG tablet Take 2 tablets by mouth every 6 hours as needed for Pain  Patient taking differently: Take 600 mg by mouth every 6 hours as needed for Pain  9/15/17   Donovan Martinez MD   acetaminophen (APAP EXTRA STRENGTH) 500 MG tablet Take 2 tablets by mouth every 6 hours as needed for Pain 9/15/17   Donovan Martinez MD   metoprolol (LOPRESSOR) 25 MG tablet Take 25 mg by mouth daily  1/6/16   Historical Provider, MD     Allergies  is allergic to aripiprazole; eletriptan; hydrocodone-acetaminophen; oxycodone-acetaminophen; sumatriptan; triptans; zosyn [piperacillin sod-tazobactam so]; and lamotrigine. Family History  family history includes Heart Disease in her father; High Blood Pressure in her brother. Social History   reports that she quit smoking about 9 months ago. Her smoking use included cigarettes. She smoked 1.00 pack per day. She has never used smokeless tobacco. She reports current alcohol use. She reports that she does not use drugs. Review of Systems:  General c/o fevers/chills, currently afebrile  HEENT Denies any diplopia, tinnitus or vertigo  Resp Denies any shortness of breath, cough or wheezing  Cardiac Denies any chest pain, palpitations, claudication or edema  GI Denies any melena, hematochezia, hematemesis or pyrosis   Denies any frequency, urgency, hesitancy or incontinence  Heme Denies bruising or bleeding easily  Endocrine PMHx type II DM  Neuro Denies any focal motor or sensory deficits    OBJECTIVE:   VITALS:  height is 5' 5\" (1.651 m) and weight is 275 lb 9.2 oz (125 kg). Her oral temperature is 97.9 °F (36.6 °C). Her blood pressure is 117/67 and her pulse is 109. Her respiration is 18 and oxygen saturation is 93%. CONSTITUTIONAL: Alert and oriented times 3, no acute distress and cooperative to examination with proper mood and affect.   SKIN: Skin color, texture, turgor normal. No rashes or lesions. LYMPH: no cervical nodes, no inguinal nodes  HEENT: Head is normocephalic, atraumatic. EOMI, PERRLA  NECK: Supple, symmetrical, trachea midline, no adenopathy, thyroid symmetric, not enlarged and no tenderness, skin normal  CHEST/LUNGS: chest symmetric with normal A/P diameter, normal respiratory rate and rhythm, lungs clear to auscultation without wheezes, rales or rhonchi. No accessory muscle use. Scars None   CARDIOVASCULAR: Heart regular rate and rhythm Normal S1 and S2. . Carotid and femoral pulses 2+/4 and equal bilaterally  ABDOMEN: Normal shape. . cholecystectomy scar(s) present. Normal bowel sounds. No bruits. Soft, nondistended, no masses or organomegaly. no evidence of hernia. Percussion: Normal without hepatosplenomegally. Tenderness: RUQ, LUQ and epigastric  RECTAL: deferred, not clinically indicated  NEUROLOGIC: There are no focalizing motor or sensory deficits. CN II-XII are grossly intact.   EXTREMITIES: no cyanosis, no clubbing and no edema    LABS:   CBC with Differential:    Lab Results   Component Value Date    WBC 6.8 09/17/2020    RBC 3.33 09/17/2020    HGB 10.2 09/17/2020    HCT 30.6 09/17/2020     09/17/2020    MCV 91.9 09/17/2020    MCH 30.8 09/17/2020    MCHC 33.5 09/17/2020    RDW 14.8 09/17/2020    METASPCT 1 09/04/2020    LYMPHOPCT 10 09/04/2020    LYMPHOPCT 35.0 03/22/2016    MONOPCT 7 09/04/2020    MONOPCT 6.8 03/22/2016    EOSPCT 3.8 03/22/2016    BASOPCT 0 09/04/2020    BASOPCT 0.9 03/22/2016    MONOSABS 0.84 09/04/2020    MONOSABS 0.6 03/22/2016    LYMPHSABS 1.20 09/04/2020    LYMPHSABS 3.1 03/22/2016    EOSABS 0.48 09/04/2020    EOSABS 0.3 03/22/2016    BASOSABS 0.00 09/04/2020    DIFFTYPE NOT REPORTED 09/04/2020     BMP:    Lab Results   Component Value Date     09/17/2020    K 3.6 09/17/2020     09/17/2020    CO2 21 09/17/2020    BUN 6 09/17/2020    LABALBU 4.0 09/10/2020    CREATININE 0.71 09/17/2020    CALCIUM 9.1 09/17/2020 GFRAA >60 09/17/2020    LABGLOM >60 09/17/2020    GLUCOSE 139 09/17/2020     Hepatic Function Panel:    Lab Results   Component Value Date    ALKPHOS 123 09/10/2020    ALT 27 09/10/2020    AST 18 09/10/2020    PROT 7.8 09/10/2020    PROT 7.2 01/23/2015    BILITOT 0.19 09/10/2020    BILIDIR 0.09 09/10/2020    IBILI 0.10 09/10/2020    LABALBU 4.0 09/10/2020     Calcium:    Lab Results   Component Value Date    CALCIUM 9.1 09/17/2020     Magnesium:    Lab Results   Component Value Date    MG 2.1 09/04/2020     Phosphorus:    Lab Results   Component Value Date    PHOS 6.4 09/04/2020     PT/INR:    Lab Results   Component Value Date    PROTIME 14.2 09/17/2020    INR 1.1 09/17/2020     ABG:    Lab Results   Component Value Date    PHART 7.484 08/28/2020    USV6YYA 37.2 08/28/2020    PO2ART 66.1 08/28/2020    GAM1EVF 27.9 08/28/2020    U9BJFDZX 92.1 08/28/2020     Urine Culture:  No components found for: CURINE  Blood Culture:  No components found for: CBLOOD, CFUNGUSBL  Stool Culture:  No components found for: CSTOOL    RADIOLOGY:   I have personally reviewed the following films:  No results found. IMPRESSION:   1. Chronic pancreatitis with multiple pseudocysts   2.     does not have any pertinent problems on file. PLAN:   1. Clear liquid diet per GI  2. DVT and GI prophylaxis  3. Pain and nausea management   4. Daily CBC, BMP  5. Trend lipase & LFT's  6. No acute general surgery intervention  7. Continue medical management   8. Patient was seen and examined. Known to me from a recent hospitalization for acute severe pancreatitis. Previous cholecystectomy. Was seen by her cardiologist at [facility].  Because of tachycardia and abdominal pain she was sent to the emergency room as a part. CT scan in the emergency department showed evidence of pancreatitis with pseudocyst.  Blood work noted. Patient is tolerating clears. No evidence of acute surgical abdomen at this time.   9. We will review and compare her CT scan from the [facility]. Conservative management. GI service on the case. Thank you for this interesting consult and for allowing us to participate in the care of this patient. If you have any questions please don't hesitate to call.         Electronically signed by DENNYS Molina  on 9/17/2020 at 7:43 AM

## 2020-09-17 NOTE — PLAN OF CARE
Problem: Pain:  Goal: Pain level will decrease  Description: Pain level will decrease  9/17/2020 1533 by Laureano Jose RN  Outcome: Ongoing, Patient reports pain that is tolerable with as needed medications      Problem: Falls - Risk of:  Goal: Will remain free from falls  Description: Will remain free from falls  9/17/2020 1533 by Laureano Jose RN  Outcome: Ongoing, Patient is alert and oriented, able to make needs known. Patient call light within reach, non slip socks in place, Patient is aware of limitations      Problem: Physical Regulation:  Goal: Will remain free from infection  Description: Will remain free from infection  9/17/2020 1533 by Laureano Jose RN  Outcome: Ongoing, assessment of vitals.

## 2020-09-17 NOTE — DISCHARGE INSTR - COC
09/15/2017       Active Problems:  Patient Active Problem List   Diagnosis Code    Anxiety F41.9    Class 3 severe obesity due to excess calories with serious comorbidity in adult ([facility]) E66.01    Restless leg syndrome G25.81    Previous back surgery Z98.890    Major depression F32.9    HTN (hypertension) I10    FHx: rheumatoid arthritis Z82.61    SVT (supraventricular tachycardia) ([facility]) I47.1    Right elbow pain M25.521    Pain of right lower extremity M79.604    Cough with sputum R05    Varicose vein of leg I83.90    Leg swelling M79.89    Herpes zoster without complication S08.6    Chest pain R07.9    Fibromyalgia M79.7    Acute pancreatitis K85.90    Sepsis ([facility]) A41.9    Morbid obesity ([facility]) E66.01    Abdominal pain, epigastric R10.13    Nausea R11.0    Acute respiratory failure with hypoxia ([facility]) J96.01    Hypocalcemia E83.51    History of anxiety disorder Z86.59    Abnormal levels of other serum enzymes R74.8    Elevated liver enzymes R74.8    Bilateral leg edema R60.0    Smoker F17.200    Severe episode of recurrent major depressive disorder, without psychotic features ([facility] [address] F33.2    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    Presbyopia H52.4    Pseudotumor cerebri syndrome G93.2    Insomnia due to psychological stress F51.02    Astigmatism H52.209    Generalized anxiety disorder F41.1    Carpal tunnel syndrome of right wrist G56.01    Pneumonia of both lungs due to infectious organism J18.9    Fatty liver K76.0    Pancreatic pseudocyst K86.3       Isolation/Infection:   Isolation            No Isolation          Patient Infection Status       Infection Onset Added Last Indicated Last Indicated By Review Planned Expiration Resolved Resolved By    None active    Resolved    COVID-19 Rule Out 08/26/20 08/26/20 08/26/20 COVID-19 (Ordered)   08/26/20 Rule-Out Test Resulted            Nurse Assessment:  Last Vital Signs: /67   Pulse 109   Temp 97.9 °F (36.6 °C) (Oral)   Resp 18   Ht 5' 5\" (1.651 m)   Wt 275 lb 9.2 oz (125 kg)   SpO2 93%   BMI 45.86 kg/m²     Last documented pain score (0-10 scale): Pain Level: 3  Last Weight:   Wt Readings from Last 1 Encounters:   09/16/20 275 lb 9.2 oz (125 kg)     Mental Status:  {IP PT MENTAL STATUS:20030}    IV Access:  { JUAN FRANCISCO IV ACCESS:368972030}    Nursing Mobility/ADLs:  Walking   {CHP DME CRVT:568517327}  Transfer  {CHP DME RGWB:561268927}  Bathing  {CHP DME ZPTD:899603048}  Dressing  {CHP DME NZAB:251967598}  Toileting  {CHP DME ZSNZ:460089844}  Feeding  {CHP DME DVPU:483667783}  Med Admin  {CHP DME SPCY:983070583}  Med Delivery   { JUAN FRANCISCO MED Delivery:738134145}    Wound Care Documentation and Therapy:        Elimination:  Continence:   · Bowel: {YES / ER:14400}  · Bladder: {YES / MO:49765}  Urinary Catheter: {Urinary Catheter:658705240}   Colostomy/Ileostomy/Ileal Conduit: {YES / LZ:91594}       Date of Last BM: ***    Intake/Output Summary (Last 24 hours) at 9/17/2020 1037  Last data filed at 9/17/2020 0618  Gross per 24 hour   Intake 1727 ml   Output 600 ml   Net 1127 ml     I/O last 3 completed shifts: In: 5695 [P.O.:720;  I.V.:1007]  Out: 600 [Urine:600]    Safety Concerns:     508 elicit Safety Concerns:752180915}    Impairments/Disabilities:      508 elicit Impairments/Disabilities:163760483}    Nutrition Therapy:  Current Nutrition Therapy:   508 elicit Diet List:430095617}    Routes of Feeding: {CHP DME Other Feedings:076167022}  Liquids: {Slp liquid thickness:24509}  Daily Fluid Restriction: {CHP DME Yes amt example:759632387}  Last Modified Barium Swallow with Video (Video Swallowing Test): {Done Not Done GDDE:855143293}    Treatments at the Time of Hospital Discharge:   Respiratory Treatments: ***  Oxygen Therapy:  {Therapy; copd oxygen:14999}  Ventilator:    {MH CC Vent RENW:165928295}    Rehab Therapies: Physical Therapy and Occupational Therapy  Weight Bearing Status/Restrictions: No weight bearing restirctions  Other Medical Equipment (for information only, NOT a DME order): Other Treatments: skilled nursing assessment, medication education and monitoring. PICC Line Care Per Protocol. Patient's personal belongings (please select all that are sent with patient):  {P DME Belongings:891158139}    RN SIGNATURE:  {Esignature:918025201}    CASE MANAGEMENT/SOCIAL WORK SECTION    Inpatient Status Date: ***    Readmission Risk Assessment Score:  Readmission Risk              Risk of Unplanned Readmission:        20           Discharging to Facility/ [address]  [address] [place] [address] [address]  [address] [address]  [place] Phone [phone] Fax  [phone]  ·   · [facility] (if applicable)   · Name:  · Address:  · Dialysis Schedule:  · Phone:  · Fax:    / signature: Electronically signed by Sheyla Cooper RN on 9/21/20 at 12:28 PM EDT    PHYSICIAN SECTION    Prognosis: {Prognosis:1133631518}    Condition at Discharge: [facility] Patient Condition:927164463}    Rehab Potential (if transferring to Rehab): {Prognosis:4645522313}    Recommended Labs or Other Treatments After Discharge: ***    Physician Certification: I certify the above information and transfer of Terrance Barger  is necessary for the continuing treatment of the diagnosis listed and that she requires {Admit to Appropriate Level of Care:01041} for {GREATER/LESS:536113129} 30 days.      Update Admission H&P: {CHP DME Changes in IUPXW:203431808}    PHYSICIAN SIGNATURE:  Electronically signed by Brittney Jarrell MD on 9/19/20 at 12:21 PM EDT

## 2020-09-17 NOTE — PROGRESS NOTES
Page sent out to Dr. Court Guzmán at this time. Spoke with Dr. Court Guzmán regarding new consult.  Dr. Court Guzmán states he will see patient in AM.

## 2020-09-17 NOTE — PLAN OF CARE
After patient's mother went home, patient spoke with writer and apologized for her mother's attitude and behavior. She stated that her mom just has a lot on her mind. Apology was accepted. Deirdre Pollack RN taking over care of patient.

## 2020-09-17 NOTE — PLAN OF CARE
PRE-CONSULT ROUNDING NOTE    HPI    50-year-old female with hx of morbid obesity, pancreatitis, HTN, anxiety presents as direct admit from [facility] pancreatic pseudocysts. Patient was recently discharged on 9/11 following 16-day stay for pancreatitis. Upon discharge patient reports she generally felt well. About 2 days ago she started to develop severe, sharp epigastric pain. Pain is constant. No alleviating factors. Associated with a fever of 102F, nausea, vomiting, chills. Also reports constipation, last BM 4 days ago. Denies any chest pain, shortness of breath, cough. Epi prior patient had CT of the abdomen and pelvis that revealed multiple pseudocyst.  No evidence of necrosis. Patient is currently afebrile. Vital signs stable. Has nausea without emesis. Abdominal pain improved with pain medications. Has active bowel sounds. Reports flatus. Triglycerides normal last admission. IgG4 subclasses normal.  Family history of pancreatitis. Current smoker  Denies alcohol use    Review of Systems   Constitutional: Positive for fever. Negative for appetite change, fatigue and unexpected weight change. HENT: Negative for mouth sores, sore throat, trouble swallowing and voice change. Eyes: Negative. Respiratory: Negative for cough, choking, shortness of breath and wheezing. Cardiovascular: Negative for chest pain, palpitations and leg swelling. Gastrointestinal: Positive for abdominal pain, constipation, nausea and vomiting. Negative for blood in stool and diarrhea. Endocrine: Negative for polyphagia and polyuria. Genitourinary: Negative for difficulty urinating, frequency, hematuria, pelvic pain, urgency and vaginal bleeding. Musculoskeletal: Negative for arthralgias, back pain, gait problem and joint swelling. Skin: Negative for color change, pallor and rash. Allergic/Immunologic: Negative for food allergies. Neurological: Positive for weakness.  Negative for dizziness, seizures, light-headedness and headaches. Hematological: Negative for adenopathy. Does not bruise/bleed easily. Psychiatric/Behavioral: Negative for sleep disturbance. The patient is not nervous/anxious. Physical Exam  Vitals signs and nursing note reviewed. Constitutional:       General: She is not in acute distress. Appearance: She is well-developed. She is obese. She is not toxic-appearing. HENT:      Head: Normocephalic and atraumatic. Eyes:      General: No scleral icterus. Conjunctiva/sclera: Conjunctivae normal.      Pupils: Pupils are equal, round, and reactive to light. Neck:      Musculoskeletal: Normal range of motion and neck supple. Thyroid: No thyromegaly. Vascular: No hepatojugular reflux or JVD. Trachea: No tracheal deviation. Cardiovascular:      Rate and Rhythm: Normal rate and regular rhythm. Heart sounds: Normal heart sounds. Pulmonary:      Effort: Pulmonary effort is normal. No respiratory distress. Breath sounds: Normal breath sounds. No wheezing or rales. Abdominal:      General: Bowel sounds are normal. There is no distension. Palpations: Abdomen is soft. There is no hepatomegaly or mass. Tenderness: There is abdominal tenderness. There is no rebound. Hernia: No hernia is present. Musculoskeletal:         General: No tenderness. Comments: No joint swelling   Lymphadenopathy:      Cervical: No cervical adenopathy. Skin:     General: Skin is warm. Findings: No bruising, ecchymosis, erythema or rash. Neurological:      Mental Status: She is alert and oriented to person, place, and time. Cranial Nerves: No cranial nerve deficit. Psychiatric:         Thought Content:  Thought content normal.         ASSESSMENT/PLAN    Pancreatic pseudocysts  -Lipase 32  -Antiemetics as needed  -PRN pain control  -IV fluids; bolus given in ED  -I&O  -Clear liquid diet  -Monitor labs  -MRI MRCP

## 2020-09-17 NOTE — CARE COORDINATION
CASE MANAGEMENT NOTE:    Admission Date:  9/16/2020 Joon Gonzalez is a 54 y.o.  female    Admitted for : Pancreatic pseudocyst [K86.3]    Met with:  Patient    PCP:  Dr. Amie Perez:  [facility]      Current Residence/ Living Arrangements:  independently at home with her mother            Current Services PTA:  Yes, current with [facility] and would like resumed. Faxed facesheet to [facility] to notify of admission    Is patient agreeable to [facility]: Yes    Freedom of choice provided:  Yes    List of [address] provided: No, already current with [facility] and happy with their care    [facility] chosen:  [facility]    DME:  walker    Home Oxygen: No    Nebulizer: No    CPAP/BIPAP: No    Supplier: N/A    Potential Assistance Needed: No    SNF needed: No    Freedom of choice and list provided: NA    Pharmacy:  [facility]       Does Patient want to use MEDS to BEDS? No    Is patient currently receiving oral anticoagulation therapy? No    Is the Patient an [facility] with Readmission Risk Score greater than 14%? No  If yes, pt needs a follow up appointment made within 7 days. Family Members/Caregivers that pt would like involved in their care:    Yes    If yes, list name here:  [address] [place] [address] [address]    Transportation Provider:  Family                        Discharge Plan:  Home with [facility].                  Electronically signed by: Angelique Lazo RN on 9/17/2020 at 10:38 AM

## 2020-09-17 NOTE — PROGRESS NOTES
Comprehensive Nutrition Assessment    Type and Reason for Visit:  Initial(pancreatic pseudocyst)    Nutrition Recommendations/Plan: 1. Continue Clear Liquid diet 2. Start Ensure Clear with each meal 3. Advance diet as medically able. Nutrition Assessment:  Patient admitted with abdominal pain, nausea, vomiting and constipation. Patient discharged 9/3/20 after lengthy stay, had similar problems, was on TPN, had diagnosis of pancreatitis. Currently on Clear Liquid diet. Will start Ensure Clear with each meal and monitor nutrition progression. Malnutrition Assessment:  Malnutrition Status:  No malnutrition    Context:  Acute Illness     Findings of the 6 clinical characteristics of malnutrition:  Energy Intake:  1 - 75% or less of estimated energy requirements for 7 or more days  Weight Loss:  No significant weight loss     Body Fat Loss:  Unable to assess     Muscle Mass Loss:  Unable to assess    Fluid Accumulation:  1 - Mild Extremities   Strength:  Not Performed    Estimated Daily Nutrient Needs:  Energy (kcal):  2080 kcal based on [place]- [place] Jeor using 1.1 factor; Weight Used for Energy Requirements:  Current     Protein (g):  102-113 gm based on 1.8-2.0 gm/kg of ideal; Weight Used for Protein Requirements:  Ideal          Nutrition Related Findings:  Edema: Trace lower extremities, +1 upper extremities. GI: bowel sounds active, nausea and loss of appetite      Wounds:  None       Current Nutrition Therapies:    DIET CLEAR LIQUID;     Anthropometric Measures:  · Height: 5' 5\" (165.1 cm)  · Current Body Weight: 275 lb 9.2 oz (125 kg)   · Admission Body Weight: 275 lb 9.2 oz (125 kg)    · Usual Body Weight: 275 lb (124.7 kg)     · Ideal Body Weight: 125 lbs; % Ideal Body Weight 220.5 %   · BMI: 45.9  · BMI Categories: Obese Class 3 (BMI 40.0 or greater)       Nutrition Diagnosis:   · Altered GI function related to altered GI function as evidenced by NPO or clear liquid status due to medical condition, GI abnormality, nausea, vomiting, constipation    Nutrition Interventions:   Food and/or Nutrient Delivery:  Continue Current Diet, Start Oral Nutrition Supplement  Nutrition Education/Counseling:  Education not indicated   Coordination of Nutrition Care:  Continued Inpatient Monitoring    Goals: To provide adequate nutrition either via PO intakes or nutrition support. Nutrition Monitoring and Evaluation:   Food/Nutrient Intake Outcomes:  Diet Advancement/Tolerance, Food and Nutrient Intake  Physical Signs/Symptoms Outcomes:  Biochemical Data, Weight, Nausea or Vomiting, Constipation, GI Status     Discharge Planning:     Too soon to determine       Some areas of assessment may be incomplete due to COVID-19 precautions    William Santos RD, LD  Office phone [phone]

## 2020-09-17 NOTE — PROGRESS NOTES
Patient's mother arrived per patient request.  She was updated on what has happened so far such as being seen by Edgard Hylton CNP, given IV pain medication, and is now getting phenergan IVPB for her nausea. Patient is sobbing, almost hyperventilating, and rocking back and forth upon her mother's arrival.  Patient's mother asked numerous questions about why her daughter isn't better. Explanation was given to both patient and mother of how sometimes medications take time to have a positive affect. Mother demanded to see Edgard Hylton so she can ask her why her daughter wasn't any better. Mother requested patient's temp be checked again when it was just checked 15 minutes ago. Again, afebrile. She was irate because patient had peed the bed and needed a bed change but nobody came in to check on her. Both mother and daughter were re-educated on the use of the call light system. Bedding was changed per JYOTI Mendes. Patient's mother now wants to know names of staff members and if Edgard Hylton isn't back to see her daughter Aaron Rodriguez she will want to speak with the house supervisor. Edgard Hylton communicated to writer that she would be back again to see patient after she was finished seeing a patient on ARU. Both mother and patient were updated that Ky Concetta was tending to another patient and that she would be back around as soon as she was finished. Enio Mckeon, house supervisor, made aware of the situation especially with the patient's mother. The patient also became more dramatic, including peeing the bed, once her mother arrived.

## 2020-09-17 NOTE — PROGRESS NOTES
Patient seen and examined in room at approximately 8 PM, at which time patient was noted to be extremely uncomfortable and requested that mother be notified to come and sit with her. Home medications reconciled and admission orders entered. At this time, I am back in patient's room at the request of patient's mother who is upset that she does not feel as if patient is receiving adequate pain control. Explained to mother that patient had received 4 mg morphine sulfate prior to my visit with an additional 1 mg Dilaudid administered during my visit. Additionally, patient received IV Zofran prior to transfer from other ED and IV Phenergan had been administered during my visit. Patient was noted to be resting comfortably in bed with no moaning or writhing movements noted. Mother reports the patient just became comfortable 5 minutes prior to my return. While talking with mother, patient began moaning and writhing. Patient with known history of anxiety unable to tolerate p.o. meds this evening order entered for 1 mg IV Ativan x1 dose and an additional 1 mg of Dilaudid x1 dose. GI and general surgery consult orders entered. We will continue to monitor overnight.

## 2020-09-18 ENCOUNTER — APPOINTMENT (OUTPATIENT)
Dept: MRI IMAGING | Age: 45
DRG: 438 | End: 2020-09-18
Attending: INTERNAL MEDICINE
Payer: COMMERCIAL

## 2020-09-18 LAB
ABSOLUTE EOS #: 0.38 K/UL (ref 0–0.4)
ABSOLUTE IMMATURE GRANULOCYTE: ABNORMAL K/UL (ref 0–0.3)
ABSOLUTE LYMPH #: 1.43 K/UL (ref 1–4.8)
ABSOLUTE MONO #: 0.38 K/UL (ref 0.1–1.3)
ALBUMIN SERPL-MCNC: 3.2 G/DL (ref 3.5–5.2)
ALBUMIN/GLOBULIN RATIO: ABNORMAL (ref 1–2.5)
ALP BLD-CCNC: 179 U/L (ref 35–104)
ALT SERPL-CCNC: 93 U/L (ref 5–33)
ANION GAP SERPL CALCULATED.3IONS-SCNC: 13 MMOL/L (ref 9–17)
AST SERPL-CCNC: 74 U/L
ATYPICAL LYMPHOCYTE ABSOLUTE COUNT: 0.04 K/UL
ATYPICAL LYMPHOCYTES: 1 %
BASOPHILS # BLD: 2 % (ref 0–2)
BASOPHILS ABSOLUTE: 0.08 K/UL (ref 0–0.2)
BILIRUB SERPL-MCNC: 0.26 MG/DL (ref 0.3–1.2)
BILIRUBIN DIRECT: 0.13 MG/DL
BILIRUBIN, INDIRECT: 0.13 MG/DL (ref 0–1)
BUN BLDV-MCNC: 4 MG/DL (ref 6–20)
BUN/CREAT BLD: ABNORMAL (ref 9–20)
CALCIUM SERPL-MCNC: 8.6 MG/DL (ref 8.6–10.4)
CHLORIDE BLD-SCNC: 105 MMOL/L (ref 98–107)
CO2: 23 MMOL/L (ref 20–31)
CREAT SERPL-MCNC: 0.65 MG/DL (ref 0.5–0.9)
DIFFERENTIAL TYPE: ABNORMAL
EOSINOPHILS RELATIVE PERCENT: 9 % (ref 0–4)
GFR AFRICAN AMERICAN: >60 ML/MIN
GFR NON-AFRICAN AMERICAN: >60 ML/MIN
GFR SERPL CREATININE-BSD FRML MDRD: ABNORMAL ML/MIN/{1.73_M2}
GFR SERPL CREATININE-BSD FRML MDRD: ABNORMAL ML/MIN/{1.73_M2}
GLOBULIN: ABNORMAL G/DL (ref 1.5–3.8)
GLUCOSE BLD-MCNC: 125 MG/DL (ref 70–99)
HCT VFR BLD CALC: 26.9 % (ref 36–46)
HEMOGLOBIN: 8.9 G/DL (ref 12–16)
IMMATURE GRANULOCYTES: ABNORMAL %
LIPASE: 36 U/L (ref 13–60)
LYMPHOCYTES # BLD: 34 % (ref 24–44)
MAGNESIUM: 1.7 MG/DL (ref 1.6–2.6)
MCH RBC QN AUTO: 30.4 PG (ref 26–34)
MCHC RBC AUTO-ENTMCNC: 33.1 G/DL (ref 31–37)
MCV RBC AUTO: 91.7 FL (ref 80–100)
MONOCYTES # BLD: 9 % (ref 1–7)
MORPHOLOGY: NORMAL
NRBC AUTOMATED: ABNORMAL PER 100 WBC
PDW BLD-RTO: 14.8 % (ref 11.5–14.9)
PLATELET # BLD: 166 K/UL (ref 150–450)
PLATELET ESTIMATE: ABNORMAL
PMV BLD AUTO: 9.3 FL (ref 6–12)
POTASSIUM SERPL-SCNC: 3.3 MMOL/L (ref 3.7–5.3)
RBC # BLD: 2.94 M/UL (ref 4–5.2)
RBC # BLD: ABNORMAL 10*6/UL
SEG NEUTROPHILS: 45 % (ref 36–66)
SEGMENTED NEUTROPHILS ABSOLUTE COUNT: 1.89 K/UL (ref 1.3–9.1)
SODIUM BLD-SCNC: 141 MMOL/L (ref 135–144)
TOTAL PROTEIN: 6.5 G/DL (ref 6.4–8.3)
WBC # BLD: 4.2 K/UL (ref 3.5–11)
WBC # BLD: ABNORMAL 10*3/UL

## 2020-09-18 PROCEDURE — 36415 COLL VENOUS BLD VENIPUNCTURE: CPT

## 2020-09-18 PROCEDURE — 6370000000 HC RX 637 (ALT 250 FOR IP): Performed by: NURSE PRACTITIONER

## 2020-09-18 PROCEDURE — 6370000000 HC RX 637 (ALT 250 FOR IP): Performed by: INTERNAL MEDICINE

## 2020-09-18 PROCEDURE — 1200000000 HC SEMI PRIVATE

## 2020-09-18 PROCEDURE — 2580000003 HC RX 258: Performed by: NURSE PRACTITIONER

## 2020-09-18 PROCEDURE — 83690 ASSAY OF LIPASE: CPT

## 2020-09-18 PROCEDURE — 6360000002 HC RX W HCPCS: Performed by: NURSE PRACTITIONER

## 2020-09-18 PROCEDURE — 80076 HEPATIC FUNCTION PANEL: CPT

## 2020-09-18 PROCEDURE — 99232 SBSQ HOSP IP/OBS MODERATE 35: CPT | Performed by: INTERNAL MEDICINE

## 2020-09-18 PROCEDURE — 6360000002 HC RX W HCPCS: Performed by: INTERNAL MEDICINE

## 2020-09-18 PROCEDURE — 99233 SBSQ HOSP IP/OBS HIGH 50: CPT | Performed by: INTERNAL MEDICINE

## 2020-09-18 PROCEDURE — 85025 COMPLETE CBC W/AUTO DIFF WBC: CPT

## 2020-09-18 PROCEDURE — 83735 ASSAY OF MAGNESIUM: CPT

## 2020-09-18 PROCEDURE — 76377 3D RENDER W/INTRP POSTPROCES: CPT

## 2020-09-18 PROCEDURE — 80048 BASIC METABOLIC PNL TOTAL CA: CPT

## 2020-09-18 RX ORDER — LORAZEPAM 2 MG/ML
0.5 INJECTION INTRAMUSCULAR ONCE
Status: DISCONTINUED | OUTPATIENT
Start: 2020-09-18 | End: 2020-09-18

## 2020-09-18 RX ORDER — LORAZEPAM 2 MG/ML
0.5 INJECTION INTRAMUSCULAR
Status: COMPLETED | OUTPATIENT
Start: 2020-09-18 | End: 2020-09-18

## 2020-09-18 RX ADMIN — ONDANSETRON 4 MG: 2 INJECTION INTRAMUSCULAR; INTRAVENOUS at 22:36

## 2020-09-18 RX ADMIN — POTASSIUM CHLORIDE 10 MEQ: 7.46 INJECTION, SOLUTION INTRAVENOUS at 09:46

## 2020-09-18 RX ADMIN — LORAZEPAM 0.5 MG: 2 INJECTION INTRAMUSCULAR; INTRAVENOUS at 12:41

## 2020-09-18 RX ADMIN — BISACODYL 10 MG: 10 SUPPOSITORY RECTAL at 16:55

## 2020-09-18 RX ADMIN — PRAZOSIN HYDROCHLORIDE 1 MG: 1 CAPSULE ORAL at 19:18

## 2020-09-18 RX ADMIN — POTASSIUM CHLORIDE 10 MEQ: 7.46 INJECTION, SOLUTION INTRAVENOUS at 06:55

## 2020-09-18 RX ADMIN — ENOXAPARIN SODIUM 30 MG: 30 INJECTION SUBCUTANEOUS at 09:41

## 2020-09-18 RX ADMIN — Medication 2 MG: at 00:25

## 2020-09-18 RX ADMIN — Medication 1 MG: at 19:17

## 2020-09-18 RX ADMIN — DULOXETINE 60 MG: 60 CAPSULE, DELAYED RELEASE ORAL at 14:33

## 2020-09-18 RX ADMIN — LORAZEPAM 1 MG: 1 TABLET ORAL at 22:31

## 2020-09-18 RX ADMIN — ENOXAPARIN SODIUM 30 MG: 30 INJECTION SUBCUTANEOUS at 19:17

## 2020-09-18 RX ADMIN — PIPERACILLIN SODIUM AND TAZOBACTAM SODIUM 3.38 G: 3; .375 INJECTION, POWDER, LYOPHILIZED, FOR SOLUTION INTRAVENOUS at 22:30

## 2020-09-18 RX ADMIN — Medication 10 ML: at 21:51

## 2020-09-18 RX ADMIN — PIPERACILLIN SODIUM AND TAZOBACTAM SODIUM 3.38 G: 3; .375 INJECTION, POWDER, LYOPHILIZED, FOR SOLUTION INTRAVENOUS at 14:40

## 2020-09-18 RX ADMIN — BUSPIRONE HYDROCHLORIDE 15 MG: 15 TABLET ORAL at 19:17

## 2020-09-18 RX ADMIN — POTASSIUM CHLORIDE 10 MEQ: 1500 TABLET, EXTENDED RELEASE ORAL at 19:17

## 2020-09-18 RX ADMIN — Medication 2 MG: at 14:40

## 2020-09-18 RX ADMIN — PRAMIPEXOLE DIHYDROCHLORIDE 0.25 MG: 0.25 TABLET ORAL at 14:33

## 2020-09-18 RX ADMIN — QUETIAPINE FUMARATE 800 MG: 400 TABLET, EXTENDED RELEASE ORAL at 19:18

## 2020-09-18 RX ADMIN — POTASSIUM CHLORIDE 10 MEQ: 1500 TABLET, EXTENDED RELEASE ORAL at 14:33

## 2020-09-18 RX ADMIN — Medication 400 MG: at 14:32

## 2020-09-18 RX ADMIN — Medication 2 MG: at 09:34

## 2020-09-18 RX ADMIN — METOPROLOL TARTRATE 25 MG: 25 TABLET, FILM COATED ORAL at 09:41

## 2020-09-18 RX ADMIN — Medication 2000 UNITS: at 14:32

## 2020-09-18 RX ADMIN — PIPERACILLIN SODIUM AND TAZOBACTAM SODIUM 3.38 G: 3; .375 INJECTION, POWDER, LYOPHILIZED, FOR SOLUTION INTRAVENOUS at 05:21

## 2020-09-18 RX ADMIN — FUROSEMIDE 20 MG: 20 TABLET ORAL at 09:41

## 2020-09-18 RX ADMIN — ONDANSETRON 4 MG: 2 INJECTION INTRAMUSCULAR; INTRAVENOUS at 14:50

## 2020-09-18 RX ADMIN — ACETAMINOPHEN 650 MG: 325 TABLET, FILM COATED ORAL at 16:55

## 2020-09-18 RX ADMIN — Medication 2 MG: at 21:20

## 2020-09-18 ASSESSMENT — PAIN DESCRIPTION - LOCATION
LOCATION: ABDOMEN

## 2020-09-18 ASSESSMENT — PAIN SCALES - GENERAL
PAINLEVEL_OUTOF10: 7
PAINLEVEL_OUTOF10: 7
PAINLEVEL_OUTOF10: 3
PAINLEVEL_OUTOF10: 7
PAINLEVEL_OUTOF10: 7
PAINLEVEL_OUTOF10: 3
PAINLEVEL_OUTOF10: 2
PAINLEVEL_OUTOF10: 3

## 2020-09-18 ASSESSMENT — PAIN DESCRIPTION - PAIN TYPE
TYPE: ACUTE PAIN

## 2020-09-18 NOTE — PROGRESS NOTES
Darrell Rodriguez is on, regarding pt being anxious. Dr. Izzy Clemons at shift change talked with day shift Nurse, Augusto Mclean and ordered telephone with read back 1mg of Ativan PO Nightly PRN. Pt states to writer it is not working for her. Dr. Izzy Clemons ordered 1 mg Ativan dose once IV for pt.

## 2020-09-18 NOTE — PROGRESS NOTES
[facility] [address]                 PATIENT NAME: aRyray Pennington     TODAY'S DATE: 9/18/2020, 9:54 AM    SUBJECTIVE:    Pt seen and examined. Afebrile, VSS. LFT's improving, lipase WNL at 36. Patient still c/o abdominal pain but states it is improved. Mild nausea without vomiting. MRCP today per GI. OBJECTIVE:   VITALS:  /72   Pulse 100   Temp 98 °F (36.7 °C) (Oral)   Resp 14   Ht 5' 5\" (1.651 m)   Wt 275 lb 9.2 oz (125 kg)   SpO2 92%   BMI 45.86 kg/m²      INTAKE/OUTPUT:      Intake/Output Summary (Last 24 hours) at 9/18/2020 0954  Last data filed at 9/18/2020 3237  Gross per 24 hour   Intake 1980 ml   Output 1300 ml   Net 680 ml                 CONSTITUTIONAL:  awake and alert.   No acute distress  HEART:   RRR  LUNGS:   Decreased air entry at bases  ABDOMEN:   Abdomen soft, epigastric tender, non-distended  EXTREMITIES:   Pedal edema    Data:  CBC:   Lab Results   Component Value Date    WBC 4.2 09/18/2020    RBC 2.94 09/18/2020    HGB 8.9 09/18/2020    HCT 26.9 09/18/2020    MCV 91.7 09/18/2020    MCH 30.4 09/18/2020    MCHC 33.1 09/18/2020    RDW 14.8 09/18/2020     09/18/2020    MPV 9.3 09/18/2020     BMP:    Lab Results   Component Value Date     09/18/2020    K 3.3 09/18/2020     09/18/2020    CO2 23 09/18/2020    BUN 4 09/18/2020    LABALBU 3.2 09/18/2020    CREATININE 0.65 09/18/2020    CALCIUM 8.6 09/18/2020    GFRAA >60 09/18/2020    LABGLOM >60 09/18/2020    GLUCOSE 125 09/18/2020     Hepatic Function Panel:    Lab Results   Component Value Date    ALKPHOS 179 09/18/2020    ALT 93 09/18/2020    AST 74 09/18/2020    PROT 6.5 09/18/2020    PROT 7.2 01/23/2015    BILITOT 0.26 09/18/2020    BILIDIR 0.13 09/18/2020    IBILI 0.13 09/18/2020    LABALBU 3.2 09/18/2020     LIPASE:    Lab Results   Component Value Date    LIPASE 36 09/18/2020       Radiology Review:  No new images to review, MRCP today      ASSESSMENT     Principal Problem: Pancreatic pseudocyst  Active Problems:    Anxiety    Class 3 severe obesity due to excess calories with serious comorbidity in adult (HCC)    HTN (hypertension)    Smoker    History of depression  Resolved Problems:    * No resolved hospital problems. *      Plan  1. Diet per GI  2. Hypokalemia, replace potassium  3. MRCP today  4. Surgically stable  5. Continue medical management  6. Patient was seen and examined. Awake alert in no acute distress. N.p.o.  MRCP noted. Blood work noted. Continue medical management. Possible clear liquids if okay with GI. Will need TPN. PICC line on Monday.       Electronically signed by Joaquina Keller PA-C  [id number] [address]

## 2020-09-18 NOTE — PLAN OF CARE
Problem: Pain:  Goal: Pain level will decrease  Description: Pain level will decrease  9/18/2020 1319 by Eden Vivas RN  Outcome: Ongoing, Patient acceptance of as needed pain medications for pain, resulting with tolerable pain      Problem: Falls - Risk of:  Goal: Will remain free from falls  Description: Will remain free from falls  9/18/2020 1319 by Eden Vivas RN  Outcome: Ongoing, Patient is alert and oriented, able to make needs known. Patient call light within reach. Patient has non slip socks in place. Patient up with walker walking hallways.  Aware of limitations      Problem: Nutrition  Goal: Optimal nutrition therapy  9/18/2020 1319 by Eden Vivas RN  Outcome: Ongoing, Patient is NPO at this time see GI note

## 2020-09-18 NOTE — PROGRESS NOTES
Writer zeferino served Dr. Nando Wren regarding pt potassium level at 3.3. She has potassium sliding scale ordered, PO. Pt is NPO.

## 2020-09-18 NOTE — CONSULTS
Gastroenterology Consult Note      Patient: Bianca Ponce  : 1975  Acct#:  [id number]     Date:  2020    Subjective:       History of Present Illness  Patient is a 54 y.o.  female admitted with Pancreatic pseudocyst [K86.3] who is seen in consult for pancreatitis  This is a 58-year-old lady who has significant pancreatitis we know her from her previous admission she has spent long time in the ICU  Pancreatitis was complicated with respiratory issues needing intubation  She had fever and sepsis which was thought at that time to be related to pneumonia as etc. please refer to the previous admissions details. Patient said since discharge she felt good for few days and then she started going again having issues  Especially in the last 2 days  When she started having the epigastric sharp pain again  Constant  Worse with food  Associated with fever up to 102 on and off since discharge she said  Nausea and vomiting especially after she eats. She was seeing her cardiologist today and he sent her to the emergency room at [facility]  And from there she was sent here to be admitted directly. No alleviating factors for her pain  No other issues except constipation  She is having bowel movement every few days only spent 4 days now she did have a bowel movement today  Since she is admitted here in the hospital her vitals are stable and she has been being afebrile  Her pain is better with pain medication  Her pancreatitis was unclear etiology her IgG4 and triglyceride were checked last admission they were negative she did have elevated liver enzyme but the MRCP did not show biliary obstruction, and her liver enzymes normalized before discharge.     There is family history of pancreatitis    Denied any alcohol use denied any IV drug use  Her white count is normal  Her hemoglobin is stable at 10.2  And her alkaline phosphatase is up to 225  ALT 93    No imaging studies here but she had a CAT scan at [address] today which showed multiple pseudocysts            Past Medical History:   Diagnosis Date    Abnormal levels of other serum enzymes     Anxiety     Bilateral leg edema     Chronic kidney disease     right renal cyst    Depression     DVT (deep venous thrombosis) ([facility] [address] 1997    after delivery    Elevated liver enzymes     Fibromyalgia     Headache(784.0)     migraines    Hx of blood clots     1997 left calf    Hypertension     Kidney stone     Obstructive sleep apnea syndrome     Pancreatitis 2001    Pleural effusion 2001    SOB (shortness of breath)     Supraventricular tachycardia (HCC)     SVT (supraventricular tachycardia) ([facility] [address] 9/8/2015      Past Surgical History:   Procedure Laterality Date    ATRIAL ABLATION SURGERY      BACK SURGERY      L4-5    CHOLECYSTECTOMY  2001    ENDOMETRIAL ABLATION      e sure implants placed also    ENDOSCOPY, COLON, DIAGNOSTIC      EXCISION LESION HAND / FINGER Right 1/25/2019    HAND LESION BIOPSY EXCISION performed by Tim Rincon MD at [address] [place] [address] Bilateral     left x2, right x1    FOOT SURGERY Right     x3    KNEE ARTHROSCOPY Left     x2    [place] CYSTO/URETERO/PYELOSCOPY W/LITHOTRIPSY Left 9/20/2017    CYSTOSCOPY URETEROSCOPY HOLMIUM LASER LITHO WITH STONE BASKETING WITH  STENT  performed by Denisha Corado MD at [facility]       Past Endoscopic History as above    Admission Meds  No current facility-administered medications on file prior to encounter.       Current Outpatient Medications on File Prior to Encounter   Medication Sig Dispense Refill    ibuprofen (ADVIL;MOTRIN) 600 MG tablet Take 1 tablet by mouth every 6 hours as needed for Pain 120 tablet 3    prazosin (MINIPRESS) 1 MG capsule Take 1 mg by mouth nightly      melatonin 5 MG TABS tablet Take 5 mg by mouth daily      potassium chloride (KLOR-CON M) 10 MEQ extended release tablet Take 1 tablet by mouth 2 times  Sumatriptan Other (See Comments)    Triptans      Other reaction(s): Unknown    Zosyn [Piperacillin Sod-Tazobactam So]      Rash 20 possibley caused by zosyn    Lamotrigine Rash        Social   Social History     Tobacco Use    Smoking status: Former Smoker     Packs/day: 1.00     Types: Cigarettes     Last attempt to quit: 2019     Years since quittin.7    Smokeless tobacco: Never Used    Tobacco comment: today last smoke   Substance Use Topics    Alcohol use: Yes     Alcohol/week: 0.0 standard drinks     Comment: rarely        PSYCH HISTORY:  Depression No  Anxiety No  Suicide No       Family History   Problem Relation Age of Onset    Heart Disease Father     High Blood Pressure Brother       No family history of colon cancer, Crohn's disease, or ulcerative colitis. Review of Systems  Constitutional: negative  Eyes: negative  Ears, nose, mouth, throat, and face: negative  Respiratory: negative  Cardiovascular: negative  Gastrointestinal: negative  Genitourinary:negative  Integument/breast: negative  Hematologic/lymphatic: negative  Musculoskeletal:negative  Endocrine: negative           Physical Exam  Blood pressure (!) 143/80, pulse 103, temperature 98.1 °F (36.7 °C), temperature source Oral, resp. rate 18, height 5' 5\" (1.651 m), weight 275 lb 9.2 oz (125 kg), SpO2 92 %, not currently breastfeeding.          General Appearance: alert and oriented to person, place and time, well-developed and well-nourished, in no acute distress  Skin: warm and dry, no rash or erythema  Head: normocephalic and atraumatic  Eyes: pupils equal, round, and reactive to light, extraocular eye movements intact, conjunctivae normal  ENT: hearing grossly normal bilaterally  Neck: neck supple and non tender without mass, no thyromegaly or thyroid nodules, no cervical lymphadenopathy   Pulmonary/Chest: clear to auscultation bilaterally- no wheezes, rales or rhonchi, normal air movement, no respiratory she does have family history and she might need genetic testing sent out later on    Recommendations:   IV fluid resuscitation  Panculture  Follow closely  Pain medication n.p.o. We will order MRCP evaluate the biliary duct and rule out stones  As mentioned genetic testing can be done as an outpatient   I's and O's  Electrolyte correction  Trend liver enzymes and pancreatic enzymes                    Thank you for allowing me to participate in the care of your patient. Please feel free to contact me with any questions or concerns.      Narinder Hayes MD

## 2020-09-18 NOTE — PLAN OF CARE
Problem: Pain:  Goal: Pain level will decrease  Description: Pain level will decrease  9/18/2020 0445 by Hortensia Erickson RN  Outcome: Ongoing     Problem: Pain:  Goal: Control of acute pain  Description: Control of acute pain  9/18/2020 0445 by Hortensia Erickson RN  Outcome: Ongoing     Problem: Pain:  Goal: Control of chronic pain  Description: Control of chronic pain  9/18/2020 0445 by Hortensia Erickson RN  Outcome: Ongoing    Pt pain has remained manageable during this shift. Pharmacological and non-pharmacological interventions have been utilized. Problem: Falls - Risk of:  Goal: Will remain free from falls  Description: Will remain free from falls  9/18/2020 0445 by Hortensia Erickson RN  Outcome: Ongoing     Problem: Falls - Risk of:  Goal: Absence of physical injury  Description: Absence of physical injury  9/18/2020 0445 by Hortensia Erickson RN  Outcome: Ongoing    Pt has remained free from falls and physical injury during this shift. Pt bed is in the lowest position, call light is within reach, pt has been educated to call when help is needed.       Problem: Physical Regulation:  Goal: Will remain free from infection  Description: Will remain free from infection  9/18/2020 0445 by Hortensia Erickson RN  Outcome: Ongoing     Problem: Nutrition  Goal: Optimal nutrition therapy  9/18/2020 0445 by Hortensia Erickson RN  Outcome: Ongoing

## 2020-09-18 NOTE — PROGRESS NOTES
Pt has walked the halls twice tonight, pt tolerated well. Pt denies dizziness or being light head during her walks.

## 2020-09-18 NOTE — PROGRESS NOTES
[facility]    Progress Note     9/18/2020    10:30 AM    Name:   Joao Garza  MRN:     [id number]     Acct:      [de-identified]   Room:   2047/2047-01  IP Day:  2  Admit Date:  9/16/2020  6:22 PM    PCP:   Jason Espinal  Code Status:  Full Code    Subjective:     C/C: No chief complaint on file. Principal Problem:    Pancreatic pseudocyst  Active Problems:    Anxiety    Class 3 severe obesity due to excess calories with serious comorbidity in adult (HCC)    HTN (hypertension)    Smoker    History of depression  Resolved Problems:    * No resolved hospital problems. *      Interval History Status: improved. Patient admitted with nausea vomiting pain abdomen  Known to have recent history of pancreatitis with multiple pseudocysts  Since admission her emesis has improved  Her blood pressure is good today heart rate is 100 afebrile    She has been seen by GI  She has been seen by general surgery    Today MRCP is scheduled    Surgery input noted  No plans for surgery at this time    GI input noted  MRCP today     Assessment:      Principal Problem:    Pancreatic pseudocyst  Active Problems:    Anxiety    Class 3 severe obesity due to excess calories with serious comorbidity in adult ([address]    HTN (hypertension)    Smoker    History of depression  Resolved Problems:    * No resolved hospital problems. *    *Patient with significant pancreatitis pancreatic pseudocyst  And fever, although the CAT scan which was done today at [address] showing no necrosis and no air to indicate infection  Normal white count  Etiology for her pericarditis is unclear but most likely hereditary type of pancreatitis as she does have family history and she might need genetic testing sent out later on     Recommendations:   IV fluid resuscitation  Panculture  Follow closely  Pain medication n.p.o.   We will order MRCP evaluate the biliary duct and rule out ston          Significant last 24 hr data reviewed ;   Vitals:    09/18/20 0632 09/18/20 0740 09/18/20 0745 09/18/20 0941   BP: (!) 140/78  139/72    Pulse: 121 110 (!) 1 100   Resp: 18  14    Temp: 97.2 °F (36.2 °C)  98 °F (36.7 °C)    TempSrc: Oral  Oral    SpO2: 92%      Weight:       Height:          Recent Results (from the past 24 hour(s))   Basic Metabolic Panel w/ Reflex to MG    Collection Time: 09/18/20  5:31 AM   Result Value Ref Range    Glucose 125 (H) 70 - 99 mg/dL    BUN 4 (L) 6 - 20 mg/dL    CREATININE 0.65 0.50 - 0.90 mg/dL    Bun/Cre Ratio NOT REPORTED 9 - 20    Calcium 8.6 8.6 - 10.4 mg/dL    Sodium 141 135 - 144 mmol/L    Potassium 3.3 (L) 3.7 - 5.3 mmol/L    Chloride 105 98 - 107 mmol/L    CO2 23 20 - 31 mmol/L    Anion Gap 13 9 - 17 mmol/L    GFR Non-African American >60 >60 mL/min    GFR African American >60 >60 mL/min    GFR Comment          GFR Staging NOT REPORTED    CBC WITH AUTO DIFFERENTIAL    Collection Time: 09/18/20  5:31 AM   Result Value Ref Range    WBC 4.2 3.5 - 11.0 k/uL    RBC 2.94 (L) 4.0 - 5.2 m/uL    Hemoglobin 8.9 (L) 12.0 - 16.0 g/dL    Hematocrit 26.9 (L) 36 - 46 %    MCV 91.7 80 - 100 fL    MCH 30.4 26 - 34 pg    MCHC 33.1 31 - 37 g/dL    RDW 14.8 11.5 - 14.9 %    Platelets 696 856 - 968 k/uL    MPV 9.3 6.0 - 12.0 fL    NRBC Automated NOT REPORTED per 100 WBC    Differential Type NOT REPORTED     Immature Granulocytes NOT REPORTED 0 %    Absolute Immature Granulocyte NOT REPORTED 0.00 - 0.30 k/uL    WBC Morphology NOT REPORTED     RBC Morphology NOT REPORTED     Platelet Estimate NOT REPORTED     Seg Neutrophils 45 36 - 66 %    Lymphocytes 34 24 - 44 %    Atypical Lymphocytes 1 %    Monocytes 9 (H) 1 - 7 %    Eosinophils % 9 (H) 0 - 4 %    Basophils 2 0 - 2 %    Segs Absolute 1.89 1.3 - 9.1 k/uL    Absolute Lymph # 1.43 1.0 - 4.8 k/uL    Atypical Lymphocytes Absolute 0.04 k/uL    Absolute Mono # 0.38 0.1 - 1.3 k/uL    Absolute Eos # 0.38 0.0 - 0.4 k/uL    Basophils Absolute 0.08 0.0 - 0.2 k/uL Morphology Normal    Lipase    Collection Time: 09/18/20  5:31 AM   Result Value Ref Range    Lipase 36 13 - 60 U/L   Liver Profile    Collection Time: 09/18/20  5:31 AM   Result Value Ref Range    Alb 3.2 (L) 3.5 - 5.2 g/dL    Alkaline Phosphatase 179 (H) 35 - 104 U/L    ALT 93 (H) 5 - 33 U/L    AST 74 (H) <32 U/L    Total Bilirubin 0.26 (L) 0.3 - 1.2 mg/dL    Bilirubin, Direct 0.13 <0.31 mg/dL    Bilirubin, Indirect 0.13 0.00 - 1.00 mg/dL    Total Protein 6.5 6.4 - 8.3 g/dL    Globulin NOT REPORTED 1.5 - 3.8 g/dL    Albumin/Globulin Ratio NOT REPORTED 1.0 - 2.5   Magnesium    Collection Time: 09/18/20  5:31 AM   Result Value Ref Range    Magnesium 1.7 1.6 - 2.6 mg/dL     No results for input(s): POCGLU in the last 72 hours. No results found. HPI:   See history in H and P      Review of Systems:     Constitutional:  negative for chills, fevers, sweats  Respiratory:  negative for cough, dyspnea on exertion, hemoptysis, shortness of breath, wheezing  Cardiovascular:  negative for chest pain, chest pressure/discomfort, lower extremity edema, palpitations  Gastrointestinal: Positive for abdominal pain nausea vomiting neurological:  negative for dizziness, headache  Data:     Past Medical History:  no change     Social History:  no change    Family History: @no change    Vitals:      I/O (24Hr):     Intake/Output Summary (Last 24 hours) at 9/18/2020 1030  Last data filed at 9/18/2020 0614  Gross per 24 hour   Intake 1980 ml   Output 1300 ml   Net 680 ml       Labs:    URINE ANALYSIS: No results found for: LABURIN     CBC:  Lab Results   Component Value Date    WBC 4.2 09/18/2020    HGB 8.9 09/18/2020     09/18/2020        BMP:    Lab Results   Component Value Date     09/18/2020    K 3.3 09/18/2020     09/18/2020    CO2 23 09/18/2020    BUN 4 09/18/2020    CREATININE 0.65 09/18/2020    GLUCOSE 125 09/18/2020      LIVER PROFILE:  Lab Results   Component Value Date    ALT 93 09/18/2020 AST 74 09/18/2020    PROT 6.5 09/18/2020    PROT 7.2 01/23/2015    BILITOT 0.26 09/18/2020    BILIDIR 0.13 09/18/2020    LABALBU 3.2 09/18/2020               Radiology:      Physical Examination:        General appearance:  alert, cooperative and no distress  Mental Status:  oriented to person, place and time and normal affect  Lungs:  clear to auscultation bilaterally, normal effort  Heart:  regular rate and rhythm, no murmur  Abdomen: Mild epigastric tenderness  Extremities:  no edema, redness, tenderness in the calves  Skin:  no gross lesions, rashes, induration    Assessment:        Primary Problem  Pancreatic pseudocyst    Active Hospital Problems    Diagnosis Date Noted    History of depression [Z86.59] 09/17/2020    Pancreatic pseudocyst [K86.3] 09/16/2020    Smoker [F17.200] 08/25/2020    HTN (hypertension) [I10] 12/15/2014    Class 3 severe obesity due to excess calories with serious comorbidity in adult [facility]) [E66.01] 01/14/2013    Anxiety [F41.9] 01/14/2013       Plan:          9/18/20    Symptoms have improved   On IV fluids  Will hold Lasix  MRCP planned for today  Patient is on Zosyn      1. Medications: Allergies:     Allergies   Allergen Reactions    Aripiprazole      Bad reaction    Eletriptan      Other reaction(s): Swelling-throat    Hydrocodone-Acetaminophen      Other reaction(s): Unknown    Oxycodone-Acetaminophen      Other reaction(s): Unknown    Sumatriptan Other (See Comments)    Triptans      Other reaction(s): Unknown    Zosyn [Piperacillin Sod-Tazobactam So]      Rash 8/26/20 possibley caused by zosyn    Lamotrigine Rash       Current Meds:   Scheduled Meds:    busPIRone  15 mg Oral Nightly    Vitamin D  2 tablet Oral Daily    DULoxetine  60 mg Oral Daily    furosemide  20 mg Oral BID    magnesium oxide  400 mg Oral Daily    melatonin ER  1 mg Oral Daily    metoprolol tartrate  25 mg Oral Daily    pantoprazole  40 mg Oral QAM AC    potassium chloride  10 mEq

## 2020-09-18 NOTE — PROGRESS NOTES
GI Progress notes    9/18/2020   9:43 AM    Name:  Jarrett Santiago  MRN:    [id number]     Acct:     [de-identified]   Room:  2047/204701   Day: 2     Admit Date: 9/16/2020  6:22 PM  PCP: Mohsen Pat    Subjective:     C/C: No chief complaint on file. Interval History: Status: not changed. Patient seen and examined. No acute events overnight. Remains afebrile. VSS. Still has significant abdominal pain. Has mild nausea, no vomiting. LFTs improved  Lipase normal  Scheduled for MRCP today to rule out biliary obstruction/stones. ROS:  Constitutional: negative for chills, fevers and sweats  Gastrointestinal: negative for diarrhea, nausea and vomiting      Medications: Allergies:    Allergies   Allergen Reactions    Aripiprazole      Bad reaction    Eletriptan      Other reaction(s): Swelling-throat    Hydrocodone-Acetaminophen      Other reaction(s): Unknown    Oxycodone-Acetaminophen      Other reaction(s): Unknown    Sumatriptan Other (See Comments)    Triptans      Other reaction(s): Unknown    Zosyn [Piperacillin Sod-Tazobactam So]      Rash 8/26/20 possibley caused by zosyn    Lamotrigine Rash       Current Meds: LORazepam (ATIVAN) tablet 1 mg, Nightly PRN  morphine (PF) injection 1 mg, Q4H PRN    Or  morphine (PF) injection 2 mg, Q4H PRN  albuterol sulfate  (90 Base) MCG/ACT inhaler 2 puff, Q4H PRN  busPIRone (BUSPAR) tablet 15 mg, Nightly  Vitamin D (CHOLECALCIFEROL) tablet 2,000 Units, Daily  DULoxetine (CYMBALTA) extended release capsule 60 mg, Daily  furosemide (LASIX) tablet 20 mg, BID  magnesium oxide (MAG-OX) tablet 400 mg, Daily  melatonin ER tablet 1 mg, Daily  metoprolol tartrate (LOPRESSOR) tablet 25 mg, Daily  pantoprazole (PROTONIX) tablet 40 mg, QAM AC  potassium chloride (KLOR-CON M) extended release tablet 10 mEq, BID  pramipexole (MIRAPEX) tablet 0.25 mg, Daily  prazosin (MINIPRESS) capsule 1 mg, Nightly  QUEtiapine (SEROQUEL XR) extended release tablet 800 mg, Nightly  sodium chloride flush 0.9 % injection 10 mL, 2 times per day  sodium chloride flush 0.9 % injection 10 mL, PRN  potassium chloride (KLOR-CON M) extended release tablet 40 mEq, PRN    Or  potassium bicarb-citric acid (EFFER-K) effervescent tablet 40 mEq, PRN    Or  potassium chloride 10 mEq/100 mL IVPB (Peripheral Line), PRN  magnesium sulfate 1 g in dextrose 5% 100 mL IVPB, PRN  acetaminophen (TYLENOL) tablet 650 mg, Q6H PRN    Or  acetaminophen (TYLENOL) suppository 650 mg, Q6H PRN  enoxaparin (LOVENOX) injection 30 mg, BID  0.9 % sodium chloride infusion, Continuous  bisacodyl (DULCOLAX) suppository 10 mg, Daily PRN  ondansetron (ZOFRAN) injection 4 mg, Q6H PRN  promethazine (PHENERGAN) 12.5 mg in sodium chloride 0.9 % 50 mL IVPB, Q6H PRN  piperacillin-tazobactam (ZOSYN) 3.375 g in dextrose 5 % 50 mL IVPB extended infusion (mini-bag), Q8H        Data:     Code Status:  Full Code    Family History   Problem Relation Age of Onset    Heart Disease Father     High Blood Pressure Brother        Social History     Socioeconomic History    Marital status:      Spouse name: Not on file    Number of children: Not on file    Years of education: Not on file    Highest education level: Not on file   Occupational History    Not on file   Social Needs    Financial resource strain: Not on file    Food insecurity     Worry: Not on file     Inability: Not on file    Transportation needs     Medical: Not on file     Non-medical: Not on file   Tobacco Use    Smoking status: Former Smoker     Packs/day: 1.00     Types: Cigarettes     Last attempt to quit: 2019     Years since quittin.8    Smokeless tobacco: Never Used    Tobacco comment: today last smoke   Substance and Sexual Activity    Alcohol use:  Yes     Alcohol/week: 0.0 standard drinks     Comment: rarely    Drug use: No    Sexual activity: Not on file   Lifestyle    Physical activity     Days per week: Not on file     Minutes per session: Not on file    Stress: Not on file   Relationships    Social connections     Talks on phone: Not on file     Gets together: Not on file     Attends Anabaptism service: Not on file     Active member of club or organization: Not on file     Attends meetings of clubs or organizations: Not on file     Relationship status: Not on file    Intimate partner violence     Fear of current or ex partner: Not on file     Emotionally abused: Not on file     Physically abused: Not on file     Forced sexual activity: Not on file   Other Topics Concern    Not on file   Social History Narrative    Not on file       Vitals:  /72   Pulse 100   Temp 98 °F (36.7 °C) (Oral)   Resp 14   Ht 5' 5\" (1.651 m)   Wt 275 lb 9.2 oz (125 kg)   SpO2 92%   BMI 45.86 kg/m²   Temp (24hrs), Av.8 °F (36.6 °C), Min:97.2 °F (36.2 °C), Max:98.1 °F (36.7 °C)    No results for input(s): POCGLU in the last 72 hours. I/O (24Hr):     Intake/Output Summary (Last 24 hours) at 2020 0943  Last data filed at 2020 0614  Gross per 24 hour   Intake 1980 ml   Output 1300 ml   Net 680 ml       Labs:      CBC:   Lab Results   Component Value Date    WBC 4.2 2020    RBC 2.94 2020    HGB 8.9 2020    HCT 26.9 2020    MCV 91.7 2020    MCH 30.4 2020    MCHC 33.1 2020    RDW 14.8 2020     2020    MPV 9.3 2020     CBC with Differential:    Lab Results   Component Value Date    WBC 4.2 2020    RBC 2.94 2020    HGB 8.9 2020    HCT 26.9 2020     2020    MCV 91.7 2020    MCH 30.4 2020    MCHC 33.1 2020    RDW 14.8 2020    METASPCT 1 2020    LYMPHOPCT 34 2020    LYMPHOPCT 35.0 2016    MONOPCT 9 2020    MONOPCT 6.8 2016    EOSPCT 3.8 2016    BASOPCT 2 2020    BASOPCT 0.9 2016    MONOSABS 0.38 2020    MONOSABS 0.6 2016    LYMPHSABS 1.43 2020    LYMPHSABS 3.1 03/22/2016    EOSABS 0.38 09/18/2020    EOSABS 0.3 03/22/2016    BASOSABS 0.08 09/18/2020    DIFFTYPE NOT REPORTED 09/18/2020     Hemoglobin/Hematocrit:    Lab Results   Component Value Date    HGB 8.9 09/18/2020    HCT 26.9 09/18/2020     CMP:    Lab Results   Component Value Date     09/18/2020    K 3.3 09/18/2020     09/18/2020    CO2 23 09/18/2020    BUN 4 09/18/2020    CREATININE 0.65 09/18/2020    GFRAA >60 09/18/2020    LABGLOM >60 09/18/2020    GLUCOSE 125 09/18/2020    PROT 6.5 09/18/2020    PROT 7.2 01/23/2015    LABALBU 3.2 09/18/2020    CALCIUM 8.6 09/18/2020    BILITOT 0.26 09/18/2020    ALKPHOS 179 09/18/2020    AST 74 09/18/2020    ALT 93 09/18/2020     BMP:    Lab Results   Component Value Date     09/18/2020    K 3.3 09/18/2020     09/18/2020    CO2 23 09/18/2020    BUN 4 09/18/2020    LABALBU 3.2 09/18/2020    CREATININE 0.65 09/18/2020    CALCIUM 8.6 09/18/2020    GFRAA >60 09/18/2020    LABGLOM >60 09/18/2020    GLUCOSE 125 09/18/2020     PT/INR:    Lab Results   Component Value Date    PROTIME 14.2 09/17/2020    INR 1.1 09/17/2020     PTT:    Lab Results   Component Value Date    APTT 24.4 08/20/2020   [APTT}    Physical Examination:        General appearance: alert, cooperative and no distress  Mental Status: oriented to person, place and time and normal affect  Abdomen: soft, tender to palpation, nondistended, bowel sounds present    Assessment:        Primary Problem  Pancreatic pseudocyst     Active Hospital Problems    Diagnosis Date Noted    History of depression [Z86.59] 09/17/2020    Pancreatic pseudocyst [K86.3] 09/16/2020    Smoker [F17.200] 08/25/2020    HTN (hypertension) [I10] 12/15/2014    Class 3 severe obesity due to excess calories with serious comorbidity in adult [facility]) [E66.01] 01/14/2013    Anxiety [F41.9] 01/14/2013     Past Medical History:   Diagnosis Date    Abnormal levels of other serum enzymes     Anxiety     Bilateral leg edema  Chronic kidney disease     right renal cyst    Depression     DVT (deep venous thrombosis) ([facility] [address] 1997    after delivery    Elevated liver enzymes     Fibromyalgia     Headache(784.0)     migraines    Hx of blood clots     1997 left calf    Hypertension     Kidney stone     Obstructive sleep apnea syndrome     Pancreatitis 2001    Pleural effusion 2001    SOB (shortness of breath)     Supraventricular tachycardia (HCC)     SVT (supraventricular tachycardia) ([facility] [address] 9/8/2015        Plan:        Pancreatic pseudocysts  -Lipase 36  -LFTs improving  -Continue to trend  -Antiemetics as needed  -PRN pain control  -IV fluids  -I&O  -NPO  -Monitor labs  -MRI MRCP today  -If pain continues she may need TPN    Explained to the patient and d/W Nursing Staff  Will F/U with you  Please call or Page for any issues or change in status  Thanks    Electronically signed by ANIBAL Smalls NP on 9/18/2020 at 9:43 AM

## 2020-09-18 NOTE — CARE COORDINATION
ONGOING DISCHARGE PLAN:    Spoke with patient regarding discharge plan and patient confirms that plan is still home with [facility]. MRCP ordered for today. Active order for IV Zosyn. Will continue to follow for additional discharge needs.     Electronically signed by Ashley Shafer RN on 9/18/2020 at 9:18 AM

## 2020-09-18 NOTE — PROGRESS NOTES
MRCP     Timed for 1245, according to MRI patient can have medications prior to procedure.  Patient given morphine for pain and b/p meds

## 2020-09-18 NOTE — PROGRESS NOTES
PAGE    Dr. April Castillo replied to Perfect serve, new orders obtained, Ativan 0.5mg IV once prior to MRCP

## 2020-09-19 LAB
ABSOLUTE EOS #: 0.5 K/UL (ref 0–0.4)
ABSOLUTE IMMATURE GRANULOCYTE: ABNORMAL K/UL (ref 0–0.3)
ABSOLUTE LYMPH #: 1.8 K/UL (ref 1–4.8)
ABSOLUTE MONO #: 0.4 K/UL (ref 0.1–1.3)
ALBUMIN SERPL-MCNC: 3.2 G/DL (ref 3.5–5.2)
ALBUMIN/GLOBULIN RATIO: ABNORMAL (ref 1–2.5)
ALP BLD-CCNC: 150 U/L (ref 35–104)
ALT SERPL-CCNC: 65 U/L (ref 5–33)
ANION GAP SERPL CALCULATED.3IONS-SCNC: 13 MMOL/L (ref 9–17)
AST SERPL-CCNC: 32 U/L
BASOPHILS # BLD: 1 % (ref 0–2)
BASOPHILS ABSOLUTE: 0.1 K/UL (ref 0–0.2)
BILIRUB SERPL-MCNC: 0.17 MG/DL (ref 0.3–1.2)
BILIRUBIN DIRECT: <0.08 MG/DL
BILIRUBIN, INDIRECT: ABNORMAL MG/DL (ref 0–1)
BUN BLDV-MCNC: 4 MG/DL (ref 6–20)
BUN/CREAT BLD: ABNORMAL (ref 9–20)
CALCIUM SERPL-MCNC: 8.6 MG/DL (ref 8.6–10.4)
CHLORIDE BLD-SCNC: 102 MMOL/L (ref 98–107)
CO2: 22 MMOL/L (ref 20–31)
CREAT SERPL-MCNC: 0.68 MG/DL (ref 0.5–0.9)
DIFFERENTIAL TYPE: ABNORMAL
EOSINOPHILS RELATIVE PERCENT: 10 % (ref 0–4)
GFR AFRICAN AMERICAN: >60 ML/MIN
GFR NON-AFRICAN AMERICAN: >60 ML/MIN
GFR SERPL CREATININE-BSD FRML MDRD: ABNORMAL ML/MIN/{1.73_M2}
GFR SERPL CREATININE-BSD FRML MDRD: ABNORMAL ML/MIN/{1.73_M2}
GLOBULIN: ABNORMAL G/DL (ref 1.5–3.8)
GLUCOSE BLD-MCNC: 135 MG/DL (ref 70–99)
HCT VFR BLD CALC: 28.3 % (ref 36–46)
HEMOGLOBIN: 9.3 G/DL (ref 12–16)
IMMATURE GRANULOCYTES: ABNORMAL %
LIPASE: 35 U/L (ref 13–60)
LYMPHOCYTES # BLD: 35 % (ref 24–44)
MCH RBC QN AUTO: 30.3 PG (ref 26–34)
MCHC RBC AUTO-ENTMCNC: 32.9 G/DL (ref 31–37)
MCV RBC AUTO: 92.2 FL (ref 80–100)
MONOCYTES # BLD: 8 % (ref 1–7)
NRBC AUTOMATED: ABNORMAL PER 100 WBC
PDW BLD-RTO: 14.8 % (ref 11.5–14.9)
PLATELET # BLD: 180 K/UL (ref 150–450)
PLATELET ESTIMATE: ABNORMAL
PMV BLD AUTO: 9.2 FL (ref 6–12)
POTASSIUM SERPL-SCNC: 3.6 MMOL/L (ref 3.7–5.3)
RBC # BLD: 3.07 M/UL (ref 4–5.2)
RBC # BLD: ABNORMAL 10*6/UL
SEG NEUTROPHILS: 46 % (ref 36–66)
SEGMENTED NEUTROPHILS ABSOLUTE COUNT: 2.4 K/UL (ref 1.3–9.1)
SODIUM BLD-SCNC: 137 MMOL/L (ref 135–144)
TOTAL PROTEIN: 6.9 G/DL (ref 6.4–8.3)
WBC # BLD: 5.3 K/UL (ref 3.5–11)
WBC # BLD: ABNORMAL 10*3/UL

## 2020-09-19 PROCEDURE — 99232 SBSQ HOSP IP/OBS MODERATE 35: CPT | Performed by: INTERNAL MEDICINE

## 2020-09-19 PROCEDURE — 83690 ASSAY OF LIPASE: CPT

## 2020-09-19 PROCEDURE — 99233 SBSQ HOSP IP/OBS HIGH 50: CPT | Performed by: INTERNAL MEDICINE

## 2020-09-19 PROCEDURE — 2580000003 HC RX 258: Performed by: NURSE PRACTITIONER

## 2020-09-19 PROCEDURE — APPNB30 APP NON BILLABLE TIME 0-30 MINS: Performed by: NURSE PRACTITIONER

## 2020-09-19 PROCEDURE — 80076 HEPATIC FUNCTION PANEL: CPT

## 2020-09-19 PROCEDURE — 1200000000 HC SEMI PRIVATE

## 2020-09-19 PROCEDURE — 6360000002 HC RX W HCPCS: Performed by: NURSE PRACTITIONER

## 2020-09-19 PROCEDURE — 6370000000 HC RX 637 (ALT 250 FOR IP): Performed by: NURSE PRACTITIONER

## 2020-09-19 PROCEDURE — 80048 BASIC METABOLIC PNL TOTAL CA: CPT

## 2020-09-19 PROCEDURE — 6370000000 HC RX 637 (ALT 250 FOR IP): Performed by: INTERNAL MEDICINE

## 2020-09-19 PROCEDURE — 85025 COMPLETE CBC W/AUTO DIFF WBC: CPT

## 2020-09-19 PROCEDURE — 6360000002 HC RX W HCPCS: Performed by: INTERNAL MEDICINE

## 2020-09-19 RX ADMIN — PANTOPRAZOLE SODIUM 40 MG: 40 TABLET, DELAYED RELEASE ORAL at 06:14

## 2020-09-19 RX ADMIN — PROMETHAZINE HYDROCHLORIDE 12.5 MG: 25 INJECTION INTRAMUSCULAR; INTRAVENOUS at 20:03

## 2020-09-19 RX ADMIN — Medication 400 MG: at 09:33

## 2020-09-19 RX ADMIN — QUETIAPINE FUMARATE 800 MG: 400 TABLET, EXTENDED RELEASE ORAL at 21:10

## 2020-09-19 RX ADMIN — POTASSIUM CHLORIDE 10 MEQ: 1500 TABLET, EXTENDED RELEASE ORAL at 09:37

## 2020-09-19 RX ADMIN — ACETAMINOPHEN 650 MG: 325 TABLET, FILM COATED ORAL at 09:36

## 2020-09-19 RX ADMIN — LORAZEPAM 1 MG: 1 TABLET ORAL at 21:19

## 2020-09-19 RX ADMIN — PRAMIPEXOLE DIHYDROCHLORIDE 0.25 MG: 0.25 TABLET ORAL at 12:33

## 2020-09-19 RX ADMIN — Medication 2000 UNITS: at 09:31

## 2020-09-19 RX ADMIN — SODIUM CHLORIDE: 9 INJECTION, SOLUTION INTRAVENOUS at 04:04

## 2020-09-19 RX ADMIN — Medication 2 MG: at 05:44

## 2020-09-19 RX ADMIN — Medication 2 MG: at 01:35

## 2020-09-19 RX ADMIN — PIPERACILLIN SODIUM AND TAZOBACTAM SODIUM 3.38 G: 3; .375 INJECTION, POWDER, LYOPHILIZED, FOR SOLUTION INTRAVENOUS at 14:29

## 2020-09-19 RX ADMIN — SODIUM CHLORIDE: 9 INJECTION, SOLUTION INTRAVENOUS at 14:32

## 2020-09-19 RX ADMIN — SODIUM CHLORIDE: 9 INJECTION, SOLUTION INTRAVENOUS at 23:29

## 2020-09-19 RX ADMIN — METOPROLOL TARTRATE 25 MG: 25 TABLET, FILM COATED ORAL at 09:25

## 2020-09-19 RX ADMIN — Medication 2 MG: at 23:21

## 2020-09-19 RX ADMIN — Medication 2 MG: at 14:25

## 2020-09-19 RX ADMIN — POTASSIUM CHLORIDE 10 MEQ: 1500 TABLET, EXTENDED RELEASE ORAL at 21:14

## 2020-09-19 RX ADMIN — Medication 1 MG: at 21:10

## 2020-09-19 RX ADMIN — PRAZOSIN HYDROCHLORIDE 1 MG: 1 CAPSULE ORAL at 21:10

## 2020-09-19 RX ADMIN — ONDANSETRON 4 MG: 2 INJECTION INTRAMUSCULAR; INTRAVENOUS at 15:43

## 2020-09-19 RX ADMIN — DULOXETINE 60 MG: 60 CAPSULE, DELAYED RELEASE ORAL at 09:27

## 2020-09-19 RX ADMIN — ENOXAPARIN SODIUM 30 MG: 30 INJECTION SUBCUTANEOUS at 21:10

## 2020-09-19 RX ADMIN — ENOXAPARIN SODIUM 30 MG: 30 INJECTION SUBCUTANEOUS at 09:28

## 2020-09-19 RX ADMIN — PIPERACILLIN SODIUM AND TAZOBACTAM SODIUM 3.38 G: 3; .375 INJECTION, POWDER, LYOPHILIZED, FOR SOLUTION INTRAVENOUS at 21:19

## 2020-09-19 RX ADMIN — Medication 2 MG: at 18:49

## 2020-09-19 RX ADMIN — PROMETHAZINE HYDROCHLORIDE 12.5 MG: 25 INJECTION INTRAMUSCULAR; INTRAVENOUS at 03:58

## 2020-09-19 RX ADMIN — BUSPIRONE HYDROCHLORIDE 15 MG: 15 TABLET ORAL at 21:10

## 2020-09-19 RX ADMIN — Medication 2 MG: at 10:16

## 2020-09-19 RX ADMIN — PIPERACILLIN SODIUM AND TAZOBACTAM SODIUM 3.38 G: 3; .375 INJECTION, POWDER, LYOPHILIZED, FOR SOLUTION INTRAVENOUS at 06:14

## 2020-09-19 ASSESSMENT — PAIN SCALES - GENERAL
PAINLEVEL_OUTOF10: 7
PAINLEVEL_OUTOF10: 8
PAINLEVEL_OUTOF10: 5
PAINLEVEL_OUTOF10: 8
PAINLEVEL_OUTOF10: 8
PAINLEVEL_OUTOF10: 4
PAINLEVEL_OUTOF10: 6
PAINLEVEL_OUTOF10: 8
PAINLEVEL_OUTOF10: 7
PAINLEVEL_OUTOF10: 9

## 2020-09-19 ASSESSMENT — PAIN DESCRIPTION - DESCRIPTORS: DESCRIPTORS: STABBING

## 2020-09-19 ASSESSMENT — PAIN DESCRIPTION - PAIN TYPE
TYPE: ACUTE PAIN
TYPE: ACUTE PAIN

## 2020-09-19 ASSESSMENT — PAIN DESCRIPTION - ORIENTATION
ORIENTATION: MID;UPPER
ORIENTATION: MID;UPPER

## 2020-09-19 ASSESSMENT — PAIN DESCRIPTION - FREQUENCY: FREQUENCY: CONTINUOUS

## 2020-09-19 ASSESSMENT — PAIN DESCRIPTION - LOCATION
LOCATION: ABDOMEN
LOCATION: ABDOMEN

## 2020-09-19 NOTE — PLAN OF CARE
Problem: Pain:  Goal: Pain level will decrease  Description: Pain level will decrease  9/19/2020 1402 by Tylor Linares RN  Outcome: Ongoing  9/19/2020 0527 by Katharine Cobian RN  Outcome: Ongoing  Goal: Control of acute pain  Description: Control of acute pain  9/19/2020 1402 by Tylor Linares RN  Outcome: Ongoing  9/19/2020 0527 by Katharine Cobian RN  Outcome: Ongoing  Goal: Control of chronic pain  Description: Control of chronic pain  9/19/2020 1402 by Tylor Linares RN  Outcome: Ongoing  9/19/2020 0527 by Katharine Cobian RN  Outcome: Ongoing     Problem: Falls - Risk of:  Goal: Will remain free from falls  Description: Will remain free from falls  9/19/2020 1402 by Tylor Linares RN  Outcome: Ongoing  9/19/2020 0527 by Katharine Cobian RN  Outcome: Ongoing  Goal: Absence of physical injury  Description: Absence of physical injury  9/19/2020 1402 by Tylor Linares RN  Outcome: Ongoing  9/19/2020 0527 by Katharine Cobian RN  Outcome: Ongoing     Problem: Physical Regulation:  Goal: Will remain free from infection  Description: Will remain free from infection  9/19/2020 1402 by Tylor Linares RN  Outcome: Ongoing  9/19/2020 0527 by Katharine Cobian RN  Outcome: Ongoing     Problem: Nutrition  Goal: Optimal nutrition therapy  9/19/2020 1402 by Tylor Linares RN  Outcome: Ongoing  9/19/2020 0527 by Katharine Cobian RN  Outcome: Ongoing     Problem: Pain:  Goal: Pain level will decrease  Description: Pain level will decrease  9/19/2020 1402 by Tylor Linares RN  Outcome: Ongoing  9/19/2020 0527 by Katharine Cobian RN  Outcome: Ongoing     Problem: Pain:  Goal: Control of acute pain  Description: Control of acute pain  9/19/2020 1402 by Tylor Linares RN  Outcome: Ongoing  9/19/2020 0527 by Katharine Cobian RN  Outcome: Ongoing     Problem: Pain:  Goal: Control of chronic pain  Description: Control of chronic pain  9/19/2020 1402 by Tylor Linares RN  Outcome: Ongoing  9/19/2020 0527 by Katharine Cobian RN  Outcome: Ongoing     Problem: Falls - Risk of:  Goal: Absence of physical injury  Description: Absence of physical injury  9/19/2020 1402 by Martha Valiente RN  Outcome: Ongoing  9/19/2020 0527 by Lucas Patel RN  Outcome: Ongoing     Problem: Falls - Risk of:  Goal: Will remain free from falls  Description: Will remain free from falls  9/19/2020 1402 by Martha Valiente RN  Outcome: Ongoing     Problem: Nutrition  Goal: Optimal nutrition therapy  9/19/2020 1402 by Martha Valiente RN  Outcome: Ongoing  9/19/2020 0527 by Lucas Patel RN  Outcome: Ongoing     Problem: Physical Regulation:  Goal: Will remain free from infection  Description: Will remain free from infection  9/19/2020 1402 by Martha Valiente RN  Outcome: Ongoing

## 2020-09-19 NOTE — CARE COORDINATION
DISCHARGE PLANNING NOTE:      The discharge plan is for patient to return home and resume [facility]. PICC line planned on Monday. Needs TPN. Patient remains NPO. Current order for IV zosyn. MRCP completed. JUAN FRANCISCO NEEDS SIGNED AND COMPLETED. Will continue to follow for discharge needs.

## 2020-09-19 NOTE — PROGRESS NOTES
Patient was seen and examined. Up in the chair. Afebrile vital signs are stable. N.p.o.  Blood work reviewed. Abdomen is soft. Mild epigastric tenderness. Nothing acute. She looks comfortable. Extremity nontender. Blood work reviewed. LFTs are improving. Discussed with nursing staff and the patient. Possibly clear liquids if okay with GI. PICC line on Monday with TPN. Conservative management.

## 2020-09-19 NOTE — PLAN OF CARE
Problem: Pain:  Goal: Pain level will decrease  Description: Pain level will decrease  Outcome: Ongoing  Goal: Control of acute pain  Description: Control of acute pain  Outcome: Ongoing  Goal: Control of chronic pain  Description: Control of chronic pain  Outcome: Ongoing    Pt pain has remained manageable during this shift. Pharmacological and non-pharmacologic interventions have been utilized. Problem: Falls - Risk of:  Goal: Will remain free from falls  Description: Will remain free from falls  Outcome: Ongoing  Goal: Absence of physical injury  Description: Absence of physical injury  Outcome: Ongoing    Pt bed is in the lowest position, pt is up per self, call light is within reach.       Problem: Physical Regulation:  Goal: Will remain free from infection  Description: Will remain free from infection  Outcome: Ongoing     Problem: Nutrition  Goal: Optimal nutrition therapy  Outcome: Ongoing

## 2020-09-19 NOTE — PROGRESS NOTES
[facility] Internal Medicine    Progress Note     9/19/2020    11:01 AM    Name:   Josey Madden  MRN:     [id number]     Acct:      [de-identified]   Room:   2047/2047-01  IP Day:  3  Admit Date:  9/16/2020  6:22 PM    PCP:   Geoffrey Bergeron  Code Status:  Full Code    Subjective:     C/C: No chief complaint on file. Principal Problem:    Pancreatic pseudocyst  Active Problems:    Anxiety    Class 3 severe obesity due to excess calories with serious comorbidity in adult (HCC)    HTN (hypertension)    Smoker    History of depression  Resolved Problems:    * No resolved hospital problems. *      Interval History Status: improved. 9/19/20  Patient is feeling better not throwing up  MRCP shows necrosis and pseudocyst  General surgery and gastroenterology on board  Patient may need TPN per per surgery  1. Patient was seen and examined. Awake alert in no acute distress. N.p.o.  MRCP noted. Blood work noted. Continue medical management. Possible clear liquids if okay with GI. Will need TPN. PICC line on Monday.          Patient admitted with nausea vomiting pain abdomen  Known to have recent history of pancreatitis with multiple pseudocysts  Since admission her emesis has improved  Her blood pressure is good today heart rate is 100 afebrile    She has been seen by GI  She has been seen by general surgery    Today MRCP is scheduled    Surgery input noted  No plans for surgery at this time    GI input noted  MRCP today     Impression    1. Status post cholecystectomy.  No evidence of choledocholithiasis. Dilation of the biliary tree may be related to cholecystectomy status. 2. Findings compatible with acute pancreatitis.  Intrapancreatic T2    hyperintense collections may be related to pseudocysts or focal necrosis. Multiple peripancreatic collections noted, as described above. 3. Hepatic steatosis. 4. Splenomegaly.        Assessment:      Principal Problem: Pancreatic pseudocyst  Active Problems:    Anxiety    Class 3 severe obesity due to excess calories with serious comorbidity in adult (HCC)    HTN (hypertension)    Smoker    History of depression  Resolved Problems:    * No resolved hospital problems. *    *Patient with significant pancreatitis pancreatic pseudocyst  And fever, although the CAT scan which was done today at [address] showing no necrosis and no air to indicate infection  Normal white count  Etiology for her pericarditis is unclear but most likely hereditary type of pancreatitis as she does have family history and she might need genetic testing sent out later on     Recommendations:   IV fluid resuscitation  Panculture  Follow closely  Pain medication n.p.o.   We will order MRCP evaluate the biliary duct and rule out ston          Significant last 24 hr data reviewed ;   Vitals:    09/19/20 0545 09/19/20 0622 09/19/20 0700 09/19/20 0900   BP:  (!) 144/81 (!) 148/80    Pulse:  108 96 96   Resp:  14 15    Temp:  98.2 °F (36.8 °C) 98.3 °F (36.8 °C)    TempSrc:  Oral Oral    SpO2:  91%     Weight: 271 lb 2.7 oz (123 kg)      Height:          Recent Results (from the past 24 hour(s))   Basic Metabolic Panel w/ Reflex to MG    Collection Time: 09/19/20  5:33 AM   Result Value Ref Range    Glucose 135 (H) 70 - 99 mg/dL    BUN 4 (L) 6 - 20 mg/dL    CREATININE 0.68 0.50 - 0.90 mg/dL    Bun/Cre Ratio NOT REPORTED 9 - 20    Calcium 8.6 8.6 - 10.4 mg/dL    Sodium 137 135 - 144 mmol/L    Potassium 3.6 (L) 3.7 - 5.3 mmol/L    Chloride 102 98 - 107 mmol/L    CO2 22 20 - 31 mmol/L    Anion Gap 13 9 - 17 mmol/L    GFR Non-African American >60 >60 mL/min    GFR African American >60 >60 mL/min    GFR Comment          GFR Staging NOT REPORTED    CBC WITH AUTO DIFFERENTIAL    Collection Time: 09/19/20  5:33 AM   Result Value Ref Range    WBC 5.3 3.5 - 11.0 k/uL    RBC 3.07 (L) 4.0 - 5.2 m/uL    Hemoglobin 9.3 (L) 12.0 - 16.0 g/dL    Hematocrit 28.3 (L) 36 - 46 %    MCV 92.2 80 - 100 fL    MCH 30.3 26 - 34 pg    MCHC 32.9 31 - 37 g/dL    RDW 14.8 11.5 - 14.9 %    Platelets 471 246 - 334 k/uL    MPV 9.2 6.0 - 12.0 fL    NRBC Automated NOT REPORTED per 100 WBC    Differential Type NOT REPORTED     Seg Neutrophils 46 36 - 66 %    Lymphocytes 35 24 - 44 %    Monocytes 8 (H) 1 - 7 %    Eosinophils % 10 (H) 0 - 4 %    Basophils 1 0 - 2 %    Immature Granulocytes NOT REPORTED 0 %    Segs Absolute 2.40 1.3 - 9.1 k/uL    Absolute Lymph # 1.80 1.0 - 4.8 k/uL    Absolute Mono # 0.40 0.1 - 1.3 k/uL    Absolute Eos # 0.50 (H) 0.0 - 0.4 k/uL    Basophils Absolute 0.10 0.0 - 0.2 k/uL    Absolute Immature Granulocyte NOT REPORTED 0.00 - 0.30 k/uL    WBC Morphology NOT REPORTED     RBC Morphology NOT REPORTED     Platelet Estimate NOT REPORTED    Lipase    Collection Time: 09/19/20  5:33 AM   Result Value Ref Range    Lipase 35 13 - 60 U/L   Liver Profile    Collection Time: 09/19/20  5:33 AM   Result Value Ref Range    Alb 3.2 (L) 3.5 - 5.2 g/dL    Alkaline Phosphatase 150 (H) 35 - 104 U/L    ALT 65 (H) 5 - 33 U/L    AST 32 (H) <32 U/L    Total Bilirubin 0.17 (L) 0.3 - 1.2 mg/dL    Bilirubin, Direct <0.08 <0.31 mg/dL    Bilirubin, Indirect CANNOT BE CALCULATED 0.00 - 1.00 mg/dL    Total Protein 6.9 6.4 - 8.3 g/dL    Globulin NOT REPORTED 1.5 - 3.8 g/dL    Albumin/Globulin Ratio NOT REPORTED 1.0 - 2.5     No results for input(s): POCGLU in the last 72 hours. No results found.           HPI:   See history in H and P      Review of Systems:     Constitutional:  negative for chills, fevers, sweats  Respiratory:  negative for cough, dyspnea on exertion, hemoptysis, shortness of breath, wheezing  Cardiovascular:  negative for chest pain, chest pressure/discomfort, lower extremity edema, palpitations  Gastrointestinal: Positive for abdominal pain nausea vomiting neurological:  negative for dizziness, headache  Data:     Past Medical History:  no change     Social History:  no change    Family History: @no change    Vitals:      I/O (24Hr): Intake/Output Summary (Last 24 hours) at 9/19/2020 1101  Last data filed at 9/19/2020 0547  Gross per 24 hour   Intake 2600 ml   Output 1300 ml   Net 1300 ml       Labs:    URINE ANALYSIS: No results found for: LABURIN     CBC:  Lab Results   Component Value Date    WBC 5.3 09/19/2020    HGB 9.3 09/19/2020     09/19/2020        BMP:    Lab Results   Component Value Date     09/19/2020    K 3.6 09/19/2020     09/19/2020    CO2 22 09/19/2020    BUN 4 09/19/2020    CREATININE 0.68 09/19/2020    GLUCOSE 135 09/19/2020      LIVER PROFILE:  Lab Results   Component Value Date    ALT 65 09/19/2020    AST 32 09/19/2020    PROT 6.9 09/19/2020    PROT 7.2 01/23/2015    BILITOT 0.17 09/19/2020    BILIDIR <0.08 09/19/2020    LABALBU 3.2 09/19/2020               Radiology:      Physical Examination:        General appearance:  alert, cooperative and no distress  Mental Status:  oriented to person, place and time and normal affect  Lungs:  clear to auscultation bilaterally, normal effort  Heart:  regular rate and rhythm, no murmur  Abdomen: Mild epigastric tenderness  Extremities:  no edema, redness, tenderness in the calves  Skin:  no gross lesions, rashes, induration    Assessment:        Primary Problem  Pancreatic pseudocyst    Active Hospital Problems    Diagnosis Date Noted    History of depression [Z86.59] 09/17/2020    Pancreatic pseudocyst [K86.3] 09/16/2020    Smoker [F17.200] 08/25/2020    HTN (hypertension) [I10] 12/15/2014    Class 3 severe obesity due to excess calories with serious comorbidity in adult [facility]) [E66.01] 01/14/2013    Anxiety [F41.9] 01/14/2013       Plan:          9/19/20    Patient is improving  However MRCP shows significant pseudocyst and acute pancreatitis and changes   surgery has recommended possible TPN             1.     Medications: Allergies:     Allergies   Allergen Reactions    Aripiprazole      Bad reaction    Eletriptan      Other reaction(s): Swelling-throat    Hydrocodone-Acetaminophen      Other reaction(s): Unknown    Oxycodone-Acetaminophen      Other reaction(s): Unknown    Sumatriptan Other (See Comments)    Triptans      Other reaction(s): Unknown    Zosyn [Piperacillin Sod-Tazobactam So]      Rash 8/26/20 possibley caused by zosyn    Lamotrigine Rash       Current Meds:   Scheduled Meds:    busPIRone  15 mg Oral Nightly    Vitamin D  2 tablet Oral Daily    DULoxetine  60 mg Oral Daily    [Held by provider] furosemide  20 mg Oral BID    magnesium oxide  400 mg Oral Daily    melatonin ER  1 mg Oral Daily    metoprolol tartrate  25 mg Oral Daily    pantoprazole  40 mg Oral QAM AC    potassium chloride  10 mEq Oral BID    pramipexole  0.25 mg Oral Daily    prazosin  1 mg Oral Nightly    QUEtiapine  800 mg Oral Nightly    sodium chloride flush  10 mL Intravenous 2 times per day    enoxaparin  30 mg Subcutaneous BID    piperacillin-tazobactam (ZOSYN) 3.375 g in dextrose 5% IVPB extended infusion (mini-bag)  3.375 g Intravenous Q8H     Continuous Infusions:    sodium chloride 100 mL/hr at 09/19/20 0404     PRN Meds: LORazepam, morphine **OR** morphine, albuterol sulfate HFA, sodium chloride flush, potassium chloride **OR** potassium alternative oral replacement **OR** potassium chloride, magnesium sulfate, acetaminophen **OR** acetaminophen, bisacodyl, ondansetron, promethazine (PHENERGAN) in sodium chloride 0.9% IVPB       Jeanette Gross MD  9/19/2020  11:01 AM

## 2020-09-19 NOTE — PROGRESS NOTES
[facility]    Gastroenterology Daily Progress Note      Patient:   Lauri Costa   :    1975   [place]   [facility] [place]:     [place]  Consultant:   Mohsen Griffin CNP      SUBJECTIVE  54 y.o. female admitted 2020 with Pancreatic pseudocyst [K86.3] and seen for pancreatitis with pancreatic pseudocysts. The pt was seen and examined. Pt up walking in hallway however continues to have nausea with epigastric pain. MRI abd discussed below. LFT's are trending down. Afebrile.          OBJECTIVE  Scheduled Meds:   busPIRone  15 mg Oral Nightly    Vitamin D  2 tablet Oral Daily    DULoxetine  60 mg Oral Daily    [Held by provider] furosemide  20 mg Oral BID    magnesium oxide  400 mg Oral Daily    melatonin ER  1 mg Oral Daily    metoprolol tartrate  25 mg Oral Daily    pantoprazole  40 mg Oral QAM AC    potassium chloride  10 mEq Oral BID    pramipexole  0.25 mg Oral Daily    prazosin  1 mg Oral Nightly    QUEtiapine  800 mg Oral Nightly    sodium chloride flush  10 mL Intravenous 2 times per day    enoxaparin  30 mg Subcutaneous BID    piperacillin-tazobactam (ZOSYN) 3.375 g in dextrose 5% IVPB extended infusion (mini-bag)  3.375 g Intravenous Q8H       Vital Signs:  BP (!) 140/80   Pulse 91   Temp 98.6 °F (37 °C) (Oral)   Resp 14   Ht 5' 5\" (1.651 m)   Wt 271 lb 2.7 oz (123 kg)   SpO2 91%   BMI 45.12 kg/m²      Physical Exam:     General Appearance: alert and oriented to person, place and time, well-developed and well-nourished, in no acute distress  Skin: warm and dry, no rash or erythema  Head: normocephalic and atraumatic  Eyes: pupils equal, round, and reactive to light, extraocular eye movements intact, conjunctivae normal  ENT: hearing grossly normal bilaterally  Neck: neck supple and non tender without mass, no thyromegaly or thyroid nodules, no cervical lymphadenopathy   Pulmonary/Chest: clear to auscultation bilaterally- no wheezes, rales or rhonchi, normal air movement, no respiratory distress  Cardiovascular: normal rate, regular rhythm, normal S1 and S2, no murmurs, rubs, clicks or gallops, distal pulses intact, no carotid bruits  Abdomen: soft, obese epigastric is tender, non-distended, normal bowel sounds, no masses or organomegaly  Extremities: no cyanosis, clubbing or edema  Musculoskeletal: normal range of motion, no joint swelling, deformity or tenderness  Neurologic: no cranial nerve deficit and muscle strength normal    Lab and Imaging Review     CBC  Recent Labs     09/17/20  0553 09/18/20  0531 09/19/20  0533   WBC 6.8 4.2 5.3   HGB 10.2* 8.9* 9.3*   HCT 30.6* 26.9* 28.3*   MCV 91.9 91.7 92.2    166 180       BMP  Recent Labs     09/17/20  0553 09/18/20  0531 09/19/20  0533    141 137   K 3.6* 3.3* 3.6*    105 102   CO2 21 23 22   BUN 6 4* 4*   CREATININE 0.71 0.65 0.68   GLUCOSE 139* 125* 135*   CALCIUM 9.1 8.6 8.6       LFTS  Recent Labs     09/17/20  0553 09/18/20  0531 09/19/20  0533   ALKPHOS 225* 179* 150*   ALT 93* 93* 65*   * 74* 32*   PROT 7.3 6.5 6.9   BILITOT 0.50 0.26* 0.17*   BILIDIR 0.27 0.13 <0.08   LABALBU 3.4* 3.2* 3.2*       AMYLASE/LIPASE/AMMONIA  Recent Labs     09/17/20  0553 09/18/20  0531 09/19/20  0533   LIPASE 46 36 35       PT/INR  Recent Labs     09/17/20  0553   PROTIME 14.2   INR 1.1     FINDINGS: mri abd 9/18/20   Gallbladder: Status post cholecystectomy.         Bile Ducts:  The intrahepatic and extrahepatic biliary tree is dilated with    the common bile duct measuring up to 9 mm.  No filling defects within the    biliary tree to suggest stones.  No focal strictures identified.  The common    bile duct tapers smoothly to the ampulla.         Pancreatic Duct: The main pancreatic duct is nondilated.         Other:  The pancreatic parenchyma is enlarged and heterogeneous in T2 signal    intensity compatible with acute pancreatitis.  There are few intraparenchymal    T2 hyperintense collections, for example in the pancreatic head measuring up    to 1.4 cm which could represent a pseudocyst or pancreatic necrosis.  There    are multiple peripancreatic fluid collections, for example adjacent the    pancreatic tail at the splenic hilum measuring 4.8 x 2.9 cm.         There is loss of signal on opposed phase imaging involving the hepatic    parenchyma compatible steatosis.  The spleen is enlarged measuring up to 15.5    cm.  The adrenal glands are unremarkable.  Focal cortical scarring in the    right kidney.  Few small bilateral renal cortical cysts.  No hydronephrosis.         The visualized small and large bowel are nondilated.  No significant    lymphadenopathy.  The abdominal aorta is normal in course and caliber.  No    acute soft tissue or osseous abnormality.              Impression    1. Status post cholecystectomy.  No evidence of choledocholithiasis. Dilation of the biliary tree may be related to cholecystectomy status. 2. Findings compatible with acute pancreatitis.  Intrapancreatic T2    hyperintense collections may be related to pseudocysts or focal necrosis. Multiple peripancreatic collections noted, as described above. 3. Hepatic steatosis. 4. Splenomegaly.                ASSESSMENT/PLAN:  1. Pancreatitis with pseudocysts, mri showing continued pancreatitis with pseudocysts verses focal necrosis; d/w md will repeat ct  On wed and consult nutrition services for PPN  -continue pain and nausea mgt  -trend lft  -continue npo  -recommend pan culture due to fevers at home        This plan was formulated in collaboration with Dr. Abigail Mercado  .     Electronically signed by: ANIBAL Sun CNP on 9/19/2020 at 1:13 PM

## 2020-09-20 LAB
ABSOLUTE EOS #: 0.5 K/UL (ref 0–0.4)
ABSOLUTE IMMATURE GRANULOCYTE: ABNORMAL K/UL (ref 0–0.3)
ABSOLUTE LYMPH #: 2.1 K/UL (ref 1–4.8)
ABSOLUTE MONO #: 0.5 K/UL (ref 0.1–1.3)
ALBUMIN SERPL-MCNC: 3 G/DL (ref 3.5–5.2)
ALBUMIN/GLOBULIN RATIO: ABNORMAL (ref 1–2.5)
ALP BLD-CCNC: 202 U/L (ref 35–104)
ALT SERPL-CCNC: 58 U/L (ref 5–33)
ANION GAP SERPL CALCULATED.3IONS-SCNC: 11 MMOL/L (ref 9–17)
AST SERPL-CCNC: 29 U/L
BASOPHILS # BLD: 1 % (ref 0–2)
BASOPHILS ABSOLUTE: 0.1 K/UL (ref 0–0.2)
BILIRUB SERPL-MCNC: 0.17 MG/DL (ref 0.3–1.2)
BILIRUBIN DIRECT: 0.1 MG/DL
BILIRUBIN, INDIRECT: 0.07 MG/DL (ref 0–1)
BUN BLDV-MCNC: 4 MG/DL (ref 6–20)
BUN/CREAT BLD: ABNORMAL (ref 9–20)
CALCIUM SERPL-MCNC: 8.8 MG/DL (ref 8.6–10.4)
CHLORIDE BLD-SCNC: 108 MMOL/L (ref 98–107)
CO2: 23 MMOL/L (ref 20–31)
CREAT SERPL-MCNC: 0.69 MG/DL (ref 0.5–0.9)
DIFFERENTIAL TYPE: ABNORMAL
EOSINOPHILS RELATIVE PERCENT: 8 % (ref 0–4)
GFR AFRICAN AMERICAN: >60 ML/MIN
GFR NON-AFRICAN AMERICAN: >60 ML/MIN
GFR SERPL CREATININE-BSD FRML MDRD: ABNORMAL ML/MIN/{1.73_M2}
GFR SERPL CREATININE-BSD FRML MDRD: ABNORMAL ML/MIN/{1.73_M2}
GLOBULIN: ABNORMAL G/DL (ref 1.5–3.8)
GLUCOSE BLD-MCNC: 123 MG/DL (ref 70–99)
HCT VFR BLD CALC: 27.9 % (ref 36–46)
HEMOGLOBIN: 9.2 G/DL (ref 12–16)
IMMATURE GRANULOCYTES: ABNORMAL %
LIPASE: 27 U/L (ref 13–60)
LYMPHOCYTES # BLD: 37 % (ref 24–44)
MAGNESIUM: 1.7 MG/DL (ref 1.6–2.6)
MCH RBC QN AUTO: 30.3 PG (ref 26–34)
MCHC RBC AUTO-ENTMCNC: 32.9 G/DL (ref 31–37)
MCV RBC AUTO: 92 FL (ref 80–100)
MONOCYTES # BLD: 8 % (ref 1–7)
NRBC AUTOMATED: ABNORMAL PER 100 WBC
PDW BLD-RTO: 15 % (ref 11.5–14.9)
PLATELET # BLD: 160 K/UL (ref 150–450)
PLATELET ESTIMATE: ABNORMAL
PMV BLD AUTO: 8.9 FL (ref 6–12)
POTASSIUM SERPL-SCNC: 3.4 MMOL/L (ref 3.7–5.3)
RBC # BLD: 3.03 M/UL (ref 4–5.2)
RBC # BLD: ABNORMAL 10*6/UL
SEG NEUTROPHILS: 46 % (ref 36–66)
SEGMENTED NEUTROPHILS ABSOLUTE COUNT: 2.6 K/UL (ref 1.3–9.1)
SODIUM BLD-SCNC: 142 MMOL/L (ref 135–144)
TOTAL PROTEIN: 6.3 G/DL (ref 6.4–8.3)
WBC # BLD: 5.8 K/UL (ref 3.5–11)
WBC # BLD: ABNORMAL 10*3/UL

## 2020-09-20 PROCEDURE — 6360000002 HC RX W HCPCS: Performed by: INTERNAL MEDICINE

## 2020-09-20 PROCEDURE — 6370000000 HC RX 637 (ALT 250 FOR IP): Performed by: NURSE PRACTITIONER

## 2020-09-20 PROCEDURE — APPNB30 APP NON BILLABLE TIME 0-30 MINS: Performed by: NURSE PRACTITIONER

## 2020-09-20 PROCEDURE — 85025 COMPLETE CBC W/AUTO DIFF WBC: CPT

## 2020-09-20 PROCEDURE — 80076 HEPATIC FUNCTION PANEL: CPT

## 2020-09-20 PROCEDURE — 1200000000 HC SEMI PRIVATE

## 2020-09-20 PROCEDURE — 99232 SBSQ HOSP IP/OBS MODERATE 35: CPT | Performed by: INTERNAL MEDICINE

## 2020-09-20 PROCEDURE — 2580000003 HC RX 258: Performed by: NURSE PRACTITIONER

## 2020-09-20 PROCEDURE — 6370000000 HC RX 637 (ALT 250 FOR IP): Performed by: INTERNAL MEDICINE

## 2020-09-20 PROCEDURE — 83735 ASSAY OF MAGNESIUM: CPT

## 2020-09-20 PROCEDURE — 83690 ASSAY OF LIPASE: CPT

## 2020-09-20 PROCEDURE — 80048 BASIC METABOLIC PNL TOTAL CA: CPT

## 2020-09-20 PROCEDURE — 36415 COLL VENOUS BLD VENIPUNCTURE: CPT

## 2020-09-20 PROCEDURE — 6360000002 HC RX W HCPCS: Performed by: NURSE PRACTITIONER

## 2020-09-20 RX ADMIN — QUETIAPINE FUMARATE 800 MG: 400 TABLET, EXTENDED RELEASE ORAL at 19:33

## 2020-09-20 RX ADMIN — BUSPIRONE HYDROCHLORIDE 15 MG: 15 TABLET ORAL at 19:34

## 2020-09-20 RX ADMIN — PROMETHAZINE HYDROCHLORIDE 12.5 MG: 25 INJECTION INTRAMUSCULAR; INTRAVENOUS at 10:56

## 2020-09-20 RX ADMIN — MORPHINE SULFATE 1 MG: 2 INJECTION, SOLUTION INTRAMUSCULAR; INTRAVENOUS at 18:17

## 2020-09-20 RX ADMIN — LORAZEPAM 1 MG: 1 TABLET ORAL at 23:21

## 2020-09-20 RX ADMIN — PIPERACILLIN SODIUM AND TAZOBACTAM SODIUM 3.38 G: 3; .375 INJECTION, POWDER, LYOPHILIZED, FOR SOLUTION INTRAVENOUS at 13:16

## 2020-09-20 RX ADMIN — POTASSIUM CHLORIDE 10 MEQ: 1500 TABLET, EXTENDED RELEASE ORAL at 19:34

## 2020-09-20 RX ADMIN — Medication 2 MG: at 05:27

## 2020-09-20 RX ADMIN — Medication 2000 UNITS: at 10:45

## 2020-09-20 RX ADMIN — PRAMIPEXOLE DIHYDROCHLORIDE 0.25 MG: 0.25 TABLET ORAL at 10:44

## 2020-09-20 RX ADMIN — ENOXAPARIN SODIUM 30 MG: 30 INJECTION SUBCUTANEOUS at 19:35

## 2020-09-20 RX ADMIN — PIPERACILLIN SODIUM AND TAZOBACTAM SODIUM 3.38 G: 3; .375 INJECTION, POWDER, LYOPHILIZED, FOR SOLUTION INTRAVENOUS at 22:33

## 2020-09-20 RX ADMIN — Medication 2 MG: at 10:14

## 2020-09-20 RX ADMIN — PANTOPRAZOLE SODIUM 40 MG: 40 TABLET, DELAYED RELEASE ORAL at 05:25

## 2020-09-20 RX ADMIN — PIPERACILLIN SODIUM AND TAZOBACTAM SODIUM 3.38 G: 3; .375 INJECTION, POWDER, LYOPHILIZED, FOR SOLUTION INTRAVENOUS at 05:25

## 2020-09-20 RX ADMIN — DULOXETINE 60 MG: 60 CAPSULE, DELAYED RELEASE ORAL at 10:47

## 2020-09-20 RX ADMIN — POTASSIUM CHLORIDE 10 MEQ: 1500 TABLET, EXTENDED RELEASE ORAL at 10:44

## 2020-09-20 RX ADMIN — Medication 2 MG: at 14:16

## 2020-09-20 RX ADMIN — Medication 2 MG: at 22:30

## 2020-09-20 RX ADMIN — PROMETHAZINE HYDROCHLORIDE 12.5 MG: 25 INJECTION INTRAMUSCULAR; INTRAVENOUS at 17:58

## 2020-09-20 RX ADMIN — ONDANSETRON 4 MG: 2 INJECTION INTRAMUSCULAR; INTRAVENOUS at 14:16

## 2020-09-20 RX ADMIN — Medication 1 MG: at 19:33

## 2020-09-20 RX ADMIN — PRAZOSIN HYDROCHLORIDE 1 MG: 1 CAPSULE ORAL at 19:32

## 2020-09-20 RX ADMIN — Medication 400 MG: at 10:44

## 2020-09-20 RX ADMIN — ENOXAPARIN SODIUM 30 MG: 30 INJECTION SUBCUTANEOUS at 10:52

## 2020-09-20 RX ADMIN — METOPROLOL TARTRATE 25 MG: 25 TABLET, FILM COATED ORAL at 10:47

## 2020-09-20 RX ADMIN — ACETAMINOPHEN 650 MG: 325 TABLET, FILM COATED ORAL at 00:57

## 2020-09-20 RX ADMIN — ACETAMINOPHEN 650 MG: 325 TABLET, FILM COATED ORAL at 23:21

## 2020-09-20 ASSESSMENT — PAIN DESCRIPTION - PAIN TYPE
TYPE: ACUTE PAIN

## 2020-09-20 ASSESSMENT — PAIN SCALES - GENERAL
PAINLEVEL_OUTOF10: 8
PAINLEVEL_OUTOF10: 6
PAINLEVEL_OUTOF10: 7
PAINLEVEL_OUTOF10: 8
PAINLEVEL_OUTOF10: 5
PAINLEVEL_OUTOF10: 7
PAINLEVEL_OUTOF10: 7
PAINLEVEL_OUTOF10: 8
PAINLEVEL_OUTOF10: 6
PAINLEVEL_OUTOF10: 8

## 2020-09-20 ASSESSMENT — PAIN DESCRIPTION - LOCATION
LOCATION: ABDOMEN

## 2020-09-20 ASSESSMENT — PAIN DESCRIPTION - ORIENTATION
ORIENTATION: MID;UPPER
ORIENTATION: UPPER;MID
ORIENTATION: UPPER;MID
ORIENTATION: MID;UPPER

## 2020-09-20 NOTE — PROGRESS NOTES
Patient was seen and examined. Still complaining of abdominal pain. Afebrile vital signs are stable. Voiding well. Abdomen is soft. Upper abdominal tenderness nothing acute. Extremity nontender. Blood work was reviewed. Potassium was 3.4. CBC is unremarkable. GI physician does not want the patient to be drinking or eating. Pain control. PICC line in the morning. TPN tomorrow. Conservative management. Repeat imaging in the next several days. Discussed with the patient and the nursing staff.

## 2020-09-20 NOTE — CARE COORDINATION
ONGOING DISCHARGE PLAN:    Spoke with patient regarding discharge plan and patient confirms that plan is still to return home and resume services with [facility]. PICC line placement planned tomorro for TPN. Milad from [place] is following. Abdominal CT to be repeated Wednesday. On IV zosyn, remains NPO. JUAN FRANCISCO IS SIGNED AND NEEDS COMPLETED. Will continue to follow for additional discharge needs.     Electronically signed by Judyth Baumgarten, RN on 9/20/2020 at 12:03 PM

## 2020-09-20 NOTE — PROGRESS NOTES
[facility]    Gastroenterology Daily Progress Note      Patient:   Bianca Ponce   :    1975   [facility]   Date:     2020  Consultant:   Linda Simmons CNP      SUBJECTIVE  54 y.o. female admitted 2020 with Pancreatic pseudocyst [K86.3] and seen for pancreatitis with pseudocysts. The pt was seen and examined. Continues to have nausea with upper abdominal pain. Remains afebrile.          OBJECTIVE  Scheduled Meds:   busPIRone  15 mg Oral Nightly    Vitamin D  2 tablet Oral Daily    DULoxetine  60 mg Oral Daily    [Held by provider] furosemide  20 mg Oral BID    magnesium oxide  400 mg Oral Daily    melatonin ER  1 mg Oral Daily    metoprolol tartrate  25 mg Oral Daily    pantoprazole  40 mg Oral QAM AC    potassium chloride  10 mEq Oral BID    pramipexole  0.25 mg Oral Daily    prazosin  1 mg Oral Nightly    QUEtiapine  800 mg Oral Nightly    sodium chloride flush  10 mL Intravenous 2 times per day    enoxaparin  30 mg Subcutaneous BID    piperacillin-tazobactam (ZOSYN) 3.375 g in dextrose 5% IVPB extended infusion (mini-bag)  3.375 g Intravenous Q8H       Vital Signs:  BP (!) 142/78   Pulse 101   Temp 97.7 °F (36.5 °C) (Oral)   Resp 14   Ht 5' 5\" (1.651 m)   Wt 271 lb 2.7 oz (123 kg)   SpO2 99%   BMI 45.12 kg/m²      Physical Exam:     General Appearance: alert and oriented to person, place and time, well-developed and well-nourished, in no acute distress  Skin: warm and dry, no rash or erythema  Head: normocephalic and atraumatic  Eyes: pupils equal, round, and reactive to light, extraocular eye movements intact, conjunctivae normal  ENT: hearing grossly normal bilaterally  Neck: neck supple and non tender without mass, no thyromegaly or thyroid nodules, no cervical lymphadenopathy   Pulmonary/Chest: clear to auscultation bilaterally- no wheezes, rales or rhonchi, normal air movement, no respiratory distress  Cardiovascular: normal rate, regular rhythm, normal S1 and S2, no murmurs, rubs, clicks or gallops, distal pulses intact, no carotid bruits  Abdomen: soft,obese bilateral upper abdominal pain, non-distended, normal bowel sounds, no masses or organomegaly  Extremities: no cyanosis, clubbing or edema  Musculoskeletal: normal range of motion, no joint swelling, deformity or tenderness  Neurologic: no cranial nerve deficit and muscle strength normal    Lab and Imaging Review     CBC  Recent Labs     09/18/20  0531 09/19/20  0533 09/20/20  0554   WBC 4.2 5.3 5.8   HGB 8.9* 9.3* 9.2*   HCT 26.9* 28.3* 27.9*   MCV 91.7 92.2 92.0    180 160       BMP  Recent Labs     09/18/20  0531 09/19/20  0533 09/20/20  0554    137 142   K 3.3* 3.6* 3.4*    102 108*   CO2 23 22 23   BUN 4* 4* 4*   CREATININE 0.65 0.68 0.69   GLUCOSE 125* 135* 123*   CALCIUM 8.6 8.6 8.8       LFTS  Recent Labs     09/18/20  0531 09/19/20  0533 09/20/20  0554   ALKPHOS 179* 150* 202*   ALT 93* 65* 58*   AST 74* 32* 29   PROT 6.5 6.9 6.3*   BILITOT 0.26* 0.17* 0.17*   BILIDIR 0.13 <0.08 0.10   LABALBU 3.2* 3.2* 3.0*       AMYLASE/LIPASE/AMMONIA  Recent Labs     09/18/20  0531 09/19/20  0533 09/20/20  0554   LIPASE 36 35 27     FINDINGS: mri abd 9/18/20   Gallbladder: Status post cholecystectomy.         Bile Ducts: The intrahepatic and extrahepatic biliary tree is dilated with    the common bile duct measuring up to 9 mm.  No filling defects within the    biliary tree to suggest stones.  No focal strictures identified.  The common    bile duct tapers smoothly to the ampulla.         Pancreatic Duct: The main pancreatic duct is nondilated.         Other:  The pancreatic parenchyma is enlarged and heterogeneous in T2 signal    intensity compatible with acute pancreatitis.  There are few intraparenchymal    T2 hyperintense collections, for example in the pancreatic head measuring up    to 1.4 cm which could represent a pseudocyst or pancreatic necrosis.  There    are multiple peripancreatic fluid collections, for example adjacent the    pancreatic tail at the splenic hilum measuring 4.8 x 2.9 cm.         There is loss of signal on opposed phase imaging involving the hepatic    parenchyma compatible steatosis.  The spleen is enlarged measuring up to 15.5    cm.  The adrenal glands are unremarkable.  Focal cortical scarring in the    right kidney.  Few small bilateral renal cortical cysts.  No hydronephrosis.         The visualized small and large bowel are nondilated.  No significant    lymphadenopathy.  The abdominal aorta is normal in course and caliber.  No    acute soft tissue or osseous abnormality.              Impression    1. Status post cholecystectomy.  No evidence of choledocholithiasis. Dilation of the biliary tree may be related to cholecystectomy status. 2. Findings compatible with acute pancreatitis.  Intrapancreatic T2    hyperintense collections may be related to pseudocysts or focal necrosis. Multiple peripancreatic collections noted, as described above. 3. Hepatic steatosis. 4. Splenomegaly.                  ASSESSMENT/PLAN:  1. Pancreatitis with pseudocysts, mri showing continued pancreatitis with pseudocysts verses focal necrosis  -continues to have pain and nausea, ct abd to be repeated wed  -nutrition consulted for PPN-hopefully to start tomorrow  -continue pain and nausea mgt  -npo      This plan was formulated in collaboration with Dr. José Miguel Luis .     Electronically signed by: ANIBAL Walton CNP on 9/20/2020 at 10:07 AM

## 2020-09-20 NOTE — PROGRESS NOTES
[facility] Internal Medicine    Progress Note     9/20/2020    12:07 PM    Name:   Arcelia Torres  MRN:     [id number]     Acct:      [de-identified]   Room:   [facility]  IP Day:  4  Admit Date:  9/16/2020  6:22 PM    PCP:   Jensen Fulton  Code Status:  Full Code    Subjective:     C/C: No chief complaint on file. Principal Problem:    Pancreatic pseudocyst  Active Problems:    Anxiety    Class 3 severe obesity due to excess calories with serious comorbidity in adult (HCC)    HTN (hypertension)    Smoker    History of depression  Resolved Problems:    * No resolved hospital problems. *      Interval History Status: improved. 9/19/20  Patient is feeling better not throwing up  MRCP shows necrosis and pseudocyst  General surgery and gastroenterology on board  Patient may need TPN per per surgery  1. Patient was seen and examined. Awake alert in no acute distress. N.p.o.  MRCP noted. Blood work noted. Continue medical management. Possible clear liquids if okay with GI. Will need TPN. PICC line on Monday.          Patient admitted with nausea vomiting pain abdomen  Known to have recent history of pancreatitis with multiple pseudocysts  Since admission her emesis has improved  Her blood pressure is good today heart rate is 100 afebrile    She has been seen by GI  She has been seen by general surgery    Today MRCP is scheduled    Surgery input noted  No plans for surgery at this time    GI input noted  MRCP today     Impression    1. Status post cholecystectomy.  No evidence of choledocholithiasis. Dilation of the biliary tree may be related to cholecystectomy status. 2. Findings compatible with acute pancreatitis.  Intrapancreatic T2    hyperintense collections may be related to pseudocysts or focal necrosis. Multiple peripancreatic collections noted, as described above. 3. Hepatic steatosis. 4. Splenomegaly.        Assessment:      Principal Problem: Pancreatic pseudocyst  Active Problems:    Anxiety    Class 3 severe obesity due to excess calories with serious comorbidity in adult (HCC)    HTN (hypertension)    Smoker    History of depression  Resolved Problems:    * No resolved hospital problems. *    *Patient with significant pancreatitis pancreatic pseudocyst  And fever, although the CAT scan which was done today at [address] showing no necrosis and no air to indicate infection  Normal white count  Etiology for her pericarditis is unclear but most likely hereditary type of pancreatitis as she does have family history and she might need genetic testing sent out later on     Recommendations:   IV fluid resuscitation  Panculture  Follow closely  Pain medication n.p.o.   We will order MRCP evaluate the biliary duct and rule out ston          Significant last 24 hr data reviewed ;   Vitals:    09/20/20 0700 09/20/20 0715 09/20/20 1030 09/20/20 1047   BP: (!) 142/78  (!) 147/92 (!) 147/92   Pulse: 101 101 99 102   Resp: 14  14    Temp: 97.7 °F (36.5 °C)  98.6 °F (37 °C)    TempSrc: Oral  Oral    SpO2: 99%  95%    Weight:       Height:          Recent Results (from the past 24 hour(s))   Basic Metabolic Panel w/ Reflex to MG    Collection Time: 09/20/20  5:54 AM   Result Value Ref Range    Glucose 123 (H) 70 - 99 mg/dL    BUN 4 (L) 6 - 20 mg/dL    CREATININE 0.69 0.50 - 0.90 mg/dL    Bun/Cre Ratio NOT REPORTED 9 - 20    Calcium 8.8 8.6 - 10.4 mg/dL    Sodium 142 135 - 144 mmol/L    Potassium 3.4 (L) 3.7 - 5.3 mmol/L    Chloride 108 (H) 98 - 107 mmol/L    CO2 23 20 - 31 mmol/L    Anion Gap 11 9 - 17 mmol/L    GFR Non-African American >60 >60 mL/min    GFR African American >60 >60 mL/min    GFR Comment          GFR Staging NOT REPORTED    CBC WITH AUTO DIFFERENTIAL    Collection Time: 09/20/20  5:54 AM   Result Value Ref Range    WBC 5.8 3.5 - 11.0 k/uL    RBC 3.03 (L) 4.0 - 5.2 m/uL    Hemoglobin 9.2 (L) 12.0 - 16.0 g/dL    Hematocrit 27.9 (L) 36 - 46 %    MCV 92.0 80 - 100 fL    MCH 30.3 26 - 34 pg    MCHC 32.9 31 - 37 g/dL    RDW 15.0 (H) 11.5 - 14.9 %    Platelets 057 204 - 239 k/uL    MPV 8.9 6.0 - 12.0 fL    NRBC Automated NOT REPORTED per 100 WBC    Differential Type NOT REPORTED     Seg Neutrophils 46 36 - 66 %    Lymphocytes 37 24 - 44 %    Monocytes 8 (H) 1 - 7 %    Eosinophils % 8 (H) 0 - 4 %    Basophils 1 0 - 2 %    Immature Granulocytes NOT REPORTED 0 %    Segs Absolute 2.60 1.3 - 9.1 k/uL    Absolute Lymph # 2.10 1.0 - 4.8 k/uL    Absolute Mono # 0.50 0.1 - 1.3 k/uL    Absolute Eos # 0.50 (H) 0.0 - 0.4 k/uL    Basophils Absolute 0.10 0.0 - 0.2 k/uL    Absolute Immature Granulocyte NOT REPORTED 0.00 - 0.30 k/uL    WBC Morphology NOT REPORTED     RBC Morphology NOT REPORTED     Platelet Estimate NOT REPORTED    Lipase    Collection Time: 09/20/20  5:54 AM   Result Value Ref Range    Lipase 27 13 - 60 U/L   Liver Profile    Collection Time: 09/20/20  5:54 AM   Result Value Ref Range    Alb 3.0 (L) 3.5 - 5.2 g/dL    Alkaline Phosphatase 202 (H) 35 - 104 U/L    ALT 58 (H) 5 - 33 U/L    AST 29 <32 U/L    Total Bilirubin 0.17 (L) 0.3 - 1.2 mg/dL    Bilirubin, Direct 0.10 <0.31 mg/dL    Bilirubin, Indirect 0.07 0.00 - 1.00 mg/dL    Total Protein 6.3 (L) 6.4 - 8.3 g/dL    Globulin NOT REPORTED 1.5 - 3.8 g/dL    Albumin/Globulin Ratio NOT REPORTED 1.0 - 2.5   Magnesium    Collection Time: 09/20/20  5:54 AM   Result Value Ref Range    Magnesium 1.7 1.6 - 2.6 mg/dL     No results for input(s): POCGLU in the last 72 hours. No results found.           HPI:   See history in H and P      Review of Systems:     Constitutional:  negative for chills, fevers, sweats  Respiratory:  negative for cough, dyspnea on exertion, hemoptysis, shortness of breath, wheezing  Cardiovascular:  negative for chest pain, chest pressure/discomfort, lower extremity edema, palpitations  Gastrointestinal: Positive for abdominal pain nausea vomiting neurological:  negative for dizziness, headache  Data:     Past Medical History:  no change     Social History:  no change    Family History: @no change    Vitals:      I/O (24Hr):     Intake/Output Summary (Last 24 hours) at 9/20/2020 1207  Last data filed at 9/20/2020 0919  Gross per 24 hour   Intake 2593 ml   Output 1000 ml   Net 1593 ml       Labs:    URINE ANALYSIS: No results found for: LABURIN     CBC:  Lab Results   Component Value Date    WBC 5.8 09/20/2020    HGB 9.2 09/20/2020     09/20/2020        BMP:    Lab Results   Component Value Date     09/20/2020    K 3.4 09/20/2020     09/20/2020    CO2 23 09/20/2020    BUN 4 09/20/2020    CREATININE 0.69 09/20/2020    GLUCOSE 123 09/20/2020      LIVER PROFILE:  Lab Results   Component Value Date    ALT 58 09/20/2020    AST 29 09/20/2020    PROT 6.3 09/20/2020    PROT 7.2 01/23/2015    BILITOT 0.17 09/20/2020    BILIDIR 0.10 09/20/2020    LABALBU 3.0 09/20/2020               Radiology:      Physical Examination:        General appearance:  alert, cooperative and no distress  Mental Status:  oriented to person, place and time and normal affect  Lungs:  clear to auscultation bilaterally, normal effort  Heart:  regular rate and rhythm, no murmur  Abdomen: Mild epigastric tenderness  Extremities:  no edema, redness, tenderness in the calves  Skin:  no gross lesions, rashes, induration    Assessment:        Primary Problem  Pancreatic pseudocyst    Active Hospital Problems    Diagnosis Date Noted    History of depression [Z86.59] 09/17/2020    Pancreatic pseudocyst [K86.3] 09/16/2020    Smoker [F17.200] 08/25/2020    HTN (hypertension) [I10] 12/15/2014    Class 3 severe obesity due to excess calories with serious comorbidity in adult [facility]) [E66.01] 01/14/2013    Anxiety [F41.9] 01/14/2013       Plan:          9/20/20      Patient is improving  However MRCP shows significant pseudocyst and acute pancreatitis and changes  Patient is on ice chips only   Plan is to put PICC line and start her on TPN  She will be n.p.o. for a while in an attempt to have pancreatic process come down and help her pseudocysts etc. settle down          1. Medications: Allergies:     Allergies   Allergen Reactions    Aripiprazole      Bad reaction    Eletriptan      Other reaction(s): Swelling-throat    Hydrocodone-Acetaminophen      Other reaction(s): Unknown    Oxycodone-Acetaminophen      Other reaction(s): Unknown    Sumatriptan Other (See Comments)    Triptans      Other reaction(s): Unknown    Zosyn [Piperacillin Sod-Tazobactam So]      Rash 8/26/20 possibley caused by zosyn    Lamotrigine Rash       Current Meds:   Scheduled Meds:    busPIRone  15 mg Oral Nightly    Vitamin D  2 tablet Oral Daily    DULoxetine  60 mg Oral Daily    [Held by provider] furosemide  20 mg Oral BID    magnesium oxide  400 mg Oral Daily    melatonin ER  1 mg Oral Daily    metoprolol tartrate  25 mg Oral Daily    pantoprazole  40 mg Oral QAM AC    potassium chloride  10 mEq Oral BID    pramipexole  0.25 mg Oral Daily    prazosin  1 mg Oral Nightly    QUEtiapine  800 mg Oral Nightly    sodium chloride flush  10 mL Intravenous 2 times per day    enoxaparin  30 mg Subcutaneous BID    piperacillin-tazobactam (ZOSYN) 3.375 g in dextrose 5% IVPB extended infusion (mini-bag)  3.375 g Intravenous Q8H     Continuous Infusions:    sodium chloride 100 mL/hr at 09/19/20 2329     PRN Meds: LORazepam, morphine **OR** morphine, albuterol sulfate HFA, sodium chloride flush, potassium chloride **OR** potassium alternative oral replacement **OR** potassium chloride, magnesium sulfate, acetaminophen **OR** acetaminophen, bisacodyl, ondansetron, promethazine (PHENERGAN) in sodium chloride 0.9% IVPB       Sondra Thomas MD  9/20/2020  12:07 PM

## 2020-09-20 NOTE — PLAN OF CARE
Problem: Pain:  Goal: Pain level will decrease  Description: Pain level will decrease  9/20/2020 0444 by Polina Rose RN  Outcome: Ongoing     Problem: Pain:  Goal: Control of acute pain  Description: Control of acute pain  9/20/2020 0444 by Polina Rose RN  Outcome: Ongoing     Problem: Pain:  Goal: Control of chronic pain  Description: Control of chronic pain  9/20/2020 0444 by Polina Rose RN  Outcome: Ongoing     Problem: Falls - Risk of:  Goal: Will remain free from falls  Description: Will remain free from falls  9/20/2020 0444 by Polina Rose RN  Outcome: Ongoing     Problem: Falls - Risk of:  Goal: Absence of physical injury  Description: Absence of physical injury  9/20/2020 0444 by Polina Rose RN  Outcome: Ongoing     Problem: Physical Regulation:  Goal: Will remain free from infection  Description: Will remain free from infection  9/20/2020 0444 by Polina Rose RN  Outcome: Ongoing     Problem: Nutrition  Goal: Optimal nutrition therapy  Outcome: Ongoing

## 2020-09-20 NOTE — PLAN OF CARE
Problem: Pain:  Goal: Pain level will decrease  Description: Pain level will decrease  9/19/2020 2144 by Tia Lai RN  Outcome: Ongoing  Note: Continues to bemedicated for complaints of upper mid abdominal pain. Problem: Pain:  Goal: Control of acute pain  Description: Control of acute pain  9/19/2020 2144 by Tia Lai RN  Outcome: Ongoing     Problem: Pain:  Goal: Control of chronic pain  Description: Control of chronic pain  9/19/2020 2144 by Tia Lai RN  Outcome: Ongoing     Problem: Falls - Risk of:  Goal: Will remain free from falls  Description: Will remain free from falls  9/19/2020 2144 by Tia Lai RN  Outcome: Ongoing  Note: No falls this shift. Patient frequently ambulates in beth.      Problem: Falls - Risk of:  Goal: Absence of physical injury  Description: Absence of physical injury  9/19/2020 2144 by Tia Lai RN  Outcome: Ongoing     Problem: Physical Regulation:  Goal: Will remain free from infection  Description: Will remain free from infection  9/19/2020 2144 by Tia Lai RN  Outcome: Ongoing

## 2020-09-20 NOTE — PLAN OF CARE
Problem: Pain:  Goal: Pain level will decrease  Description: Pain level will decrease  9/20/2020 1704 by Barbara Ramos RN  Outcome: Met This Shift  9/20/2020 0444 by Brinda Gr RN  Outcome: Ongoing  Goal: Control of acute pain  Description: Control of acute pain  9/20/2020 1704 by Barbara Ramos RN  Outcome: Met This Shift  9/20/2020 0444 by Brinda Gr RN  Outcome: Ongoing  Goal: Control of chronic pain  Description: Control of chronic pain  9/20/2020 1704 by Barbara Ramos RN  Outcome: Met This Shift  9/20/2020 0444 by Brinda Gr RN  Outcome: Ongoing     Problem: Falls - Risk of:  Goal: Will remain free from falls  Description: Will remain free from falls  9/20/2020 1704 by Barbara Ramos RN  Outcome: Met This Shift  9/20/2020 0444 by Brinda Gr RN  Outcome: Ongoing  Goal: Absence of physical injury  Description: Absence of physical injury  9/20/2020 1704 by Barbara Ramos RN  Outcome: Met This Shift  9/20/2020 0444 by Brinda Gr RN  Outcome: Ongoing     Problem: Physical Regulation:  Goal: Will remain free from infection  Description: Will remain free from infection  9/20/2020 1704 by Barbara Ramos RN  Outcome: Met This Shift  9/20/2020 0444 by Brinda Gr RN  Outcome: Ongoing     Problem: Nutrition  Goal: Optimal nutrition therapy  9/20/2020 1704 by Barbara Ramos RN  Outcome: Met This Shift  9/20/2020 0444 by Brinda Gr RN  Outcome: Ongoing     Problem: Skin Integrity:  Goal: Will show no infection signs and symptoms  Description: Will show no infection signs and symptoms  Outcome: Met This Shift  Goal: Absence of new skin breakdown  Description: Absence of new skin breakdown  Outcome: Met This Shift

## 2020-09-21 ENCOUNTER — APPOINTMENT (OUTPATIENT)
Dept: INTERVENTIONAL RADIOLOGY/VASCULAR | Age: 45
DRG: 438 | End: 2020-09-21
Attending: INTERNAL MEDICINE
Payer: COMMERCIAL

## 2020-09-21 LAB
ABSOLUTE EOS #: 0.5 K/UL (ref 0–0.4)
ABSOLUTE IMMATURE GRANULOCYTE: ABNORMAL K/UL (ref 0–0.3)
ABSOLUTE LYMPH #: 2.1 K/UL (ref 1–4.8)
ABSOLUTE MONO #: 0.5 K/UL (ref 0.1–1.3)
ALBUMIN SERPL-MCNC: 3.4 G/DL (ref 3.5–5.2)
ALBUMIN/GLOBULIN RATIO: ABNORMAL (ref 1–2.5)
ALP BLD-CCNC: 192 U/L (ref 35–104)
ALT SERPL-CCNC: 46 U/L (ref 5–33)
ANION GAP SERPL CALCULATED.3IONS-SCNC: 9 MMOL/L (ref 9–17)
AST SERPL-CCNC: 19 U/L
BASOPHILS # BLD: 1 % (ref 0–2)
BASOPHILS ABSOLUTE: 0.1 K/UL (ref 0–0.2)
BILIRUB SERPL-MCNC: <0.15 MG/DL (ref 0.3–1.2)
BILIRUBIN DIRECT: 0.08 MG/DL
BILIRUBIN, INDIRECT: ABNORMAL MG/DL (ref 0–1)
BUN BLDV-MCNC: 4 MG/DL (ref 6–20)
BUN/CREAT BLD: ABNORMAL (ref 9–20)
CALCIUM SERPL-MCNC: 8.9 MG/DL (ref 8.6–10.4)
CHLORIDE BLD-SCNC: 106 MMOL/L (ref 98–107)
CO2: 26 MMOL/L (ref 20–31)
CREAT SERPL-MCNC: 0.72 MG/DL (ref 0.5–0.9)
DIFFERENTIAL TYPE: ABNORMAL
EOSINOPHILS RELATIVE PERCENT: 8 % (ref 0–4)
GFR AFRICAN AMERICAN: >60 ML/MIN
GFR NON-AFRICAN AMERICAN: >60 ML/MIN
GFR SERPL CREATININE-BSD FRML MDRD: ABNORMAL ML/MIN/{1.73_M2}
GFR SERPL CREATININE-BSD FRML MDRD: ABNORMAL ML/MIN/{1.73_M2}
GLOBULIN: ABNORMAL G/DL (ref 1.5–3.8)
GLUCOSE BLD-MCNC: 144 MG/DL (ref 70–99)
GLUCOSE BLD-MCNC: 147 MG/DL (ref 65–105)
HCT VFR BLD CALC: 28 % (ref 36–46)
HEMOGLOBIN: 9.4 G/DL (ref 12–16)
IMMATURE GRANULOCYTES: ABNORMAL %
LIPASE: 25 U/L (ref 13–60)
LYMPHOCYTES # BLD: 33 % (ref 24–44)
MAGNESIUM: 1.5 MG/DL (ref 1.6–2.6)
MCH RBC QN AUTO: 30.7 PG (ref 26–34)
MCHC RBC AUTO-ENTMCNC: 33.6 G/DL (ref 31–37)
MCV RBC AUTO: 91.4 FL (ref 80–100)
MONOCYTES # BLD: 8 % (ref 1–7)
NRBC AUTOMATED: ABNORMAL PER 100 WBC
PDW BLD-RTO: 15.2 % (ref 11.5–14.9)
PLATELET # BLD: 191 K/UL (ref 150–450)
PLATELET ESTIMATE: ABNORMAL
PMV BLD AUTO: 8.4 FL (ref 6–12)
POTASSIUM SERPL-SCNC: 3.4 MMOL/L (ref 3.7–5.3)
RBC # BLD: 3.06 M/UL (ref 4–5.2)
RBC # BLD: ABNORMAL 10*6/UL
SEG NEUTROPHILS: 50 % (ref 36–66)
SEGMENTED NEUTROPHILS ABSOLUTE COUNT: 3.2 K/UL (ref 1.3–9.1)
SODIUM BLD-SCNC: 141 MMOL/L (ref 135–144)
TOTAL PROTEIN: 6.8 G/DL (ref 6.4–8.3)
WBC # BLD: 6.4 K/UL (ref 3.5–11)
WBC # BLD: ABNORMAL 10*3/UL

## 2020-09-21 PROCEDURE — 2580000003 HC RX 258: Performed by: NURSE PRACTITIONER

## 2020-09-21 PROCEDURE — 99232 SBSQ HOSP IP/OBS MODERATE 35: CPT | Performed by: INTERNAL MEDICINE

## 2020-09-21 PROCEDURE — 36573 INSJ PICC RS&I 5 YR+: CPT | Performed by: RADIOLOGY

## 2020-09-21 PROCEDURE — 6370000000 HC RX 637 (ALT 250 FOR IP): Performed by: INTERNAL MEDICINE

## 2020-09-21 PROCEDURE — 80076 HEPATIC FUNCTION PANEL: CPT

## 2020-09-21 PROCEDURE — APPNB30 APP NON BILLABLE TIME 0-30 MINS: Performed by: NURSE PRACTITIONER

## 2020-09-21 PROCEDURE — 2500000003 HC RX 250 WO HCPCS: Performed by: INTERNAL MEDICINE

## 2020-09-21 PROCEDURE — 02HV33Z INSERTION OF INFUSION DEVICE INTO SUPERIOR VENA CAVA, PERCUTANEOUS APPROACH: ICD-10-PCS | Performed by: RADIOLOGY

## 2020-09-21 PROCEDURE — 1200000000 HC SEMI PRIVATE

## 2020-09-21 PROCEDURE — 6360000002 HC RX W HCPCS: Performed by: INTERNAL MEDICINE

## 2020-09-21 PROCEDURE — 83690 ASSAY OF LIPASE: CPT

## 2020-09-21 PROCEDURE — 85025 COMPLETE CBC W/AUTO DIFF WBC: CPT

## 2020-09-21 PROCEDURE — 83735 ASSAY OF MAGNESIUM: CPT

## 2020-09-21 PROCEDURE — 80048 BASIC METABOLIC PNL TOTAL CA: CPT

## 2020-09-21 PROCEDURE — 6360000002 HC RX W HCPCS: Performed by: NURSE PRACTITIONER

## 2020-09-21 PROCEDURE — 82947 ASSAY GLUCOSE BLOOD QUANT: CPT

## 2020-09-21 PROCEDURE — 36415 COLL VENOUS BLD VENIPUNCTURE: CPT

## 2020-09-21 PROCEDURE — 6370000000 HC RX 637 (ALT 250 FOR IP): Performed by: NURSE PRACTITIONER

## 2020-09-21 RX ORDER — SODIUM CHLORIDE 0.9 % (FLUSH) 0.9 %
10 SYRINGE (ML) INJECTION EVERY 12 HOURS SCHEDULED
Status: DISCONTINUED | OUTPATIENT
Start: 2020-09-21 | End: 2020-09-24 | Stop reason: HOSPADM

## 2020-09-21 RX ORDER — SODIUM CHLORIDE 0.9 % (FLUSH) 0.9 %
10 SYRINGE (ML) INJECTION PRN
Status: DISCONTINUED | OUTPATIENT
Start: 2020-09-21 | End: 2020-09-24 | Stop reason: HOSPADM

## 2020-09-21 RX ADMIN — PIPERACILLIN SODIUM AND TAZOBACTAM SODIUM 3.38 G: 3; .375 INJECTION, POWDER, LYOPHILIZED, FOR SOLUTION INTRAVENOUS at 06:17

## 2020-09-21 RX ADMIN — SODIUM CHLORIDE: 9 INJECTION, SOLUTION INTRAVENOUS at 04:13

## 2020-09-21 RX ADMIN — Medication 400 MG: at 09:16

## 2020-09-21 RX ADMIN — MAGNESIUM SULFATE HEPTAHYDRATE 1 G: 1 INJECTION, SOLUTION INTRAVENOUS at 11:55

## 2020-09-21 RX ADMIN — PROMETHAZINE HYDROCHLORIDE 12.5 MG: 25 INJECTION INTRAMUSCULAR; INTRAVENOUS at 15:32

## 2020-09-21 RX ADMIN — Medication 2 MG: at 07:22

## 2020-09-21 RX ADMIN — POTASSIUM CHLORIDE 40 MEQ: 1500 TABLET, EXTENDED RELEASE ORAL at 11:56

## 2020-09-21 RX ADMIN — ONDANSETRON 4 MG: 2 INJECTION INTRAMUSCULAR; INTRAVENOUS at 09:17

## 2020-09-21 RX ADMIN — BUSPIRONE HYDROCHLORIDE 15 MG: 15 TABLET ORAL at 20:26

## 2020-09-21 RX ADMIN — ACETAMINOPHEN 650 MG: 325 TABLET, FILM COATED ORAL at 09:15

## 2020-09-21 RX ADMIN — QUETIAPINE FUMARATE 800 MG: 400 TABLET, EXTENDED RELEASE ORAL at 20:25

## 2020-09-21 RX ADMIN — PIPERACILLIN SODIUM AND TAZOBACTAM SODIUM 3.38 G: 3; .375 INJECTION, POWDER, LYOPHILIZED, FOR SOLUTION INTRAVENOUS at 22:17

## 2020-09-21 RX ADMIN — Medication 1 MG: at 20:25

## 2020-09-21 RX ADMIN — ENOXAPARIN SODIUM 30 MG: 30 INJECTION SUBCUTANEOUS at 20:25

## 2020-09-21 RX ADMIN — I.V. FAT EMULSION 100 ML: 20 EMULSION INTRAVENOUS at 18:06

## 2020-09-21 RX ADMIN — Medication 2 MG: at 11:56

## 2020-09-21 RX ADMIN — POTASSIUM CHLORIDE 10 MEQ: 1500 TABLET, EXTENDED RELEASE ORAL at 09:17

## 2020-09-21 RX ADMIN — ENOXAPARIN SODIUM 30 MG: 30 INJECTION SUBCUTANEOUS at 09:15

## 2020-09-21 RX ADMIN — PROMETHAZINE HYDROCHLORIDE 12.5 MG: 25 INJECTION INTRAMUSCULAR; INTRAVENOUS at 02:49

## 2020-09-21 RX ADMIN — METOPROLOL TARTRATE 25 MG: 25 TABLET, FILM COATED ORAL at 09:16

## 2020-09-21 RX ADMIN — Medication 10 ML: at 20:31

## 2020-09-21 RX ADMIN — POTASSIUM CHLORIDE 10 MEQ: 1500 TABLET, EXTENDED RELEASE ORAL at 20:26

## 2020-09-21 RX ADMIN — Medication 2 MG: at 20:47

## 2020-09-21 RX ADMIN — Medication 2 MG: at 02:50

## 2020-09-21 RX ADMIN — Medication 2000 UNITS: at 09:17

## 2020-09-21 RX ADMIN — CALCIUM GLUCONATE: 94 INJECTION, SOLUTION INTRAVENOUS at 18:06

## 2020-09-21 RX ADMIN — DULOXETINE 60 MG: 60 CAPSULE, DELAYED RELEASE ORAL at 09:16

## 2020-09-21 RX ADMIN — PANTOPRAZOLE SODIUM 40 MG: 40 TABLET, DELAYED RELEASE ORAL at 06:16

## 2020-09-21 RX ADMIN — PRAZOSIN HYDROCHLORIDE 1 MG: 1 CAPSULE ORAL at 20:25

## 2020-09-21 RX ADMIN — LORAZEPAM 1 MG: 1 TABLET ORAL at 23:33

## 2020-09-21 RX ADMIN — PIPERACILLIN SODIUM AND TAZOBACTAM SODIUM 3.38 G: 3; .375 INJECTION, POWDER, LYOPHILIZED, FOR SOLUTION INTRAVENOUS at 15:32

## 2020-09-21 RX ADMIN — PRAMIPEXOLE DIHYDROCHLORIDE 0.25 MG: 0.25 TABLET ORAL at 15:30

## 2020-09-21 RX ADMIN — Medication 2 MG: at 16:38

## 2020-09-21 ASSESSMENT — PAIN DESCRIPTION - LOCATION
LOCATION: ARM
LOCATION: ARM

## 2020-09-21 ASSESSMENT — PAIN DESCRIPTION - PAIN TYPE
TYPE: ACUTE PAIN
TYPE: ACUTE PAIN

## 2020-09-21 ASSESSMENT — PAIN DESCRIPTION - ORIENTATION
ORIENTATION: RIGHT
ORIENTATION: RIGHT

## 2020-09-21 ASSESSMENT — PAIN SCALES - GENERAL
PAINLEVEL_OUTOF10: 9
PAINLEVEL_OUTOF10: 9
PAINLEVEL_OUTOF10: 8
PAINLEVEL_OUTOF10: 2
PAINLEVEL_OUTOF10: 7
PAINLEVEL_OUTOF10: 4
PAINLEVEL_OUTOF10: 8

## 2020-09-21 NOTE — CARE COORDINATION
ONGOING DISCHARGE PLAN:    Spoke with patient regarding discharge plan and patient confirms that plan is still to return to home W/ Her Mother. Pt. Will have VNS, [facility] when she returns to home. Diego Hurt, from [facility] for TPN at home. Diegoryan Ramirezh, states they are out of Network for Benefits. Will have [employer] follow. Will Fax information for them to run Benefits. Pt. Is to get PICC line today. TPN to start today. Remains NPO & on IV Zosyn. Anticipate Abd CT on Wednesday. Surgery, continues to follow. Will continue to follow for additional discharge needs.     Electronically signed by Jluis Lawson RN on 9/21/2020 at 12:32 PM

## 2020-09-21 NOTE — PROGRESS NOTES
[facility]    Gastroenterology Daily Progress Note      Patient:   Terrance Barger   :    1975   Facility:   [facility]  Date:     2020  Consultant:   Jess Willson CNP      SUBJECTIVE  54 y.o. female admitted 2020 with Pancreatic pseudocyst [K86.3] and seen for pancreatitis with pseudocysts. Going for line placement for PPN today. Up walking in the halls but continues to have nausea and epigastric pain. Low grade fever today.          OBJECTIVE  Scheduled Meds:   fat emulsion  100 mL Intravenous Daily    insulin lispro  0-6 Units Subcutaneous Q6H    busPIRone  15 mg Oral Nightly    Vitamin D  2 tablet Oral Daily    DULoxetine  60 mg Oral Daily    [Held by provider] furosemide  20 mg Oral BID    magnesium oxide  400 mg Oral Daily    melatonin ER  1 mg Oral Daily    metoprolol tartrate  25 mg Oral Daily    pantoprazole  40 mg Oral QAM AC    potassium chloride  10 mEq Oral BID    pramipexole  0.25 mg Oral Daily    prazosin  1 mg Oral Nightly    QUEtiapine  800 mg Oral Nightly    sodium chloride flush  10 mL Intravenous 2 times per day    enoxaparin  30 mg Subcutaneous BID    piperacillin-tazobactam (ZOSYN) 3.375 g in dextrose 5% IVPB extended infusion (mini-bag)  3.375 g Intravenous Q8H       Vital Signs:  BP (!) 154/89   Pulse 104   Temp 99 °F (37.2 °C) (Oral)   Resp 16   Ht 5' 5\" (1.651 m)   Wt 273 lb 2.4 oz (123.9 kg)   SpO2 93%   BMI 45.45 kg/m²      Physical Exam:     General Appearance: alert and oriented to person, place and time, well-developed and well-nourished, in no acute distress  Skin: warm and dry, no rash or erythema  Head: normocephalic and atraumatic  Eyes: pupils equal, round, and reactive to light, extraocular eye movements intact, conjunctivae normal  ENT: hearing grossly normal bilaterally  Neck: neck supple and non tender without mass, no thyromegaly or thyroid nodules, no cervical lymphadenopathy   Pulmonary/Chest: collections, for example in the pancreatic head measuring up    to 1.4 cm which could represent a pseudocyst or pancreatic necrosis.  There    are multiple peripancreatic fluid collections, for example adjacent the    pancreatic tail at the splenic hilum measuring 4.8 x 2.9 cm.         There is loss of signal on opposed phase imaging involving the hepatic    parenchyma compatible steatosis.  The spleen is enlarged measuring up to 15.5    cm.  The adrenal glands are unremarkable.  Focal cortical scarring in the    right kidney.  Few small bilateral renal cortical cysts.  No hydronephrosis.      The visualized small and large bowel are nondilated.  No significant    lymphadenopathy.  The abdominal aorta is normal in course and caliber.  No    acute soft tissue or osseous abnormality.           Impression    1. Status post cholecystectomy.  No evidence of choledocholithiasis. Dilation of the biliary tree may be related to cholecystectomy status. 2. Findings compatible with acute pancreatitis.  Intrapancreatic T2    hyperintense collections may be related to pseudocysts or focal necrosis. Multiple peripancreatic collections noted, as described above. 3. Hepatic steatosis. 4. Splenomegaly.                ASSESSMENT/PLAN:  1. Pancreatitis with pseudocysts, mri showing continued pancreatitis with pseudocysts verses focal necrosis  -continues to have pain and nausea, ct abd to be repeated wed  -nutrition consulted for PPN-line to be placed today  -continue pain and nausea mgt  -npo  -IV decreased to 75ml/hr    This plan was formulated in collaboration with Dr. Neha Starr.     Electronically signed by: ANIBAL Reynoso CNP on 9/21/2020 at 12:32 PM

## 2020-09-21 NOTE — PLAN OF CARE
Problem: Pain:  Goal: Pain level will decrease  Description: Pain level will decrease  9/21/2020 1704 by Anuj Valverde RN  Outcome: Ongoing  9/21/2020 0518 by Melissa Dejesus RN  Outcome: Ongoing  Goal: Control of acute pain  Description: Control of acute pain  9/21/2020 1704 by Aunj Valverde RN  Outcome: Ongoing  9/21/2020 0518 by Melissa Dejesus RN  Outcome: Ongoing  Note: Assess the location, characteristics, onset, duration, frequency, quality, and severity of pain. Encourage immediate report of pain. Use appropriate pain scale to rate pain. Manage pain using nonpharmacologic/pharmacologic interventions. 9/21/2020 0517 by Melissa Dejesus RN  Note: Assess the location, characteristics, onset, duration, frequency, quality, and severity of pain. Encourage immediate report of pain. Use appropriate pain scale to rate pain. Manage pain using nonpharmacologic/pharmacologic interventions. Goal: Control of chronic pain  Description: Control of chronic pain  9/21/2020 1704 by Anuj Valverde RN  Outcome: Ongoing  9/21/2020 0518 by Melissa Dejesus RN  Outcome: Ongoing     Problem: Falls - Risk of:  Goal: Will remain free from falls  Description: Will remain free from falls  9/21/2020 1704 by Anuj Valverde RN  Outcome: Ongoing  9/21/2020 0518 by Melissa Dejesus RN  Outcome: Ongoing  Note: Patient remains free of falls and injuries throughout shift. Bed remains in the lowest position, wheels locked, call light and bedside table are within reach. Goal: Absence of physical injury  Description: Absence of physical injury  9/21/2020 1704 by Anuj Valverde RN  Outcome: Ongoing  9/21/2020 0518 by Melissa Dejesus RN  Outcome: Ongoing  9/21/2020 0517 by Melissa Dejesus RN  Note: Patient remains free of falls and injuries throughout shift. Bed remains in the lowest position, wheels locked, call light and bedside table are within reach.       Problem: Physical Regulation:  Goal: Will remain free from infection  Description: Will remain free from infection  9/21/2020 1704 by Rhina Petersen RN  Outcome: Ongoing  9/21/2020 0518 by Liliam Rivas RN  Outcome: Ongoing     Problem: Nutrition  Goal: Optimal nutrition therapy  9/21/2020 1704 by Rhina Petersen RN  Outcome: Ongoing  9/21/2020 0518 by Liliam Rivas RN  Outcome: Ongoing     Problem: Skin Integrity:  Goal: Will show no infection signs and symptoms  Description: Will show no infection signs and symptoms  9/21/2020 1704 by Rhina Petersen RN  Outcome: Ongoing  9/21/2020 0518 by Liliam Rivas RN  Outcome: Ongoing  Goal: Absence of new skin breakdown  Description: Absence of new skin breakdown  9/21/2020 1704 by Rhina Petersen RN  Outcome: Ongoing  9/21/2020 0518 by Liliam Rivas RN  Outcome: Ongoing

## 2020-09-21 NOTE — CARE COORDINATION
Writer faxed Face Sheet, to Andressa Ignacio, to run benefits for TPN. Faxed to , per Aliya's Request.    Writer informed Fredi Austin, to call writer w/ any questions.

## 2020-09-21 NOTE — CARE COORDINATION
Writer notified, by Pawan Boothe, from [place] [id number], that pt. Will be covered at 100% for TPN. She will follow.

## 2020-09-21 NOTE — PROGRESS NOTES
[facility] [address]                 PATIENT NAME: Raul Gordon     TODAY'S DATE: 9/21/2020, 10:23 AM    SUBJECTIVE:    Pt seen and examined. Afebrile, VSS. LFT's trending down, lipase WNL. Patient states she is doing alright. Pain is unchanged from yesterday. Bowels are moving. Remains NPO, no N/V. PICC line today per IR. OBJECTIVE:   VITALS:  BP (!) 154/89   Pulse 104   Temp 99 °F (37.2 °C) (Oral)   Resp 16   Ht 5' 5\" (1.651 m)   Wt 273 lb 2.4 oz (123.9 kg)   SpO2 93%   BMI 45.45 kg/m²      INTAKE/OUTPUT:      Intake/Output Summary (Last 24 hours) at 9/21/2020 1023  Last data filed at 9/21/2020 0915  Gross per 24 hour   Intake 1310 ml   Output 1300 ml   Net 10 ml                 CONSTITUTIONAL:  awake and alert.   No acute distress  HEART:   RRR  LUNGS:   Decreased air entry at bases, no wheezing  ABDOMEN:   Abdomen soft, upper abdomen tender, non-distended  EXTREMITIES:   Pedal edema b/l    Data:  CBC:   Lab Results   Component Value Date    WBC 6.4 09/21/2020    RBC 3.06 09/21/2020    HGB 9.4 09/21/2020    HCT 28.0 09/21/2020    MCV 91.4 09/21/2020    MCH 30.7 09/21/2020    MCHC 33.6 09/21/2020    RDW 15.2 09/21/2020     09/21/2020    MPV 8.4 09/21/2020     BMP:    Lab Results   Component Value Date     09/21/2020    K 3.4 09/21/2020     09/21/2020    CO2 26 09/21/2020    BUN 4 09/21/2020    LABALBU 3.4 09/21/2020    CREATININE 0.72 09/21/2020    CALCIUM 8.9 09/21/2020    GFRAA >60 09/21/2020    LABGLOM >60 09/21/2020    GLUCOSE 144 09/21/2020     Hepatic Function Panel:    Lab Results   Component Value Date    ALKPHOS 192 09/21/2020    ALT 46 09/21/2020    AST 19 09/21/2020    PROT 6.8 09/21/2020    PROT 7.2 01/23/2015    BILITOT <0.15 09/21/2020    BILIDIR 0.08 09/21/2020    IBILI CANNOT BE CALCULATED 09/21/2020    LABALBU 3.4 09/21/2020     LIPASE:    Lab Results   Component Value Date    LIPASE 25 09/21/2020       Radiology Review:  No new

## 2020-09-21 NOTE — PROGRESS NOTES
[place] [address] [facility]    Progress Note     9/21/2020    11:11 AM    Name:   Joao Garza  MRN:     [id number]     Acct:      [de-identified]   Room:   [facility] [place] [address] Day:  5  Admit Date:  9/16/2020  6:22 PM    PCP:   Jason Espinal  Code Status:  Full Code    Subjective:     C/C: No chief complaint on file. Principal Problem:    Pancreatic pseudocyst  Active Problems:    Anxiety    Class 3 severe obesity due to excess calories with serious comorbidity in adult (HCC)    HTN (hypertension)    Smoker    History of depression  Resolved Problems:    * No resolved hospital problems. *      Interval History Status: improved.             Significant last 24 hr data reviewed ;   Vitals:    09/20/20 1817 09/20/20 1921 09/21/20 0600 09/21/20 0705   BP:  (!) 151/82  (!) 154/89   Pulse:  100  104   Resp:  18  16   Temp:  97.7 °F (36.5 °C)  99 °F (37.2 °C)   TempSrc:  Oral  Oral   SpO2:  95%  93%   Weight: 275 lb 9.2 oz (125 kg)  273 lb 2.4 oz (123.9 kg)    Height:          Recent Results (from the past 24 hour(s))   Basic Metabolic Panel w/ Reflex to MG    Collection Time: 09/21/20  6:54 AM   Result Value Ref Range    Glucose 144 (H) 70 - 99 mg/dL    BUN 4 (L) 6 - 20 mg/dL    CREATININE 0.72 0.50 - 0.90 mg/dL    Bun/Cre Ratio NOT REPORTED 9 - 20    Calcium 8.9 8.6 - 10.4 mg/dL    Sodium 141 135 - 144 mmol/L    Potassium 3.4 (L) 3.7 - 5.3 mmol/L    Chloride 106 98 - 107 mmol/L    CO2 26 20 - 31 mmol/L    Anion Gap 9 9 - 17 mmol/L    GFR Non-African American >60 >60 mL/min    GFR African American >60 >60 mL/min    GFR Comment          GFR Staging NOT REPORTED    CBC WITH AUTO DIFFERENTIAL    Collection Time: 09/21/20  6:54 AM   Result Value Ref Range    WBC 6.4 3.5 - 11.0 k/uL    RBC 3.06 (L) 4.0 - 5.2 m/uL    Hemoglobin 9.4 (L) 12.0 - 16.0 g/dL    Hematocrit 28.0 (L) 36 - 46 %    MCV 91.4 80 - 100 fL    MCH 30.7 26 - 34 pg    MCHC 33.6 31 - 37 g/dL    RDW 15.2 (H) 11.5 - 14.9 % Platelets 666 223 - 393 k/uL    MPV 8.4 6.0 - 12.0 fL    NRBC Automated NOT REPORTED per 100 WBC    Differential Type NOT REPORTED     Seg Neutrophils 50 36 - 66 %    Lymphocytes 33 24 - 44 %    Monocytes 8 (H) 1 - 7 %    Eosinophils % 8 (H) 0 - 4 %    Basophils 1 0 - 2 %    Immature Granulocytes NOT REPORTED 0 %    Segs Absolute 3.20 1.3 - 9.1 k/uL    Absolute Lymph # 2.10 1.0 - 4.8 k/uL    Absolute Mono # 0.50 0.1 - 1.3 k/uL    Absolute Eos # 0.50 (H) 0.0 - 0.4 k/uL    Basophils Absolute 0.10 0.0 - 0.2 k/uL    Absolute Immature Granulocyte NOT REPORTED 0.00 - 0.30 k/uL    WBC Morphology NOT REPORTED     RBC Morphology NOT REPORTED     Platelet Estimate NOT REPORTED    Lipase    Collection Time: 09/21/20  6:54 AM   Result Value Ref Range    Lipase 25 13 - 60 U/L   Liver Profile    Collection Time: 09/21/20  6:54 AM   Result Value Ref Range    Alb 3.4 (L) 3.5 - 5.2 g/dL    Alkaline Phosphatase 192 (H) 35 - 104 U/L    ALT 46 (H) 5 - 33 U/L    AST 19 <32 U/L    Total Bilirubin <0.15 (L) 0.3 - 1.2 mg/dL    Bilirubin, Direct 0.08 <0.31 mg/dL    Bilirubin, Indirect CANNOT BE CALCULATED 0.00 - 1.00 mg/dL    Total Protein 6.8 6.4 - 8.3 g/dL    Globulin NOT REPORTED 1.5 - 3.8 g/dL    Albumin/Globulin Ratio NOT REPORTED 1.0 - 2.5   Magnesium    Collection Time: 09/21/20  6:54 AM   Result Value Ref Range    Magnesium 1.5 (L) 1.6 - 2.6 mg/dL     No results for input(s): POCGLU in the last 72 hours. No results found.           HPI:   See history in H and P      Review of Systems:     Constitutional:  negative for chills, fevers, sweats  Respiratory:  negative for cough, dyspnea on exertion, hemoptysis, shortness of breath, wheezing  Cardiovascular:  negative for chest pain, chest pressure/discomfort, lower extremity edema, palpitations  Gastrointestinal: Positive for appetite loss nausea and abdominal pain  Neurological:  negative for dizziness, headache  Data:     Past Medical History:  no change     Social History:  no change    Family History: @no change    Vitals:      I/O (24Hr): Intake/Output Summary (Last 24 hours) at 9/21/2020 1111  Last data filed at 9/21/2020 0915  Gross per 24 hour   Intake 1310 ml   Output 1300 ml   Net 10 ml       Labs:    URINE ANALYSIS: No results found for: LABURIN     CBC:  Lab Results   Component Value Date    WBC 6.4 09/21/2020    HGB 9.4 09/21/2020     09/21/2020        BMP:    Lab Results   Component Value Date     09/21/2020    K 3.4 09/21/2020     09/21/2020    CO2 26 09/21/2020    BUN 4 09/21/2020    CREATININE 0.72 09/21/2020    GLUCOSE 144 09/21/2020      LIVER PROFILE:  Lab Results   Component Value Date    ALT 46 09/21/2020    AST 19 09/21/2020    PROT 6.8 09/21/2020    PROT 7.2 01/23/2015    BILITOT <0.15 09/21/2020    BILIDIR 0.08 09/21/2020    LABALBU 3.4 09/21/2020               Radiology:      Physical Examination:        General appearance:  alert, cooperative and no distress  Mental Status:  oriented to person, place and time and normal affect  Lungs:  clear to auscultation bilaterally, normal effort  Heart:  regular rate and rhythm, no murmur  Abdomen: Soft mild tender no guarding no rigidity, nondistended, normal bowel sounds, no masses, hepatomegaly, splenomegaly  Extremities:  no edema, redness, tenderness in the calves  Skin:  no gross lesions, rashes, induration    Assessment:        Primary Problem  Pancreatic pseudocyst    Active Hospital Problems    Diagnosis Date Noted    History of depression [Z86.59] 09/17/2020    Pancreatic pseudocyst [K86.3] 09/16/2020    Smoker [F17.200] 08/25/2020    HTN (hypertension) [I10] 12/15/2014    Class 3 severe obesity due to excess calories with serious comorbidity in adult ([facility] [address] [E66.01] 01/14/2013    Anxiety [F41.9] 01/14/2013       Plan:        1. Acute severe pancreatitis not tolerating oral getting a PICC line and TPN today on IV morphine for pain control will change to oral Percocet  2.  Morbid obesity BMI 45.45    Medications: Allergies:     Allergies   Allergen Reactions    Aripiprazole      Bad reaction    Eletriptan      Other reaction(s): Swelling-throat    Hydrocodone-Acetaminophen      Other reaction(s): Unknown    Oxycodone-Acetaminophen      Other reaction(s): Unknown    Sumatriptan Other (See Comments)    Triptans      Other reaction(s): Unknown    Zosyn [Piperacillin Sod-Tazobactam So]      Rash 8/26/20 possibley caused by zosyn    Lamotrigine Rash       Current Meds:   Scheduled Meds:    busPIRone  15 mg Oral Nightly    Vitamin D  2 tablet Oral Daily    DULoxetine  60 mg Oral Daily    [Held by provider] furosemide  20 mg Oral BID    magnesium oxide  400 mg Oral Daily    melatonin ER  1 mg Oral Daily    metoprolol tartrate  25 mg Oral Daily    pantoprazole  40 mg Oral QAM AC    potassium chloride  10 mEq Oral BID    pramipexole  0.25 mg Oral Daily    prazosin  1 mg Oral Nightly    QUEtiapine  800 mg Oral Nightly    sodium chloride flush  10 mL Intravenous 2 times per day    enoxaparin  30 mg Subcutaneous BID    piperacillin-tazobactam (ZOSYN) 3.375 g in dextrose 5% IVPB extended infusion (mini-bag)  3.375 g Intravenous Q8H     Continuous Infusions:    sodium chloride 100 mL/hr at 09/21/20 0413     PRN Meds: LORazepam, morphine **OR** morphine, albuterol sulfate HFA, sodium chloride flush, potassium chloride **OR** potassium alternative oral replacement **OR** potassium chloride, magnesium sulfate, acetaminophen **OR** acetaminophen, bisacodyl, ondansetron, promethazine (PHENERGAN) in sodium chloride 0.9% IVPB       Soraida Cao MD  9/21/2020  11:11 AM

## 2020-09-21 NOTE — PROGRESS NOTES
constipation    Nutrition Interventions:   Food and/or Nutrient Delivery:  Continue NPO, Start Parenteral Nutrititon  Nutrition Education/Counseling:  Education not indicated   Coordination of Nutrition Care:  Continued Inpatient Monitoring  · Current Parenteral Nutrition Orders:  · Type and Formula: 2-in-1 Custom   · Lipids: 100ml  · Duration: Continuous  · Rate/Volume: 41.7/1000  · Current PN Order Provides: 1080 kcal, 50 gm protein  · Goal PN Orders Provides: 2080 kcal, 102-113 gm protein    Goals:  TPN to meet greater than 75% of estimated nutrition needs       Nutrition Monitoring and Evaluation:   Food/Nutrient Intake Outcomes:  Diet Advancement/Tolerance, Parenteral Nutrition Intake/Tolerance  Physical Signs/Symptoms Outcomes:  Biochemical Data, Weight, Nausea or Vomiting, Constipation, GI Status     Discharge Planning:     Too soon to determine       Some areas of assessment may be incomplete due to COVID-19 precautions    Ernesto Justice RD, LD  Office phone [phone]

## 2020-09-21 NOTE — PLAN OF CARE
Problem: Pain:  Goal: Pain level will decrease  Description: Pain level will decrease  9/21/2020 0518 by Neo Isaac RN  Outcome: Ongoing  9/20/2020 1704 by Diogo Rueda RN  Outcome: Met This Shift  Goal: Control of acute pain  Description: Control of acute pain  9/21/2020 0518 by Neo Isaac RN  Outcome: Ongoing  Note: Assess the location, characteristics, onset, duration, frequency, quality, and severity of pain. Encourage immediate report of pain. Use appropriate pain scale to rate pain. Manage pain using nonpharmacologic/pharmacologic interventions. 9/21/2020 0517 by Neo Isaac RN  Note: Assess the location, characteristics, onset, duration, frequency, quality, and severity of pain. Encourage immediate report of pain. Use appropriate pain scale to rate pain. Manage pain using nonpharmacologic/pharmacologic interventions. 9/20/2020 1704 by Diogo Rueda RN  Outcome: Met This Shift  Goal: Control of chronic pain  Description: Control of chronic pain  9/21/2020 0518 by Neo Isaac RN  Outcome: Ongoing  9/20/2020 1704 by Diogo Rueda RN  Outcome: Met This Shift     Problem: Falls - Risk of:  Goal: Will remain free from falls  Description: Will remain free from falls  9/21/2020 0518 by Neo Isaac RN  Outcome: Ongoing  Note: Patient remains free of falls and injuries throughout shift. Bed remains in the lowest position, wheels locked, call light and bedside table are within reach. 9/20/2020 1704 by Diogo Rueda RN  Outcome: Met This Shift  Goal: Absence of physical injury  Description: Absence of physical injury  9/21/2020 0518 by Neo Isaac RN  Outcome: Ongoing  9/21/2020 0517 by Neo Isaac RN  Note: Patient remains free of falls and injuries throughout shift. Bed remains in the lowest position, wheels locked, call light and bedside table are within reach.    9/20/2020 1704 by Diogo Rueda RN  Outcome: Met This Shift     Problem: Physical Regulation:  Goal: Will remain free from infection  Description: Will remain free from infection  9/21/2020 0518 by Liliam Rivas RN  Outcome: Ongoing  9/20/2020 1704 by Wili Resendiz RN  Outcome: Met This Shift     Problem: Nutrition  Goal: Optimal nutrition therapy  9/21/2020 0518 by Liliam Rivas RN  Outcome: Ongoing  9/20/2020 1704 by Wili Resendiz RN  Outcome: Met This Shift     Problem: Skin Integrity:  Goal: Will show no infection signs and symptoms  Description: Will show no infection signs and symptoms  9/21/2020 0518 by Liliam Rivas RN  Outcome: Ongoing  9/20/2020 1704 by Wili Resendiz RN  Outcome: Met This Shift  Goal: Absence of new skin breakdown  Description: Absence of new skin breakdown  9/21/2020 0518 by Liliam Rivas RN  Outcome: Ongoing  9/20/2020 1704 by Wili Resendiz RN  Outcome: Met This Shift

## 2020-09-22 LAB
ABSOLUTE EOS #: 0.7 K/UL (ref 0–0.4)
ABSOLUTE IMMATURE GRANULOCYTE: ABNORMAL K/UL (ref 0–0.3)
ABSOLUTE LYMPH #: 2.4 K/UL (ref 1–4.8)
ABSOLUTE MONO #: 0.5 K/UL (ref 0.1–1.3)
ALBUMIN SERPL-MCNC: 3.1 G/DL (ref 3.5–5.2)
ALBUMIN/GLOBULIN RATIO: ABNORMAL (ref 1–2.5)
ALP BLD-CCNC: 164 U/L (ref 35–104)
ALT SERPL-CCNC: 40 U/L (ref 5–33)
ANION GAP SERPL CALCULATED.3IONS-SCNC: 10 MMOL/L (ref 9–17)
AST SERPL-CCNC: 21 U/L
BASOPHILS # BLD: 1 % (ref 0–2)
BASOPHILS ABSOLUTE: 0.1 K/UL (ref 0–0.2)
BILIRUB SERPL-MCNC: <0.15 MG/DL (ref 0.3–1.2)
BUN BLDV-MCNC: 5 MG/DL (ref 6–20)
BUN/CREAT BLD: ABNORMAL (ref 9–20)
CALCIUM SERPL-MCNC: 8.6 MG/DL (ref 8.6–10.4)
CHLORIDE BLD-SCNC: 104 MMOL/L (ref 98–107)
CO2: 26 MMOL/L (ref 20–31)
CREAT SERPL-MCNC: 0.61 MG/DL (ref 0.5–0.9)
DIFFERENTIAL TYPE: ABNORMAL
EOSINOPHILS RELATIVE PERCENT: 9 % (ref 0–4)
GFR AFRICAN AMERICAN: >60 ML/MIN
GFR NON-AFRICAN AMERICAN: >60 ML/MIN
GFR SERPL CREATININE-BSD FRML MDRD: ABNORMAL ML/MIN/{1.73_M2}
GFR SERPL CREATININE-BSD FRML MDRD: ABNORMAL ML/MIN/{1.73_M2}
GLUCOSE BLD-MCNC: 103 MG/DL (ref 65–105)
GLUCOSE BLD-MCNC: 133 MG/DL (ref 65–105)
GLUCOSE BLD-MCNC: 136 MG/DL (ref 65–105)
GLUCOSE BLD-MCNC: 137 MG/DL (ref 65–105)
GLUCOSE BLD-MCNC: 151 MG/DL (ref 70–99)
HCT VFR BLD CALC: 28.5 % (ref 36–46)
HEMOGLOBIN: 9.4 G/DL (ref 12–16)
IMMATURE GRANULOCYTES: ABNORMAL %
LIPASE: 24 U/L (ref 13–60)
LYMPHOCYTES # BLD: 30 % (ref 24–44)
MAGNESIUM: 1.9 MG/DL (ref 1.6–2.6)
MCH RBC QN AUTO: 30.4 PG (ref 26–34)
MCHC RBC AUTO-ENTMCNC: 32.9 G/DL (ref 31–37)
MCV RBC AUTO: 92.5 FL (ref 80–100)
MONOCYTES # BLD: 6 % (ref 1–7)
NRBC AUTOMATED: ABNORMAL PER 100 WBC
PDW BLD-RTO: 15.2 % (ref 11.5–14.9)
PHOSPHORUS: 3.8 MG/DL (ref 2.6–4.5)
PLATELET # BLD: 188 K/UL (ref 150–450)
PLATELET ESTIMATE: ABNORMAL
PMV BLD AUTO: 8.3 FL (ref 6–12)
POTASSIUM SERPL-SCNC: 3.5 MMOL/L (ref 3.7–5.3)
RBC # BLD: 3.08 M/UL (ref 4–5.2)
RBC # BLD: ABNORMAL 10*6/UL
SEG NEUTROPHILS: 54 % (ref 36–66)
SEGMENTED NEUTROPHILS ABSOLUTE COUNT: 4.2 K/UL (ref 1.3–9.1)
SODIUM BLD-SCNC: 140 MMOL/L (ref 135–144)
TOTAL PROTEIN: 6.6 G/DL (ref 6.4–8.3)
TRIGL SERPL-MCNC: 131 MG/DL
WBC # BLD: 7.9 K/UL (ref 3.5–11)
WBC # BLD: ABNORMAL 10*3/UL

## 2020-09-22 PROCEDURE — 2580000003 HC RX 258: Performed by: RADIOLOGY

## 2020-09-22 PROCEDURE — 6370000000 HC RX 637 (ALT 250 FOR IP): Performed by: INTERNAL MEDICINE

## 2020-09-22 PROCEDURE — 1200000000 HC SEMI PRIVATE

## 2020-09-22 PROCEDURE — 83690 ASSAY OF LIPASE: CPT

## 2020-09-22 PROCEDURE — 85025 COMPLETE CBC W/AUTO DIFF WBC: CPT

## 2020-09-22 PROCEDURE — 6360000002 HC RX W HCPCS: Performed by: NURSE PRACTITIONER

## 2020-09-22 PROCEDURE — 84100 ASSAY OF PHOSPHORUS: CPT

## 2020-09-22 PROCEDURE — 36415 COLL VENOUS BLD VENIPUNCTURE: CPT

## 2020-09-22 PROCEDURE — 80053 COMPREHEN METABOLIC PANEL: CPT

## 2020-09-22 PROCEDURE — 83735 ASSAY OF MAGNESIUM: CPT

## 2020-09-22 PROCEDURE — 6360000002 HC RX W HCPCS: Performed by: INTERNAL MEDICINE

## 2020-09-22 PROCEDURE — 99232 SBSQ HOSP IP/OBS MODERATE 35: CPT | Performed by: INTERNAL MEDICINE

## 2020-09-22 PROCEDURE — 2580000003 HC RX 258: Performed by: NURSE PRACTITIONER

## 2020-09-22 PROCEDURE — APPNB30 APP NON BILLABLE TIME 0-30 MINS: Performed by: NURSE PRACTITIONER

## 2020-09-22 PROCEDURE — 2500000003 HC RX 250 WO HCPCS: Performed by: INTERNAL MEDICINE

## 2020-09-22 PROCEDURE — 82947 ASSAY GLUCOSE BLOOD QUANT: CPT

## 2020-09-22 PROCEDURE — 84478 ASSAY OF TRIGLYCERIDES: CPT

## 2020-09-22 PROCEDURE — 6370000000 HC RX 637 (ALT 250 FOR IP): Performed by: NURSE PRACTITIONER

## 2020-09-22 RX ORDER — DEXTROSE MONOHYDRATE 25 G/50ML
12.5 INJECTION, SOLUTION INTRAVENOUS PRN
Status: DISCONTINUED | OUTPATIENT
Start: 2020-09-22 | End: 2020-09-24 | Stop reason: HOSPADM

## 2020-09-22 RX ORDER — DULOXETIN HYDROCHLORIDE 60 MG/1
60 CAPSULE, DELAYED RELEASE ORAL 2 TIMES DAILY
Status: DISCONTINUED | OUTPATIENT
Start: 2020-09-22 | End: 2020-09-24 | Stop reason: HOSPADM

## 2020-09-22 RX ORDER — DEXTROSE MONOHYDRATE 50 MG/ML
100 INJECTION, SOLUTION INTRAVENOUS PRN
Status: DISCONTINUED | OUTPATIENT
Start: 2020-09-22 | End: 2020-09-24 | Stop reason: HOSPADM

## 2020-09-22 RX ORDER — NICOTINE POLACRILEX 4 MG
15 LOZENGE BUCCAL PRN
Status: DISCONTINUED | OUTPATIENT
Start: 2020-09-22 | End: 2020-09-24 | Stop reason: HOSPADM

## 2020-09-22 RX ADMIN — PANTOPRAZOLE SODIUM 40 MG: 40 TABLET, DELAYED RELEASE ORAL at 05:15

## 2020-09-22 RX ADMIN — DULOXETINE HYDROCHLORIDE 60 MG: 60 CAPSULE, DELAYED RELEASE ORAL at 20:02

## 2020-09-22 RX ADMIN — Medication 2 MG: at 17:35

## 2020-09-22 RX ADMIN — PRAMIPEXOLE DIHYDROCHLORIDE 0.25 MG: 0.25 TABLET ORAL at 09:14

## 2020-09-22 RX ADMIN — ACETAMINOPHEN 650 MG: 325 TABLET, FILM COATED ORAL at 19:09

## 2020-09-22 RX ADMIN — ENOXAPARIN SODIUM 30 MG: 30 INJECTION SUBCUTANEOUS at 09:03

## 2020-09-22 RX ADMIN — Medication 10 ML: at 09:04

## 2020-09-22 RX ADMIN — Medication 2 MG: at 00:44

## 2020-09-22 RX ADMIN — DULOXETINE 60 MG: 60 CAPSULE, DELAYED RELEASE ORAL at 09:01

## 2020-09-22 RX ADMIN — Medication 1 MG: at 20:03

## 2020-09-22 RX ADMIN — SODIUM CHLORIDE: 9 INJECTION, SOLUTION INTRAVENOUS at 05:29

## 2020-09-22 RX ADMIN — POTASSIUM CHLORIDE 10 MEQ: 1500 TABLET, EXTENDED RELEASE ORAL at 09:03

## 2020-09-22 RX ADMIN — Medication 10 ML: at 21:52

## 2020-09-22 RX ADMIN — QUETIAPINE FUMARATE 800 MG: 400 TABLET, EXTENDED RELEASE ORAL at 22:26

## 2020-09-22 RX ADMIN — POTASSIUM CHLORIDE 40 MEQ: 1500 TABLET, EXTENDED RELEASE ORAL at 09:10

## 2020-09-22 RX ADMIN — Medication 2 MG: at 13:35

## 2020-09-22 RX ADMIN — ALTEPLASE 1 MG: 2.2 INJECTION, POWDER, LYOPHILIZED, FOR SOLUTION INTRAVENOUS at 13:47

## 2020-09-22 RX ADMIN — PIPERACILLIN SODIUM AND TAZOBACTAM SODIUM 3.38 G: 3; .375 INJECTION, POWDER, LYOPHILIZED, FOR SOLUTION INTRAVENOUS at 05:15

## 2020-09-22 RX ADMIN — METOPROLOL TARTRATE 25 MG: 25 TABLET, FILM COATED ORAL at 09:01

## 2020-09-22 RX ADMIN — PROMETHAZINE HYDROCHLORIDE 12.5 MG: 25 INJECTION INTRAMUSCULAR; INTRAVENOUS at 13:44

## 2020-09-22 RX ADMIN — ACETAMINOPHEN 650 MG: 325 TABLET, FILM COATED ORAL at 02:36

## 2020-09-22 RX ADMIN — SODIUM CHLORIDE: 9 INJECTION, SOLUTION INTRAVENOUS at 19:13

## 2020-09-22 RX ADMIN — Medication 2000 UNITS: at 09:00

## 2020-09-22 RX ADMIN — PIPERACILLIN SODIUM AND TAZOBACTAM SODIUM 3.38 G: 3; .375 INJECTION, POWDER, LYOPHILIZED, FOR SOLUTION INTRAVENOUS at 14:33

## 2020-09-22 RX ADMIN — ENOXAPARIN SODIUM 30 MG: 30 INJECTION SUBCUTANEOUS at 20:03

## 2020-09-22 RX ADMIN — BUSPIRONE HYDROCHLORIDE 15 MG: 15 TABLET ORAL at 20:02

## 2020-09-22 RX ADMIN — CALCIUM GLUCONATE: 94 INJECTION, SOLUTION INTRAVENOUS at 18:11

## 2020-09-22 RX ADMIN — Medication 400 MG: at 09:01

## 2020-09-22 RX ADMIN — I.V. FAT EMULSION 100 ML: 20 EMULSION INTRAVENOUS at 18:11

## 2020-09-22 RX ADMIN — INSULIN LISPRO 1 UNITS: 100 INJECTION, SOLUTION INTRAVENOUS; SUBCUTANEOUS at 00:13

## 2020-09-22 RX ADMIN — POTASSIUM CHLORIDE 10 MEQ: 1500 TABLET, EXTENDED RELEASE ORAL at 20:02

## 2020-09-22 RX ADMIN — PRAZOSIN HYDROCHLORIDE 1 MG: 1 CAPSULE ORAL at 20:02

## 2020-09-22 RX ADMIN — Medication 2 MG: at 09:10

## 2020-09-22 RX ADMIN — Medication 2 MG: at 05:16

## 2020-09-22 RX ADMIN — PIPERACILLIN SODIUM AND TAZOBACTAM SODIUM 3.38 G: 3; .375 INJECTION, POWDER, LYOPHILIZED, FOR SOLUTION INTRAVENOUS at 22:26

## 2020-09-22 RX ADMIN — Medication 10 ML: at 20:04

## 2020-09-22 RX ADMIN — Medication 2 MG: at 21:51

## 2020-09-22 RX ADMIN — ONDANSETRON 4 MG: 2 INJECTION INTRAMUSCULAR; INTRAVENOUS at 17:35

## 2020-09-22 ASSESSMENT — PAIN DESCRIPTION - ORIENTATION
ORIENTATION: RIGHT

## 2020-09-22 ASSESSMENT — PAIN SCALES - GENERAL
PAINLEVEL_OUTOF10: 2
PAINLEVEL_OUTOF10: 2
PAINLEVEL_OUTOF10: 8
PAINLEVEL_OUTOF10: 8
PAINLEVEL_OUTOF10: 3
PAINLEVEL_OUTOF10: 3
PAINLEVEL_OUTOF10: 9
PAINLEVEL_OUTOF10: 2
PAINLEVEL_OUTOF10: 3
PAINLEVEL_OUTOF10: 2
PAINLEVEL_OUTOF10: 4
PAINLEVEL_OUTOF10: 8
PAINLEVEL_OUTOF10: 7
PAINLEVEL_OUTOF10: 8
PAINLEVEL_OUTOF10: 8

## 2020-09-22 ASSESSMENT — PAIN DESCRIPTION - LOCATION
LOCATION: ARM

## 2020-09-22 ASSESSMENT — PAIN DESCRIPTION - PAIN TYPE
TYPE: ACUTE PAIN

## 2020-09-22 NOTE — PLAN OF CARE
Nutrition Problem #1: Altered GI function  Intervention: Food and/or Nutrient Delivery: Continue NPO, Modify Parenteral Nutrition  Nutritional Goals: TPN to meet greater than 75% of estimated nutrition needs

## 2020-09-22 NOTE — PROGRESS NOTES
Comprehensive Nutrition Assessment    Type and Reason for Visit:  Reassess    Nutrition Recommendations/Plan: Continue NPO status. Increase TPN to 55 ml/hr with Clinimix 2/1 Custom with following additives: Na Acetate 20 to 40 mEq, K Acetate 40 to 50 mEq, K Phos 0 to 18 mmol, KCl 0 to 40 mEq, Ca Gluconate 5 to 10 mEq, Mg Sulfate 12 to 14 mEq, no MVI and trace elements. Nutrition Assessment:  Patient had PICC line placed yesterday, started TPN at 41.7 ml/hr. Per Nursing patient will have CT for pancreatitis likely tomorrow with possibilty of discharge after that. Noted patient may be discharged home on TPN with BioScript. Reviewed labs, will increase TPN rate, adjust electrolytes and monitor GI status and labs. Malnutrition Assessment:  Malnutrition Status:  No malnutrition    Context:  Acute Illness     Findings of the 6 clinical characteristics of malnutrition:  Energy Intake:  1 - 75% or less of estimated energy requirements for 7 or more days  Weight Loss:  No significant weight loss     Body Fat Loss:  Unable to assess     Muscle Mass Loss:  Unable to assess    Fluid Accumulation:  1 - Mild Extremities   Strength:  Not Performed    Estimated Daily Nutrient Needs:  Energy (kcal):  2080 kcal based on [place]- [place] Jeor using 1.1 factor; Weight Used for Energy Requirements:  Current     Protein (g):  102-113 gm based on 1.8-2.0 gm/kg of ideal; Weight Used for Protein Requirements:  Ideal          Nutrition Related Findings:  Edema: trace RLE, LLE, +1 RUE, LUE.  GI: Bowel sounds active      Wounds:  None       Current Nutrition Therapies:    Diet NPO Effective Now  PN-Adult 2-in-1 Central Line (Standard)    Anthropometric Measures:  · Height: 5' 5\" (165.1 cm)  · Current Body Weight: 273 lb 2.4 oz (123.9 kg)   · Admission Body Weight: 275 lb 9.2 oz (125 kg)    · Usual Body Weight: 275 lb (124.7 kg)     · Ideal Body Weight: 125 lbs; % Ideal Body Weight 220.5 %   · BMI: 45.5  · BMI Categories: Obese Class 3 (BMI 40.0 or greater)       Nutrition Diagnosis:   · Altered GI function related to altered GI function as evidenced by NPO or clear liquid status due to medical condition, GI abnormality, nausea, vomiting, constipation    Nutrition Interventions:   Food and/or Nutrient Delivery:  Continue NPO, Modify Parenteral Nutrition  Nutrition Education/Counseling:  Education not indicated   Coordination of Nutrition Care:  Continued Inpatient Monitoring    Goals:  TPN to meet greater than 75% of estimated nutrition needs       Nutrition Monitoring and Evaluation:   Food/Nutrient Intake Outcomes:  Diet Advancement/Tolerance, Parenteral Nutrition Intake/Tolerance  Physical Signs/Symptoms Outcomes:  Biochemical Data, Weight, Nausea or Vomiting, Constipation, GI Status     Discharge Planning:    Parenteral Nutrition       Some areas of assessment may be incomplete due to COVID-19 precautions    Joana Abreu RD, LD  Office phone [phone]

## 2020-09-22 NOTE — CARE COORDINATION
PICC purple port clotted unable to flush with 10ml saline. Dr Marcella Samaniego notified and orders received. Jorge Hein

## 2020-09-22 NOTE — FLOWSHEET NOTE
Patient expressed frustration and exhaustion as she gave writer update. 09/22/20 1342   Encounter Summary   Services provided to: Patient   Referral/Consult From: Rounding   Continue Visiting   (9-22-20)   Complexity of Encounter Moderate   Length of Encounter 15 minutes   Routine   Type Follow up   Spiritual/Sikh   Type Spiritual support   Assessment Anxious  (Frustration)   Intervention Active listening;Prayer;Sustaining presence/ Ministry of presence; Discussed illness/injury and it's impact   Outcome Expressed gratitude;Engaged in conversation;Expressed feelings/needs/concerns

## 2020-09-22 NOTE — PLAN OF CARE
Problem: Pain:  Goal: Pain level will decrease  Description: Pain level will decrease  9/22/2020 0542 by Amina French RN  Outcome: Ongoing  Note: Pt pain is controlled with PRN pain medication as needed. 9/21/2020 1704 by Helder Muñoz RN  Outcome: Ongoing  Goal: Control of acute pain  Description: Control of acute pain  9/22/2020 0542 by Amina French RN  Outcome: Ongoing  Note: Pt pain is controlled with PRN pain medication as needed. 9/21/2020 1704 by Helder Muñoz RN  Outcome: Ongoing  Goal: Control of chronic pain  Description: Control of chronic pain  9/22/2020 0542 by Amina French RN  Outcome: Ongoing  Note: Pt pain is controlled with PRN pain medication as needed. 9/21/2020 1704 by Helder Muñoz RN  Outcome: Ongoing     Problem: Falls - Risk of:  Goal: Will remain free from falls  Description: Will remain free from falls  9/22/2020 0542 by Amina French RN  Outcome: Ongoing  Note: No falls this shift. Call light is within reach, side rails up x2, and bed in lowest position. Pt safety is maintained. 9/21/2020 1704 by Helder Muñoz RN  Outcome: Ongoing  Goal: Absence of physical injury  Description: Absence of physical injury  9/22/2020 0542 by Amina French RN  Outcome: Ongoing  Note: No injury this shift. Pt safety maintained. 9/21/2020 1704 by Helder Muñoz RN  Outcome: Ongoing     Problem: Physical Regulation:  Goal: Will remain free from infection  Description: Will remain free from infection  9/22/2020 0542 by Amina French RN  Outcome: Ongoing  Note: Pt remains free from infection. No signs and symptoms of infection.     9/21/2020 1704 by Helder Muñoz RN  Outcome: Ongoing     Problem: Nutrition  Goal: Optimal nutrition therapy  9/22/2020 0542 by Amina French RN  Outcome: Ongoing  Note: Pt is NPO.  9/21/2020 1704 by Helder Muñoz RN  Outcome: Ongoing     Problem: Skin Integrity:  Goal: Will show no infection signs and symptoms  Description: Will show no infection signs and symptoms  9/22/2020 0542 by Octavia Lizama RN  Outcome: Ongoing  Note: Pt remains free from infection. No signs and symptoms of infection. 9/21/2020 1704 by Alicia Diallo RN  Outcome: Ongoing  Goal: Absence of new skin breakdown  Description: Absence of new skin breakdown  9/22/2020 0542 by Octavia Lizama RN  Outcome: Ongoing  Note: Skin assessment as charted.     9/21/2020 1704 by Alicia Diallo RN  Outcome: Ongoing

## 2020-09-22 NOTE — PLAN OF CARE
Problem: Pain:  Goal: Pain level will decrease  Description: Pain level will decrease  9/22/2020 1615 by Orlando Josue RN  Outcome: Ongoing  Note: Pain is managed by prn pain medication as needed. 9/22/2020 0542 by Taylor Cisneros RN  Outcome: Ongoing  Note: Pt pain is controlled with PRN pain medication as needed. Goal: Control of acute pain  Description: Control of acute pain  9/22/2020 1615 by Orlando Josue RN  Outcome: Ongoing  9/22/2020 0542 by Taylor Cisneros RN  Outcome: Ongoing  Note: Pt pain is controlled with PRN pain medication as needed. Goal: Control of chronic pain  Description: Control of chronic pain  9/22/2020 1615 by Orlando Josue RN  Outcome: Ongoing  9/22/2020 0542 by Taylor Cisneros RN  Outcome: Ongoing  Note: Pt pain is controlled with PRN pain medication as needed. Problem: Falls - Risk of:  Goal: Will remain free from falls  Description: Will remain free from falls  9/22/2020 1615 by Orlando Josue RN  Outcome: Ongoing  9/22/2020 0542 by Taylor Cisneros RN  Outcome: Ongoing  Note: No falls this shift. Call light is within reach, side rails up x2, and bed in lowest position. Pt safety is maintained. Goal: Absence of physical injury  Description: Absence of physical injury  9/22/2020 1615 by Orlando Josue RN  Outcome: Ongoing  Note: Patient will remain free of injury and falls this shift. Side rails up x2, call light in reach, bed in lowest position. 9/22/2020 0542 by Taylor Cisneros RN  Outcome: Ongoing  Note: No injury this shift. Pt safety maintained. Problem: Physical Regulation:  Goal: Will remain free from infection  Description: Will remain free from infection  9/22/2020 1615 by Orlando Josue RN  Outcome: Ongoing  9/22/2020 0542 by Taylor Cisneros RN  Outcome: Ongoing  Note: Pt remains free from infection. No signs and symptoms of infection.        Problem: Nutrition  Goal: Optimal nutrition therapy  9/22/2020 1615 by Orlando Josue RN  Outcome: Ongoing  Note: Pt NPO and is receiving TPN  9/22/2020 0542 by Verónica Medina RN  Outcome: Ongoing  Note: Pt is NPO. Problem: Skin Integrity:  Goal: Will show no infection signs and symptoms  Description: Will show no infection signs and symptoms  9/22/2020 1615 by Chaim Mina RN  Outcome: Ongoing  9/22/2020 0542 by Verónica Medina RN  Outcome: Ongoing  Note: Pt remains free from infection. No signs and symptoms of infection. Goal: Absence of new skin breakdown  Description: Absence of new skin breakdown  9/22/2020 1615 by Chaim Mina RN  Outcome: Ongoing  9/22/2020 0542 by Verónica Medina RN  Outcome: Ongoing  Note: Skin assessment as charted.

## 2020-09-22 NOTE — PROGRESS NOTES
[facility] Internal Medicine    Progress Note     9/22/2020    2:20 PM    Name:   Raul Gordon  MRN:     [id number]     Acct:      [de-identified]   Room:   [facility] [place] [address] Day:  6  Admit Date:  9/16/2020  6:22 PM    PCP:   Hans Porter  Code Status:  Full Code    Subjective:     C/C: No chief complaint on file. Principal Problem:    Pancreatic pseudocyst  Active Problems:    Anxiety    Class 3 severe obesity due to excess calories with serious comorbidity in adult (HCC)    HTN (hypertension)    Smoker    History of depression  Resolved Problems:    * No resolved hospital problems. *      Interval History Status: improved.             Significant last 24 hr data reviewed ;   Vitals:    09/21/20 1908 09/22/20 0600 09/22/20 0700 09/22/20 1315   BP: (!) 152/94 138/78 (!) 149/77 (!) 140/83   Pulse: 87 89 95 96   Resp: 16 16 16 16   Temp: 97.7 °F (36.5 °C) 97.7 °F (36.5 °C) 98.2 °F (36.8 °C) 97.7 °F (36.5 °C)   TempSrc: Oral Oral Oral Oral   SpO2: 97% 94% 95%    Weight:       Height:          Recent Results (from the past 24 hour(s))   POC Glucose Fingerstick    Collection Time: 09/21/20 11:15 PM   Result Value Ref Range    POC Glucose 147 (H) 65 - 105 mg/dL   POC Glucose Fingerstick    Collection Time: 09/22/20  5:19 AM   Result Value Ref Range    POC Glucose 133 (H) 65 - 105 mg/dL   POC Glucose Fingerstick    Collection Time: 09/22/20  6:02 AM   Result Value Ref Range    POC Glucose 137 (H) 65 - 105 mg/dL   CBC WITH AUTO DIFFERENTIAL    Collection Time: 09/22/20  7:06 AM   Result Value Ref Range    WBC 7.9 3.5 - 11.0 k/uL    RBC 3.08 (L) 4.0 - 5.2 m/uL    Hemoglobin 9.4 (L) 12.0 - 16.0 g/dL    Hematocrit 28.5 (L) 36 - 46 %    MCV 92.5 80 - 100 fL    MCH 30.4 26 - 34 pg    MCHC 32.9 31 - 37 g/dL    RDW 15.2 (H) 11.5 - 14.9 %    Platelets 602 243 - 954 k/uL    MPV 8.3 6.0 - 12.0 fL    NRBC Automated NOT REPORTED per 100 WBC    Differential Type NOT REPORTED     Seg Neutrophils 54 36 - 66 %    Lymphocytes 30 24 - 44 %    Monocytes 6 1 - 7 %    Eosinophils % 9 (H) 0 - 4 %    Basophils 1 0 - 2 %    Immature Granulocytes NOT REPORTED 0 %    Segs Absolute 4.20 1.3 - 9.1 k/uL    Absolute Lymph # 2.40 1.0 - 4.8 k/uL    Absolute Mono # 0.50 0.1 - 1.3 k/uL    Absolute Eos # 0.70 (H) 0.0 - 0.4 k/uL    Basophils Absolute 0.10 0.0 - 0.2 k/uL    Absolute Immature Granulocyte NOT REPORTED 0.00 - 0.30 k/uL    WBC Morphology NOT REPORTED     RBC Morphology NOT REPORTED     Platelet Estimate NOT REPORTED    Lipase    Collection Time: 09/22/20  7:06 AM   Result Value Ref Range    Lipase 24 13 - 60 U/L   Comprehensive Metabolic Panel    Collection Time: 09/22/20  7:06 AM   Result Value Ref Range    Glucose 151 (H) 70 - 99 mg/dL    BUN 5 (L) 6 - 20 mg/dL    CREATININE 0.61 0.50 - 0.90 mg/dL    Bun/Cre Ratio NOT REPORTED 9 - 20    Calcium 8.6 8.6 - 10.4 mg/dL    Sodium 140 135 - 144 mmol/L    Potassium 3.5 (L) 3.7 - 5.3 mmol/L    Chloride 104 98 - 107 mmol/L    CO2 26 20 - 31 mmol/L    Anion Gap 10 9 - 17 mmol/L    Alkaline Phosphatase 164 (H) 35 - 104 U/L    ALT 40 (H) 5 - 33 U/L    AST 21 <32 U/L    Total Bilirubin <0.15 (L) 0.3 - 1.2 mg/dL    Total Protein 6.6 6.4 - 8.3 g/dL    Alb 3.1 (L) 3.5 - 5.2 g/dL    Albumin/Globulin Ratio NOT REPORTED 1.0 - 2.5    GFR Non-African American >60 >60 mL/min    GFR African American >60 >60 mL/min    GFR Comment          GFR Staging NOT REPORTED    Magnesium    Collection Time: 09/22/20  7:06 AM   Result Value Ref Range    Magnesium 1.9 1.6 - 2.6 mg/dL   Phosphorus    Collection Time: 09/22/20  7:06 AM   Result Value Ref Range    Phosphorus 3.8 2.6 - 4.5 mg/dL   Triglycerides    Collection Time: 09/22/20  7:06 AM   Result Value Ref Range    Triglycerides 131 <150 mg/dL   POC Glucose Fingerstick    Collection Time: 09/22/20 11:25 AM   Result Value Ref Range    POC Glucose 103 65 - 105 mg/dL     Recent Labs     09/21/20  2315 09/22/20  0519 09/22/20  0602 09/22/20  1125 POCGLU 147* 133* 137* 103        No results found. HPI:   See history in H and P      Review of Systems:     Constitutional:  negative for chills, fevers, sweats  Respiratory:  negative for cough, dyspnea on exertion, hemoptysis, shortness of breath, wheezing  Cardiovascular:  negative for chest pain, chest pressure/discomfort, lower extremity edema, palpitations  Gastrointestinal: Positive for appetite loss nausea and abdominal pain  Neurological:  negative for dizziness, headache  Data:     Past Medical History:  no change     Social History:  no change    Family History: @no change    Vitals:      I/O (24Hr):     Intake/Output Summary (Last 24 hours) at 9/22/2020 1420  Last data filed at 9/22/2020 0856  Gross per 24 hour   Intake 1255.32 ml   Output 1900 ml   Net -644.68 ml       Labs:    URINE ANALYSIS: No results found for: LABURIN     CBC:  Lab Results   Component Value Date    WBC 7.9 09/22/2020    HGB 9.4 09/22/2020     09/22/2020        BMP:    Lab Results   Component Value Date     09/22/2020    K 3.5 09/22/2020     09/22/2020    CO2 26 09/22/2020    BUN 5 09/22/2020    CREATININE 0.61 09/22/2020    GLUCOSE 151 09/22/2020      LIVER PROFILE:  Lab Results   Component Value Date    ALT 40 09/22/2020    AST 21 09/22/2020    PROT 6.6 09/22/2020    PROT 7.2 01/23/2015    BILITOT <0.15 09/22/2020    BILIDIR 0.08 09/21/2020    LABALBU 3.1 09/22/2020               Radiology:      Physical Examination:        General appearance:  alert, cooperative and no distress  Mental Status:  oriented to person, place and time and normal affect  Lungs:  clear to auscultation bilaterally, normal effort  Heart:  regular rate and rhythm, no murmur  Abdomen: Soft mild tender no guarding no rigidity, nondistended, normal bowel sounds, no masses, hepatomegaly, splenomegaly  Extremities:  no edema, redness, tenderness in the calves  Skin:  no gross lesions, rashes, induration    Assessment:        Primary Problem  Pancreatic pseudocyst    Active Hospital Problems    Diagnosis Date Noted    History of depression [Z86.59] 09/17/2020    Pancreatic pseudocyst [K86.3] 09/16/2020    Smoker [F17.200] 08/25/2020    HTN (hypertension) [I10] 12/15/2014    Class 3 severe obesity due to excess calories with serious comorbidity in adult [facility]) [E66.01] 01/14/2013    Anxiety [F41.9] 01/14/2013       Plan:        1. Acute severe pancreatitis not tolerating oral getting a PICC line and TPN today on IV morphine for pain control will change to oral Percocet  2. Morbid obesity BMI 45.45  3. Pain is better wants to start oral liquid diet  4. PICC line is done TPN is in process  5. Case discussed with a gastroenterologist plan is for CT abdomen with IV contrast for pancreatic protocol will reevaluate with that lipase is noted 22    Medications: Allergies:     Allergies   Allergen Reactions    Aripiprazole      Bad reaction    Eletriptan      Other reaction(s): Swelling-throat    Hydrocodone-Acetaminophen      Other reaction(s): Unknown    Oxycodone-Acetaminophen      Other reaction(s): Unknown    Sumatriptan Other (See Comments)    Triptans      Other reaction(s): Unknown    Zosyn [Piperacillin Sod-Tazobactam So]      Rash 8/26/20 possibley caused by zosyn    Lamotrigine Rash       Current Meds:   Scheduled Meds:    fat emulsion  100 mL Intravenous Daily    insulin lispro  0-6 Units Subcutaneous Q6H    sodium chloride flush  10 mL Intravenous 2 times per day    busPIRone  15 mg Oral Nightly    Vitamin D  2 tablet Oral Daily    DULoxetine  60 mg Oral Daily    [Held by provider] furosemide  20 mg Oral BID    magnesium oxide  400 mg Oral Daily    melatonin ER  1 mg Oral Daily    metoprolol tartrate  25 mg Oral Daily    pantoprazole  40 mg Oral QAM AC    potassium chloride  10 mEq Oral BID    pramipexole  0.25 mg Oral Daily    prazosin  1 mg Oral Nightly    QUEtiapine  800 mg Oral Nightly    sodium chloride flush  10 mL Intravenous 2 times per day    enoxaparin  30 mg Subcutaneous BID    piperacillin-tazobactam (ZOSYN) 3.375 g in dextrose 5% IVPB extended infusion (mini-bag)  3.375 g Intravenous Q8H     Continuous Infusions:    PN-Adult 2-in-1 Central Line (Standard)      PN-Adult 2-in-1 Central Line (Standard) 41.7 mL/hr at 09/21/20 1806    sodium chloride 75 mL/hr at 09/22/20 0529     PRN Meds: sodium chloride flush, LORazepam, morphine **OR** morphine, albuterol sulfate HFA, sodium chloride flush, potassium chloride **OR** potassium alternative oral replacement **OR** potassium chloride, magnesium sulfate, acetaminophen **OR** acetaminophen, bisacodyl, ondansetron, promethazine (PHENERGAN) in sodium chloride 0.9% IVPB       Ann Rowe MD  9/22/2020  2:20 PM

## 2020-09-22 NOTE — PROGRESS NOTES
[facility]    Gastroenterology Daily Progress Note      Patient:   Terrance Barger   :    1975   Facility:   [facility]  Date:     2020  Consultant:   Jess Willson CNP      SUBJECTIVE  54 y.o. female admitted 2020 with Pancreatic pseudocyst [K86.3] and seen for pancreatitis with pseudocysts. The pt was seen and examined. TPN has been started reports decreased epigastric pain, no emesis. lft's decreased.;afebrile overnight.          OBJECTIVE  Scheduled Meds:   fat emulsion  100 mL Intravenous Daily    insulin lispro  0-6 Units Subcutaneous Q6H    sodium chloride flush  10 mL Intravenous 2 times per day    busPIRone  15 mg Oral Nightly    Vitamin D  2 tablet Oral Daily    DULoxetine  60 mg Oral Daily    [Held by provider] furosemide  20 mg Oral BID    magnesium oxide  400 mg Oral Daily    melatonin ER  1 mg Oral Daily    metoprolol tartrate  25 mg Oral Daily    pantoprazole  40 mg Oral QAM AC    potassium chloride  10 mEq Oral BID    pramipexole  0.25 mg Oral Daily    prazosin  1 mg Oral Nightly    QUEtiapine  800 mg Oral Nightly    sodium chloride flush  10 mL Intravenous 2 times per day    enoxaparin  30 mg Subcutaneous BID    piperacillin-tazobactam (ZOSYN) 3.375 g in dextrose 5% IVPB extended infusion (mini-bag)  3.375 g Intravenous Q8H       Vital Signs:  BP (!) 149/77   Pulse 95   Temp 98.2 °F (36.8 °C) (Oral)   Resp 16   Ht 5' 5\" (1.651 m)   Wt 273 lb 2.4 oz (123.9 kg)   SpO2 95%   BMI 45.45 kg/m²      Physical Exam:     General Appearance: alert and oriented to person, place and time, well-developed and well-nourished, in no acute distress  Skin: warm and dry, no rash or erythema  Head: normocephalic and atraumatic  Eyes: pupils equal, round, and reactive to light, extraocular eye movements intact, conjunctivae normal  ENT: hearing grossly normal bilaterally  Neck: neck supple and non tender without mass, no thyromegaly or thyroid nodules, no cervical lymphadenopathy   Pulmonary/Chest: clear to auscultation bilaterally- no wheezes, rales or rhonchi, normal air movement, no respiratory distress  Cardiovascular: normal rate, regular rhythm, normal S1 and S2, no murmurs, rubs, clicks or gallops, distal pulses intact, no carotid bruits  Abdomen: soft, obese non-tender, non-distended, normal bowel sounds, no masses or organomegaly  Extremities: no cyanosis, clubbing or edema  Musculoskeletal: normal range of motion, no joint swelling, deformity or tenderness  Neurologic: no cranial nerve deficit and muscle strength normal    Lab and Imaging Review     CBC  Recent Labs     09/20/20  0554 09/21/20  0654 09/22/20  0706   WBC 5.8 6.4 7.9   HGB 9.2* 9.4* 9.4*   HCT 27.9* 28.0* 28.5*   MCV 92.0 91.4 92.5    191 188       BMP  Recent Labs     09/20/20  0554 09/21/20  0654 09/22/20  0706    141 140   K 3.4* 3.4* 3.5*   * 106 104   CO2 23 26 26   BUN 4* 4* 5*   CREATININE 0.69 0.72 0.61   GLUCOSE 123* 144* 151*   CALCIUM 8.8 8.9 8.6       LFTS  Recent Labs     09/20/20  0554 09/21/20  0654 09/22/20  0706   ALKPHOS 202* 192* 164*   ALT 58* 46* 40*   AST 29 19 21   PROT 6.3* 6.8 6.6   BILITOT 0.17* <0.15* <0.15*   BILIDIR 0.10 0.08  --    LABALBU 3.0* 3.4* 3.1*       AMYLASE/LIPASE/AMMONIA  Recent Labs     09/20/20  0554 09/21/20  0654 09/22/20  0706   LIPASE 27 25 24     FINDINGS: mri abd 9/18/20   Gallbladder: Status post cholecystectomy.         Bile Ducts:  The intrahepatic and extrahepatic biliary tree is dilated with    the common bile duct measuring up to 9 mm.  No filling defects within the    biliary tree to suggest stones.  No focal strictures identified.  The common    bile duct tapers smoothly to the ampulla.         Pancreatic Duct: The main pancreatic duct is nondilated.         Other:  The pancreatic parenchyma is enlarged and heterogeneous in T2 signal    intensity compatible with acute pancreatitis.  There are few intraparenchymal    T2 hyperintense collections, for example in the pancreatic head measuring up    to 1.4 cm which could represent a pseudocyst or pancreatic necrosis.  There    are multiple peripancreatic fluid collections, for example adjacent the    pancreatic tail at the splenic hilum measuring 4.8 x 2.9 cm.         There is loss of signal on opposed phase imaging involving the hepatic    parenchyma compatible steatosis.  The spleen is enlarged measuring up to 15.5    cm.  The adrenal glands are unremarkable.  Focal cortical scarring in the    right kidney.  Few small bilateral renal cortical cysts.  No hydronephrosis.      The visualized small and large bowel are nondilated.  No significant    lymphadenopathy.  The abdominal aorta is normal in course and caliber.  No    acute soft tissue or osseous abnormality.           Impression    1. Status post cholecystectomy.  No evidence of choledocholithiasis. Dilation of the biliary tree may be related to cholecystectomy status. 2. Findings compatible with acute pancreatitis.  Intrapancreatic T2    hyperintense collections may be related to pseudocysts or focal necrosis. Multiple peripancreatic collections noted, as described above. 3. Hepatic steatosis. 4. Splenomegaly.               ASSESSMENT/PLAN:  1. Pancreatitis with pseudocysts, mri showing continued pancreatitis with pseudocysts verses focal necrosis  -repeat ct with pancreatic protocol ordered for tomorrow  -continue TPN  -pain mgt        This plan was formulated in collaboration with Dr. Rebel Mccarty .     Electronically signed by: ANIBAL Pitt CNP on 9/22/2020 at 7:53 AM

## 2020-09-23 ENCOUNTER — APPOINTMENT (OUTPATIENT)
Dept: CT IMAGING | Age: 45
DRG: 438 | End: 2020-09-23
Attending: INTERNAL MEDICINE
Payer: COMMERCIAL

## 2020-09-23 LAB
ABSOLUTE EOS #: 0.7 K/UL (ref 0–0.4)
ABSOLUTE IMMATURE GRANULOCYTE: ABNORMAL K/UL (ref 0–0.3)
ABSOLUTE LYMPH #: 2.2 K/UL (ref 1–4.8)
ABSOLUTE MONO #: 0.5 K/UL (ref 0.1–1.3)
ALBUMIN SERPL-MCNC: 3.2 G/DL (ref 3.5–5.2)
ALBUMIN/GLOBULIN RATIO: ABNORMAL (ref 1–2.5)
ALP BLD-CCNC: 153 U/L (ref 35–104)
ALT SERPL-CCNC: 34 U/L (ref 5–33)
ANION GAP SERPL CALCULATED.3IONS-SCNC: 10 MMOL/L (ref 9–17)
AST SERPL-CCNC: 21 U/L
BASOPHILS # BLD: 1 % (ref 0–2)
BASOPHILS ABSOLUTE: 0.1 K/UL (ref 0–0.2)
BILIRUB SERPL-MCNC: 0.17 MG/DL (ref 0.3–1.2)
BILIRUBIN DIRECT: <0.08 MG/DL
BILIRUBIN, INDIRECT: ABNORMAL MG/DL (ref 0–1)
BUN BLDV-MCNC: 5 MG/DL (ref 6–20)
BUN/CREAT BLD: ABNORMAL (ref 9–20)
CALCIUM SERPL-MCNC: 8.7 MG/DL (ref 8.6–10.4)
CHLORIDE BLD-SCNC: 102 MMOL/L (ref 98–107)
CO2: 27 MMOL/L (ref 20–31)
CREAT SERPL-MCNC: 0.63 MG/DL (ref 0.5–0.9)
DIFFERENTIAL TYPE: ABNORMAL
EOSINOPHILS RELATIVE PERCENT: 9 % (ref 0–4)
GFR AFRICAN AMERICAN: >60 ML/MIN
GFR NON-AFRICAN AMERICAN: >60 ML/MIN
GFR SERPL CREATININE-BSD FRML MDRD: ABNORMAL ML/MIN/{1.73_M2}
GFR SERPL CREATININE-BSD FRML MDRD: ABNORMAL ML/MIN/{1.73_M2}
GLOBULIN: ABNORMAL G/DL (ref 1.5–3.8)
GLUCOSE BLD-MCNC: 126 MG/DL (ref 65–105)
GLUCOSE BLD-MCNC: 131 MG/DL (ref 65–105)
GLUCOSE BLD-MCNC: 142 MG/DL (ref 70–99)
GLUCOSE BLD-MCNC: 143 MG/DL (ref 65–105)
GLUCOSE BLD-MCNC: 168 MG/DL (ref 65–105)
HCT VFR BLD CALC: 28.2 % (ref 36–46)
HEMOGLOBIN: 9.3 G/DL (ref 12–16)
IMMATURE GRANULOCYTES: ABNORMAL %
LIPASE: 21 U/L (ref 13–60)
LYMPHOCYTES # BLD: 28 % (ref 24–44)
MAGNESIUM: 2.1 MG/DL (ref 1.6–2.6)
MCH RBC QN AUTO: 30.4 PG (ref 26–34)
MCHC RBC AUTO-ENTMCNC: 32.9 G/DL (ref 31–37)
MCV RBC AUTO: 92.3 FL (ref 80–100)
MONOCYTES # BLD: 6 % (ref 1–7)
NRBC AUTOMATED: ABNORMAL PER 100 WBC
PDW BLD-RTO: 15.5 % (ref 11.5–14.9)
PHOSPHORUS: 4.3 MG/DL (ref 2.6–4.5)
PLATELET # BLD: 191 K/UL (ref 150–450)
PLATELET ESTIMATE: ABNORMAL
PMV BLD AUTO: 8.4 FL (ref 6–12)
POTASSIUM SERPL-SCNC: 4 MMOL/L (ref 3.7–5.3)
RBC # BLD: 3.05 M/UL (ref 4–5.2)
RBC # BLD: ABNORMAL 10*6/UL
SARS-COV-2, RAPID: NOT DETECTED
SARS-COV-2: NORMAL
SARS-COV-2: NORMAL
SEG NEUTROPHILS: 56 % (ref 36–66)
SEGMENTED NEUTROPHILS ABSOLUTE COUNT: 4.3 K/UL (ref 1.3–9.1)
SODIUM BLD-SCNC: 139 MMOL/L (ref 135–144)
SOURCE: NORMAL
TOTAL PROTEIN: 6.6 G/DL (ref 6.4–8.3)
WBC # BLD: 7.7 K/UL (ref 3.5–11)
WBC # BLD: ABNORMAL 10*3/UL

## 2020-09-23 PROCEDURE — 6360000002 HC RX W HCPCS: Performed by: NURSE PRACTITIONER

## 2020-09-23 PROCEDURE — 2580000003 HC RX 258: Performed by: NURSE PRACTITIONER

## 2020-09-23 PROCEDURE — 80076 HEPATIC FUNCTION PANEL: CPT

## 2020-09-23 PROCEDURE — 99232 SBSQ HOSP IP/OBS MODERATE 35: CPT | Performed by: INTERNAL MEDICINE

## 2020-09-23 PROCEDURE — 83735 ASSAY OF MAGNESIUM: CPT

## 2020-09-23 PROCEDURE — 6360000004 HC RX CONTRAST MEDICATION: Performed by: NURSE PRACTITIONER

## 2020-09-23 PROCEDURE — 6370000000 HC RX 637 (ALT 250 FOR IP): Performed by: NURSE PRACTITIONER

## 2020-09-23 PROCEDURE — 2500000003 HC RX 250 WO HCPCS: Performed by: INTERNAL MEDICINE

## 2020-09-23 PROCEDURE — 6360000002 HC RX W HCPCS: Performed by: INTERNAL MEDICINE

## 2020-09-23 PROCEDURE — 36415 COLL VENOUS BLD VENIPUNCTURE: CPT

## 2020-09-23 PROCEDURE — 82947 ASSAY GLUCOSE BLOOD QUANT: CPT

## 2020-09-23 PROCEDURE — 99233 SBSQ HOSP IP/OBS HIGH 50: CPT | Performed by: INTERNAL MEDICINE

## 2020-09-23 PROCEDURE — APPNB30 APP NON BILLABLE TIME 0-30 MINS: Performed by: NURSE PRACTITIONER

## 2020-09-23 PROCEDURE — 2580000003 HC RX 258: Performed by: RADIOLOGY

## 2020-09-23 PROCEDURE — 1200000000 HC SEMI PRIVATE

## 2020-09-23 PROCEDURE — 80048 BASIC METABOLIC PNL TOTAL CA: CPT

## 2020-09-23 PROCEDURE — 84100 ASSAY OF PHOSPHORUS: CPT

## 2020-09-23 PROCEDURE — 85025 COMPLETE CBC W/AUTO DIFF WBC: CPT

## 2020-09-23 PROCEDURE — 83690 ASSAY OF LIPASE: CPT

## 2020-09-23 PROCEDURE — U0002 COVID-19 LAB TEST NON-CDC: HCPCS

## 2020-09-23 PROCEDURE — 6370000000 HC RX 637 (ALT 250 FOR IP): Performed by: INTERNAL MEDICINE

## 2020-09-23 PROCEDURE — 74177 CT ABD & PELVIS W/CONTRAST: CPT

## 2020-09-23 RX ORDER — LORAZEPAM 1 MG/1
1 TABLET ORAL 2 TIMES DAILY PRN
Status: DISCONTINUED | OUTPATIENT
Start: 2020-09-23 | End: 2020-09-24 | Stop reason: HOSPADM

## 2020-09-23 RX ORDER — 0.9 % SODIUM CHLORIDE 0.9 %
80 INTRAVENOUS SOLUTION INTRAVENOUS ONCE
Status: COMPLETED | OUTPATIENT
Start: 2020-09-23 | End: 2020-09-23

## 2020-09-23 RX ORDER — LANOLIN ALCOHOL/MO/W.PET/CERES
3 CREAM (GRAM) TOPICAL NIGHTLY
Status: DISCONTINUED | OUTPATIENT
Start: 2020-09-23 | End: 2020-09-24 | Stop reason: HOSPADM

## 2020-09-23 RX ORDER — SODIUM CHLORIDE 0.9 % (FLUSH) 0.9 %
10 SYRINGE (ML) INJECTION PRN
Status: DISCONTINUED | OUTPATIENT
Start: 2020-09-23 | End: 2020-09-24 | Stop reason: HOSPADM

## 2020-09-23 RX ORDER — LORAZEPAM 1 MG/1
1 TABLET ORAL 2 TIMES DAILY
Status: DISCONTINUED | OUTPATIENT
Start: 2020-09-23 | End: 2020-09-23

## 2020-09-23 RX ADMIN — CALCIUM GLUCONATE: 94 INJECTION, SOLUTION INTRAVENOUS at 17:26

## 2020-09-23 RX ADMIN — SODIUM CHLORIDE: 9 INJECTION, SOLUTION INTRAVENOUS at 22:22

## 2020-09-23 RX ADMIN — DULOXETINE HYDROCHLORIDE 60 MG: 60 CAPSULE, DELAYED RELEASE ORAL at 08:54

## 2020-09-23 RX ADMIN — Medication 2 MG: at 01:46

## 2020-09-23 RX ADMIN — Medication 400 MG: at 08:54

## 2020-09-23 RX ADMIN — ACETAMINOPHEN 650 MG: 325 TABLET, FILM COATED ORAL at 03:14

## 2020-09-23 RX ADMIN — SODIUM CHLORIDE: 9 INJECTION, SOLUTION INTRAVENOUS at 08:55

## 2020-09-23 RX ADMIN — Medication 3 MG: at 21:34

## 2020-09-23 RX ADMIN — PRAZOSIN HYDROCHLORIDE 1 MG: 1 CAPSULE ORAL at 21:40

## 2020-09-23 RX ADMIN — PANTOPRAZOLE SODIUM 40 MG: 40 TABLET, DELAYED RELEASE ORAL at 06:22

## 2020-09-23 RX ADMIN — POTASSIUM CHLORIDE 10 MEQ: 1500 TABLET, EXTENDED RELEASE ORAL at 21:34

## 2020-09-23 RX ADMIN — IOVERSOL 100 ML: 741 INJECTION INTRA-ARTERIAL; INTRAVENOUS at 10:02

## 2020-09-23 RX ADMIN — QUETIAPINE FUMARATE 800 MG: 400 TABLET, EXTENDED RELEASE ORAL at 21:41

## 2020-09-23 RX ADMIN — Medication 2 MG: at 13:51

## 2020-09-23 RX ADMIN — POTASSIUM CHLORIDE 10 MEQ: 1500 TABLET, EXTENDED RELEASE ORAL at 08:54

## 2020-09-23 RX ADMIN — SODIUM CHLORIDE 80 ML: 9 INJECTION, SOLUTION INTRAVENOUS at 10:02

## 2020-09-23 RX ADMIN — Medication 10 ML: at 10:03

## 2020-09-23 RX ADMIN — DULOXETINE HYDROCHLORIDE 60 MG: 60 CAPSULE, DELAYED RELEASE ORAL at 21:34

## 2020-09-23 RX ADMIN — INSULIN LISPRO 1 UNITS: 100 INJECTION, SOLUTION INTRAVENOUS; SUBCUTANEOUS at 11:54

## 2020-09-23 RX ADMIN — Medication 2000 UNITS: at 08:54

## 2020-09-23 RX ADMIN — PIPERACILLIN SODIUM AND TAZOBACTAM SODIUM 3.38 G: 3; .375 INJECTION, POWDER, LYOPHILIZED, FOR SOLUTION INTRAVENOUS at 06:22

## 2020-09-23 RX ADMIN — ENOXAPARIN SODIUM 30 MG: 30 INJECTION SUBCUTANEOUS at 08:54

## 2020-09-23 RX ADMIN — LORAZEPAM 1 MG: 1 TABLET ORAL at 15:08

## 2020-09-23 RX ADMIN — Medication 10 ML: at 21:41

## 2020-09-23 RX ADMIN — INSULIN LISPRO 1 UNITS: 100 INJECTION, SOLUTION INTRAVENOUS; SUBCUTANEOUS at 00:10

## 2020-09-23 RX ADMIN — Medication 10 ML: at 08:54

## 2020-09-23 RX ADMIN — Medication 2 MG: at 17:50

## 2020-09-23 RX ADMIN — METOPROLOL TARTRATE 25 MG: 25 TABLET, FILM COATED ORAL at 08:54

## 2020-09-23 RX ADMIN — ENOXAPARIN SODIUM 30 MG: 30 INJECTION SUBCUTANEOUS at 21:34

## 2020-09-23 RX ADMIN — PIPERACILLIN SODIUM AND TAZOBACTAM SODIUM 3.38 G: 3; .375 INJECTION, POWDER, LYOPHILIZED, FOR SOLUTION INTRAVENOUS at 13:51

## 2020-09-23 RX ADMIN — I.V. FAT EMULSION 100 ML: 20 EMULSION INTRAVENOUS at 17:26

## 2020-09-23 RX ADMIN — Medication 2 MG: at 09:40

## 2020-09-23 RX ADMIN — LORAZEPAM 1 MG: 1 TABLET ORAL at 21:34

## 2020-09-23 RX ADMIN — PROMETHAZINE HYDROCHLORIDE 12.5 MG: 25 INJECTION INTRAMUSCULAR; INTRAVENOUS at 11:51

## 2020-09-23 RX ADMIN — PRAMIPEXOLE DIHYDROCHLORIDE 0.25 MG: 0.25 TABLET ORAL at 08:54

## 2020-09-23 RX ADMIN — PIPERACILLIN SODIUM AND TAZOBACTAM SODIUM 3.38 G: 3; .375 INJECTION, POWDER, LYOPHILIZED, FOR SOLUTION INTRAVENOUS at 21:41

## 2020-09-23 RX ADMIN — Medication 2 MG: at 05:51

## 2020-09-23 RX ADMIN — Medication 2 MG: at 22:20

## 2020-09-23 RX ADMIN — ACETAMINOPHEN 650 MG: 325 TABLET, FILM COATED ORAL at 15:35

## 2020-09-23 RX ADMIN — BUSPIRONE HYDROCHLORIDE 15 MG: 15 TABLET ORAL at 21:34

## 2020-09-23 ASSESSMENT — PAIN SCALES - GENERAL
PAINLEVEL_OUTOF10: 9
PAINLEVEL_OUTOF10: 7
PAINLEVEL_OUTOF10: 4
PAINLEVEL_OUTOF10: 0
PAINLEVEL_OUTOF10: 8
PAINLEVEL_OUTOF10: 8
PAINLEVEL_OUTOF10: 5
PAINLEVEL_OUTOF10: 7
PAINLEVEL_OUTOF10: 2
PAINLEVEL_OUTOF10: 9
PAINLEVEL_OUTOF10: 4
PAINLEVEL_OUTOF10: 9
PAINLEVEL_OUTOF10: 8
PAINLEVEL_OUTOF10: 8

## 2020-09-23 ASSESSMENT — PAIN DESCRIPTION - LOCATION
LOCATION: GENERALIZED
LOCATION: ARM
LOCATION: ABDOMEN
LOCATION: GENERALIZED
LOCATION: ARM
LOCATION: GENERALIZED
LOCATION: ABDOMEN
LOCATION: ABDOMEN
LOCATION: GENERALIZED

## 2020-09-23 ASSESSMENT — PAIN DESCRIPTION - ORIENTATION
ORIENTATION: UPPER
ORIENTATION: RIGHT
ORIENTATION: RIGHT

## 2020-09-23 ASSESSMENT — PAIN DESCRIPTION - PAIN TYPE
TYPE: ACUTE PAIN

## 2020-09-23 ASSESSMENT — PAIN DESCRIPTION - DESCRIPTORS
DESCRIPTORS: CONSTANT
DESCRIPTORS: SHARP;STABBING
DESCRIPTORS: SHARP

## 2020-09-23 ASSESSMENT — PAIN DESCRIPTION - FREQUENCY: FREQUENCY: CONTINUOUS

## 2020-09-23 NOTE — PLAN OF CARE
Ct abd discussed with radiologist by dr Virgilio Gupta. Recommend transfer to [address] [place] [address] due to patchy/moderate necrosis of pancreas head and body lesser degree in tail. Keep pt NPO with TPN. Primary RN notified; instructed to notify dr Meng Andrew . Eileen Chawla, APRN - CNP

## 2020-09-23 NOTE — PROGRESS NOTES
[facility] [address]                 PATIENT NAME: Kendy Kaur     TODAY'S DATE: 9/23/2020, 12:09 PM    SUBJECTIVE:    Pt seen and examined. Afebrile, VSS. LFT's improved, lipase WNL. Patient states abdominal pain is unchanged. Bowels are moving. States she had mild nausea this morning but no vomiting. Repeat CT abdomen/pelvis for this morning. OBJECTIVE:   VITALS:  BP (!) 148/91   Pulse 84   Temp 97.5 °F (36.4 °C) (Oral)   Resp 17   Ht 5' 5\" (1.651 m)   Wt 273 lb 2.4 oz (123.9 kg)   SpO2 95%   BMI 45.45 kg/m²      INTAKE/OUTPUT:      Intake/Output Summary (Last 24 hours) at 9/23/2020 1209  Last data filed at 9/23/2020 0739  Gross per 24 hour   Intake 4316.5 ml   Output 2450 ml   Net 1866.5 ml                 CONSTITUTIONAL:  awake and alert.   No acute distress  HEART:   RRR  LUNGS:   Decreased air entry at bases  ABDOMEN:   Abdomen soft, epigastric tender, non-distended  EXTREMITIES:   Trace pedal edema    Data:  CBC:   Lab Results   Component Value Date    WBC 7.7 09/23/2020    RBC 3.05 09/23/2020    HGB 9.3 09/23/2020    HCT 28.2 09/23/2020    MCV 92.3 09/23/2020    MCH 30.4 09/23/2020    MCHC 32.9 09/23/2020    RDW 15.5 09/23/2020     09/23/2020    MPV 8.4 09/23/2020     BMP:    Lab Results   Component Value Date     09/23/2020    K 4.0 09/23/2020     09/23/2020    CO2 27 09/23/2020    BUN 5 09/23/2020    LABALBU 3.2 09/23/2020    CREATININE 0.63 09/23/2020    CALCIUM 8.7 09/23/2020    GFRAA >60 09/23/2020    LABGLOM >60 09/23/2020    GLUCOSE 142 09/23/2020     Hepatic Function Panel:    Lab Results   Component Value Date    ALKPHOS 153 09/23/2020    ALT 34 09/23/2020    AST 21 09/23/2020    PROT 6.6 09/23/2020    PROT 7.2 01/23/2015    BILITOT 0.17 09/23/2020    BILIDIR <0.08 09/23/2020    IBILI CANNOT BE CALCULATED 09/23/2020    LABALBU 3.2 09/23/2020     LIPASE:    Lab Results   Component Value Date    LIPASE 21 09/23/2020       Radiology Review:  No new images to review, awaiting CT      ASSESSMENT     Principal Problem:    Pancreatic pseudocyst  Active Problems:    Anxiety    Class 3 severe obesity due to excess calories with serious comorbidity in adult (HCC)    HTN (hypertension)    Smoker    History of depression  Resolved Problems:    * No resolved hospital problems. *      Plan  1. NPO  2. Continue TPN  3. LFT's and lipase improving  4. Repeat CT abdomen/pelvis today  5. Surgically stable  6. Continue medical management  7. Discharge planning  8. Patient is on TPN. Repeat CT scan showing evidence of evolving pancreatitis poor enhancement consistent with necrosis. Possibly splenic vein thrombosis. 9. I was told patient is being transferred to [facility] clinic for further care. Awaiting bed placement. Continue TPN.       Electronically signed by Last Velasco PA-C  [id number] [address]

## 2020-09-23 NOTE — PLAN OF CARE
Problem: Pain:  Goal: Pain level will decrease  Description: Pain level will decrease  Outcome: Ongoing  Note: Pt pain is controlled with PRN pain medication as needed. Goal: Control of acute pain  Description: Control of acute pain  Outcome: Ongoing  Note: Pt pain is controlled with PRN pain medication as needed. Goal: Control of chronic pain  Description: Control of chronic pain  Outcome: Ongoing  Note: Pt pain is controlled with PRN pain medication as needed. Problem: Falls - Risk of:  Goal: Will remain free from falls  Description: Will remain free from falls  Outcome: Ongoing  Note: No falls this shift. Call light is within reach, side rails up x2, and bed in lowest position. Pt safety is maintained. Goal: Absence of physical injury  Description: Absence of physical injury  Outcome: Ongoing  Note: No injury this shift. Pt safety maintained. Problem: Physical Regulation:  Goal: Will remain free from infection  Description: Will remain free from infection  Outcome: Ongoing     Problem: Nutrition  Goal: Optimal nutrition therapy  Outcome: Ongoing  Note: Pt is NPO. Problem: Skin Integrity:  Goal: Will show no infection signs and symptoms  Description: Will show no infection signs and symptoms  Outcome: Ongoing  Note: Pt remains free from infection. No signs and symptoms of infection. Goal: Absence of new skin breakdown  Description: Absence of new skin breakdown  Outcome: Ongoing  Note: Skin assessment as charted.

## 2020-09-23 NOTE — PROGRESS NOTES
[facility] Internal Medicine    Progress Note     9/23/2020    11:57 AM    Name:   Evert Lai  MRN:     [id number]     Acct:      [de-identified]   Room:   204/2047[facility] Day:  7  Admit Date:  9/16/2020  6:22 PM    PCP:   Zaria Guzman  Code Status:  Full Code    Subjective:     C/C: No chief complaint on file. Principal Problem:    Pancreatic pseudocyst  Active Problems:    Anxiety    Class 3 severe obesity due to excess calories with serious comorbidity in adult (HCC)    HTN (hypertension)    Smoker    History of depression  Resolved Problems:    * No resolved hospital problems. *      Interval History Status: improved.             Significant last 24 hr data reviewed ;   Vitals:    09/22/20 1315 09/22/20 1921 09/23/20 0748 09/23/20 1130   BP: (!) 140/83 (!) 164/94 (!) 171/99 (!) 148/91   Pulse: 96 94 104 84   Resp: 16 19 20 17   Temp: 97.7 °F (36.5 °C) 98.1 °F (36.7 °C) 97.9 °F (36.6 °C) 97.5 °F (36.4 °C)   TempSrc: Oral Axillary Oral Oral   SpO2:  95% 96% 95%   Weight:       Height:          Recent Results (from the past 24 hour(s))   POC Glucose Fingerstick    Collection Time: 09/22/20  4:09 PM   Result Value Ref Range    POC Glucose 136 (H) 65 - 105 mg/dL   POC Glucose Fingerstick    Collection Time: 09/23/20 12:01 AM   Result Value Ref Range    POC Glucose 168 (H) 65 - 105 mg/dL   Basic Metabolic Panel w/ Reflex to MG    Collection Time: 09/23/20  5:38 AM   Result Value Ref Range    Glucose 142 (H) 70 - 99 mg/dL    BUN 5 (L) 6 - 20 mg/dL    CREATININE 0.63 0.50 - 0.90 mg/dL    Bun/Cre Ratio NOT REPORTED 9 - 20    Calcium 8.7 8.6 - 10.4 mg/dL    Sodium 139 135 - 144 mmol/L    Potassium 4.0 3.7 - 5.3 mmol/L    Chloride 102 98 - 107 mmol/L    CO2 27 20 - 31 mmol/L    Anion Gap 10 9 - 17 mmol/L    GFR Non-African American >60 >60 mL/min    GFR African American >60 >60 mL/min    GFR Comment          GFR Staging NOT REPORTED    CBC WITH AUTO DIFFERENTIAL    Collection Time: 09/23/20  5:38 AM   Result Value Ref Range    WBC 7.7 3.5 - 11.0 k/uL    RBC 3.05 (L) 4.0 - 5.2 m/uL    Hemoglobin 9.3 (L) 12.0 - 16.0 g/dL    Hematocrit 28.2 (L) 36 - 46 %    MCV 92.3 80 - 100 fL    MCH 30.4 26 - 34 pg    MCHC 32.9 31 - 37 g/dL    RDW 15.5 (H) 11.5 - 14.9 %    Platelets 487 363 - 399 k/uL    MPV 8.4 6.0 - 12.0 fL    NRBC Automated NOT REPORTED per 100 WBC    Differential Type NOT REPORTED     Seg Neutrophils 56 36 - 66 %    Lymphocytes 28 24 - 44 %    Monocytes 6 1 - 7 %    Eosinophils % 9 (H) 0 - 4 %    Basophils 1 0 - 2 %    Immature Granulocytes NOT REPORTED 0 %    Segs Absolute 4.30 1.3 - 9.1 k/uL    Absolute Lymph # 2.20 1.0 - 4.8 k/uL    Absolute Mono # 0.50 0.1 - 1.3 k/uL    Absolute Eos # 0.70 (H) 0.0 - 0.4 k/uL    Basophils Absolute 0.10 0.0 - 0.2 k/uL    Absolute Immature Granulocyte NOT REPORTED 0.00 - 0.30 k/uL    WBC Morphology NOT REPORTED     RBC Morphology NOT REPORTED     Platelet Estimate NOT REPORTED    Lipase    Collection Time: 09/23/20  5:38 AM   Result Value Ref Range    Lipase 21 13 - 60 U/L   Liver Profile    Collection Time: 09/23/20  5:38 AM   Result Value Ref Range    Alb 3.2 (L) 3.5 - 5.2 g/dL    Alkaline Phosphatase 153 (H) 35 - 104 U/L    ALT 34 (H) 5 - 33 U/L    AST 21 <32 U/L    Total Bilirubin 0.17 (L) 0.3 - 1.2 mg/dL    Bilirubin, Direct <0.08 <0.31 mg/dL    Bilirubin, Indirect CANNOT BE CALCULATED 0.00 - 1.00 mg/dL    Total Protein 6.6 6.4 - 8.3 g/dL    Globulin NOT REPORTED 1.5 - 3.8 g/dL    Albumin/Globulin Ratio NOT REPORTED 1.0 - 2.5   Magnesium    Collection Time: 09/23/20  5:38 AM   Result Value Ref Range    Magnesium 2.1 1.6 - 2.6 mg/dL   Phosphorus    Collection Time: 09/23/20  5:38 AM   Result Value Ref Range    Phosphorus 4.3 2.6 - 4.5 mg/dL   POC Glucose Fingerstick    Collection Time: 09/23/20  6:30 AM   Result Value Ref Range    POC Glucose 131 (H) 65 - 105 mg/dL   POC Glucose Fingerstick    Collection Time: 09/23/20 11:30 AM   Result Value Ref Range splenomegaly  Extremities:  no edema, redness, tenderness in the calves  Skin:  no gross lesions, rashes, induration    Assessment:        Primary Problem  Pancreatic pseudocyst    Active Hospital Problems    Diagnosis Date Noted    History of depression [Z86.59] 09/17/2020    Pancreatic pseudocyst [K86.3] 09/16/2020    Smoker [F17.200] 08/25/2020    HTN (hypertension) [I10] 12/15/2014    Class 3 severe obesity due to excess calories with serious comorbidity in adult [facility]) [E66.01] 01/14/2013    Anxiety [F41.9] 01/14/2013       Plan:        1. Acute severe pancreatitis not tolerating oral getting a PICC line and TPN today on IV morphine for pain control will change to oral Percocet  2. Morbid obesity BMI 45.45  3. Pain is better wants to start oral liquid diet  4. PICC line is done TPN is in process  5. Case discussed with a gastroenterologist plan is for CT abdomen with IV contrast for pancreatic protocol will reevaluate with that lipase is noted 22  6. Ct noted      Medications: Allergies:     Allergies   Allergen Reactions    Aripiprazole      Bad reaction    Eletriptan      Other reaction(s): Swelling-throat    Hydrocodone-Acetaminophen      Other reaction(s): Unknown    Oxycodone-Acetaminophen      Other reaction(s): Unknown    Sumatriptan Other (See Comments)    Triptans      Other reaction(s): Unknown    Zosyn [Piperacillin Sod-Tazobactam So]      Rash 8/26/20 possibley caused by zosyn    Lamotrigine Rash       Current Meds:   Scheduled Meds:    melatonin  3 mg Oral Nightly    DULoxetine  60 mg Oral BID    fat emulsion  100 mL Intravenous Daily    insulin lispro  0-6 Units Subcutaneous Q6H    sodium chloride flush  10 mL Intravenous 2 times per day    busPIRone  15 mg Oral Nightly    Vitamin D  2 tablet Oral Daily    [Held by provider] furosemide  20 mg Oral BID    magnesium oxide  400 mg Oral Daily    metoprolol tartrate  25 mg Oral Daily    pantoprazole  40 mg Oral QAM AC    potassium chloride  10 mEq Oral BID    pramipexole  0.25 mg Oral Daily    prazosin  1 mg Oral Nightly    QUEtiapine  800 mg Oral Nightly    sodium chloride flush  10 mL Intravenous 2 times per day    enoxaparin  30 mg Subcutaneous BID    piperacillin-tazobactam (ZOSYN) 3.375 g in dextrose 5% IVPB extended infusion (mini-bag)  3.375 g Intravenous Q8H     Continuous Infusions:    PN-Adult 2-in-1 Central Line (Standard) 55 mL/hr at 09/22/20 1811    dextrose      sodium chloride 75 mL/hr at 09/23/20 0855     PRN Meds: sodium chloride flush, glucose, dextrose, glucagon (rDNA), dextrose, sodium chloride flush, LORazepam, morphine **OR** morphine, albuterol sulfate HFA, sodium chloride flush, potassium chloride **OR** potassium alternative oral replacement **OR** potassium chloride, magnesium sulfate, acetaminophen **OR** acetaminophen, bisacodyl, ondansetron, promethazine (PHENERGAN) in sodium chloride 0.9% IVPB       Sumaya Hensley MD  9/23/2020  11:57 AM

## 2020-09-23 NOTE — FLOWSHEET NOTE
Writer received [employer] message that patient wanted to complete POA paperwork; explained and executed both HCPOA and LW documents with patient; patient's HCPOAs are:  Valentin Ryder (friend) and Ulices Huertas (mother); copies of these documents were placed in patient's chart; patient is worried and scared as she talked about her medical issues and her transfer to [facility]; patient welcomed prayer; listening presence and support;     09/23/20 1822   Encounter Summary   Services provided to: Patient   Referral/Consult From: Patient   Support System Parent;Friends/neighbors   Continue Visiting   (9/23/20)   Complexity of Encounter Moderate   Length of Encounter 30 minutes   Spiritual Assessment Completed Yes   Advance Care Planning Yes   Grief and Life Adjustment   Type Adjustment to illness   Assessment Approachable; Anxious; Fearful; Hopeful;Coping;Helplessness   Intervention Active listening;Explored feelings, thoughts, concerns;Prayer;Sustaining presence/ Ministry of presence; Discussed illness/injury and it's impact   Outcome Expressed gratitude;Engaged in conversation;Expressed feelings/needs/concerns;Coping; Hopeful;Receptive   Advance Directives (For Healthcare)   Healthcare Directive Yes, patient has an advance directive for healthcare treatment   Type of Healthcare Directive Durable power of  for health care   Copy in Chart Yes, copy in chart   Chart Copy Status : Active;Current   Date Reviewed and Current: 09/23/20   Mariam Pitts & Hattie Bateman,[address]698 power of    Healthcare Agent's Name Nae Agent's Phone Number [phone]

## 2020-09-23 NOTE — PLAN OF CARE
Will discuss repeat ct abd results with md      FINDINGS: ct abd 9/23/20   Lower Chest: Trace pleural effusions and dependent atelectasis again    demonstrated and without appreciable change since recent MRI.         Organs: Findings of pancreatitis are again demonstrated with persistent    peripancreatic inflammatory change.  Organizing collections of fluid    involving the body and tail the pancreas are present measuring up to 3.8 x    2.9 cm near the splenic hilum.  Inflammatory change does abut the adjacent    stomach.  No extraluminal gas identified. Carlee Rich is poor enhancement and    visualized architecture of the underlying pancreatic parenchyma in the body    and tail, consistent with necrosis.  No pancreatic duct dilatation the    entirety of the splenic vein is not visualized and small perisplenic    collaterals are noted.  The splenic artery is normal in contour.         Findings consistent with hepatic steatosis.  Status post cholecystectomy.  No    biliary dilatation identified.         The adrenals and kidneys reveal no acute findings.  Scar in the anterior    lower pole the right kidney and intraparenchymal right renal cyst again    demonstrated.         GI/Bowel:  The visualized bowel is normal in caliber.  No discrete bowel wall    thickening identified.         Pelvis: No acute abnormality identified.  Fibroid uterus is noted.  Tubal    occlusion devices present.         Peritoneum/Retroperitoneum: No free air.  No significant free fluid.  The    aorta is normal in caliber.  The visceral branches are patent.  Few small    retroperitoneal lymph nodes are again demonstrated and without appreciable    change.         Bones/Soft Tissues: No acute abnormality identified.  Degenerative disc    disease and facet arthropathy most notable in the lower lumbar spine.              Impression    1.  Findings of evolving pancreatitis are again demonstrated with small    organizing fluid collections involving the body and tail.  Overall decrease    in the amount of abdominopelvic fluid.  Poor enhancement and architectural    detail of the body and tail the pancreas is consistent with necrosis.         2.  Poor visualization of the entirety of the splenic vein and    collateralization, suggestive of occlusion.         3.  Trace pleural effusions appears without significant change.                .. Hannah Chavez, APRN - CNP

## 2020-09-23 NOTE — ACP (ADVANCE CARE PLANNING)
Advance Care Planning     Advance Care Planning Activator (Inpatient)  Conversation Note      Date of ACP Conversation: 9/23/2020    Conversation Conducted with: Patient with Decision Making Capacity    ACP Activator: Parag Stroud. Amelie Win    *When Decision Maker makes decisions on behalf of the incapacitated patient: Decision Maker is asked to consider and make decisions based on patient values, known preferences, or best interests. Health Care Decision Maker:     Current Designated Health Care Decision Maker:   Primary Decision Maker: Anika Huynh - [phone]    Secondary Decision Maker: Rachael De Los Santos - [phone]  (If there is a valid Health Care Decision Maker named in the \"Healthcare Decision Makers\" box in the ACP activity, but it is not visible above, be sure to open that field and then select the health care decision maker relationship (ie \"primary\") in the blank space to the right of the name.) Validate  this information as still accurate & up-to-date; edit PriceAdvice 8 field as needed.)    Note: Assess and validate information in current ACP documents, as indicated. ICare Preferences    Ventilation: \"If you were in your present state of health and suddenly became very ill and were unable to breathe on your own, what would your preference be about the use of a ventilator (breathing machine) if it were available to you? \"      Would the patient desire the use of ventilator (breathing machine)?: Unknown    \"If your health worsens and it becomes clear that your chance of recovery is unlikely, what would your preference be about the use of a ventilator (breathing machine) if it were available to you? \"     Would the patient desire the use of ventilator (breathing machine)?: Unknown      Resuscitation  \"CPR works best to restart the heart when there is a sudden event, like a heart attack, in someone who is otherwise healthy.  Unfortunately, CPR does not typically restart the heart for people who have serious health conditions or who are very sick. \" Unknown    \"In the event your heart stopped as a result of an underlying serious health condition, would you want attempts to be made to restart your heart (answer \"yes\" for attempt to resuscitate) or would you prefer a natural death (answer \"no\" for do not attempt to resuscitate)? \" Unknown      NOTE: If the patient has a valid advance directive AND now provides care preference(s) that are inconsistent with that prior directive, advise the patient to consider either: creating a new advance directive that complies with state-specific requirements; or, if that is not possible, orally revoking that prior directive in accordance with state-specific requirements, which must be documented in the EHR. [] Yes   [x] No   Educated Patient / Alline Marquez regarding differences between Advance Directives and portable DNR orders.     Length of ACP Conversation in minutes:      Conversation Outcomes:  [] ACP discussion completed  [] Existing advance directive reviewed with patient; no changes to patient's previously recorded wishes  [x] New Advance Directive completed  [] Portable Do Not Rescitate prepared for Provider review and signature  [] POLST/POST/MOLST/MOST prepared for Provider review and signature

## 2020-09-23 NOTE — CARE COORDINATION
DISCHARGE PLANNING NOTE:    Attempted to speak with patient regarding discharge plans, however she was off the floor for CT Abd/Pelvis. Plan is for patient to resume services with [facility] and [facility] is following for TPN (covered at 100%). Still need order for TPN and glucometer/supplies. Active order for IV Zosyn, TPN, and NPO. Will continue to follow for additional discharge needs. Electronically signed by Chris Lomeli RN on 9/23/2020 at 10:05 AM    Spoke with Erin Marin at [facility]. She states an order for \"pharmacy to recommend and manage\" along with current TPN orders are needed. Fax to [phone].     Electronically signed by Chris Lomeli RN on 9/23/2020 at 11:16 AM

## 2020-09-23 NOTE — PROGRESS NOTES
Notified Dr. Jocelyne Walters regarding Dr. Randol Moritz wanting to transfer patient to [facility]. Dr. Jocelyne Walters is ok with transfer.

## 2020-09-23 NOTE — PROGRESS NOTES
Patient was seen and examined. Afebrile vital signs are stable. Blood work reviewed. On TPN. TPN was approved at home. Abdomen is soft. Nonspecific tenderness. Extremity nontender. Repeat CT scan of the abdomen per GI tomorrow and then potentially discharge tomorrow. Late entry. no

## 2020-09-23 NOTE — PLAN OF CARE
[facility] requesting new covid test prior to any transfer, stat covid ordered . Cande Avery, APRN - CNP

## 2020-09-23 NOTE — PLAN OF CARE
Problem: Pain:  Goal: Pain level will decrease  Description: Pain level will decrease  9/23/2020 1801 by Yuko Lopez RN  Outcome: Ongoing  Note: Patients pain level decreased with prescribed pain medications. 9/23/2020 0737 by Chayo Grande RN  Outcome: Ongoing  Note: Pt pain is controlled with PRN pain medication as needed. Goal: Control of acute pain  Description: Control of acute pain  9/23/2020 1801 by Yuko Lopez RN  Outcome: Ongoing  Note: Patients pain level decreased with prescribed pain medications. 9/23/2020 0737 by Chayo Grande RN  Outcome: Ongoing  Note: Pt pain is controlled with PRN pain medication as needed. Goal: Control of chronic pain  Description: Control of chronic pain  9/23/2020 1801 by Yuko Lopez RN  Outcome: Ongoing  Note: Patients pain level decreased with prescribed pain medications. 9/23/2020 0737 by Chayo Grande RN  Outcome: Ongoing  Note: Pt pain is controlled with PRN pain medication as needed.

## 2020-09-24 VITALS
RESPIRATION RATE: 18 BRPM | DIASTOLIC BLOOD PRESSURE: 89 MMHG | SYSTOLIC BLOOD PRESSURE: 148 MMHG | WEIGHT: 273.15 LBS | HEIGHT: 65 IN | OXYGEN SATURATION: 96 % | TEMPERATURE: 97.7 F | HEART RATE: 101 BPM | BODY MASS INDEX: 45.51 KG/M2

## 2020-09-24 LAB
ABSOLUTE EOS #: 0.9 K/UL (ref 0–0.4)
ABSOLUTE IMMATURE GRANULOCYTE: ABNORMAL K/UL (ref 0–0.3)
ABSOLUTE LYMPH #: 2.3 K/UL (ref 1–4.8)
ABSOLUTE MONO #: 0.5 K/UL (ref 0.1–1.3)
ALBUMIN SERPL-MCNC: 3.2 G/DL (ref 3.5–5.2)
ALBUMIN/GLOBULIN RATIO: ABNORMAL (ref 1–2.5)
ALP BLD-CCNC: 143 U/L (ref 35–104)
ALT SERPL-CCNC: 27 U/L (ref 5–33)
ANION GAP SERPL CALCULATED.3IONS-SCNC: 11 MMOL/L (ref 9–17)
AST SERPL-CCNC: 17 U/L
BASOPHILS # BLD: 1 % (ref 0–2)
BASOPHILS ABSOLUTE: 0.1 K/UL (ref 0–0.2)
BILIRUB SERPL-MCNC: <0.15 MG/DL (ref 0.3–1.2)
BILIRUBIN DIRECT: <0.08 MG/DL
BILIRUBIN, INDIRECT: ABNORMAL MG/DL (ref 0–1)
BUN BLDV-MCNC: 7 MG/DL (ref 6–20)
BUN/CREAT BLD: ABNORMAL (ref 9–20)
CALCIUM SERPL-MCNC: 8.7 MG/DL (ref 8.6–10.4)
CHLORIDE BLD-SCNC: 102 MMOL/L (ref 98–107)
CO2: 26 MMOL/L (ref 20–31)
CREAT SERPL-MCNC: 0.56 MG/DL (ref 0.5–0.9)
DIFFERENTIAL TYPE: ABNORMAL
EOSINOPHILS RELATIVE PERCENT: 10 % (ref 0–4)
GFR AFRICAN AMERICAN: >60 ML/MIN
GFR NON-AFRICAN AMERICAN: >60 ML/MIN
GFR SERPL CREATININE-BSD FRML MDRD: ABNORMAL ML/MIN/{1.73_M2}
GFR SERPL CREATININE-BSD FRML MDRD: ABNORMAL ML/MIN/{1.73_M2}
GLOBULIN: ABNORMAL G/DL (ref 1.5–3.8)
GLUCOSE BLD-MCNC: 131 MG/DL (ref 65–105)
GLUCOSE BLD-MCNC: 139 MG/DL (ref 65–105)
GLUCOSE BLD-MCNC: 157 MG/DL (ref 70–99)
HCT VFR BLD CALC: 29.6 % (ref 36–46)
HEMOGLOBIN: 9.8 G/DL (ref 12–16)
IMMATURE GRANULOCYTES: ABNORMAL %
LIPASE: 15 U/L (ref 13–60)
LYMPHOCYTES # BLD: 26 % (ref 24–44)
MAGNESIUM: 2.2 MG/DL (ref 1.6–2.6)
MCH RBC QN AUTO: 30.8 PG (ref 26–34)
MCHC RBC AUTO-ENTMCNC: 33.1 G/DL (ref 31–37)
MCV RBC AUTO: 92.9 FL (ref 80–100)
MONOCYTES # BLD: 5 % (ref 1–7)
NRBC AUTOMATED: ABNORMAL PER 100 WBC
PDW BLD-RTO: 15.7 % (ref 11.5–14.9)
PHOSPHORUS: 4.6 MG/DL (ref 2.6–4.5)
PLATELET # BLD: 194 K/UL (ref 150–450)
PLATELET ESTIMATE: ABNORMAL
PMV BLD AUTO: 7.8 FL (ref 6–12)
POTASSIUM SERPL-SCNC: 4.4 MMOL/L (ref 3.7–5.3)
RBC # BLD: 3.19 M/UL (ref 4–5.2)
RBC # BLD: ABNORMAL 10*6/UL
SEG NEUTROPHILS: 58 % (ref 36–66)
SEGMENTED NEUTROPHILS ABSOLUTE COUNT: 5.1 K/UL (ref 1.3–9.1)
SODIUM BLD-SCNC: 139 MMOL/L (ref 135–144)
TOTAL PROTEIN: 6.6 G/DL (ref 6.4–8.3)
WBC # BLD: 8.9 K/UL (ref 3.5–11)
WBC # BLD: ABNORMAL 10*3/UL

## 2020-09-24 PROCEDURE — 83735 ASSAY OF MAGNESIUM: CPT

## 2020-09-24 PROCEDURE — APPNB30 APP NON BILLABLE TIME 0-30 MINS: Performed by: NURSE PRACTITIONER

## 2020-09-24 PROCEDURE — 6360000002 HC RX W HCPCS: Performed by: NURSE PRACTITIONER

## 2020-09-24 PROCEDURE — 82947 ASSAY GLUCOSE BLOOD QUANT: CPT

## 2020-09-24 PROCEDURE — 84100 ASSAY OF PHOSPHORUS: CPT

## 2020-09-24 PROCEDURE — 2580000003 HC RX 258: Performed by: NURSE PRACTITIONER

## 2020-09-24 PROCEDURE — 2580000003 HC RX 258: Performed by: RADIOLOGY

## 2020-09-24 PROCEDURE — 99239 HOSP IP/OBS DSCHRG MGMT >30: CPT | Performed by: INTERNAL MEDICINE

## 2020-09-24 PROCEDURE — 36415 COLL VENOUS BLD VENIPUNCTURE: CPT

## 2020-09-24 PROCEDURE — 6370000000 HC RX 637 (ALT 250 FOR IP): Performed by: INTERNAL MEDICINE

## 2020-09-24 PROCEDURE — 80076 HEPATIC FUNCTION PANEL: CPT

## 2020-09-24 PROCEDURE — 80048 BASIC METABOLIC PNL TOTAL CA: CPT

## 2020-09-24 PROCEDURE — 6360000002 HC RX W HCPCS: Performed by: INTERNAL MEDICINE

## 2020-09-24 PROCEDURE — 85025 COMPLETE CBC W/AUTO DIFF WBC: CPT

## 2020-09-24 PROCEDURE — 83690 ASSAY OF LIPASE: CPT

## 2020-09-24 RX ADMIN — Medication 10 ML: at 08:57

## 2020-09-24 RX ADMIN — Medication 2 MG: at 06:26

## 2020-09-24 RX ADMIN — PANTOPRAZOLE SODIUM 40 MG: 40 TABLET, DELAYED RELEASE ORAL at 06:26

## 2020-09-24 RX ADMIN — POTASSIUM CHLORIDE 10 MEQ: 1500 TABLET, EXTENDED RELEASE ORAL at 08:56

## 2020-09-24 RX ADMIN — ENOXAPARIN SODIUM 30 MG: 30 INJECTION SUBCUTANEOUS at 08:56

## 2020-09-24 RX ADMIN — LORAZEPAM 1 MG: 1 TABLET ORAL at 06:26

## 2020-09-24 RX ADMIN — Medication 400 MG: at 08:55

## 2020-09-24 RX ADMIN — METOPROLOL TARTRATE 25 MG: 25 TABLET, FILM COATED ORAL at 08:56

## 2020-09-24 RX ADMIN — PROMETHAZINE HYDROCHLORIDE 12.5 MG: 25 INJECTION INTRAMUSCULAR; INTRAVENOUS at 05:01

## 2020-09-24 RX ADMIN — Medication 2 MG: at 02:20

## 2020-09-24 RX ADMIN — Medication 2000 UNITS: at 08:55

## 2020-09-24 RX ADMIN — PRAMIPEXOLE DIHYDROCHLORIDE 0.25 MG: 0.25 TABLET ORAL at 08:56

## 2020-09-24 RX ADMIN — DULOXETINE HYDROCHLORIDE 60 MG: 60 CAPSULE, DELAYED RELEASE ORAL at 08:56

## 2020-09-24 RX ADMIN — Medication 2 MG: at 10:34

## 2020-09-24 ASSESSMENT — PAIN SCALES - GENERAL
PAINLEVEL_OUTOF10: 8
PAINLEVEL_OUTOF10: 0
PAINLEVEL_OUTOF10: 6
PAINLEVEL_OUTOF10: 9
PAINLEVEL_OUTOF10: 6
PAINLEVEL_OUTOF10: 7
PAINLEVEL_OUTOF10: 9

## 2020-09-24 ASSESSMENT — PAIN DESCRIPTION - ORIENTATION: ORIENTATION: UPPER

## 2020-09-24 ASSESSMENT — PAIN DESCRIPTION - FREQUENCY: FREQUENCY: CONTINUOUS

## 2020-09-24 ASSESSMENT — PAIN DESCRIPTION - PAIN TYPE: TYPE: ACUTE PAIN

## 2020-09-24 ASSESSMENT — PAIN DESCRIPTION - LOCATION: LOCATION: ABDOMEN

## 2020-09-24 NOTE — PROGRESS NOTES
Access center called patient will go to [facility] main campus G91 Bed 14  Need to call report to [phone]

## 2020-09-24 NOTE — PROGRESS NOTES
Pt. Discharged with Transport to [facility]. TPN stopped and pt. Placed on D10 at 76. Pt. And mother has all personal belongings with them.

## 2020-09-24 NOTE — PROGRESS NOTES
Access center notified of negative covid result.  They will make a call to [facility]

## 2020-09-24 NOTE — FLOWSHEET NOTE
SC visit with patient and her mother prior to patient being transported to [facility];      09/24/20 1125   Encounter Summary   Services provided to: Patient and family together   [facility]   Support System Parent   Continue Visiting   (9/24/20)   Complexity of Encounter Moderate   Length of Encounter 15 minutes   Spiritual Assessment Completed Yes   Spiritual/Voodoo   Type Spiritual support   Assessment Approachable; Anxious; Hopeful;Coping;Helplessness   Intervention Active listening;Prayer;Sustaining presence/ Ministry of presence; Discussed illness/injury and it's impact   Outcome Expressed gratitude;Engaged in conversation;Expressed feelings/needs/concerns;Coping; Hopeful;Receptive

## 2020-09-24 NOTE — PROGRESS NOTES
[facility]    Gastroenterology Daily Progress Note      Patient:   Rayray Pennington   :    1975   Facility:   [address] [place] [address]  Date:     2020  Consultant:   Nacho Horta CNP      SUBJECTIVE  54 y.o. female admitted 2020 with Pancreatic pseudocyst [K86.3] and seen for pancreatitis with necrosis. The pt was seen and examined. Continues to have epigastric pain, nausea. Awaiting transport to [address] [place] [address] today.          OBJECTIVE  Scheduled Meds:   melatonin  3 mg Oral Nightly    DULoxetine  60 mg Oral BID    fat emulsion  100 mL Intravenous Daily    insulin lispro  0-6 Units Subcutaneous Q6H    sodium chloride flush  10 mL Intravenous 2 times per day    busPIRone  15 mg Oral Nightly    Vitamin D  2 tablet Oral Daily    [Held by provider] furosemide  20 mg Oral BID    magnesium oxide  400 mg Oral Daily    metoprolol tartrate  25 mg Oral Daily    pantoprazole  40 mg Oral QAM AC    potassium chloride  10 mEq Oral BID    pramipexole  0.25 mg Oral Daily    prazosin  1 mg Oral Nightly    QUEtiapine  800 mg Oral Nightly    sodium chloride flush  10 mL Intravenous 2 times per day    enoxaparin  30 mg Subcutaneous BID    piperacillin-tazobactam (ZOSYN) 3.375 g in dextrose 5% IVPB extended infusion (mini-bag)  3.375 g Intravenous Q8H       Vital Signs:  BP (!) 148/89   Pulse 101   Temp 97.7 °F (36.5 °C) (Oral)   Resp 18   Ht 5' 5\" (1.651 m)   Wt 273 lb 2.4 oz (123.9 kg)   SpO2 96%   BMI 45.45 kg/m²      Physical Exam:     General Appearance: alert and oriented to person, place and time, well-developed and well-nourished, in no acute distress  Skin: warm and dry, no rash or erythema  Head: normocephalic and atraumatic  Eyes: pupils equal, round, and reactive to light, extraocular eye movements intact, conjunctivae normal  ENT: hearing grossly normal bilaterally  Neck: neck supple and non tender without mass, no thyromegaly or thyroid nodules, no cervical lymphadenopathy   Pulmonary/Chest: clear to auscultation bilaterally- no wheezes, rales or rhonchi, normal air movement, no respiratory distress  Cardiovascular: normal rate, regular rhythm, normal S1 and S2, no murmurs, rubs, clicks or gallops, distal pulses intact, no carotid bruits  Abdomen: soft, obese epigastric tenderness, non-distended, normal bowel sounds, no masses or organomegaly  Extremities: no cyanosis, clubbing or edema  Musculoskeletal: normal range of motion, no joint swelling, deformity or tenderness  Neurologic: no cranial nerve deficit and muscle strength normal    Lab and Imaging Review     CBC  Recent Labs     09/22/20  0706 09/23/20  0538 09/24/20  0538   WBC 7.9 7.7 8.9   HGB 9.4* 9.3* 9.8*   HCT 28.5* 28.2* 29.6*   MCV 92.5 92.3 92.9    191 194       BMP  Recent Labs     09/22/20  0706 09/23/20  0538 09/24/20  0538    139 139   K 3.5* 4.0 4.4    102 102   CO2 26 27 26   BUN 5* 5* 7   CREATININE 0.61 0.63 0.56   GLUCOSE 151* 142* 157*   CALCIUM 8.6 8.7 8.7       LFTS  Recent Labs     09/22/20  0706 09/23/20  0538 09/24/20  0538   ALKPHOS 164* 153* 143*   ALT 40* 34* 27   AST 21 21 17   PROT 6.6 6.6 6.6   BILITOT <0.15* 0.17* <0.15*   BILIDIR  --  <0.08 <0.08   LABALBU 3.1* 3.2* 3.2*       AMYLASE/LIPASE/AMMONIA  Recent Labs     09/22/20  0706 09/23/20  0538 09/24/20  0538   LIPASE 24 21 15       FINDINGS: ct abd 9/23/20   Lower Chest: Trace pleural effusions and dependent atelectasis again    demonstrated and without appreciable change since recent MRI.         Organs: Findings of pancreatitis are again demonstrated with persistent    peripancreatic inflammatory change.  Organizing collections of fluid    involving the body and tail the pancreas are present measuring up to 3.8 x    2.9 cm near the splenic hilum.  Inflammatory change does abut the adjacent    stomach.  No extraluminal gas identified. Carlee Agostoie is poor enhancement and    visualized architecture of the underlying pancreatic parenchyma in the body    and tail, consistent with necrosis.  No pancreatic duct dilatation the    entirety of the splenic vein is not visualized and small perisplenic    collaterals are noted.  The splenic artery is normal in contour.         Findings consistent with hepatic steatosis.  Status post cholecystectomy.  No    biliary dilatation identified.         The adrenals and kidneys reveal no acute findings.  Scar in the anterior    lower pole the right kidney and intraparenchymal right renal cyst again    demonstrated.         GI/Bowel: The visualized bowel is normal in caliber.  No discrete bowel wall    thickening identified.         Pelvis: No acute abnormality identified.  Fibroid uterus is noted.  Tubal    occlusion devices present.         Peritoneum/Retroperitoneum: No free air.  No significant free fluid.  The    aorta is normal in caliber.  The visceral branches are patent.  Few small    retroperitoneal lymph nodes are again demonstrated and without appreciable    change.         Bones/Soft Tissues: No acute abnormality identified.  Degenerative disc    disease and facet arthropathy most notable in the lower lumbar spine.              Impression    1.  Findings of evolving pancreatitis are again demonstrated with small    organizing fluid collections involving the body and tail.  Overall decrease    in the amount of abdominopelvic fluid.  Poor enhancement and architectural    detail of the body and tail the pancreas is consistent with necrosis.         2.  Poor visualization of the entirety of the splenic vein and    collateralization, suggestive of occlusion.         3.  Trace pleural effusions appears without significant change.                    ASSESSMENT/PLAN  1.pancreatitis with necrosis per repeat ct yesterday  -transfer to [address] [place] [address] today  -continue TPN and pain/nausea mgt        This plan was formulated in collaboration with Dr. Shira Felder.     Electronically signed by: Jeanie So Vera Anaya - CNP on 9/24/2020 at 10:17 AM

## 2020-09-24 NOTE — DISCHARGE SUMMARY
[facility]    Discharge Summary     Patient ID: Ela Baeza  :  1975   MRN: [id number]     ACCOUNT:  [de-identified]   Patient's PCP: Nasim Michel  Admit Date: 2020   Discharge Date: 2020    Length of Stay: 8  Code Status:  Full Code  Admitting Physician: Ramón Escobar MD  Discharge Physician: Nacho Cornejo MD     Active Discharge Diagnoses:     Primary Problem  Pancreatic pseudocyst      [place] Problems    Diagnosis Date Noted    History of depression [Z86.59] 2020    Pancreatic pseudocyst [K86.3] 2020    Smoker [F17.200] 2020    HTN (hypertension) [I10] 12/15/2014    Class 3 severe obesity due to excess calories with serious comorbidity in [facility]) [E66.01] 2013    Anxiety [F41.9] 2013       Admission Condition:  fair     Discharged Condition: fair    Hospital Stay:     Hospital Course:  Ela Baeza is a 54 y.o. female who was admitted for the management of Pancreatic pseudocyst , presented to ER with No chief complaint on file.   57-year-old morbidly obese lady BMI 45.45 was admitted with abdominal pain was diagnosed with acute severe pancreatitis with necrosis conservative treatment and was at discharge readmitted again with recurrent symptoms she was managed with TPN and n.p.o. repeat CT shows areas of necrosis possibly new no etiology could be found  Patient was managed conservatively with pain control IV fluids and TPN with a GI input as her symptoms were not improving and new areas of necrosis patient was transferred to a tertiary level at [facility]  Patient has a MRI done MRCP on  which shows post cholecystectomy and no evidence of gallstones no CBD dilatation areas of focal necrosis noted hepatic steatosis and splenomegaly no pancreatic divisum noted  Repeat CT yesterday showed evolving pancreatitis demonstrate fluid collection body and tail and areas of necrosis noted  Gi dr Brennan Semen  surg dr Mervat Molina      Significant therapeutic interventions:     Significant Diagnostic Studies:   Labs / Micro:        ,     Radiology:    Xr Chest (single View Frontal)    Result Date: 8/28/2020  EXAMINATION: ONE XRAY VIEW OF THE CHEST 8/28/2020 6:20 am COMPARISON: 08/27/2020 HISTORY: ORDERING SYSTEM PROVIDED HISTORY: f/u pneumonia TECHNOLOGIST PROVIDED HISTORY: f/u pneumonia Reason for Exam: f/u pneumonia Acuity: Unknown Type of Exam: Subsequent/Follow-up Additional signs and symptoms: f/u pneumonia Relevant Medical/Surgical History: f/u pneumonia FINDINGS: Poor inspiration with decrease lung volumes worse on the prior. Right upper extremity PICC line remain in place. Cardiomediastinal silhouette stable accentuated by the low lung volumes. No focal consolidation. Patchy airspace opacities in the left upper lung accentuated by low lung volume. Poor inspiration with low lung volumes worse than prior. Patchy airspace opacities in the left upper lung accentuated by low lung volume. Ct Abdomen Pelvis W Iv Contrast Additional Contrast? Radiologist Recommendation    Result Date: 9/23/2020  EXAMINATION: CT OF THE ABDOMEN AND PELVIS WITH CONTRAST 9/23/2020 9:52 am TECHNIQUE: CT of the abdomen and pelvis was performed with the administration of intravenous contrast. Multiplanar reformatted images are provided for review. Dose modulation, iterative reconstruction, and/or weight based adjustment of the mA/kV was utilized to reduce the radiation dose to as low as reasonably achievable. COMPARISON: MRI 09/18/2020.   CT abdomen and pelvis 08/26/2020, 08/20/2020 HISTORY: ORDERING SYSTEM PROVIDED HISTORY: recheck pancreatitis need pancreatic protocol, check for necrosis please TECHNOLOGIST PROVIDED HISTORY: recheck pancreatitis need pancreatic protocol, check for necrosis please Reason for Exam: recheck pancreatitis need pancreatic protocol, check for necrosis please Acuity: Acute Type of Exam: Initial FINDINGS: Lower Chest: Trace pleural effusions and dependent atelectasis again demonstrated and without appreciable change since recent MRI. Organs: Findings of pancreatitis are again demonstrated with persistent peripancreatic inflammatory change. Organizing collections of fluid involving the body and tail the pancreas are present measuring up to 3.8 x 2.9 cm near the splenic hilum. Inflammatory change does abut the adjacent stomach. No extraluminal gas identified. There is poor enhancement and visualized architecture of the underlying pancreatic parenchyma in the body and tail, consistent with necrosis. No pancreatic duct dilatation the entirety of the splenic vein is not visualized and small perisplenic collaterals are noted. The splenic artery is normal in contour. Findings consistent with hepatic steatosis. Status post cholecystectomy. No biliary dilatation identified. The adrenals and kidneys reveal no acute findings. Scar in the anterior lower pole the right kidney and intraparenchymal right renal cyst again demonstrated. GI/Bowel: The visualized bowel is normal in caliber. No discrete bowel wall thickening identified. Pelvis: No acute abnormality identified. Fibroid uterus is noted. Tubal occlusion devices present. Peritoneum/Retroperitoneum: No free air. No significant free fluid. The aorta is normal in caliber. The visceral branches are patent. Few small retroperitoneal lymph nodes are again demonstrated and without appreciable change. Bones/Soft Tissues: No acute abnormality identified. Degenerative disc disease and facet arthropathy most notable in the lower lumbar spine. 1.  Findings of evolving pancreatitis are again demonstrated with small organizing fluid collections involving the body and tail. Overall decrease in the amount of abdominopelvic fluid.   Poor enhancement and architectural detail of the body and tail the pancreas is consistent with necrosis. 2.  Poor visualization of the entirety of the splenic vein and collateralization, suggestive of occlusion. 3.  Trace pleural effusions appears without significant change. Ct Abdomen Pelvis W Iv Contrast Additional Contrast? None    Result Date: 8/26/2020  EXAMINATION: CTA OF THE CHEST; CT OF THE ABDOMEN AND PELVIS WITH CONTRAST 8/26/2020 10:34 am TECHNIQUE: CTA of the chest was performed after the administration of intravenous contrast.  Multiplanar reformatted images are provided for review. MIP images are provided for review. Dose modulation, iterative reconstruction, and/or weight based adjustment of the mA/kV was utilized to reduce the radiation dose to as low as reasonably achievable.; CT of the abdomen and pelvis was performed with the administration of intravenous contrast. Multiplanar reformatted images are provided for review. Dose modulation, iterative reconstruction, and/or weight based adjustment of the mA/kV was utilized to reduce the radiation dose to as low as reasonably achievable. COMPARISON: CT chest May 26, 2019 CT abdomen and pelvis August 20, 2020 HISTORY: ORDERING SYSTEM PROVIDED HISTORY: Hypoxia, recurrent fevers TECHNOLOGIST PROVIDED HISTORY: Hypoxia, recurrent fevers Reason for Exam: Hypoxia, recurrent fevers, best images possible due to patient size 322 pounds Acuity: Acute Type of Exam: Initial; ORDERING SYSTEM PROVIDED HISTORY: Splenic vein thrombosis? TECHNOLOGIST PROVIDED HISTORY: Splenic vein thrombosis? Reason for Exam: Splenic vein thrombosis? best images possible due to patient size 322  poounds Acuity: Acute Type of Exam: Initial FINDINGS: CT CHEST: Chest wall: No axillary adenopathy. Mediastinum: Cardiomegaly. No pericardial effusion. No adenopathy. Pulmonary arteries: Significantly limited evaluation for pulmonary embolus due to bolus timing, motion, and patient body habitus. Questionable pulmonary embolus within the right lower lobe. Lungs:  Moderate left pleural effusion. Small right pleural effusion. Left lower lobe consolidation versus atelectasis. Bilateral upper lobe areas of consolidation. Bones: No acute osseous abnormality. CT ABDOMEN-PELVIS: Organs: Decreased attenuation of the liver is consistent with hepatic steatosis. No focal liver lesion. Cholecystectomy. Severe pancreatitis with surrounding inflammatory changes and fluid. Mild splenomegaly. No adrenal lesion. No hydronephrosis. Right renal cyst.  Focal area of scarring within the right kidney is unchanged. GI/Bowel: No bowel obstruction. Secondary involvement of the descending colon adjacent to the peripancreatic inflammatory changes along the tail of the pancreas. Pelvis: Essure devices. No significant free fluid. No bladder calculus. Peritoneum/Retroperitoneum: Splenic artery is patent. No evidence of splenic artery thrombosis. No definite splenic venous thrombosis. A portion of the splenic vein as it courses along the tail of the pancreas is indistinct. However, proximal and distal to this, there is flow and this may be related to artifact. No abdominal aortic aneurysm. Prominent peripancreatic lymph nodes are likely reactive. Bones/Soft Tissues: No acute soft tissue abnormality. No acute osseous abnormality. Significantly limited evaluation for pulmonary embolus. Questionable right lower lobe pulmonary embolus. Bilateral lung opacities are likely pneumonia. Recommend follow-up to document resolution. Moderate left pleural effusion. Severe acute pancreatitis. No definite splenic venous thrombosis. Critical results were called by Dr. Nirmala Starks MD to Dr. Ryann Hayden on 8/26/2020 at 11:40.      Ir Sammi Ottot Device Plmt/replace/removal    Result Date: 9/21/2020  PROCEDURE: IR FLUOROSCOPY GUIDED CENTRAL VENOUS ACCESS DEVICE PLACEMENT 9/21/2020 HISTORY: ORDERING SYSTEM PROVIDED HISTORY: Malnutrition, patient in need of TPN TECHNOLOGIST PROVIDED HISTORY: Pancreatitis with pancreatic pseudocyst; Is the patient pregnant?->No CONTRAST: None used SEDATION: Local anesthesia FLUOROSCOPY DOSE AND TYPE OR TIME AND EXPOSURES: Fluoroscopy time 38 seconds D AP-478 cGy cm squared DESCRIPTION OF PROCEDURE: Informed consent was obtained. The patient was identified and placed in the supine position. The right arm was prepped and draped in the usual sterile fashion. Real-time ultrasound was used for guidance and demonstrated a patent right brachial vein. A sonographic image was obtained showing the needle tip within the vessel. An ultrasound image was stored in PACs. Under ultrasound guidance, the right brachial vein was punctured. An 0.018 in wire was introduced, the needle removed and a 5.5 [place] sheath placed. Under fluoroscopic observation and via the peel-away sheath, a 35 cm 5 Maori double lumen PICC line was placed with its tip at the junction of the distal superior vena cava. The catheter was flushed with heparinized saline and secured on the patient's skin. The patient tolerated the procedure well. FINDINGS: Digital image shows the PICC with its tip near the cavoatrial junction. Successful right upper extremity PICC insertion, as above. PICC is ready for use at this time. Xr Chest Portable    Result Date: 8/31/2020  EXAMINATION: ONE XRAY VIEW OF THE CHEST 8/31/2020 6:09 am COMPARISON: 08/30/2020 HISTORY: ORDERING SYSTEM PROVIDED HISTORY: infiltrate TECHNOLOGIST PROVIDED HISTORY: infiltrate Reason for Exam: infiltrate Acuity: Unknown Type of Exam: Unknown FINDINGS: A right upper extremity PICC line distal tip is at the cavoatrial junction. There is improved aeration of the left lower lung, with a small pleural effusion with overlying atelectasis/infiltrate remaining. No pneumothorax is demonstrated. The heart is enlarged. No acute osseous abnormality is seen.      Improved aeration of the left lower lung, with a small left pleural effusion with overlying atelectasis/pneumonia remaining. Xr Chest Portable    Result Date: 8/30/2020  EXAMINATION: ONE XRAY VIEW OF THE CHEST 8/30/2020 6:07 am COMPARISON: 08/28/2020 HISTORY: ORDERING SYSTEM PROVIDED HISTORY: infiltrate TECHNOLOGIST PROVIDED HISTORY: infiltrate Reason for Exam: infiltrate Acuity: Unknown Type of Exam: Unknown FINDINGS: Right upper extremity PICC line is seen with tip obscured by additional overlying catheters. Low lung volume. Left basilar pleuroparenchymal opacity appears greater than prior. Aeration of the right lung appears slightly improved. No gross pneumothorax. Cardiac and mediastinal silhouettes are reflective of patient rotation. Left pleural effusion and left basilar airspace disease, slightly greater than prior. Improving aeration of the right lung as compared to prior. Xr Chest Portable    Result Date: 8/27/2020  EXAMINATION: ONE XRAY VIEW OF THE CHEST 8/27/2020 6:15 pm COMPARISON: 08/26/2020 radiograph HISTORY: ORDERING SYSTEM PROVIDED HISTORY: Increased WOB TECHNOLOGIST PROVIDED HISTORY: Increased WOB Reason for Exam: Increased WOB Acuity: Unknown Type of Exam: Unknown Additional signs and symptoms: Increased WOB FINDINGS: Right PICC line stable. The heart is enlarged and there is severe perihilar opacification that is increased centrally. Diffuse ground-glass opacities are also increased bilaterally. No pneumothorax. No skeletal finding. Perihilar and diffuse ground-glass opacities have increased from prior imaging. Pattern suspected to represent pulmonary edema. Developing pneumonitis can not be excluded. Xr Chest Portable    Result Date: 8/26/2020  EXAMINATION: ONE XRAY VIEW OF THE CHEST 8/26/2020 9:07 am COMPARISON: August 24, 2020, chest exam HISTORY: ORDERING SYSTEM PROVIDED HISTORY: PNA TECHNOLOGIST PROVIDED HISTORY: PNA Reason for Exam: PT CO increased SOB and CP.  Acuity: Acute Type of Exam: Initial FINDINGS: Right PICC line catheter tip is projected at the SVC right atrial junction Persistent mild cardiomegaly Expiratory exam with mild diffuse prominence of lung markings likely related to the low volume lungs. Mild vascular congestion is not reliably excluded. Interval increase in now mild patchy right upper lobe infiltrate. Moderate left basilar infiltrate     Interval increase in now mild patchy right upper lobe infiltrate with moderate left basilar infiltrate Line placement as above Expiratory exam, possible mild vascular congestion. Repeat upright good inspiration PA view of the chest is suggested for more accurate assessment of the pulmonary vascularity RECOMMENDATION: Repeat upright good inspiration PA view of the chest is suggested for more accurate assessment of the pulmonary vascularity     Ct Chest Pulmonary Embolism W Contrast    Result Date: 8/26/2020  EXAMINATION: CTA OF THE CHEST; CT OF THE ABDOMEN AND PELVIS WITH CONTRAST 8/26/2020 10:34 am TECHNIQUE: CTA of the chest was performed after the administration of intravenous contrast.  Multiplanar reformatted images are provided for review. MIP images are provided for review. Dose modulation, iterative reconstruction, and/or weight based adjustment of the mA/kV was utilized to reduce the radiation dose to as low as reasonably achievable.; CT of the abdomen and pelvis was performed with the administration of intravenous contrast. Multiplanar reformatted images are provided for review. Dose modulation, iterative reconstruction, and/or weight based adjustment of the mA/kV was utilized to reduce the radiation dose to as low as reasonably achievable.  COMPARISON: CT chest May 26, 2019 CT abdomen and pelvis August 20, 2020 HISTORY: ORDERING SYSTEM PROVIDED HISTORY: Hypoxia, recurrent fevers TECHNOLOGIST PROVIDED HISTORY: Hypoxia, recurrent fevers Reason for Exam: Hypoxia, recurrent fevers, best images possible due to patient size 322 pounds Acuity: Acute Type of Exam: Initial; ORDERING SYSTEM PROVIDED HISTORY: Splenic vein thrombosis? TECHNOLOGIST PROVIDED HISTORY: Splenic vein thrombosis? Reason for Exam: Splenic vein thrombosis? best images possible due to patient size 322  poounds Acuity: Acute Type of Exam: Initial FINDINGS: CT CHEST: Chest wall: No axillary adenopathy. Mediastinum: Cardiomegaly. No pericardial effusion. No adenopathy. Pulmonary arteries: Significantly limited evaluation for pulmonary embolus due to bolus timing, motion, and patient body habitus. Questionable pulmonary embolus within the right lower lobe. Lungs: Moderate left pleural effusion. Small right pleural effusion. Left lower lobe consolidation versus atelectasis. Bilateral upper lobe areas of consolidation. Bones: No acute osseous abnormality. CT ABDOMEN-PELVIS: Organs: Decreased attenuation of the liver is consistent with hepatic steatosis. No focal liver lesion. Cholecystectomy. Severe pancreatitis with surrounding inflammatory changes and fluid. Mild splenomegaly. No adrenal lesion. No hydronephrosis. Right renal cyst.  Focal area of scarring within the right kidney is unchanged. GI/Bowel: No bowel obstruction. Secondary involvement of the descending colon adjacent to the peripancreatic inflammatory changes along the tail of the pancreas. Pelvis: Essure devices. No significant free fluid. No bladder calculus. Peritoneum/Retroperitoneum: Splenic artery is patent. No evidence of splenic artery thrombosis. No definite splenic venous thrombosis. A portion of the splenic vein as it courses along the tail of the pancreas is indistinct. However, proximal and distal to this, there is flow and this may be related to artifact. No abdominal aortic aneurysm. Prominent peripancreatic lymph nodes are likely reactive. Bones/Soft Tissues: No acute soft tissue abnormality. No acute osseous abnormality. Significantly limited evaluation for pulmonary embolus. Questionable right lower lobe pulmonary embolus.  Bilateral lung opacities are likely pancreatic tail at the splenic hilum measuring 4.8 x 2.9 cm. There is loss of signal on opposed phase imaging involving the hepatic parenchyma compatible steatosis. The spleen is enlarged measuring up to 15.5 cm. The adrenal glands are unremarkable. Focal cortical scarring in the right kidney. Few small bilateral renal cortical cysts. No hydronephrosis. The visualized small and large bowel are nondilated. No significant lymphadenopathy. The abdominal aorta is normal in course and caliber. No acute soft tissue or osseous abnormality. 1. Status post cholecystectomy. No evidence of choledocholithiasis. Dilation of the biliary tree may be related to cholecystectomy status. 2. Findings compatible with acute pancreatitis. Intrapancreatic T2 hyperintense collections may be related to pseudocysts or focal necrosis. Multiple peripancreatic collections noted, as described above. 3. Hepatic steatosis. 4. Splenomegaly. Vl Lower Extremity Bilateral Venous Duplex    Result Date: 8/30/2020    [facility]  Vascular Lower Extremities DVT Study Procedure   Patient Name  Prabhakar Mcginnis [id number]   Date of Study           08/30/2020                [facility]   Date of Birth 1975  Gender                  Female   Age           54 year(s)  Race                       Room Number   2003        Height:                 64.96 inch, 165 cm   Corporate ID  [id number]    Weight:                 304 pounds, 137.9 kg  #   Patient Acct  [de-identified]   BSA:        2.36 m^2    BMI:       50.65 kg/m^2  #   MR #          [id number]      Sonographer             Adrian Hairston   Accession #   [id number]  Interpreting Physician  Ortiz Toro   Referring                 Referring Physician     Lesley Jacobsen  Nurse  Practitioner  Procedure Type of Study:   Veins: Lower Extremities DVT Study, Venous Scan Lower Bilateral.  Indications for Study:Shortness of breath. Patient Status: In Patient. Technical Quality:Limited visualization. Limitation reason:Bedside edema. Conclusions   Summary   No evidence of superficial or deep venous thrombosis in both lower  extremities. Signature   ----------------------------------------------------------------  Electronically signed by Brown Kemp(Sonographer) on  08/30/2020 04:00 PM  ----------------------------------------------------------------   ----------------------------------------------------------------  Electronically signed by Ortiz Toro(Interpreting physician)  on 08/30/2020 08:16 PM  ----------------------------------------------------------------  Findings:   Right Impression:                    Left Impression:  The common femoral, femoral,         The common femoral, femoral,  popliteal and tibial veins           popliteal and tibial veins  demonstrate normal compressibility   demonstrate normal compressibility  and augmentation. and augmentation. Normal compressibility of the great  Normal compressibility of the great  saphenous vein. saphenous vein. Normal compressibility of the small  Normal compressibility of the small  saphenous vein. saphenous vein. Velocities are measured in cm/s ; Diameters are measured in cm Right Lower Extremities DVT Study Measurements Right 2D Measurements +------------------------------------+----------+---------------+----------+ ! Location                            ! Visualized! Compressibility! Thrombosis! +------------------------------------+----------+---------------+----------+ ! Common Femoral                      !Yes       ! Yes            ! None      ! +------------------------------------+----------+---------------+----------+ ! Prox Femoral                        !Yes       ! Yes            ! None      ! +------------------------------------+----------+---------------+----------+ ! Mid Femoral                         !Yes       ! Yes            ! None      ! !Yes       !Yes            ! None      ! +------------------------------------+----------+---------------+----------+ ! GSV Thigh                           ! Yes       ! Yes            ! None      ! +------------------------------------+----------+---------------+----------+ ! GSV Knee                            ! Yes       ! Yes            ! None      ! +------------------------------------+----------+---------------+----------+ ! GSV Ankle                           ! Yes       ! Yes            ! None      ! +------------------------------------+----------+---------------+----------+ ! SSV                                 ! Yes       ! Yes            ! None      ! +------------------------------------+----------+---------------+----------+ Left Doppler Measurements +---------------------------+------+------+--------------------------------+ ! Location                   ! Signal!Reflux! Reflux (msec)                   ! +---------------------------+------+------+--------------------------------+ ! Common Femoral             !Phasic!      !                                ! +---------------------------+------+------+--------------------------------+ ! Prox Femoral               !Phasic!      !                                ! +---------------------------+------+------+--------------------------------+ ! Popliteal                  !Phasic!      !                                ! +---------------------------+------+------+--------------------------------+    Us Retroperitoneal Limited    Result Date: 8/25/2020  EXAMINATION: ULTRASOUND OF THE KIDNEYS 8/25/2020 1:19 pm COMPARISON: August 20, 2020 CT abdomen and pelvis HISTORY: ORDERING SYSTEM PROVIDED HISTORY: Low UOP TECHNOLOGIST PROVIDED HISTORY: Bilateral Renal Ultrasound Low UOP Acuity: Acute Type of Exam: Initial FINDINGS: Exam is limited due to patient body habitus. The right kidney measures 15.7 centimetres in length and the left kidney measures 16 cm in length.  There is no hydronephrosis in either kidney. New focal renal lesion. Diffuse hepatic steatosis. No hydronephrosis in either kidney. Ir Guided Thoracentesis Pleural    Result Date: 8/31/2020  PROCEDURE: Robert DASILVA DIAGNOSTIC THORACENTESIS 8/28/2020 HISTORY: ORDERING SYSTEM PROVIDED HISTORY: pleural effusion left TECHNOLOGIST PROVIDED HISTORY: pleural effusion left Is the patient pregnant? ->Yes TECHNIQUE: Informed consent was obtained after a detailed explanation of the procedure including risks, benefits, and alternatives. Universal protocol was performed. The left posterior chest was prepped and draped in sterile fashion and local anesthesia was achieved with lidocaine. An 5 Danish needle sheath was advanced under ultrasound guidance into the small pleural effusion and diagnostic thoracentesis was performed. The patient tolerated the procedure well. Fluid was provided for further analysis. FINDINGS: Only a small amount of accessible left pleural fluid demonstrated sonographically. A total of 15 mL cloudy yellow fluid was removed. Successful ultrasound guided diagnostic left thoracentesis. Consultations:    Consults:     Final Specialist Recommendations/Findings:   IP CONSULT TO GI  IP CONSULT TO GENERAL SURGERY  IP CONSULT TO DIETITIAN  IP CONSULT TO DIETITIAN  IP CONSULT TO PHARMACY      The patient was seen and examined on day of discharge and this discharge summary is in conjunction with any daily progress note from day of discharge. Discharge plan:     Disposition: [facility]    Physician Follow Up:     [address]  [place] [address]  Ramón Sigrid   [phone]    They will call you to setup a time to come out to your house    Jason [address]  [phone]    In 1 week  for hospital follow up    [facility] (a division of [facility])  [phone]    This is the company that will supply your TPN at home. Requiring Further Evaluation/Follow Up POST HOSPITALIZATION/Incidental Findings:    Diet:  n.p.o. on TPN    Activity: As tolerated    Instructions to Patient:     Discharge Medications:      Medication List      ASK your doctor about these medications    acetaminophen 500 MG tablet  Commonly known as:  APAP Extra Strength  Take 2 tablets by mouth every 6 hours as needed for Pain     busPIRone 15 MG tablet  Commonly known as:  BUSPAR     butalbital-APAP-caffeine -40 MG Caps per capsule  Commonly known as:  Fioricet  Take 1 capsule by mouth every 6 hours as needed for Headaches     DULoxetine 60 MG extended release capsule  Commonly known as:  CYMBALTA  TAKE 1 CAPSULE BY MOUTH TWICE A DAY     fexofenadine 180 MG tablet  Commonly known as:  RA Allergy Relief  take 1 tablet by mouth once daily     furosemide 20 MG tablet  Commonly known as:  Lasix  Take 1 tablet by mouth 2 times daily     * ibuprofen 200 MG tablet  Commonly known as:   Advil  Take 2 tablets by mouth every 6 hours as needed for Pain     * ibuprofen 600 MG tablet  Commonly known as:  ADVIL;MOTRIN  Take 1 tablet by mouth every 6 hours as needed for Pain     magnesium oxide 400 MG tablet  Commonly known as:  MAG-OX     melatonin 5 MG Tabs tablet     metoprolol tartrate 25 MG tablet  Commonly known as:  LOPRESSOR     omeprazole 40 MG delayed release capsule  Commonly known as:  PRILOSEC     potassium chloride 10 MEQ extended release tablet  Commonly known as:  KLOR-CON M  Take 1 tablet by mouth 2 times daily     pramipexole 0.5 MG tablet  Commonly known as:  MIRAPEX  take 1 tablet by mouth twice a day     prazosin 1 MG capsule  Commonly known as:  MINIPRESS     Proventil  (90 Base) MCG/ACT inhaler  Generic drug:  albuterol sulfate HFA     SEROquel  MG extended release tablet  Generic drug:  QUEtiapine     vitamin D 50 MCG (2000 UT) Caps capsule         * This list has 2 medication(s) that are the same as other medications prescribed for you. Read the directions carefully, and ask your doctor or other care provider to review them with you. Time Spent on discharge is  35 mins in patient examination, evaluation, counseling as well as medication reconciliation, prescriptions for required medications, discharge plan and follow up. Electronically signed by   Misty Turner MD  9/24/2020  12:35 PM      Thank you Dr. Geoffrey Bergeron for the opportunity to be involved in this patient's care.

## 2020-10-27 ENCOUNTER — TELEPHONE (OUTPATIENT)
Dept: GASTROENTEROLOGY | Age: 45
End: 2020-10-27

## 2020-11-10 ENCOUNTER — APPOINTMENT (OUTPATIENT)
Dept: CT IMAGING | Age: 45
DRG: 440 | End: 2020-11-10
Payer: COMMERCIAL

## 2020-11-10 ENCOUNTER — HOSPITAL ENCOUNTER (INPATIENT)
Age: 45
LOS: 1 days | Discharge: HOME | DRG: 440 | End: 2020-11-12
Attending: EMERGENCY MEDICINE | Admitting: EMERGENCY MEDICINE
Payer: COMMERCIAL

## 2020-11-10 PROBLEM — K86.1 ACUTE ON CHRONIC PANCREATITIS (HCC): Status: ACTIVE | Noted: 2020-11-10

## 2020-11-10 PROBLEM — K85.90 ACUTE ON CHRONIC PANCREATITIS (HCC): Status: ACTIVE | Noted: 2020-11-10

## 2020-11-10 LAB
-: ABNORMAL
ABSOLUTE EOS #: 0.23 K/UL (ref 0–0.4)
ABSOLUTE IMMATURE GRANULOCYTE: 0 K/UL (ref 0–0.3)
ABSOLUTE LYMPH #: 4.18 K/UL (ref 1–4.8)
ABSOLUTE MONO #: 0.35 K/UL (ref 0.1–0.8)
ALBUMIN SERPL-MCNC: 4 G/DL (ref 3.5–5.2)
ALBUMIN/GLOBULIN RATIO: 1.2 (ref 1–2.5)
ALP BLD-CCNC: 114 U/L (ref 35–104)
ALT SERPL-CCNC: 15 U/L (ref 5–33)
AMORPHOUS: ABNORMAL
ANION GAP SERPL CALCULATED.3IONS-SCNC: 12 MMOL/L (ref 9–17)
AST SERPL-CCNC: 18 U/L
BACTERIA: ABNORMAL
BASOPHILS # BLD: 0 % (ref 0–2)
BASOPHILS ABSOLUTE: 0 K/UL (ref 0–0.2)
BILIRUB SERPL-MCNC: 0.17 MG/DL (ref 0.3–1.2)
BILIRUBIN DIRECT: <0.08 MG/DL
BILIRUBIN URINE: NEGATIVE
BILIRUBIN, INDIRECT: ABNORMAL MG/DL (ref 0–1)
BUN BLDV-MCNC: 14 MG/DL (ref 6–20)
BUN/CREAT BLD: ABNORMAL (ref 9–20)
CALCIUM SERPL-MCNC: 9 MG/DL (ref 8.6–10.4)
CASTS UA: ABNORMAL /LPF (ref 0–8)
CHLORIDE BLD-SCNC: 101 MMOL/L (ref 98–107)
CO2: 26 MMOL/L (ref 20–31)
COLOR: YELLOW
CREAT SERPL-MCNC: 0.48 MG/DL (ref 0.5–0.9)
CRYSTALS, UA: ABNORMAL /HPF
DIFFERENTIAL TYPE: ABNORMAL
EOSINOPHILS RELATIVE PERCENT: 2 % (ref 1–4)
EPITHELIAL CELLS UA: ABNORMAL /HPF (ref 0–5)
GFR AFRICAN AMERICAN: >60 ML/MIN
GFR NON-AFRICAN AMERICAN: >60 ML/MIN
GFR SERPL CREATININE-BSD FRML MDRD: ABNORMAL ML/MIN/{1.73_M2}
GFR SERPL CREATININE-BSD FRML MDRD: ABNORMAL ML/MIN/{1.73_M2}
GLOBULIN: ABNORMAL G/DL (ref 1.5–3.8)
GLUCOSE BLD-MCNC: 97 MG/DL (ref 70–99)
GLUCOSE URINE: NEGATIVE
HCG QUALITATIVE: NEGATIVE
HCT VFR BLD CALC: 39.9 % (ref 36.3–47.1)
HEMOGLOBIN: 12.7 G/DL (ref 11.9–15.1)
IMMATURE GRANULOCYTES: 0 %
KETONES, URINE: NEGATIVE
LEUKOCYTE ESTERASE, URINE: ABNORMAL
LIPASE: 6 U/L (ref 13–60)
LYMPHOCYTES # BLD: 36 % (ref 24–44)
MCH RBC QN AUTO: 29.5 PG (ref 25.2–33.5)
MCHC RBC AUTO-ENTMCNC: 31.8 G/DL (ref 28.4–34.8)
MCV RBC AUTO: 92.8 FL (ref 82.6–102.9)
MONOCYTES # BLD: 3 % (ref 1–7)
MORPHOLOGY: ABNORMAL
MUCUS: ABNORMAL
NITRITE, URINE: NEGATIVE
NRBC AUTOMATED: 0 PER 100 WBC
OTHER OBSERVATIONS UA: ABNORMAL
PDW BLD-RTO: 15.9 % (ref 11.8–14.4)
PH UA: 7 (ref 5–8)
PLATELET # BLD: 283 K/UL (ref 138–453)
PLATELET ESTIMATE: ABNORMAL
PMV BLD AUTO: 10.3 FL (ref 8.1–13.5)
POTASSIUM SERPL-SCNC: 4.1 MMOL/L (ref 3.7–5.3)
PROTEIN UA: NEGATIVE
RBC # BLD: 4.3 M/UL (ref 3.95–5.11)
RBC # BLD: ABNORMAL 10*6/UL
RBC UA: ABNORMAL /HPF (ref 0–4)
RENAL EPITHELIAL, UA: ABNORMAL /HPF
SEG NEUTROPHILS: 59 % (ref 36–66)
SEGMENTED NEUTROPHILS ABSOLUTE COUNT: 6.84 K/UL (ref 1.8–7.7)
SODIUM BLD-SCNC: 139 MMOL/L (ref 135–144)
SPECIFIC GRAVITY UA: 1.03 (ref 1–1.03)
TOTAL PROTEIN: 7.3 G/DL (ref 6.4–8.3)
TRICHOMONAS: ABNORMAL
TURBIDITY: CLEAR
URINE HGB: NEGATIVE
UROBILINOGEN, URINE: NORMAL
WBC # BLD: 11.6 K/UL (ref 3.5–11.3)
WBC # BLD: ABNORMAL 10*3/UL
WBC UA: ABNORMAL /HPF (ref 0–5)
YEAST: ABNORMAL

## 2020-11-10 PROCEDURE — 81001 URINALYSIS AUTO W/SCOPE: CPT

## 2020-11-10 PROCEDURE — 74176 CT ABD & PELVIS W/O CONTRAST: CPT

## 2020-11-10 PROCEDURE — 96376 TX/PRO/DX INJ SAME DRUG ADON: CPT

## 2020-11-10 PROCEDURE — 99283 EMERGENCY DEPT VISIT LOW MDM: CPT

## 2020-11-10 PROCEDURE — 80076 HEPATIC FUNCTION PANEL: CPT

## 2020-11-10 PROCEDURE — 85025 COMPLETE CBC W/AUTO DIFF WBC: CPT

## 2020-11-10 PROCEDURE — 84703 CHORIONIC GONADOTROPIN ASSAY: CPT

## 2020-11-10 PROCEDURE — 6360000002 HC RX W HCPCS: Performed by: EMERGENCY MEDICINE

## 2020-11-10 PROCEDURE — 2580000003 HC RX 258: Performed by: HEALTH CARE PROVIDER

## 2020-11-10 PROCEDURE — 6360000002 HC RX W HCPCS: Performed by: HEALTH CARE PROVIDER

## 2020-11-10 PROCEDURE — G0378 HOSPITAL OBSERVATION PER HR: HCPCS

## 2020-11-10 PROCEDURE — 96374 THER/PROPH/DIAG INJ IV PUSH: CPT

## 2020-11-10 PROCEDURE — 96375 TX/PRO/DX INJ NEW DRUG ADDON: CPT

## 2020-11-10 PROCEDURE — 80048 BASIC METABOLIC PNL TOTAL CA: CPT

## 2020-11-10 PROCEDURE — 83690 ASSAY OF LIPASE: CPT

## 2020-11-10 RX ORDER — LOSARTAN POTASSIUM 50 MG/1
50 TABLET ORAL DAILY
Status: DISCONTINUED | OUTPATIENT
Start: 2020-11-11 | End: 2020-11-12 | Stop reason: HOSPADM

## 2020-11-10 RX ORDER — FENTANYL CITRATE 50 UG/ML
50 INJECTION, SOLUTION INTRAMUSCULAR; INTRAVENOUS ONCE
Status: COMPLETED | OUTPATIENT
Start: 2020-11-10 | End: 2020-11-10

## 2020-11-10 RX ORDER — SODIUM CHLORIDE, SODIUM LACTATE, POTASSIUM CHLORIDE, CALCIUM CHLORIDE 600; 310; 30; 20 MG/100ML; MG/100ML; MG/100ML; MG/100ML
1000 INJECTION, SOLUTION INTRAVENOUS ONCE
Status: COMPLETED | OUTPATIENT
Start: 2020-11-10 | End: 2020-11-11

## 2020-11-10 RX ORDER — PRAMIPEXOLE DIHYDROCHLORIDE 0.25 MG/1
0.25 TABLET ORAL DAILY
Status: DISCONTINUED | OUTPATIENT
Start: 2020-11-11 | End: 2020-11-12 | Stop reason: HOSPADM

## 2020-11-10 RX ORDER — VITAMIN B COMPLEX
1000 TABLET ORAL DAILY
Status: DISCONTINUED | OUTPATIENT
Start: 2020-11-11 | End: 2020-11-12 | Stop reason: HOSPADM

## 2020-11-10 RX ORDER — KETOROLAC TROMETHAMINE 15 MG/ML
15 INJECTION, SOLUTION INTRAMUSCULAR; INTRAVENOUS ONCE
Status: COMPLETED | OUTPATIENT
Start: 2020-11-10 | End: 2020-11-10

## 2020-11-10 RX ORDER — LOSARTAN POTASSIUM 50 MG/1
100 TABLET ORAL DAILY
COMMUNITY

## 2020-11-10 RX ORDER — DULOXETIN HYDROCHLORIDE 30 MG/1
60 CAPSULE, DELAYED RELEASE ORAL 2 TIMES DAILY
Status: DISCONTINUED | OUTPATIENT
Start: 2020-11-10 | End: 2020-11-12 | Stop reason: HOSPADM

## 2020-11-10 RX ORDER — ONDANSETRON 2 MG/ML
4 INJECTION INTRAMUSCULAR; INTRAVENOUS ONCE
Status: COMPLETED | OUTPATIENT
Start: 2020-11-10 | End: 2020-11-10

## 2020-11-10 RX ORDER — AMITRIPTYLINE HYDROCHLORIDE 25 MG/1
25 TABLET, FILM COATED ORAL NIGHTLY
Status: DISCONTINUED | OUTPATIENT
Start: 2020-11-10 | End: 2020-11-12 | Stop reason: HOSPADM

## 2020-11-10 RX ORDER — CEPHALEXIN 250 MG/1
250 CAPSULE ORAL EVERY 6 HOURS SCHEDULED
Status: DISCONTINUED | OUTPATIENT
Start: 2020-11-11 | End: 2020-11-11

## 2020-11-10 RX ORDER — MORPHINE SULFATE 4 MG/ML
4 INJECTION, SOLUTION INTRAMUSCULAR; INTRAVENOUS
Status: DISCONTINUED | OUTPATIENT
Start: 2020-11-10 | End: 2020-11-11

## 2020-11-10 RX ORDER — AMITRIPTYLINE HYDROCHLORIDE 25 MG/1
200 TABLET, FILM COATED ORAL NIGHTLY
COMMUNITY

## 2020-11-10 RX ORDER — ONDANSETRON 2 MG/ML
4 INJECTION INTRAMUSCULAR; INTRAVENOUS EVERY 6 HOURS PRN
Status: DISCONTINUED | OUTPATIENT
Start: 2020-11-10 | End: 2020-11-12 | Stop reason: HOSPADM

## 2020-11-10 RX ORDER — SODIUM CHLORIDE, SODIUM LACTATE, POTASSIUM CHLORIDE, CALCIUM CHLORIDE 600; 310; 30; 20 MG/100ML; MG/100ML; MG/100ML; MG/100ML
INJECTION, SOLUTION INTRAVENOUS CONTINUOUS
Status: DISCONTINUED | OUTPATIENT
Start: 2020-11-10 | End: 2020-11-12 | Stop reason: HOSPADM

## 2020-11-10 RX ORDER — ALBUTEROL SULFATE 90 UG/1
2 AEROSOL, METERED RESPIRATORY (INHALATION) EVERY 6 HOURS PRN
Status: DISCONTINUED | OUTPATIENT
Start: 2020-11-10 | End: 2020-11-12 | Stop reason: HOSPADM

## 2020-11-10 RX ORDER — BUSPIRONE HYDROCHLORIDE 15 MG/1
15 TABLET ORAL NIGHTLY
Status: DISCONTINUED | OUTPATIENT
Start: 2020-11-10 | End: 2020-11-12 | Stop reason: HOSPADM

## 2020-11-10 RX ORDER — PANTOPRAZOLE SODIUM 40 MG/1
40 TABLET, DELAYED RELEASE ORAL
Status: DISCONTINUED | OUTPATIENT
Start: 2020-11-11 | End: 2020-11-12 | Stop reason: HOSPADM

## 2020-11-10 RX ORDER — UREA 10 %
5 LOTION (ML) TOPICAL DAILY
Status: DISCONTINUED | OUTPATIENT
Start: 2020-11-11 | End: 2020-11-12 | Stop reason: HOSPADM

## 2020-11-10 RX ORDER — PRAZOSIN HYDROCHLORIDE 1 MG/1
1 CAPSULE ORAL NIGHTLY
Status: DISCONTINUED | OUTPATIENT
Start: 2020-11-10 | End: 2020-11-12 | Stop reason: HOSPADM

## 2020-11-10 RX ADMIN — ONDANSETRON 4 MG: 2 INJECTION INTRAMUSCULAR; INTRAVENOUS at 23:40

## 2020-11-10 RX ADMIN — SODIUM CHLORIDE, POTASSIUM CHLORIDE, SODIUM LACTATE AND CALCIUM CHLORIDE 1000 ML: 600; 310; 30; 20 INJECTION, SOLUTION INTRAVENOUS at 18:36

## 2020-11-10 RX ADMIN — FENTANYL CITRATE 50 MCG: 50 INJECTION, SOLUTION INTRAMUSCULAR; INTRAVENOUS at 17:14

## 2020-11-10 RX ADMIN — KETOROLAC TROMETHAMINE 15 MG: 15 INJECTION, SOLUTION INTRAMUSCULAR; INTRAVENOUS at 17:08

## 2020-11-10 RX ADMIN — FENTANYL CITRATE 50 MCG: 50 INJECTION, SOLUTION INTRAMUSCULAR; INTRAVENOUS at 18:36

## 2020-11-10 RX ADMIN — ONDANSETRON 4 MG: 2 INJECTION INTRAMUSCULAR; INTRAVENOUS at 17:08

## 2020-11-10 RX ADMIN — MORPHINE SULFATE 4 MG: 4 INJECTION INTRAVENOUS at 23:39

## 2020-11-10 ASSESSMENT — PAIN SCALES - GENERAL
PAINLEVEL_OUTOF10: 10
PAINLEVEL_OUTOF10: 8
PAINLEVEL_OUTOF10: 7
PAINLEVEL_OUTOF10: 7

## 2020-11-10 ASSESSMENT — ENCOUNTER SYMPTOMS
SHORTNESS OF BREATH: 0
CHEST TIGHTNESS: 0
NAUSEA: 0
DIARRHEA: 0
ABDOMINAL PAIN: 0
VOMITING: 0

## 2020-11-10 NOTE — ED PROVIDER NOTES
[facility] ED     Emergency Department     Faculty Attestation    I performed a history and physical examination of the patient and discussed management with the resident. I reviewed the residents note and agree with the documented findings and plan of care. Any areas of disagreement are noted on the chart. I was personally present for the key portions of any procedures. I have documented in the chart those procedures where I was not present during the key portions. I have reviewed the emergency nurses triage note. I agree with the chief complaint, past medical history, past surgical history, allergies, medications, social and family history as documented unless otherwise noted below. For Physician Assistant/ Nurse Practitioner cases/documentation I have personally evaluated this patient and have completed at least one if not all key elements of the E/M (history, physical exam, and MDM). Additional findings are as noted. Patient with history of kidney stones presents with right flank pain that she has had for the past 3 days. She says it feels similar to when she is had a kidney stone in the past.  She denies fever, chills, chest pain, shortness of breath, dysuria, hematuria, changes in bowel movements. On exam, patient is lying in the bed and appears uncomfortable. Lungs are clear to auscultation bilaterally heart sounds are normal.  Abdomen is soft and nontender. There is moderate right CVA tenderness. We will get a CT scan of the abdomen as well as labs. Will treat patient's pain and reassess.       Willian Baires MD  Attending Emergency  Physician              Tyrone Sosa MD  11/10/20 1986

## 2020-11-10 NOTE — ED PROVIDER NOTES
[facility] ED  Emergency Department Encounter  Emergency Medicine Resident     Pt Name: Da Huston  MRN: [id number]  Markygfshiva 1975  Date of evaluation: 11/10/20  PCP:  Pilar Germain       Chief Complaint   Patient presents with    Flank Pain       HISTORY [facility]  (Location/Symptom, Timing/Onset, Context/Setting, Quality, Duration, Modifying Factors,Severity.)      Da Huston is a 54 y.o. female with a history of kidney stones in 2017 who presents with right flank pain. Symptoms started 3 days ago. Pain was intermittent, throbbing and 7/10 in severity. Pain is localized to the right flank without radiation to other areas. Patient states that the pain became constant this morning. She denies fever, chills, headache, dizziness, chest pain, shortness of breath, abdominal pain, nausea, vomiting, diarrhea, hematuria, dysuria, urinary urgency, or increased urinary frequency. Patient states pain is exactly the same as her last kidney stone, which was impacted and required surgical removal.    PAST MEDICAL / SURGICAL / SOCIAL / FAMILY HISTORY      has a past medical history of Abnormal levels of other serum enzymes, Anxiety, Bilateral leg edema, Chronic kidney disease, Depression, DVT (deep venous thrombosis) (HCC), Elevated liver enzymes, Fibromyalgia, Headache(784.0), Hx of blood clots, Hypertension, Kidney stone, Obstructive sleep apnea syndrome, Pancreatitis, Pleural effusion, SOB (shortness of breath), Supraventricular tachycardia ([address] and SVT (supraventricular tachycardia) ([address] has a past surgical history that includes Thyroid lobectomy (Right); Cholecystectomy (2001); Knee arthroscopy (Left); Foot surgery (Right); Endometrial ablation; Atrial ablation surgery; eye surgery (Bilateral);  Endoscopy, colon, diagnostic; back surgery; pr cysto/uretero/pyeloscopy w/lithotripsy (Left, 9/20/2017); and EXCISION LESION HAND / FINGER (Right, 2019). Social History     Socioeconomic History    Marital status:      Spouse name: Not on file    Number of children: Not on file    Years of education: Not on file    Highest education level: Not on file   Occupational History    Not on file   Social Needs    Financial resource strain: Not on file    Food insecurity     Worry: Not on file     Inability: Not on file    Transportation needs     Medical: Not on file     Non-medical: Not on file   Tobacco Use    Smoking status: Former Smoker     Packs/day: 1.00     Types: Cigarettes     Last attempt to quit: 2019     Years since quittin.9    Smokeless tobacco: Never Used    Tobacco comment: today last smoke   Substance and Sexual Activity    Alcohol use: Yes     Alcohol/week: 0.0 standard drinks     Comment: rarely    Drug use: No    Sexual activity: Not on file   Lifestyle    Physical activity     Days per week: Not on file     Minutes per session: Not on file    Stress: Not on file   Relationships    Social connections     Talks on phone: Not on file     Gets together: Not on file     Attends Evangelical service: Not on file     Active member of club or organization: Not on file     Attends meetings of clubs or organizations: Not on file     Relationship status: Not on file    Intimate partner violence     Fear of current or ex partner: Not on file     Emotionally abused: Not on file     Physically abused: Not on file     Forced sexual activity: Not on file   Other Topics Concern    Not on file   Social History Narrative    Not on file       Family History   Problem Relation Age of Onset    Heart Disease Father     High Blood Pressure Brother         Allergies:  Aripiprazole; Eletriptan; Hydrocodone-acetaminophen; Oxycodone-acetaminophen; Sumatriptan; Triptans; Zosyn [piperacillin sod-tazobactam so]; and Lamotrigine    Home Medications:  Prior to Admission medications    Medication Sig Start Date End Date Taking? Authorizing Provider   losartan (COZAAR) 50 MG tablet Take 50 mg by mouth daily   Yes Historical Provider, MD   amitriptyline (ELAVIL) 25 MG tablet Take 25 mg by mouth nightly   Yes Historical Provider, MD   prazosin (MINIPRESS) 1 MG capsule Take 1 mg by mouth nightly   Yes Historical Provider, MD   melatonin 5 MG TABS tablet Take 5 mg by mouth daily   Yes Historical Provider, MD   QUEtiapine (SEROQUEL XR) 400 MG extended release tablet Take 800 mg by mouth nightly   Yes Historical Provider, MD   busPIRone (BUSPAR) 15 MG tablet Take 15 mg by mouth nightly Can take once in morning PRN   Yes Historical Provider, MD   omeprazole (PRILOSEC) 40 MG delayed release capsule Take 40 mg by mouth daily   Yes Historical Provider, MD   Cholecalciferol (VITAMIN D) 50 MCG (2000 UT) CAPS capsule Take by mouth daily   Yes Historical Provider, MD   butalbital-APAP-caffeine (FIORICET) -40 MG CAPS per capsule Take 1 capsule by mouth every 6 hours as needed for Headaches 5/19/20 11/10/20 Yes Bearl Peoples, DO   albuterol sulfate HFA (PROVENTIL HFA) 108 (90 Base) MCG/ACT inhaler Inhale 2 puffs into the lungs as needed 12/28/18  Yes Historical Provider, MD   magnesium oxide (MAG-OX) 400 MG tablet Take 400 mg by mouth daily   Yes Historical Provider, MD   pramipexole (MIRAPEX) 0.5 MG tablet take 1 tablet by mouth twice a day  Patient taking differently: Take 0.25 mg by mouth daily take 1 tablet by mouth twice a day 11/7/18  Yes Braeden Rose MD   DULoxetine (CYMBALTA) 60 MG extended release capsule TAKE 1 CAPSULE BY MOUTH TWICE A DAY 8/3/18  Yes Braeden Rose MD   potassium chloride (KLOR-CON M) 10 MEQ extended release tablet Take 1 tablet by mouth 2 times daily 7/18/20   Enedina Guevara MD   fexofenadine (RA ALLERGY RELIEF) 180 MG tablet take 1 tablet by mouth once daily  Patient taking differently: Take 180 mg by mouth daily as needed take 1 tablet by mouth once daily 11/7/18   Braeden Rose MD       REVIEW Marty Nevarez Skin:     General: Skin is warm and dry. Capillary Refill: Capillary refill takes less than 2 seconds. Coloration: Skin is not jaundiced. Neurological:      General: No focal deficit present. Mental Status: She is alert and oriented to person, place, and time. Psychiatric:         Mood and Affect: Mood normal.         Behavior: Behavior normal.         DIFFERENTIAL  DIAGNOSIS     PLAN (LABS / IMAGING / EKG):  Orders Placed This Encounter   Procedures    CT ABDOMEN PELVIS WO CONTRAST Additional Contrast? None    CBC Auto Differential    Basic Metabolic Panel w/ Reflex to MG    HCG Qualitative, Serum    Urinalysis with Microscopic    Lipase    Hepatic Function Panel    PATIENT STATUS (FROM ED OR OR/PROCEDURAL) Observation       MEDICATIONS ORDERED:  Orders Placed This Encounter   Medications    ketorolac (TORADOL) injection 15 mg    ondansetron (ZOFRAN) injection 4 mg    fentaNYL (SUBLIMAZE) injection 50 mcg    fentaNYL (SUBLIMAZE) injection 50 mcg    lactated ringers infusion 1,000 mL       DDX: Nephrolithiasis, ureterolithiasis, pyelonephritis, cystitis, abdominal wall pain    Initial MDM/Plan: 54 y.o. female who presents with right flank pain, which is worsened over the last 3 days. Patient does have right CVA tenderness and has a high risk of nephrolithiasis per her history. Will obtain noncontrast CT of the abdomen to evaluate for stones, as well as BMP, CBC, hCG and urinalysis.     DIAGNOSTIC RESULTS / EMERGENCYDEPARTMENT COURSE / MDM     LABS:  Labs Reviewed   CBC WITH AUTO DIFFERENTIAL - Abnormal; Notable for the following components:       Result Value    WBC 11.6 (*)     RDW 15.9 (*)     All other components within normal limits   BASIC METABOLIC PANEL W/ REFLEX TO MG FOR LOW K - Abnormal; Notable for the following components:    CREATININE 0.48 (*)     All other components within normal limits   URINALYSIS WITH MICROSCOPIC - Abnormal; Notable for the following components: Specific Gravity, UA 1.035 (*)     Leukocyte Esterase, Urine MODERATE (*)     All other components within normal limits   LIPASE - Abnormal; Notable for the following components:    Lipase 6 (*)     All other components within normal limits   HEPATIC FUNCTION PANEL - Abnormal; Notable for the following components:    Alkaline Phosphatase 114 (*)     Total Bilirubin 0.17 (*)     All other components within normal limits   HCG, SERUM, QUALITATIVE         RADIOLOGY:  Ct Abdomen Pelvis Wo Contrast Additional Contrast? None    Result Date: 11/10/2020  EXAMINATION: CT OF THE ABDOMEN AND PELVIS WITHOUT CONTRAST 11/10/2020 5:45 pm TECHNIQUE: CT of the abdomen and pelvis was performed without the administration of intravenous contrast. Multiplanar reformatted images are provided for review. Dose modulation, iterative reconstruction, and/or weight based adjustment of the mA/kV was utilized to reduce the radiation dose to as low as reasonably achievable. COMPARISON: September 23, 2020 HISTORY: ORDERING SYSTEM PROVIDED HISTORY: right flank pain, rule out nephrolithiasis TECHNOLOGIST PROVIDED HISTORY: right flank pain, rule out nephrolithiasis Is the patient pregnant?->No Reason for Exam: right flank pain Acuity: Acute Type of Exam: Initial FINDINGS: Lower Chest: Lung bases are clear. Organs: Evaluation of the solid organs is limited without intravenous contrast. Decreased attenuation of the liver is consistent with hepatic steatosis. No focal liver lesion. Cholecystectomy. Mild peripancreatic inflammatory changes along the tail of the pancreas. No discrete pancreatic lesion. No splenomegaly. No adrenal lesion. No hydronephrosis. No renal calculus. Calcified right renal lesion measures 11 mm. GI/Bowel: No bowel obstruction. No adjacent inflammatory process. No appendicitis. Pelvis: No free fluid. Essure devices.   Slightly lobulated appearance of the uterus which may be on the basis of multiple small uterine

## 2020-11-11 ENCOUNTER — APPOINTMENT (OUTPATIENT)
Dept: CT IMAGING | Age: 45
DRG: 440 | End: 2020-11-11
Payer: COMMERCIAL

## 2020-11-11 LAB
ABSOLUTE EOS #: 0.34 K/UL (ref 0–0.44)
ABSOLUTE IMMATURE GRANULOCYTE: <0.03 K/UL (ref 0–0.3)
ABSOLUTE LYMPH #: 3.22 K/UL (ref 1.1–3.7)
ABSOLUTE MONO #: 0.34 K/UL (ref 0.1–1.2)
ALBUMIN SERPL-MCNC: 3.6 G/DL (ref 3.5–5.2)
ALBUMIN/GLOBULIN RATIO: 1.3 (ref 1–2.5)
ALP BLD-CCNC: 129 U/L (ref 35–104)
ALT SERPL-CCNC: 39 U/L (ref 5–33)
ANION GAP SERPL CALCULATED.3IONS-SCNC: 10 MMOL/L (ref 9–17)
AST SERPL-CCNC: 83 U/L
BASOPHILS # BLD: 1 % (ref 0–2)
BASOPHILS ABSOLUTE: 0.04 K/UL (ref 0–0.2)
BILIRUB SERPL-MCNC: 0.34 MG/DL (ref 0.3–1.2)
BUN BLDV-MCNC: 14 MG/DL (ref 6–20)
BUN/CREAT BLD: ABNORMAL (ref 9–20)
CALCIUM SERPL-MCNC: 8.4 MG/DL (ref 8.6–10.4)
CHLORIDE BLD-SCNC: 101 MMOL/L (ref 98–107)
CO2: 27 MMOL/L (ref 20–31)
CREAT SERPL-MCNC: 0.41 MG/DL (ref 0.5–0.9)
DIFFERENTIAL TYPE: ABNORMAL
EOSINOPHILS RELATIVE PERCENT: 4 % (ref 1–4)
GFR AFRICAN AMERICAN: >60 ML/MIN
GFR NON-AFRICAN AMERICAN: >60 ML/MIN
GFR SERPL CREATININE-BSD FRML MDRD: ABNORMAL ML/MIN/{1.73_M2}
GFR SERPL CREATININE-BSD FRML MDRD: ABNORMAL ML/MIN/{1.73_M2}
GLUCOSE BLD-MCNC: 113 MG/DL (ref 70–99)
HCT VFR BLD CALC: 35.4 % (ref 36.3–47.1)
HEMOGLOBIN: 11.3 G/DL (ref 11.9–15.1)
IMMATURE GRANULOCYTES: 0 %
LIPASE: 6 U/L (ref 13–60)
LYMPHOCYTES # BLD: 37 % (ref 24–43)
MCH RBC QN AUTO: 30 PG (ref 25.2–33.5)
MCHC RBC AUTO-ENTMCNC: 31.9 G/DL (ref 28.4–34.8)
MCV RBC AUTO: 93.9 FL (ref 82.6–102.9)
MONOCYTES # BLD: 4 % (ref 3–12)
NRBC AUTOMATED: 0 PER 100 WBC
PDW BLD-RTO: 16 % (ref 11.8–14.4)
PLATELET # BLD: 234 K/UL (ref 138–453)
PLATELET ESTIMATE: ABNORMAL
PMV BLD AUTO: 10.4 FL (ref 8.1–13.5)
POTASSIUM SERPL-SCNC: 3.9 MMOL/L (ref 3.7–5.3)
RBC # BLD: 3.77 M/UL (ref 3.95–5.11)
RBC # BLD: ABNORMAL 10*6/UL
SEG NEUTROPHILS: 54 % (ref 36–65)
SEGMENTED NEUTROPHILS ABSOLUTE COUNT: 4.8 K/UL (ref 1.5–8.1)
SODIUM BLD-SCNC: 138 MMOL/L (ref 135–144)
TOTAL PROTEIN: 6.4 G/DL (ref 6.4–8.3)
WBC # BLD: 8.8 K/UL (ref 3.5–11.3)
WBC # BLD: ABNORMAL 10*3/UL

## 2020-11-11 PROCEDURE — 96375 TX/PRO/DX INJ NEW DRUG ADDON: CPT

## 2020-11-11 PROCEDURE — 80053 COMPREHEN METABOLIC PANEL: CPT

## 2020-11-11 PROCEDURE — 6370000000 HC RX 637 (ALT 250 FOR IP): Performed by: HEALTH CARE PROVIDER

## 2020-11-11 PROCEDURE — 6360000004 HC RX CONTRAST MEDICATION: Performed by: EMERGENCY MEDICINE

## 2020-11-11 PROCEDURE — 85025 COMPLETE CBC W/AUTO DIFF WBC: CPT

## 2020-11-11 PROCEDURE — 96372 THER/PROPH/DIAG INJ SC/IM: CPT

## 2020-11-11 PROCEDURE — 6360000002 HC RX W HCPCS: Performed by: STUDENT IN AN ORGANIZED HEALTH CARE EDUCATION/TRAINING PROGRAM

## 2020-11-11 PROCEDURE — 96376 TX/PRO/DX INJ SAME DRUG ADON: CPT

## 2020-11-11 PROCEDURE — 2580000003 HC RX 258: Performed by: HEALTH CARE PROVIDER

## 2020-11-11 PROCEDURE — 1200000000 HC SEMI PRIVATE

## 2020-11-11 PROCEDURE — 83690 ASSAY OF LIPASE: CPT

## 2020-11-11 PROCEDURE — 6360000002 HC RX W HCPCS: Performed by: HEALTH CARE PROVIDER

## 2020-11-11 PROCEDURE — 74177 CT ABD & PELVIS W/CONTRAST: CPT

## 2020-11-11 PROCEDURE — 6370000000 HC RX 637 (ALT 250 FOR IP): Performed by: STUDENT IN AN ORGANIZED HEALTH CARE EDUCATION/TRAINING PROGRAM

## 2020-11-11 RX ORDER — PROMETHAZINE HYDROCHLORIDE 25 MG/ML
12.5 INJECTION, SOLUTION INTRAMUSCULAR; INTRAVENOUS EVERY 6 HOURS PRN
Status: DISCONTINUED | OUTPATIENT
Start: 2020-11-11 | End: 2020-11-12 | Stop reason: HOSPADM

## 2020-11-11 RX ORDER — OXYCODONE HYDROCHLORIDE 5 MG/1
5 TABLET ORAL EVERY 4 HOURS PRN
Status: DISCONTINUED | OUTPATIENT
Start: 2020-11-11 | End: 2020-11-12 | Stop reason: HOSPADM

## 2020-11-11 RX ORDER — HYDROXYZINE HYDROCHLORIDE 10 MG/1
10 TABLET, FILM COATED ORAL EVERY 6 HOURS PRN
Status: DISCONTINUED | OUTPATIENT
Start: 2020-11-11 | End: 2020-11-12 | Stop reason: HOSPADM

## 2020-11-11 RX ORDER — DIPHENHYDRAMINE HCL 25 MG
25 TABLET ORAL EVERY 6 HOURS PRN
Status: DISCONTINUED | OUTPATIENT
Start: 2020-11-11 | End: 2020-11-12 | Stop reason: HOSPADM

## 2020-11-11 RX ORDER — SODIUM CHLORIDE 0.9 % (FLUSH) 0.9 %
10 SYRINGE (ML) INJECTION PRN
Status: DISCONTINUED | OUTPATIENT
Start: 2020-11-11 | End: 2020-11-12 | Stop reason: HOSPADM

## 2020-11-11 RX ORDER — MORPHINE SULFATE 4 MG/ML
4 INJECTION, SOLUTION INTRAMUSCULAR; INTRAVENOUS EVERY 4 HOURS PRN
Status: DISCONTINUED | OUTPATIENT
Start: 2020-11-11 | End: 2020-11-12 | Stop reason: HOSPADM

## 2020-11-11 RX ORDER — OXYCODONE HYDROCHLORIDE 5 MG/1
5 TABLET ORAL EVERY 6 HOURS PRN
Qty: 12 TABLET | Refills: 0 | Status: SHIPPED | OUTPATIENT
Start: 2020-11-11 | End: 2020-11-12 | Stop reason: HOSPADM

## 2020-11-11 RX ORDER — SODIUM CHLORIDE 0.9 % (FLUSH) 0.9 %
10 SYRINGE (ML) INJECTION EVERY 12 HOURS SCHEDULED
Status: DISCONTINUED | OUTPATIENT
Start: 2020-11-11 | End: 2020-11-12 | Stop reason: HOSPADM

## 2020-11-11 RX ADMIN — PANTOPRAZOLE SODIUM 40 MG: 40 TABLET, DELAYED RELEASE ORAL at 16:50

## 2020-11-11 RX ADMIN — LOSARTAN POTASSIUM 50 MG: 50 TABLET, FILM COATED ORAL at 09:49

## 2020-11-11 RX ADMIN — DULOXETINE HYDROCHLORIDE 60 MG: 30 CAPSULE, DELAYED RELEASE ORAL at 09:50

## 2020-11-11 RX ADMIN — MORPHINE SULFATE 4 MG: 4 INJECTION INTRAVENOUS at 20:00

## 2020-11-11 RX ADMIN — OXYCODONE HYDROCHLORIDE 5 MG: 5 TABLET ORAL at 23:29

## 2020-11-11 RX ADMIN — QUETIAPINE FUMARATE 800 MG: 300 TABLET, EXTENDED RELEASE ORAL at 01:08

## 2020-11-11 RX ADMIN — DULOXETINE HYDROCHLORIDE 60 MG: 30 CAPSULE, DELAYED RELEASE ORAL at 20:00

## 2020-11-11 RX ADMIN — ONDANSETRON 4 MG: 2 INJECTION INTRAMUSCULAR; INTRAVENOUS at 12:34

## 2020-11-11 RX ADMIN — OXYCODONE HYDROCHLORIDE 5 MG: 5 TABLET ORAL at 14:12

## 2020-11-11 RX ADMIN — IOPAMIDOL 75 ML: 755 INJECTION, SOLUTION INTRAVENOUS at 09:03

## 2020-11-11 RX ADMIN — SODIUM CHLORIDE, POTASSIUM CHLORIDE, SODIUM LACTATE AND CALCIUM CHLORIDE: 600; 310; 30; 20 INJECTION, SOLUTION INTRAVENOUS at 12:34

## 2020-11-11 RX ADMIN — SODIUM CHLORIDE, POTASSIUM CHLORIDE, SODIUM LACTATE AND CALCIUM CHLORIDE: 600; 310; 30; 20 INJECTION, SOLUTION INTRAVENOUS at 20:15

## 2020-11-11 RX ADMIN — MORPHINE SULFATE 4 MG: 4 INJECTION INTRAVENOUS at 05:53

## 2020-11-11 RX ADMIN — DIPHENHYDRAMINE HCL 25 MG: 25 TABLET ORAL at 14:12

## 2020-11-11 RX ADMIN — PROMETHAZINE HYDROCHLORIDE 12.5 MG: 25 INJECTION INTRAMUSCULAR; INTRAVENOUS at 15:02

## 2020-11-11 RX ADMIN — MORPHINE SULFATE 4 MG: 4 INJECTION INTRAVENOUS at 08:57

## 2020-11-11 RX ADMIN — PRAZOSIN HYDROCHLORIDE 1 MG: 1 CAPSULE ORAL at 20:00

## 2020-11-11 RX ADMIN — CEPHALEXIN 250 MG: 250 CAPSULE ORAL at 12:54

## 2020-11-11 RX ADMIN — PRAMIPEXOLE DIHYDROCHLORIDE 0.25 MG: 0.25 TABLET ORAL at 09:49

## 2020-11-11 RX ADMIN — AMITRIPTYLINE HYDROCHLORIDE 25 MG: 25 TABLET, FILM COATED ORAL at 01:08

## 2020-11-11 RX ADMIN — CEPHALEXIN 250 MG: 250 CAPSULE ORAL at 01:14

## 2020-11-11 RX ADMIN — ENOXAPARIN SODIUM 40 MG: 40 INJECTION SUBCUTANEOUS at 09:51

## 2020-11-11 RX ADMIN — MORPHINE SULFATE 4 MG: 4 INJECTION INTRAVENOUS at 02:52

## 2020-11-11 RX ADMIN — Medication 1000 UNITS: at 09:50

## 2020-11-11 RX ADMIN — MAGNESIUM GLUCONATE 500 MG ORAL TABLET 400 MG: 500 TABLET ORAL at 09:49

## 2020-11-11 RX ADMIN — SODIUM CHLORIDE, POTASSIUM CHLORIDE, SODIUM LACTATE AND CALCIUM CHLORIDE: 600; 310; 30; 20 INJECTION, SOLUTION INTRAVENOUS at 01:11

## 2020-11-11 RX ADMIN — Medication 5 MG: at 20:00

## 2020-11-11 RX ADMIN — OXYCODONE HYDROCHLORIDE 5 MG: 5 TABLET ORAL at 18:36

## 2020-11-11 RX ADMIN — CEPHALEXIN 250 MG: 250 CAPSULE ORAL at 05:53

## 2020-11-11 RX ADMIN — HYDROXYZINE HYDROCHLORIDE 10 MG: 10 TABLET ORAL at 23:30

## 2020-11-11 RX ADMIN — MORPHINE SULFATE 4 MG: 4 INJECTION INTRAVENOUS at 15:29

## 2020-11-11 RX ADMIN — PANTOPRAZOLE SODIUM 40 MG: 40 TABLET, DELAYED RELEASE ORAL at 06:43

## 2020-11-11 RX ADMIN — ONDANSETRON 4 MG: 2 INJECTION INTRAMUSCULAR; INTRAVENOUS at 20:16

## 2020-11-11 RX ADMIN — HYDROXYZINE HYDROCHLORIDE 10 MG: 10 TABLET ORAL at 16:50

## 2020-11-11 RX ADMIN — DULOXETINE HYDROCHLORIDE 60 MG: 30 CAPSULE, DELAYED RELEASE ORAL at 01:08

## 2020-11-11 RX ADMIN — BUSPIRONE HYDROCHLORIDE 15 MG: 15 TABLET ORAL at 01:08

## 2020-11-11 RX ADMIN — QUETIAPINE FUMARATE 800 MG: 300 TABLET, EXTENDED RELEASE ORAL at 22:14

## 2020-11-11 RX ADMIN — MORPHINE SULFATE 4 MG: 4 INJECTION INTRAVENOUS at 12:34

## 2020-11-11 RX ADMIN — PRAZOSIN HYDROCHLORIDE 1 MG: 1 CAPSULE ORAL at 01:08

## 2020-11-11 RX ADMIN — ONDANSETRON 4 MG: 2 INJECTION INTRAMUSCULAR; INTRAVENOUS at 05:56

## 2020-11-11 RX ADMIN — BUSPIRONE HYDROCHLORIDE 15 MG: 15 TABLET ORAL at 20:00

## 2020-11-11 RX ADMIN — AMITRIPTYLINE HYDROCHLORIDE 25 MG: 25 TABLET, FILM COATED ORAL at 20:00

## 2020-11-11 ASSESSMENT — PAIN SCALES - GENERAL
PAINLEVEL_OUTOF10: 7
PAINLEVEL_OUTOF10: 7
PAINLEVEL_OUTOF10: 5
PAINLEVEL_OUTOF10: 6
PAINLEVEL_OUTOF10: 8
PAINLEVEL_OUTOF10: 7
PAINLEVEL_OUTOF10: 6
PAINLEVEL_OUTOF10: 8
PAINLEVEL_OUTOF10: 8
PAINLEVEL_OUTOF10: 7
PAINLEVEL_OUTOF10: 5

## 2020-11-11 ASSESSMENT — ENCOUNTER SYMPTOMS
DIARRHEA: 0
SHORTNESS OF BREATH: 0
ABDOMINAL PAIN: 1
SORE THROAT: 0
RHINORRHEA: 0
EYE PAIN: 0
COUGH: 0
CONSTIPATION: 0

## 2020-11-11 ASSESSMENT — PAIN DESCRIPTION - DESCRIPTORS: DESCRIPTORS: THROBBING

## 2020-11-11 NOTE — ED NOTES
Received report from Shiloh, RN all questions answered. Pt. Resting cot, resp. Even and non-labored. NAD noted Will continue to monitor.       Osmany Kinsey RN  11/10/20 1933

## 2020-11-11 NOTE — ED NOTES
Report received from FRANKO Glass  Pt resting on cot, RR even and unlabored, NAD, A&O.   Call light in reach, denies any needs     Anna Rodriguez RN  11/11/20 0189

## 2020-11-11 NOTE — ED PROVIDER NOTES
[facility]  ED  Emergency Department  Emergency Medicine Resident Sign-out     Care of Tressa Osorio was assumed from Dr. Isabella Tanner and is being seen for Flank Pain  . The patient's initial evaluation and plan have been discussed with the prior provider who initially evaluated the patient. EMERGENCY DEPARTMENT COURSE / MEDICAL DECISION MAKING:       MEDICATIONS GIVEN:  Orders Placed This Encounter   Medications    ketorolac (TORADOL) injection 15 mg    ondansetron (ZOFRAN) injection 4 mg    fentaNYL (SUBLIMAZE) injection 50 mcg    fentaNYL (SUBLIMAZE) injection 50 mcg    lactated ringers infusion 1,000 mL       LABS / RADIOLOGY:     Labs Reviewed   CBC WITH AUTO DIFFERENTIAL - Abnormal; Notable for the following components:       Result Value    WBC 11.6 (*)     RDW 15.9 (*)     All other components within normal limits   BASIC METABOLIC PANEL W/ REFLEX TO MG FOR LOW K - Abnormal; Notable for the following components:    CREATININE 0.48 (*)     All other components within normal limits   URINALYSIS WITH MICROSCOPIC - Abnormal; Notable for the following components:    Specific Gravity, UA 1.035 (*)     Leukocyte Esterase, Urine MODERATE (*)     All other components within normal limits   LIPASE - Abnormal; Notable for the following components:    Lipase 6 (*)     All other components within normal limits   HEPATIC FUNCTION PANEL - Abnormal; Notable for the following components:    Alkaline Phosphatase 114 (*)     Total Bilirubin 0.17 (*)     All other components within normal limits   HCG, SERUM, QUALITATIVE       Ct Abdomen Pelvis Wo Contrast Additional Contrast? None    Result Date: 11/10/2020  EXAMINATION: CT OF THE ABDOMEN AND PELVIS WITHOUT CONTRAST 11/10/2020 5:45 pm TECHNIQUE: CT of the abdomen and pelvis was performed without the administration of intravenous contrast. Multiplanar reformatted images are provided for review.  Dose modulation, iterative reconstruction, and/or weight based adjustment of the mA/kV was utilized to reduce the radiation dose to as low as reasonably achievable. COMPARISON: September 23, 2020 HISTORY: ORDERING SYSTEM PROVIDED HISTORY: right flank pain, rule out nephrolithiasis TECHNOLOGIST PROVIDED HISTORY: right flank pain, rule out nephrolithiasis Is the patient pregnant?->No Reason for Exam: right flank pain Acuity: Acute Type of Exam: Initial FINDINGS: Lower Chest: Lung bases are clear. Organs: Evaluation of the solid organs is limited without intravenous contrast. Decreased attenuation of the liver is consistent with hepatic steatosis. No focal liver lesion. Cholecystectomy. Mild peripancreatic inflammatory changes along the tail of the pancreas. No discrete pancreatic lesion. No splenomegaly. No adrenal lesion. No hydronephrosis. No renal calculus. Calcified right renal lesion measures 11 mm. GI/Bowel: No bowel obstruction. No adjacent inflammatory process. No appendicitis. Pelvis: No free fluid. Essure devices. Slightly lobulated appearance of the uterus which may be on the basis of multiple small uterine leiomyoma. No bladder calculus. Peritoneum/Retroperitoneum: Multiple prominent lymph nodes surround the pancreas which are suspected to be reactive. No abdominal aortic aneurysm. Bones/Soft Tissues: No acute soft tissue abnormality. Lumbar spine degenerative changes. Acute pancreatitis. No focal drainable fluid collection. Hepatic steatosis. No hydronephrosis. Calcified right renal lesion measuring 11 mm is stable since 2017. RECENT VITALS:     Temp: 97 °F (36.1 °C),  Pulse: 90, Resp: 18, BP: (!) 147/94, SpO2: 96 %    This patient is a 54 y.o. Female with flank  pain, abdominal pain, certain for possible kidney stone. ET abdomen consistent with acute on chronic pancreatitis. lipase 6. Concern for pancreatitis without elevated lipase in setting of chronic necrotizing pancreatitis. UA positive. Patient started on Keflex. Plan admit to ETU for pain control, antiemetics, fluids. OUTSTANDING TASKS / RECOMMENDATIONS:    1. Awaiting bed     FINAL IMPRESSION:     1. Acute on chronic pancreatitis ([facility] [address]        DISPOSITION:         DISPOSITION:  []  Discharge   []  Transfer -    [x]  Admission -   ETU   []  Against Medical Advice   []  Eloped   FOLLOW-UP: No follow-up provider specified.    DISCHARGE MEDICATIONS: New Prescriptions    No medications on file          Pretty Solis DO  Emergency Medicine Resident  [facility]       [facility]ra[facility] RamirezCentral [facility]  Resident  11/10/20 [id number]

## 2020-11-11 NOTE — ED NOTES
Bed: 47PED  Expected date: 11/10/20  Expected time: 11:59 PM  Means of arrival:   Comments:  [address], RN  11/11/20 6336

## 2020-11-11 NOTE — H&P
[address] [place] [address]  CDU / [facility] NOTE     Pt Name: Tressa Osorio  MRN: [id number]  [place] 1975  Date of evaluation: 11/11/20  Patient's PCP is :  Pilar Germain       Chief Complaint   Patient presents with    Flank Pain         HISTORY OF PRESENT ILLNESS    Tressa Osorio is a 54 y.o. female past medical history significant for chronic pancreatitis with previous necrotizing pancreatitis, follows with GI doctor at [facility]; Dr. Vikash Ernst. Who presents with acute right-sided abdominal pain. Patient states that she has had kidney stones in the past and this pain felt similar to her previous kidney stones. Imaging was obtained in the emergency department which was significant for acute on chronic pancreatitis. Likely the etiology of patient's abdominal pain. Patient to be n.p.o. with IV fluid resuscitation and pain control. Patient is requesting to get CT scan with IV contrast as she was scheduled as outpatient to get CT scan with IV contrast today per her GI doctor at [facility]. We will obtain this imaging. Location/Symptom: Right-sided abdominal pain  Timing/Onset: Acute on chronic  Provocation: Eating and palpation  Quality: Crampy, sharp  Radiation: Right back  Severity: Moderate to severe  Timing/Duration: Acute worsening, persistent since onset  Modifying Factors: None    REVIEW OF SYSTEMS       Review of Systems   Constitutional: Negative for activity change, chills and fever. HENT: Negative for congestion, rhinorrhea and sore throat. Eyes: Negative for pain and visual disturbance. Respiratory: Negative for cough and shortness of breath. Cardiovascular: Negative for chest pain and palpitations. Gastrointestinal: Positive for abdominal pain. Negative for constipation and diarrhea. Genitourinary: Negative for difficulty urinating and frequency.    Musculoskeletal: Negative for arthralgias and myalgias. Skin: Negative for rash and wound. Neurological: Negative for dizziness and headaches. (PQRS) Advance directives on face sheet per hospital policy. No change unless specifically mentioned in chart    PAST MEDICAL HISTORY    has a past medical history of Abnormal levels of other serum enzymes, Anxiety, Bilateral leg edema, Chronic kidney disease, Depression, DVT (deep venous thrombosis) (HCC), Elevated liver enzymes, Fibromyalgia, Headache(784.0), Hx of blood clots, Hypertension, Kidney stone, Obstructive sleep apnea syndrome, Pancreatitis, Pleural effusion, SOB (shortness of breath), Supraventricular tachycardia ([address] and SVT (supraventricular tachycardia) ([address] I have reviewed the past medical history with the patient and it is pertinent to this complaint. SURGICAL HISTORY      has a past surgical history that includes Thyroid lobectomy (Right); Cholecystectomy (2001); Knee arthroscopy (Left); Foot surgery (Right); Endometrial ablation; Atrial ablation surgery; eye surgery (Bilateral); Endoscopy, colon, diagnostic; back surgery; pr cysto/uretero/pyeloscopy w/lithotripsy (Left, 9/20/2017); and EXCISION LESION HAND / FINGER (Right, 1/25/2019). I have reviewed and agree with Surgical History entered and it is pertinent to this complaint.      CURRENT MEDICATIONS     iopamidol (ISOVUE-370) 76 % injection 75 mL, ONCE PRN  enoxaparin (LOVENOX) injection 40 mg, Daily  albuterol sulfate  (90 Base) MCG/ACT inhaler 2 puff, Q6H PRN  amitriptyline (ELAVIL) tablet 25 mg, Nightly  busPIRone (BUSPAR) tablet 15 mg, Nightly  Vitamin D (CHOLECALCIFEROL) tablet 1,000 Units, Daily  DULoxetine (CYMBALTA) extended release capsule 60 mg, BID  losartan (COZAAR) tablet 50 mg, Daily  magnesium oxide (MAG-OX) tablet 400 mg, Daily  melatonin tablet 5 mg, Daily  pantoprazole (PROTONIX) tablet 40 mg, BID AC  pramipexole (MIRAPEX) tablet 0.25 mg, Daily  prazosin (MINIPRESS) capsule 1 mg, Nightly  QUEtiapine regular rhythm. Heart sounds: No murmur. No friction rub. No gallop. Pulmonary:      Effort: Pulmonary effort is normal. No respiratory distress. Breath sounds: Normal breath sounds. Abdominal:      Palpations: Abdomen is soft. Tenderness: There is abdominal tenderness. Skin:     General: Skin is warm and dry. Neurological:      General: No focal deficit present. Mental Status: She is alert and oriented to person, place, and time. Psychiatric:         Mood and Affect: Mood normal.         Thought Content: Thought content normal.             DIAGNOSTIC RESULTS         Tia Sheets, DO        RADIOLOGY:   I directly visualized the following  images and reviewed the radiologist interpretations:    Ct Abdomen Pelvis Wo Contrast Additional Contrast? None    Result Date: 11/10/2020  EXAMINATION: CT OF THE ABDOMEN AND PELVIS WITHOUT CONTRAST 11/10/2020 5:45 pm TECHNIQUE: CT of the abdomen and pelvis was performed without the administration of intravenous contrast. Multiplanar reformatted images are provided for review. Dose modulation, iterative reconstruction, and/or weight based adjustment of the mA/kV was utilized to reduce the radiation dose to as low as reasonably achievable. COMPARISON: September 23, 2020 HISTORY: ORDERING SYSTEM PROVIDED HISTORY: right flank pain, rule out nephrolithiasis TECHNOLOGIST PROVIDED HISTORY: right flank pain, rule out nephrolithiasis Is the patient pregnant?->No Reason for Exam: right flank pain Acuity: Acute Type of Exam: Initial FINDINGS: Lower Chest: Lung bases are clear. Organs: Evaluation of the solid organs is limited without intravenous contrast. Decreased attenuation of the liver is consistent with hepatic steatosis. No focal liver lesion. Cholecystectomy. Mild peripancreatic inflammatory changes along the tail of the pancreas. No discrete pancreatic lesion. No splenomegaly. No adrenal lesion. No hydronephrosis. No renal calculus. Calcified right renal lesion measures 11 mm. GI/Bowel: No bowel obstruction. No adjacent inflammatory process. No appendicitis. Pelvis: No free fluid. Essure devices. Slightly lobulated appearance of the uterus which may be on the basis of multiple small uterine leiomyoma. No bladder calculus. Peritoneum/Retroperitoneum: Multiple prominent lymph nodes surround the pancreas which are suspected to be reactive. No abdominal aortic aneurysm. Bones/Soft Tissues: No acute soft tissue abnormality. Lumbar spine degenerative changes. Acute pancreatitis. No focal drainable fluid collection. Hepatic steatosis. No hydronephrosis. Calcified right renal lesion measuring 11 mm is stable since 2017. LABS:  I have reviewed and interpreted all available lab results.   Labs Reviewed   CBC WITH AUTO DIFFERENTIAL - Abnormal; Notable for the following components:       Result Value    WBC 11.6 (*)     RDW 15.9 (*)     All other components within normal limits   BASIC METABOLIC PANEL W/ REFLEX TO MG FOR LOW K - Abnormal; Notable for the following components:    CREATININE 0.48 (*)     All other components within normal limits   URINALYSIS WITH MICROSCOPIC - Abnormal; Notable for the following components:    Specific Gravity, UA 1.035 (*)     Leukocyte Esterase, Urine MODERATE (*)     All other components within normal limits   LIPASE - Abnormal; Notable for the following components:    Lipase 6 (*)     All other components within normal limits   HEPATIC FUNCTION PANEL - Abnormal; Notable for the following components:    Alkaline Phosphatase 114 (*)     Total Bilirubin 0.17 (*)     All other components within normal limits   COMPREHENSIVE METABOLIC PANEL - Abnormal; Notable for the following components:    Glucose 113 (*)     CREATININE 0.41 (*)     Calcium 8.4 (*)     Alkaline Phosphatase 129 (*)     ALT 39 (*)     AST 83 (*)     All other components within normal limits   LIPASE - Abnormal; Notable for the following components:    Lipase 6 (*)     All other components within normal limits   CBC WITH AUTO DIFFERENTIAL - Abnormal; Notable for the following components:    RBC 3.77 (*)     Hemoglobin 11.3 (*)     Hematocrit 35.4 (*)     RDW 16.0 (*)     All other components within normal limits   HCG, SERUM, QUALITATIVE              CDU IMPRESSION / PLAN      Melissa Allen is a 54 y.o. female who presents with acute abdominal pain    1. Acute abdominal pain  · Acute on chronic pancreatitis  · Patient follows with GI doctor at [facility] [facility]. Of note patient has had necrotizing pancreatitis in the past.  · N.p.o. and IV fluid resuscitation. · Patient was supposed to get outpatient abdominal imaging of CT with contrast today. We will obtain this imaging. · CT with IV contrast ordered. Will attempt to contact GI doctor at [facility] [facility]. · We will burn MRI disc for patient to take to GI doctor at [facility]. · Urinalysis showing moderate leukocyte esterase but no bacteria noted. Will not require antibiotics. · Still pending patient pain control, able to tolerate oral intake, obtaining CT abdomen with IV contrast imaging. · White blood cell count slightly elevated yesterday, on repeat no significant leukocytosis noted. Hemoglobin down from 12.7-11.3 likely delusional due to IV fluid resuscitation. No complainys of hematemesis or hematochezia    · Continue home medications and pain control  · Monitor vitals, labs, and imaging  · DISPO: pending consults and clinical improvement    CONSULTS:    None    PROCEDURES:  Not indicated       PATIENT REFERRED TO:    No follow-up provider specified. --  Shasta Deleon DO   Emergency Medicine Resident     This dictation was generated by voice recognition computer software. Although all attempts are made to edit the dictation for accuracy, there may be errors in the transcription that are not intended.

## 2020-11-11 NOTE — ED NOTES
Report given to [facility] [place], RN.  All questions answered     Cara Sung RN  11/11/20 5056

## 2020-11-11 NOTE — PROGRESS NOTES
Patient continues to have nausea and need for antiemetics. Likely DC tomorrow pending pain control, nausea control, and ability to tolerate oral intake.

## 2020-11-11 NOTE — ED NOTES
Blood labeled and sent to lab  Pt ambulated to restroom with a steady gait     Heather Gil RN  11/11/20 [id number]

## 2020-11-11 NOTE — ED NOTES
Pt resting on cot, RR even and unlabored, NAD, A&O, call light in reach, denies any needs     Greta Judge, FRANKO  11/11/20 0600

## 2020-11-11 NOTE — CARE COORDINATION
Case Management Initial Discharge Plan  Tamera Rome,             Met with:patient to discuss discharge plans. Information verified: address, contacts, phone number, , insurance Yes    Emergency Contact/Next of Kin name & number:   Rachael De Los Santos    No Parent  Other [phone]         PCP: Stacy Caceres  Date of last visit: 1 week    Insurance Provider: [facility]    Discharge Planning    Living Arrangements:  Family Members   Support Systems:  Children, Family Members    Home has 1 stories  1 stairs to climb to get into front door, 0 stairs to climb to reach second floor  Location of bedroom/bathroom in home [place]    Patient able to perform ADL's:Independent    Current Services (outpatient & in home) none  DME equipment: none  DME provider: none    Receiving oral anticoagulation therapy? No    If indicated:   Physician managing anticoagulation treatment: n/a  Where does patient obtain lab work for ATC treatment? n/a      Potential Assistance Needed:       Patient agreeable to home care: No  [place] of choice provided:  n/a    Prior SNF/Rehab Placement and Facility: none  Agreeable to SNF/Rehab: No  [place] of choice provided: n/a     Evaluation: no    Expected Discharge date:       Patient expects to be discharged to:  home  Follow Up Appointment: Best Day/ Time:      Transportation provider: family  Transportation arrangements needed for discharge: No    Readmission Risk              Risk of Unplanned Readmission:        0             Does patient have a readmission risk score greater than 14?: not calculated  If yes, follow-up appointment must be made within 7 days of discharge.      Goals of Care: pain control      Discharge Plan: Home independently pcp established has transportation          Electronically signed by Marcus Mariee RN on 11/10/20 at 10:34 PM EST

## 2020-11-12 VITALS
HEIGHT: 65 IN | TEMPERATURE: 97.7 F | OXYGEN SATURATION: 96 % | RESPIRATION RATE: 20 BRPM | BODY MASS INDEX: 42.99 KG/M2 | HEART RATE: 96 BPM | SYSTOLIC BLOOD PRESSURE: 116 MMHG | WEIGHT: 258 LBS | DIASTOLIC BLOOD PRESSURE: 68 MMHG

## 2020-11-12 PROCEDURE — 6370000000 HC RX 637 (ALT 250 FOR IP): Performed by: HEALTH CARE PROVIDER

## 2020-11-12 PROCEDURE — 6360000002 HC RX W HCPCS: Performed by: STUDENT IN AN ORGANIZED HEALTH CARE EDUCATION/TRAINING PROGRAM

## 2020-11-12 PROCEDURE — 6360000002 HC RX W HCPCS: Performed by: HEALTH CARE PROVIDER

## 2020-11-12 PROCEDURE — 2580000003 HC RX 258: Performed by: HEALTH CARE PROVIDER

## 2020-11-12 PROCEDURE — 6370000000 HC RX 637 (ALT 250 FOR IP): Performed by: STUDENT IN AN ORGANIZED HEALTH CARE EDUCATION/TRAINING PROGRAM

## 2020-11-12 RX ORDER — LIDOCAINE 50 MG/G
1 PATCH TOPICAL DAILY
Qty: 10 PATCH | Refills: 0 | Status: SHIPPED | OUTPATIENT
Start: 2020-11-12 | End: 2020-11-22

## 2020-11-12 RX ORDER — OXYCODONE HYDROCHLORIDE 5 MG/1
5 TABLET ORAL EVERY 6 HOURS PRN
Qty: 12 TABLET | Refills: 0 | Status: SHIPPED | OUTPATIENT
Start: 2020-11-12 | End: 2020-11-15

## 2020-11-12 RX ADMIN — MORPHINE SULFATE 4 MG: 4 INJECTION INTRAVENOUS at 05:11

## 2020-11-12 RX ADMIN — DIPHENHYDRAMINE HCL 25 MG: 25 TABLET ORAL at 01:59

## 2020-11-12 RX ADMIN — HYDROXYZINE HYDROCHLORIDE 10 MG: 10 TABLET ORAL at 08:27

## 2020-11-12 RX ADMIN — ENOXAPARIN SODIUM 40 MG: 40 INJECTION SUBCUTANEOUS at 08:28

## 2020-11-12 RX ADMIN — OXYCODONE HYDROCHLORIDE 5 MG: 5 TABLET ORAL at 08:19

## 2020-11-12 RX ADMIN — MAGNESIUM GLUCONATE 500 MG ORAL TABLET 400 MG: 500 TABLET ORAL at 08:27

## 2020-11-12 RX ADMIN — PROMETHAZINE HYDROCHLORIDE 12.5 MG: 25 INJECTION INTRAMUSCULAR; INTRAVENOUS at 09:14

## 2020-11-12 RX ADMIN — MORPHINE SULFATE 4 MG: 4 INJECTION INTRAVENOUS at 00:05

## 2020-11-12 RX ADMIN — LOSARTAN POTASSIUM 50 MG: 50 TABLET, FILM COATED ORAL at 08:27

## 2020-11-12 RX ADMIN — PRAMIPEXOLE DIHYDROCHLORIDE 0.25 MG: 0.25 TABLET ORAL at 08:27

## 2020-11-12 RX ADMIN — SODIUM CHLORIDE, POTASSIUM CHLORIDE, SODIUM LACTATE AND CALCIUM CHLORIDE: 600; 310; 30; 20 INJECTION, SOLUTION INTRAVENOUS at 05:17

## 2020-11-12 RX ADMIN — OXYCODONE HYDROCHLORIDE 5 MG: 5 TABLET ORAL at 03:51

## 2020-11-12 RX ADMIN — DULOXETINE HYDROCHLORIDE 60 MG: 30 CAPSULE, DELAYED RELEASE ORAL at 08:27

## 2020-11-12 RX ADMIN — Medication 1000 UNITS: at 08:27

## 2020-11-12 RX ADMIN — PANTOPRAZOLE SODIUM 40 MG: 40 TABLET, DELAYED RELEASE ORAL at 08:27

## 2020-11-12 RX ADMIN — ONDANSETRON 4 MG: 2 INJECTION INTRAMUSCULAR; INTRAVENOUS at 03:51

## 2020-11-12 ASSESSMENT — PAIN SCALES - GENERAL
PAINLEVEL_OUTOF10: 7
PAINLEVEL_OUTOF10: 8
PAINLEVEL_OUTOF10: 7
PAINLEVEL_OUTOF10: 7

## 2020-11-12 NOTE — PLAN OF CARE
Pt tolerated pancake/sausage/yogurt/cereal and OJ for breakfast.  States back flank pain and pancreatic pain continues.

## 2020-11-12 NOTE — ADT AUTH CERT
Utilization Reviews         PA RECOMMENDATION LETTER by Mary Ta RN         Review Status  Review Entered    In Primary  2020 10:01        Criteria Review    PA RECOMMENDATION LETTER       slr keep in   We recommend that the following pt's current hospitalization under INPATIENT   status  is APPROPRIATE  .    If you agree  with status change recommendation , please place a new order  as  recommended.     Name: Pilar Byrne   : 1975   CSN: [id number]   [address] [facility]                         Clinical summary        Epigastric pain in patient with chronic pancreatitis,  Vitals                           Stable   Labs and Imaging       CT + acute pancreatitis with scarring Lipase 6, required  Morphine IV x 4 since admission  and antiemetics     Comments       Still not tolerating PO well  note due to chronic pancreatitis    lipase is not elevated      This chart was reviewed at 8:59 AM 2020     Sasha Marquez MD   Physician [facility]        Pancreatitis - Care Day 2 (2020) by Kelli Francis RN         Review Status  Review Entered    Completed  2020 12:35        Criteria Review       Care Day: 2 Care Date: 2020 Level of Care:    Guideline Day 1    Level Of Care    (X) ICU [D] or floor    2020 12:35 PM EST by Virginia Weir      OBS    Clinical Status    (X) * Clinical Indications met [E]    2020 12:35 PM EST by Virginia Weir      20 0345   --   16   85   149/95 96% RA  20 1148   98 (36.7)   16   93   119/76  FLANK PAIN CONT    Activity    (X) Bed rest    Routes    (X) NPO, enteral, or oral feeds [F]    (X) IV fluids and medications    2020 12:35 PM EST by Virginia Weir      LRS @ 125 ML/HR  amitriptyline (ELAVIL) tablet 25 mg  Q HS  BUSPAR 15 MG PO Q HS  KEFLEX 250MG PO QID  CYMBALTA 60 MG BID PO  LOVENOX 40 MG SC DAILY  PROTONIX 40 MG PO BID  MINIPRES 1 MG PO Q HS  MIRAPEX 0.25MG PO QD  SEROQUESL XR 800 MG Q HS    Interventions    (X) Abdominal imaging    (X) CXR, ECG    (X) Laboratory tests    Medications    (X) Parenteral analgesics    11/11/2020 12:35 PM EST by Andrea Owen      MS 4 MG IV PRN X 3 DOSES (TOTAL OF 16MG SINCE ADMISSION)  ZOFRAN 4 MG IV X 1    * Milestone    Additional Notes    11/11/2020       ===CDU NOTES       CHIEF COMPLAINT              Chief Complaint    Patient presents with    · Flank Pain                   HISTORY OF PRESENT ILLNESS     Loreta De Los Santos is a 54 y.o. female past medical history significant for chronic pancreatitis with previous necrotizing pancreatitis, follows with GI doctor at [address] [place] [address] acute right-sided abdominal pain.  Patient states that she has had kidney stones in the past and this pain felt similar to her previous kidney stones.  Imaging was obtained in the emergency department which was significant for acute on chronic pancreatitis.  Likely the etiology of patient's abdominal pain.  Patient to be n.p.o. with IV fluid resuscitation and pain control. Drew Rae is requesting to get CT scan with IV contrast as she was scheduled as outpatient to get CT scan with IV contrast today per her GI doctor at [facility]. Jada Prado will obtain this imaging.         Location/Symptom: Right-sided abdominal pain    Timing/Onset: Acute on chronic    Provocation: Eating and palpation    Quality: Crampy, sharp    Radiation: Right back    Severity: Moderate to severe    Timing/Duration: Acute worsening, persistent since onset    Modifying Factors: None         REVIEW OF SYSTEMS           Review of Systems    Constitutional: Negative for activity change, chills and fever. HENT: Negative for congestion, rhinorrhea and sore throat.      Eyes: Negative for pain and visual disturbance. Respiratory: Negative for cough and shortness of breath.      Cardiovascular: Negative for chest pain and palpitations.     Gastrointestinal: Positive for abdominal pain. Negative for constipation and diarrhea. Genitourinary: Negative for difficulty urinating and frequency. Musculoskeletal: Negative for arthralgias and myalgias. Skin: Negative for rash and wound. Neurological: Negative for dizziness and headaches.           INITIAL VITALS:  weight is 273 lb (123.8 kg). Her temperature is 97 °F (36.1 °C). Her blood pressure is 149/95 (abnormal) and her pulse is 85. Her respiration is 16 and oxygen saturation is 96%.         Physical Exam    Vitals signs and nursing note reviewed. Constitutional:         General: She is not in acute distress.     Appearance: Normal appearance. She is not ill-appearing. HENT:       Head: Normocephalic and atraumatic.      Nose: Nose normal.      Mouth/Throat:      Mouth: Mucous membranes are moist.      Pharynx: Oropharynx is clear. Eyes:       General:         Right eye: No discharge.         Left eye: No discharge. Cardiovascular:       Rate and Rhythm: Normal rate and regular rhythm.      Heart sounds: No murmur. No friction rub. No gallop.    Pulmonary:       Effort: Pulmonary effort is normal. No respiratory distress.      Breath sounds: Normal breath sounds. Abdominal:      Palpations: Abdomen is soft.      Tenderness: There is abdominal tenderness. Skin:       General: Skin is warm and dry. Neurological:       General: No focal deficit present.      Mental Status: She is alert and oriented to person, place, and time. Psychiatric:         Mood and Affect: Mood normal.         Thought Content: Thought content normal.               CDU IMPRESSION / PLAN          Loreta De Los Santos is a 54 y.o. female who presents with acute abdominal pain         1. Acute abdominal pain    Acute on chronic pancreatitis    Patient follows with GI doctor at [address] [place] [address] note patient has had necrotizing pancreatitis in the past.    N.p.o. and IV fluid resuscitation.     Patient was supposed to get outpatient abdominal imaging of CT with contrast today.  We will obtain this imaging. CT with IV contrast ordered.  Will attempt to contact GI doctor at [facility]. We will burn MRI disc for patient to take to GI doctor at [facility]. Urinalysis showing moderate leukocyte esterase but no bacteria noted.  Will not require antibiotics. Still pending patient pain control, able to tolerate oral intake, obtaining CT abdomen with IV contrast imaging.     White blood cell count slightly elevated yesterday, on repeat no significant leukocytosis noted.  Hemoglobin down from 12.7-11.3 likely delusional due to IV fluid resuscitation.  No complainys of hematemesis or hematochezia         Continue home medications and pain control    Monitor vitals, labs, and imaging    DISPO: pending consults and clinical improvement          Results for Angeles Santiago (MRN [id number]) as of 11/11/2020 12:23       11/11/2020 05:12    Sodium: 138    Potassium: 3.9    Chloride: 101    CO2: 27    BUN: 14    Creatinine: 0.41 (L)    Bun/Cre Ratio: NOT REPORTED    Anion Gap: 10    GFR Non-: >60    GFR African American: >60    Glucose: 113 (H)    Calcium: 8.4 (L)    Albumin/Globulin Ratio: 1.3    Total Protein: 6.4    GFR Comment: Pend    GFR Staging: NOT REPORTED    Albumin: 3.6    Alk Phos: 129 (H)    ALT: 39 (H)    AST: 83 (H)    Bilirubin: 0.34    Lipase: 6 (L)    WBC: 8.8    RBC: 3.77 (L)    Hemoglobin Quant: 11.3 (L)    Hematocrit: 35.4 (L)    MCV: 93.9    MCH: 30.0    MCHC: 31.9    MPV: 10.4    RDW: 16.0 (H)    Platelet Count: 723    Platelet Estimate: NOT REPORTED    Absolute Mono #: 0.34    Eosinophils %: 4    Basophils Absolute: 0.04    Differential Type: NOT REPORTED    Seg Neutrophils: 54    Segs Absolute: 4.80    Lymphocytes: 37    Absolute Lymph #: 3.22    Monocytes: 4    Absolute Eos #: 0.34    Basophils: 1    Immature Granulocytes: 0    WBC Morphology: NOT REPORTED    RBC Morphology: ANISOCYTOSIS PRESENT    Absolute Immature Granulocyte: <0.03    NRBC Automated: 0.0       11/11/2020 09:10    CT ABDOMEN PELVIS W IV CONTRAST: Rpt    Impression    Stable appearance of acute pancreatitis without evidence of necrosis or    pseudocyst formation.         Mild right-sided pelvicaliectasis of unclear etiology.         Trace right pleural effusion.         Hepatic steatosis.

## 2020-11-12 NOTE — PROGRESS NOTES
[address] [facility]  CDU / OBSERVATION eNCOUnter  Attending NOte       I performed a history and physical examination of the patient and discussed management with the resident. I reviewed the residents note and agree with the documented findings and plan of care. Any areas of disagreement are noted on the chart. I was personally present for the key portions of any procedures. I have documented in the chart those procedures where I was not present during the key portions. I have reviewed the nurses notes. I agree with the chief complaint, past medical history, past surgical history, allergies, medications, social and family history as documented unless otherwise noted below. The Family history, social history, and ROS are effectively unchanged since admission unless noted elsewhere in the chart. History of chronic pancreatitis. Patient presents with complaints of pain. Patient with similar discomfort his previous episodes. CT scanning recommended by her physician at [facility] was performed. Acute pancreatitis demonstrated on CT scanning. Patient uncomfortable and unable to handle p.o. fully. Will hold patient for 24 hours for return of ability to handle oral fluids.     Josh Shah MD  Attending Emergency  Physician

## 2020-11-12 NOTE — PROGRESS NOTES
[address] [facility]  CDU / OBSERVATION eNCOUnter  Attending NOte       I performed a history and physical examination of the patient and discussed management with the resident. I reviewed the residents note and agree with the documented findings and plan of care. Any areas of disagreement are noted on the chart. I was personally present for the key portions of any procedures. I have documented in the chart those procedures where I was not present during the key portions. I have reviewed the nurses notes. I agree with the chief complaint, past medical history, past surgical history, allergies, medications, social and family history as documented unless otherwise noted below. The Family history, social history, and ROS are effectively unchanged since admission unless noted elsewhere in the chart. Patient admitted for chronic pancreatitis. Patient had been having flank pain. Patient was found to have acute pancreatitis on CT scan of the abdomen. Lipase is within normal limits. On my exam today, patient states that she is feeling much better and is now tolerating p.o. She denies any fevers or acute changes overnight. Patient states that she is supposed to be getting a new dog today and would like to be discharged home. We will plan to discharge home.     Marcos Marin MD  Attending Emergency  Physician

## 2020-11-12 NOTE — PLAN OF CARE
Discharge teaching and instructions completed with patient using teachback method. AVS reviewed. Printed prescriptions given to patient. Patient voiced understanding regarding prescriptions, follow up appointments, and care of self at home. Discharged home. All questions answered.

## 2020-11-13 NOTE — DISCHARGE SUMMARY
CDU Discharge Summary        Patient:  Emiliano Gerber  YOB: 1975    MRN: [id number]   Acct: [de-identified]    Primary Care Physician: Jeremy Koo    Admit date:  11/10/2020  3:26 PM  Discharge date: 11/12/2020 10:52 AM     Discharge Diagnoses:     Acute flank pain due to pancreatitis. Patient did have a normal lipase but was identified to have pancreatitis on CT scan. Patient continues have abdominal cramping but stated that she was clinically improving. Patient was able to tolerate p.o. without difficulty. Patient states she was interested in going home because she began any drug. Patient was discharged home    Follow-up:  Call today/tomorrow for a follow up appointment with Jeremy Koo , or return to the Emergency Room with worsening symptoms    Stressed to patient the importance of following up with primary care doctor for further workup/management of symptoms. Pt verbalizes understanding and agrees with plan.     Discharge Medications:  Changes to medications          Elise Vernon   Home Medication Instructions NFS:[id number]    Printed on:11/13/20 1323   Medication Information                      albuterol sulfate HFA (PROVENTIL HFA) 108 (90 Base) MCG/ACT inhaler  Inhale 2 puffs into the lungs as needed             amitriptyline (ELAVIL) 25 MG tablet  Take 25 mg by mouth nightly             busPIRone (BUSPAR) 15 MG tablet  Take 15 mg by mouth nightly Can take once in morning PRN             butalbital-APAP-caffeine (FIORICET) -40 MG CAPS per capsule  Take 1 capsule by mouth every 6 hours as needed for Headaches             Cholecalciferol (VITAMIN D) 50 MCG (2000 UT) CAPS capsule  Take by mouth daily             DULoxetine (CYMBALTA) 60 MG extended release capsule  TAKE 1 CAPSULE BY MOUTH TWICE A DAY             fexofenadine (RA ALLERGY RELIEF) 180 MG tablet  take 1 tablet by mouth once daily             lidocaine (LIDODERM) 5 %  Place 1 patch onto the skin daily for 10 days 12 hours on, 12 hours off.             losartan (COZAAR) 50 MG tablet  Take 50 mg by mouth daily             magnesium oxide (MAG-OX) 400 MG tablet  Take 400 mg by mouth daily             melatonin 5 MG TABS tablet  Take 5 mg by mouth daily             omeprazole (PRILOSEC) 40 MG delayed release capsule  Take 40 mg by mouth daily             oxyCODONE (ROXICODONE) 5 MG immediate release tablet  Take 1 tablet by mouth every 6 hours as needed for Pain for up to 3 days.              potassium chloride (KLOR-CON M) 10 MEQ extended release tablet  Take 1 tablet by mouth 2 times daily             pramipexole (MIRAPEX) 0.5 MG tablet  take 1 tablet by mouth twice a day             prazosin (MINIPRESS) 1 MG capsule  Take 1 mg by mouth nightly             QUEtiapine (SEROQUEL XR) 400 MG extended release tablet  Take 800 mg by mouth nightly                 Diet:  No diet orders on file , Advance as tolerated     Activity:  As tolerated    Consultants: None    Procedures:  Not indicated     Diagnostic Test:   Results for orders placed or performed during the hospital encounter of 11/10/20   CBC Auto Differential   Result Value Ref Range    WBC 11.6 (H) 3.5 - 11.3 k/uL    RBC 4.30 3.95 - 5.11 m/uL    Hemoglobin 12.7 11.9 - 15.1 g/dL    Hematocrit 39.9 36.3 - 47.1 %    MCV 92.8 82.6 - 102.9 fL    MCH 29.5 25.2 - 33.5 pg    MCHC 31.8 28.4 - 34.8 g/dL    RDW 15.9 (H) 11.8 - 14.4 %    Platelets 120 996 - 576 k/uL    MPV 10.3 8.1 - 13.5 fL    NRBC Automated 0.0 0.0 per 100 WBC    Differential Type NOT REPORTED     WBC Morphology NOT REPORTED     RBC Morphology NOT REPORTED     Platelet Estimate NOT REPORTED     Immature Granulocytes 0 0 %    Seg Neutrophils 59 36 - 66 %    Lymphocytes 36 24 - 44 %    Monocytes 3 1 - 7 %    Eosinophils % 2 1 - 4 %    Basophils 0 0 - 2 %    Absolute Immature Granulocyte 0.00 0.00 - 0.30 k/uL    Segs Absolute 6.84 1.8 - 7.7 k/uL    Absolute Lymph # 4.18 1.0 - 4.8 k/uL    Absolute Mono # 0.35 0.1 - 0.8 k/uL    Absolute Eos # 0.23 0.0 - 0.4 k/uL    Basophils Absolute 0.00 0.0 - 0.2 k/uL    Morphology ANISOCYTOSIS PRESENT    Basic Metabolic Panel w/ Reflex to MG   Result Value Ref Range    Glucose 97 70 - 99 mg/dL    BUN 14 6 - 20 mg/dL    CREATININE 0.48 (L) 0.50 - 0.90 mg/dL    Bun/Cre Ratio NOT REPORTED 9 - 20    Calcium 9.0 8.6 - 10.4 mg/dL    Sodium 139 135 - 144 mmol/L    Potassium 4.1 3.7 - 5.3 mmol/L    Chloride 101 98 - 107 mmol/L    CO2 26 20 - 31 mmol/L    Anion Gap 12 9 - 17 mmol/L    GFR Non-African American >60 >60 mL/min    GFR African American >60 >60 mL/min    GFR Comment          GFR Staging NOT REPORTED    HCG Qualitative, Serum   Result Value Ref Range    hCG Qual NEGATIVE NEGATIVE   Urinalysis with Microscopic   Result Value Ref Range    Color, UA YELLOW YELLOW    Turbidity UA CLEAR CLEAR    Glucose, Ur NEGATIVE NEGATIVE    Bilirubin Urine NEGATIVE NEGATIVE    Ketones, Urine NEGATIVE NEGATIVE    Specific Gravity, UA 1.035 (H) 1.005 - 1.030    Urine Hgb NEGATIVE NEGATIVE    pH, UA 7.0 5.0 - 8.0    Protein, UA NEGATIVE NEGATIVE    Urobilinogen, Urine Normal Normal    Nitrite, Urine NEGATIVE NEGATIVE    Leukocyte Esterase, Urine MODERATE (A) NEGATIVE    -          WBC, UA 20 TO 50 0 - 5 /HPF    RBC, UA 2 TO 5 0 - 4 /HPF    Casts UA  0 - 8 /LPF     10 TO 20 HYALINE Reference range defined for non-centrifuged specimen. Crystals, UA NOT REPORTED None /HPF    Epithelial Cells UA 2 TO 5 0 - 5 /HPF    Renal Epithelial, UA NOT REPORTED 0 /HPF    Bacteria, UA NOT REPORTED None    Mucus, UA NOT REPORTED None    Trichomonas, UA NOT REPORTED None    Amorphous, UA NOT REPORTED None    Other Observations UA NOT REPORTED NOT REQ.     Yeast, UA NOT REPORTED None   Lipase   Result Value Ref Range    Lipase 6 (L) 13 - 60 U/L   Hepatic Function Panel   Result Value Ref Range    Alb 4.0 3.5 - 5.2 g/dL    Alkaline Phosphatase 114 (H) 35 - 104 U/L    ALT 15 5 - 33 U/L    AST 18 <32 U/L    Total Bilirubin 0.17 (L) 0.3 - 1.2 mg/dL    Bilirubin, Direct <0.08 <0.31 mg/dL    Bilirubin, Indirect CANNOT BE CALCULATED 0.00 - 1.00 mg/dL    Total Protein 7.3 6.4 - 8.3 g/dL    Globulin NOT REPORTED 1.5 - 3.8 g/dL    Albumin/Globulin Ratio 1.2 1.0 - 2.5   COMPREHENSIVE METABOLIC PANEL   Result Value Ref Range    Glucose 113 (H) 70 - 99 mg/dL    BUN 14 6 - 20 mg/dL    CREATININE 0.41 (L) 0.50 - 0.90 mg/dL    Bun/Cre Ratio NOT REPORTED 9 - 20    Calcium 8.4 (L) 8.6 - 10.4 mg/dL    Sodium 138 135 - 144 mmol/L    Potassium 3.9 3.7 - 5.3 mmol/L    Chloride 101 98 - 107 mmol/L    CO2 27 20 - 31 mmol/L    Anion Gap 10 9 - 17 mmol/L    Alkaline Phosphatase 129 (H) 35 - 104 U/L    ALT 39 (H) 5 - 33 U/L    AST 83 (H) <32 U/L    Total Bilirubin 0.34 0.3 - 1.2 mg/dL    Total Protein 6.4 6.4 - 8.3 g/dL    Alb 3.6 3.5 - 5.2 g/dL    Albumin/Globulin Ratio 1.3 1.0 - 2.5    GFR Non-African American >60 >60 mL/min    GFR African American >60 >60 mL/min    GFR Comment          GFR Staging NOT REPORTED    LIPASE   Result Value Ref Range    Lipase 6 (L) 13 - 60 U/L   CBC WITH AUTO DIFFERENTIAL   Result Value Ref Range    WBC 8.8 3.5 - 11.3 k/uL    RBC 3.77 (L) 3.95 - 5.11 m/uL    Hemoglobin 11.3 (L) 11.9 - 15.1 g/dL    Hematocrit 35.4 (L) 36.3 - 47.1 %    MCV 93.9 82.6 - 102.9 fL    MCH 30.0 25.2 - 33.5 pg    MCHC 31.9 28.4 - 34.8 g/dL    RDW 16.0 (H) 11.8 - 14.4 %    Platelets 077 409 - 361 k/uL    MPV 10.4 8.1 - 13.5 fL    NRBC Automated 0.0 0.0 per 100 WBC    Differential Type NOT REPORTED     Seg Neutrophils 54 36 - 65 %    Lymphocytes 37 24 - 43 %    Monocytes 4 3 - 12 %    Eosinophils % 4 1 - 4 %    Basophils 1 0 - 2 %    Immature Granulocytes 0 0 %    Segs Absolute 4.80 1.50 - 8.10 k/uL    Absolute Lymph # 3.22 1.10 - 3.70 k/uL    Absolute Mono # 0.34 0.10 - 1.20 k/uL    Absolute Eos # 0.34 0.00 - 0.44 k/uL    Basophils Absolute 0.04 0.00 - 0.20 k/uL    Absolute Immature Granulocyte <0.03 0.00 - 0.30 k/uL    WBC Morphology NOT REPORTED     RBC measuring 11 mm is stable since 2017. Ct Abdomen Pelvis W Iv Contrast    Result Date: 11/11/2020  EXAMINATION: CT OF THE ABDOMEN AND PELVIS WITH CONTRAST 11/11/2020 8:48 am TECHNIQUE: CT of the abdomen and pelvis was performed with the administration of intravenous contrast. Multiplanar reformatted images are provided for review. Dose modulation, iterative reconstruction, and/or weight based adjustment of the mA/kV was utilized to reduce the radiation dose to as low as reasonably achievable. COMPARISON: 09/23/2020, 11/10/2020 HISTORY: ORDERING SYSTEM PROVIDED HISTORY: recheck pancreatitis need pancreatic protocol, check for necrosis. Follows with GI doctor at [facility] who is requesting CT abdomen with IV contrast TECHNOLOGIST PROVIDED HISTORY: IV Contrast - pancreatitis, unable to tolerate oral recheck pancreatitis need pancreatic protocol, check for necrosis. Follows with GI doctor at [facility] who is requesting CT abdomen with IV contrast Reason for Exam: recheck pancreatitis need pancreatic protocol, check for necrosis. Acuity: Acute Type of Exam: Initial FINDINGS: Lower Chest: Trace right pleural effusion. Mild basilar atelectasis. Organs: Hepatic steatosis. Status post cholecystectomy. Fat stranding and inflammatory fluid are present adjacent to the pancreas, similar to prior exam.  No nonenhancing pancreatic parenchyma is seen or loculated peripancreatic fluid collections. Mild dilation of the common bile duct is likely related to reservoir effect. The adrenals, ureters, and bladder are without acute process. Cortical scarring anteriorly in the right kidney with parenchymal calcifications. No suspicious renal lesions with subcentimeter hypodensities too small to characterize. Slight pelvicaliectasis on the right is of unclear clinical significance . GI/Bowel: Normal appendix. No evidence of bowel obstruction. Pelvis: Metallic devices along the adnexa bilaterally.   Possible fibroid along the leftward uterine fundus. No adnexal mass. Peritoneum/Retroperitoneum: Unremarkable vasculature. No lymphadenopathy. No free air. Trace fluid along the left paracolic gutter. Bones/Soft Tissues: No acute osseous abnormality. Small lipoma along the inferior left chest wall. Stable appearance of acute pancreatitis without evidence of necrosis or pseudocyst formation. Mild right-sided pelvicaliectasis of unclear etiology. Trace right pleural effusion. Hepatic steatosis. Physical Exam:    General appearance - NAD, AOx 3   Lungs -CTAB, no R/R/R  Heart - RRR, no M/R/G  Abdomen - Soft, NT/ND  Neurological:  MAEx4, No focal motor deficit, sensory loss  Extremities - Cap refil <2 sec in all ext., no edema  Skin -warm, dry      Hospital Course:  Clinical course has improved, labs and imaging reviewed. Neymar Patel originally presented to the hospital on 11/10/2020  3:26 PM. with pancreatitis. At that time it was determined that She required further observation and evaluation. She was admitted and labs and imaging were followed daily. Imaging results as above. She is medically stable to be discharged. Disposition: Home    Patient stated that they will not drive themselves home from the hospital if they have gotten pain killers/ narcotics earlier that day and that they will arrange for transportation on their own or work with the  for a ride. Patient counseled NOT to drive while under the influence of narcotics/ pain killers. Condition: Good    Patient stable and ready for discharge home. I have discussed plan of care with patient and they are in understanding. They were instructed to read discharge paperwork. All of their questions and concerns were addressed. Time Spent: 1 day      --  Neil Trevizo DO  Emergency Medicine Resident Physician    This dictation was generated by voice recognition computer software.   Although all attempts are made to edit the dictation for accuracy, there may be errors in the transcription that are not intended.

## 2021-01-18 ENCOUNTER — OFFICE VISIT (OUTPATIENT)
Dept: ORTHOPEDIC SURGERY | Age: 46
End: 2021-01-18
Payer: COMMERCIAL

## 2021-01-18 ENCOUNTER — TELEPHONE (OUTPATIENT)
Dept: ORTHOPEDIC SURGERY | Age: 46
End: 2021-01-18

## 2021-01-18 DIAGNOSIS — M79.89 LEFT LEG SWELLING: Primary | ICD-10-CM

## 2021-01-18 DIAGNOSIS — M25.562 ACUTE PAIN OF LEFT KNEE: Primary | ICD-10-CM

## 2021-01-18 PROCEDURE — 99203 OFFICE O/P NEW LOW 30 MIN: CPT | Performed by: ORTHOPAEDIC SURGERY

## 2021-01-18 PROCEDURE — G8428 CUR MEDS NOT DOCUMENT: HCPCS | Performed by: ORTHOPAEDIC SURGERY

## 2021-01-18 PROCEDURE — G8417 CALC BMI ABV UP PARAM F/U: HCPCS | Performed by: ORTHOPAEDIC SURGERY

## 2021-01-18 PROCEDURE — 1036F TOBACCO NON-USER: CPT | Performed by: ORTHOPAEDIC SURGERY

## 2021-01-18 PROCEDURE — G8484 FLU IMMUNIZE NO ADMIN: HCPCS | Performed by: ORTHOPAEDIC SURGERY

## 2021-01-18 RX ORDER — METHYLPREDNISOLONE 4 MG/1
4 TABLET ORAL SEE ADMIN INSTRUCTIONS
Qty: 1 KIT | Refills: 0 | Status: SHIPPED | OUTPATIENT
Start: 2021-01-18 | End: 2021-01-24

## 2021-01-18 RX ORDER — HYDROCODONE BITARTRATE AND ACETAMINOPHEN 5; 325 MG/1; MG/1
1 TABLET ORAL EVERY 6 HOURS PRN
Qty: 28 TABLET | Refills: 0 | Status: ON HOLD | OUTPATIENT
Start: 2021-01-18 | End: 2021-02-02

## 2021-01-18 NOTE — TELEPHONE ENCOUNTER
Patient calling to request a phone call from clinical staff. She states that she would like to discuss additional injuries that she forgot to mention while in her appointment today to find out what Dr Yomaira Milton would like for her to do. Please advise.

## 2021-01-18 NOTE — PROGRESS NOTES
Prosper Moreno M.D.            [address] [facility],[address], [address] [facility]           Dept Phone: [phone]           Dept Fax:  [phone]           [place]ts          Dept Phone: [phone]           Dept Fax:  [phone]      Chief Compliant:  Chief Complaint   Patient presents with    Pain     Lt knee        History of Present Illness: This is a 54 y.o. female who presents to the clinic today for evaluation / follow up of left knee pain. Patient states that she fell 4 days ago. This happened as result of a syncopal episode due to blood pressure drop. She was seen at [facility] where she had x-rays of both her left knee and left ankle performed these were all negative for any acute process. Patient does report of having 2 previous arthroscopies of left knee in the past done many many years ago. Review of Systems   Constitutional: Negative for fever, chills, sweats. Eyes: Negative for changes in vision, or pain. HENT: Negative for ear ache, epistaxis, or sore throat. Respiratory/Cardio: Negative for Chest pain, palpitations, SOB, or cough. Gastrointestinal: Negative for abdominal pain, N/V/D. Genitourinary: Negative for dysuria, frequency, urgency, or hematuria. Neurological: Negative for headache, numbness, or weakness. Integumentary: Negative for rash, itching, laceration, or abrasion. Musculoskeletal: Positive for Pain (Lt knee)       Physical Exam:  Constitutional: Patient is oriented to person, place, and time. Patient appears well-developed and well nourished. HENT: Negative otherwise noted  Head: Normocephalic and Atraumatic  Nose: Normal  Eyes: Conjunctivae and EOM are normal  Neck: Normal range of motion Neck supple. Respiratory/Cardio: Effort normal. No respiratory distress. Musculoskeletal: Examination patient left knee notes he is wrapped with an Ace wrap. She cannot get past 10 degrees full extension. Flexion past 90 degrees is difficult for her as well. Her collaterals appear to be good in mid flexion. Difficult to do a very good Lachman's on her but nothing gross. She does have a very positive reproducible Nova's medially. Main examination noncontributory  Neurological: Patient is alert and oriented to person, place, and time. Normal strenght. No sensory deficit. Skin: Skin is warm and dry  Psychiatric: Behavior is normal. Thought content normal.  Nursing note and vitals reviewed. Labs and Imaging:     XR taken at [facility]. emergency room show AP lateral views the patient's left knee which show no acute process. Patient had all x-rays taken of her foot and ankle which were unremarkable for any acute process. She did have a questionable old avulsion injury near the navicular     No orders of the defined types were placed in this encounter. Assessment and Plan:  1. Acute pain of left knee            This is a 54 y.o. female who presents to the clinic today for evaluation / follow up of suspicious for recurrent medial meniscus tear left knee    Plan  Patient was advised that it is difficult to interpret an MRI in a patient has had 2 previous arthroscopies. I think we will treat the patient conservatively now give her a prescription for Medrol Dosepak as well as some Norco per her request.  I will reexamine her back here in 2 weeks to reevaluate her knee when she is less painful.   If still positive on Nova's then consider repeat arthroscopy    Past History:    Current Outpatient Medications:     losartan (COZAAR) 50 MG tablet, Take 50 mg by mouth daily, Disp: , Rfl:     amitriptyline (ELAVIL) 25 MG tablet, Take 25 mg by mouth nightly, Disp: , Rfl:     prazosin (MINIPRESS) 1 MG capsule, Take 1 mg by mouth nightly, Disp: , Rfl:   melatonin 5 MG TABS tablet, Take 5 mg by mouth daily, Disp: , Rfl:     potassium chloride (KLOR-CON M) 10 MEQ extended release tablet, Take 1 tablet by mouth 2 times daily, Disp: 8 tablet, Rfl: 0    QUEtiapine (SEROQUEL XR) 400 MG extended release tablet, Take 800 mg by mouth nightly, Disp: , Rfl:     busPIRone (BUSPAR) 15 MG tablet, Take 15 mg by mouth nightly Can take once in morning PRN, Disp: , Rfl:     omeprazole (PRILOSEC) 40 MG delayed release capsule, Take 40 mg by mouth daily, Disp: , Rfl:     Cholecalciferol (VITAMIN D) 50 MCG (2000 UT) CAPS capsule, Take by mouth daily, Disp: , Rfl:     butalbital-APAP-caffeine (FIORICET) -40 MG CAPS per capsule, Take 1 capsule by mouth every 6 hours as needed for Headaches, Disp: 28 capsule, Rfl: 0    albuterol sulfate HFA (PROVENTIL HFA) 108 (90 Base) MCG/ACT inhaler, Inhale 2 puffs into the lungs as needed, Disp: , Rfl:     magnesium oxide (MAG-OX) 400 MG tablet, Take 400 mg by mouth daily, Disp: , Rfl:     fexofenadine (RA ALLERGY RELIEF) 180 MG tablet, take 1 tablet by mouth once daily (Patient taking differently: Take 180 mg by mouth daily as needed take 1 tablet by mouth once daily), Disp: 30 tablet, Rfl: 0    pramipexole (MIRAPEX) 0.5 MG tablet, take 1 tablet by mouth twice a day (Patient taking differently: Take 0.25 mg by mouth daily take 1 tablet by mouth twice a day), Disp: 60 tablet, Rfl: 0    DULoxetine (CYMBALTA) 60 MG extended release capsule, TAKE 1 CAPSULE BY MOUTH TWICE A DAY, Disp: 60 capsule, Rfl: 3  Allergies   Allergen Reactions    Aripiprazole      Bad reaction    Eletriptan      Other reaction(s): Swelling-throat    Hydrocodone-Acetaminophen      Other reaction(s): Unknown    Oxycodone-Acetaminophen      Other reaction(s): Unknown    Sumatriptan Other (See Comments)    Triptans      Other reaction(s): Unknown    Lamotrigine Rash     Social History     Socioeconomic History    Marital status:  Spouse name: Not on file    Number of children: Not on file    Years of education: Not on file    Highest education level: Not on file   Occupational History    Not on file   Social Needs    Financial resource strain: Not on file    Food insecurity     Worry: Not on file     Inability: Not on file    Transportation needs     Medical: Not on file     Non-medical: Not on file   Tobacco Use    Smoking status: Former Smoker     Packs/day: 1.00     Types: Cigarettes     Quit date: 2019     Years since quittin.1    Smokeless tobacco: Never Used    Tobacco comment: today last smoke   Substance and Sexual Activity    Alcohol use:  Yes     Alcohol/week: 0.0 standard drinks     Comment: rarely    Drug use: No    Sexual activity: Not on file   Lifestyle    Physical activity     Days per week: Not on file     Minutes per session: Not on file    Stress: Not on file   Relationships    Social connections     Talks on phone: Not on file     Gets together: Not on file     Attends Hinduism service: Not on file     Active member of club or organization: Not on file     Attends meetings of clubs or organizations: Not on file     Relationship status: Not on file    Intimate partner violence     Fear of current or ex partner: Not on file     Emotionally abused: Not on file     Physically abused: Not on file     Forced sexual activity: Not on file   Other Topics Concern    Not on file   Social History Narrative    Not on file     Past Medical History:   Diagnosis Date    Abnormal levels of other serum enzymes     Anxiety     Bilateral leg edema     Chronic kidney disease     right renal cyst    Depression     [facility]     after [facility]    Elevated liver enzymes     Fibromyalgia     Headache(784.0)     migraines    Hx of blood clots      left calf    Hypertension     Kidney stone     Obstructive sleep apnea syndrome     Pancreatitis   Pleural effusion 2001    SOB (shortness of breath)     Supraventricular tachycardia (HCC)     SVT [facility]supraventricular tachycardia) ([facility] 9/8/2015     Past Surgical History:   Procedure Laterality Date    ATRIAL ABLATION SURGERY      BACK SURGERY      L4-5    CHOLECYSTECTOMY  2001    ENDOMETRIAL ABLATION      e sure implants placed also    ENDOSCOPY, COLON, DIAGNOSTIC      EXCISION LESION HAND / FINGER Right 1/25/2019    HAND LESION BIOPSY EXCISION performed by Emma Naylor MD at [address] [place] [address],[address] Bilateral     left x2, right x1    FOOT SURGERY Right     x3    KNEE ARTHROSCOPY Left     x2    PA CYSTO/URETERO/PYELOSCOPY W/LITHOTRIPSY Left 9/20/2017    CYSTOSCOPY URETEROSCOPY HOLMIUM LASER LITHO WITH STONE BASKETING WITH  STENT  performed by Tomer Byrne MD at [facility]      Family History   Problem Relation Age of Onset    Heart Disease Father     High Blood Pressure Brother           Provider Attestation:  Bull Vigil, personally performed the services described in this documentation. All medical record entries made by the scribe were at my direction and in my presence. I have reviewed the chart and discharge instructions and agree that the records reflect my personal performance and is accurate and complete. Karl Post MD. 01/18/21      Please note that this chart was generated using voice recognition Dragon dictation software. Although every effort was made to ensure the accuracy of this automated transcription, some errors in transcription may have occurred.

## 2021-01-19 NOTE — TELEPHONE ENCOUNTER
You saw her yesterday for left knee pain. Patient calling because she is having issues with her calf muscle. It is noticeably sore. There is no redness or warmth to it just soreness. She describes it as like residual pain after a charley horse. Any thoughts on that?

## 2021-01-19 NOTE — TELEPHONE ENCOUNTER
Called patient in response to her concerns about left calf pain. She states the pain worsens when she does plantar flexion. During dorsiflexion, she states she feels a \"stretch,\" but not really any pain. She denies pain in the popliteal area. She denies pain in the chest, but feels like she needs to use her rescue inhaler. She does report a history of necrotizing pancreatitis, which \"left a lot of scarring in my lungs. \" She also reports she just came home from work. Of note, patient also reports a history of a DVT in the left calf in 1997, but she states this \"doesn't feel anywhere near as bad as that. \" She did, however, start taking baby aspirin last night. Patient states she saw Dr. Yomaira Milton for her left knee but she also injured her left ankle during the same fall, and she forgot to mention this at her last appointment. Discussed with patient the signs of DVT for which to look. Since she hasn't been working for a few weeks, this could be pain and/or swelling from returning to work. However, with her history, she has the option of reporting to the ER for a doppler scan. Alternatively, she may keep the leg elevated, use ice, continue the aspirin, and monitor for the signs we discussed. Certainly, if any develop, patient should report to the ER. Patient is amenable to this plan.

## 2021-01-20 ENCOUNTER — HOSPITAL ENCOUNTER (OUTPATIENT)
Dept: VASCULAR LAB | Age: 46
Discharge: HOME | End: 2021-01-20
Payer: COMMERCIAL

## 2021-01-20 ENCOUNTER — TELEPHONE (OUTPATIENT)
Dept: ORTHOPEDIC SURGERY | Age: 46
End: 2021-01-20

## 2021-01-20 DIAGNOSIS — M79.89 LEFT LEG SWELLING: ICD-10-CM

## 2021-01-20 PROCEDURE — 93971 EXTREMITY STUDY: CPT

## 2021-01-20 NOTE — TELEPHONE ENCOUNTER
Called patient and she is still having the symptoms, I gave her the scheduling line number and placed the order for STAT doppler.

## 2021-01-20 NOTE — TELEPHONE ENCOUNTER
Patient calling back in, she went and had her doppler today, she is stating she is still having a lot of pain and is wondering if you will give something else for pain, the [place] you gave her on 1/18/21 1 every 6 hours is not helping the pain at all. Pharmacy:  [facility].

## 2021-01-20 NOTE — TELEPHONE ENCOUNTER
Called patient back and called [facility] scheduling and transferred patient over to them to get scheduled.

## 2021-01-25 ENCOUNTER — HOSPITAL ENCOUNTER (INPATIENT)
Age: 46
LOS: 7 days | Discharge: HOME | DRG: 392 | End: 2021-02-03
Attending: EMERGENCY MEDICINE | Admitting: EMERGENCY MEDICINE
Payer: COMMERCIAL

## 2021-01-25 DIAGNOSIS — R10.13 ABDOMINAL PAIN, EPIGASTRIC: Primary | ICD-10-CM

## 2021-01-25 DIAGNOSIS — M25.562 ACUTE PAIN OF LEFT KNEE: ICD-10-CM

## 2021-01-25 PROBLEM — R10.9 ABDOMINAL PAIN: Status: ACTIVE | Noted: 2021-01-25

## 2021-01-25 LAB
ABSOLUTE EOS #: 0.38 K/UL (ref 0–0.44)
ABSOLUTE IMMATURE GRANULOCYTE: 0.07 K/UL (ref 0–0.3)
ABSOLUTE LYMPH #: 3.64 K/UL (ref 1.1–3.7)
ABSOLUTE MONO #: 0.48 K/UL (ref 0.1–1.2)
ALBUMIN SERPL-MCNC: 4 G/DL (ref 3.5–5.2)
ALBUMIN/GLOBULIN RATIO: 1.3 (ref 1–2.5)
ALP BLD-CCNC: 122 U/L (ref 35–104)
ALT SERPL-CCNC: 63 U/L (ref 5–33)
ANION GAP SERPL CALCULATED.3IONS-SCNC: 10 MMOL/L (ref 9–17)
AST SERPL-CCNC: 40 U/L
BASOPHILS # BLD: 1 % (ref 0–2)
BASOPHILS ABSOLUTE: 0.06 K/UL (ref 0–0.2)
BILIRUB SERPL-MCNC: <0.1 MG/DL (ref 0.3–1.2)
BUN BLDV-MCNC: 11 MG/DL (ref 6–20)
BUN/CREAT BLD: ABNORMAL (ref 9–20)
CALCIUM SERPL-MCNC: 8.9 MG/DL (ref 8.6–10.4)
CHLORIDE BLD-SCNC: 102 MMOL/L (ref 98–107)
CO2: 24 MMOL/L (ref 20–31)
CREAT SERPL-MCNC: 0.52 MG/DL (ref 0.5–0.9)
DIFFERENTIAL TYPE: ABNORMAL
EOSINOPHILS RELATIVE PERCENT: 4 % (ref 1–4)
GFR AFRICAN AMERICAN: >60 ML/MIN
GFR NON-AFRICAN AMERICAN: >60 ML/MIN
GFR SERPL CREATININE-BSD FRML MDRD: ABNORMAL ML/MIN/{1.73_M2}
GFR SERPL CREATININE-BSD FRML MDRD: ABNORMAL ML/MIN/{1.73_M2}
GLUCOSE BLD-MCNC: 93 MG/DL (ref 70–99)
HCT VFR BLD CALC: 39.6 % (ref 36.3–47.1)
HEMOGLOBIN: 12.6 G/DL (ref 11.9–15.1)
IMMATURE GRANULOCYTES: 1 %
LIPASE: 7 U/L (ref 13–60)
LYMPHOCYTES # BLD: 35 % (ref 24–43)
MCH RBC QN AUTO: 31.4 PG (ref 25.2–33.5)
MCHC RBC AUTO-ENTMCNC: 31.8 G/DL (ref 28.4–34.8)
MCV RBC AUTO: 98.8 FL (ref 82.6–102.9)
MONOCYTES # BLD: 5 % (ref 3–12)
NRBC AUTOMATED: 0 PER 100 WBC
PDW BLD-RTO: 13.4 % (ref 11.8–14.4)
PLATELET # BLD: 271 K/UL (ref 138–453)
PLATELET ESTIMATE: ABNORMAL
PMV BLD AUTO: 10.7 FL (ref 8.1–13.5)
POTASSIUM SERPL-SCNC: 3.6 MMOL/L (ref 3.7–5.3)
RBC # BLD: 4.01 M/UL (ref 3.95–5.11)
RBC # BLD: ABNORMAL 10*6/UL
SEG NEUTROPHILS: 54 % (ref 36–65)
SEGMENTED NEUTROPHILS ABSOLUTE COUNT: 5.71 K/UL (ref 1.5–8.1)
SODIUM BLD-SCNC: 136 MMOL/L (ref 135–144)
TOTAL PROTEIN: 7.1 G/DL (ref 6.4–8.3)
TROPONIN INTERP: NORMAL
TROPONIN T: NORMAL NG/ML
TROPONIN, HIGH SENSITIVITY: <6 NG/L (ref 0–14)
WBC # BLD: 10.3 K/UL (ref 3.5–11.3)
WBC # BLD: ABNORMAL 10*3/UL

## 2021-01-25 PROCEDURE — 6360000002 HC RX W HCPCS: Performed by: STUDENT IN AN ORGANIZED HEALTH CARE EDUCATION/TRAINING PROGRAM

## 2021-01-25 PROCEDURE — G0378 HOSPITAL OBSERVATION PER HR: HCPCS

## 2021-01-25 PROCEDURE — 2500000003 HC RX 250 WO HCPCS: Performed by: STUDENT IN AN ORGANIZED HEALTH CARE EDUCATION/TRAINING PROGRAM

## 2021-01-25 PROCEDURE — 84484 ASSAY OF TROPONIN QUANT: CPT

## 2021-01-25 PROCEDURE — 96374 THER/PROPH/DIAG INJ IV PUSH: CPT

## 2021-01-25 PROCEDURE — 80053 COMPREHEN METABOLIC PANEL: CPT

## 2021-01-25 PROCEDURE — 99284 EMERGENCY DEPT VISIT MOD MDM: CPT

## 2021-01-25 PROCEDURE — 96376 TX/PRO/DX INJ SAME DRUG ADON: CPT

## 2021-01-25 PROCEDURE — 6370000000 HC RX 637 (ALT 250 FOR IP): Performed by: STUDENT IN AN ORGANIZED HEALTH CARE EDUCATION/TRAINING PROGRAM

## 2021-01-25 PROCEDURE — 83690 ASSAY OF LIPASE: CPT

## 2021-01-25 PROCEDURE — 96375 TX/PRO/DX INJ NEW DRUG ADDON: CPT

## 2021-01-25 PROCEDURE — 96372 THER/PROPH/DIAG INJ SC/IM: CPT

## 2021-01-25 PROCEDURE — 2580000003 HC RX 258: Performed by: STUDENT IN AN ORGANIZED HEALTH CARE EDUCATION/TRAINING PROGRAM

## 2021-01-25 PROCEDURE — 93005 ELECTROCARDIOGRAM TRACING: CPT

## 2021-01-25 PROCEDURE — 85025 COMPLETE CBC W/AUTO DIFF WBC: CPT

## 2021-01-25 RX ORDER — LOSARTAN POTASSIUM 50 MG/1
50 TABLET ORAL DAILY
Status: DISCONTINUED | OUTPATIENT
Start: 2021-01-25 | End: 2021-01-25

## 2021-01-25 RX ORDER — BUPROPION HYDROCHLORIDE 150 MG/1
150 TABLET, EXTENDED RELEASE ORAL NIGHTLY
Status: DISCONTINUED | OUTPATIENT
Start: 2021-01-25 | End: 2021-02-03 | Stop reason: HOSPADM

## 2021-01-25 RX ORDER — FENTANYL CITRATE 50 UG/ML
25 INJECTION, SOLUTION INTRAMUSCULAR; INTRAVENOUS
Status: DISCONTINUED | OUTPATIENT
Start: 2021-01-25 | End: 2021-01-27

## 2021-01-25 RX ORDER — PRAZOSIN HYDROCHLORIDE 1 MG/1
1 CAPSULE ORAL NIGHTLY
Status: DISCONTINUED | OUTPATIENT
Start: 2021-01-25 | End: 2021-02-03 | Stop reason: HOSPADM

## 2021-01-25 RX ORDER — SODIUM CHLORIDE 0.9 % (FLUSH) 0.9 %
10 SYRINGE (ML) INJECTION PRN
Status: DISCONTINUED | OUTPATIENT
Start: 2021-01-25 | End: 2021-02-03 | Stop reason: HOSPADM

## 2021-01-25 RX ORDER — PANTOPRAZOLE SODIUM 40 MG/1
40 TABLET, DELAYED RELEASE ORAL
Status: DISCONTINUED | OUTPATIENT
Start: 2021-01-26 | End: 2021-01-27

## 2021-01-25 RX ORDER — ONDANSETRON 2 MG/ML
4 INJECTION INTRAMUSCULAR; INTRAVENOUS ONCE
Status: COMPLETED | OUTPATIENT
Start: 2021-01-25 | End: 2021-01-25

## 2021-01-25 RX ORDER — FENTANYL CITRATE 50 UG/ML
50 INJECTION, SOLUTION INTRAMUSCULAR; INTRAVENOUS ONCE
Status: COMPLETED | OUTPATIENT
Start: 2021-01-25 | End: 2021-01-25

## 2021-01-25 RX ORDER — PRAMIPEXOLE DIHYDROCHLORIDE 0.25 MG/1
0.25 TABLET ORAL DAILY
Status: DISCONTINUED | OUTPATIENT
Start: 2021-01-25 | End: 2021-02-03 | Stop reason: HOSPADM

## 2021-01-25 RX ORDER — AMITRIPTYLINE HYDROCHLORIDE 25 MG/1
25 TABLET, FILM COATED ORAL NIGHTLY
Status: DISCONTINUED | OUTPATIENT
Start: 2021-01-25 | End: 2021-02-03 | Stop reason: HOSPADM

## 2021-01-25 RX ORDER — DULOXETIN HYDROCHLORIDE 30 MG/1
60 CAPSULE, DELAYED RELEASE ORAL DAILY
Status: DISCONTINUED | OUTPATIENT
Start: 2021-01-25 | End: 2021-01-25

## 2021-01-25 RX ORDER — 0.9 % SODIUM CHLORIDE 0.9 %
1000 INTRAVENOUS SOLUTION INTRAVENOUS ONCE
Status: COMPLETED | OUTPATIENT
Start: 2021-01-25 | End: 2021-01-25

## 2021-01-25 RX ORDER — QUETIAPINE FUMARATE 200 MG/1
400 TABLET, FILM COATED ORAL ONCE
Status: COMPLETED | OUTPATIENT
Start: 2021-01-25 | End: 2021-01-25

## 2021-01-25 RX ORDER — BUPROPION HYDROCHLORIDE 150 MG/1
150 TABLET ORAL NIGHTLY
COMMUNITY
End: 2021-12-29

## 2021-01-25 RX ORDER — TIZANIDINE 4 MG/1
4 TABLET ORAL NIGHTLY
COMMUNITY

## 2021-01-25 RX ORDER — SODIUM CHLORIDE 9 MG/ML
INJECTION, SOLUTION INTRAVENOUS CONTINUOUS
Status: DISCONTINUED | OUTPATIENT
Start: 2021-01-25 | End: 2021-01-30

## 2021-01-25 RX ORDER — OXYCODONE HYDROCHLORIDE 5 MG/1
5 TABLET ORAL EVERY 4 HOURS PRN
Status: DISCONTINUED | OUTPATIENT
Start: 2021-01-25 | End: 2021-01-26

## 2021-01-25 RX ORDER — ONDANSETRON 2 MG/ML
4 INJECTION INTRAMUSCULAR; INTRAVENOUS EVERY 6 HOURS PRN
Status: DISCONTINUED | OUTPATIENT
Start: 2021-01-25 | End: 2021-01-30

## 2021-01-25 RX ORDER — LOSARTAN POTASSIUM 50 MG/1
50 TABLET ORAL DAILY
Status: DISCONTINUED | OUTPATIENT
Start: 2021-01-25 | End: 2021-02-03 | Stop reason: HOSPADM

## 2021-01-25 RX ORDER — DULOXETIN HYDROCHLORIDE 30 MG/1
60 CAPSULE, DELAYED RELEASE ORAL 2 TIMES DAILY
Status: DISCONTINUED | OUTPATIENT
Start: 2021-01-25 | End: 2021-02-03 | Stop reason: HOSPADM

## 2021-01-25 RX ORDER — TIZANIDINE 4 MG/1
4 TABLET ORAL NIGHTLY
Status: DISCONTINUED | OUTPATIENT
Start: 2021-01-25 | End: 2021-02-03 | Stop reason: HOSPADM

## 2021-01-25 RX ORDER — MORPHINE SULFATE 4 MG/ML
4 INJECTION, SOLUTION INTRAMUSCULAR; INTRAVENOUS EVERY 4 HOURS PRN
Status: DISCONTINUED | OUTPATIENT
Start: 2021-01-25 | End: 2021-01-28

## 2021-01-25 RX ORDER — FENTANYL CITRATE 50 UG/ML
50 INJECTION, SOLUTION INTRAMUSCULAR; INTRAVENOUS
Status: DISCONTINUED | OUTPATIENT
Start: 2021-01-25 | End: 2021-01-27

## 2021-01-25 RX ORDER — IBUPROFEN 400 MG/1
600 TABLET ORAL EVERY 6 HOURS PRN
Status: DISCONTINUED | OUTPATIENT
Start: 2021-01-25 | End: 2021-02-03 | Stop reason: HOSPADM

## 2021-01-25 RX ORDER — SODIUM CHLORIDE 0.9 % (FLUSH) 0.9 %
10 SYRINGE (ML) INJECTION EVERY 12 HOURS SCHEDULED
Status: DISCONTINUED | OUTPATIENT
Start: 2021-01-25 | End: 2021-02-03 | Stop reason: HOSPADM

## 2021-01-25 RX ORDER — PRAMIPEXOLE DIHYDROCHLORIDE 0.25 MG/1
0.25 TABLET ORAL DAILY
Status: DISCONTINUED | OUTPATIENT
Start: 2021-01-25 | End: 2021-01-25

## 2021-01-25 RX ORDER — DROPERIDOL 2.5 MG/ML
0.62 INJECTION, SOLUTION INTRAMUSCULAR; INTRAVENOUS ONCE
Status: COMPLETED | OUTPATIENT
Start: 2021-01-25 | End: 2021-01-25

## 2021-01-25 RX ORDER — UREA 10 %
5 LOTION (ML) TOPICAL DAILY
Status: DISCONTINUED | OUTPATIENT
Start: 2021-01-25 | End: 2021-02-03 | Stop reason: HOSPADM

## 2021-01-25 RX ORDER — BUSPIRONE HYDROCHLORIDE 15 MG/1
15 TABLET ORAL NIGHTLY
Status: DISCONTINUED | OUTPATIENT
Start: 2021-01-25 | End: 2021-02-03 | Stop reason: HOSPADM

## 2021-01-25 RX ADMIN — DROPERIDOL 0.62 MG: 2.5 INJECTION, SOLUTION INTRAMUSCULAR; INTRAVENOUS at 08:57

## 2021-01-25 RX ADMIN — BUSPIRONE HYDROCHLORIDE 15 MG: 15 TABLET ORAL at 20:20

## 2021-01-25 RX ADMIN — PRAZOSIN HYDROCHLORIDE 1 MG: 1 CAPSULE ORAL at 20:21

## 2021-01-25 RX ADMIN — Medication 5 MG: at 20:21

## 2021-01-25 RX ADMIN — TIZANIDINE 4 MG: 4 TABLET ORAL at 20:21

## 2021-01-25 RX ADMIN — FAMOTIDINE 20 MG: 10 INJECTION INTRAVENOUS at 07:40

## 2021-01-25 RX ADMIN — AMITRIPTYLINE HYDROCHLORIDE 25 MG: 25 TABLET, FILM COATED ORAL at 20:20

## 2021-01-25 RX ADMIN — LOSARTAN POTASSIUM 50 MG: 50 TABLET, FILM COATED ORAL at 17:55

## 2021-01-25 RX ADMIN — MORPHINE SULFATE 4 MG: 4 INJECTION, SOLUTION INTRAMUSCULAR; INTRAVENOUS at 16:11

## 2021-01-25 RX ADMIN — FENTANYL CITRATE 50 MCG: 50 INJECTION, SOLUTION INTRAMUSCULAR; INTRAVENOUS at 07:46

## 2021-01-25 RX ADMIN — IBUPROFEN 600 MG: 400 TABLET, FILM COATED ORAL at 16:11

## 2021-01-25 RX ADMIN — ENOXAPARIN SODIUM 40 MG: 40 INJECTION SUBCUTANEOUS at 12:08

## 2021-01-25 RX ADMIN — OXYCODONE HYDROCHLORIDE 5 MG: 5 TABLET ORAL at 21:25

## 2021-01-25 RX ADMIN — MORPHINE SULFATE 4 MG: 4 INJECTION, SOLUTION INTRAMUSCULAR; INTRAVENOUS at 12:04

## 2021-01-25 RX ADMIN — OXYCODONE HYDROCHLORIDE 5 MG: 5 TABLET ORAL at 13:06

## 2021-01-25 RX ADMIN — QUETIAPINE FUMARATE 400 MG: 200 TABLET ORAL at 20:20

## 2021-01-25 RX ADMIN — BUPROPION HYDROCHLORIDE 150 MG: 150 TABLET, EXTENDED RELEASE ORAL at 20:20

## 2021-01-25 RX ADMIN — MORPHINE SULFATE 4 MG: 4 INJECTION, SOLUTION INTRAMUSCULAR; INTRAVENOUS at 20:28

## 2021-01-25 RX ADMIN — ONDANSETRON 4 MG: 2 INJECTION INTRAMUSCULAR; INTRAVENOUS at 19:02

## 2021-01-25 RX ADMIN — POTASSIUM BICARBONATE 40 MEQ: 782 TABLET, EFFERVESCENT ORAL at 08:57

## 2021-01-25 RX ADMIN — PRAMIPEXOLE DIHYDROCHLORIDE 0.25 MG: 0.25 TABLET ORAL at 17:55

## 2021-01-25 RX ADMIN — DULOXETINE HYDROCHLORIDE 60 MG: 30 CAPSULE, DELAYED RELEASE ORAL at 20:21

## 2021-01-25 RX ADMIN — SODIUM CHLORIDE: 9 INJECTION, SOLUTION INTRAVENOUS at 13:03

## 2021-01-25 RX ADMIN — ONDANSETRON 4 MG: 2 INJECTION INTRAMUSCULAR; INTRAVENOUS at 07:40

## 2021-01-25 RX ADMIN — SODIUM CHLORIDE 1000 ML: 9 INJECTION, SOLUTION INTRAVENOUS at 07:40

## 2021-01-25 RX ADMIN — OXYCODONE HYDROCHLORIDE 5 MG: 5 TABLET ORAL at 17:03

## 2021-01-25 ASSESSMENT — PAIN DESCRIPTION - PAIN TYPE
TYPE: ACUTE PAIN
TYPE: ACUTE PAIN

## 2021-01-25 ASSESSMENT — PAIN SCALES - GENERAL
PAINLEVEL_OUTOF10: 8
PAINLEVEL_OUTOF10: 7
PAINLEVEL_OUTOF10: 7
PAINLEVEL_OUTOF10: 8
PAINLEVEL_OUTOF10: 7
PAINLEVEL_OUTOF10: 6
PAINLEVEL_OUTOF10: 8
PAINLEVEL_OUTOF10: 7
PAINLEVEL_OUTOF10: 6

## 2021-01-25 ASSESSMENT — PAIN DESCRIPTION - FREQUENCY: FREQUENCY: CONTINUOUS

## 2021-01-25 ASSESSMENT — PAIN DESCRIPTION - LOCATION
LOCATION: ABDOMEN
LOCATION: ABDOMEN

## 2021-01-25 ASSESSMENT — ENCOUNTER SYMPTOMS
VOMITING: 0
CONSTIPATION: 0
SHORTNESS OF BREATH: 0
SORE THROAT: 0
ABDOMINAL PAIN: 1
RHINORRHEA: 0
BACK PAIN: 0
DIARRHEA: 0

## 2021-01-25 ASSESSMENT — PAIN - FUNCTIONAL ASSESSMENT: PAIN_FUNCTIONAL_ASSESSMENT: PREVENTS OR INTERFERES SOME ACTIVE ACTIVITIES AND ADLS

## 2021-01-25 ASSESSMENT — PAIN DESCRIPTION - ORIENTATION: ORIENTATION: UPPER

## 2021-01-25 ASSESSMENT — PAIN DESCRIPTION - ONSET: ONSET: PROGRESSIVE

## 2021-01-25 NOTE — PROGRESS NOTES
[address] [facility]  CDU / OBSERVATION eNCOUnter  Attending NOte       I performed a history and physical examination of the patient and discussed management with the resident. I reviewed the residents note and agree with the documented findings and plan of care. Any areas of disagreement are noted on the chart. I was personally present for the key portions of any procedures. I have documented in the chart those procedures where I was not present during the key portions. I have reviewed the nurses notes. I agree with the chief complaint, past medical history, past surgical history, allergies, medications, social and family history as documented unless otherwise noted below. The Family history, social history, and ROS are effectively unchanged since admission unless noted elsewhere in the chart. Patient with history of pancreatitis. Presents with abdominal discomfort and need for ongoing IV hydration and antiemetics. Patient more comfortable now after initial evaluation in emergency department but still will require aggressive supportive care. Will reassess clinically. Will recheck labs.     Neil Barr MD  Attending Emergency  Physician

## 2021-01-25 NOTE — ED NOTES
Pt to ed with c/o epigastric abdominal pain with nausea for the past 8 hrs. Pt has history of pancreatitis and states this feels very similar to her last attack. Pt rates pain 8/10. Pt is alert, oriented, speaking in full, complete sentences.       Jim Norman, FRANKO  01/25/21 1383

## 2021-01-25 NOTE — ED PROVIDER NOTES
[facility]     Emergency Department     Faculty Attestation    I performed a history and physical examination of the patient and discussed management with the resident. I reviewed the residents note and agree with the documented findings and plan of care. Any areas of disagreement are noted on the chart. I was personally present for the key portions of any procedures. I have documented in the chart those procedures where I was not present during the key portions. I have reviewed the emergency nurses triage note. I agree with the chief complaint, past medical history, past surgical history, allergies, medications, social and family history as documented unless otherwise noted below. For Physician Assistant/ Nurse Practitioner cases/documentation I have personally evaluated this patient and have completed at least one if not all key elements of the E/M (history, physical exam, and MDM). Additional findings are as noted.       Primary Care Physician:  [id number] Germain       Chief Complaint   Patient presents with    Abdominal Pain       RECENT VITALS:   Temp: 96.8 °F (36 °C),  Pulse: 73, Resp: 18, BP: (!) 140/77    LABS:  Labs Reviewed   CBC WITH AUTO DIFFERENTIAL   COMPREHENSIVE METABOLIC PANEL   LIPASE   TROPONIN     EKG shows normal sinus rhythm rate 75 bpm  ms QRS durations 82 ms QT corrected 450 ms axis is 20 there is no acute ST or T wave changes seen    PERTINENT ATTENDING PHYSICIAN COMMENTS:    She is here with abdominal pain radiating through to the back she had a history of pancreatitis her last flare was back in November no fevers she has had some chills no cough    Critical Care          Colletta Kast, MD, [facility]  Attending Emergency  Physician              Colletta Kast, MD  01/25/21 1910

## 2021-01-25 NOTE — CARE COORDINATION
Case Management Initial Discharge Plan  Prince Bond,             Met with:patient to discuss discharge plans. Information verified: address, contacts, phone number, , insurance Yes    Emergency Contact/Next of Kin name & number: Geeta Parrish, Parent [phone]    PCP: Henry Lopez  Date of last visit: 1 week ago     Insurance Provider: [facility]     Discharge Planning    Living Arrangements:  Family Members   Support Systems:  Family Members, Friends/Neighbors, Children    Home has 1 stories  2 stairs to climb to get into front door, na stairs to climb to reach second floor  Location of bedroom/bathroom in home first floor     Patient able to perform ADL's:Independent    Current Services (outpatient & in home) none   DME equipment: cane   DME provider: na     Receiving oral anticoagulation therapy? No    If indicated:   Physician managing anticoagulation treatment: na  Where does patient obtain lab work for ATC treatment? Na       Potential Assistance Needed:  N/A    Patient agreeable to home care: No  [place] of choice provided:  n/a    Prior SNF/Rehab Placement and Facility: none   Agreeable to SNF/Rehab: No  [facility] of choice provided: n/a     Evaluation: no    Expected Discharge date:       Patient expects to be discharged to:  Home  Follow Up Appointment: Best Day/ Time:      Transportation provider: family   Transportation arrangements needed for discharge: No    Readmission Risk              Risk of Unplanned Readmission:        0             Does patient have a readmission risk score greater than 14?: No  If yes, follow-up appointment must be made within 7 days of discharge.      Goals of Care: Pain control       Discharge Plan: Home independently           Electronically signed by Yoko Dill RN on 21 at 11:34 AM EST

## 2021-01-25 NOTE — ED PROVIDER NOTES
[address] ED  Emergency Department Encounter  EmergencyMedicine Resident     Pt Eugenia Farooq  MRN: [id number]  Markygfshiva 1975  Date of evaluation: 1/25/21  PCP:  Mian Briggs    CHIEF COMPLAINT       Chief Complaint   Patient presents with    Abdominal Pain       HISTORY OF PRESENT ILLNESS  (Location/Symptom, Timing/Onset, Context/Setting, Quality, Duration, Modifying Factors, Severity.)      Bella Aguayo is a 54 y.o. female who presents with complaint of epigastric abdominal pain has been ongoing for approximately 8 hours prior to arrival in the emergency department patient has not tried anything at home for this. Denies any fevers any chills any vomiting any diarrhea any constipation any trauma patient has a history of pancreatitis. She states that this is secondary to Lasix. Denies any alcohol use. PAST MEDICAL / SURGICAL / SOCIAL / FAMILY HISTORY      has a past medical history of Abnormal levels of other serum enzymes, Anxiety, Bilateral leg edema, Chronic kidney disease, Depression, DVT (deep venous thrombosis) (HCC), Elevated liver enzymes, Fibromyalgia, Headache(784.0), Hx of blood clots, Hypertension, Kidney stone, Obstructive sleep apnea syndrome, Pancreatitis, Pleural effusion, SOB (shortness of breath), Supraventricular tachycardia ([address] and SVT (supraventricular tachycardia) ([address] has a past surgical history that includes Thyroid lobectomy (Right); Cholecystectomy (2001); Knee arthroscopy (Left); Foot surgery (Right); Endometrial ablation; Atrial ablation surgery; eye surgery (Bilateral); Endoscopy, colon, diagnostic; back surgery; pr cysto/uretero/pyeloscopy w/lithotripsy (Left, 9/20/2017); and EXCISION LESION HAND / FINGER (Right, 1/25/2019).       Social History     Socioeconomic History    Marital status:      Spouse name: Not on file    Number of children: Not on file    Years of education: Not on file  Highest education level: Not on file   Occupational History    Not on file   Social Needs    Financial resource strain: Not on file    Food insecurity     Worry: Not on file     Inability: Not on file    Transportation needs     Medical: Not on file     Non-medical: Not on file   Tobacco Use    Smoking status: Former Smoker     Packs/day: 1.00     Types: Cigarettes     Quit date: 2019     Years since quittin.1    Smokeless tobacco: Never Used    Tobacco comment: today last smoke   Substance and Sexual Activity    Alcohol use: Yes     Alcohol/week: 0.0 standard drinks     Comment: rarely    Drug use: No    Sexual activity: Not on file   Lifestyle    Physical activity     Days per week: Not on file     Minutes per session: Not on file    Stress: Not on file   Relationships    Social connections     Talks on phone: Not on file     Gets together: Not on file     Attends Yarsani service: Not on file     Active member of club or organization: Not on file     Attends meetings of clubs or organizations: Not on file     Relationship status: Not on file    Intimate partner violence     Fear of current or ex partner: Not on file     Emotionally abused: Not on file     Physically abused: Not on file     Forced sexual activity: Not on file   Other Topics Concern    Not on file   Social History Narrative    Not on file       Family History   Problem Relation Age of Onset    Heart Disease Father     High Blood Pressure Brother        Allergies:  Aripiprazole, Eletriptan, Hydrocodone-acetaminophen, Oxycodone-acetaminophen, Sumatriptan, Triptans, and Lamotrigine    Home Medications:  Prior to Admission medications    Medication Sig Start Date End Date Taking?  Authorizing Provider HYDROcodone-acetaminophen (NORCO) 5-325 MG per tablet Take 1 tablet by mouth every 6 hours as needed for Pain for up to 7 days. Intended supply: 7 days.  Take lowest dose possible to manage pain 1/18/21 1/25/21  Catherine Miller, MD   losartan (COZAAR) 50 MG tablet Take 50 mg by mouth daily    Historical Provider, MD   amitriptyline (ELAVIL) 25 MG tablet Take 25 mg by mouth nightly    Historical Provider, MD   prazosin (MINIPRESS) 1 MG capsule Take 1 mg by mouth nightly    Historical Provider, MD   melatonin 5 MG TABS tablet Take 5 mg by mouth daily    Historical Provider, MD   potassium chloride (KLOR-CON M) 10 MEQ extended release tablet Take 1 tablet by mouth 2 times daily 7/18/20   Mervat Jones MD   QUEtiapine (SEROQUEL XR) 400 MG extended release tablet Take 800 mg by mouth nightly    Historical Provider, MD   busPIRone (BUSPAR) 15 MG tablet Take 15 mg by mouth nightly Can take once in morning PRN    Historical Provider, MD   omeprazole (PRILOSEC) 40 MG delayed release capsule Take 40 mg by mouth daily    Historical Provider, MD   Cholecalciferol (VITAMIN D) 50 MCG (2000 UT) CAPS capsule Take by mouth daily    Historical Provider, MD   butalbital-APAP-caffeine (FIORICET) -40 MG CAPS per capsule Take 1 capsule by mouth every 6 hours as needed for Headaches 5/19/20 11/10/20  Mihai Gupta DO   albuterol sulfate HFA (PROVENTIL HFA) 108 (90 Base) MCG/ACT inhaler Inhale 2 puffs into the lungs as needed 12/28/18   Historical Provider, MD   magnesium oxide (MAG-OX) 400 MG tablet Take 400 mg by mouth daily    Historical Provider, MD   fexofenadine (RA ALLERGY RELIEF) 180 MG tablet take 1 tablet by mouth once daily  Patient taking differently: Take 180 mg by mouth daily as needed take 1 tablet by mouth once daily 11/7/18   Liliana Chamorro MD   pramipexole (MIRAPEX) 0.5 MG tablet take 1 tablet by mouth twice a day Patient taking differently: Take 0.25 mg by mouth daily take 1 tablet by mouth twice a day 11/7/18   Jesse Egan MD   DULoxetine (CYMBALTA) 60 MG extended release capsule TAKE 1 CAPSULE BY MOUTH TWICE A DAY 8/3/18   Jesse Egan MD       REVIEW OF SYSTEMS    (2-9 systems for level 4, 10 or more for level 5)      Review of Systems   Constitutional: Negative for fever. HENT: Negative for congestion, rhinorrhea and sore throat. Eyes: Negative for visual disturbance. Respiratory: Negative for shortness of breath. Cardiovascular: Negative for chest pain. Gastrointestinal: Positive for abdominal pain. Negative for constipation, diarrhea and vomiting. Genitourinary: Negative for difficulty urinating. Musculoskeletal: Negative for back pain and neck pain. Skin: Negative for wound. Neurological: Negative for syncope. Hematological: Negative for adenopathy. Psychiatric/Behavioral: Negative for confusion. PHYSICAL EXAM   (up to 7 for level 4, 8 or more for level 5)      INITIAL VITALS:   BP (!) 140/77   Pulse 73   Temp 96.8 °F (36 °C) (Temporal)   Resp 18   Ht 5' 5\" (1.651 m)   Wt 258 lb (117 kg)   SpO2 100%   BMI 42.93 kg/m²     Physical Exam  Vitals signs and nursing note reviewed. Constitutional:       Appearance: She is obese. She is not toxic-appearing. HENT:      Head: Normocephalic. Right Ear: External ear normal.      Left Ear: External ear normal.      Nose: Nose normal.      Mouth/Throat:      Mouth: Mucous membranes are moist.   Eyes:      Extraocular Movements: Extraocular movements intact. Neck:      Musculoskeletal: Normal range of motion. Cardiovascular:      Rate and Rhythm: Normal rate. Pulses: Normal pulses. Pulmonary:      Effort: Pulmonary effort is normal. No respiratory distress. Abdominal:      Palpations: There is no mass. Tenderness: There is abdominal tenderness. There is no right CVA tenderness, left CVA tenderness, guarding or rebound. Musculoskeletal: Normal range of motion. Skin:     General: Skin is warm. Capillary Refill: Capillary refill takes less than 2 seconds. Neurological:      Mental Status: She is alert and oriented to person, place, and time.    Psychiatric:         Mood and Affect: Mood normal.         DIFFERENTIAL  DIAGNOSIS     PLAN (LABS / IMAGING / EKG):  Orders Placed This Encounter   Procedures    CBC WITH AUTO DIFFERENTIAL    COMPREHENSIVE METABOLIC PANEL    LIPASE    Troponin    Diet NPO Effective Now    Vital signs per unit routine    Notify physician    Notify physician    Up as tolerated    Place intermittent pneumatic compression device    Full Code    EKG 12 Lead    PATIENT STATUS (FROM ED OR OR/PROCEDURAL) Observation       MEDICATIONS ORDERED:  Orders Placed This Encounter   Medications    ondansetron (ZOFRAN) injection 4 mg    famotidine (PEPCID) injection 20 mg    0.9 % sodium chloride bolus    fentaNYL (SUBLIMAZE) injection 50 mcg    potassium bicarb-citric acid (EFFER-K) effervescent tablet 40 mEq    droperidol (INAPSINE) injection 0.625 mg    sodium chloride flush 0.9 % injection 10 mL    sodium chloride flush 0.9 % injection 10 mL    enoxaparin (LOVENOX) injection 40 mg    OR Linked Order Group     fentaNYL (SUBLIMAZE) injection 25 mcg     fentaNYL (SUBLIMAZE) injection 50 mcg    DULoxetine (CYMBALTA) extended release capsule 60 mg    losartan (COZAAR) tablet 50 mg    melatonin tablet 5 mg    pramipexole (MIRAPEX) tablet 0.25 mg    prazosin (MINIPRESS) capsule 1 mg    pantoprazole (PROTONIX) tablet 40 mg    amitriptyline (ELAVIL) tablet 25 mg    busPIRone (BUSPAR) tablet 15 mg    0.9 % sodium chloride infusion    morphine injection 4 mg    oxyCODONE (ROXICODONE) immediate release tablet 5 mg       DDX: pancreatitis, gerd, gastritis, pud DIAGNOSTIC RESULTS / EMERGENCY DEPARTMENT COURSE / MDM   LAB RESULTS:  Results for orders placed or performed during the hospital encounter of 01/25/21   CBC WITH AUTO DIFFERENTIAL   Result Value Ref Range    WBC 10.3 3.5 - 11.3 k/uL    RBC 4.01 3.95 - 5.11 m/uL    Hemoglobin 12.6 11.9 - 15.1 g/dL    Hematocrit 39.6 36.3 - 47.1 %    MCV 98.8 82.6 - 102.9 fL    MCH 31.4 25.2 - 33.5 pg    MCHC 31.8 28.4 - 34.8 g/dL    RDW 13.4 11.8 - 14.4 %    Platelets 382 356 - 205 k/uL    MPV 10.7 8.1 - 13.5 fL    NRBC Automated 0.0 0.0 per 100 WBC    Differential Type NOT REPORTED     Seg Neutrophils 54 36 - 65 %    Lymphocytes 35 24 - 43 %    Monocytes 5 3 - 12 %    Eosinophils % 4 1 - 4 %    Basophils 1 0 - 2 %    Immature Granulocytes 1 (H) 0 %    Segs Absolute 5.71 1.50 - 8.10 k/uL    Absolute Lymph # 3.64 1.10 - 3.70 k/uL    Absolute Mono # 0.48 0.10 - 1.20 k/uL    Absolute Eos # 0.38 0.00 - 0.44 k/uL    Basophils Absolute 0.06 0.00 - 0.20 k/uL    Absolute Immature Granulocyte 0.07 0.00 - 0.30 k/uL    WBC Morphology NOT REPORTED     RBC Morphology NOT REPORTED     Platelet Estimate NOT REPORTED    COMPREHENSIVE METABOLIC PANEL   Result Value Ref Range    Glucose 93 70 - 99 mg/dL    BUN 11 6 - 20 mg/dL    CREATININE 0.52 0.50 - 0.90 mg/dL    Bun/Cre Ratio NOT REPORTED 9 - 20    Calcium 8.9 8.6 - 10.4 mg/dL    Sodium 136 135 - 144 mmol/L    Potassium 3.6 (L) 3.7 - 5.3 mmol/L    Chloride 102 98 - 107 mmol/L    CO2 24 20 - 31 mmol/L    Anion Gap 10 9 - 17 mmol/L    Alkaline Phosphatase 122 (H) 35 - 104 U/L    ALT 63 (H) 5 - 33 U/L    AST 40 (H) <32 U/L    Total Bilirubin <0.10 (L) 0.3 - 1.2 mg/dL    Total Protein 7.1 6.4 - 8.3 g/dL    Alb 4.0 3.5 - 5.2 g/dL    Albumin/Globulin Ratio 1.3 1.0 - 2.5    GFR Non-African American >60 >60 mL/min    GFR African American >60 >60 mL/min    GFR Comment          GFR Staging NOT REPORTED    LIPASE   Result Value Ref Range    Lipase 7 (L) 13 - 60 U/L   Troponin   Result Value Ref Range Troponin, High Sensitivity <6 0 - 14 ng/L    Troponin T NOT REPORTED <0.03 ng/mL    Troponin Interp NOT REPORTED        IMPRESSION: Alert oriented obese nontoxic adult female complaining of epigastric pain x8 hours history of chronic pancreatitis initiate broad work-up with labs ekg and analgesia as needed anticipate admission. RADIOLOGY:  No results found. EKG  Normal sinus rhythm rate of 75 normal axis normal intervals  normal R wave progression is appropriate precordial T wave balance nonspecific ECG. All EKG's are interpreted by the Emergency Department Physician who either signs or Co-signs this chart in the absence of a cardiologist.    EMERGENCY DEPARTMENT COURSE:  Seen and evaluated labs unremarkable pain unrelieved by fentanyl and droperidol patient admitted  to the observation unit for further pain management and hydration. PROCEDURES:  none    CONSULTS:  None    CRITICAL CARE:  Please see attending note    FINAL IMPRESSION      1. Abdominal pain, epigastric          DISPOSITION / PLAN     DISPOSITION Admitted 01/25/2021 08:44:04 AM      PATIENT REFERRED TO:  No follow-up provider specified.     DISCHARGE MEDICATIONS:  New Prescriptions    No medications on file       Dee Vasquez DO  Emergency Medicine Resident    (Please note that portions of thisnote were completed with a voice recognition program.  Efforts were made to edit the dictations but occasionally words are mis-transcribed.)        Dee Vasquez DO  Resident  01/25/21 [id number]

## 2021-01-25 NOTE — H&P
[facility]  CDU / OBSERVATION eNCOUnter  Resident Note     Pt Name: Ruperto Brooks  MRN: [id number]  [place] 1975  Date of evaluation: 1/25/21  Patient's PCP is :  Isha Lopez    CHIEF COMPLAINT       Chief Complaint   Patient presents with    Abdominal Pain         HISTORY OF PRESENT ILLNESS    Ruperto Brooks is a 54 y.o. female who presents with acute on chronic epigastric abdominal pain. Patient has a longstanding history of chronic pancreatitis and has had multiple episodes of this in the past.  Patient states that the epigastric pain has become unbearable over the last day and came to the emergency department to be evaluated. Patient has been worked appropriately by [facility] and has known chronic otitis. Patient sees [facility] for this as well as other specialty services. Patient denies: Fever, nausea, vomiting, chills, chest pain or shortness of breath, change in mental status.     Location/Symptom: Epigastric abdominal pain  Timing/Onset: Acute on chronic exacerbated over the last day  Provocation: No provocation  Quality: Deep stabbing  Radiation: Occasionally to the back  Severity: 6 out of 10  Timing/Duration: Ongoing  Modifying Factors: Analgesic medications    REVIEW OF SYSTEMS       General ROS - No fevers, No malaise   Ophthalmic ROS - No discharge, No changes in vision  ENT ROS -  No sore throat, No rhinorrhea,   Respiratory ROS - no shortness of breath, no cough, no  wheezing  Cardiovascular ROS - No chest pain, no dyspnea on exertion  Gastrointestinal ROS -positive for epigastric abdominal pain, no nausea or vomiting, no change in bowel habits, no black or bloody stools  Genito-Urinary ROS - No dysuria, trouble voiding, or hematuria  Musculoskeletal ROS - No myalgias, No arthalgias  Neurological ROS - No headache, no dizziness/lightheadedness, No focal weakness, no loss of sensation  Dermatological ROS - No lesions, No rash (PQRS) Advance directives on face sheet per hospital policy. No change unless specifically mentioned in chart    PAST MEDICAL HISTORY    has a past medical history of Abnormal levels of other serum enzymes, Anxiety, Bilateral leg edema, Chronic kidney disease, Depression, DVT (deep venous thrombosis) (HCC), Elevated liver enzymes, Fibromyalgia, Headache(784.0), Hx of blood clots, Hypertension, Kidney stone, Obstructive sleep apnea syndrome, Pancreatitis, Pleural effusion, SOB (shortness of breath), Supraventricular tachycardia ([address] and SVT (supraventricular tachycardia) ([address] I have reviewed the past medical history with the patient and it is pertinent to this complaint. SURGICAL HISTORY      has a past surgical history that includes Thyroid lobectomy (Right); Cholecystectomy (2001); Knee arthroscopy (Left); Foot surgery (Right); Endometrial ablation; Atrial ablation surgery; eye surgery (Bilateral); Endoscopy, colon, diagnostic; back surgery; pr cysto/uretero/pyeloscopy w/lithotripsy (Left, 9/20/2017); and EXCISION LESION HAND / FINGER (Right, 1/25/2019). I have reviewed and agree with Surgical History entered and it is pertinent to this complaint.      CURRENT MEDICATIONS         potassium bicarb-citric acid (EFFER-K) effervescent tablet 40 mEq, Daily      sodium chloride flush 0.9 % injection 10 mL, 2 times per day      sodium chloride flush 0.9 % injection 10 mL, PRN      enoxaparin (LOVENOX) injection 40 mg, Daily      fentaNYL (SUBLIMAZE) injection 25 mcg, Q1H PRN    Or      fentaNYL (SUBLIMAZE) injection 50 mcg, Q1H PRN      DULoxetine (CYMBALTA) extended release capsule 60 mg, Daily      losartan (COZAAR) tablet 50 mg, Daily      melatonin tablet 5 mg, Daily      pramipexole (MIRAPEX) tablet 0.25 mg, Daily      prazosin (MINIPRESS) capsule 1 mg, Nightly      [START ON 1/26/2021] pantoprazole (PROTONIX) tablet 40 mg, QAM AC      amitriptyline (ELAVIL) tablet 25 mg, Nightly   busPIRone (BUSPAR) tablet 15 mg, Nightly      0.9 % sodium chloride infusion, Continuous      morphine injection 4 mg, Q4H PRN      oxyCODONE (ROXICODONE) immediate release tablet 5 mg, Q4H PRN        All medication charted and reviewed. ALLERGIES     is allergic to aripiprazole; eletriptan; hydrocodone-acetaminophen; oxycodone-acetaminophen; sumatriptan; triptans; and lamotrigine. FAMILY HISTORY     She indicated that her mother is alive. She indicated that her father is alive. She indicated that both of her brothers are alive. family history includes Heart Disease in her father; High Blood Pressure in her brother. The patient denies any pertinent family history. I have reviewed and agree with the family history entered. I have reviewed the Family History and it is not significant to the case    SOCIAL HISTORY      reports that she quit smoking about 13 months ago. Her smoking use included cigarettes. She smoked 1.00 pack per day. She has never used smokeless tobacco. She reports current alcohol use. She reports that she does not use drugs. I have reviewed and agree with all Social.  There are no concerns for substance abuse/use. PHYSICAL EXAM     INITIAL VITALS:  height is 5' 5\" (1.651 m) and weight is 277 lb 1.6 oz (125.7 kg). Her oral temperature is 96.6 °F (35.9 °C). Her blood pressure is 125/79 and her pulse is 79. Her respiration is 18 and oxygen saturation is 96%.       CONSTITUTIONAL: AOx4, no apparent distress, appears stated age    HEAD: normocephalic, atraumatic   EYES: PERRLA, EOMI    ENT: moist mucous membranes, uvula midline   NECK: supple, symmetric   BACK: symmetric   LUNGS: clear to auscultation bilaterally   CARDIOVASCULAR: regular rate and rhythm, no murmurs, rubs or gallops   ABDOMEN: soft, tender to palpation with mild guarding, non-distended with normal active bowel sounds   NEUROLOGIC:  MAEx4, no focal sensory or motor deficits MUSCULOSKELETAL: no clubbing, cyanosis or edema   SKIN: no rash or wounds       DIFFERENTIAL DIAGNOSIS/MDM:     Chronic pancreatitis, gastritis, enteritis    DIAGNOSTIC RESULTS     RADIOLOGY:   I directly visualized the following  images and reviewed the radiologist interpretations:    No results found. LABS:  I have reviewed and interpreted all available lab results. Labs Reviewed   CBC WITH AUTO DIFFERENTIAL - Abnormal; Notable for the following components:       Result Value    Immature Granulocytes 1 (*)     All other components within normal limits   COMPREHENSIVE METABOLIC PANEL - Abnormal; Notable for the following components:    Potassium 3.6 (*)     Alkaline Phosphatase 122 (*)     ALT 63 (*)     AST 40 (*)     Total Bilirubin <0.10 (*)     All other components within normal limits   LIPASE - Abnormal; Notable for the following components:    Lipase 7 (*)     All other components within normal limits   TROPONIN         CDU IMPRESSION / PLAN      John Barriga is a 54 y.o. female who presents with acute exacerbation of chronic pancreatitis. Patient is having more epigastric dental pain than normal.  Patient sees GI specialist in [facility] clinic for this problem. Patient is reliable, answers questions appropriately and participates in all physical examinations. Patient admitted the observation service for symptomatic control and resolution of her symptoms. · Symptomatic control  · Continue home medications and pain control  · Monitor vitals, labs, and imaging  · DISPO: pending consults and clinical improvement    CONSULTS:    None    PROCEDURES:  Not indicated       PATIENT REFERRED TO:    No follow-up provider specified. --  Andrzej Espinoza MD   Emergency Medicine Resident     This dictation was generated by voice recognition computer software. Although all attempts are made to edit the dictation for accuracy, there may be errors in the transcription that are not intended.

## 2021-01-26 PROCEDURE — 6360000002 HC RX W HCPCS: Performed by: STUDENT IN AN ORGANIZED HEALTH CARE EDUCATION/TRAINING PROGRAM

## 2021-01-26 PROCEDURE — 2580000003 HC RX 258: Performed by: STUDENT IN AN ORGANIZED HEALTH CARE EDUCATION/TRAINING PROGRAM

## 2021-01-26 PROCEDURE — 96376 TX/PRO/DX INJ SAME DRUG ADON: CPT

## 2021-01-26 PROCEDURE — 96372 THER/PROPH/DIAG INJ SC/IM: CPT

## 2021-01-26 PROCEDURE — 6370000000 HC RX 637 (ALT 250 FOR IP): Performed by: STUDENT IN AN ORGANIZED HEALTH CARE EDUCATION/TRAINING PROGRAM

## 2021-01-26 PROCEDURE — 6370000000 HC RX 637 (ALT 250 FOR IP): Performed by: EMERGENCY MEDICINE

## 2021-01-26 PROCEDURE — G0378 HOSPITAL OBSERVATION PER HR: HCPCS

## 2021-01-26 RX ORDER — PROMETHAZINE HYDROCHLORIDE 25 MG/ML
12.5 INJECTION, SOLUTION INTRAMUSCULAR; INTRAVENOUS ONCE
Status: COMPLETED | OUTPATIENT
Start: 2021-01-26 | End: 2021-01-26

## 2021-01-26 RX ORDER — OXYCODONE HYDROCHLORIDE 5 MG/1
10 TABLET ORAL EVERY 4 HOURS PRN
Status: DISCONTINUED | OUTPATIENT
Start: 2021-01-26 | End: 2021-01-28

## 2021-01-26 RX ORDER — QUETIAPINE FUMARATE 200 MG/1
400 TABLET, FILM COATED ORAL NIGHTLY
Status: DISCONTINUED | OUTPATIENT
Start: 2021-01-26 | End: 2021-02-03 | Stop reason: HOSPADM

## 2021-01-26 RX ORDER — ACETAMINOPHEN, ASPIRIN AND CAFFEINE 250; 250; 65 MG/1; MG/1; MG/1
1 TABLET, FILM COATED ORAL EVERY 6 HOURS PRN
Status: DISCONTINUED | OUTPATIENT
Start: 2021-01-26 | End: 2021-01-28

## 2021-01-26 RX ADMIN — MORPHINE SULFATE 4 MG: 4 INJECTION, SOLUTION INTRAMUSCULAR; INTRAVENOUS at 17:08

## 2021-01-26 RX ADMIN — OXYCODONE HYDROCHLORIDE 10 MG: 5 TABLET ORAL at 14:27

## 2021-01-26 RX ADMIN — SODIUM CHLORIDE: 9 INJECTION, SOLUTION INTRAVENOUS at 14:29

## 2021-01-26 RX ADMIN — PRAMIPEXOLE DIHYDROCHLORIDE 0.25 MG: 0.25 TABLET ORAL at 18:28

## 2021-01-26 RX ADMIN — PROMETHAZINE HYDROCHLORIDE 12.5 MG: 25 INJECTION INTRAMUSCULAR; INTRAVENOUS at 17:01

## 2021-01-26 RX ADMIN — OXYCODONE HYDROCHLORIDE 5 MG: 5 TABLET ORAL at 01:40

## 2021-01-26 RX ADMIN — OXYCODONE HYDROCHLORIDE 5 MG: 5 TABLET ORAL at 05:52

## 2021-01-26 RX ADMIN — Medication 5 MG: at 20:00

## 2021-01-26 RX ADMIN — IBUPROFEN 600 MG: 400 TABLET, FILM COATED ORAL at 08:41

## 2021-01-26 RX ADMIN — ONDANSETRON 4 MG: 2 INJECTION INTRAMUSCULAR; INTRAVENOUS at 08:35

## 2021-01-26 RX ADMIN — DULOXETINE HYDROCHLORIDE 60 MG: 30 CAPSULE, DELAYED RELEASE ORAL at 08:41

## 2021-01-26 RX ADMIN — POTASSIUM BICARBONATE 40 MEQ: 782 TABLET, EFFERVESCENT ORAL at 08:47

## 2021-01-26 RX ADMIN — OXYCODONE HYDROCHLORIDE 10 MG: 5 TABLET ORAL at 18:29

## 2021-01-26 RX ADMIN — MORPHINE SULFATE 4 MG: 4 INJECTION, SOLUTION INTRAMUSCULAR; INTRAVENOUS at 08:44

## 2021-01-26 RX ADMIN — MORPHINE SULFATE 4 MG: 4 INJECTION, SOLUTION INTRAMUSCULAR; INTRAVENOUS at 04:44

## 2021-01-26 RX ADMIN — BUSPIRONE HYDROCHLORIDE 15 MG: 15 TABLET ORAL at 20:00

## 2021-01-26 RX ADMIN — MORPHINE SULFATE 4 MG: 4 INJECTION, SOLUTION INTRAMUSCULAR; INTRAVENOUS at 21:25

## 2021-01-26 RX ADMIN — PRAZOSIN HYDROCHLORIDE 1 MG: 1 CAPSULE ORAL at 20:00

## 2021-01-26 RX ADMIN — MORPHINE SULFATE 4 MG: 4 INJECTION, SOLUTION INTRAMUSCULAR; INTRAVENOUS at 00:22

## 2021-01-26 RX ADMIN — ENOXAPARIN SODIUM 40 MG: 40 INJECTION SUBCUTANEOUS at 08:34

## 2021-01-26 RX ADMIN — IBUPROFEN 600 MG: 400 TABLET, FILM COATED ORAL at 17:12

## 2021-01-26 RX ADMIN — IBUPROFEN 600 MG: 400 TABLET, FILM COATED ORAL at 00:21

## 2021-01-26 RX ADMIN — TIZANIDINE 4 MG: 4 TABLET ORAL at 20:00

## 2021-01-26 RX ADMIN — PANTOPRAZOLE SODIUM 40 MG: 40 TABLET, DELAYED RELEASE ORAL at 05:57

## 2021-01-26 RX ADMIN — ACETAMINOPHEN, ASPIRIN AND CAFFEINE 1 TABLET: 250; 250; 65 TABLET, FILM COATED ORAL at 18:29

## 2021-01-26 RX ADMIN — ONDANSETRON 4 MG: 2 INJECTION INTRAMUSCULAR; INTRAVENOUS at 01:40

## 2021-01-26 RX ADMIN — OXYCODONE HYDROCHLORIDE 10 MG: 5 TABLET ORAL at 23:27

## 2021-01-26 RX ADMIN — OXYCODONE HYDROCHLORIDE 10 MG: 5 TABLET ORAL at 09:46

## 2021-01-26 RX ADMIN — ONDANSETRON 4 MG: 2 INJECTION INTRAMUSCULAR; INTRAVENOUS at 21:35

## 2021-01-26 RX ADMIN — LOSARTAN POTASSIUM 50 MG: 50 TABLET, FILM COATED ORAL at 18:29

## 2021-01-26 RX ADMIN — ACETAMINOPHEN, ASPIRIN AND CAFFEINE 1 TABLET: 250; 250; 65 TABLET, FILM COATED ORAL at 12:32

## 2021-01-26 RX ADMIN — MORPHINE SULFATE 4 MG: 4 INJECTION, SOLUTION INTRAMUSCULAR; INTRAVENOUS at 12:40

## 2021-01-26 RX ADMIN — AMITRIPTYLINE HYDROCHLORIDE 25 MG: 25 TABLET, FILM COATED ORAL at 20:00

## 2021-01-26 RX ADMIN — BUPROPION HYDROCHLORIDE 150 MG: 150 TABLET, EXTENDED RELEASE ORAL at 20:00

## 2021-01-26 RX ADMIN — ONDANSETRON 4 MG: 2 INJECTION INTRAMUSCULAR; INTRAVENOUS at 14:35

## 2021-01-26 RX ADMIN — QUETIAPINE FUMARATE 400 MG: 200 TABLET ORAL at 20:57

## 2021-01-26 RX ADMIN — DULOXETINE HYDROCHLORIDE 60 MG: 30 CAPSULE, DELAYED RELEASE ORAL at 20:00

## 2021-01-26 ASSESSMENT — PAIN SCALES - GENERAL
PAINLEVEL_OUTOF10: 9
PAINLEVEL_OUTOF10: 7
PAINLEVEL_OUTOF10: 8
PAINLEVEL_OUTOF10: 7
PAINLEVEL_OUTOF10: 7
PAINLEVEL_OUTOF10: 9
PAINLEVEL_OUTOF10: 6
PAINLEVEL_OUTOF10: 7

## 2021-01-26 ASSESSMENT — PAIN DESCRIPTION - LOCATION: LOCATION: ABDOMEN

## 2021-01-26 ASSESSMENT — PAIN DESCRIPTION - FREQUENCY: FREQUENCY: CONTINUOUS

## 2021-01-26 ASSESSMENT — PAIN DESCRIPTION - PAIN TYPE: TYPE: ACUTE PAIN

## 2021-01-26 NOTE — PROGRESS NOTES
CDU Daily Progress Note  Attending Physician       Pt Name: Kal Celaya  MRN: [id number]  [place] 1975  Date of evaluation: 1/26/21    I performed a history and physical examination of the patient and discussed management with the resident. I reviewed the residents note and agree with the documented findings and plan of care. Any areas of disagreement are noted on the chart. I was personally present for the key portions of any procedures. I have documented in the chart those procedures where I was not present during the key portions. I have reviewed the emergency nurses triage note. I agree with the chief complaint, past medical history, past surgical history, allergies, medications, social and family history as documented unless otherwise noted below. Documentation of the HPI, Physical Exam and Medical Decision Making performed by medical students or scribes is based on my personal performance of the HPI, PE and MDM. For Physician Assistant/ Nurse Practitioner cases/documentation I have personally evaluated this patient and have completed at least one if not all key elements of the E/M (history, physical exam, and MDM). Additional findings are as noted. The Family History, Social History and Review of Systems are unchanged from the previous day. No significant events overnight. Still requires IV pain meds. Will try to wean later today.  If can treat pain w orals plan to discharge as she is eating without vomiting    Juan Dior MD  Attending Physician  Critical Decision Unit

## 2021-01-26 NOTE — PROGRESS NOTES
OBS/CDU   RESIDENT NOTE      Patients PCP is:  Theo Huntley        SUBJECTIVE    Patient was evaluated at bedside, stated that she had no events overnight. Patient states that she is still in moderate amount of discomfort and does not feel like she is improving. Patient states that the pain control which she is receiving is doing a decent job like to have more background coverage for pain. Patient states that she was only able to sleep overnight because the discomfort that she is in her abdomen. Patient is remained afebrile, with no new symptoms or changes in her current. Has been able to tolerate a full diet without nausea or vomiting. The patient is urinating on his own and is passing flatus. Denies fever, chills, nausea, vomiting, chest pain, shortness of breath, abdominal pain, focal weakness, numbness, tingling, urinary/bowel symptoms, vision changes, visual hallucinations, or headache. PHYSICAL EXAM      General: NAD, AO X 3  Heent: EMOI, PERRL  Neck: SUPPLE, NO JVD  Cardiovascular: RRR, S1S2  Pulmonary: CTAB, NO SOB  Abdomen: SOFT, NTTP, ND, +BS  Extremities: +2/4 PULSES DISTAL, NO SWELLING  Neuro / Psych: NO NUMBNESS OR TINGLING, MENTATION AT BASELINE    PERTINENT TEST /EXAMS      I have reviewed all available laboratory results.     MEDICATIONS CURRENT       oxyCODONE (ROXICODONE) immediate release tablet 10 mg, Q4H PRN      potassium bicarb-citric acid (EFFER-K) effervescent tablet 40 mEq, Daily      sodium chloride flush 0.9 % injection 10 mL, 2 times per day      sodium chloride flush 0.9 % injection 10 mL, PRN      enoxaparin (LOVENOX) injection 40 mg, Daily      fentaNYL (SUBLIMAZE) injection 25 mcg, Q1H PRN    Or      fentaNYL (SUBLIMAZE) injection 50 mcg, Q1H PRN      melatonin tablet 5 mg, Daily      prazosin (MINIPRESS) capsule 1 mg, Nightly      pantoprazole (PROTONIX) tablet 40 mg, QAM AC      amitriptyline (ELAVIL) tablet 25 mg, Nightly   busPIRone (BUSPAR) tablet 15 mg, Nightly      0.9 % sodium chloride infusion, Continuous      morphine injection 4 mg, Q4H PRN      tiZANidine (ZANAFLEX) tablet 4 mg, Nightly      buPROPion (WELLBUTRIN SR) extended release tablet 150 mg, Nightly      losartan (COZAAR) tablet 50 mg, Daily      pramipexole (MIRAPEX) tablet 0.25 mg, Daily      ibuprofen (ADVIL;MOTRIN) tablet 600 mg, Q6H PRN      DULoxetine (CYMBALTA) extended release capsule 60 mg, BID      ondansetron (ZOFRAN) injection 4 mg, Q6H PRN        All medication charted and reviewed. CONSULTS      None    ASSESSMENT/PLAN       Avel Cummings is a 54 y.o. female who presents with abdominal pain secondary to chronic pancreatitis. Patient not receiving enough pain relief for symptomatic control. Will increase amount of Roxicodone to attempt to better control his pain with analgesia. · Increased pain control months  · Continue home medications and pain control  · Monitor vitals, labs, and imaging  · DISPO: pending consults and clinical improvement    --  Steven Poon  Emergency Medicine Resident Physician     This dictation was generated by voice recognition computer software. Although all attempts are made to edit the dictation for accuracy, there may be errors in the transcription that are not intended.

## 2021-01-26 NOTE — FLOWSHEET NOTE
Assessment:  rounding on med/surg floor. Nursing staff present upon arrival. Patient was approachable. Patient appeared to be disturbed in some way. Intervention:  provided active listening as patient shared her feelings around her illness. Patient shared how she almost  last year and how the impact of her illness has interrupted her work life and how it is pressing on her mind.  inquired of patient support, patient has 2 children and her mother as support; but no one has visited but have been in contact with patient.  inquired of Mu-[employer] home, patient shared she attends, [employer]. Patient shared [facility]ist is not aware of her stay and that it would be helpful for  to inform.  offered prayer.  encouraged patient concerning her diagnosis and future. Outcome: Patient was receptive to 's words. Patient received prayer. Patient was tearful but expressed gratitude for visit. Throughout visit patient engaged in conversation and shared thoughts and current concerns.  contacted [facility], with success, informed ministerial staff will be made aware. Plan: Spiritual care will gladly follow as needed and/or requested. 21 1635   Encounter Summary   Services provided to: Patient   Referral/Consult From: [facility] Parent; Children   Place of Religious   ([facility] )   Contact Nondenominational Yes   Continue Visiting   (2021)   Complexity of Encounter Moderate   Length of Encounter 45 minutes   Spiritual Assessment Completed Yes   Routine   Type Initial   Assessment Approachable   Intervention Active listening;Sustaining presence/ Ministry of presence;Prayer;Nurtured hope;Explored feelings, thoughts, concerns; Discussed illness/injury and it's impact;Contacted support as requested per patient/family request   Who?   (Nondenominational )   Why? (To share that patient has been hospitalized )   At Request Of   (Patient)   Outcome Acceptance;Expressed gratitude;Engaged in conversation; Tearful;Receptive

## 2021-01-27 ENCOUNTER — APPOINTMENT (OUTPATIENT)
Dept: CT IMAGING | Age: 46
DRG: 392 | End: 2021-01-27
Payer: COMMERCIAL

## 2021-01-27 PROCEDURE — 96372 THER/PROPH/DIAG INJ SC/IM: CPT

## 2021-01-27 PROCEDURE — 2580000003 HC RX 258: Performed by: STUDENT IN AN ORGANIZED HEALTH CARE EDUCATION/TRAINING PROGRAM

## 2021-01-27 PROCEDURE — 96375 TX/PRO/DX INJ NEW DRUG ADDON: CPT

## 2021-01-27 PROCEDURE — 6360000002 HC RX W HCPCS: Performed by: STUDENT IN AN ORGANIZED HEALTH CARE EDUCATION/TRAINING PROGRAM

## 2021-01-27 PROCEDURE — 74177 CT ABD & PELVIS W/CONTRAST: CPT

## 2021-01-27 PROCEDURE — 96376 TX/PRO/DX INJ SAME DRUG ADON: CPT

## 2021-01-27 PROCEDURE — 6360000002 HC RX W HCPCS: Performed by: EMERGENCY MEDICINE

## 2021-01-27 PROCEDURE — 6370000000 HC RX 637 (ALT 250 FOR IP): Performed by: STUDENT IN AN ORGANIZED HEALTH CARE EDUCATION/TRAINING PROGRAM

## 2021-01-27 PROCEDURE — 6360000004 HC RX CONTRAST MEDICATION: Performed by: EMERGENCY MEDICINE

## 2021-01-27 PROCEDURE — C9113 INJ PANTOPRAZOLE SODIUM, VIA: HCPCS | Performed by: EMERGENCY MEDICINE

## 2021-01-27 PROCEDURE — 2580000003 HC RX 258: Performed by: EMERGENCY MEDICINE

## 2021-01-27 PROCEDURE — 1200000000 HC SEMI PRIVATE

## 2021-01-27 PROCEDURE — 6370000000 HC RX 637 (ALT 250 FOR IP): Performed by: EMERGENCY MEDICINE

## 2021-01-27 RX ORDER — PROMETHAZINE HYDROCHLORIDE 25 MG/ML
12.5 INJECTION, SOLUTION INTRAMUSCULAR; INTRAVENOUS EVERY 6 HOURS PRN
Status: DISCONTINUED | OUTPATIENT
Start: 2021-01-27 | End: 2021-01-31

## 2021-01-27 RX ORDER — SODIUM CHLORIDE 9 MG/ML
10 INJECTION INTRAVENOUS 2 TIMES DAILY
Status: DISCONTINUED | OUTPATIENT
Start: 2021-01-27 | End: 2021-01-30

## 2021-01-27 RX ORDER — PANTOPRAZOLE SODIUM 40 MG/10ML
40 INJECTION, POWDER, LYOPHILIZED, FOR SOLUTION INTRAVENOUS 2 TIMES DAILY
Status: DISCONTINUED | OUTPATIENT
Start: 2021-01-27 | End: 2021-01-30

## 2021-01-27 RX ORDER — DIPHENHYDRAMINE HYDROCHLORIDE 50 MG/ML
25 INJECTION INTRAMUSCULAR; INTRAVENOUS EVERY 6 HOURS PRN
Status: DISCONTINUED | OUTPATIENT
Start: 2021-01-27 | End: 2021-01-30

## 2021-01-27 RX ADMIN — BUPROPION HYDROCHLORIDE 150 MG: 150 TABLET, EXTENDED RELEASE ORAL at 20:43

## 2021-01-27 RX ADMIN — QUETIAPINE FUMARATE 400 MG: 200 TABLET ORAL at 20:42

## 2021-01-27 RX ADMIN — Medication 25 MG: at 17:05

## 2021-01-27 RX ADMIN — ONDANSETRON 4 MG: 2 INJECTION INTRAMUSCULAR; INTRAVENOUS at 17:05

## 2021-01-27 RX ADMIN — HYDROMORPHONE HYDROCHLORIDE 0.5 MG: 1 INJECTION, SOLUTION INTRAMUSCULAR; INTRAVENOUS; SUBCUTANEOUS at 17:06

## 2021-01-27 RX ADMIN — OXYCODONE HYDROCHLORIDE 10 MG: 5 TABLET ORAL at 20:53

## 2021-01-27 RX ADMIN — IBUPROFEN 600 MG: 400 TABLET, FILM COATED ORAL at 14:59

## 2021-01-27 RX ADMIN — PROMETHAZINE HYDROCHLORIDE 12.5 MG: 25 INJECTION INTRAMUSCULAR; INTRAVENOUS at 19:00

## 2021-01-27 RX ADMIN — SODIUM CHLORIDE, PRESERVATIVE FREE 10 ML: 5 INJECTION INTRAVENOUS at 20:45

## 2021-01-27 RX ADMIN — IOPAMIDOL 75 ML: 755 INJECTION, SOLUTION INTRAVENOUS at 12:00

## 2021-01-27 RX ADMIN — PANTOPRAZOLE SODIUM 40 MG: 40 INJECTION, POWDER, FOR SOLUTION INTRAVENOUS at 20:45

## 2021-01-27 RX ADMIN — PRAMIPEXOLE DIHYDROCHLORIDE 0.25 MG: 0.25 TABLET ORAL at 17:06

## 2021-01-27 RX ADMIN — OXYCODONE HYDROCHLORIDE 10 MG: 5 TABLET ORAL at 08:03

## 2021-01-27 RX ADMIN — Medication 25 MG: at 22:58

## 2021-01-27 RX ADMIN — ACETAMINOPHEN, ASPIRIN AND CAFFEINE 1 TABLET: 250; 250; 65 TABLET, FILM COATED ORAL at 19:06

## 2021-01-27 RX ADMIN — PRAZOSIN HYDROCHLORIDE 1 MG: 1 CAPSULE ORAL at 20:43

## 2021-01-27 RX ADMIN — AMITRIPTYLINE HYDROCHLORIDE 25 MG: 25 TABLET, FILM COATED ORAL at 20:43

## 2021-01-27 RX ADMIN — SODIUM CHLORIDE, PRESERVATIVE FREE 10 ML: 5 INJECTION INTRAVENOUS at 20:44

## 2021-01-27 RX ADMIN — ONDANSETRON 4 MG: 2 INJECTION INTRAMUSCULAR; INTRAVENOUS at 05:11

## 2021-01-27 RX ADMIN — PANTOPRAZOLE SODIUM 40 MG: 40 TABLET, DELAYED RELEASE ORAL at 05:11

## 2021-01-27 RX ADMIN — OXYCODONE HYDROCHLORIDE 10 MG: 5 TABLET ORAL at 17:09

## 2021-01-27 RX ADMIN — DULOXETINE HYDROCHLORIDE 60 MG: 30 CAPSULE, DELAYED RELEASE ORAL at 20:41

## 2021-01-27 RX ADMIN — OXYCODONE HYDROCHLORIDE 10 MG: 5 TABLET ORAL at 04:00

## 2021-01-27 RX ADMIN — PROMETHAZINE HYDROCHLORIDE 12.5 MG: 25 INJECTION INTRAMUSCULAR; INTRAVENOUS at 12:29

## 2021-01-27 RX ADMIN — HYDROMORPHONE HYDROCHLORIDE 0.5 MG: 1 INJECTION, SOLUTION INTRAMUSCULAR; INTRAVENOUS; SUBCUTANEOUS at 12:29

## 2021-01-27 RX ADMIN — Medication 25 MG: at 10:31

## 2021-01-27 RX ADMIN — Medication 5 MG: at 20:42

## 2021-01-27 RX ADMIN — ENOXAPARIN SODIUM 40 MG: 40 INJECTION SUBCUTANEOUS at 08:08

## 2021-01-27 RX ADMIN — IOHEXOL 50 ML: 240 INJECTION, SOLUTION INTRATHECAL; INTRAVASCULAR; INTRAVENOUS; ORAL at 10:30

## 2021-01-27 RX ADMIN — DULOXETINE HYDROCHLORIDE 60 MG: 30 CAPSULE, DELAYED RELEASE ORAL at 08:05

## 2021-01-27 RX ADMIN — TIZANIDINE 4 MG: 4 TABLET ORAL at 20:42

## 2021-01-27 RX ADMIN — SODIUM CHLORIDE, PRESERVATIVE FREE 10 ML: 5 INJECTION INTRAVENOUS at 10:31

## 2021-01-27 RX ADMIN — SODIUM CHLORIDE: 9 INJECTION, SOLUTION INTRAVENOUS at 20:41

## 2021-01-27 RX ADMIN — LOSARTAN POTASSIUM 50 MG: 50 TABLET, FILM COATED ORAL at 17:06

## 2021-01-27 RX ADMIN — ONDANSETRON 4 MG: 2 INJECTION INTRAMUSCULAR; INTRAVENOUS at 22:58

## 2021-01-27 RX ADMIN — HYDROMORPHONE HYDROCHLORIDE 0.5 MG: 1 INJECTION, SOLUTION INTRAMUSCULAR; INTRAVENOUS; SUBCUTANEOUS at 08:03

## 2021-01-27 RX ADMIN — HYDROMORPHONE HYDROCHLORIDE 0.5 MG: 1 INJECTION, SOLUTION INTRAMUSCULAR; INTRAVENOUS; SUBCUTANEOUS at 14:59

## 2021-01-27 RX ADMIN — POTASSIUM BICARBONATE 40 MEQ: 782 TABLET, EFFERVESCENT ORAL at 08:07

## 2021-01-27 RX ADMIN — ONDANSETRON 4 MG: 2 INJECTION INTRAMUSCULAR; INTRAVENOUS at 10:30

## 2021-01-27 RX ADMIN — OXYCODONE HYDROCHLORIDE 10 MG: 5 TABLET ORAL at 12:39

## 2021-01-27 RX ADMIN — HYDROMORPHONE HYDROCHLORIDE 0.5 MG: 1 INJECTION, SOLUTION INTRAMUSCULAR; INTRAVENOUS; SUBCUTANEOUS at 10:30

## 2021-01-27 RX ADMIN — PANTOPRAZOLE SODIUM 40 MG: 40 INJECTION, POWDER, FOR SOLUTION INTRAVENOUS at 10:30

## 2021-01-27 RX ADMIN — IBUPROFEN 600 MG: 400 TABLET, FILM COATED ORAL at 08:03

## 2021-01-27 RX ADMIN — MORPHINE SULFATE 4 MG: 4 INJECTION, SOLUTION INTRAMUSCULAR; INTRAVENOUS at 03:11

## 2021-01-27 RX ADMIN — SODIUM CHLORIDE, PRESERVATIVE FREE 10 ML: 5 INJECTION INTRAVENOUS at 08:08

## 2021-01-27 RX ADMIN — HYDROMORPHONE HYDROCHLORIDE 0.5 MG: 1 INJECTION, SOLUTION INTRAMUSCULAR; INTRAVENOUS; SUBCUTANEOUS at 18:59

## 2021-01-27 RX ADMIN — HYDROMORPHONE HYDROCHLORIDE 0.5 MG: 1 INJECTION, SOLUTION INTRAMUSCULAR; INTRAVENOUS; SUBCUTANEOUS at 20:53

## 2021-01-27 RX ADMIN — HYDROMORPHONE HYDROCHLORIDE 0.5 MG: 1 INJECTION, SOLUTION INTRAMUSCULAR; INTRAVENOUS; SUBCUTANEOUS at 22:58

## 2021-01-27 RX ADMIN — ACETAMINOPHEN, ASPIRIN AND CAFFEINE 1 TABLET: 250; 250; 65 TABLET, FILM COATED ORAL at 12:29

## 2021-01-27 RX ADMIN — BUSPIRONE HYDROCHLORIDE 15 MG: 15 TABLET ORAL at 20:43

## 2021-01-27 ASSESSMENT — PAIN SCALES - GENERAL
PAINLEVEL_OUTOF10: 6
PAINLEVEL_OUTOF10: 6
PAINLEVEL_OUTOF10: 8
PAINLEVEL_OUTOF10: 6
PAINLEVEL_OUTOF10: 7
PAINLEVEL_OUTOF10: 8
PAINLEVEL_OUTOF10: 7
PAINLEVEL_OUTOF10: 7

## 2021-01-27 NOTE — PROGRESS NOTES
OBS/CDU   RESIDENT NOTE      Patients PCP is:  Sandra Mejia        SUBJECTIVE    Patient was evaluated bedside. States there were no acute events overnight however her pain is still not well controlled. Patient states that the changes in her pain regimen have been only minorly helpful. Decision was made with patient to attempt different pain medication modulation. Patient has been able to eat, and has been able to ambulate on her own but pain is still not well controlled. The patient is urinating on his own and is passing flatus. Denies fever, chills, nausea, vomiting, chest pain, shortness of breath, abdominal pain, focal weakness, numbness, tingling, urinary/bowel symptoms, vision changes, visual hallucinations, or headache. PHYSICAL EXAM      General: NAD, AO X 3  Heent: EMOI, PERRL  Neck: SUPPLE, NO JVD  Cardiovascular: RRR, S1S2  Pulmonary: CTAB, NO SOB  Abdomen: SOFT, mildly tender especially palpation  Extremities: +2/4 PULSES DISTAL, NO SWELLING  Neuro / Psych: NO NUMBNESS OR TINGLING, MENTATION AT BASELINE    PERTINENT TEST /EXAMS      I have reviewed all available laboratory results.     MEDICATIONS CURRENT       HYDROmorphone (DILAUDID) injection 0.5 mg, Q2H PRN      oxyCODONE (ROXICODONE) immediate release tablet 10 mg, Q4H PRN      aspirin-acetaminophen-caffeine (EXCEDRIN MIGRAINE) per tablet 1 tablet, Q6H PRN      QUEtiapine (SEROQUEL) tablet 400 mg, Nightly      potassium bicarb-citric acid (EFFER-K) effervescent tablet 40 mEq, Daily      sodium chloride flush 0.9 % injection 10 mL, 2 times per day      sodium chloride flush 0.9 % injection 10 mL, PRN      enoxaparin (LOVENOX) injection 40 mg, Daily      fentaNYL (SUBLIMAZE) injection 25 mcg, Q1H PRN    Or      fentaNYL (SUBLIMAZE) injection 50 mcg, Q1H PRN      melatonin tablet 5 mg, Daily      prazosin (MINIPRESS) capsule 1 mg, Nightly      pantoprazole (PROTONIX) tablet 40 mg, QAM AC   amitriptyline (ELAVIL) tablet 25 mg, Nightly      busPIRone (BUSPAR) tablet 15 mg, Nightly      0.9 % sodium chloride infusion, Continuous      morphine injection 4 mg, Q4H PRN      tiZANidine (ZANAFLEX) tablet 4 mg, Nightly      buPROPion (WELLBUTRIN SR) extended release tablet 150 mg, Nightly      losartan (COZAAR) tablet 50 mg, Daily      pramipexole (MIRAPEX) tablet 0.25 mg, Daily      ibuprofen (ADVIL;MOTRIN) tablet 600 mg, Q6H PRN      DULoxetine (CYMBALTA) extended release capsule 60 mg, BID      ondansetron (ZOFRAN) injection 4 mg, Q6H PRN        All medication charted and reviewed. CONSULTS      None    ASSESSMENT/PLAN       Nikki Buckner is a 54 y.o. female who presents with abdominal pain secondary to chronic pancreatitis. Patient's pain still is not well controlled. Changing pain medications for better analgesia    · Medication modulation  · Continue home medications and pain control  · Monitor vitals, labs, and imaging  · DISPO: pending consults and clinical improvement    --  Nelli العلي  Emergency Medicine Resident Physician     This dictation was generated by voice recognition computer software. Although all attempts are made to edit the dictation for accuracy, there may be errors in the transcription that are not intended.

## 2021-01-27 NOTE — PROGRESS NOTES
[facility]  CDU / OBSERVATION eNCOUnter  Attending NOte       I performed a history and physical examination of the patient and discussed management with the resident. I reviewed the residents note and agree with the documented findings and plan of care. Any areas of disagreement are noted on the chart. I was personally present for the key portions of any procedures. I have documented in the chart those procedures where I was not present during the key portions. I have reviewed the nurses notes. I agree with the chief complaint, past medical history, past surgical history, allergies, medications, social and family history as documented unless otherwise noted below. The Family history, social history, and ROS are effectively unchanged since admission unless noted elsewhere in the chart. Intractable pain patient has been moved to NPO. Patient will have CT scanning done. Protonix twice daily. Aggressive pain control. Patient meets inpatient criteria based on inability to handle discomfort and requirement of IV medications. We will continue to provide supportive care.     Roni Lawson MD  Attending Emergency  Physician

## 2021-01-28 LAB
ABSOLUTE EOS #: 0.55 K/UL (ref 0–0.44)
ABSOLUTE IMMATURE GRANULOCYTE: 0.04 K/UL (ref 0–0.3)
ABSOLUTE LYMPH #: 2.24 K/UL (ref 1.1–3.7)
ABSOLUTE MONO #: 0.22 K/UL (ref 0.1–1.2)
ALBUMIN SERPL-MCNC: 4 G/DL (ref 3.5–5.2)
ALBUMIN/GLOBULIN RATIO: 1.3 (ref 1–2.5)
ALP BLD-CCNC: 244 U/L (ref 35–104)
ALT SERPL-CCNC: 364 U/L (ref 5–33)
ANION GAP SERPL CALCULATED.3IONS-SCNC: 13 MMOL/L (ref 9–17)
AST SERPL-CCNC: 153 U/L
BASOPHILS # BLD: 1 % (ref 0–2)
BASOPHILS ABSOLUTE: 0.04 K/UL (ref 0–0.2)
BILIRUB SERPL-MCNC: 0.18 MG/DL (ref 0.3–1.2)
BUN BLDV-MCNC: 5 MG/DL (ref 6–20)
BUN/CREAT BLD: ABNORMAL (ref 9–20)
CALCIUM SERPL-MCNC: 8.9 MG/DL (ref 8.6–10.4)
CHLORIDE BLD-SCNC: 102 MMOL/L (ref 98–107)
CO2: 23 MMOL/L (ref 20–31)
CREAT SERPL-MCNC: 0.5 MG/DL (ref 0.5–0.9)
DIFFERENTIAL TYPE: ABNORMAL
EOSINOPHILS RELATIVE PERCENT: 7 % (ref 1–4)
GFR AFRICAN AMERICAN: >60 ML/MIN
GFR NON-AFRICAN AMERICAN: >60 ML/MIN
GFR SERPL CREATININE-BSD FRML MDRD: ABNORMAL ML/MIN/{1.73_M2}
GFR SERPL CREATININE-BSD FRML MDRD: ABNORMAL ML/MIN/{1.73_M2}
GLUCOSE BLD-MCNC: 138 MG/DL (ref 70–99)
HCT VFR BLD CALC: 37.1 % (ref 36.3–47.1)
HEMOGLOBIN: 11.7 G/DL (ref 11.9–15.1)
IMMATURE GRANULOCYTES: 1 %
LIPASE: 7 U/L (ref 13–60)
LYMPHOCYTES # BLD: 28 % (ref 24–43)
MCH RBC QN AUTO: 31 PG (ref 25.2–33.5)
MCHC RBC AUTO-ENTMCNC: 31.5 G/DL (ref 28.4–34.8)
MCV RBC AUTO: 98.4 FL (ref 82.6–102.9)
MONOCYTES # BLD: 3 % (ref 3–12)
NRBC AUTOMATED: 0 PER 100 WBC
PDW BLD-RTO: 13.6 % (ref 11.8–14.4)
PLATELET # BLD: 236 K/UL (ref 138–453)
PLATELET ESTIMATE: ABNORMAL
PMV BLD AUTO: 10.1 FL (ref 8.1–13.5)
POTASSIUM SERPL-SCNC: 3.9 MMOL/L (ref 3.7–5.3)
RBC # BLD: 3.77 M/UL (ref 3.95–5.11)
RBC # BLD: ABNORMAL 10*6/UL
SEG NEUTROPHILS: 61 % (ref 36–65)
SEGMENTED NEUTROPHILS ABSOLUTE COUNT: 4.9 K/UL (ref 1.5–8.1)
SODIUM BLD-SCNC: 138 MMOL/L (ref 135–144)
TOTAL PROTEIN: 7 G/DL (ref 6.4–8.3)
WBC # BLD: 8 K/UL (ref 3.5–11.3)
WBC # BLD: ABNORMAL 10*3/UL

## 2021-01-28 PROCEDURE — 1200000000 HC SEMI PRIVATE

## 2021-01-28 PROCEDURE — 6370000000 HC RX 637 (ALT 250 FOR IP): Performed by: STUDENT IN AN ORGANIZED HEALTH CARE EDUCATION/TRAINING PROGRAM

## 2021-01-28 PROCEDURE — C9113 INJ PANTOPRAZOLE SODIUM, VIA: HCPCS | Performed by: EMERGENCY MEDICINE

## 2021-01-28 PROCEDURE — 85025 COMPLETE CBC W/AUTO DIFF WBC: CPT

## 2021-01-28 PROCEDURE — 83690 ASSAY OF LIPASE: CPT

## 2021-01-28 PROCEDURE — 80053 COMPREHEN METABOLIC PANEL: CPT

## 2021-01-28 PROCEDURE — 6360000002 HC RX W HCPCS: Performed by: STUDENT IN AN ORGANIZED HEALTH CARE EDUCATION/TRAINING PROGRAM

## 2021-01-28 PROCEDURE — 36415 COLL VENOUS BLD VENIPUNCTURE: CPT

## 2021-01-28 PROCEDURE — 2580000003 HC RX 258: Performed by: STUDENT IN AN ORGANIZED HEALTH CARE EDUCATION/TRAINING PROGRAM

## 2021-01-28 PROCEDURE — 2580000003 HC RX 258: Performed by: EMERGENCY MEDICINE

## 2021-01-28 PROCEDURE — 6360000002 HC RX W HCPCS: Performed by: EMERGENCY MEDICINE

## 2021-01-28 RX ORDER — OXYCODONE HYDROCHLORIDE 5 MG/1
5 TABLET ORAL EVERY 4 HOURS PRN
Status: DISCONTINUED | OUTPATIENT
Start: 2021-01-28 | End: 2021-01-29

## 2021-01-28 RX ADMIN — DULOXETINE HYDROCHLORIDE 60 MG: 30 CAPSULE, DELAYED RELEASE ORAL at 21:41

## 2021-01-28 RX ADMIN — Medication 25 MG: at 05:04

## 2021-01-28 RX ADMIN — PRAZOSIN HYDROCHLORIDE 1 MG: 1 CAPSULE ORAL at 21:42

## 2021-01-28 RX ADMIN — HYDROMORPHONE HYDROCHLORIDE 0.5 MG: 1 INJECTION, SOLUTION INTRAMUSCULAR; INTRAVENOUS; SUBCUTANEOUS at 10:02

## 2021-01-28 RX ADMIN — BUPROPION HYDROCHLORIDE 150 MG: 150 TABLET, EXTENDED RELEASE ORAL at 21:41

## 2021-01-28 RX ADMIN — HYDROMORPHONE HYDROCHLORIDE 0.5 MG: 1 INJECTION, SOLUTION INTRAMUSCULAR; INTRAVENOUS; SUBCUTANEOUS at 12:07

## 2021-01-28 RX ADMIN — PROMETHAZINE HYDROCHLORIDE 12.5 MG: 25 INJECTION INTRAMUSCULAR; INTRAVENOUS at 21:42

## 2021-01-28 RX ADMIN — PROMETHAZINE HYDROCHLORIDE 12.5 MG: 25 INJECTION INTRAMUSCULAR; INTRAVENOUS at 08:00

## 2021-01-28 RX ADMIN — LOSARTAN POTASSIUM 50 MG: 50 TABLET, FILM COATED ORAL at 21:41

## 2021-01-28 RX ADMIN — HYDROMORPHONE HYDROCHLORIDE 0.5 MG: 1 INJECTION, SOLUTION INTRAMUSCULAR; INTRAVENOUS; SUBCUTANEOUS at 01:30

## 2021-01-28 RX ADMIN — PROMETHAZINE HYDROCHLORIDE 12.5 MG: 25 INJECTION INTRAMUSCULAR; INTRAVENOUS at 14:34

## 2021-01-28 RX ADMIN — HYDROMORPHONE HYDROCHLORIDE 0.5 MG: 1 INJECTION, SOLUTION INTRAMUSCULAR; INTRAVENOUS; SUBCUTANEOUS at 19:15

## 2021-01-28 RX ADMIN — IBUPROFEN 600 MG: 400 TABLET, FILM COATED ORAL at 17:59

## 2021-01-28 RX ADMIN — OXYCODONE HYDROCHLORIDE 10 MG: 5 TABLET ORAL at 05:04

## 2021-01-28 RX ADMIN — SODIUM CHLORIDE, PRESERVATIVE FREE 10 ML: 5 INJECTION INTRAVENOUS at 08:08

## 2021-01-28 RX ADMIN — HYDROMORPHONE HYDROCHLORIDE 0.5 MG: 1 INJECTION, SOLUTION INTRAMUSCULAR; INTRAVENOUS; SUBCUTANEOUS at 16:35

## 2021-01-28 RX ADMIN — ONDANSETRON 4 MG: 2 INJECTION INTRAMUSCULAR; INTRAVENOUS at 05:03

## 2021-01-28 RX ADMIN — AMITRIPTYLINE HYDROCHLORIDE 25 MG: 25 TABLET, FILM COATED ORAL at 21:41

## 2021-01-28 RX ADMIN — HYDROMORPHONE HYDROCHLORIDE 0.5 MG: 1 INJECTION, SOLUTION INTRAMUSCULAR; INTRAVENOUS; SUBCUTANEOUS at 14:37

## 2021-01-28 RX ADMIN — SODIUM CHLORIDE, PRESERVATIVE FREE 10 ML: 5 INJECTION INTRAVENOUS at 21:50

## 2021-01-28 RX ADMIN — HYDROMORPHONE HYDROCHLORIDE 0.5 MG: 1 INJECTION, SOLUTION INTRAMUSCULAR; INTRAVENOUS; SUBCUTANEOUS at 21:58

## 2021-01-28 RX ADMIN — PANTOPRAZOLE SODIUM 40 MG: 40 INJECTION, POWDER, FOR SOLUTION INTRAVENOUS at 08:06

## 2021-01-28 RX ADMIN — HYDROMORPHONE HYDROCHLORIDE 0.5 MG: 1 INJECTION, SOLUTION INTRAMUSCULAR; INTRAVENOUS; SUBCUTANEOUS at 07:49

## 2021-01-28 RX ADMIN — ENOXAPARIN SODIUM 40 MG: 40 INJECTION SUBCUTANEOUS at 08:07

## 2021-01-28 RX ADMIN — QUETIAPINE FUMARATE 400 MG: 200 TABLET ORAL at 21:41

## 2021-01-28 RX ADMIN — OXYCODONE HYDROCHLORIDE 10 MG: 5 TABLET ORAL at 01:30

## 2021-01-28 RX ADMIN — IBUPROFEN 600 MG: 400 TABLET, FILM COATED ORAL at 09:08

## 2021-01-28 RX ADMIN — OXYCODONE HYDROCHLORIDE 5 MG: 5 TABLET ORAL at 17:56

## 2021-01-28 RX ADMIN — OXYCODONE HYDROCHLORIDE 5 MG: 5 TABLET ORAL at 23:28

## 2021-01-28 RX ADMIN — OXYCODONE HYDROCHLORIDE 10 MG: 5 TABLET ORAL at 13:15

## 2021-01-28 RX ADMIN — Medication 25 MG: at 12:07

## 2021-01-28 RX ADMIN — BUSPIRONE HYDROCHLORIDE 15 MG: 15 TABLET ORAL at 21:41

## 2021-01-28 RX ADMIN — Medication 25 MG: at 17:57

## 2021-01-28 RX ADMIN — OXYCODONE HYDROCHLORIDE 10 MG: 5 TABLET ORAL at 09:04

## 2021-01-28 RX ADMIN — PROMETHAZINE HYDROCHLORIDE 12.5 MG: 25 INJECTION INTRAMUSCULAR; INTRAVENOUS at 01:33

## 2021-01-28 RX ADMIN — PRAMIPEXOLE DIHYDROCHLORIDE 0.25 MG: 0.25 TABLET ORAL at 21:41

## 2021-01-28 RX ADMIN — ONDANSETRON 4 MG: 2 INJECTION INTRAMUSCULAR; INTRAVENOUS at 12:07

## 2021-01-28 RX ADMIN — POTASSIUM BICARBONATE 40 MEQ: 782 TABLET, EFFERVESCENT ORAL at 08:07

## 2021-01-28 RX ADMIN — ONDANSETRON 4 MG: 2 INJECTION INTRAMUSCULAR; INTRAVENOUS at 17:57

## 2021-01-28 RX ADMIN — HYDROMORPHONE HYDROCHLORIDE 0.5 MG: 1 INJECTION, SOLUTION INTRAMUSCULAR; INTRAVENOUS; SUBCUTANEOUS at 05:04

## 2021-01-28 RX ADMIN — Medication 5 MG: at 21:42

## 2021-01-28 RX ADMIN — PANTOPRAZOLE SODIUM 40 MG: 40 INJECTION, POWDER, FOR SOLUTION INTRAVENOUS at 21:48

## 2021-01-28 RX ADMIN — DULOXETINE HYDROCHLORIDE 60 MG: 30 CAPSULE, DELAYED RELEASE ORAL at 08:06

## 2021-01-28 RX ADMIN — TIZANIDINE 4 MG: 4 TABLET ORAL at 21:42

## 2021-01-28 RX ADMIN — HYDROMORPHONE HYDROCHLORIDE 0.5 MG: 1 INJECTION, SOLUTION INTRAMUSCULAR; INTRAVENOUS; SUBCUTANEOUS at 03:03

## 2021-01-28 ASSESSMENT — PAIN DESCRIPTION - ONSET: ONSET: ON-GOING

## 2021-01-28 ASSESSMENT — PAIN DESCRIPTION - LOCATION: LOCATION: ABDOMEN;CHEST

## 2021-01-28 ASSESSMENT — PAIN DESCRIPTION - PROGRESSION: CLINICAL_PROGRESSION: NOT CHANGED

## 2021-01-28 ASSESSMENT — PAIN DESCRIPTION - DESCRIPTORS: DESCRIPTORS: SHARP;STABBING

## 2021-01-28 ASSESSMENT — PAIN SCALES - GENERAL
PAINLEVEL_OUTOF10: 7
PAINLEVEL_OUTOF10: 6
PAINLEVEL_OUTOF10: 8

## 2021-01-28 ASSESSMENT — PAIN DESCRIPTION - ORIENTATION: ORIENTATION: MID;UPPER

## 2021-01-28 ASSESSMENT — PAIN DESCRIPTION - FREQUENCY: FREQUENCY: CONTINUOUS

## 2021-01-28 ASSESSMENT — PAIN DESCRIPTION - PAIN TYPE: TYPE: ACUTE PAIN

## 2021-01-28 ASSESSMENT — PAIN - FUNCTIONAL ASSESSMENT: PAIN_FUNCTIONAL_ASSESSMENT: ACTIVITIES ARE NOT PREVENTED

## 2021-01-28 NOTE — PROGRESS NOTES
Spoke with Dr Andrés Caputo, the patient's gastroenterologist at [facility] regarding patient's transaminitis and elevated alk phos. Recommended trending liver enzymes and if uptrending considering an ERCP. Differential diagnosis included sphincter of Oddi contraction secondary to opioid pain medication as well as hepatotoxic medication. Will attempt to wean patient off multiple opioid pain medications. Patient's Excedrin was discontinued as it contains Tylenol.

## 2021-01-28 NOTE — PROGRESS NOTES
OBS/CDU   RESIDENT NOTE      Patients PCP is:  Deon BULL      No acute events overnight. Patient reports her pain has overall improved from original presentation, however no significant difference from yesterday. Has been able to tolerate a liquid diet without nausea or vomiting. The patient is urinating on her own and is passing flatus. Denies fever, chills, vomiting, chest pain, shortness of breath, focal weakness, numbness, tingling, urinary/bowel symptoms, vision changes, visual hallucinations, or headache. PHYSICAL EXAM      General: NAD, AO X 3  Heent: EMOI, PERRL  Neck: SUPPLE, NO JVD  Cardiovascular: RRR, S1S2  Pulmonary: CTAB, NO SOB  Abdomen: SOFT, ND, +BS, slight tenderness to palpation epigastric region  Extremities: +2/4 PULSES DISTAL, NO SWELLING  Neuro / Psych: NO NUMBNESS OR TINGLING, MENTATION AT BASELINE    PERTINENT TEST /EXAMS      I have reviewed all available laboratory results.     MEDICATIONS CURRENT       HYDROmorphone (DILAUDID) injection 0.5 mg, Q2H PRN      pantoprazole (PROTONIX) injection 40 mg, BID    And      sodium chloride (PF) 0.9 % injection 10 mL, BID      diphenhydrAMINE (BENADRYL) injection 25 mg, Q6H PRN      promethazine (PHENERGAN) injection 12.5 mg, Q6H PRN      oxyCODONE (ROXICODONE) immediate release tablet 10 mg, Q4H PRN      aspirin-acetaminophen-caffeine (EXCEDRIN MIGRAINE) per tablet 1 tablet, Q6H PRN      QUEtiapine (SEROQUEL) tablet 400 mg, Nightly      potassium bicarb-citric acid (EFFER-K) effervescent tablet 40 mEq, Daily      sodium chloride flush 0.9 % injection 10 mL, 2 times per day      sodium chloride flush 0.9 % injection 10 mL, PRN      enoxaparin (LOVENOX) injection 40 mg, Daily      melatonin tablet 5 mg, Daily      prazosin (MINIPRESS) capsule 1 mg, Nightly      amitriptyline (ELAVIL) tablet 25 mg, Nightly      busPIRone (BUSPAR) tablet 15 mg, Nightly      0.9 % sodium chloride infusion, Continuous   morphine injection 4 mg, Q4H PRN      tiZANidine (ZANAFLEX) tablet 4 mg, Nightly      buPROPion [facility] SR) extended release tablet 150 mg, Nightly      losartan (COZAAR) tablet 50 mg, Daily      pramipexole (MIRAPEX) tablet 0.25 mg, Daily      ibuprofen (ADVIL;MOTRIN) tablet 600 mg, Q6H PRN      DULoxetine (CYMBALTA) extended release capsule 60 mg, BID      ondansetron (ZOFRAN) injection 4 mg, Q6H PRN        All medication charted and reviewed. CONSULTS      None    ASSESSMENT/PLAN       Ruperto Brooks is a 54 y.o. female who presents with acute on chronic abdominal pain secondary to chronic pancreatitis. Patient states overall pain has improved from original presentation, however no significant improvement from yesterday. She is tolerating a diet of clears. CT abdomen/pelvis showed improvement in pancreatitis. Patient did have lab work that revealed markedly elevated transaminitis as well as elevated alk phos. · Given transaminitis and elevated alk phos will contact patient's GI doctor at [facility]  · If unable to speak with patient's GI doctor plan for GI consult during this admission  · Continue home medications and pain control  · Monitor vitals, labs, and imaging  · DISPO: pending consults and clinical improvement    --  Lisa Richards  Emergency Medicine Resident Physician     This dictation was generated by voice recognition computer software. Although all attempts are made to edit the dictation for accuracy, there may be errors in the transcription that are not intended.

## 2021-01-28 NOTE — PROGRESS NOTES
[facility]  CDU / OBSERVATION ENCOUNTER  ATTENDING NOTE       I performed a history and physical examination of the patient and discussed management with the resident. I reviewed the residents note and agree with the documented findings and plan of care. Any areas of disagreement are noted on the chart. I was personally present for the key portions of any procedures. I have documented in the chart those procedures where I was not present during the key portions. I have reviewed the nurses notes. I agree with the chief complaint, past medical history, past surgical history, allergies, medications, social and family history as documented unless otherwise noted below. The Family history, social history, and ROS are effectively unchanged since admission unless noted elsewhere in the chart. The patient is a 54year old female who was admitted for acute on chronic pancreatitis. She has had issues with pain control. CT abdomen pelvis yesterday showed interval improvement of pancreatitis without any new complications. Repeat labs this morning show transaminitis. Will continue pain control and attempt to contact the patient's GI physician at [facility].      Nabeel Isaacs DO  Attending Emergency Physician

## 2021-01-29 ENCOUNTER — APPOINTMENT (OUTPATIENT)
Dept: MRI IMAGING | Age: 46
DRG: 392 | End: 2021-01-29
Payer: COMMERCIAL

## 2021-01-29 LAB
ALBUMIN SERPL-MCNC: 3.9 G/DL (ref 3.5–5.2)
ALBUMIN/GLOBULIN RATIO: 1.3 (ref 1–2.5)
ALP BLD-CCNC: 301 U/L (ref 35–104)
ALT SERPL-CCNC: 544 U/L (ref 5–33)
ANION GAP SERPL CALCULATED.3IONS-SCNC: 10 MMOL/L (ref 9–17)
AST SERPL-CCNC: 319 U/L
BILIRUB SERPL-MCNC: 0.43 MG/DL (ref 0.3–1.2)
BUN BLDV-MCNC: 5 MG/DL (ref 6–20)
BUN/CREAT BLD: ABNORMAL (ref 9–20)
CALCIUM SERPL-MCNC: 8.5 MG/DL (ref 8.6–10.4)
CHLORIDE BLD-SCNC: 100 MMOL/L (ref 98–107)
CO2: 28 MMOL/L (ref 20–31)
CREAT SERPL-MCNC: 0.47 MG/DL (ref 0.5–0.9)
EKG ATRIAL RATE: 75 BPM
EKG P AXIS: 60 DEGREES
EKG P-R INTERVAL: 152 MS
EKG Q-T INTERVAL: 372 MS
EKG QRS DURATION: 82 MS
EKG QTC CALCULATION (BAZETT): 415 MS
EKG R AXIS: 20 DEGREES
EKG T AXIS: 35 DEGREES
EKG VENTRICULAR RATE: 75 BPM
GFR AFRICAN AMERICAN: >60 ML/MIN
GFR NON-AFRICAN AMERICAN: >60 ML/MIN
GFR SERPL CREATININE-BSD FRML MDRD: ABNORMAL ML/MIN/{1.73_M2}
GFR SERPL CREATININE-BSD FRML MDRD: ABNORMAL ML/MIN/{1.73_M2}
GLUCOSE BLD-MCNC: 147 MG/DL (ref 70–99)
POTASSIUM SERPL-SCNC: 3.6 MMOL/L (ref 3.7–5.3)
SODIUM BLD-SCNC: 138 MMOL/L (ref 135–144)
TOTAL PROTEIN: 6.8 G/DL (ref 6.4–8.3)

## 2021-01-29 PROCEDURE — 1200000000 HC SEMI PRIVATE

## 2021-01-29 PROCEDURE — 6370000000 HC RX 637 (ALT 250 FOR IP): Performed by: STUDENT IN AN ORGANIZED HEALTH CARE EDUCATION/TRAINING PROGRAM

## 2021-01-29 PROCEDURE — C9113 INJ PANTOPRAZOLE SODIUM, VIA: HCPCS | Performed by: EMERGENCY MEDICINE

## 2021-01-29 PROCEDURE — 6370000000 HC RX 637 (ALT 250 FOR IP): Performed by: EMERGENCY MEDICINE

## 2021-01-29 PROCEDURE — 74183 MRI ABD W/O CNTR FLWD CNTR: CPT

## 2021-01-29 PROCEDURE — 6360000002 HC RX W HCPCS: Performed by: EMERGENCY MEDICINE

## 2021-01-29 PROCEDURE — 2580000003 HC RX 258: Performed by: EMERGENCY MEDICINE

## 2021-01-29 PROCEDURE — 2580000003 HC RX 258: Performed by: STUDENT IN AN ORGANIZED HEALTH CARE EDUCATION/TRAINING PROGRAM

## 2021-01-29 PROCEDURE — 6360000004 HC RX CONTRAST MEDICATION: Performed by: STUDENT IN AN ORGANIZED HEALTH CARE EDUCATION/TRAINING PROGRAM

## 2021-01-29 PROCEDURE — 6360000002 HC RX W HCPCS: Performed by: STUDENT IN AN ORGANIZED HEALTH CARE EDUCATION/TRAINING PROGRAM

## 2021-01-29 PROCEDURE — A9579 GAD-BASE MR CONTRAST NOS,1ML: HCPCS | Performed by: STUDENT IN AN ORGANIZED HEALTH CARE EDUCATION/TRAINING PROGRAM

## 2021-01-29 PROCEDURE — 80053 COMPREHEN METABOLIC PANEL: CPT

## 2021-01-29 PROCEDURE — 99221 1ST HOSP IP/OBS SF/LOW 40: CPT | Performed by: INTERNAL MEDICINE

## 2021-01-29 PROCEDURE — 36415 COLL VENOUS BLD VENIPUNCTURE: CPT

## 2021-01-29 RX ORDER — KETOROLAC TROMETHAMINE 30 MG/ML
30 INJECTION, SOLUTION INTRAMUSCULAR; INTRAVENOUS ONCE
Status: COMPLETED | OUTPATIENT
Start: 2021-01-29 | End: 2021-01-29

## 2021-01-29 RX ORDER — POTASSIUM CHLORIDE 20 MEQ/1
40 TABLET, EXTENDED RELEASE ORAL 2 TIMES DAILY WITH MEALS
Status: DISCONTINUED | OUTPATIENT
Start: 2021-01-29 | End: 2021-02-03 | Stop reason: HOSPADM

## 2021-01-29 RX ORDER — LORAZEPAM 2 MG/ML
1 INJECTION INTRAMUSCULAR ONCE
Status: DISCONTINUED | OUTPATIENT
Start: 2021-01-29 | End: 2021-02-03 | Stop reason: HOSPADM

## 2021-01-29 RX ORDER — LORAZEPAM 2 MG/ML
1 INJECTION INTRAMUSCULAR ONCE
Status: COMPLETED | OUTPATIENT
Start: 2021-01-29 | End: 2021-01-29

## 2021-01-29 RX ORDER — OXYCODONE HYDROCHLORIDE 5 MG/1
5 TABLET ORAL EVERY 4 HOURS PRN
Status: DISCONTINUED | OUTPATIENT
Start: 2021-01-29 | End: 2021-02-03 | Stop reason: HOSPADM

## 2021-01-29 RX ORDER — 0.9 % SODIUM CHLORIDE 0.9 %
30 INTRAVENOUS SOLUTION INTRAVENOUS ONCE
Status: DISCONTINUED | OUTPATIENT
Start: 2021-01-29 | End: 2021-02-03 | Stop reason: HOSPADM

## 2021-01-29 RX ORDER — DOCUSATE SODIUM 100 MG/1
100 CAPSULE, LIQUID FILLED ORAL DAILY
Status: DISCONTINUED | OUTPATIENT
Start: 2021-01-29 | End: 2021-02-03 | Stop reason: HOSPADM

## 2021-01-29 RX ADMIN — ONDANSETRON 4 MG: 2 INJECTION INTRAMUSCULAR; INTRAVENOUS at 18:22

## 2021-01-29 RX ADMIN — Medication 25 MG: at 23:48

## 2021-01-29 RX ADMIN — QUETIAPINE FUMARATE 400 MG: 200 TABLET ORAL at 21:27

## 2021-01-29 RX ADMIN — Medication 25 MG: at 00:53

## 2021-01-29 RX ADMIN — SODIUM CHLORIDE, PRESERVATIVE FREE 10 ML: 5 INJECTION INTRAVENOUS at 21:31

## 2021-01-29 RX ADMIN — ENOXAPARIN SODIUM 40 MG: 40 INJECTION SUBCUTANEOUS at 15:58

## 2021-01-29 RX ADMIN — IBUPROFEN 600 MG: 400 TABLET, FILM COATED ORAL at 16:32

## 2021-01-29 RX ADMIN — DULOXETINE HYDROCHLORIDE 60 MG: 30 CAPSULE, DELAYED RELEASE ORAL at 15:58

## 2021-01-29 RX ADMIN — HYDROMORPHONE HYDROCHLORIDE 0.5 MG: 1 INJECTION, SOLUTION INTRAMUSCULAR; INTRAVENOUS; SUBCUTANEOUS at 19:10

## 2021-01-29 RX ADMIN — PRAMIPEXOLE DIHYDROCHLORIDE 0.25 MG: 0.25 TABLET ORAL at 21:26

## 2021-01-29 RX ADMIN — HYDROMORPHONE HYDROCHLORIDE 0.5 MG: 1 INJECTION, SOLUTION INTRAMUSCULAR; INTRAVENOUS; SUBCUTANEOUS at 14:06

## 2021-01-29 RX ADMIN — OXYCODONE HYDROCHLORIDE 5 MG: 5 TABLET ORAL at 14:06

## 2021-01-29 RX ADMIN — HYDROMORPHONE HYDROCHLORIDE 0.5 MG: 1 INJECTION, SOLUTION INTRAMUSCULAR; INTRAVENOUS; SUBCUTANEOUS at 21:26

## 2021-01-29 RX ADMIN — KETOROLAC TROMETHAMINE 30 MG: 30 INJECTION, SOLUTION INTRAMUSCULAR at 09:44

## 2021-01-29 RX ADMIN — ONDANSETRON 4 MG: 2 INJECTION INTRAMUSCULAR; INTRAVENOUS at 00:53

## 2021-01-29 RX ADMIN — PRAZOSIN HYDROCHLORIDE 1 MG: 1 CAPSULE ORAL at 21:27

## 2021-01-29 RX ADMIN — PROMETHAZINE HYDROCHLORIDE 12.5 MG: 25 INJECTION INTRAMUSCULAR; INTRAVENOUS at 23:48

## 2021-01-29 RX ADMIN — BUPROPION HYDROCHLORIDE 150 MG: 150 TABLET, EXTENDED RELEASE ORAL at 21:26

## 2021-01-29 RX ADMIN — SODIUM CHLORIDE: 9 INJECTION, SOLUTION INTRAVENOUS at 17:17

## 2021-01-29 RX ADMIN — SODIUM CHLORIDE, PRESERVATIVE FREE 10 ML: 5 INJECTION INTRAVENOUS at 09:44

## 2021-01-29 RX ADMIN — Medication 5 MG: at 21:27

## 2021-01-29 RX ADMIN — PANTOPRAZOLE SODIUM 40 MG: 40 INJECTION, POWDER, FOR SOLUTION INTRAVENOUS at 21:26

## 2021-01-29 RX ADMIN — POTASSIUM CHLORIDE 40 MEQ: 1500 TABLET, EXTENDED RELEASE ORAL at 16:31

## 2021-01-29 RX ADMIN — SODIUM CHLORIDE: 9 INJECTION, SOLUTION INTRAVENOUS at 00:53

## 2021-01-29 RX ADMIN — HYDROMORPHONE HYDROCHLORIDE 0.5 MG: 1 INJECTION, SOLUTION INTRAMUSCULAR; INTRAVENOUS; SUBCUTANEOUS at 23:48

## 2021-01-29 RX ADMIN — HYDROMORPHONE HYDROCHLORIDE 0.5 MG: 1 INJECTION, SOLUTION INTRAMUSCULAR; INTRAVENOUS; SUBCUTANEOUS at 16:59

## 2021-01-29 RX ADMIN — LOSARTAN POTASSIUM 50 MG: 50 TABLET, FILM COATED ORAL at 21:26

## 2021-01-29 RX ADMIN — GADOTERIDOL 20 ML: 279.3 INJECTION, SOLUTION INTRAVENOUS at 12:37

## 2021-01-29 RX ADMIN — OXYCODONE HYDROCHLORIDE 5 MG: 5 TABLET ORAL at 18:23

## 2021-01-29 RX ADMIN — PROMETHAZINE HYDROCHLORIDE 12.5 MG: 25 INJECTION INTRAMUSCULAR; INTRAVENOUS at 04:14

## 2021-01-29 RX ADMIN — HYDROMORPHONE HYDROCHLORIDE 0.5 MG: 1 INJECTION, SOLUTION INTRAMUSCULAR; INTRAVENOUS; SUBCUTANEOUS at 04:14

## 2021-01-29 RX ADMIN — HYDROMORPHONE HYDROCHLORIDE 0.5 MG: 1 INJECTION, SOLUTION INTRAMUSCULAR; INTRAVENOUS; SUBCUTANEOUS at 00:54

## 2021-01-29 RX ADMIN — DULOXETINE HYDROCHLORIDE 60 MG: 30 CAPSULE, DELAYED RELEASE ORAL at 21:26

## 2021-01-29 RX ADMIN — BUSPIRONE HYDROCHLORIDE 15 MG: 15 TABLET ORAL at 21:26

## 2021-01-29 RX ADMIN — TIZANIDINE 4 MG: 4 TABLET ORAL at 21:26

## 2021-01-29 RX ADMIN — Medication 25 MG: at 15:52

## 2021-01-29 RX ADMIN — PANTOPRAZOLE SODIUM 40 MG: 40 INJECTION, POWDER, FOR SOLUTION INTRAVENOUS at 09:43

## 2021-01-29 RX ADMIN — LORAZEPAM 1 MG: 2 INJECTION INTRAMUSCULAR; INTRAVENOUS at 11:15

## 2021-01-29 RX ADMIN — AMITRIPTYLINE HYDROCHLORIDE 25 MG: 25 TABLET, FILM COATED ORAL at 21:26

## 2021-01-29 RX ADMIN — PROMETHAZINE HYDROCHLORIDE 12.5 MG: 25 INJECTION INTRAMUSCULAR; INTRAVENOUS at 15:53

## 2021-01-29 RX ADMIN — DOCUSATE SODIUM 100 MG: 100 CAPSULE, LIQUID FILLED ORAL at 21:26

## 2021-01-29 ASSESSMENT — PAIN SCALES - GENERAL
PAINLEVEL_OUTOF10: 7
PAINLEVEL_OUTOF10: 7
PAINLEVEL_OUTOF10: 6
PAINLEVEL_OUTOF10: 7
PAINLEVEL_OUTOF10: 7
PAINLEVEL_OUTOF10: 8
PAINLEVEL_OUTOF10: 5
PAINLEVEL_OUTOF10: 6

## 2021-01-29 ASSESSMENT — PAIN DESCRIPTION - LOCATION
LOCATION: ABDOMEN
LOCATION: ABDOMEN

## 2021-01-29 ASSESSMENT — PAIN DESCRIPTION - PAIN TYPE
TYPE: ACUTE PAIN
TYPE: ACUTE PAIN;CHRONIC PAIN

## 2021-01-29 ASSESSMENT — PAIN DESCRIPTION - ORIENTATION: ORIENTATION: MID;UPPER

## 2021-01-29 ASSESSMENT — PAIN DESCRIPTION - DESCRIPTORS: DESCRIPTORS: SHARP;STABBING

## 2021-01-29 NOTE — CONSULTS
[facility]  Consultation Note     . REASON FOR CONSULTATION:  burnt out pancreas with new elevated liver enzymes      HISTORY OF PRESENT ILLNESS:     This is a 54 y.o. female with past medical history of major depression, anxiety, hypertension, fibromyalgia, class III severe obesity with BMI 46.11, pancreatitis s/p cholecystectomy in 2001 and recently diagnosed acute pancreatitis now presented to the ED with c/o pain epigastric. Patient was diagnosed with acute pancreatitis 08/2020, was last seen in September 2020 by Dr Irene Sarmiento and was transferred to [facility] clinic as CT finding concerning for necrotizing pancreatitis. Patient was treated with antibiotics. In the ED labs show  Alk phos 122, ALT 63, AST 40, bilirubin <0.10  Lipase 70    GI consulted for burned-out pancreas with new elevated liver enzymes. On my exam, patient lying comfortably in the bed, reported epigastric pain 8/10 in intensity, radiating to back, associated with nausea and 2 episodes of vomiting. Patient denies any blood in the vomitus. Denies any diarrhea, constipation, fever, chills or any recent exposure to any Covid suspect or Covid positive. Patient denies alcohol or any illicit substance abuse. Quit smoking 1 year ago, used to smoke 1 pack/day. Denies any history of pancreatitis or cancer in family. Labs show elevated LFTs as compared to the previous labs  Alk phos 122> 244> 301  ALT 63> 364> 544  AST 40> 153> 319. Bilirubin <0.10> 0.18> 0.43  Lipase 7    CT abdomen pelvis with IV contrast compared to 11/11/2020  Mild interval improvement of pancreatitis, no loculated fluid collection. Hepatomegaly with steatosis. S/p cholecystectomy, no biliary dilatation. Patient going for MRI with MRCP today. Previous GI history:  12/2/2020 EGD with normal findings. S/p cholecystectomy (2001 ? ?)    PAST MEDICAL HISTORY:  Past Medical History:   Diagnosis Date  Abnormal levels of other serum enzymes     Anxiety     Bilateral leg edema     Chronic kidney disease     right renal cyst    Depression     DVT (deep venous thrombosis) ([address] 1997    after delivery    Elevated liver enzymes     Fibromyalgia     Headache(784.0)     migraines    Hx of blood clots     1997 left calf    Hypertension     Kidney stone     Obstructive sleep apnea syndrome     Pancreatitis 2001    Pleural effusion 2001    SOB (shortness of breath)     Supraventricular tachycardia (HCC)     SVT (supraventricular tachycardia) ([facility] [address] 9/8/2015     Past Surgical History:   Procedure Laterality Date    ATRIAL ABLATION SURGERY      BACK SURGERY      L4-5    CHOLECYSTECTOMY  2001    ENDOMETRIAL ABLATION      e sure implants placed also    ENDOSCOPY, COLON, DIAGNOSTIC      EXCISION LESION HAND / FINGER Right 1/25/2019    HAND LESION BIOPSY EXCISION performed by Sheron Osorio MD at [facility] Bilateral     left x2, right x1    FOOT SURGERY Right     x3    KNEE ARTHROSCOPY Left     x2    NC CYSTO/URETERO/PYELOSCOPY W/LITHOTRIPSY Left 9/20/2017    CYSTOSCOPY URETEROSCOPY HOLMIUM LASER LITHO WITH STONE BASKETING WITH  STENT  performed by Divya Humphrey MD at [facility] Right        ALLERGIES:  Allergies   Allergen Reactions    Aripiprazole      Bad reaction    Eletriptan      Other reaction(s): Swelling-throat    Hydrocodone-Acetaminophen Itching     Other reaction(s): Unknown    Oxycodone-Acetaminophen      Other reaction(s): Unknown    Sumatriptan Other (See Comments)    Triptans      Other reaction(s): Unknown    Lamotrigine Rash       HOME MEDICATIONS:  Prior to Admission medications    Medication Sig Start Date End Date Taking?  Authorizing Provider   tiZANidine (ZANAFLEX) 4 MG tablet Take 4 mg by mouth nightly   Yes Historical Provider, MD buPROPion (WELLBUTRIN XL) 150 MG extended release tablet Take 150 mg by mouth nightly   Yes Historical Provider, MD   losartan (COZAAR) 50 MG tablet Take 50 mg by mouth daily   Yes Historical Provider, MD   amitriptyline (ELAVIL) 25 MG tablet Take 25 mg by mouth nightly   Yes Historical Provider, MD   prazosin (MINIPRESS) 1 MG capsule Take 2 mg by mouth nightly    Yes Historical Provider, MD   melatonin 5 MG TABS tablet Take 10 mg by mouth daily    Yes Historical Provider, MD   QUEtiapine (SEROQUEL XR) 400 MG extended release tablet Take 800 mg by mouth nightly   Yes Historical Provider, MD   busPIRone (BUSPAR) 15 MG tablet Take 15 mg by mouth nightly Can take once in morning PRN   Yes Historical Provider, MD   omeprazole (PRILOSEC) 40 MG delayed release capsule Take 40 mg by mouth daily   Yes Historical Provider, MD   Cholecalciferol (VITAMIN D) 50 MCG (2000 UT) CAPS capsule Take by mouth daily   Yes Historical Provider, MD   butalbital-APAP-caffeine (FIORICET) -40 MG CAPS per capsule Take 1 capsule by mouth every 6 hours as needed for Headaches 5/19/20 1/25/21 Yes Zaheer Swartz,    albuterol sulfate HFA (PROVENTIL HFA) 108 (90 Base) MCG/ACT inhaler Inhale 2 puffs into the lungs as needed 12/28/18  Yes Historical Provider, MD   magnesium oxide (MAG-OX) 400 MG tablet Take 400 mg by mouth daily   Yes Historical Provider, MD   fexofenadine (RA ALLERGY RELIEF) 180 MG tablet take 1 tablet by mouth once daily  Patient taking differently: Take 180 mg by mouth daily as needed take 1 tablet by mouth once daily 11/7/18  Yes Christel Boast, MD   pramipexole (MIRAPEX) 0.5 MG tablet take 1 tablet by mouth twice a day  Patient taking differently: Take 0.25 mg by mouth daily take 1 tablet by mouth twice a day 11/7/18  Yes Christel Boast, MD   DULoxetine (CYMBALTA) 60 MG extended release capsule TAKE 1 CAPSULE BY MOUTH TWICE A DAY Patient taking differently: Take 120 mg by mouth Daily with supper  8/3/18  Yes Cricket Mercado MD   potassium chloride (KLOR-CON M) 10 MEQ extended release tablet Take 1 tablet by mouth 2 times daily 20   Ana Nails MD     .  CURRENT MEDICATIONS:  Scheduled Meds:   ketorolac  30 mg Intravenous Once    LORazepam  1 mg Intravenous Once    LORazepam  1 mg Intravenous Once    pantoprazole  40 mg Intravenous BID    And    sodium chloride (PF)  10 mL Intravenous BID    QUEtiapine  400 mg Oral Nightly    potassium bicarb-citric acid  40 mEq Oral Daily    sodium chloride flush  10 mL Intravenous 2 times per day    enoxaparin  40 mg Subcutaneous Daily    melatonin  5 mg Oral Daily    prazosin  1 mg Oral Nightly    amitriptyline  25 mg Oral Nightly    busPIRone  15 mg Oral Nightly    tiZANidine  4 mg Oral Nightly    buPROPion  150 mg Oral Nightly    losartan  50 mg Oral Daily    pramipexole  0.25 mg Oral Daily    DULoxetine  60 mg Oral BID     . Continuous Infusions:   sodium chloride 125 mL/hr at 21 0053     . PRN Meds:diphenhydrAMINE, promethazine, sodium chloride flush, ibuprofen, ondansetron  . SOCIAL HISTORY:     Social History     Socioeconomic History    Marital status:      Spouse name: Not on file    Number of children: Not on file    Years of education: Not on file    Highest education level: Not on file   Occupational History    Not on file   Social Needs    Financial resource strain: Not on file    Food insecurity     Worry: Not on file     Inability: Not on file    Transportation needs     Medical: Not on file     Non-medical: Not on file   Tobacco Use    Smoking status: Former Smoker     Packs/day: 1.00     Types: Cigarettes     Quit date: 2019     Years since quittin.1    Smokeless tobacco: Never Used    Tobacco comment: today last smoke   Substance and Sexual Activity    Alcohol use:  Yes     Alcohol/week: 0.0 standard drinks Comment: rarely    Drug use: No    Sexual activity: Not on file   Lifestyle    Physical activity     Days per week: Not on file     Minutes per session: Not on file    Stress: Not on file   Relationships    Social connections     Talks on phone: Not on file     Gets together: Not on file     Attends Rastafarian service: Not on file     Active member of club or organization: Not on file     Attends meetings of clubs or organizations: Not on file     Relationship status: Not on file    Intimate partner violence     Fear of current or ex partner: Not on file     Emotionally abused: Not on file     Physically abused: Not on file     Forced sexual activity: Not on file   Other Topics Concern    Not on file   Social History Narrative    Not on file       FAMILY HISTORY:   Family History   Problem Relation Age of Onset    Heart Disease Father     High Blood Pressure Brother        REVIEW OF SYSTEMS:     Constitutional: No fever, no chills, no lethargy, no weakness. HEENT:  No headache, otalgia, itchy eyes, nasal discharge or sore throat. Cardiac:  No chest pain, dyspnea, orthopnea or PND. Chest:   No cough, phlegm or wheezing. Abdomen:  Positive for abdominal pain, nausea or vomiting. Neuro:  No focal weakness, abnormal movements or seizure like activity. Skin:   No rashes, no itching. :   No hematuria, no pyuria, no dysuria, no flank pain. Extremities:  No swelling or joint pains. ROS was otherwise negative except as mentioned in the [address] PHYSICAL EXAM:    BP (!) 152/95   Pulse 104   Temp 98.2 °F (36.8 °C) (Oral)   Resp 18   Ht 5' 5\" (1.651 m)   Wt 277 lb 1.6 oz (125.7 kg)   SpO2 95%   BMI 46.11 kg/m²   . TMAX[24]    General: Well developed, Well nourished, No apparent distress  Head:  Normocephalic, Atraumatic  EENT: EOMI, Sclera not icteric, Oropharynx moist  Neck:  Supple, Trachea midline  Lungs:CTA Bilaterally  Heart: RRR, No murmur, No rub, No gallop, PMI nondisplaced. Abdomen:Soft,  tender, Not distended, BS WNL,  No masses. Ext:No clubbing. No cyanosis. No edema. Skin: No rashes. No jaundice. No stigmata of liver disease. Neuro:  A&O x Three, No focal neurological deficits    LABS and IMAGING:     Hemotological labs:   ANEMIA STUDIES  No results for input(s): LABIRON, TIBC, FERRITIN, [id number], FOLATE, OCCULTBLD in the last 72 hours. CBC  Recent Labs     01/28/21  0716   WBC 8.0   HGB 11.7*   MCV 98.4   RDW 13.6          PT/INR  No results for input(s): PROTIME, INR in the last 72 hours. BMP  Recent Labs     01/28/21  0716 01/29/21  0620    138   K 3.9 3.6*    100   CO2 23 28   BUN 5* 5*   CREATININE 0.50 0.47*   GLUCOSE 138* 147*   CALCIUM 8.9 8.5*       LIVER WORK UP  Hepatitis Functional Panel:  Recent Labs     01/29/21  0620   ALKPHOS 301*   *   *   PROT 6.8   BILITOT 0.43   LABALBU 3.9       AMYLASE/LIPASE/AMMONIA  Recent Labs     01/28/21  0716   LIPASE 7*       Acute Hepatitis Panel   Lab Results   Component Value Date    HEPBSAG NONREACTIVE 08/21/2020    HEPCAB NONREACTIVE 08/21/2020    HEPBIGM NONREACTIVE 08/21/2020    HEPAIGM NONREACTIVE 08/21/2020       HCV Genotype   No results found for: HEPATITISCGENOTYPE    HCV Quantitative   No results found for: HCVQNT    Metabolic work up:  Ceruloplasmin  AA work up:  Alpha 1 antitrypsin   Lab Results   Component Value Date    A1A 208 08/21/2020     AMA:  Lab Results   Component Value Date    MITOAB 3.6 08/21/2020       ASMA:  Lab Results   Component Value Date    SMOOTHMUSCAB 5 08/21/2020       ANCA:    SHARON:  No results found for: SHARON    Anti - Liver/Kidney Ab:  No results found for: LIVER-KIDNEYMICROSOMALAB    Ceruloplasmin:  Lab Results   Component Value Date    CERULOPLSM 26 08/21/2020       Celiac panel:  No results found for: TISSTRNTIIGG, TTGIGA, IGA    Cancer Markers:  CEA:    No results for input(s): CEA in the last 72 hours.   Ca 125:

## 2021-01-29 NOTE — PROGRESS NOTES
[facility]  CDU / OBSERVATION eNCOUnter  Attending NOte       I performed a history and physical examination of the patient and discussed management with the resident. I reviewed the residents note and agree with the documented findings and plan of care. Any areas of disagreement are noted on the chart. I was personally present for the key portions of any procedures. I have documented in the chart those procedures where I was not present during the key portions. I have reviewed the nurses notes. I agree with the chief complaint, past medical history, past surgical history, allergies, medications, social and family history as documented unless otherwise noted below. The Family history, social history, and ROS are effectively unchanged since admission unless noted elsewhere in the chart. Considering ERCP regarding patient's abdominal pain and elevated transaminases. Discussed with physician at [facility] clinic. Patient with recurrent and sustained abdominal discomfort. Patient with history of pancreatitis. Patient has imaging of abdomen with CT scan not showing significant change but laboratory work does show elevation of transaminases consistent with exacerbation. We will recheck today. Initiate gastroenterology evaluation. MRI done. Patient for symptomatic care. Will advance diet. Will reassess tomorrow. Will reassess along with GI regarding further plan for intervention.       Em Corado MD  Attending Emergency  Physician

## 2021-01-29 NOTE — PROGRESS NOTES
Physician Progress Note      Aris Selby  CSN #:                  [id number]  :                       1975  ADMIT DATE:       2021 7:18 AM  DISCH DATE:  RESPONDING  PROVIDER #:        Linda Chatterjee MD          QUERY TEXT:    Patient admitted with BMI 46.2. If possible, please document in progress notes   and discharge summary if you are evaluating and /or treating any of the   following: The medical record reflects the following:  Risk Factors: fibromyalgia  Clinical Indicators: BMI 46.2  Treatment: monitoring    Thank you, Victorino Stoddard RN, CDS. Please call with any questions [phone]    M-F 6a-2:30p  Options provided:  -- Morbid obesity  -- Obesity  -- Overweight  -- BMI not clinically significant  -- Other - I will add my own diagnosis  -- Disagree - Not applicable / Not valid  -- Disagree - Clinically unable to determine / Unknown  -- Refer to Clinical Documentation Reviewer    PROVIDER RESPONSE TEXT:    This patient has morbid obesity.     Query created by: Rabia De Los Santos on 2021 7:10 AM      Electronically signed by:  Linda Chatterjee MD 2021 1:18 PM

## 2021-01-29 NOTE — PROGRESS NOTES
OBS/CDU   RESIDENT NOTE      Patients PCP is:  Deon Wright        SUBJECTIVE      Patient was evaluated at bedside with no improvement of her symptom, CMP showed continuing elevation of hepatic enzymes. Has been able to tolerate a diet without nausea or vomiting. The patient is urinating on his own and is passing flatus. Denies fever, chills, nausea, vomiting, chest pain, shortness of breath, abdominal pain, focal weakness, numbness, tingling, urinary/bowel symptoms, vision changes, visual hallucinations, or headache. PHYSICAL EXAM      General: NAD, AO X 3  Heent: EMOI, PERRL  Neck: SUPPLE, NO JVD  Cardiovascular: RRR, S1S2  Pulmonary: CTAB, NO SOB  Abdomen: SOFT, tender to palpation diffusely, ND, +BS  Extremities: +2/4 PULSES DISTAL, NO SWELLING  Neuro / Psych: NO NUMBNESS OR TINGLING, MENTATION AT BASELINE    PERTINENT TEST /EXAMS      I have reviewed all available laboratory results.     MEDICATIONS CURRENT       pantoprazole (PROTONIX) injection 40 mg, BID    And      sodium chloride (PF) 0.9 % injection 10 mL, BID      diphenhydrAMINE (BENADRYL) injection 25 mg, Q6H PRN      promethazine (PHENERGAN) injection 12.5 mg, Q6H PRN      QUEtiapine (SEROQUEL) tablet 400 mg, Nightly      potassium bicarb-citric acid (EFFER-K) effervescent tablet 40 mEq, Daily      sodium chloride flush 0.9 % injection 10 mL, 2 times per day      sodium chloride flush 0.9 % injection 10 mL, PRN      enoxaparin (LOVENOX) injection 40 mg, Daily      melatonin tablet 5 mg, Daily      prazosin (MINIPRESS) capsule 1 mg, Nightly      amitriptyline (ELAVIL) tablet 25 mg, Nightly      busPIRone (BUSPAR) tablet 15 mg, Nightly      0.9 % sodium chloride infusion, Continuous      tiZANidine (ZANAFLEX) tablet 4 mg, Nightly      buPROPion (WELLBUTRIN SR) extended release tablet 150 mg, Nightly      losartan (COZAAR) tablet 50 mg, Daily      pramipexole (MIRAPEX) tablet 0.25 mg, Daily

## 2021-01-30 LAB
ALBUMIN SERPL-MCNC: 3.7 G/DL (ref 3.5–5.2)
ALBUMIN/GLOBULIN RATIO: 1.2 (ref 1–2.5)
ALP BLD-CCNC: 239 U/L (ref 35–104)
ALT SERPL-CCNC: 315 U/L (ref 5–33)
AST SERPL-CCNC: 83 U/L
BILIRUB SERPL-MCNC: 0.17 MG/DL (ref 0.3–1.2)
BILIRUBIN DIRECT: 0.1 MG/DL
BILIRUBIN, INDIRECT: 0.07 MG/DL (ref 0–1)
GLOBULIN: ABNORMAL G/DL (ref 1.5–3.8)
TOTAL PROTEIN: 6.8 G/DL (ref 6.4–8.3)

## 2021-01-30 PROCEDURE — 6370000000 HC RX 637 (ALT 250 FOR IP): Performed by: STUDENT IN AN ORGANIZED HEALTH CARE EDUCATION/TRAINING PROGRAM

## 2021-01-30 PROCEDURE — 6360000002 HC RX W HCPCS: Performed by: STUDENT IN AN ORGANIZED HEALTH CARE EDUCATION/TRAINING PROGRAM

## 2021-01-30 PROCEDURE — 2580000003 HC RX 258: Performed by: STUDENT IN AN ORGANIZED HEALTH CARE EDUCATION/TRAINING PROGRAM

## 2021-01-30 PROCEDURE — 6360000002 HC RX W HCPCS: Performed by: EMERGENCY MEDICINE

## 2021-01-30 PROCEDURE — 6370000000 HC RX 637 (ALT 250 FOR IP): Performed by: EMERGENCY MEDICINE

## 2021-01-30 PROCEDURE — 99232 SBSQ HOSP IP/OBS MODERATE 35: CPT | Performed by: INTERNAL MEDICINE

## 2021-01-30 PROCEDURE — C9113 INJ PANTOPRAZOLE SODIUM, VIA: HCPCS | Performed by: EMERGENCY MEDICINE

## 2021-01-30 PROCEDURE — 36415 COLL VENOUS BLD VENIPUNCTURE: CPT

## 2021-01-30 PROCEDURE — 1200000000 HC SEMI PRIVATE

## 2021-01-30 PROCEDURE — 80076 HEPATIC FUNCTION PANEL: CPT

## 2021-01-30 RX ORDER — DIPHENHYDRAMINE HCL 25 MG
25 TABLET ORAL EVERY 4 HOURS PRN
Status: DISCONTINUED | OUTPATIENT
Start: 2021-01-30 | End: 2021-02-03 | Stop reason: HOSPADM

## 2021-01-30 RX ORDER — ONDANSETRON 4 MG/1
4 TABLET, FILM COATED ORAL EVERY 6 HOURS PRN
Status: DISCONTINUED | OUTPATIENT
Start: 2021-01-30 | End: 2021-02-03 | Stop reason: HOSPADM

## 2021-01-30 RX ORDER — PANTOPRAZOLE SODIUM 40 MG/1
40 TABLET, DELAYED RELEASE ORAL 2 TIMES DAILY
Status: DISCONTINUED | OUTPATIENT
Start: 2021-01-30 | End: 2021-02-03 | Stop reason: HOSPADM

## 2021-01-30 RX ADMIN — OXYCODONE HYDROCHLORIDE 5 MG: 5 TABLET ORAL at 14:18

## 2021-01-30 RX ADMIN — PANTOPRAZOLE SODIUM 40 MG: 40 TABLET, DELAYED RELEASE ORAL at 21:57

## 2021-01-30 RX ADMIN — OXYCODONE HYDROCHLORIDE 5 MG: 5 TABLET ORAL at 10:03

## 2021-01-30 RX ADMIN — Medication 5 MG: at 20:47

## 2021-01-30 RX ADMIN — IBUPROFEN 600 MG: 400 TABLET, FILM COATED ORAL at 08:48

## 2021-01-30 RX ADMIN — PANTOPRAZOLE SODIUM 40 MG: 40 INJECTION, POWDER, FOR SOLUTION INTRAVENOUS at 08:48

## 2021-01-30 RX ADMIN — HYDROMORPHONE HYDROCHLORIDE 1 MG: 1 INJECTION, SOLUTION INTRAMUSCULAR; INTRAVENOUS; SUBCUTANEOUS at 15:04

## 2021-01-30 RX ADMIN — Medication 25 MG: at 05:50

## 2021-01-30 RX ADMIN — OXYCODONE HYDROCHLORIDE 5 MG: 5 TABLET ORAL at 05:50

## 2021-01-30 RX ADMIN — ONDANSETRON HYDROCHLORIDE 4 MG: 4 TABLET, FILM COATED ORAL at 21:57

## 2021-01-30 RX ADMIN — DOCUSATE SODIUM 100 MG: 100 CAPSULE, LIQUID FILLED ORAL at 08:48

## 2021-01-30 RX ADMIN — HYDROMORPHONE HYDROCHLORIDE 0.5 MG: 1 INJECTION, SOLUTION INTRAMUSCULAR; INTRAVENOUS; SUBCUTANEOUS at 02:36

## 2021-01-30 RX ADMIN — DULOXETINE HYDROCHLORIDE 60 MG: 30 CAPSULE, DELAYED RELEASE ORAL at 08:48

## 2021-01-30 RX ADMIN — ONDANSETRON HYDROCHLORIDE 4 MG: 4 TABLET, FILM COATED ORAL at 15:04

## 2021-01-30 RX ADMIN — DIPHENHYDRAMINE HCL 25 MG: 25 TABLET ORAL at 21:57

## 2021-01-30 RX ADMIN — HYDROMORPHONE HYDROCHLORIDE 1 MG: 1 INJECTION, SOLUTION INTRAMUSCULAR; INTRAVENOUS; SUBCUTANEOUS at 08:48

## 2021-01-30 RX ADMIN — OXYCODONE HYDROCHLORIDE 5 MG: 5 TABLET ORAL at 01:13

## 2021-01-30 RX ADMIN — POTASSIUM CHLORIDE 40 MEQ: 1500 TABLET, EXTENDED RELEASE ORAL at 08:48

## 2021-01-30 RX ADMIN — IBUPROFEN 600 MG: 400 TABLET, FILM COATED ORAL at 17:47

## 2021-01-30 RX ADMIN — HYDROMORPHONE HYDROCHLORIDE 0.5 MG: 1 INJECTION, SOLUTION INTRAMUSCULAR; INTRAVENOUS; SUBCUTANEOUS at 04:41

## 2021-01-30 RX ADMIN — DIPHENHYDRAMINE HCL 25 MG: 25 TABLET ORAL at 11:55

## 2021-01-30 RX ADMIN — BUPROPION HYDROCHLORIDE 150 MG: 150 TABLET, EXTENDED RELEASE ORAL at 20:47

## 2021-01-30 RX ADMIN — AMITRIPTYLINE HYDROCHLORIDE 25 MG: 25 TABLET, FILM COATED ORAL at 20:47

## 2021-01-30 RX ADMIN — DIPHENHYDRAMINE HCL 25 MG: 25 TABLET ORAL at 17:47

## 2021-01-30 RX ADMIN — PRAZOSIN HYDROCHLORIDE 1 MG: 1 CAPSULE ORAL at 20:48

## 2021-01-30 RX ADMIN — TIZANIDINE 4 MG: 4 TABLET ORAL at 20:47

## 2021-01-30 RX ADMIN — ENOXAPARIN SODIUM 40 MG: 40 INJECTION SUBCUTANEOUS at 08:47

## 2021-01-30 RX ADMIN — PROMETHAZINE HYDROCHLORIDE 12.5 MG: 25 INJECTION INTRAMUSCULAR; INTRAVENOUS at 11:55

## 2021-01-30 RX ADMIN — DULOXETINE HYDROCHLORIDE 60 MG: 30 CAPSULE, DELAYED RELEASE ORAL at 20:47

## 2021-01-30 RX ADMIN — HYDROMORPHONE HYDROCHLORIDE 1 MG: 1 INJECTION, SOLUTION INTRAMUSCULAR; INTRAVENOUS; SUBCUTANEOUS at 17:43

## 2021-01-30 RX ADMIN — SODIUM CHLORIDE, PRESERVATIVE FREE 10 ML: 5 INJECTION INTRAVENOUS at 20:50

## 2021-01-30 RX ADMIN — PROMETHAZINE HYDROCHLORIDE 12.5 MG: 25 INJECTION INTRAMUSCULAR; INTRAVENOUS at 17:44

## 2021-01-30 RX ADMIN — QUETIAPINE FUMARATE 400 MG: 200 TABLET ORAL at 20:47

## 2021-01-30 RX ADMIN — OXYCODONE HYDROCHLORIDE 5 MG: 5 TABLET ORAL at 17:48

## 2021-01-30 RX ADMIN — HYDROMORPHONE HYDROCHLORIDE 1 MG: 1 INJECTION, SOLUTION INTRAMUSCULAR; INTRAVENOUS; SUBCUTANEOUS at 13:04

## 2021-01-30 RX ADMIN — HYDROMORPHONE HYDROCHLORIDE 1 MG: 1 INJECTION, SOLUTION INTRAMUSCULAR; INTRAVENOUS; SUBCUTANEOUS at 20:38

## 2021-01-30 RX ADMIN — PROMETHAZINE HYDROCHLORIDE 12.5 MG: 25 INJECTION INTRAMUSCULAR; INTRAVENOUS at 05:50

## 2021-01-30 RX ADMIN — OXYCODONE HYDROCHLORIDE 5 MG: 5 TABLET ORAL at 21:57

## 2021-01-30 RX ADMIN — LOSARTAN POTASSIUM 50 MG: 50 TABLET, FILM COATED ORAL at 18:34

## 2021-01-30 RX ADMIN — PRAMIPEXOLE DIHYDROCHLORIDE 0.25 MG: 0.25 TABLET ORAL at 18:34

## 2021-01-30 RX ADMIN — ONDANSETRON 4 MG: 2 INJECTION INTRAMUSCULAR; INTRAVENOUS at 08:49

## 2021-01-30 RX ADMIN — BUSPIRONE HYDROCHLORIDE 15 MG: 15 TABLET ORAL at 20:47

## 2021-01-30 RX ADMIN — POTASSIUM CHLORIDE 40 MEQ: 1500 TABLET, EXTENDED RELEASE ORAL at 18:34

## 2021-01-30 RX ADMIN — IBUPROFEN 600 MG: 400 TABLET, FILM COATED ORAL at 02:41

## 2021-01-30 ASSESSMENT — PAIN SCALES - GENERAL
PAINLEVEL_OUTOF10: 6
PAINLEVEL_OUTOF10: 7
PAINLEVEL_OUTOF10: 6
PAINLEVEL_OUTOF10: 6

## 2021-01-30 ASSESSMENT — PAIN DESCRIPTION - PAIN TYPE: TYPE: CHRONIC PAIN;ACUTE PAIN

## 2021-01-30 ASSESSMENT — PAIN DESCRIPTION - FREQUENCY: FREQUENCY: CONTINUOUS

## 2021-01-30 ASSESSMENT — PAIN DESCRIPTION - PROGRESSION: CLINICAL_PROGRESSION: NOT CHANGED

## 2021-01-30 ASSESSMENT — PAIN DESCRIPTION - LOCATION: LOCATION: ABDOMEN

## 2021-01-30 NOTE — PROGRESS NOTES
[facility]   Progress Note    Shellie Cedillo is a 54 y.o. female patient. Hospitalization Day:3      Chief consult reason:   Burnt out pancreas with new elevated liver enzymes     Subjective:  Pt seen and examined. Pt states she is having epigastric pain constantly. Pt tolerated diet this am, no vomiting for last 3 days. Pt admits to Motrin 600 mg po 2-3 times daily several times weekly, Tylenol Arthritis 3x weekly and several other pain meds. [facility]t  LFT's continue to increase. Imaging has been negative so far for biliary obstruction, stricture, mass, etc    VITALS:  BP (!) 149/84   Pulse 95   Temp 97.2 °F (36.2 °C) (Oral)   Resp 18   Ht 5' 5\" (1.651 m)   Wt 277 lb 1.6 oz (125.7 kg)   SpO2 96%   BMI 46.11 kg/m²   TEMPERATURE:  Current - Temp: 97.2 °F (36.2 °C); Max - Temp  Av.6 °F (36.4 °C)  Min: 97.2 °F (36.2 °C)  Max: 97.9 °F (36.6 °C)    Physical Assessment:  General appearance:  alert, cooperative and no distress  Mental Status:  oriented to person, place and time and normal affect  Lungs:  clear to auscultation bilaterally, normal effort  Heart:  regular rate and rhythm, no murmur  Abdomen:  soft, nontender, nondistended, normal bowel sounds, no masses, hepatomegaly, splenomegaly  Extremities:  no edema, redness, tenderness in the calves  Skin:  no gross lesions, rashes, induration    Data Review:    Labs and Imaging:     CBC:  Recent Labs     21  0716   WBC 8.0   HGB 11.7*   MCV 98.4   RDW 13.6          ANEMIA STUDIES:  No results for input(s): LABIRON, TIBC, FERRITIN, [id number], FOLATE, OCCULTBLD in the last 72 hours.     BMP:  Recent Labs     21  0716 21  0620    138   K 3.9 3.6*    100   CO2 23 28   BUN 5* 5*   CREATININE 0.50 0.47*   GLUCOSE 138* 147*   CALCIUM 8.9 8.5*       LFTS:  Recent Labs     21  0716 21  0620   ALKPHOS 244* 301*   * 544*   * 319* BILITOT 0.18* 0.43   LABALBU 4.0 3.9       Amylase/Lipase and Ammonia:  Recent Labs     01/28/21  0716   LIPASE 7*       Acute Hepatitis Panel:  Lab Results   Component Value Date    HEPBSAG NONREACTIVE 08/21/2020    HEPCAB NONREACTIVE 08/21/2020    HEPBIGM NONREACTIVE 08/21/2020    HEPAIGM NONREACTIVE 08/21/2020       HCV Genotype:  No results found for: HEPATITISCGENOTYPE    HCV Quantitative:  No results found for: HCVQNT    LIVER WORK UP:    AFP  No results found for: AFP    Alpha 1 antitrypsin   Lab Results   Component Value Date    A1A 208 08/21/2020       SHARON  No results found for: SHARON    AMA  Lab Results   Component Value Date    MITOAB 3.6 08/21/2020       ASMA  Lab Results   Component Value Date    SMOOTHMUSCAB 5 08/21/2020       PT/INR  No results for input(s): PROTIME, INR in the last 72 hours. Cancer Markers:  CEA:  No results for input(s): CEA in the last 72 hours. Ca 125:  No results for input(s):  in the last 72 hours. Ca 19-9:   Invalid input(s):   AFP: No results for input(s): AFP in the last 72 hours. Lactic acid:Invalid input(s): LACTIC ACID    Radiology Review:    No results found. Active Problems:    Abdominal pain  Resolved Problems:    * No resolved hospital problems. *       GI Impression:    1. Elevated LFT's-etiology unclear at this time. Imaging non-diagnostic at this point. LFT's continue to rise daily  -pt has had extensive work up and labs etc completed at [facility] need to repeat normal hepatic work up  2. 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 ??drug seeking behavior-pt presentation does not match verbalized complaints, lack of radiographic evidence, etc      Plan and Recommendations:    1. Rec liver biopsy to further assess etiology of abnormal liver function   2. Rec low fat diet  3.  Pain mgt per primary-rec limiting Tylenol containing products as pt has multiple meds prior to admission containing Acetaminophen 4. Discussed at length with pt that there is no clinical evidence to indicate ERCP at this point. Pt is requesting procedure. 5. Will follow      This plan was formulated in collaboration with Dr. Wandy Shields MD    Thank you for allowing me to participate in the care of your patient. Please feel free to contact me with any questions or concerns.      [facility]

## 2021-01-30 NOTE — PROGRESS NOTES
CDU Daily Progress Note  Attending Physician       Pt Name: Shirley Alanis  MRN: [id number]  [place] 1975  Date of evaluation: 1/30/21    I performed a history and physical examination of the patient and discussed management with the resident. I reviewed the residents note and agree with the documented findings and plan of care. Any areas of disagreement are noted on the chart. I was personally present for the key portions of any procedures. I have documented in the chart those procedures where I was not present during the key portions. I have reviewed the emergency nurses triage note. I agree with the chief complaint, past medical history, past surgical history, allergies, medications, social and family history as documented unless otherwise noted below. Documentation of the HPI, Physical Exam and Medical Decision Making performed by medical students or scribes is based on my personal performance of the HPI, PE and MDM. For Physician Assistant/ Nurse Practitioner cases/documentation I have personally evaluated this patient and have completed at least one if not all key elements of the E/M (history, physical exam, and MDM). Additional findings are as noted. The Family History, Social History and Review of Systems are unchanged from the previous day. No significant events overnight. MRCP shows no pancreatic inflammation, pancreatic duct dilatation, peripancreatic fluiid.  GI following    Karla Morataya MD  Attending Physician  Critical Decision Unit

## 2021-01-30 NOTE — FLOWSHEET NOTE
Patient calling out for pain medication frequently. Patient asking for IV and po pain medications at the same time and states day shift RN was supposed to inform night shift staff to give these together. Patient has been educated on multiple occassions of this. Patient asking for all medications to be given at the same time. Pt was educated that IV and po narcotics have to be given 1 hour apart. Patient walking down to the nurses station asking where her medication is when RN isn't present immediately after pt calls out. As RN administering pain medication patient was observed nodding. Then began snoring during IV administration. Pt has a log of pain medication times written on paper at bedside.

## 2021-01-30 NOTE — PROGRESS NOTES
OBS/CDU   RESIDENT NOTE      Patients PCP is:  Jaquelin Calvert        SUBJECTIVE    Patient was evaluated at bedside, states that there are no changes overnight and that her pain is remained constant 5/6 out of 10. Medical intervention with GI yesterday with MRCP showed no acute pathology which could be contributing to her pain. Conversation was had about better alternative options for treatment care. Patient agrees that possible discharge to [facility] clinic may be the best option at this point. Has been able to tolerate a full diet without nausea or vomiting. The patient is urinating on his own and is passing flatus. Denies fever, chills, nausea, vomiting, chest pain, shortness of breath, abdominal pain, focal weakness, numbness, tingling, urinary/bowel symptoms, vision changes, visual hallucinations, or headache. PHYSICAL EXAM      General: NAD, AO X 3  Heent: EMOI, PERRL  Neck: SUPPLE, NO JVD  Cardiovascular: RRR, S1S2  Pulmonary: CTAB, NO SOB  Abdomen: SOFT, NTTP, ND, +BS  Extremities: +2/4 PULSES DISTAL, NO SWELLING  Neuro / Psych: NO NUMBNESS OR TINGLING, MENTATION AT BASELINE    PERTINENT TEST /EXAMS      I have reviewed all available laboratory results.     MEDICATIONS CURRENT       HYDROmorphone (DILAUDID) injection 1 mg, Q4H PRN      LORazepam (ATIVAN) injection 1 mg, Once      0.9 % sodium chloride bolus, Once      oxyCODONE (ROXICODONE) immediate release tablet 5 mg, Q4H PRN      potassium chloride (KLOR-CON M) extended release tablet 40 mEq, BID WC      docusate sodium (COLACE) capsule 100 mg, Daily      pantoprazole (PROTONIX) injection 40 mg, BID    And      sodium chloride (PF) 0.9 % injection 10 mL, BID      diphenhydrAMINE (BENADRYL) injection 25 mg, Q6H PRN      promethazine (PHENERGAN) injection 12.5 mg, Q6H PRN      QUEtiapine (SEROQUEL) tablet 400 mg, Nightly      sodium chloride flush 0.9 % injection 10 mL, 2 times per day   sodium chloride flush 0.9 % injection 10 mL, PRN      enoxaparin (LOVENOX) injection 40 mg, Daily      melatonin tablet 5 mg, Daily      prazosin (MINIPRESS) capsule 1 mg, Nightly      amitriptyline (ELAVIL) tablet 25 mg, Nightly      busPIRone (BUSPAR) tablet 15 mg, Nightly      0.9 % sodium chloride infusion, Continuous      tiZANidine (ZANAFLEX) tablet 4 mg, Nightly      buPROPion (WELLBUTRIN SR) extended release tablet 150 mg, Nightly      losartan (COZAAR) tablet 50 mg, Daily      pramipexole (MIRAPEX) tablet 0.25 mg, Daily      ibuprofen (ADVIL;MOTRIN) tablet 600 mg, Q6H PRN      DULoxetine (CYMBALTA) extended release capsule 60 mg, BID      ondansetron (ZOFRAN) injection 4 mg, Q6H PRN        All medication charted and reviewed. CONSULTS      IP CONSULT TO GI    ASSESSMENT/PLAN       Mari Lew is a 54 y.o. female who presents with burned-out pancreas with epigastric abdominal pain. Patient's work-up negative for acute findings showing improvement of her pancreas with mild elevation of LFTs. GI following along. MRCP negative for acute findings. Discussion about discharge with follow-up with [address] [place], [place] [address] [facility] with her regular GI doctors was had. We'll follow along recommendations with our GI specialists and if there are no further recommendations, discharged home with follow-up with specialty doctors in [facility]    · Follow along recommendations and specialty services  · Discharge pending recommendations  · Continue home medications and pain control  · Monitor vitals, labs, and imaging  · DISPO: pending consults and clinical improvement    --  Jacque Li  Emergency Medicine Resident Physician     This dictation was generated by voice recognition computer software. Although all attempts are made to edit the dictation for accuracy, there may be errors in the transcription that are not intended.

## 2021-01-31 LAB
ALBUMIN SERPL-MCNC: 3.7 G/DL (ref 3.5–5.2)
ALBUMIN/GLOBULIN RATIO: 1.3 (ref 1–2.5)
ALP BLD-CCNC: 229 U/L (ref 35–104)
ALT SERPL-CCNC: 237 U/L (ref 5–33)
AST SERPL-CCNC: 45 U/L
BILIRUB SERPL-MCNC: <0.1 MG/DL (ref 0.3–1.2)
BILIRUBIN DIRECT: 0.1 MG/DL
BILIRUBIN, INDIRECT: ABNORMAL MG/DL (ref 0–1)
GLOBULIN: ABNORMAL G/DL (ref 1.5–3.8)
TOTAL PROTEIN: 6.6 G/DL (ref 6.4–8.3)

## 2021-01-31 PROCEDURE — 6360000002 HC RX W HCPCS: Performed by: EMERGENCY MEDICINE

## 2021-01-31 PROCEDURE — 99232 SBSQ HOSP IP/OBS MODERATE 35: CPT | Performed by: INTERNAL MEDICINE

## 2021-01-31 PROCEDURE — 6370000000 HC RX 637 (ALT 250 FOR IP): Performed by: STUDENT IN AN ORGANIZED HEALTH CARE EDUCATION/TRAINING PROGRAM

## 2021-01-31 PROCEDURE — 6360000002 HC RX W HCPCS: Performed by: STUDENT IN AN ORGANIZED HEALTH CARE EDUCATION/TRAINING PROGRAM

## 2021-01-31 PROCEDURE — 1200000000 HC SEMI PRIVATE

## 2021-01-31 PROCEDURE — 80076 HEPATIC FUNCTION PANEL: CPT

## 2021-01-31 PROCEDURE — 36415 COLL VENOUS BLD VENIPUNCTURE: CPT

## 2021-01-31 PROCEDURE — 6370000000 HC RX 637 (ALT 250 FOR IP): Performed by: EMERGENCY MEDICINE

## 2021-01-31 PROCEDURE — 2580000003 HC RX 258: Performed by: STUDENT IN AN ORGANIZED HEALTH CARE EDUCATION/TRAINING PROGRAM

## 2021-01-31 RX ORDER — PROMETHAZINE HYDROCHLORIDE 12.5 MG/1
12.5 TABLET ORAL EVERY 6 HOURS PRN
Status: DISCONTINUED | OUTPATIENT
Start: 2021-01-31 | End: 2021-02-03 | Stop reason: HOSPADM

## 2021-01-31 RX ADMIN — HYDROMORPHONE HYDROCHLORIDE 1 MG: 1 INJECTION, SOLUTION INTRAMUSCULAR; INTRAVENOUS; SUBCUTANEOUS at 06:18

## 2021-01-31 RX ADMIN — IBUPROFEN 600 MG: 400 TABLET, FILM COATED ORAL at 00:52

## 2021-01-31 RX ADMIN — BUPROPION HYDROCHLORIDE 150 MG: 150 TABLET, EXTENDED RELEASE ORAL at 21:39

## 2021-01-31 RX ADMIN — Medication 5 MG: at 21:38

## 2021-01-31 RX ADMIN — SODIUM CHLORIDE, PRESERVATIVE FREE 10 ML: 5 INJECTION INTRAVENOUS at 00:53

## 2021-01-31 RX ADMIN — TIZANIDINE 4 MG: 4 TABLET ORAL at 21:38

## 2021-01-31 RX ADMIN — PROMETHAZINE HYDROCHLORIDE 12.5 MG: 25 INJECTION INTRAMUSCULAR; INTRAVENOUS at 04:39

## 2021-01-31 RX ADMIN — HYDROMORPHONE HYDROCHLORIDE 1 MG: 1 INJECTION, SOLUTION INTRAMUSCULAR; INTRAVENOUS; SUBCUTANEOUS at 00:52

## 2021-01-31 RX ADMIN — OXYCODONE HYDROCHLORIDE 5 MG: 5 TABLET ORAL at 22:09

## 2021-01-31 RX ADMIN — SODIUM CHLORIDE, PRESERVATIVE FREE 10 ML: 5 INJECTION INTRAVENOUS at 03:59

## 2021-01-31 RX ADMIN — HYDROMORPHONE HYDROCHLORIDE 1 MG: 1 INJECTION, SOLUTION INTRAMUSCULAR; INTRAVENOUS; SUBCUTANEOUS at 11:21

## 2021-01-31 RX ADMIN — ONDANSETRON HYDROCHLORIDE 4 MG: 4 TABLET, FILM COATED ORAL at 08:42

## 2021-01-31 RX ADMIN — ENOXAPARIN SODIUM 40 MG: 40 INJECTION SUBCUTANEOUS at 08:43

## 2021-01-31 RX ADMIN — DOCUSATE SODIUM 100 MG: 100 CAPSULE, LIQUID FILLED ORAL at 08:42

## 2021-01-31 RX ADMIN — DIPHENHYDRAMINE HCL 25 MG: 25 TABLET ORAL at 17:38

## 2021-01-31 RX ADMIN — PROMETHAZINE HYDROCHLORIDE 12.5 MG: 12.5 TABLET ORAL at 11:21

## 2021-01-31 RX ADMIN — SODIUM CHLORIDE, PRESERVATIVE FREE 10 ML: 5 INJECTION INTRAVENOUS at 06:18

## 2021-01-31 RX ADMIN — BUSPIRONE HYDROCHLORIDE 15 MG: 15 TABLET ORAL at 21:39

## 2021-01-31 RX ADMIN — ONDANSETRON HYDROCHLORIDE 4 MG: 4 TABLET, FILM COATED ORAL at 13:19

## 2021-01-31 RX ADMIN — POTASSIUM CHLORIDE 40 MEQ: 1500 TABLET, EXTENDED RELEASE ORAL at 17:45

## 2021-01-31 RX ADMIN — DULOXETINE HYDROCHLORIDE 60 MG: 30 CAPSULE, DELAYED RELEASE ORAL at 21:39

## 2021-01-31 RX ADMIN — IBUPROFEN 600 MG: 400 TABLET, FILM COATED ORAL at 15:38

## 2021-01-31 RX ADMIN — IBUPROFEN 600 MG: 400 TABLET, FILM COATED ORAL at 08:42

## 2021-01-31 RX ADMIN — HYDROMORPHONE HYDROCHLORIDE 1 MG: 1 INJECTION, SOLUTION INTRAMUSCULAR; INTRAVENOUS; SUBCUTANEOUS at 03:58

## 2021-01-31 RX ADMIN — AMITRIPTYLINE HYDROCHLORIDE 25 MG: 25 TABLET, FILM COATED ORAL at 21:39

## 2021-01-31 RX ADMIN — IBUPROFEN 600 MG: 400 TABLET, FILM COATED ORAL at 22:09

## 2021-01-31 RX ADMIN — DULOXETINE HYDROCHLORIDE 60 MG: 30 CAPSULE, DELAYED RELEASE ORAL at 08:42

## 2021-01-31 RX ADMIN — OXYCODONE HYDROCHLORIDE 5 MG: 5 TABLET ORAL at 08:43

## 2021-01-31 RX ADMIN — HYDROMORPHONE HYDROCHLORIDE 1 MG: 1 INJECTION, SOLUTION INTRAMUSCULAR; INTRAVENOUS; SUBCUTANEOUS at 15:38

## 2021-01-31 RX ADMIN — PROMETHAZINE HYDROCHLORIDE 12.5 MG: 12.5 TABLET ORAL at 17:38

## 2021-01-31 RX ADMIN — SODIUM CHLORIDE, PRESERVATIVE FREE 10 ML: 5 INJECTION INTRAVENOUS at 08:43

## 2021-01-31 RX ADMIN — SODIUM CHLORIDE, PRESERVATIVE FREE 10 ML: 5 INJECTION INTRAVENOUS at 23:32

## 2021-01-31 RX ADMIN — PRAMIPEXOLE DIHYDROCHLORIDE 0.25 MG: 0.25 TABLET ORAL at 17:45

## 2021-01-31 RX ADMIN — HYDROMORPHONE HYDROCHLORIDE 1 MG: 1 INJECTION, SOLUTION INTRAMUSCULAR; INTRAVENOUS; SUBCUTANEOUS at 17:38

## 2021-01-31 RX ADMIN — QUETIAPINE FUMARATE 400 MG: 200 TABLET ORAL at 21:38

## 2021-01-31 RX ADMIN — PANTOPRAZOLE SODIUM 40 MG: 40 TABLET, DELAYED RELEASE ORAL at 08:42

## 2021-01-31 RX ADMIN — DIPHENHYDRAMINE HCL 25 MG: 25 TABLET ORAL at 11:21

## 2021-01-31 RX ADMIN — LOSARTAN POTASSIUM 50 MG: 50 TABLET, FILM COATED ORAL at 17:45

## 2021-01-31 RX ADMIN — POTASSIUM CHLORIDE 40 MEQ: 1500 TABLET, EXTENDED RELEASE ORAL at 08:42

## 2021-01-31 RX ADMIN — HYDROMORPHONE HYDROCHLORIDE 1 MG: 1 INJECTION, SOLUTION INTRAMUSCULAR; INTRAVENOUS; SUBCUTANEOUS at 19:56

## 2021-01-31 RX ADMIN — OXYCODONE HYDROCHLORIDE 5 MG: 5 TABLET ORAL at 02:38

## 2021-01-31 RX ADMIN — HYDROMORPHONE HYDROCHLORIDE 1 MG: 1 INJECTION, SOLUTION INTRAMUSCULAR; INTRAVENOUS; SUBCUTANEOUS at 23:32

## 2021-01-31 RX ADMIN — PANTOPRAZOLE SODIUM 40 MG: 40 TABLET, DELAYED RELEASE ORAL at 21:38

## 2021-01-31 RX ADMIN — PRAZOSIN HYDROCHLORIDE 1 MG: 1 CAPSULE ORAL at 21:38

## 2021-01-31 RX ADMIN — ONDANSETRON HYDROCHLORIDE 4 MG: 4 TABLET, FILM COATED ORAL at 22:09

## 2021-01-31 RX ADMIN — DIPHENHYDRAMINE HCL 25 MG: 25 TABLET ORAL at 04:39

## 2021-01-31 RX ADMIN — HYDROMORPHONE HYDROCHLORIDE 1 MG: 1 INJECTION, SOLUTION INTRAMUSCULAR; INTRAVENOUS; SUBCUTANEOUS at 13:19

## 2021-01-31 RX ADMIN — OXYCODONE HYDROCHLORIDE 5 MG: 5 TABLET ORAL at 13:18

## 2021-01-31 RX ADMIN — OXYCODONE HYDROCHLORIDE 5 MG: 5 TABLET ORAL at 17:38

## 2021-01-31 RX ADMIN — SODIUM CHLORIDE, PRESERVATIVE FREE 10 ML: 5 INJECTION INTRAVENOUS at 19:56

## 2021-01-31 RX ADMIN — HYDROMORPHONE HYDROCHLORIDE 1 MG: 1 INJECTION, SOLUTION INTRAMUSCULAR; INTRAVENOUS; SUBCUTANEOUS at 08:42

## 2021-01-31 ASSESSMENT — PAIN SCALES - GENERAL
PAINLEVEL_OUTOF10: 6
PAINLEVEL_OUTOF10: 5
PAINLEVEL_OUTOF10: 6
PAINLEVEL_OUTOF10: 5
PAINLEVEL_OUTOF10: 5
PAINLEVEL_OUTOF10: 6
PAINLEVEL_OUTOF10: 6
PAINLEVEL_OUTOF10: 5
PAINLEVEL_OUTOF10: 6
PAINLEVEL_OUTOF10: 5
PAINLEVEL_OUTOF10: 6

## 2021-01-31 ASSESSMENT — PAIN DESCRIPTION - ONSET: ONSET: ON-GOING

## 2021-01-31 ASSESSMENT — PAIN DESCRIPTION - PAIN TYPE: TYPE: ACUTE PAIN

## 2021-01-31 ASSESSMENT — PAIN - FUNCTIONAL ASSESSMENT: PAIN_FUNCTIONAL_ASSESSMENT: ACTIVITIES ARE NOT PREVENTED

## 2021-01-31 NOTE — PROGRESS NOTES
[facility]  CDU / OBSERVATION eNCOUnter  Attending NOte       I performed a history and physical examination of the patient and discussed management with the resident. I reviewed the residents note and agree with the documented findings and plan of care. Any areas of disagreement are noted on the chart. I was personally present for the key portions of any procedures. I have documented in the chart those procedures where I was not present during the key portions. I have reviewed the nurses notes. I agree with the chief complaint, past medical history, past surgical history, allergies, medications, social and family history as documented unless otherwise noted below. The Family history, social history, and ROS are effectively unchanged since admission unless noted elsewhere in the chart. Patient admitted to the ETU for abdominal pain and increasing LFTs. Patient states she does continue to have abdominal pain this morning. She denies nausea and was eating breakfast when I was in the room. GI is following patient and patient is to have a liver biopsy tomorrow. We will continue pain and nausea control today.     Delia Majano MD  Attending Emergency  Physician

## 2021-01-31 NOTE — CARE COORDINATION
TRANSITIONAL CARE PLANNING/ 2 Rehab En Day: 4    Reason for Admission: Abdominal pain [R10.9]     Treatment Plan of Care: Pain control     Tests/Procedures still needed: Liver Bx  Barriers to Discharge: Symptom control     Readmission Risk              Risk of Unplanned Readmission:        18            Patient goals/Treatment Preferences/Transitional Plan:     Referrals Made: none    Follow Up needed: na

## 2021-01-31 NOTE — PROGRESS NOTES
Patient walked to nurses station to ask for pain medication. Patient was given oxycodone at 0238 and is asking for dilaudid, mayra crackers and ice at 0330.

## 2021-01-31 NOTE — PROGRESS NOTES
OBS/CDU   RESIDENT NOTE      Patients PCP is:  Tika Campbell        SUBJECTIVE    Patient was evaluated at bedside. States that there was no improvement overnight and that actually she would like to have more pain medications as she believes her pain is becoming worse and more intolerable. Patient continues to be able to ambulate, eat and perform activities of daily function without assistance. Patient does state that she began to have swelling in her ankles and feet. Patient was counseled that this is expected given her high volumes of fluid that she has been receiving. The patient is urinating on his own and is passing flatus. Denies fever, chills, nausea, vomiting, chest pain, shortness of breath, abdominal pain, focal weakness, numbness, tingling, urinary/bowel symptoms, vision changes, visual hallucinations, or headache. PHYSICAL EXAM      General: NAD, AO X 3  Heent: EMOI, PERRL  Neck: SUPPLE, NO JVD  Cardiovascular: RRR, S1S2  Pulmonary: CTAB, NO SOB  Abdomen: SOFT, positive for tenderness in the epigastric region, ND, +BS  Extremities: +2/4 PULSES DISTAL, NO SWELLING  Neuro / Psych: NO NUMBNESS OR TINGLING, MENTATION AT BASELINE    PERTINENT TEST /EXAMS      I have reviewed all available laboratory results.     MEDICATIONS CURRENT       promethazine (PHENERGAN) tablet 12.5 mg, Q6H PRN      diphenhydrAMINE (BENADRYL) tablet 25 mg, Q4H PRN      ondansetron (ZOFRAN) tablet 4 mg, Q6H PRN      pantoprazole (PROTONIX) tablet 40 mg, BID      HYDROmorphone (DILAUDID) injection 1 mg, Q2H PRN      LORazepam (ATIVAN) injection 1 mg, Once      0.9 % sodium chloride bolus, Once      oxyCODONE (ROXICODONE) immediate release tablet 5 mg, Q4H PRN      potassium chloride (KLOR-CON M) extended release tablet 40 mEq, BID WC      docusate sodium (COLACE) capsule 100 mg, Daily      QUEtiapine (SEROQUEL) tablet 400 mg, Nightly      sodium chloride flush 0.9 % injection 10 mL, 2 times per day   sodium chloride flush 0.9 % injection 10 mL, PRN      enoxaparin (LOVENOX) injection 40 mg, Daily      melatonin tablet 5 mg, Daily      prazosin (MINIPRESS) capsule 1 mg, Nightly      amitriptyline (ELAVIL) tablet 25 mg, Nightly      busPIRone (BUSPAR) tablet 15 mg, Nightly      tiZANidine (ZANAFLEX) tablet 4 mg, Nightly      buPROPion (WELLBUTRIN SR) extended release tablet 150 mg, Nightly      losartan (COZAAR) tablet 50 mg, Daily      pramipexole (MIRAPEX) tablet 0.25 mg, Daily      ibuprofen (ADVIL;MOTRIN) tablet 600 mg, Q6H PRN      DULoxetine (CYMBALTA) extended release capsule 60 mg, BID        All medication charted and reviewed. CONSULTS      IP CONSULT TO GI    ASSESSMENT/PLAN       Marysol Arana is a 54 y.o. female who presents with epigastric abdominal pain secondary to burned-out pancreas. All work-ups and labs showed improvement of symptoms however, patient continues to have pain and a newly elevated hepatic enzymes. Patient to have liver biopsy with GI on 2/1/2021. We will follow along with recommendations post liver biopsy. · Liver biopsy with GI  · Continue home medications and pain control  · Monitor vitals, labs, and imaging  · DISPO: pending consults and clinical improvement    --  Suleman Mancini  Emergency Medicine Resident Physician     This dictation was generated by voice recognition computer software. Although all attempts are made to edit the dictation for accuracy, there may be errors in the transcription that are not intended.

## 2021-01-31 NOTE — PROGRESS NOTES
THE [facility] AT [facility]   Progress Note    Leanne Patel is a 54 y.o. female patient. Hospitalization Day:4      Chief consult reason:   Burnt out pancreas with new elevated liver enzymes     Subjective:  Pt seen and examined. LFT's improving today. Tolerated breakfast. Still having abdominal pain. VITALS:  BP (!) 148/84   Pulse 98   Temp 97.9 °F (36.6 °C) (Oral)   Resp 18   Ht 5' 5\" (1.651 m)   Wt 277 lb 1.6 oz (125.7 kg)   SpO2 97%   BMI 46.11 kg/m²   TEMPERATURE:  Current - Temp: 97.9 °F (36.6 °C); Max - Temp  Av.5 °F (36.4 °C)  Min: 97 °F (36.1 °C)  Max: 97.9 °F (36.6 °C)    Physical Assessment:  General appearance:  alert, cooperative and no distress  Mental Status:  oriented to person, place and time and normal affect  Lungs:  clear to auscultation bilaterally, normal effort  Heart:  regular rate and rhythm, no murmur  Abdomen:  soft, nontender, nondistended, normal bowel sounds, no masses, hepatomegaly, splenomegaly  Extremities:  no edema, redness, tenderness in the calves  Skin:  no gross lesions, rashes, induration    Data Review:    Labs and Imaging:     CBC:  No results for input(s): WBC, HGB, MCV, RDW, PLT in the last 72 hours. ANEMIA STUDIES:  No results for input(s): LABIRON, TIBC, FERRITIN, [id number], FOLATE, OCCULTBLD in the last 72 hours. BMP:  Recent Labs     21  0620      K 3.6*      CO2 28   BUN 5*   CREATININE 0.47*   GLUCOSE 147*   CALCIUM 8.5*       LFTS:  Recent Labs     21  0620 21  1421 21  0325   ALKPHOS 301* 239* 229*   * 315* 237*   * 83* 45*   BILITOT 0.43 0.17* <0.10*   BILIDIR  --  0.10 0.10   LABALBU 3.9 3.7 3.7       Amylase/Lipase and Ammonia:  No results for input(s): AMYLASE, LIPASE, AMMONIA in the last 72 hours.     Acute Hepatitis Panel:  Lab Results   Component Value Date    HEPBSAG NONREACTIVE 2020    HEPCAB NONREACTIVE 2020    HEPBIGM NONREACTIVE 2020 HEPAIGM NONREACTIVE 08/21/2020       HCV Genotype:  No results found for: HEPATITISCGENOTYPE    HCV Quantitative:  No results found for: HCVQNT    LIVER WORK UP:    AFP  No results found for: AFP    Alpha 1 antitrypsin   Lab Results   Component Value Date    A1A 208 08/21/2020       SHARON  No results found for: SHARON    AMA  Lab Results   Component Value Date    MITOAB 3.6 08/21/2020       ASMA  Lab Results   Component Value Date    SMOOTHMUSCAB 5 08/21/2020       PT/INR  No results for input(s): PROTIME, INR in the last 72 hours. Cancer Markers:  CEA:  No results for input(s): CEA in the last 72 hours. Ca 125:  No results for input(s):  in the last 72 hours. Ca 19-9:   Invalid input(s):   AFP: No results for input(s): AFP in the last 72 hours. Lactic acid:Invalid input(s): LACTIC ACID    Radiology Review:    No results found. Active Problems:    Abdominal pain  Resolved Problems:    * No resolved hospital problems. *       GI Impression:    1. Elevated LFT's-etiology unclear at this time. Imaging non-diagnostic at this point. LFT's continue to rise daily  -pt has had extensive work up and labs etc completed at [facility] need to repeat normal hepatic work up  2. 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 ??drug seeking behavior-pt presentation does not match verbalized complaints, lack of radiographic evidence, etc      Plan and Recommendations:    1. Rec low fat diet-NPO MN for biopsy in am  2. Hold Lovenox for biopsy  3. Liver biopsy order  4. Pain mgt per primary-rec limiting Tylenol containing products as pt has multiple meds prior to admission containing Acetaminophen   5. Discussed at length with pt that there is no clinical evidence to indicate ERCP at this point. Pt is requesting procedure. LFT's improving today  6.  Will follow      This plan was formulated in collaboration with Dr. Emily Jacinto MD Thank you for allowing me to participate in the care of your patient. Please feel free to contact me with any questions or concerns.      2 [facility]

## 2021-02-01 ENCOUNTER — APPOINTMENT (OUTPATIENT)
Dept: ULTRASOUND IMAGING | Age: 46
DRG: 392 | End: 2021-02-01
Payer: COMMERCIAL

## 2021-02-01 LAB
ALBUMIN SERPL-MCNC: 3.8 G/DL (ref 3.5–5.2)
ALBUMIN/GLOBULIN RATIO: 1.4 (ref 1–2.5)
ALP BLD-CCNC: 203 U/L (ref 35–104)
ALT SERPL-CCNC: 169 U/L (ref 5–33)
AST SERPL-CCNC: 25 U/L
BILIRUB SERPL-MCNC: 0.15 MG/DL (ref 0.3–1.2)
BILIRUBIN DIRECT: 0.09 MG/DL
BILIRUBIN, INDIRECT: 0.06 MG/DL (ref 0–1)
GLOBULIN: ABNORMAL G/DL (ref 1.5–3.8)
INR BLD: 0.9
PARTIAL THROMBOPLASTIN TIME: 27 SEC (ref 20.5–30.5)
PROTHROMBIN TIME: 9.6 SEC (ref 9–12)
SARS-COV-2, RAPID: NORMAL
SARS-COV-2: NORMAL
SARS-COV-2: NOT DETECTED
SOURCE: NORMAL
TOTAL PROTEIN: 6.6 G/DL (ref 6.4–8.3)

## 2021-02-01 PROCEDURE — 0FB03ZX EXCISION OF LIVER, PERCUTANEOUS APPROACH, DIAGNOSTIC: ICD-10-PCS | Performed by: RADIOLOGY

## 2021-02-01 PROCEDURE — 6360000002 HC RX W HCPCS: Performed by: RADIOLOGY

## 2021-02-01 PROCEDURE — 6360000002 HC RX W HCPCS: Performed by: STUDENT IN AN ORGANIZED HEALTH CARE EDUCATION/TRAINING PROGRAM

## 2021-02-01 PROCEDURE — 76942 ECHO GUIDE FOR BIOPSY: CPT

## 2021-02-01 PROCEDURE — 2580000003 HC RX 258: Performed by: STUDENT IN AN ORGANIZED HEALTH CARE EDUCATION/TRAINING PROGRAM

## 2021-02-01 PROCEDURE — 2580000003 HC RX 258: Performed by: RADIOLOGY

## 2021-02-01 PROCEDURE — 85730 THROMBOPLASTIN TIME PARTIAL: CPT

## 2021-02-01 PROCEDURE — 85610 PROTHROMBIN TIME: CPT

## 2021-02-01 PROCEDURE — 1200000000 HC SEMI PRIVATE

## 2021-02-01 PROCEDURE — U0003 INFECTIOUS AGENT DETECTION BY NUCLEIC ACID (DNA OR RNA); SEVERE ACUTE RESPIRATORY SYNDROME CORONAVIRUS 2 (SARS-COV-2) (CORONAVIRUS DISEASE [COVID-19]), AMPLIFIED PROBE TECHNIQUE, MAKING USE OF HIGH THROUGHPUT TECHNOLOGIES AS DESCRIBED BY CMS-2020-01-R: HCPCS

## 2021-02-01 PROCEDURE — 6370000000 HC RX 637 (ALT 250 FOR IP): Performed by: STUDENT IN AN ORGANIZED HEALTH CARE EDUCATION/TRAINING PROGRAM

## 2021-02-01 PROCEDURE — 88313 SPECIAL STAINS GROUP 2: CPT

## 2021-02-01 PROCEDURE — 6370000000 HC RX 637 (ALT 250 FOR IP): Performed by: EMERGENCY MEDICINE

## 2021-02-01 PROCEDURE — 36415 COLL VENOUS BLD VENIPUNCTURE: CPT

## 2021-02-01 PROCEDURE — U0005 INFEC AGEN DETEC AMPLI PROBE: HCPCS

## 2021-02-01 PROCEDURE — 80076 HEPATIC FUNCTION PANEL: CPT

## 2021-02-01 PROCEDURE — 2709999900 US BIOPSY LIVER PERCUTANEOUS

## 2021-02-01 PROCEDURE — 88307 TISSUE EXAM BY PATHOLOGIST: CPT

## 2021-02-01 RX ORDER — SODIUM CHLORIDE 9 MG/ML
INJECTION, SOLUTION INTRAVENOUS CONTINUOUS
Status: DISCONTINUED | OUTPATIENT
Start: 2021-02-01 | End: 2021-02-03 | Stop reason: HOSPADM

## 2021-02-01 RX ORDER — MIDAZOLAM HYDROCHLORIDE 2 MG/2ML
INJECTION, SOLUTION INTRAMUSCULAR; INTRAVENOUS
Status: COMPLETED | OUTPATIENT
Start: 2021-02-01 | End: 2021-02-01

## 2021-02-01 RX ORDER — FENTANYL CITRATE 50 UG/ML
INJECTION, SOLUTION INTRAMUSCULAR; INTRAVENOUS
Status: COMPLETED | OUTPATIENT
Start: 2021-02-01 | End: 2021-02-01

## 2021-02-01 RX ADMIN — PROMETHAZINE HYDROCHLORIDE 12.5 MG: 12.5 TABLET ORAL at 18:33

## 2021-02-01 RX ADMIN — TIZANIDINE 4 MG: 4 TABLET ORAL at 20:46

## 2021-02-01 RX ADMIN — PROMETHAZINE HYDROCHLORIDE 12.5 MG: 12.5 TABLET ORAL at 02:26

## 2021-02-01 RX ADMIN — DIPHENHYDRAMINE HCL 25 MG: 25 TABLET ORAL at 20:46

## 2021-02-01 RX ADMIN — SODIUM CHLORIDE, PRESERVATIVE FREE 10 ML: 5 INJECTION INTRAVENOUS at 20:47

## 2021-02-01 RX ADMIN — OXYCODONE HYDROCHLORIDE 5 MG: 5 TABLET ORAL at 22:52

## 2021-02-01 RX ADMIN — HYDROMORPHONE HYDROCHLORIDE 1 MG: 1 INJECTION, SOLUTION INTRAMUSCULAR; INTRAVENOUS; SUBCUTANEOUS at 06:11

## 2021-02-01 RX ADMIN — DIPHENHYDRAMINE HCL 25 MG: 25 TABLET ORAL at 15:48

## 2021-02-01 RX ADMIN — PANTOPRAZOLE SODIUM 40 MG: 40 TABLET, DELAYED RELEASE ORAL at 13:39

## 2021-02-01 RX ADMIN — FENTANYL CITRATE 50 MCG: 50 INJECTION, SOLUTION INTRAMUSCULAR; INTRAVENOUS at 12:54

## 2021-02-01 RX ADMIN — MIDAZOLAM HYDROCHLORIDE 1 MG: 1 INJECTION, SOLUTION INTRAMUSCULAR; INTRAVENOUS at 12:54

## 2021-02-01 RX ADMIN — HYDROMORPHONE HYDROCHLORIDE 1 MG: 1 INJECTION, SOLUTION INTRAMUSCULAR; INTRAVENOUS; SUBCUTANEOUS at 10:35

## 2021-02-01 RX ADMIN — HYDROMORPHONE HYDROCHLORIDE 1 MG: 1 INJECTION, SOLUTION INTRAMUSCULAR; INTRAVENOUS; SUBCUTANEOUS at 08:14

## 2021-02-01 RX ADMIN — Medication 5 MG: at 20:46

## 2021-02-01 RX ADMIN — DULOXETINE HYDROCHLORIDE 60 MG: 30 CAPSULE, DELAYED RELEASE ORAL at 20:46

## 2021-02-01 RX ADMIN — BUSPIRONE HYDROCHLORIDE 15 MG: 15 TABLET ORAL at 20:46

## 2021-02-01 RX ADMIN — OXYCODONE HYDROCHLORIDE 5 MG: 5 TABLET ORAL at 07:45

## 2021-02-01 RX ADMIN — IBUPROFEN 600 MG: 400 TABLET, FILM COATED ORAL at 20:46

## 2021-02-01 RX ADMIN — PRAMIPEXOLE DIHYDROCHLORIDE 0.25 MG: 0.25 TABLET ORAL at 17:10

## 2021-02-01 RX ADMIN — SODIUM CHLORIDE: 9 INJECTION, SOLUTION INTRAVENOUS at 08:38

## 2021-02-01 RX ADMIN — IBUPROFEN 600 MG: 400 TABLET, FILM COATED ORAL at 13:39

## 2021-02-01 RX ADMIN — SODIUM CHLORIDE, PRESERVATIVE FREE 10 ML: 5 INJECTION INTRAVENOUS at 01:52

## 2021-02-01 RX ADMIN — POTASSIUM CHLORIDE 40 MEQ: 1500 TABLET, EXTENDED RELEASE ORAL at 15:48

## 2021-02-01 RX ADMIN — LOSARTAN POTASSIUM 50 MG: 50 TABLET, FILM COATED ORAL at 17:10

## 2021-02-01 RX ADMIN — BUPROPION HYDROCHLORIDE 150 MG: 150 TABLET, EXTENDED RELEASE ORAL at 20:46

## 2021-02-01 RX ADMIN — SODIUM CHLORIDE, PRESERVATIVE FREE 10 ML: 5 INJECTION INTRAVENOUS at 08:40

## 2021-02-01 RX ADMIN — HYDROMORPHONE HYDROCHLORIDE 1 MG: 1 INJECTION, SOLUTION INTRAMUSCULAR; INTRAVENOUS; SUBCUTANEOUS at 13:38

## 2021-02-01 RX ADMIN — DIPHENHYDRAMINE HCL 25 MG: 25 TABLET ORAL at 10:35

## 2021-02-01 RX ADMIN — AMITRIPTYLINE HYDROCHLORIDE 25 MG: 25 TABLET, FILM COATED ORAL at 20:46

## 2021-02-01 RX ADMIN — POTASSIUM CHLORIDE 40 MEQ: 1500 TABLET, EXTENDED RELEASE ORAL at 13:42

## 2021-02-01 RX ADMIN — HYDROMORPHONE HYDROCHLORIDE 1 MG: 1 INJECTION, SOLUTION INTRAMUSCULAR; INTRAVENOUS; SUBCUTANEOUS at 15:48

## 2021-02-01 RX ADMIN — OXYCODONE HYDROCHLORIDE 5 MG: 5 TABLET ORAL at 11:37

## 2021-02-01 RX ADMIN — PROMETHAZINE HYDROCHLORIDE 12.5 MG: 12.5 TABLET ORAL at 10:35

## 2021-02-01 RX ADMIN — OXYCODONE HYDROCHLORIDE 5 MG: 5 TABLET ORAL at 15:48

## 2021-02-01 RX ADMIN — OXYCODONE HYDROCHLORIDE 5 MG: 5 TABLET ORAL at 02:16

## 2021-02-01 RX ADMIN — PANTOPRAZOLE SODIUM 40 MG: 40 TABLET, DELAYED RELEASE ORAL at 20:46

## 2021-02-01 RX ADMIN — HYDROMORPHONE HYDROCHLORIDE 1 MG: 1 INJECTION, SOLUTION INTRAMUSCULAR; INTRAVENOUS; SUBCUTANEOUS at 20:47

## 2021-02-01 RX ADMIN — DULOXETINE HYDROCHLORIDE 60 MG: 30 CAPSULE, DELAYED RELEASE ORAL at 13:38

## 2021-02-01 RX ADMIN — DOCUSATE SODIUM 100 MG: 100 CAPSULE, LIQUID FILLED ORAL at 13:40

## 2021-02-01 RX ADMIN — QUETIAPINE FUMARATE 400 MG: 200 TABLET ORAL at 20:46

## 2021-02-01 RX ADMIN — DIPHENHYDRAMINE HCL 25 MG: 25 TABLET ORAL at 02:26

## 2021-02-01 RX ADMIN — HYDROMORPHONE HYDROCHLORIDE 1 MG: 1 INJECTION, SOLUTION INTRAMUSCULAR; INTRAVENOUS; SUBCUTANEOUS at 03:45

## 2021-02-01 RX ADMIN — SODIUM CHLORIDE, PRESERVATIVE FREE 10 ML: 5 INJECTION INTRAVENOUS at 03:45

## 2021-02-01 RX ADMIN — HYDROMORPHONE HYDROCHLORIDE 1 MG: 1 INJECTION, SOLUTION INTRAMUSCULAR; INTRAVENOUS; SUBCUTANEOUS at 18:04

## 2021-02-01 RX ADMIN — PRAZOSIN HYDROCHLORIDE 1 MG: 1 CAPSULE ORAL at 20:46

## 2021-02-01 ASSESSMENT — PAIN SCALES - GENERAL
PAINLEVEL_OUTOF10: 8
PAINLEVEL_OUTOF10: 7
PAINLEVEL_OUTOF10: 6
PAINLEVEL_OUTOF10: 4
PAINLEVEL_OUTOF10: 3
PAINLEVEL_OUTOF10: 7
PAINLEVEL_OUTOF10: 6
PAINLEVEL_OUTOF10: 7

## 2021-02-01 ASSESSMENT — PAIN DESCRIPTION - FREQUENCY: FREQUENCY: CONTINUOUS

## 2021-02-01 ASSESSMENT — PAIN DESCRIPTION - PROGRESSION: CLINICAL_PROGRESSION: NOT CHANGED

## 2021-02-01 ASSESSMENT — PAIN DESCRIPTION - PAIN TYPE: TYPE: ACUTE PAIN

## 2021-02-01 NOTE — PROGRESS NOTES
THE [facility] AT [place] [facility]   Progress Note    Leanne Patel is a 54 y.o. female patient. Hospitalization Day:5      Chief consult reason:   Burnt out pancreas with new elevated liver enzymes     Subjective:  Pt seen and examined. LFT's improving today. No acute issues overnight. Awaiting liver biopsy today and EGD tomorrow    VITALS:  /89   Pulse 76   Temp 98.1 °F (36.7 °C) (Oral)   Resp 18   Ht 5' 5\" (1.651 m)   Wt 277 lb 1.6 oz (125.7 kg)   SpO2 96%   BMI 46.11 kg/m²   TEMPERATURE:  Current - Temp: 98.1 °F (36.7 °C); Max - Temp  Av.7 °F (36.5 °C)  Min: 96.8 °F (36 °C)  Max: 98.2 °F (36.8 °C)    Physical Assessment:  General appearance:  alert, cooperative and no distress  Mental Status:  oriented to person, place and time and normal affect  Lungs:  clear to auscultation bilaterally, normal effort  Heart:  regular rate and rhythm, no murmur  Abdomen:  soft, nontender, nondistended, normal bowel sounds, no masses, hepatomegaly, splenomegaly  Extremities:  no edema, redness, tenderness in the calves  Skin:  no gross lesions, rashes, induration    Data Review:    Labs and Imaging:     CBC:  No results for input(s): WBC, HGB, MCV, RDW, PLT in the last 72 hours. ANEMIA STUDIES:  No results for input(s): LABIRON, TIBC, FERRITIN, [id number], FOLATE, OCCULTBLD in the last 72 hours. BMP:  No results for input(s): NA, K, CL, CO2, BUN, CREATININE, GLUCOSE, CALCIUM in the last 72 hours. Invalid input(s):  MG,  PHOS;3    LFTS:  Recent Labs     21  1421 21  0325 21  0329   ALKPHOS 239* 229* 203*   * 237* 169*   AST 83* 45* 25   BILITOT 0.17* <0.10* 0.15*   BILIDIR 0.10 0.10 0.09   LABALBU 3.7 3.7 3.8       Amylase/Lipase and Ammonia:  No results for input(s): AMYLASE, LIPASE, AMMONIA in the last 72 hours.     Acute Hepatitis Panel:  Lab Results   Component Value Date    HEPBSAG NONREACTIVE 2020    HEPCAB NONREACTIVE 2020    HEPBIGM NONREACTIVE 2020 HEPAIGM NONREACTIVE 08/21/2020       HCV Genotype:  No results found for: HEPATITISCGENOTYPE    HCV Quantitative:  No results found for: HCVQNT    LIVER WORK UP:    AFP  No results found for: AFP    Alpha 1 antitrypsin   Lab Results   Component Value Date    A1A 208 08/21/2020       SHARON  No results found for: SHARON    AMA  Lab Results   Component Value Date    MITOAB 3.6 08/21/2020       ASMA  Lab Results   Component Value Date    SMOOTHMUSCAB 5 08/21/2020       PT/INR  Recent Labs     02/01/21  0933   PROTIME 9.6   INR 0.9       Cancer Markers:  CEA:  No results for input(s): CEA in the last 72 hours. Ca 125:  No results for input(s):  in the last 72 hours. Ca 19-9:   Invalid input(s):   AFP: No results for input(s): AFP in the last 72 hours. Lactic acid:Invalid input(s): LACTIC ACID    Radiology Review:    No results found. Active Problems:    Abdominal pain  Resolved Problems:    * No resolved hospital problems. *       GI Impression:    1. Elevated LFT's-etiology unclear at this time. Imaging non-diagnostic at this point. LFT's continue to rise daily  -pt has had extensive work up and labs etc completed at [facility] need to repeat normal hepatic work up  2. 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 ??drug seeking behavior-pt presentation does not match verbalized complaints, lack of radiographic evidence, etc      Plan and Recommendations:    1. Pt may resume diet after biopsy, then NPO MN for EGD Tuesday  2. Liver biopsy pending  3. Will follow      This plan was formulated in collaboration with Dr. Sobeida Eng MD    Thank you for allowing me to participate in the care of your patient. Please feel free to contact me with any questions or concerns.      2 [facility]

## 2021-02-01 NOTE — BRIEF OP NOTE
Brief Postoperative Note    Edgard Sorto  YOB: 1975  [id number]    Pre-operative Diagnosis: Abnormal liver function    Post-operative Diagnosis: Same    Procedure: Liver bx    Anesthesia: Local and Moderate Sedation    Surgeons/Assistants: Jalil    Estimated Blood Loss: minimal  Complications: None    Specimens: Was Obtained:     Findings: Technically successful US guided liver bx.  4 core biopsies were taken and placed in formalin for pathologist evaluation.      Electronically signed by DENNYS Duvall on 2/1/2021 at 1:10 PM

## 2021-02-01 NOTE — PRE SEDATION
 amitriptyline  25 mg Oral Nightly    busPIRone  15 mg Oral Nightly    tiZANidine  4 mg Oral Nightly    buPROPion  150 mg Oral Nightly    losartan  50 mg Oral Daily    pramipexole  0.25 mg Oral Daily    DULoxetine  60 mg Oral BID     Continuous Infusions:    sodium chloride 20 mL/hr at 02/01/21 0838     PRN Meds: promethazine, diphenhydrAMINE, ondansetron, HYDROmorphone, oxyCODONE, sodium chloride flush, ibuprofen  Home Meds:   Prior to Admission medications    Medication Sig Start Date End Date Taking?  Authorizing Provider   tiZANidine (ZANAFLEX) 4 MG tablet Take 4 mg by mouth nightly   Yes Historical Provider, MD   buPROPion (WELLBUTRIN XL) 150 MG extended release tablet Take 150 mg by mouth nightly   Yes Historical Provider, MD   losartan (COZAAR) 50 MG tablet Take 50 mg by mouth daily   Yes Historical Provider, MD   amitriptyline (ELAVIL) 25 MG tablet Take 25 mg by mouth nightly   Yes Historical Provider, MD   prazosin (MINIPRESS) 1 MG capsule Take 2 mg by mouth nightly    Yes Historical Provider, MD   melatonin 5 MG TABS tablet Take 10 mg by mouth daily    Yes Historical Provider, MD   QUEtiapine (SEROQUEL XR) 400 MG extended release tablet Take 800 mg by mouth nightly   Yes Historical Provider, MD   busPIRone (BUSPAR) 15 MG tablet Take 15 mg by mouth nightly Can take once in morning PRN   Yes Historical Provider, MD   omeprazole (PRILOSEC) 40 MG delayed release capsule Take 40 mg by mouth daily   Yes Historical Provider, MD   Cholecalciferol (VITAMIN D) 50 MCG (2000 UT) CAPS capsule Take by mouth daily   Yes Historical Provider, MD   butalbital-APAP-caffeine (FIORICET) -40 MG CAPS per capsule Take 1 capsule by mouth every 6 hours as needed for Headaches 5/19/20 1/25/21 Yes Zoe Villela,    albuterol sulfate HFA (PROVENTIL HFA) 108 (90 Base) MCG/ACT inhaler Inhale 2 puffs into the lungs as needed 12/28/18  Yes Historical Provider, MD magnesium oxide (MAG-OX) 400 MG tablet Take 400 mg by mouth daily   Yes Historical Provider, MD   fexofenadine (RA ALLERGY RELIEF) 180 MG tablet take 1 tablet by mouth once daily  Patient taking differently: Take 180 mg by mouth daily as needed take 1 tablet by mouth once daily 11/7/18  Yes Darrell Cisneros MD   pramipexole (MIRAPEX) 0.5 MG tablet take 1 tablet by mouth twice a day  Patient taking differently: Take 0.25 mg by mouth daily take 1 tablet by mouth twice a day 11/7/18  Yes Darrell Cisneros MD   DULoxetine (CYMBALTA) 60 MG extended release capsule TAKE 1 CAPSULE BY MOUTH TWICE A DAY  Patient taking differently: Take 120 mg by mouth Daily with supper  8/3/18  Yes Darrell Cisneros MD   potassium chloride (KLOR-CON M) 10 MEQ extended release tablet Take 1 tablet by mouth 2 times daily 7/18/20   Andressa Sorenson MD     Coumadin Use Last 7 Days:  no  Antiplatelet drug therapy use last 7 days: no  Other anticoagulant use last 7 days: no  Additional Medication Information:  See med rec      Pre-Sedation Documentation and Exam:   I have personally completed a history, physical exam & review of systems for this patient (see notes).     Mallampati Airway Assessment:  Mallampati Class II - (soft palate, fauces & uvula are visible)    Prior History of Anesthesia Complications:   none    ASA Classification:  Class 2 - A normal healthy patient with mild systemic disease    Sedation/ Anesthesia Plan:   intravenous sedation    Medications Planned:   midazolam (Versed) intravenously and fentanyl intravenously    Patient is an appropriate candidate for plan of sedation: yes    Electronically signed by DENNYS Cuevas on 2/1/2021 at 12:33 PM

## 2021-02-01 NOTE — PROGRESS NOTES
Patient called out at 0100 for pain medication (Dilaudid)  RN explained to patient that this medication is not due until 0130 as it was last administered at 2330. At [address] went to check on patient, she is resting, eyes are closed, RR is 18, patient appears to be comfortable, nurse did not disturb patient.

## 2021-02-01 NOTE — PROGRESS NOTES
CDU Daily Progress Note  Attending Physician       Pt Name: Ibrahima Nair  MRN: [id number]  [place] 1975  Date of evaluation: 2/1/21    I performed a history and physical examination of the patient and discussed management with the resident. I reviewed the residents note and agree with the documented findings and plan of care. Any areas of disagreement are noted on the chart. I was personally present for the key portions of any procedures. I have documented in the chart those procedures where I was not present during the key portions. I have reviewed the emergency nurses triage note. I agree with the chief complaint, past medical history, past surgical history, allergies, medications, social and family history as documented unless otherwise noted below. Documentation of the HPI, Physical Exam and Medical Decision Making performed by medical students or scribes is based on my personal performance of the HPI, PE and MDM. For Physician Assistant/ Nurse Practitioner cases/documentation I have personally evaluated this patient and have completed at least one if not all key elements of the E/M (history, physical exam, and MDM). Additional findings are as noted. The Family History, Social History and Review of Systems are unchanged from the previous day. No significant events overnight. Not diabetic. Former smoker. Liver biopsy. EGD on Tuesday for abdominal pain.     Brittany Lance MD  Attending Physician  Critical Decision Unit

## 2021-02-01 NOTE — POST SEDATION
Sedation Post Procedure Note    Patient Name: Everett Alvarez   YOB: 1975  Room/Bed: 7702/2093-70  Medical Record Number: [id number]  Date: 2/1/2021   Time: 1:10 PM         Physicians/Assistants: DENNYS Masters    Procedure Performed:  Liver bx    Post-Sedation Vital Signs:  Vitals:    02/01/21 1305   BP: (!) 145/70   Pulse: 90   Resp: 19   Temp:    SpO2:       Vital signs were reviewed and were stable after the procedure (see flow sheet for vitals)            Post-Sedation Exam: Pt remains stable           Complications: none    Electronically signed by Crystal Sacks, PA on 2/1/2021 at 1:10 PM

## 2021-02-01 NOTE — PROGRESS NOTES
OBS/CDU   RESIDENT NOTE      Patients PCP is:  Angela Ramos        SUBJECTIVE    Patient was evaluated bedside. Patient states that there were no acute events overnight. Patient states that she understand that she will be going for hepatic biopsy and possible further work-up tomorrow. Patient has been n.p.o. The patient is urinating on his own and is passing flatus. Denies fever, chills, nausea, vomiting, chest pain, shortness of breath, abdominal pain, focal weakness, numbness, tingling, urinary/bowel symptoms, vision changes, visual hallucinations, or headache. PHYSICAL EXAM      General: NAD, AO X 3  Heent: EMOI, PERRL  Neck: SUPPLE, NO JVD  Cardiovascular: RRR, S1S2  Pulmonary: CTAB, NO SOB  Abdomen: SOFT, tender to palpation, unchanged from days previous, ND, +BS  Extremities: +2/4 PULSES DISTAL, NO SWELLING  Neuro / Psych: NO NUMBNESS OR TINGLING, MENTATION AT BASELINE    PERTINENT TEST /EXAMS      I have reviewed all available laboratory results.     MEDICATIONS CURRENT       0.9 % sodium chloride infusion, Continuous      promethazine (PHENERGAN) tablet 12.5 mg, Q6H PRN      diphenhydrAMINE (BENADRYL) tablet 25 mg, Q4H PRN      ondansetron (ZOFRAN) tablet 4 mg, Q6H PRN      pantoprazole (PROTONIX) tablet 40 mg, BID      HYDROmorphone (DILAUDID) injection 1 mg, Q2H PRN      LORazepam (ATIVAN) injection 1 mg, Once      0.9 % sodium chloride bolus, Once      oxyCODONE (ROXICODONE) immediate release tablet 5 mg, Q4H PRN      potassium chloride (KLOR-CON M) extended release tablet 40 mEq, BID WC      docusate sodium (COLACE) capsule 100 mg, Daily      QUEtiapine (SEROQUEL) tablet 400 mg, Nightly      sodium chloride flush 0.9 % injection 10 mL, 2 times per day      sodium chloride flush 0.9 % injection 10 mL, PRN      [Held by provider] enoxaparin (LOVENOX) injection 40 mg, Daily      melatonin tablet 5 mg, Daily      prazosin (MINIPRESS) capsule 1 mg, Nightly   amitriptyline (ELAVIL) tablet 25 mg, Nightly      busPIRone (BUSPAR) tablet 15 mg, Nightly      tiZANidine (ZANAFLEX) tablet 4 mg, Nightly      buPROPion (WELLBUTRIN SR) extended release tablet 150 mg, Nightly      losartan (COZAAR) tablet 50 mg, Daily      pramipexole (MIRAPEX) tablet 0.25 mg, Daily      ibuprofen (ADVIL;MOTRIN) tablet 600 mg, Q6H PRN      DULoxetine (CYMBALTA) extended release capsule 60 mg, BID        All medication charted and reviewed. CONSULTS      IP CONSULT TO GI    ASSESSMENT/PLAN       Barrett Hahn is a 54 y.o. female who presents with epigastric abdominal pain secondary to burned-out pancreas. Patient subsequently found to have elevated LFTs which have been improving. Patient will be undergoing biopsy of her liver for further work-up. Will follow recommendations of specialty service post procedure. · Hepatic biopsy  · Continue home medications and pain control  · Monitor vitals, labs, and imaging  · DISPO: pending consults and clinical improvement    --  Riky Michelle  Emergency Medicine Resident Physician     This dictation was generated by voice recognition computer software. Although all attempts are made to edit the dictation for accuracy, there may be errors in the transcription that are not intended.

## 2021-02-02 ENCOUNTER — ANESTHESIA (OUTPATIENT)
Dept: ENDOSCOPY | Age: 46
DRG: 392 | End: 2021-02-02
Payer: COMMERCIAL

## 2021-02-02 ENCOUNTER — ANESTHESIA EVENT (OUTPATIENT)
Dept: ENDOSCOPY | Age: 46
DRG: 392 | End: 2021-02-02
Payer: COMMERCIAL

## 2021-02-02 VITALS
DIASTOLIC BLOOD PRESSURE: 107 MMHG | OXYGEN SATURATION: 93 % | RESPIRATION RATE: 24 BRPM | SYSTOLIC BLOOD PRESSURE: 128 MMHG

## 2021-02-02 LAB
ALBUMIN SERPL-MCNC: 4 G/DL (ref 3.5–5.2)
ALBUMIN/GLOBULIN RATIO: 1.3 (ref 1–2.5)
ALP BLD-CCNC: 192 U/L (ref 35–104)
ALT SERPL-CCNC: 135 U/L (ref 5–33)
AST SERPL-CCNC: 21 U/L
BILIRUB SERPL-MCNC: 0.16 MG/DL (ref 0.3–1.2)
BILIRUBIN DIRECT: <0.08 MG/DL
BILIRUBIN, INDIRECT: ABNORMAL MG/DL (ref 0–1)
ESTIMATED AVERAGE GLUCOSE: 123 MG/DL
GLOBULIN: ABNORMAL G/DL (ref 1.5–3.8)
HBA1C MFR BLD: 5.9 % (ref 4–6)
SURGICAL PATHOLOGY REPORT: NORMAL
TOTAL PROTEIN: 7.2 G/DL (ref 6.4–8.3)

## 2021-02-02 PROCEDURE — 6370000000 HC RX 637 (ALT 250 FOR IP): Performed by: INTERNAL MEDICINE

## 2021-02-02 PROCEDURE — 6360000002 HC RX W HCPCS: Performed by: INTERNAL MEDICINE

## 2021-02-02 PROCEDURE — 2580000003 HC RX 258: Performed by: RADIOLOGY

## 2021-02-02 PROCEDURE — 7100000010 HC PHASE II RECOVERY - FIRST 15 MIN: Performed by: INTERNAL MEDICINE

## 2021-02-02 PROCEDURE — 6370000000 HC RX 637 (ALT 250 FOR IP): Performed by: STUDENT IN AN ORGANIZED HEALTH CARE EDUCATION/TRAINING PROGRAM

## 2021-02-02 PROCEDURE — 7100000011 HC PHASE II RECOVERY - ADDTL 15 MIN: Performed by: INTERNAL MEDICINE

## 2021-02-02 PROCEDURE — 36415 COLL VENOUS BLD VENIPUNCTURE: CPT

## 2021-02-02 PROCEDURE — 2500000003 HC RX 250 WO HCPCS: Performed by: NURSE ANESTHETIST, CERTIFIED REGISTERED

## 2021-02-02 PROCEDURE — 6360000002 HC RX W HCPCS: Performed by: STUDENT IN AN ORGANIZED HEALTH CARE EDUCATION/TRAINING PROGRAM

## 2021-02-02 PROCEDURE — 6370000000 HC RX 637 (ALT 250 FOR IP): Performed by: EMERGENCY MEDICINE

## 2021-02-02 PROCEDURE — 3609017100 HC EGD: Performed by: INTERNAL MEDICINE

## 2021-02-02 PROCEDURE — 6360000002 HC RX W HCPCS: Performed by: NURSE ANESTHETIST, CERTIFIED REGISTERED

## 2021-02-02 PROCEDURE — 80076 HEPATIC FUNCTION PANEL: CPT

## 2021-02-02 PROCEDURE — 2580000003 HC RX 258: Performed by: INTERNAL MEDICINE

## 2021-02-02 PROCEDURE — 83036 HEMOGLOBIN GLYCOSYLATED A1C: CPT

## 2021-02-02 PROCEDURE — 43235 EGD DIAGNOSTIC BRUSH WASH: CPT | Performed by: INTERNAL MEDICINE

## 2021-02-02 PROCEDURE — 3700000000 HC ANESTHESIA ATTENDED CARE: Performed by: INTERNAL MEDICINE

## 2021-02-02 PROCEDURE — 1200000000 HC SEMI PRIVATE

## 2021-02-02 PROCEDURE — 0DJ08ZZ INSPECTION OF UPPER INTESTINAL TRACT, VIA NATURAL OR ARTIFICIAL OPENING ENDOSCOPIC: ICD-10-PCS | Performed by: INTERNAL MEDICINE

## 2021-02-02 RX ORDER — PROPOFOL 10 MG/ML
INJECTION, EMULSION INTRAVENOUS PRN
Status: DISCONTINUED | OUTPATIENT
Start: 2021-02-02 | End: 2021-02-02 | Stop reason: SDUPTHER

## 2021-02-02 RX ORDER — METOCLOPRAMIDE 10 MG/1
10 TABLET ORAL
Qty: 42 TABLET | Refills: 0 | Status: SHIPPED | OUTPATIENT
Start: 2021-02-02 | End: 2021-02-03 | Stop reason: SDUPTHER

## 2021-02-02 RX ORDER — LIDOCAINE HYDROCHLORIDE 10 MG/ML
INJECTION, SOLUTION EPIDURAL; INFILTRATION; INTRACAUDAL; PERINEURAL PRN
Status: DISCONTINUED | OUTPATIENT
Start: 2021-02-02 | End: 2021-02-02 | Stop reason: SDUPTHER

## 2021-02-02 RX ORDER — METOCLOPRAMIDE HYDROCHLORIDE 5 MG/ML
10 INJECTION INTRAMUSCULAR; INTRAVENOUS EVERY 6 HOURS
Status: COMPLETED | OUTPATIENT
Start: 2021-02-02 | End: 2021-02-03

## 2021-02-02 RX ORDER — PROMETHAZINE HYDROCHLORIDE 25 MG/ML
6.25 INJECTION, SOLUTION INTRAMUSCULAR; INTRAVENOUS ONCE
Status: COMPLETED | OUTPATIENT
Start: 2021-02-02 | End: 2021-02-02

## 2021-02-02 RX ORDER — BACITRACIN, NEOMYCIN, POLYMYXIN B 400; 3.5; 5 [USP'U]/G; MG/G; [USP'U]/G
OINTMENT TOPICAL 2 TIMES DAILY
Status: DISCONTINUED | OUTPATIENT
Start: 2021-02-02 | End: 2021-02-03 | Stop reason: HOSPADM

## 2021-02-02 RX ORDER — HYDROCODONE BITARTRATE AND ACETAMINOPHEN 5; 325 MG/1; MG/1
1 TABLET ORAL EVERY 6 HOURS PRN
Qty: 28 TABLET | Refills: 0 | Status: SHIPPED | OUTPATIENT
Start: 2021-02-02 | End: 2021-02-03 | Stop reason: HOSPADM

## 2021-02-02 RX ADMIN — HYDROMORPHONE HYDROCHLORIDE 1 MG: 1 INJECTION, SOLUTION INTRAMUSCULAR; INTRAVENOUS; SUBCUTANEOUS at 07:02

## 2021-02-02 RX ADMIN — OXYCODONE HYDROCHLORIDE 5 MG: 5 TABLET ORAL at 17:00

## 2021-02-02 RX ADMIN — IBUPROFEN 600 MG: 400 TABLET, FILM COATED ORAL at 22:54

## 2021-02-02 RX ADMIN — AMITRIPTYLINE HYDROCHLORIDE 25 MG: 25 TABLET, FILM COATED ORAL at 20:09

## 2021-02-02 RX ADMIN — PROMETHAZINE HYDROCHLORIDE 12.5 MG: 12.5 TABLET ORAL at 13:44

## 2021-02-02 RX ADMIN — DULOXETINE HYDROCHLORIDE 60 MG: 30 CAPSULE, DELAYED RELEASE ORAL at 10:47

## 2021-02-02 RX ADMIN — POLYMYXIN B SULFATE, BACITRACIN ZINC, NEOMYCIN SULFATE: 5000; 3.5; 4 OINTMENT TOPICAL at 20:13

## 2021-02-02 RX ADMIN — IBUPROFEN 600 MG: 400 TABLET, FILM COATED ORAL at 17:09

## 2021-02-02 RX ADMIN — HYDROMORPHONE HYDROCHLORIDE 1 MG: 1 INJECTION, SOLUTION INTRAMUSCULAR; INTRAVENOUS; SUBCUTANEOUS at 11:38

## 2021-02-02 RX ADMIN — HYDROMORPHONE HYDROCHLORIDE 1 MG: 1 INJECTION, SOLUTION INTRAMUSCULAR; INTRAVENOUS; SUBCUTANEOUS at 04:42

## 2021-02-02 RX ADMIN — METOCLOPRAMIDE 10 MG: 5 INJECTION, SOLUTION INTRAMUSCULAR; INTRAVENOUS at 15:44

## 2021-02-02 RX ADMIN — SODIUM CHLORIDE, PRESERVATIVE FREE 10 ML: 5 INJECTION INTRAVENOUS at 22:04

## 2021-02-02 RX ADMIN — IBUPROFEN 600 MG: 400 TABLET, FILM COATED ORAL at 03:33

## 2021-02-02 RX ADMIN — PROMETHAZINE HYDROCHLORIDE 6.25 MG: 25 INJECTION INTRAMUSCULAR; INTRAVENOUS at 08:23

## 2021-02-02 RX ADMIN — HYDROMORPHONE HYDROCHLORIDE 1 MG: 1 INJECTION, SOLUTION INTRAMUSCULAR; INTRAVENOUS; SUBCUTANEOUS at 02:15

## 2021-02-02 RX ADMIN — PANCRELIPASE 36000 UNITS: 24000; 76000; 120000 CAPSULE, DELAYED RELEASE PELLETS ORAL at 16:59

## 2021-02-02 RX ADMIN — PROMETHAZINE HYDROCHLORIDE 12.5 MG: 12.5 TABLET ORAL at 20:25

## 2021-02-02 RX ADMIN — DIPHENHYDRAMINE HCL 25 MG: 25 TABLET ORAL at 04:42

## 2021-02-02 RX ADMIN — PROPOFOL 100 MG: 10 INJECTION, EMULSION INTRAVENOUS at 09:32

## 2021-02-02 RX ADMIN — PANTOPRAZOLE SODIUM 40 MG: 40 TABLET, DELAYED RELEASE ORAL at 20:08

## 2021-02-02 RX ADMIN — OXYCODONE HYDROCHLORIDE 5 MG: 5 TABLET ORAL at 21:20

## 2021-02-02 RX ADMIN — HYDROMORPHONE HYDROCHLORIDE 1 MG: 1 INJECTION, SOLUTION INTRAMUSCULAR; INTRAVENOUS; SUBCUTANEOUS at 17:54

## 2021-02-02 RX ADMIN — TIZANIDINE 4 MG: 4 TABLET ORAL at 20:08

## 2021-02-02 RX ADMIN — DIPHENHYDRAMINE HCL 25 MG: 25 TABLET ORAL at 15:43

## 2021-02-02 RX ADMIN — PROMETHAZINE HYDROCHLORIDE 12.5 MG: 12.5 TABLET ORAL at 00:16

## 2021-02-02 RX ADMIN — BUSPIRONE HYDROCHLORIDE 15 MG: 15 TABLET ORAL at 20:09

## 2021-02-02 RX ADMIN — BUPROPION HYDROCHLORIDE 150 MG: 150 TABLET, EXTENDED RELEASE ORAL at 20:09

## 2021-02-02 RX ADMIN — HYDROMORPHONE HYDROCHLORIDE 1 MG: 1 INJECTION, SOLUTION INTRAMUSCULAR; INTRAVENOUS; SUBCUTANEOUS at 22:55

## 2021-02-02 RX ADMIN — OXYCODONE HYDROCHLORIDE 5 MG: 5 TABLET ORAL at 10:43

## 2021-02-02 RX ADMIN — METOCLOPRAMIDE 10 MG: 5 INJECTION, SOLUTION INTRAMUSCULAR; INTRAVENOUS at 21:20

## 2021-02-02 RX ADMIN — POTASSIUM CHLORIDE 40 MEQ: 1500 TABLET, EXTENDED RELEASE ORAL at 16:59

## 2021-02-02 RX ADMIN — OXYCODONE HYDROCHLORIDE 5 MG: 5 TABLET ORAL at 03:29

## 2021-02-02 RX ADMIN — DIPHENHYDRAMINE HCL 25 MG: 25 TABLET ORAL at 20:25

## 2021-02-02 RX ADMIN — HYDROMORPHONE HYDROCHLORIDE 1 MG: 1 INJECTION, SOLUTION INTRAMUSCULAR; INTRAVENOUS; SUBCUTANEOUS at 15:44

## 2021-02-02 RX ADMIN — QUETIAPINE FUMARATE 400 MG: 200 TABLET ORAL at 20:08

## 2021-02-02 RX ADMIN — ONDANSETRON HYDROCHLORIDE 4 MG: 4 TABLET, FILM COATED ORAL at 15:43

## 2021-02-02 RX ADMIN — PROPOFOL 50 MG: 10 INJECTION, EMULSION INTRAVENOUS at 09:33

## 2021-02-02 RX ADMIN — DULOXETINE HYDROCHLORIDE 60 MG: 30 CAPSULE, DELAYED RELEASE ORAL at 20:09

## 2021-02-02 RX ADMIN — PROMETHAZINE HYDROCHLORIDE 12.5 MG: 12.5 TABLET ORAL at 07:02

## 2021-02-02 RX ADMIN — IBUPROFEN 600 MG: 400 TABLET, FILM COATED ORAL at 11:03

## 2021-02-02 RX ADMIN — HYDROMORPHONE HYDROCHLORIDE 1 MG: 1 INJECTION, SOLUTION INTRAMUSCULAR; INTRAVENOUS; SUBCUTANEOUS at 13:45

## 2021-02-02 RX ADMIN — SODIUM CHLORIDE: 9 INJECTION, SOLUTION INTRAVENOUS at 08:55

## 2021-02-02 RX ADMIN — PRAMIPEXOLE DIHYDROCHLORIDE 0.25 MG: 0.25 TABLET ORAL at 16:59

## 2021-02-02 RX ADMIN — PRAZOSIN HYDROCHLORIDE 1 MG: 1 CAPSULE ORAL at 20:09

## 2021-02-02 RX ADMIN — DIPHENHYDRAMINE HCL 25 MG: 25 TABLET ORAL at 00:16

## 2021-02-02 RX ADMIN — PROPOFOL 50 MG: 10 INJECTION, EMULSION INTRAVENOUS at 09:34

## 2021-02-02 RX ADMIN — HYDROMORPHONE HYDROCHLORIDE 1 MG: 1 INJECTION, SOLUTION INTRAMUSCULAR; INTRAVENOUS; SUBCUTANEOUS at 20:10

## 2021-02-02 RX ADMIN — HYDROMORPHONE HYDROCHLORIDE 1 MG: 1 INJECTION, SOLUTION INTRAMUSCULAR; INTRAVENOUS; SUBCUTANEOUS at 00:16

## 2021-02-02 RX ADMIN — POTASSIUM CHLORIDE 40 MEQ: 1500 TABLET, EXTENDED RELEASE ORAL at 10:51

## 2021-02-02 RX ADMIN — PANTOPRAZOLE SODIUM 40 MG: 40 TABLET, DELAYED RELEASE ORAL at 10:51

## 2021-02-02 RX ADMIN — LIDOCAINE HYDROCHLORIDE 50 MG: 10 INJECTION, SOLUTION EPIDURAL; INFILTRATION; INTRACAUDAL; PERINEURAL at 09:32

## 2021-02-02 RX ADMIN — DIPHENHYDRAMINE HCL 25 MG: 25 TABLET ORAL at 10:43

## 2021-02-02 RX ADMIN — LOSARTAN POTASSIUM 50 MG: 50 TABLET, FILM COATED ORAL at 16:59

## 2021-02-02 RX ADMIN — Medication 5 MG: at 20:09

## 2021-02-02 ASSESSMENT — PAIN SCALES - GENERAL
PAINLEVEL_OUTOF10: 7
PAINLEVEL_OUTOF10: 7
PAINLEVEL_OUTOF10: 8
PAINLEVEL_OUTOF10: 7
PAINLEVEL_OUTOF10: 6
PAINLEVEL_OUTOF10: 7
PAINLEVEL_OUTOF10: 8

## 2021-02-02 ASSESSMENT — PAIN DESCRIPTION - DESCRIPTORS: DESCRIPTORS: SHARP

## 2021-02-02 ASSESSMENT — ENCOUNTER SYMPTOMS
TACHYPNEA: 1
SHORTNESS OF BREATH: 1

## 2021-02-02 ASSESSMENT — PAIN DESCRIPTION - LOCATION: LOCATION: ABDOMEN

## 2021-02-02 ASSESSMENT — PAIN - FUNCTIONAL ASSESSMENT: PAIN_FUNCTIONAL_ASSESSMENT: 0-10

## 2021-02-02 NOTE — PROGRESS NOTES
OBS/CDU   RESIDENT NOTE      Patients PCP is:  Leroy Pierson        SUBJECTIVE      Patient was evaluated bedside. Stated there was no improvement overnight. Patient stating that she does not think she is receiving of analgesic medication at this time and she is still having nausea and vomiting. The patient is urinating on his own and is passing flatus. Denies fever, chills, nausea, vomiting, chest pain, shortness of breath, abdominal pain, focal weakness, numbness, tingling, urinary/bowel symptoms, vision changes, visual hallucinations, or headache. PHYSICAL EXAM      General: NAD, AO X 3  Heent: EMOI, PERRL  Neck: SUPPLE, NO JVD  Cardiovascular: RRR, S1S2  Pulmonary: CTAB, NO SOB  Abdomen: SOFT, under to palpation ND, +BS  Extremities: +2/4 PULSES DISTAL, NO SWELLING  Neuro / Psych: NO NUMBNESS OR TINGLING, MENTATION AT BASELINE    PERTINENT TEST /EXAMS      I have reviewed all available laboratory results.     MEDICATIONS CURRENT       [MAR Hold] HYDROmorphone (DILAUDID) injection 0.5 mg, Once      [facility] Hold] 0.9 % sodium chloride infusion, Continuous      [MAR Hold] promethazine (PHENERGAN) tablet 12.5 mg, Q6H PRN      [MAR Hold] diphenhydrAMINE (BENADRYL) tablet 25 mg, Q4H PRN      [MAR Hold] ondansetron (ZOFRAN) tablet 4 mg, Q6H PRN      [MAR Hold] pantoprazole (PROTONIX) tablet 40 mg, BID      [MAR Hold] HYDROmorphone (DILAUDID) injection 1 mg, Q2H PRN      [MAR Hold] LORazepam (ATIVAN) injection 1 mg, Once      [facility] Hold] 0.9 % sodium chloride bolus, Once      [MAR Hold] oxyCODONE (ROXICODONE) immediate release tablet 5 mg, Q4H PRN      [MAR Hold] potassium chloride (KLOR-CON M) extended release tablet 40 mEq, BID WC      [MAR Hold] docusate sodium (COLACE) capsule 100 mg, Daily      [MAR Hold] QUEtiapine (SEROQUEL) tablet 400 mg, Nightly      [MAR Hold] sodium chloride flush 0.9 % injection 10 mL, 2 times per day      [MAR Hold] sodium chloride flush 0.9 % injection 10 mL, PRN   [Held by provider] enoxaparin (LOVENOX) injection 40 mg, Daily      [MAR Hold] melatonin tablet 5 mg, Daily      [MAR Hold] prazosin (MINIPRESS) capsule 1 mg, Nightly      [MAR Hold] amitriptyline (ELAVIL) tablet 25 mg, Nightly      [MAR Hold] busPIRone (BUSPAR) tablet 15 mg, Nightly      [MAR Hold] tiZANidine (ZANAFLEX) tablet 4 mg, Nightly      [MAR Hold] buPROPion (WELLBUTRIN SR) extended release tablet 150 mg, Nightly      [MAR Hold] losartan (COZAAR) tablet 50 mg, Daily      [MAR Hold] pramipexole (MIRAPEX) tablet 0.25 mg, Daily      [MAR Hold] ibuprofen (ADVIL;MOTRIN) tablet 600 mg, Q6H PRN      [MAR Hold] DULoxetine (CYMBALTA) extended release capsule 60 mg, BID        All medication charted and reviewed. CONSULTS      IP CONSULT TO GI    ASSESSMENT/PLAN       Mitchell Mccarthy is a 54 y.o. female who presents with gastric abdominal pain secondary to burned-out pancreas and transient elevated liver enzymes. Patient underwent hepatic biopsy 2/1/2021. With plan EGD today. We will follow along with recommendations of specialty services once results of her EGD are in. · EGD  · Continue home medications and pain control  · Monitor vitals, labs, and imaging  · DISPO: pending consults and clinical improvement    --  Joshua Buchanan  Emergency Medicine Resident Physician     This dictation was generated by voice recognition computer software. Although all attempts are made to edit the dictation for accuracy, there may be errors in the transcription that are not intended.

## 2021-02-02 NOTE — PROGRESS NOTES
Writer went into room to give patient Dilaudid 1mg dose and phenergan PO, while giving Dilaudid, IV started leaking and patient didn't receive dose. Writer changed IV dressing and corrected IV placement because IV was still good. There is 0.3ml left in the Dilaudid syringe that will be wasted with second RN and Dr. Shay Maryland a 1x 0.5mg dose of Dilaudid IM to replace missing dosage.

## 2021-02-02 NOTE — OP NOTE
Operative Note      Patient: Nikki Buckner  YOB: 1975  MRN: [id number]    Date of Procedure: 2/2/2021    Pre-Op Diagnosis: EPIGASTRIC PAIN    Post-Op Diagnosis: Same   Phytobezoar suspicious for gastroparesis       Procedure(s):  EGD ESOPHAGOGASTRODUODENOSCOPY    Surgeon(s):  Nathan Foster MD    Assistant:   First Assistant: Mariam Stevenson RN    Anesthesia: Monitor Anesthesia Care    Estimated Blood Loss (mL): None    Complications: None    Specimens:   * No specimens in log *    Implants:  * No implants in log *      Drains: * No LDAs found *    Findings:   · Normal esophagus. · Large amount of food bezoar in the stomach. · The procedure was aborted due to risk of aspiration. · The mucosa could not be examined due to presence of food. Recommendations  · Consider gastric emptying study. This could be performed as an outpatient. · Avoid narcotics. · Patient is okay to discharge from GI standpoint. · Follow-up in GI clinic in 1 to 2 weeks. · Performed gastroparesis education prior to discharge. · GI will sign off. Please call for any questions or concerns. Detailed Description of Procedure:   Informed consent was obtained from the patient after explanation of the procedure including indications, description of the procedure,  benefits and possible risks and complications of the procedure, and alternatives. Questions were answered. The patient's history was reviewed and a directed physical examination was performed prior to the procedure.

## 2021-02-02 NOTE — PROGRESS NOTES
Patient has not vomited since early this am.  Patient is tolerating a clear liquid and frequently calling out for ice, pop, popsicles. Patient continues to complain of abd pain and nausea and is taking prn medications. Patient ambulating hallways without difficulty.

## 2021-02-02 NOTE — ANESTHESIA PRE PROCEDURE
Department of Anesthesiology  Preprocedure Note       Name:  Barrett Hahn   Age:  54 y.o.  :  1975                                          MRN:  [id number]         Date:  2021      Surgeon: Sebastián Huntley):  Nia Esquivel MD    Procedure: Procedure(s):  EGD ESOPHAGOGASTRODUODENOSCOPY    Medications prior to admission:   Prior to Admission medications    Medication Sig Start Date End Date Taking?  Authorizing Provider   tiZANidine (ZANAFLEX) 4 MG tablet Take 4 mg by mouth nightly   Yes Historical Provider, MD   buPROPion (WELLBUTRIN XL) 150 MG extended release tablet Take 150 mg by mouth nightly   Yes Historical Provider, MD   losartan (COZAAR) 50 MG tablet Take 50 mg by mouth daily   Yes Historical Provider, MD   amitriptyline (ELAVIL) 25 MG tablet Take 25 mg by mouth nightly   Yes Historical Provider, MD   prazosin (MINIPRESS) 1 MG capsule Take 2 mg by mouth nightly    Yes Historical Provider, MD   melatonin 5 MG TABS tablet Take 10 mg by mouth daily    Yes Historical Provider, MD   QUEtiapine (SEROQUEL XR) 400 MG extended release tablet Take 800 mg by mouth nightly   Yes Historical Provider, MD   busPIRone (BUSPAR) 15 MG tablet Take 15 mg by mouth nightly Can take once in morning PRN   Yes Historical Provider, MD   omeprazole (PRILOSEC) 40 MG delayed release capsule Take 40 mg by mouth daily   Yes Historical Provider, MD   Cholecalciferol (VITAMIN D) 50 MCG (2000 UT) CAPS capsule Take by mouth daily   Yes Historical Provider, MD   butalbital-APAP-caffeine (FIORICET) -40 MG CAPS per capsule Take 1 capsule by mouth every 6 hours as needed for Headaches 20 Yes Alison Cantu DO   albuterol sulfate HFA (PROVENTIL HFA) 108 (90 Base) MCG/ACT inhaler Inhale 2 puffs into the lungs as needed 18  Yes Historical Provider, MD   magnesium oxide (MAG-OX) 400 MG tablet Take 400 mg by mouth daily   Yes Historical Provider, MD fexofenadine (RA ALLERGY RELIEF) 180 MG tablet take 1 tablet by mouth once daily  Patient taking differently: Take 180 mg by mouth daily as needed take 1 tablet by mouth once daily 11/7/18  Yes Janice Sotomayor MD   pramipexole (MIRAPEX) 0.5 MG tablet take 1 tablet by mouth twice a day  Patient taking differently: Take 0.25 mg by mouth daily take 1 tablet by mouth twice a day 11/7/18  Yes Janice Sotomayor MD   DULoxetine (CYMBALTA) 60 MG extended release capsule TAKE 1 CAPSULE BY MOUTH TWICE A DAY  Patient taking differently: Take 120 mg by mouth Daily with supper  8/3/18  Yes Janice Sotomayor MD   potassium chloride (KLOR-CON M) 10 MEQ extended release tablet Take 1 tablet by mouth 2 times daily 7/18/20   Ana Maria Rivera MD       Current medications:    Current Facility-Administered Medications   Medication Dose Route Frequency Provider Last Rate Last Admin    HYDROmorphone (DILAUDID) injection 0.5 mg  0.5 mg Intramuscular Once Rebeca Berg MD        0.9 % sodium chloride infusion   Intravenous Continuous Eleanor Trimble MD 20 mL/hr at 02/01/21 0838 New Bag at 02/01/21 0838    promethazine (PHENERGAN) tablet 12.5 mg  12.5 mg Oral Q6H PRN Rebeca Berg MD   12.5 mg at 02/02/21 0431    diphenhydrAMINE (BENADRYL) tablet 25 mg  25 mg Oral Q4H PRN Rebeca Berg MD   25 mg at 02/02/21 0442    ondansetron (ZOFRAN) tablet 4 mg  4 mg Oral Q6H PRN Rebeca Berg MD   4 mg at 01/31/21 2209    pantoprazole (PROTONIX) tablet 40 mg  40 mg Oral BID Rebeca Berg MD   40 mg at 02/01/21 2046    HYDROmorphone (DILAUDID) injection 1 mg  1 mg Intravenous Q2H PRN Rebeca Berg MD   1 mg at 02/02/21 0702    LORazepam (ATIVAN) injection 1 mg  1 mg Intravenous Once Rebeca Berg MD        0.9 % sodium chloride bolus  30 mL Intravenous Once Rebeca Berg MD        oxyCODONE (ROXICODONE) immediate release tablet 5 mg  5 mg Oral Q4H PRN She Neri MD   5 mg at 02/02/21 1296  potassium chloride (KLOR-CON M) extended release tablet 40 mEq  40 mEq Oral BID WC She Neri MD   40 mEq at 02/01/21 1548    docusate sodium (COLACE) capsule 100 mg  100 mg Oral Daily She Neri MD   100 mg at 02/01/21 1340    QUEtiapine (SEROQUEL) tablet 400 mg  400 mg Oral Nightly Clark Palomo MD   400 mg at 02/01/21 2046    sodium chloride flush 0.9 % injection 10 mL  10 mL Intravenous 2 times per day Sherlene Patch, DO   10 mL at 02/01/21 2047    sodium chloride flush 0.9 % injection 10 mL  10 mL Intravenous PRN Sherlene Patch, DO   10 mL at 02/01/21 0345    [Held by provider] enoxaparin (LOVENOX) injection 40 mg  40 mg Subcutaneous Daily Sherlene Patch, DO   40 mg at 01/31/21 0843    melatonin tablet 5 mg  5 mg Oral Daily Eleazar Gaspar, DO   5 mg at 02/01/21 2046    prazosin (MINIPRESS) capsule 1 mg  1 mg Oral Nightly Sherlene Patch, DO   1 mg at 02/01/21 2046    amitriptyline (ELAVIL) tablet 25 mg  25 mg Oral Nightly Eleazar Welch, DO   25 mg at 02/01/21 2046    busPIRone (BUSPAR) tablet 15 mg  15 mg Oral Nightly Eleazar Welch, DO   15 mg at 02/01/21 2046    tiZANidine (ZANAFLEX) tablet 4 mg  4 mg Oral Nightly Rebeca Berg MD   4 mg at 02/01/21 2046    buPROPion [facility] ) extended release tablet 150 mg  150 mg Oral Nightly Rebeca Berg MD   150 mg at 02/01/21 2046    losartan (COZAAR) tablet 50 mg  50 mg Oral Daily Eleazar Gaspar, DO   50 mg at 02/01/21 1710    pramipexole (MIRAPEX) tablet 0.25 mg  0.25 mg Oral Daily Eleazar Gaspar, DO   0.25 mg at 02/01/21 1710    ibuprofen (ADVIL;MOTRIN) tablet 600 mg  600 mg Oral Q6H PRN Rebeca Berg MD   600 mg at 02/02/21 0333    DULoxetine (CYMBALTA) extended release capsule 60 mg  60 mg Oral BID Nelsy Austin DO   60 mg at 02/01/21 2046       Allergies:     Allergies   Allergen Reactions    Aripiprazole      Bad reaction    Eletriptan      Other reaction(s): Swelling-throat  Hydrocodone-Acetaminophen Itching     Other reaction(s): Unknown    Oxycodone-Acetaminophen      Other reaction(s): Unknown    Sumatriptan Other (See Comments)    Triptans      Other reaction(s): Unknown    Lamotrigine Rash       Problem List:    Patient Active Problem List   Diagnosis Code    Anxiety F41.9    Class 3 severe obesity due to excess calories with serious comorbidity in adult ([facility]) E66.01    Restless leg syndrome G25.81    Previous back surgery Z98.890    Major depression F32.9    HTN (hypertension) I10    FHx: rheumatoid arthritis Z82.61    SVT (supraventricular tachycardia) ([facility]) I47.1    Right elbow pain M25.521    Pain of right lower extremity M79.604    Cough with sputum R05    Varicose vein of leg I83.90    Leg swelling M79.89    Herpes zoster without complication O15.9    Chest pain R07.9    Fibromyalgia M79.7    Acute pancreatitis K85.90    Sepsis ([facility]) A41.9    Morbid obesity ([facility]) E66.01    Abdominal pain, epigastric R10.13    Nausea R11.0    Acute respiratory failure with hypoxia ([facility]) J96.01    Hypocalcemia E83.51    History of anxiety disorder Z86.59    Abnormal levels of other serum enzymes R74.8    Elevated liver enzymes R74.8    Bilateral leg edema R60.0    Smoker F17.200    Severe episode of recurrent major depressive disorder, without psychotic features ([facility] [address] F33.2    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    Presbyopia H52.4    Pseudotumor cerebri syndrome G93.2    Insomnia due to psychological stress F51.02    Astigmatism H52.209    Generalized anxiety disorder F41.1    Carpal tunnel syndrome of right wrist G56.01    Pneumonia of both lungs due to infectious organism J18.9    Fatty liver K76.0    Pancreatic pseudocyst K86.3    History of depression Z86.59    Acute on chronic pancreatitis ([facility]) K85.90, K86.1    Abdominal pain R10.9       Past Medical History:        Diagnosis Date  Abnormal levels of other serum enzymes     Anxiety     Bilateral leg edema     Chronic kidney disease     right renal cyst    Depression     DVT (deep venous thrombosis) ([facility] [address] 1997    after delivery    Elevated liver enzymes     Fibromyalgia     Headache(784.0)     migraines    Hx of blood clots      left calf    Hypertension     Kidney stone     Obstructive sleep apnea syndrome     Pancreatitis     Pleural effusion     SOB (shortness of breath)     Supraventricular tachycardia (HCC)     SVT (supraventricular tachycardia) ([facility] [address] 2015       Past Surgical History:        Procedure Laterality Date    ATRIAL ABLATION SURGERY      BACK SURGERY      L4-5    CHOLECYSTECTOMY      ENDOMETRIAL ABLATION      e sure implants placed also    ENDOSCOPY, COLON, DIAGNOSTIC      EXCISION LESION HAND / FINGER Right 2019    HAND LESION BIOPSY EXCISION performed by Enedina Smith MD at [address] [place] [address] Bilateral     left x2, right x1    FOOT SURGERY Right     x3    KNEE ARTHROSCOPY Left     x2    [place] CYSTO/URETERO/PYELOSCOPY W/LITHOTRIPSY Left 2017    CYSTOSCOPY URETEROSCOPY HOLMIUM LASER LITHO WITH STONE BASKETING WITH  STENT  performed by Ashli Hardy MD at [facility] Right    [facility] 44 LIVER BIOPSY PERCUTANEOUS  2021    US GUIDED LIVER BIOPSY PERCUTANEOUS 2021 STV ULTRASOUND       Social History:    Social History     Tobacco Use    Smoking status: Former Smoker     Packs/day: 1.00     Types: Cigarettes     Quit date: 2019     Years since quittin.1    Smokeless tobacco: Never Used    Tobacco comment: today last smoke   Substance Use Topics    Alcohol use:  Yes     Alcohol/week: 0.0 standard drinks     Comment: rarely                                Counseling given: Not Answered  Comment: today last smoke      Vital Signs (Current):   Vitals:    21 1300 21 1305 21 1329 21 1955 BP: 133/76 (!) 145/70 (!) 140/88 (!) 152/88   Pulse: 92 90 91 103   Resp: 16 19 16 20   Temp:   97 °F (36.1 °C) 98 °F (36.7 °C)   TempSrc:   Oral Oral   SpO2: 94% 94%  95%   Weight:       Height:                                                  BP Readings from Last 3 Encounters:   02/01/21 (!) 152/88   11/12/20 116/68   09/24/20 (!) 148/89       NPO Status:                                                                                 BMI:   Wt Readings from Last 3 Encounters:   01/25/21 277 lb 1.6 oz (125.7 kg)   11/11/20 258 lb (117 kg)   09/21/20 273 lb 2.4 oz (123.9 kg)     Body mass index is 46.11 kg/m². CBC:   Lab Results   Component Value Date    WBC 8.0 01/28/2021    RBC 3.77 01/28/2021    HGB 11.7 01/28/2021    HCT 37.1 01/28/2021    MCV 98.4 01/28/2021    RDW 13.6 01/28/2021     01/28/2021       CMP:   Lab Results   Component Value Date     01/29/2021    K 3.6 01/29/2021     01/29/2021    CO2 28 01/29/2021    BUN 5 01/29/2021    CREATININE 0.47 01/29/2021    GFRAA >60 01/29/2021    LABGLOM >60 01/29/2021    GLUCOSE 147 01/29/2021    PROT 7.2 02/02/2021    PROT 7.2 01/23/2015    CALCIUM 8.5 01/29/2021    BILITOT 0.16 02/02/2021    ALKPHOS 192 02/02/2021    AST 21 02/02/2021     02/02/2021       POC Tests: No results for input(s): POCGLU, POCNA, POCK, POCCL, POCBUN, POCHEMO, POCHCT in the last 72 hours.     Coags:   Lab Results   Component Value Date    PROTIME 9.6 02/01/2021    INR 0.9 02/01/2021    APTT 27.0 02/01/2021       HCG (If Applicable): No results found for: PREGTESTUR, PREGSERUM, HCG, HCGQUANT     ABGs:   Lab Results   Component Value Date    PHART 7.484 08/28/2020    PO2ART 66.1 08/28/2020    HGB8JYY 37.2 08/28/2020    BWV8MGM 27.9 08/28/2020    T9WSTGXJ 92.1 08/28/2020        Type & Screen (If Applicable):  No results found for: LABABO, LABRH    Drug/Infectious Status (If Applicable):  Lab Results   Component Value Date    HEPCAB NONREACTIVE 08/21/2020 COVID-19 Screening (If Applicable):   Lab Results   Component Value Date    COVID19 Not Detected 02/01/2021         Anesthesia Evaluation  Patient summary reviewed no history of anesthetic complications:   Airway: Mallampati: III        Dental:          Pulmonary:   (+) pneumonia:  shortness of breath:  sleep apnea:  decreased breath sounds,                             Cardiovascular:    (+) hypertension:, dysrhythmias: SVT,         Rhythm: regular  Rate: normal                 ROS comment: S/p ablation     Neuro/Psych:   (+) neuromuscular disease:, headaches:, psychiatric history:            GI/Hepatic/Renal:   (+) liver disease:, morbid obesity          Endo/Other: Negative Endo/Other ROS                    Abdominal:           Vascular:                                      Anesthesia Plan      MAC     ASA 3       Induction: intravenous. Anesthetic plan and risks discussed with patient. Plan discussed with CRNA. Summary  Contrast was utilized on this technically difficult study. Left ventricle is normal in size. Mild left ventricular hypertrophy. Hyperdynamic wall motions. Global left ventricular systolic function is normal. Estimated LV EF >65 %. Left atrium is normal in size. Right heart chambers not well visualized. MPI and TAPSE values suggest  normal RV systolic function. No significant valvular regurgitation or stenosis seen. Normal aortic root dimensions. No significant pericardial effusion is seen.     Normal sinus rhythm  Possible Left atrial enlargement  Borderline ECG  No previous ECGs available        Dayton Zarco MD   2/2/2021

## 2021-02-02 NOTE — ANESTHESIA POSTPROCEDURE EVALUATION
Department of Anesthesiology  Postprocedure Note    Patient: Miquel Guerra  MRN: [id number]  YOB: 1975  Date of evaluation: 2/2/2021  Time:  10:01 AM     Procedure Summary     Date: 02/02/21 Room / Location: [address] [place] [address]    Anesthesia Start: 0930 Anesthesia Stop: 8263    Procedure: EGD ESOPHAGOGASTRODUODENOSCOPY (N/A ) Diagnosis: (EPIGASTRIC PAIN)    Surgeons: Octavio Santiago MD Responsible Provider: Catarina Alonso MD    Anesthesia Type: MAC ASA Status: 3          Anesthesia Type: MAC    Chidi Phase I: Chidi Score: 10    Chidi Phase II: Chidi Score: 10    Last vitals: Reviewed and per EMR flowsheets.        Anesthesia Post Evaluation    Patient location during evaluation: PACU  Patient participation: complete - patient participated  Level of consciousness: awake  Pain score: 1  Airway patency: patent  Nausea & Vomiting: no nausea and no vomiting  Complications: no  Cardiovascular status: blood pressure returned to baseline and hemodynamically stable  Respiratory status: acceptable  Hydration status: euvolemic

## 2021-02-02 NOTE — PROGRESS NOTES
CDU Daily Progress Note  Attending Physician       Pt Name: Miquel Guerra  MRN: [id number]  [place] 1975  Date of evaluation: 2/2/21    I performed a history and physical examination of the patient and discussed management with the resident. I reviewed the residents note and agree with the documented findings and plan of care. Any areas of disagreement are noted on the chart. I was personally present for the key portions of any procedures. I have documented in the chart those procedures where I was not present during the key portions. I have reviewed the emergency nurses triage note. I agree with the chief complaint, past medical history, past surgical history, allergies, medications, social and family history as documented unless otherwise noted below. Documentation of the HPI, Physical Exam and Medical Decision Making performed by medical students or scribes is based on my personal performance of the HPI, PE and MDM. For Physician Assistant/ Nurse Practitioner cases/documentation I have personally evaluated this patient and have completed at least one if not all key elements of the E/M (history, physical exam, and MDM). Additional findings are as noted. The Family History, Social History and Review of Systems are unchanged from the previous day. No significant events overnight.     EGD today    Fransico Branch MD  Attending Physician  Critical Decision Unit

## 2021-02-03 ENCOUNTER — APPOINTMENT (OUTPATIENT)
Dept: CT IMAGING | Age: 46
DRG: 392 | End: 2021-02-03
Payer: COMMERCIAL

## 2021-02-03 VITALS
TEMPERATURE: 97.6 F | SYSTOLIC BLOOD PRESSURE: 105 MMHG | WEIGHT: 277.1 LBS | BODY MASS INDEX: 46.17 KG/M2 | HEART RATE: 99 BPM | DIASTOLIC BLOOD PRESSURE: 71 MMHG | RESPIRATION RATE: 16 BRPM | OXYGEN SATURATION: 96 % | HEIGHT: 65 IN

## 2021-02-03 LAB
ALBUMIN SERPL-MCNC: 4 G/DL (ref 3.5–5.2)
ALBUMIN/GLOBULIN RATIO: 1.2 (ref 1–2.5)
ALP BLD-CCNC: 250 U/L (ref 35–104)
ALT SERPL-CCNC: 128 U/L (ref 5–33)
AST SERPL-CCNC: 48 U/L
BILIRUB SERPL-MCNC: 0.41 MG/DL (ref 0.3–1.2)
BILIRUBIN DIRECT: 0.13 MG/DL
BILIRUBIN, INDIRECT: 0.28 MG/DL (ref 0–1)
GLOBULIN: ABNORMAL G/DL (ref 1.5–3.8)
TOTAL PROTEIN: 7.4 G/DL (ref 6.4–8.3)

## 2021-02-03 PROCEDURE — 74176 CT ABD & PELVIS W/O CONTRAST: CPT

## 2021-02-03 PROCEDURE — 6370000000 HC RX 637 (ALT 250 FOR IP): Performed by: STUDENT IN AN ORGANIZED HEALTH CARE EDUCATION/TRAINING PROGRAM

## 2021-02-03 PROCEDURE — 6360000002 HC RX W HCPCS: Performed by: INTERNAL MEDICINE

## 2021-02-03 PROCEDURE — 6370000000 HC RX 637 (ALT 250 FOR IP): Performed by: INTERNAL MEDICINE

## 2021-02-03 PROCEDURE — 99232 SBSQ HOSP IP/OBS MODERATE 35: CPT | Performed by: INTERNAL MEDICINE

## 2021-02-03 PROCEDURE — 2580000003 HC RX 258: Performed by: INTERNAL MEDICINE

## 2021-02-03 PROCEDURE — 36415 COLL VENOUS BLD VENIPUNCTURE: CPT

## 2021-02-03 PROCEDURE — 80076 HEPATIC FUNCTION PANEL: CPT

## 2021-02-03 PROCEDURE — 6360000002 HC RX W HCPCS: Performed by: STUDENT IN AN ORGANIZED HEALTH CARE EDUCATION/TRAINING PROGRAM

## 2021-02-03 RX ORDER — METOCLOPRAMIDE 10 MG/1
10 TABLET ORAL
Qty: 42 TABLET | Refills: 0 | Status: SHIPPED | OUTPATIENT
Start: 2021-02-03 | End: 2021-02-03 | Stop reason: HOSPADM

## 2021-02-03 RX ORDER — OXYCODONE HYDROCHLORIDE 5 MG/1
5 TABLET ORAL EVERY 6 HOURS PRN
Qty: 20 TABLET | Refills: 0 | Status: SHIPPED | OUTPATIENT
Start: 2021-02-03 | End: 2021-02-08

## 2021-02-03 RX ORDER — METOCLOPRAMIDE 10 MG/1
10 TABLET ORAL
Qty: 42 TABLET | Refills: 0 | Status: SHIPPED | OUTPATIENT
Start: 2021-02-03 | End: 2021-02-03 | Stop reason: SDUPTHER

## 2021-02-03 RX ORDER — PROMETHAZINE HYDROCHLORIDE 25 MG/1
25 TABLET ORAL EVERY 6 HOURS PRN
Qty: 20 TABLET | Refills: 0 | Status: SHIPPED | OUTPATIENT
Start: 2021-02-03 | End: 2021-02-10

## 2021-02-03 RX ADMIN — HYDROMORPHONE HYDROCHLORIDE 1 MG: 1 INJECTION, SOLUTION INTRAMUSCULAR; INTRAVENOUS; SUBCUTANEOUS at 13:58

## 2021-02-03 RX ADMIN — OXYCODONE HYDROCHLORIDE 5 MG: 5 TABLET ORAL at 10:59

## 2021-02-03 RX ADMIN — OXYCODONE HYDROCHLORIDE 5 MG: 5 TABLET ORAL at 02:46

## 2021-02-03 RX ADMIN — OXYCODONE HYDROCHLORIDE 5 MG: 5 TABLET ORAL at 06:51

## 2021-02-03 RX ADMIN — PANCRELIPASE 36000 UNITS: 24000; 76000; 120000 CAPSULE, DELAYED RELEASE PELLETS ORAL at 08:12

## 2021-02-03 RX ADMIN — PANCRELIPASE 36000 UNITS: 24000; 76000; 120000 CAPSULE, DELAYED RELEASE PELLETS ORAL at 11:48

## 2021-02-03 RX ADMIN — DULOXETINE HYDROCHLORIDE 60 MG: 30 CAPSULE, DELAYED RELEASE ORAL at 08:12

## 2021-02-03 RX ADMIN — PROMETHAZINE HYDROCHLORIDE 12.5 MG: 12.5 TABLET ORAL at 10:58

## 2021-02-03 RX ADMIN — HYDROMORPHONE HYDROCHLORIDE 1 MG: 1 INJECTION, SOLUTION INTRAMUSCULAR; INTRAVENOUS; SUBCUTANEOUS at 06:29

## 2021-02-03 RX ADMIN — METOCLOPRAMIDE 10 MG: 5 INJECTION, SOLUTION INTRAMUSCULAR; INTRAVENOUS at 11:49

## 2021-02-03 RX ADMIN — POTASSIUM CHLORIDE 40 MEQ: 1500 TABLET, EXTENDED RELEASE ORAL at 08:14

## 2021-02-03 RX ADMIN — ONDANSETRON HYDROCHLORIDE 4 MG: 4 TABLET, FILM COATED ORAL at 06:51

## 2021-02-03 RX ADMIN — DOCUSATE SODIUM 100 MG: 100 CAPSULE, LIQUID FILLED ORAL at 08:14

## 2021-02-03 RX ADMIN — HYDROMORPHONE HYDROCHLORIDE 1 MG: 1 INJECTION, SOLUTION INTRAMUSCULAR; INTRAVENOUS; SUBCUTANEOUS at 08:31

## 2021-02-03 RX ADMIN — POLYMYXIN B SULFATE, BACITRACIN ZINC, NEOMYCIN SULFATE: 5000; 3.5; 4 OINTMENT TOPICAL at 08:14

## 2021-02-03 RX ADMIN — HYDROMORPHONE HYDROCHLORIDE 1 MG: 1 INJECTION, SOLUTION INTRAMUSCULAR; INTRAVENOUS; SUBCUTANEOUS at 01:36

## 2021-02-03 RX ADMIN — PANTOPRAZOLE SODIUM 40 MG: 40 TABLET, DELAYED RELEASE ORAL at 08:14

## 2021-02-03 RX ADMIN — DIPHENHYDRAMINE HCL 25 MG: 25 TABLET ORAL at 10:59

## 2021-02-03 RX ADMIN — HYDROMORPHONE HYDROCHLORIDE 1 MG: 1 INJECTION, SOLUTION INTRAMUSCULAR; INTRAVENOUS; SUBCUTANEOUS at 04:05

## 2021-02-03 RX ADMIN — ENOXAPARIN SODIUM 40 MG: 40 INJECTION SUBCUTANEOUS at 08:13

## 2021-02-03 RX ADMIN — PROMETHAZINE HYDROCHLORIDE 12.5 MG: 12.5 TABLET ORAL at 02:46

## 2021-02-03 RX ADMIN — SODIUM CHLORIDE, PRESERVATIVE FREE 10 ML: 5 INJECTION INTRAVENOUS at 08:15

## 2021-02-03 RX ADMIN — DIPHENHYDRAMINE HCL 25 MG: 25 TABLET ORAL at 02:46

## 2021-02-03 RX ADMIN — IBUPROFEN 600 MG: 400 TABLET, FILM COATED ORAL at 08:31

## 2021-02-03 RX ADMIN — DIPHENHYDRAMINE HCL 25 MG: 25 TABLET ORAL at 06:51

## 2021-02-03 RX ADMIN — METOCLOPRAMIDE 10 MG: 5 INJECTION, SOLUTION INTRAMUSCULAR; INTRAVENOUS at 06:52

## 2021-02-03 RX ADMIN — HYDROMORPHONE HYDROCHLORIDE 1 MG: 1 INJECTION, SOLUTION INTRAMUSCULAR; INTRAVENOUS; SUBCUTANEOUS at 11:53

## 2021-02-03 ASSESSMENT — PAIN SCALES - GENERAL
PAINLEVEL_OUTOF10: 7
PAINLEVEL_OUTOF10: 6
PAINLEVEL_OUTOF10: 7
PAINLEVEL_OUTOF10: 7
PAINLEVEL_OUTOF10: 6
PAINLEVEL_OUTOF10: 7
PAINLEVEL_OUTOF10: 7

## 2021-02-03 ASSESSMENT — PAIN DESCRIPTION - ORIENTATION: ORIENTATION: MID

## 2021-02-03 NOTE — PROGRESS NOTES
CLINICAL PHARMACY NOTE: MEDS TO [address] Select Patient?: No  Total # of Prescriptions Filled: 4   The following medications were delivered to the patient:  · Creon 85157-58396 units  · Metoclopramide 10mg  · Promethazine 25mg  · Oxycodone 5mg  Total # of Interventions Completed: 0  Time Spent (min): 0    Additional Documentation: meds delivered to patient 2/3 at 4:50pm. Patient in room 341. Co-pay paid with credit on [employer] ($13.19).

## 2021-02-03 NOTE — PROGRESS NOTES
Comprehensive Nutrition Assessment    Type and Reason for Visit:  Initial(LOS)    Nutrition Recommendations/Plan: Continue current Clear Liquid diet. Monitor intakes and for diet advancement as able/appropriate. Will provide Ensure Clear Liquid ONS with meals to provide additional nutrition. Monitor nausea, abdominal pain, labs, weights, and plan of care. Nutrition Assessment:  Pt seen based on length of stay. Admitted with c/o epigastric abdominal pain and nausea. PMHx of chronic pancreatitis. EGD completed yesterday shows food bezoar present in stomach. Pt remains on Clear Liquids currently. Tolerating liquids but c/o abdominal pain and nausea. Will provide clear liquid ONS and monitor for diet advancement. Malnutrition Assessment:  Malnutrition Status: At risk for malnutrition (Comment)    Context:  Chronic Illness     Findings of the 6 clinical characteristics of malnutrition:  Energy Intake:  Mild decrease in energy intake (Comment)  Weight Loss:  No significant weight loss(Weight fluctuations noted.)     Body Fat Loss:  No significant body fat loss   Muscle Mass Loss:  No significant muscle mass loss  Fluid Accumulation:  1 - Mild Extremities   Strength:  Not Performed    Estimated Daily Nutrient Needs:  Energy (kcal):  MSJ x 1.1-1.2 = 9278-3671 kcals/day; Weight Used for Energy Requirements:  Admission     Protein (g):  1.2-1.5 gm/kg = 65-85 gm pro/day; Weight Used for Protein Requirements:  Ideal          Nutrition Related Findings:  Labs: Alk Phos 250 U/L,  U/L, AST 48 U/L. Meds: Creon. BM 2/2. Wounds:  None       Current Nutrition Therapies:    DIET CLEAR LIQUID;     Anthropometric Measures:  · Height: 5' 5\" (165.1 cm)  · Current Body Weight: 277 lb 1.6 oz (125.7 kg)   · Admission Body Weight: 258 lb (117 kg)    · Usual Body Weight: 273 lb 2.4 oz (123.9 kg)(9/16/20 per EHR review)     · Ideal Body Weight: 125 lbs; % Ideal Body Weight 221.7 %   · BMI: 46.1 · BMI Categories: Obese Class 3 (BMI 40.0 or greater)       Nutrition Diagnosis:   · Inadequate oral intake related to altered GI function(abdominal pain, nausea) as evidenced by NPO or clear liquid status due to medical condition(variable PO intakes, need for ONS)    Nutrition Interventions:   Food and/or Nutrient Delivery:  Continue Current Diet, Start Oral Nutrition Supplement(Monitor for diet advancement. Will provide Ensure Clear Liquid ONS with meals for additional nutrition.)  Nutrition Education/Counseling:  No recommendation at this time   Coordination of Nutrition Care:  Continue to monitor while inpatient    Goals:  Oral intakes to meet % of estimated nutrition needs.        Nutrition Monitoring and Evaluation:   Food/Nutrient Intake Outcomes:  Diet Advancement/Tolerance, Food and Nutrient Intake, Supplement Intake  Physical Signs/Symptoms Outcomes:  Biochemical Data, GI Status, Hemodynamic Status, Nutrition Focused Physical Findings, Skin, Weight     Electronically signed by Jj Hernandez RD, ELLIS on 2/3/21 at 3:03 PM EST    Contact: [phone]

## 2021-02-03 NOTE — PROGRESS NOTES
THE [facility] AT [facility]   Progress Note    Maryellen Bowles is a 54 y.o. female patient. Hospitalization Day:7      Chief consult reason:   burnt out pancreas with new elevated liver enzymes      Subjective:  Pt seen and examined. No acute events overnight  CT scan completed shows hepatic steatosis, decreased stool content  Liver biopsy showed moderate to marked fatty change. No lobular or portal inflammation, no granuloma or neoplasm    Pt had EGD completed yesterday that showed large amount of food bezeor in stomach and the procedure had to be aborted due to risk of aspiration    Surgical Pathology Report 02/01/2021  3:27  [facility]   -- Diagnosis --   LIVER, BIOPSY:        -MODERATE TO Yudelka Saleh M.D.   **Electronically Signed Out**         [facility]/2/2/2021         Clinical Information   Pre-Op Diagnosis:  ABNORMAL LIVER ENZYMES, FATTY LIVER   Operative Findings: [facility] AND LABS ARE NOT DIAGNOSTIC   Operation Performed:    LIVER CORE BIOPSY     Source of Specimen   1: LIVER CORE     Gross Description   \"YARIEL GEORGE, LIVER BX\" Six cores from 0.3 to 2.0 cm in length,   < 0.1 cm in diameter.  Entirely 1cs.  mpb tm       Microscopic Description   Microscopic examination performed disclosing intact lobular   architecture, with moderate to marked fatty change.  No lobular or   portal inflammation of any significance is identified.  No granuloma   or neoplasm is seen.  With additional stains demonstrating reactive   controls, iron stain shows no increased storage or hepatocellular   iron; reticulin stain shows architectural distortion due to   macrovesicular fat but no other intrinsic connective tissue network   distortion; and trichrome stain shows no fibrosis.      SURGICAL PATHOLOGY CONSULTATION         Patient Name: Susan Bailey: [id number]   Path Number: [id number]     [address] [place] [id number] [facility],  [address]   [phone]   Fax: [phone]        VITALS:  /71   Pulse 99   Temp 97.6 °F (36.4 °C) (Oral)   Resp 16   Ht 5' 5\" (1.651 m)   Wt 277 lb 1.6 oz (125.7 kg)   SpO2 96%   BMI 46.11 kg/m²   TEMPERATURE:  Current - Temp: 97.6 °F (36.4 °C); Max - Temp  Av.7 °F (36.5 °C)  Min: 97.6 °F (36.4 °C)  Max: 97.7 °F (36.5 °C)    Physical Assessment:  General appearance:  alert, cooperative and no distress  Mental Status:  oriented to person, place and time and normal affect  Lungs:  clear to auscultation bilaterally, normal effort  Heart:  regular rate and rhythm, no murmur  Abdomen:  soft, nontender, nondistended, normal bowel sounds, no masses, hepatomegaly, splenomegaly  Extremities:  no edema, redness, tenderness in the calves  Skin:  no gross lesions, rashes, induration    Data Review:    Labs and Imaging:     CBC:  No results for input(s): WBC, HGB, MCV, RDW, PLT in the last 72 hours. ANEMIA STUDIES:  No results for input(s): LABIRON, TIBC, FERRITIN, [id number], FOLATE, OCCULTBLD in the last 72 hours. BMP:  No results for input(s): NA, K, CL, CO2, BUN, CREATININE, GLUCOSE, CALCIUM in the last 72 hours. Invalid input(s):  MG,  PHOS;3    LFTS:  Recent Labs     21  0329 21  0427 21  0616   ALKPHOS 203* 192* 250*   * 135* 128*   AST 25 21 48*   BILITOT 0.15* 0.16* 0.41   BILIDIR 0.09 <0.08 0.13   LABALBU 3.8 4.0 4.0       Amylase/Lipase and Ammonia:  No results for input(s): AMYLASE, LIPASE, AMMONIA in the last 72 hours.     Acute Hepatitis Panel:  Lab Results   Component Value Date    HEPBSAG NONREACTIVE 2020    HEPCAB NONREACTIVE 2020    HEPBIGM NONREACTIVE 2020    HEPAIGM NONREACTIVE 2020       HCV Genotype:  No results found for: HEPATITISCGENOTYPE    HCV Quantitative:  No results found for: HCVQNT    LIVER WORK UP:    AFP  No results found for: AFP    Alpha 1 antitrypsin Lab Results   Component Value Date    A1A 208 08/21/2020       SHARON  No results found for: SHARON    AMA  Lab Results   Component Value Date    MITOAB 3.6 08/21/2020       ASMA  Lab Results   Component Value Date    SMOOTHMUSCAB 5 08/21/2020       PT/INR  Recent Labs     02/01/21  0933   PROTIME 9.6   INR 0.9       Cancer Markers:  CEA:  No results for input(s): CEA in the last 72 hours. Ca 125:  No results for input(s):  in the last 72 hours. Ca 19-9:   Invalid input(s):   AFP: No results for input(s): AFP in the last 72 hours. Lactic acid:Invalid input(s): LACTIC ACID    Radiology Review:      2/3/2021 CT A/P:  FINDINGS:   Lower Chest: Lungs are clear.  No pleural or pericardial effusion.  Normal   heart size.       Organs: Hypoattenuation of the liver.  Status post cholecystectomy.  Spleen,   adrenals, kidneys demonstrate no acute abnormality.  Focal renal scarring of   the right kidney redemonstrated.  Mild inflammatory changes redemonstrated on   the pancreas.       GI/Bowel: Significantly decreased volume of gastric contents.  The stomach   does not appear distended significantly.  No abnormal bowel dilatation or   wall thickening.  Normal appendix.       Pelvis: Urinary bladder is unremarkable.  Uterus and adnexa unremarkable for   technique.       Peritoneum/Retroperitoneum: No intra-abdominal free fluid.  Abdominal aorta   is normal in caliber.  No retroperitoneal adenopathy.       Bones/Soft Tissues: Small left flank lipoma redemonstrated.  No acute   abnormality identified.  No aggressive osseous lesions.  Severe disc height   loss at L4-5.           Impression   Significantly decreased volume of gastric contents.       Mild residual peripancreatic inflammatory changes.  No discrete fluid   collection.       Hepatic steatosis. Active Problems:    Abdominal pain  Resolved Problems:    * No resolved hospital problems.  *       GI Impression: 1. Non-specific abdominal pain-suspect gastroparesis in the setting of multiple narcotics leading to food bezoar identified on EGD  2. Elevated LFT's-serologies and imaging have been non-diagnostic. Liver biopsy showed fatty liver. LFT's fluctuating but overall improving. ?? ETOH      Plan and Recommendations:    1. Rec gastroparesis diet-6 small meals/daily  2. No narcotics as this could lead to ileus, obstruction, bowel ischemia etc  3. Pt to follow up in the office in 1 week with repeat LFT's drawn prior to appt  4. Avoid hepatotoxic agents  5. GI will sign off      This plan was formulated in collaboration with Dr. Hitesh Johnson MD    Thank you for allowing me to participate in the care of your patient. Please feel free to contact me with any questions or concerns. Dom Jones APRKAYLEEN [place] [address] Gastroenterology      Attending Physican Attestation  Patient seen and examined with Ms. Kari Sandoval CNP.  I have reviewed the above note and confirmed the key elements of the medical history and physical exam. I have discussed the findings, established the care plan and recommendations with Ms. Stevie Lai and primary team.    CT abd shows improvement with food bezoar  Limit narcotics  Alcohol abstinence  Patient will require to follow up in [facility] clinic at [address] [facility] MD  Gastroenterology

## 2021-02-03 NOTE — PROGRESS NOTES
Patient given discharge instructions and verbalized understanding. Patient given prescription meds from pharmacy.

## 2021-02-03 NOTE — FLOWSHEET NOTE
RN noted patient to be walking down the hallway pushing a stretcher when confronted patient yelled \" it was sitting in the middle of the rode\" RN tried redirecting patient whom appeared disoriented and was unable to locate her room alone. Patient escorted back to room where she became tearful stating she had a long week. Patient placed in bed and encouraged to get some rest patient states \" I'm sorry I won't get out of bed anymore, and can you ask my nurse for my pain mediations\". Magda Lawson RN notified.

## 2021-02-03 NOTE — PROGRESS NOTES
OBS/CDU   RESIDENT NOTE      Patients PCP is:  Allison Smith        SUBJECTIVE      Patient was evaluated at bedside, states that her pain is mildly improved since yesterday. Patient is curious about plans going forward after CT scan which is planned to see patient foods bezoar has started to pass. Has been able to tolerate a full diet without nausea or vomiting. The patient is urinating on his own and is passing flatus. Denies fever, chills, nausea, vomiting, chest pain, shortness of breath, abdominal pain, focal weakness, numbness, tingling, urinary/bowel symptoms, vision changes, visual hallucinations, or headache. PHYSICAL EXAM      General: NAD, AO X 3  Heent: EMOI, PERRL  Neck: SUPPLE, NO JVD  Cardiovascular: RRR, S1S2  Pulmonary: CTAB, NO SOB  Abdomen: SOFT, NTTP, ND, +BS  Extremities: +2/4 PULSES DISTAL, NO SWELLING  Neuro / Psych: NO NUMBNESS OR TINGLING, MENTATION AT BASELINE    PERTINENT TEST /EXAMS      I have reviewed all available laboratory results.     MEDICATIONS CURRENT       HYDROmorphone (DILAUDID) injection 0.5 mg, Once      enoxaparin (LOVENOX) injection 40 mg, Daily      lipase-protease-amylase (CREON) delayed release capsule 36,000 Units, TID WC      metoclopramide (REGLAN) injection 10 mg, Q6H      neomycin-bacitracin-polymyxin (NEOSPORIN) ointment, BID      0.9 % sodium chloride infusion, Continuous      promethazine (PHENERGAN) tablet 12.5 mg, Q6H PRN      diphenhydrAMINE (BENADRYL) tablet 25 mg, Q4H PRN      ondansetron (ZOFRAN) tablet 4 mg, Q6H PRN      pantoprazole (PROTONIX) tablet 40 mg, BID      HYDROmorphone (DILAUDID) injection 1 mg, Q2H PRN      LORazepam (ATIVAN) injection 1 mg, Once      0.9 % sodium chloride bolus, Once      oxyCODONE (ROXICODONE) immediate release tablet 5 mg, Q4H PRN      potassium chloride (KLOR-CON M) extended release tablet 40 mEq, BID WC      docusate sodium (COLACE) capsule 100 mg, Daily   QUEtiapine (SEROQUEL) tablet 400 mg, Nightly      sodium chloride flush 0.9 % injection 10 mL, 2 times per day      sodium chloride flush 0.9 % injection 10 mL, PRN      melatonin tablet 5 mg, Daily      prazosin (MINIPRESS) capsule 1 mg, Nightly      amitriptyline (ELAVIL) tablet 25 mg, Nightly      busPIRone (BUSPAR) tablet 15 mg, Nightly      tiZANidine (ZANAFLEX) tablet 4 mg, Nightly      buPROPion (WELLBUTRIN SR) extended release tablet 150 mg, Nightly      losartan (COZAAR) tablet 50 mg, Daily      pramipexole (MIRAPEX) tablet 0.25 mg, Daily      ibuprofen (ADVIL;MOTRIN) tablet 600 mg, Q6H PRN      DULoxetine (CYMBALTA) extended release capsule 60 mg, BID        All medication charted and reviewed. CONSULTS      IP CONSULT TO GI    ASSESSMENT/PLAN       Ibrahima Nair is a 54 y.o. female who presents with epigastric abdominal pain secondary to burned-out pancreas. Patient has had extensive work-up by GI team without definitive cause of patient's discomfort. Patient was found to have a bezoar on endoscopy. Patient will be undergoing CT scan to see if bezoar is starting to break up after use of Creon. Observation team to continue to follow the recommendations of specialty services for this patient. · CT scan  · Recommendations per GI  · Continue home medications and pain control  · Monitor vitals, labs, and imaging  · DISPO: pending consults and clinical improvement    --  Dinora Rodriguez  Emergency Medicine Resident Physician     This dictation was generated by voice recognition computer software. Although all attempts are made to edit the dictation for accuracy, there may be errors in the transcription that are not intended.

## 2021-02-03 NOTE — PROGRESS NOTES
3B nurse arrived to unit w/ pt stating that pt had walked to the 3B unit and was found standing at the nurse's station with her eyes closed and appeared to be sleeping standing up. RN said pt then began entering one of their pt rooms. Pt was brought back to her room and educated to call out for assistance before getting up. Bed alarm in place. Will continue to monitor.

## 2021-02-03 NOTE — PLAN OF CARE
Nutrition Problem #1: Inadequate oral intake  Intervention: Food and/or Nutrient Delivery: Continue Current Diet, Start Oral Nutrition Supplement(Monitor for diet advancement. Will provide Ensure Clear Liquid ONS with meals for additional nutrition.)  Nutritional Goals: Oral intakes to meet % of estimated nutrition needs.

## 2021-02-05 NOTE — PROGRESS NOTES
[address] [facility]  CDU / OBSERVATION eNCOUnter  Attending NOte    Late note for 2/3/2021       I performed a history and physical examination of the patient and discussed management with the resident. I reviewed the residents note and agree with the documented findings and plan of care. Any areas of disagreement are noted on the chart. I was personally present for the key portions of any procedures. I have documented in the chart those procedures where I was not present during the key portions. I have reviewed the nurses notes. I agree with the chief complaint, past medical history, past surgical history, allergies, medications, social and family history as documented unless otherwise noted below. The Family history, social history, and ROS are effectively unchanged since admission unless noted elsewhere in the chart. Patient improved today. Handling p.o. Patient had bezoar removed by endoscopy. Patient with ability to handle orals and will be discharged today.     Sushil Vazquez MD  Attending Emergency  Physician

## 2021-02-05 NOTE — PROGRESS NOTES
CDU Discharge Summary        Patient:  Bella Aguayo  YOB: 1975    MRN: [id number]   Acct: [de-identified]    Primary Care Physician: Mian Briggs    Admit date:  1/25/2021  7:18 AM  Discharge date: 2/3/2021  4:59 PM      Discharge Diagnoses:     1.) Nonspecific abdominal pain, uncertain etiology, suspect gastroparesis. Patient with evidence based on gastric studies and EGD. 2.  Gastric bezoar. Removed. Uncertain acuity. Patient with significant improvement after being started on Creon and endoscopy. 3.  Elevated liver enzymes. Uncertain acuity. Patient with fatty liver. Biopsy sent by GI. Follow-up:  Call today/tomorrow for a follow up appointment with Mian Briggs , or return to the Emergency Room with worsening symptoms    Stressed to patient the importance of following up with primary care doctor for further workup/management of symptoms. Pt verbalizes understanding and agrees with plan. Discharge Medication Changes:       Medication List      START taking these medications    oxyCODONE 5 MG immediate release tablet  Commonly known as: Roxicodone  Take 1 tablet by mouth every 6 hours as needed for Pain for up to 5 days. Intended supply: 5 days. Take lowest dose possible to manage pain     promethazine 25 MG tablet  Commonly known as: PHENERGAN  Take 1 tablet by mouth every 6 hours as needed for Nausea WARNING:  May cause drowsiness. May impair ability to operate vehicles or machinery. Do not use in combination with alcohol. CHANGE how you take these medications    DULoxetine 60 MG extended release capsule  Commonly known as: CYMBALTA  TAKE 1 CAPSULE BY MOUTH TWICE A DAY  What changed: See the new instructions.      fexofenadine 180 MG tablet  Commonly known as: RA Allergy Relief  take 1 tablet by mouth once daily  What changed:   · how much to take  · how to take this  · when to take this  · reasons to take this     pramipexole 0.5 MG tablet Commonly known as: MIRAPEX  take 1 tablet by mouth twice a day  What changed:   · how much to take  · how to take this  · when to take this        CONTINUE taking these medications    amitriptyline 25 MG tablet  Commonly known as: ELAVIL     buPROPion 150 MG extended release tablet  Commonly known as: WELLBUTRIN XL     busPIRone 15 MG tablet  Commonly known as: BUSPAR     butalbital-APAP-caffeine -40 MG Caps per capsule  Commonly known as: Fioricet  Take 1 capsule by mouth every 6 hours as needed for Headaches     losartan 50 MG tablet  Commonly known as: COZAAR     magnesium oxide 400 MG tablet  Commonly known as: MAG-OX     melatonin 5 MG Tabs tablet     omeprazole 40 MG delayed release capsule  Commonly known as: PRILOSEC     potassium chloride 10 MEQ extended release tablet  Commonly known as: KLOR-CON M  Take 1 tablet by mouth 2 times daily     prazosin 1 MG capsule  Commonly known as: MINIPRESS     Proventil  (90 Base) MCG/ACT inhaler  Generic drug: albuterol sulfate HFA     SEROquel  MG extended release tablet  Generic drug: QUEtiapine     tiZANidine 4 MG tablet  Commonly known as: ZANAFLEX     vitamin D 50 MCG (2000 UT) Caps capsule        STOP taking these medications    HYDROcodone-acetaminophen 5-325 MG per tablet  Commonly known as: Norco           Where to Get Your Medications      You can get these medications from any pharmacy    Bring a paper prescription for each of these medications  · oxyCODONE 5 MG immediate release tablet  · promethazine 25 MG tablet         Diet:  No diet orders on file, advance as tolerated     Activity:  As tolerated    Consultants: IP CONSULT TO GI    Procedures: See endoscopy note    Diagnostic Test:   Results for orders placed or performed during the hospital encounter of 01/25/21   CBC WITH AUTO DIFFERENTIAL   Result Value Ref Range    WBC 10.3 3.5 - 11.3 k/uL    RBC 4.01 3.95 - 5.11 m/uL    Hemoglobin 12.6 11.9 - 15.1 g/dL Hematocrit 39.6 36.3 - 47.1 %    MCV 98.8 82.6 - 102.9 fL    MCH 31.4 25.2 - 33.5 pg    MCHC 31.8 28.4 - 34.8 g/dL    RDW 13.4 11.8 - 14.4 %    Platelets 114 352 - 007 k/uL    MPV 10.7 8.1 - 13.5 fL    NRBC Automated 0.0 0.0 per 100 WBC    Differential Type NOT REPORTED     Seg Neutrophils 54 36 - 65 %    Lymphocytes 35 24 - 43 %    Monocytes 5 3 - 12 %    Eosinophils % 4 1 - 4 %    Basophils 1 0 - 2 %    Immature Granulocytes 1 (H) 0 %    Segs Absolute 5.71 1.50 - 8.10 k/uL    Absolute Lymph # 3.64 1.10 - 3.70 k/uL    Absolute Mono # 0.48 0.10 - 1.20 k/uL    Absolute Eos # 0.38 0.00 - 0.44 k/uL    Basophils Absolute 0.06 0.00 - 0.20 k/uL    Absolute Immature Granulocyte 0.07 0.00 - 0.30 k/uL    WBC Morphology NOT REPORTED     RBC Morphology NOT REPORTED     Platelet Estimate NOT REPORTED    COMPREHENSIVE METABOLIC PANEL   Result Value Ref Range    Glucose 93 70 - 99 mg/dL    BUN 11 6 - 20 mg/dL    CREATININE 0.52 0.50 - 0.90 mg/dL    Bun/Cre Ratio NOT REPORTED 9 - 20    Calcium 8.9 8.6 - 10.4 mg/dL    Sodium 136 135 - 144 mmol/L    Potassium 3.6 (L) 3.7 - 5.3 mmol/L    Chloride 102 98 - 107 mmol/L    CO2 24 20 - 31 mmol/L    Anion Gap 10 9 - 17 mmol/L    Alkaline Phosphatase 122 (H) 35 - 104 U/L    ALT 63 (H) 5 - 33 U/L    AST 40 (H) <32 U/L    Total Bilirubin <0.10 (L) 0.3 - 1.2 mg/dL    Total Protein 7.1 6.4 - 8.3 g/dL    Albumin 4.0 3.5 - 5.2 g/dL    Albumin/Globulin Ratio 1.3 1.0 - 2.5    GFR Non-African American >60 >60 mL/min    GFR African American >60 >60 mL/min    GFR Comment          GFR Staging NOT REPORTED    LIPASE   Result Value Ref Range    Lipase 7 (L) 13 - 60 U/L   Troponin   Result Value Ref Range    Troponin, High Sensitivity <6 0 - 14 ng/L    Troponin T NOT REPORTED <0.03 ng/mL    Troponin Interp NOT REPORTED    CBC WITH AUTO DIFFERENTIAL   Result Value Ref Range    WBC 8.0 3.5 - 11.3 k/uL    RBC 3.77 (L) 3.95 - 5.11 m/uL    Hemoglobin 11.7 (L) 11.9 - 15.1 g/dL Hematocrit 37.1 36.3 - 47.1 %    MCV 98.4 82.6 - 102.9 fL    MCH 31.0 25.2 - 33.5 pg    MCHC 31.5 28.4 - 34.8 g/dL    RDW 13.6 11.8 - 14.4 %    Platelets 647 334 - 200 k/uL    MPV 10.1 8.1 - 13.5 fL    NRBC Automated 0.0 0.0 per 100 WBC    Differential Type NOT REPORTED     WBC Morphology NOT REPORTED     RBC Morphology NOT REPORTED     Platelet Estimate NOT REPORTED     Seg Neutrophils 61 36 - 65 %    Lymphocytes 28 24 - 43 %    Monocytes 3 3 - 12 %    Eosinophils % 7 (H) 1 - 4 %    Basophils 1 0 - 2 %    Immature Granulocytes 1 (H) 0 %    Segs Absolute 4.90 1.50 - 8.10 k/uL    Absolute Lymph # 2.24 1.10 - 3.70 k/uL    Absolute Mono # 0.22 0.10 - 1.20 k/uL    Absolute Eos # 0.55 (H) 0.00 - 0.44 k/uL    Basophils Absolute 0.04 0.00 - 0.20 k/uL    Absolute Immature Granulocyte 0.04 0.00 - 0.30 k/uL   Comprehensive Metabolic Panel   Result Value Ref Range    Glucose 138 (H) 70 - 99 mg/dL    BUN 5 (L) 6 - 20 mg/dL    CREATININE 0.50 0.50 - 0.90 mg/dL    Bun/Cre Ratio NOT REPORTED 9 - 20    Calcium 8.9 8.6 - 10.4 mg/dL    Sodium 138 135 - 144 mmol/L    Potassium 3.9 3.7 - 5.3 mmol/L    Chloride 102 98 - 107 mmol/L    CO2 23 20 - 31 mmol/L    Anion Gap 13 9 - 17 mmol/L    Alkaline Phosphatase 244 (H) 35 - 104 U/L     (H) 5 - 33 U/L     (H) <32 U/L    Total Bilirubin 0.18 (L) 0.3 - 1.2 mg/dL    Total Protein 7.0 6.4 - 8.3 g/dL    Albumin 4.0 3.5 - 5.2 g/dL    Albumin/Globulin Ratio 1.3 1.0 - 2.5    GFR Non-African American >60 >60 mL/min    GFR African American >60 >60 mL/min    GFR Comment          GFR Staging NOT REPORTED    Lipase   Result Value Ref Range    Lipase 7 (L) 13 - 60 U/L   COMPREHENSIVE METABOLIC PANEL   Result Value Ref Range    Glucose 147 (H) 70 - 99 mg/dL    BUN 5 (L) 6 - 20 mg/dL    CREATININE 0.47 (L) 0.50 - 0.90 mg/dL    Bun/Cre Ratio NOT REPORTED 9 - 20    Calcium 8.5 (L) 8.6 - 10.4 mg/dL    Sodium 138 135 - 144 mmol/L    Potassium 3.6 (L) 3.7 - 5.3 mmol/L Chloride 100 98 - 107 mmol/L    CO2 28 20 - 31 mmol/L    Anion Gap 10 9 - 17 mmol/L    Alkaline Phosphatase 301 (H) 35 - 104 U/L     (H) 5 - 33 U/L     (H) <32 U/L    Total Bilirubin 0.43 0.3 - 1.2 mg/dL    Total Protein 6.8 6.4 - 8.3 g/dL    Albumin 3.9 3.5 - 5.2 g/dL    Albumin/Globulin Ratio 1.3 1.0 - 2.5    GFR Non-African American >60 >60 mL/min    GFR African American >60 >60 mL/min    GFR Comment          GFR Staging NOT REPORTED    HEPATIC FUNCTION PANEL   Result Value Ref Range    Albumin 3.7 3.5 - 5.2 g/dL    Alkaline Phosphatase 239 (H) 35 - 104 U/L     (H) 5 - 33 U/L    AST 83 (H) <32 U/L    Total Bilirubin 0.17 (L) 0.3 - 1.2 mg/dL    Bilirubin, Direct 0.10 <0.31 mg/dL    Bilirubin, Indirect 0.07 0.00 - 1.00 mg/dL    Total Protein 6.8 6.4 - 8.3 g/dL    Globulin NOT REPORTED 1.5 - 3.8 g/dL    Albumin/Globulin Ratio 1.2 1.0 - 2.5   HEPATIC FUNCTION PANEL   Result Value Ref Range    Albumin 3.7 3.5 - 5.2 g/dL    Alkaline Phosphatase 229 (H) 35 - 104 U/L     (H) 5 - 33 U/L    AST 45 (H) <32 U/L    Total Bilirubin <0.10 (L) 0.3 - 1.2 mg/dL    Bilirubin, Direct 0.10 <0.31 mg/dL    Bilirubin, Indirect CANNOT BE CALCULATED 0.00 - 1.00 mg/dL    Total Protein 6.6 6.4 - 8.3 g/dL    Globulin NOT REPORTED 1.5 - 3.8 g/dL    Albumin/Globulin Ratio 1.3 1.0 - 2.5   HEPATIC FUNCTION PANEL   Result Value Ref Range    Albumin 3.8 3.5 - 5.2 g/dL    Alkaline Phosphatase 203 (H) 35 - 104 U/L     (H) 5 - 33 U/L    AST 25 <32 U/L    Total Bilirubin 0.15 (L) 0.3 - 1.2 mg/dL    Bilirubin, Direct 0.09 <0.31 mg/dL    Bilirubin, Indirect 0.06 0.00 - 1.00 mg/dL    Total Protein 6.6 6.4 - 8.3 g/dL    Globulin NOT REPORTED 1.5 - 3.8 g/dL    Albumin/Globulin Ratio 1.4 1.0 - 2.5   PROTIME-INR   Result Value Ref Range    Protime 9.6 9.0 - 12.0 sec    INR 0.9    APTT   Result Value Ref Range    PTT 27.0 20.5 - 30.5 sec   COVID-19    Specimen: Other   Result Value Ref Range SARS-CoV-2 Not Detected Not Detected    SARS-CoV-2, Rapid          Source . NASOPHARYNGEAL SWAB     SARS-CoV-2         Surgical Pathology   Result Value Ref Range    Surgical Pathology Report       -- Diagnosis --  LIVER, BIOPSY:       -KELL TO Valencia Cabrera M.D.  **Electronically Signed Out**         [facility]/2/2/2021       Clinical Information  Pre-Op Diagnosis:  ABNORMAL LIVER ENZYMES, FATTY LIVER  Operative Findings:  IMAGING AND LABS ARE NOT DIAGNOSTIC  Operation Performed:    LIVER CORE BIOPSY    Source of Specimen  1: LIVER CORE    Gross Description  \"YARIEL GEORGE, LIVER BX\" Six cores from 0.3 to 2.0 cm in length,  < 0.1 cm in diameter. Entirely 1cs. mpb tm       Microscopic Description  Microscopic examination performed disclosing intact lobular  architecture, with moderate to marked fatty change. No lobular or  portal inflammation of any significance is identified. No granuloma  or neoplasm is seen. With additional stains demonstrating reactive  controls, iron stain shows no increased storage or hepatocellular  iron; reticulin stain shows architectural distortion due to  macrovesicular fat but no other intrinsic connective tissue netwo rk  distortion; and trichrome stain shows no fibrosis. SURGICAL PATHOLOGY CONSULTATION       Patient Name: Marybeth Martinez: [id number]  Path Number: [id number]    [address] [place] [address].   [place], [address]  [phone]  Fax: [phone]     HEPATIC FUNCTION PANEL   Result Value Ref Range    Albumin 4.0 3.5 - 5.2 g/dL    Alkaline Phosphatase 192 (H) 35 - 104 U/L     (H) 5 - 33 U/L    AST 21 <32 U/L    Total Bilirubin 0.16 (L) 0.3 - 1.2 mg/dL    Bilirubin, Direct <0.08 <0.31 mg/dL    Bilirubin, Indirect CANNOT BE CALCULATED 0.00 - 1.00 mg/dL    Total Protein 7.2 6.4 - 8.3 g/dL    Globulin NOT REPORTED 1.5 - 3.8 g/dL Albumin/Globulin Ratio 1.3 1.0 - 2.5   Hemoglobin A1C   Result Value Ref Range    Hemoglobin A1C 5.9 4.0 - 6.0 %    Estimated Avg Glucose 123 mg/dL   HEPATIC FUNCTION PANEL   Result Value Ref Range    Albumin 4.0 3.5 - 5.2 g/dL    Alkaline Phosphatase 250 (H) 35 - 104 U/L     (H) 5 - 33 U/L    AST 48 (H) <32 U/L    Total Bilirubin 0.41 0.3 - 1.2 mg/dL    Bilirubin, Direct 0.13 <0.31 mg/dL    Bilirubin, Indirect 0.28 0.00 - 1.00 mg/dL    Total Protein 7.4 6.4 - 8.3 g/dL    Globulin NOT REPORTED 1.5 - 3.8 g/dL    Albumin/Globulin Ratio 1.2 1.0 - 2.5   EKG 12 Lead   Result Value Ref Range    Ventricular Rate 75 BPM    Atrial Rate 75 BPM    P-R Interval 152 ms    QRS Duration 82 ms    Q-T Interval 372 ms    QTc Calculation (Bazett) 415 ms    P Axis 60 degrees    R Axis 20 degrees    T Axis 35 degrees     No results found. Physical Exam:  At time of discharge  General appearance - NAD, AOx 3    Lungs -CTAB, no R/R/R  Heart - RRR, no M/R/G  Abdomen - Soft, NT/ND  Neurological:  MAEx4, No focal motor deficit, sensory loss  Extremities - Cap refil <2 sec in all ext., no edema  Skin -warm, dry      Hospital Course:  Clinical course has improved, labs and imaging reviewed. Divina Varner originally presented to the hospital on 1/25/2021  7:18 AM with abdominal pain requiring IV fluids and IV supportive care. .  At that time it was determined that She required further observation and GI evaluation. Patient was seen by gastroenterology and endoscopy was performed. A food bolus was seen. Patient had further work-up with gastric emptying study. Patient had fatty liver with LFTs slightly elevated. Patient had improving LFTs and after removal of the bezoar was doing better in terms of ability to handle p.o.. Labs and imaging were followed daily. Imaging results as above. She is medically stable to be discharged.        Disposition: Home Patient stated that they will not drive themselves home from the hospital if they have gotten pain killers/ narcotics earlier that day and that they will arrange for transportation on their own or work with the  for a ride. Patient counseled NOT to drive while under the influence of narcotics/ pain killers. Condition: Good    Patient stable and ready for discharge home. I have discussed plan of care with patient and they are in understanding. They were instructed to read discharge paperwork. All of their questions and concerns were addressed. Time Spent: 7 day      --  Marlen Barry MD  Emergency Medicine Attending Physician    This dictation was generated by voice recognition computer software. Although all attempts are made to edit the dictation for accuracy, there may be errors in the transcription that are not intended.

## 2021-02-06 NOTE — DISCHARGE SUMMARY
CDU Discharge Summary        Patient:  Bella Aguayo  YOB: 1975    MRN: [id number]   Acct: [de-identified]    Primary Care Physician: Mian Briggs    Admit date:  1/25/2021  7:18 AM  Discharge date: 2/3/2021  4:59 PM      Discharge Diagnoses:     1.) Nonspecific abdominal pain, uncertain etiology, suspect gastroparesis. Patient with evidence based on gastric studies and EGD. 2.  Gastric bezoar. Removed. Uncertain acuity. Patient with significant improvement after being started on Creon and endoscopy. 3.  Elevated liver enzymes. Uncertain acuity. Patient with fatty liver. Biopsy sent by GI. Follow-up:  Call today/tomorrow for a follow up appointment with Mian Briggs , or return to the Emergency Room with worsening symptoms    Stressed to patient the importance of following up with primary care doctor for further workup/management of symptoms. Pt verbalizes understanding and agrees with plan. Discharge Medication Changes:       Medication List      START taking these medications    oxyCODONE 5 MG immediate release tablet  Commonly known as: Roxicodone  Take 1 tablet by mouth every 6 hours as needed for Pain for up to 5 days. Intended supply: 5 days. Take lowest dose possible to manage pain     promethazine 25 MG tablet  Commonly known as: PHENERGAN  Take 1 tablet by mouth every 6 hours as needed for Nausea WARNING:  May cause drowsiness. May impair ability to operate vehicles or machinery. Do not use in combination with alcohol. CHANGE how you take these medications    DULoxetine 60 MG extended release capsule  Commonly known as: CYMBALTA  TAKE 1 CAPSULE BY MOUTH TWICE A DAY  What changed: See the new instructions.      fexofenadine 180 MG tablet  Commonly known as: RA Allergy Relief  take 1 tablet by mouth once daily  What changed:   · how much to take  · how to take this  · when to take this  · reasons to take this     pramipexole 0.5 MG tablet Commonly known as: MIRAPEX  take 1 tablet by mouth twice a day  What changed:   · how much to take  · how to take this  · when to take this        CONTINUE taking these medications    amitriptyline 25 MG tablet  Commonly known as: ELAVIL     buPROPion 150 MG extended release tablet  Commonly known as: WELLBUTRIN XL     busPIRone 15 MG tablet  Commonly known as: BUSPAR     butalbital-APAP-caffeine -40 MG Caps per capsule  Commonly known as: Fioricet  Take 1 capsule by mouth every 6 hours as needed for Headaches     losartan 50 MG tablet  Commonly known as: COZAAR     magnesium oxide 400 MG tablet  Commonly known as: MAG-OX     melatonin 5 MG Tabs tablet     omeprazole 40 MG delayed release capsule  Commonly known as: PRILOSEC     potassium chloride 10 MEQ extended release tablet  Commonly known as: KLOR-CON M  Take 1 tablet by mouth 2 times daily     prazosin 1 MG capsule  Commonly known as: MINIPRESS     Proventil  (90 Base) MCG/ACT inhaler  Generic drug: albuterol sulfate HFA     SEROquel  MG extended release tablet  Generic drug: QUEtiapine     tiZANidine 4 MG tablet  Commonly known as: ZANAFLEX     vitamin D 50 MCG (2000 UT) Caps capsule        STOP taking these medications    HYDROcodone-acetaminophen 5-325 MG per tablet  Commonly known as: Norco           Where to Get Your Medications      You can get these medications from any pharmacy    Bring a paper prescription for each of these medications  · oxyCODONE 5 MG immediate release tablet  · promethazine 25 MG tablet         Diet:  No diet orders on file, advance as tolerated     Activity:  As tolerated    Consultants: IP CONSULT TO GI    Procedures: See endoscopy note    Diagnostic Test:   Results for orders placed or performed during the hospital encounter of 01/25/21   CBC WITH AUTO DIFFERENTIAL   Result Value Ref Range    WBC 10.3 3.5 - 11.3 k/uL    RBC 4.01 3.95 - 5.11 m/uL    Hemoglobin 12.6 11.9 - 15.1 g/dL Hematocrit 39.6 36.3 - 47.1 %    MCV 98.8 82.6 - 102.9 fL    MCH 31.4 25.2 - 33.5 pg    MCHC 31.8 28.4 - 34.8 g/dL    RDW 13.4 11.8 - 14.4 %    Platelets 851 717 - 165 k/uL    MPV 10.7 8.1 - 13.5 fL    NRBC Automated 0.0 0.0 per 100 WBC    Differential Type NOT REPORTED     Seg Neutrophils 54 36 - 65 %    Lymphocytes 35 24 - 43 %    Monocytes 5 3 - 12 %    Eosinophils % 4 1 - 4 %    Basophils 1 0 - 2 %    Immature Granulocytes 1 (H) 0 %    Segs Absolute 5.71 1.50 - 8.10 k/uL    Absolute Lymph # 3.64 1.10 - 3.70 k/uL    Absolute Mono # 0.48 0.10 - 1.20 k/uL    Absolute Eos # 0.38 0.00 - 0.44 k/uL    Basophils Absolute 0.06 0.00 - 0.20 k/uL    Absolute Immature Granulocyte 0.07 0.00 - 0.30 k/uL    WBC Morphology NOT REPORTED     RBC Morphology NOT REPORTED     Platelet Estimate NOT REPORTED    COMPREHENSIVE METABOLIC PANEL   Result Value Ref Range    Glucose 93 70 - 99 mg/dL    BUN 11 6 - 20 mg/dL    CREATININE 0.52 0.50 - 0.90 mg/dL    Bun/Cre Ratio NOT REPORTED 9 - 20    Calcium 8.9 8.6 - 10.4 mg/dL    Sodium 136 135 - 144 mmol/L    Potassium 3.6 (L) 3.7 - 5.3 mmol/L    Chloride 102 98 - 107 mmol/L    CO2 24 20 - 31 mmol/L    Anion Gap 10 9 - 17 mmol/L    Alkaline Phosphatase 122 (H) 35 - 104 U/L    ALT 63 (H) 5 - 33 U/L    AST 40 (H) <32 U/L    Total Bilirubin <0.10 (L) 0.3 - 1.2 mg/dL    Total Protein 7.1 6.4 - 8.3 g/dL    Albumin 4.0 3.5 - 5.2 g/dL    Albumin/Globulin Ratio 1.3 1.0 - 2.5    GFR Non-African American >60 >60 mL/min    GFR African American >60 >60 mL/min    GFR Comment          GFR Staging NOT REPORTED    LIPASE   Result Value Ref Range    Lipase 7 (L) 13 - 60 U/L   Troponin   Result Value Ref Range    Troponin, High Sensitivity <6 0 - 14 ng/L    Troponin T NOT REPORTED <0.03 ng/mL    Troponin Interp NOT REPORTED    CBC WITH AUTO DIFFERENTIAL   Result Value Ref Range    WBC 8.0 3.5 - 11.3 k/uL    RBC 3.77 (L) 3.95 - 5.11 m/uL    Hemoglobin 11.7 (L) 11.9 - 15.1 g/dL Hematocrit 37.1 36.3 - 47.1 %    MCV 98.4 82.6 - 102.9 fL    MCH 31.0 25.2 - 33.5 pg    MCHC 31.5 28.4 - 34.8 g/dL    RDW 13.6 11.8 - 14.4 %    Platelets 051 609 - 630 k/uL    MPV 10.1 8.1 - 13.5 fL    NRBC Automated 0.0 0.0 per 100 WBC    Differential Type NOT REPORTED     WBC Morphology NOT REPORTED     RBC Morphology NOT REPORTED     Platelet Estimate NOT REPORTED     Seg Neutrophils 61 36 - 65 %    Lymphocytes 28 24 - 43 %    Monocytes 3 3 - 12 %    Eosinophils % 7 (H) 1 - 4 %    Basophils 1 0 - 2 %    Immature Granulocytes 1 (H) 0 %    Segs Absolute 4.90 1.50 - 8.10 k/uL    Absolute Lymph # 2.24 1.10 - 3.70 k/uL    Absolute Mono # 0.22 0.10 - 1.20 k/uL    Absolute Eos # 0.55 (H) 0.00 - 0.44 k/uL    Basophils Absolute 0.04 0.00 - 0.20 k/uL    Absolute Immature Granulocyte 0.04 0.00 - 0.30 k/uL   Comprehensive Metabolic Panel   Result Value Ref Range    Glucose 138 (H) 70 - 99 mg/dL    BUN 5 (L) 6 - 20 mg/dL    CREATININE 0.50 0.50 - 0.90 mg/dL    Bun/Cre Ratio NOT REPORTED 9 - 20    Calcium 8.9 8.6 - 10.4 mg/dL    Sodium 138 135 - 144 mmol/L    Potassium 3.9 3.7 - 5.3 mmol/L    Chloride 102 98 - 107 mmol/L    CO2 23 20 - 31 mmol/L    Anion Gap 13 9 - 17 mmol/L    Alkaline Phosphatase 244 (H) 35 - 104 U/L     (H) 5 - 33 U/L     (H) <32 U/L    Total Bilirubin 0.18 (L) 0.3 - 1.2 mg/dL    Total Protein 7.0 6.4 - 8.3 g/dL    Albumin 4.0 3.5 - 5.2 g/dL    Albumin/Globulin Ratio 1.3 1.0 - 2.5    GFR Non-African American >60 >60 mL/min    GFR African American >60 >60 mL/min    GFR Comment          GFR Staging NOT REPORTED    Lipase   Result Value Ref Range    Lipase 7 (L) 13 - 60 U/L   COMPREHENSIVE METABOLIC PANEL   Result Value Ref Range    Glucose 147 (H) 70 - 99 mg/dL    BUN 5 (L) 6 - 20 mg/dL    CREATININE 0.47 (L) 0.50 - 0.90 mg/dL    Bun/Cre Ratio NOT REPORTED 9 - 20    Calcium 8.5 (L) 8.6 - 10.4 mg/dL    Sodium 138 135 - 144 mmol/L    Potassium 3.6 (L) 3.7 - 5.3 mmol/L Chloride 100 98 - 107 mmol/L    CO2 28 20 - 31 mmol/L    Anion Gap 10 9 - 17 mmol/L    Alkaline Phosphatase 301 (H) 35 - 104 U/L     (H) 5 - 33 U/L     (H) <32 U/L    Total Bilirubin 0.43 0.3 - 1.2 mg/dL    Total Protein 6.8 6.4 - 8.3 g/dL    Albumin 3.9 3.5 - 5.2 g/dL    Albumin/Globulin Ratio 1.3 1.0 - 2.5    GFR Non-African American >60 >60 mL/min    GFR African American >60 >60 mL/min    GFR Comment          GFR Staging NOT REPORTED    HEPATIC FUNCTION PANEL   Result Value Ref Range    Albumin 3.7 3.5 - 5.2 g/dL    Alkaline Phosphatase 239 (H) 35 - 104 U/L     (H) 5 - 33 U/L    AST 83 (H) <32 U/L    Total Bilirubin 0.17 (L) 0.3 - 1.2 mg/dL    Bilirubin, Direct 0.10 <0.31 mg/dL    Bilirubin, Indirect 0.07 0.00 - 1.00 mg/dL    Total Protein 6.8 6.4 - 8.3 g/dL    Globulin NOT REPORTED 1.5 - 3.8 g/dL    Albumin/Globulin Ratio 1.2 1.0 - 2.5   HEPATIC FUNCTION PANEL   Result Value Ref Range    Albumin 3.7 3.5 - 5.2 g/dL    Alkaline Phosphatase 229 (H) 35 - 104 U/L     (H) 5 - 33 U/L    AST 45 (H) <32 U/L    Total Bilirubin <0.10 (L) 0.3 - 1.2 mg/dL    Bilirubin, Direct 0.10 <0.31 mg/dL    Bilirubin, Indirect CANNOT BE CALCULATED 0.00 - 1.00 mg/dL    Total Protein 6.6 6.4 - 8.3 g/dL    Globulin NOT REPORTED 1.5 - 3.8 g/dL    Albumin/Globulin Ratio 1.3 1.0 - 2.5   HEPATIC FUNCTION PANEL   Result Value Ref Range    Albumin 3.8 3.5 - 5.2 g/dL    Alkaline Phosphatase 203 (H) 35 - 104 U/L     (H) 5 - 33 U/L    AST 25 <32 U/L    Total Bilirubin 0.15 (L) 0.3 - 1.2 mg/dL    Bilirubin, Direct 0.09 <0.31 mg/dL    Bilirubin, Indirect 0.06 0.00 - 1.00 mg/dL    Total Protein 6.6 6.4 - 8.3 g/dL    Globulin NOT REPORTED 1.5 - 3.8 g/dL    Albumin/Globulin Ratio 1.4 1.0 - 2.5   PROTIME-INR   Result Value Ref Range    Protime 9.6 9.0 - 12.0 sec    INR 0.9    APTT   Result Value Ref Range    PTT 27.0 20.5 - 30.5 sec   COVID-19    Specimen: Other   Result Value Ref Range SARS-CoV-2 Not Detected Not Detected    SARS-CoV-2, Rapid          Source . NASOPHARYNGEAL SWAB     SARS-CoV-2         Surgical Pathology   Result Value Ref Range    Surgical Pathology Report       -- Diagnosis --  LIVER, BIOPSY:       -KELL TO Matthew South M.D.  **Electronically Signed Out**         [facility]/2/2/2021       Clinical Information  Pre-Op Diagnosis:  ABNORMAL LIVER ENZYMES, FATTY LIVER  Operative Findings:  IMAGING AND LABS ARE NOT DIAGNOSTIC  Operation Performed:    LIVER CORE BIOPSY    Source of Specimen  1: LIVER CORE    Gross Description  \"YARIEL GEORGE, LIVER BX\" Six cores from 0.3 to 2.0 cm in length,  < 0.1 cm in diameter. Entirely 1cs. mpb tm       Microscopic Description  Microscopic examination performed disclosing intact lobular  architecture, with moderate to marked fatty change. No lobular or  portal inflammation of any significance is identified. No granuloma  or neoplasm is seen. With additional stains demonstrating reactive  controls, iron stain shows no increased storage or hepatocellular  iron; reticulin stain shows architectural distortion due to  macrovesicular fat but no other intrinsic connective tissue netwo rk  distortion; and trichrome stain shows no fibrosis. SURGICAL PATHOLOGY CONSULTATION       Patient Name: Geraldine Chong: [id number]  Path Number: [id number]    [address] [place] [address].   [place], [address]  [phone]  Fax: [phone]     HEPATIC FUNCTION PANEL   Result Value Ref Range    Albumin 4.0 3.5 - 5.2 g/dL    Alkaline Phosphatase 192 (H) 35 - 104 U/L     (H) 5 - 33 U/L    AST 21 <32 U/L    Total Bilirubin 0.16 (L) 0.3 - 1.2 mg/dL    Bilirubin, Direct <0.08 <0.31 mg/dL    Bilirubin, Indirect CANNOT BE CALCULATED 0.00 - 1.00 mg/dL    Total Protein 7.2 6.4 - 8.3 g/dL    Globulin NOT REPORTED 1.5 - 3.8 g/dL Albumin/Globulin Ratio 1.3 1.0 - 2.5   Hemoglobin A1C   Result Value Ref Range    Hemoglobin A1C 5.9 4.0 - 6.0 %    Estimated Avg Glucose 123 mg/dL   HEPATIC FUNCTION PANEL   Result Value Ref Range    Albumin 4.0 3.5 - 5.2 g/dL    Alkaline Phosphatase 250 (H) 35 - 104 U/L     (H) 5 - 33 U/L    AST 48 (H) <32 U/L    Total Bilirubin 0.41 0.3 - 1.2 mg/dL    Bilirubin, Direct 0.13 <0.31 mg/dL    Bilirubin, Indirect 0.28 0.00 - 1.00 mg/dL    Total Protein 7.4 6.4 - 8.3 g/dL    Globulin NOT REPORTED 1.5 - 3.8 g/dL    Albumin/Globulin Ratio 1.2 1.0 - 2.5   EKG 12 Lead   Result Value Ref Range    Ventricular Rate 75 BPM    Atrial Rate 75 BPM    P-R Interval 152 ms    QRS Duration 82 ms    Q-T Interval 372 ms    QTc Calculation (Bazett) 415 ms    P Axis 60 degrees    R Axis 20 degrees    T Axis 35 degrees     No results found. Physical Exam:  At time of discharge  General appearance - NAD, AOx 3    Lungs -CTAB, no R/R/R  Heart - RRR, no M/R/G  Abdomen - Soft, NT/ND  Neurological:  MAEx4, No focal motor deficit, sensory loss  Extremities - Cap refil <2 sec in all ext., no edema  Skin -warm, dry      Hospital Course:  Clinical course has improved, labs and imaging reviewed. Isac Madera originally presented to the hospital on 1/25/2021  7:18 AM with abdominal pain requiring IV fluids and IV supportive care. .  At that time it was determined that She required further observation and GI evaluation. Patient was seen by gastroenterology and endoscopy was performed. A food bolus was seen. Patient had further work-up with gastric emptying study. Patient had fatty liver with LFTs slightly elevated. Patient had improving LFTs and after removal of the bezoar was doing better in terms of ability to handle p.o.. Labs and imaging were followed daily. Imaging results as above. She is medically stable to be discharged.        Disposition: Home Patient stated that they will not drive themselves home from the hospital if they have gotten pain killers/ narcotics earlier that day and that they will arrange for transportation on their own or work with the  for a ride. Patient counseled NOT to drive while under the influence of narcotics/ pain killers. Condition: Good    Patient stable and ready for discharge home. I have discussed plan of care with patient and they are in understanding. They were instructed to read discharge paperwork. All of their questions and concerns were addressed. Time Spent: 7 day      --  Gamal Hughes MD  Emergency Medicine Attending Physician    This dictation was generated by voice recognition computer software. Although all attempts are made to edit the dictation for accuracy, there may be errors in the transcription that are not intended.

## 2021-02-08 ENCOUNTER — OFFICE VISIT (OUTPATIENT)
Dept: ORTHOPEDIC SURGERY | Age: 46
End: 2021-02-08
Payer: COMMERCIAL

## 2021-02-08 DIAGNOSIS — M25.562 ACUTE PAIN OF LEFT KNEE: Primary | ICD-10-CM

## 2021-02-08 PROCEDURE — G8428 CUR MEDS NOT DOCUMENT: HCPCS | Performed by: ORTHOPAEDIC SURGERY

## 2021-02-08 PROCEDURE — 1036F TOBACCO NON-USER: CPT | Performed by: ORTHOPAEDIC SURGERY

## 2021-02-08 PROCEDURE — G8484 FLU IMMUNIZE NO ADMIN: HCPCS | Performed by: ORTHOPAEDIC SURGERY

## 2021-02-08 PROCEDURE — G8417 CALC BMI ABV UP PARAM F/U: HCPCS | Performed by: ORTHOPAEDIC SURGERY

## 2021-02-08 PROCEDURE — 1111F DSCHRG MED/CURRENT MED MERGE: CPT | Performed by: ORTHOPAEDIC SURGERY

## 2021-02-08 PROCEDURE — 99213 OFFICE O/P EST LOW 20 MIN: CPT | Performed by: ORTHOPAEDIC SURGERY

## 2021-02-08 RX ORDER — OXYCODONE HYDROCHLORIDE 5 MG/1
5 TABLET ORAL EVERY 6 HOURS PRN
Qty: 28 TABLET | Refills: 0 | Status: SHIPPED | OUTPATIENT
Start: 2021-02-08 | End: 2021-02-15

## 2021-02-08 NOTE — PROGRESS NOTES
Scooter Garcias M.D.            [address] [facility]3]., [address] [facility], [facility] [address]           Dept Phone: [phone]           Dept Fax:  [address] [place]          Dept Phone: [phone]           Dept Fax:  [phone]      Chief Compliant:  Chief Complaint   Patient presents with    Pain     LT KNEE & ANKLE        History of Present Illness: This is a 54 y.o. female who presents to the clinic today for evaluation / follow up of left knee pain. Please see prior dictation. Patient was seen 2 weeks ago after sustaining an injury and was seen at a [facility].  Because the patient had had 2 prior arthroscopies of his left knee we opted not to do an MRI right away was cutting the to see her back here and see how she is doing symptomatically. Unfortunately during the past week or so patient was recently hospitalized for pancreatitis. She now states that her left knee is still bothersome despite her being laid up in the hospital..       Review of Systems   Constitutional: Negative for fever, chills, sweats. Eyes: Negative for changes in vision, or pain. HENT: Negative for ear ache, epistaxis, or sore throat. Respiratory/Cardio: Negative for Chest pain, palpitations, SOB, or cough. Gastrointestinal: Negative for abdominal pain, N/V/D. Genitourinary: Negative for dysuria, frequency, urgency, or hematuria. Neurological: Negative for headache, numbness, or weakness. Integumentary: Negative for rash, itching, laceration, or abrasion. Musculoskeletal: Positive for Pain (LT KNEE & ANKLE)       Physical Exam:  Constitutional: Patient is oriented to person, place, and time. Patient appears well-developed and well nourished.    HENT: Negative otherwise noted  Head: Normocephalic and Atraumatic  Nose: Normal Eyes: Conjunctivae and EOM are normal  Neck: Normal range of motion Neck supple. Respiratory/Cardio: Effort normal. No respiratory distress. Musculoskeletal: Examination patient left knee notes that she still unable to give full extension. She flexes to about 110 degrees with pain after that I did 1 maneuver with Nova's on the medial joint line and she has significant pain basically center off the table. No further examination carried out  Neurological: Patient is alert and oriented to person, place, and time. Normal strenght. No sensory deficit. Skin: Skin is warm and dry  Psychiatric: Behavior is normal. Thought content normal.  Nursing note and vitals reviewed. Labs and Imaging:     XR taken today:  No results found. No orders of the defined types were placed in this encounter. Assessment and Plan:  1. Acute pain of left knee            This is a 54 y.o. female who presents to the clinic today for evaluation / follow up of recurrent medial meniscus tear left knee. Past History:    Current Outpatient Medications:     oxyCODONE (ROXICODONE) 5 MG immediate release tablet, Take 1 tablet by mouth every 6 hours as needed for Pain for up to 5 days. Intended supply: 5 days. Take lowest dose possible to manage pain, Disp: 20 tablet, Rfl: 0    promethazine (PHENERGAN) 25 MG tablet, Take 1 tablet by mouth every 6 hours as needed for Nausea WARNING:  May cause drowsiness. May impair ability to operate vehicles or machinery.   Do not use in combination with alcohol., Disp: 20 tablet, Rfl: 0    tiZANidine (ZANAFLEX) 4 MG tablet, Take 4 mg by mouth nightly, Disp: , Rfl:     buPROPion (WELLBUTRIN XL) 150 MG extended release tablet, Take 150 mg by mouth nightly, Disp: , Rfl:     losartan (COZAAR) 50 MG tablet, Take 50 mg by mouth daily, Disp: , Rfl:     amitriptyline (ELAVIL) 25 MG tablet, Take 25 mg by mouth nightly, Disp: , Rfl:   prazosin (MINIPRESS) 1 MG capsule, Take 2 mg by mouth nightly , Disp: , Rfl:     melatonin 5 MG TABS tablet, Take 10 mg by mouth daily , Disp: , Rfl:     potassium chloride (KLOR-CON M) 10 MEQ extended release tablet, Take 1 tablet by mouth 2 times daily, Disp: 8 tablet, Rfl: 0    QUEtiapine (SEROQUEL XR) 400 MG extended release tablet, Take 800 mg by mouth nightly, Disp: , Rfl:     busPIRone (BUSPAR) 15 MG tablet, Take 15 mg by mouth nightly Can take once in morning PRN, Disp: , Rfl:     omeprazole (PRILOSEC) 40 MG delayed release capsule, Take 40 mg by mouth daily, Disp: , Rfl:     Cholecalciferol (VITAMIN D) 50 MCG (2000 UT) CAPS capsule, Take by mouth daily, Disp: , Rfl:     butalbital-APAP-caffeine (FIORICET) -40 MG CAPS per capsule, Take 1 capsule by mouth every 6 hours as needed for Headaches, Disp: 28 capsule, Rfl: 0    albuterol sulfate HFA (PROVENTIL HFA) 108 (90 Base) MCG/ACT inhaler, Inhale 2 puffs into the lungs as needed, Disp: , Rfl:     magnesium oxide (MAG-OX) 400 MG tablet, Take 400 mg by mouth daily, Disp: , Rfl:     fexofenadine (RA ALLERGY RELIEF) 180 MG tablet, take 1 tablet by mouth once daily (Patient taking differently: Take 180 mg by mouth daily as needed take 1 tablet by mouth once daily), Disp: 30 tablet, Rfl: 0    pramipexole (MIRAPEX) 0.5 MG tablet, take 1 tablet by mouth twice a day (Patient taking differently: Take 0.25 mg by mouth daily take 1 tablet by mouth twice a day), Disp: 60 tablet, Rfl: 0    DULoxetine (CYMBALTA) 60 MG extended release capsule, TAKE 1 CAPSULE BY MOUTH TWICE A DAY (Patient taking differently: Take 120 mg by mouth Daily with supper ), Disp: 60 capsule, Rfl: 3  Allergies   Allergen Reactions    Aripiprazole      Bad reaction    Eletriptan      Other reaction(s): Swelling-throat    Hydrocodone-Acetaminophen Itching     Other reaction(s): Unknown    Oxycodone-Acetaminophen      Other reaction(s): Unknown  Sumatriptan Other (See Comments)    Triptans      Other reaction(s): Unknown    Lamotrigine Rash     Social History     Socioeconomic History    Marital status:      Spouse name: Not on file    Number of children: Not on file    Years of education: Not on file    Highest education level: Not on file   Occupational History    Not on file   Social Needs    Financial resource strain: Not on file    Food insecurity     Worry: Not on file     Inability: Not on file    Transportation needs     Medical: Not on file     Non-medical: Not on file   Tobacco Use    Smoking status: Former Smoker     Packs/day: 1.00     Types: Cigarettes     Quit date: 2019     Years since quittin.1    Smokeless tobacco: Never Used    Tobacco comment: today last smoke   Substance and Sexual Activity    Alcohol use:  Yes     Alcohol/week: 0.0 standard drinks     Comment: rarely    Drug use: No    Sexual activity: Not on file   Lifestyle    Physical activity     Days per week: Not on file     Minutes per session: Not on file    Stress: Not on file   Relationships    Social connections     Talks on phone: Not on file     Gets together: Not on file     Attends [employer] service: Not on file     Active member of club or organization: Not on file     Attends meetings of clubs or organizations: Not on file     Relationship status: Not on file    Intimate partner violence     Fear of current or ex partner: Not on file     Emotionally abused: Not on file     Physically abused: Not on file     Forced sexual activity: Not on file   Other Topics Concern    Not on file   Social History Narrative    Not on file     Past Medical History:   Diagnosis Date    Abnormal levels of other serum enzymes     Anxiety     Bilateral leg edema     Chronic kidney disease     right renal cyst    Depression     [facility]    after [facility]    Elevated liver enzymes     Fibromyalgia  Headache(784.0)     migraines    Hx of blood clots     1997 left calf    Hypertension     Kidney stone     Obstructive sleep apnea syndrome     Pancreatitis 2001    Pleural effusion 2001    SOB (shortness of breath)     Supraventricular tachycardia (HCC)     SVT (supra[address]ricular tachycardia) ([address] 9/8/2015     Past Surgical History:   Procedure Laterality Date    ATRIAL ABLATION SURGERY      BACK SURGERY      L4-5    CHOLECYSTECTOMY  2001    ENDOMETRIAL ABLATION      e sure implants placed also    ENDOSCOPY, COLON, DIAGNOSTIC      EXCISION LESION HAND / FINGER Right 1/25/2019    HAND LESION BIOPSY EXCISION performed by Angi Davis MD at [address] [facility] Bilateral     left x2, right x1    FOOT SURGERY Right     x3    KNEE ARTHROSCOPY Left     x2    HI CYSTO/URETERO/PYELOSCOPY W/LITHOTRIPSY Left 9/20/2017    CYSTOSCOPY URETEROSCOPY HOLMIUM LASER LITHO WITH STONE BASKETING WITH  STENT  performed by Kim Starr MD at [facility] Right     UPPER GASTROINTESTINAL ENDOSCOPY N/A 2/2/2021    EGD ESOPHAGOGASTRODUODENOSCOPY performed by Lionel Hood MD at [address] [place] [address]  2/1/2021    US GUIDED LIVER BIOPSY PERCUTANEOUS 2/1/2021 [facility] ULTRASOUND     Family History   Problem Relation Age of Onset    Heart Disease Father     High Blood Pressure Brother    Plan  I informed the patient that I do feel that her examination is definitely consistent with a recurrent tear and do not feel that an MRI is indicated or necessary at this time. She is in agreement with me would like to get scheduled as soon as possible.       Provider Attestation: Xander Jaime, personally performed the services described in this documentation. All medical record entries made by the scribe were at my direction and in my presence. I have reviewed the chart and discharge instructions and agree that the records reflect my personal performance and is accurate and complete. Adri Mabry MD. 02/08/21      Please note that this chart was generated using voice recognition Dragon dictation software. Although every effort was made to ensure the accuracy of this automated transcription, some errors in transcription may have occurred.

## 2021-02-09 ENCOUNTER — ANESTHESIA EVENT (OUTPATIENT)
Dept: OPERATING ROOM | Age: 46
End: 2021-02-09
Payer: COMMERCIAL

## 2021-02-10 ENCOUNTER — HOSPITAL ENCOUNTER (OUTPATIENT)
Age: 46
Setting detail: SPECIMEN
Discharge: HOME | End: 2021-02-10
Payer: COMMERCIAL

## 2021-02-10 ENCOUNTER — HOSPITAL ENCOUNTER (OUTPATIENT)
Dept: PREADMISSION TESTING | Age: 46
Discharge: HOME | End: 2021-02-14
Payer: COMMERCIAL

## 2021-02-10 VITALS
DIASTOLIC BLOOD PRESSURE: 65 MMHG | BODY MASS INDEX: 44.48 KG/M2 | WEIGHT: 267 LBS | RESPIRATION RATE: 20 BRPM | OXYGEN SATURATION: 97 % | SYSTOLIC BLOOD PRESSURE: 144 MMHG | HEIGHT: 65 IN | TEMPERATURE: 97 F | HEART RATE: 102 BPM

## 2021-02-10 DIAGNOSIS — Z01.818 PRE-OP TESTING: ICD-10-CM

## 2021-02-10 DIAGNOSIS — Z01.818 PRE-OP TESTING: Primary | ICD-10-CM

## 2021-02-10 LAB
ANION GAP SERPL CALCULATED.3IONS-SCNC: 12 MMOL/L (ref 9–17)
BUN BLDV-MCNC: 14 MG/DL (ref 6–20)
BUN/CREAT BLD: NORMAL (ref 9–20)
CALCIUM SERPL-MCNC: 9.1 MG/DL (ref 8.6–10.4)
CHLORIDE BLD-SCNC: 103 MMOL/L (ref 98–107)
CO2: 25 MMOL/L (ref 20–31)
CREAT SERPL-MCNC: 0.57 MG/DL (ref 0.5–0.9)
GFR AFRICAN AMERICAN: >60 ML/MIN
GFR NON-AFRICAN AMERICAN: >60 ML/MIN
GFR SERPL CREATININE-BSD FRML MDRD: NORMAL ML/MIN/{1.73_M2}
GFR SERPL CREATININE-BSD FRML MDRD: NORMAL ML/MIN/{1.73_M2}
GLUCOSE BLD-MCNC: 98 MG/DL (ref 70–99)
HCT VFR BLD CALC: 39.6 % (ref 36.3–47.1)
HEMOGLOBIN: 12.5 G/DL (ref 11.9–15.1)
MCH RBC QN AUTO: 30.8 PG (ref 25.2–33.5)
MCHC RBC AUTO-ENTMCNC: 31.6 G/DL (ref 28.4–34.8)
MCV RBC AUTO: 97.5 FL (ref 82.6–102.9)
NRBC AUTOMATED: 0 PER 100 WBC
PDW BLD-RTO: 12.8 % (ref 11.8–14.4)
PLATELET # BLD: 284 K/UL (ref 138–453)
PMV BLD AUTO: 11.5 FL (ref 8.1–13.5)
POTASSIUM SERPL-SCNC: 4.3 MMOL/L (ref 3.7–5.3)
RBC # BLD: 4.06 M/UL (ref 3.95–5.11)
SODIUM BLD-SCNC: 140 MMOL/L (ref 135–144)
WBC # BLD: 9 K/UL (ref 3.5–11.3)

## 2021-02-10 NOTE — PROGRESS NOTES
DAY OF SURGERY/PROCEDURE  GUIDELINES    As a patient at the [facility] you can expect quality medical and nursing care that is centered on your individual needs. It is our goal to make your surgical experience as comfortable and excellent as possible.  ________________________________________________________________________    The following instructions are general guidelines, if any information on this sheet is different from what your doctor has instructed you to do, please follow your doctor's instructions. · Please arrive on time or your procedure may be rescheduled. · Enter through front entrance C of the building. · Upon arrival you will be taken to the pre-operative area to get ready for surgery, your family will stay in the waiting room and visit with you once you are ready for surgery. · You will be given a specific time when you will NOT be able to eat, drink, smoke, suck or chew ANYTHING (no water, gum, mints, cigarettes, cigars, pipes, snuff, chewing tobacco, etc.) or your surgery may be canceled. This will occur during your PAT appointment or by phone 1-2 days before surgery  · Take a shower or bath on the morning of your surgery/procedure (Hibiclens if directed)  · Brush your teeth, but do not swallow any water  · IN CASE OF ILLNESS - If you have a cold or flu symptoms (high fever, runny nose, sore throat, cough, etc.) rash, nausea, vomiting, loose stools, and/or recent contact with someone who has a contagious disease (chick pox, measles, etc.) please call your doctor before coming to the surgery center  · Take a small sip of water with heart, blood pressure, and/or seizure medication the morning of surgery.  (DO NOT take blood pressure medications that contain a diuretic)  · If applicable bring your:  · Inhaler (s)  · Hearing aid(s)  · Eyeglasses and Case (If you wear contacts they have to be removed before surgery, bring case and solution) · Any paperwork given to you by your doctor  · Any X-rays you were told to bring  · A copy of your Living Will or Durable Power of     · DO NOT take anticoagulants (blood thinners, aspiring or aspirin-containing products) two weeks prior to your surgery. · DO NOT take any diabetic pills or insulin. Please bring your sliding scale instructions and sick day plan with you. If you have a low blood sugar reaction after midnight, drink 4 ounces of apple juice or regular pop. · Wear loose, comfortable clothing that is easy to put on and take off. · You will be returning home the same day as your surgery, you will need to have a responsible adult (25years of age or older) present to drive you home. You will need someone to stay with you at home for the first 24 hours following your surgery. This is due to the anesthesia and the medications given to you during surgery and recovery. · Your doctor may talk with your family immediately following your procedure. Depending on your needs, you will stay in the recovery room between 30 minutes to 2 hours. · While you are recovering, the surgery family waiting room  can answer many of your family's questions. All medically related questions will be answered by a medical professional. If you had outpatient surgery, you will meet your family for discharge instructions before leaving.

## 2021-02-11 ENCOUNTER — HOSPITAL ENCOUNTER (OUTPATIENT)
Dept: LAB | Age: 46
Setting detail: SPECIMEN
Discharge: HOME | End: 2021-02-11
Payer: COMMERCIAL

## 2021-02-11 DIAGNOSIS — Z01.818 PREOP TESTING: Primary | ICD-10-CM

## 2021-02-11 LAB
SARS-COV-2, RAPID: NORMAL
SARS-COV-2: NORMAL
SARS-COV-2: NOT DETECTED
SOURCE: NORMAL

## 2021-02-11 PROCEDURE — U0003 INFECTIOUS AGENT DETECTION BY NUCLEIC ACID (DNA OR RNA); SEVERE ACUTE RESPIRATORY SYNDROME CORONAVIRUS 2 (SARS-COV-2) (CORONAVIRUS DISEASE [COVID-19]), AMPLIFIED PROBE TECHNIQUE, MAKING USE OF HIGH THROUGHPUT TECHNOLOGIES AS DESCRIBED BY CMS-2020-01-R: HCPCS

## 2021-02-11 PROCEDURE — U0005 INFEC AGEN DETEC AMPLI PROBE: HCPCS

## 2021-02-15 ENCOUNTER — ANESTHESIA (OUTPATIENT)
Dept: OPERATING ROOM | Age: 46
End: 2021-02-15
Payer: COMMERCIAL

## 2021-02-15 ENCOUNTER — HOSPITAL ENCOUNTER (OUTPATIENT)
Age: 46
Setting detail: OUTPATIENT SURGERY
Discharge: HOME | End: 2021-02-15
Attending: ORTHOPAEDIC SURGERY | Admitting: ORTHOPAEDIC SURGERY
Payer: COMMERCIAL

## 2021-02-15 VITALS
TEMPERATURE: 96.8 F | SYSTOLIC BLOOD PRESSURE: 146 MMHG | BODY MASS INDEX: 43.99 KG/M2 | OXYGEN SATURATION: 89 % | WEIGHT: 264 LBS | RESPIRATION RATE: 23 BRPM | HEIGHT: 65 IN | DIASTOLIC BLOOD PRESSURE: 79 MMHG | HEART RATE: 100 BPM

## 2021-02-15 VITALS
TEMPERATURE: 97.9 F | SYSTOLIC BLOOD PRESSURE: 114 MMHG | DIASTOLIC BLOOD PRESSURE: 55 MMHG | OXYGEN SATURATION: 100 % | RESPIRATION RATE: 18 BRPM

## 2021-02-15 DIAGNOSIS — S83.242A ACUTE MEDIAL MENISCUS TEAR OF LEFT KNEE, INITIAL ENCOUNTER: Primary | ICD-10-CM

## 2021-02-15 LAB — HCG, PREGNANCY URINE (POC): NEGATIVE

## 2021-02-15 PROCEDURE — 7100000010 HC PHASE II RECOVERY - FIRST 15 MIN: Performed by: ORTHOPAEDIC SURGERY

## 2021-02-15 PROCEDURE — 6360000002 HC RX W HCPCS: Performed by: NURSE ANESTHETIST, CERTIFIED REGISTERED

## 2021-02-15 PROCEDURE — 6360000002 HC RX W HCPCS

## 2021-02-15 PROCEDURE — 7100000011 HC PHASE II RECOVERY - ADDTL 15 MIN: Performed by: ORTHOPAEDIC SURGERY

## 2021-02-15 PROCEDURE — 2500000003 HC RX 250 WO HCPCS: Performed by: NURSE ANESTHETIST, CERTIFIED REGISTERED

## 2021-02-15 PROCEDURE — 3600000004 HC SURGERY LEVEL 4 BASE: Performed by: ORTHOPAEDIC SURGERY

## 2021-02-15 PROCEDURE — 3600000014 HC SURGERY LEVEL 4 ADDTL 15MIN: Performed by: ORTHOPAEDIC SURGERY

## 2021-02-15 PROCEDURE — 2709999900 HC NON-CHARGEABLE SUPPLY: Performed by: ORTHOPAEDIC SURGERY

## 2021-02-15 PROCEDURE — 2500000003 HC RX 250 WO HCPCS

## 2021-02-15 PROCEDURE — 81025 URINE PREGNANCY TEST: CPT

## 2021-02-15 PROCEDURE — 3700000001 HC ADD 15 MINUTES (ANESTHESIA): Performed by: ORTHOPAEDIC SURGERY

## 2021-02-15 PROCEDURE — 6360000002 HC RX W HCPCS: Performed by: ORTHOPAEDIC SURGERY

## 2021-02-15 PROCEDURE — 99219 PR INITIAL OBSERVATION CARE/DAY 50 MINUTES: CPT | Performed by: ORTHOPAEDIC SURGERY

## 2021-02-15 PROCEDURE — 29881 ARTHRS KNE SRG MNISECTMY M/L: CPT | Performed by: ORTHOPAEDIC SURGERY

## 2021-02-15 PROCEDURE — 2580000003 HC RX 258: Performed by: ANESTHESIOLOGY

## 2021-02-15 PROCEDURE — 3700000000 HC ANESTHESIA ATTENDED CARE: Performed by: ORTHOPAEDIC SURGERY

## 2021-02-15 PROCEDURE — 6370000000 HC RX 637 (ALT 250 FOR IP)

## 2021-02-15 PROCEDURE — 7100000000 HC PACU RECOVERY - FIRST 15 MIN: Performed by: ORTHOPAEDIC SURGERY

## 2021-02-15 PROCEDURE — 7100000001 HC PACU RECOVERY - ADDTL 15 MIN: Performed by: ORTHOPAEDIC SURGERY

## 2021-02-15 RX ORDER — ONDANSETRON 2 MG/ML
INJECTION INTRAMUSCULAR; INTRAVENOUS PRN
Status: DISCONTINUED | OUTPATIENT
Start: 2021-02-15 | End: 2021-02-15 | Stop reason: SDUPTHER

## 2021-02-15 RX ORDER — APREPITANT 40 MG/1
40 CAPSULE ORAL ONCE
Status: CANCELLED | OUTPATIENT
Start: 2021-02-15 | End: 2021-02-15

## 2021-02-15 RX ORDER — SODIUM CHLORIDE 0.9 % (FLUSH) 0.9 %
10 SYRINGE (ML) INJECTION EVERY 12 HOURS SCHEDULED
Status: DISCONTINUED | OUTPATIENT
Start: 2021-02-15 | End: 2021-02-15 | Stop reason: HOSPADM

## 2021-02-15 RX ORDER — PROMETHAZINE HYDROCHLORIDE 25 MG/ML
12.5 INJECTION, SOLUTION INTRAMUSCULAR; INTRAVENOUS
Status: DISCONTINUED | OUTPATIENT
Start: 2021-02-15 | End: 2021-02-15 | Stop reason: HOSPADM

## 2021-02-15 RX ORDER — DIPHENHYDRAMINE HYDROCHLORIDE 50 MG/ML
12.5 INJECTION INTRAMUSCULAR; INTRAVENOUS
Status: DISCONTINUED | OUTPATIENT
Start: 2021-02-15 | End: 2021-02-15 | Stop reason: HOSPADM

## 2021-02-15 RX ORDER — SODIUM CHLORIDE, SODIUM LACTATE, POTASSIUM CHLORIDE, CALCIUM CHLORIDE 600; 310; 30; 20 MG/100ML; MG/100ML; MG/100ML; MG/100ML
INJECTION, SOLUTION INTRAVENOUS CONTINUOUS
Status: DISCONTINUED | OUTPATIENT
Start: 2021-02-15 | End: 2021-02-15 | Stop reason: HOSPADM

## 2021-02-15 RX ORDER — APREPITANT 40 MG/1
CAPSULE ORAL
Status: COMPLETED
Start: 2021-02-15 | End: 2021-02-15

## 2021-02-15 RX ORDER — FENTANYL CITRATE 50 UG/ML
INJECTION, SOLUTION INTRAMUSCULAR; INTRAVENOUS PRN
Status: DISCONTINUED | OUTPATIENT
Start: 2021-02-15 | End: 2021-02-15 | Stop reason: SDUPTHER

## 2021-02-15 RX ORDER — OXYCODONE HYDROCHLORIDE 10 MG/1
5 TABLET ORAL EVERY 6 HOURS PRN
Qty: 28 TABLET | Refills: 0 | Status: SHIPPED | OUTPATIENT
Start: 2021-02-15 | End: 2021-02-22

## 2021-02-15 RX ORDER — MEPERIDINE HYDROCHLORIDE 50 MG/ML
12.5 INJECTION INTRAMUSCULAR; INTRAVENOUS; SUBCUTANEOUS EVERY 5 MIN PRN
Status: DISCONTINUED | OUTPATIENT
Start: 2021-02-15 | End: 2021-02-15 | Stop reason: HOSPADM

## 2021-02-15 RX ORDER — MIDAZOLAM HYDROCHLORIDE 2 MG/2ML
2 INJECTION, SOLUTION INTRAMUSCULAR; INTRAVENOUS ONCE
Status: CANCELLED | OUTPATIENT
Start: 2021-02-15 | End: 2021-02-15

## 2021-02-15 RX ORDER — SODIUM CHLORIDE 0.9 % (FLUSH) 0.9 %
10 SYRINGE (ML) INJECTION PRN
Status: DISCONTINUED | OUTPATIENT
Start: 2021-02-15 | End: 2021-02-15 | Stop reason: HOSPADM

## 2021-02-15 RX ORDER — PROPOFOL 10 MG/ML
INJECTION, EMULSION INTRAVENOUS PRN
Status: DISCONTINUED | OUTPATIENT
Start: 2021-02-15 | End: 2021-02-15 | Stop reason: SDUPTHER

## 2021-02-15 RX ORDER — MIDAZOLAM HYDROCHLORIDE 1 MG/ML
INJECTION INTRAMUSCULAR; INTRAVENOUS
Status: COMPLETED
Start: 2021-02-15 | End: 2021-02-15

## 2021-02-15 RX ORDER — DEXAMETHASONE SODIUM PHOSPHATE 10 MG/ML
INJECTION, SOLUTION INTRAMUSCULAR; INTRAVENOUS PRN
Status: DISCONTINUED | OUTPATIENT
Start: 2021-02-15 | End: 2021-02-15 | Stop reason: SDUPTHER

## 2021-02-15 RX ORDER — OXYCODONE HYDROCHLORIDE AND ACETAMINOPHEN 5; 325 MG/1; MG/1
TABLET ORAL
Status: COMPLETED
Start: 2021-02-15 | End: 2021-02-15

## 2021-02-15 RX ORDER — OXYCODONE HYDROCHLORIDE AND ACETAMINOPHEN 5; 325 MG/1; MG/1
1 TABLET ORAL PRN
Status: COMPLETED | OUTPATIENT
Start: 2021-02-15 | End: 2021-02-15

## 2021-02-15 RX ORDER — SCOLOPAMINE TRANSDERMAL SYSTEM 1 MG/1
PATCH, EXTENDED RELEASE TRANSDERMAL
Status: COMPLETED
Start: 2021-02-15 | End: 2021-02-15

## 2021-02-15 RX ORDER — ONDANSETRON 2 MG/ML
4 INJECTION INTRAMUSCULAR; INTRAVENOUS
Status: DISCONTINUED | OUTPATIENT
Start: 2021-02-15 | End: 2021-02-15 | Stop reason: HOSPADM

## 2021-02-15 RX ORDER — SODIUM CHLORIDE 9 MG/ML
INJECTION, SOLUTION INTRAVENOUS CONTINUOUS
Status: DISCONTINUED | OUTPATIENT
Start: 2021-02-15 | End: 2021-02-15 | Stop reason: HOSPADM

## 2021-02-15 RX ORDER — 0.9 % SODIUM CHLORIDE 0.9 %
500 INTRAVENOUS SOLUTION INTRAVENOUS
Status: DISCONTINUED | OUTPATIENT
Start: 2021-02-15 | End: 2021-02-15 | Stop reason: HOSPADM

## 2021-02-15 RX ORDER — OXYCODONE HYDROCHLORIDE AND ACETAMINOPHEN 5; 325 MG/1; MG/1
2 TABLET ORAL PRN
Status: COMPLETED | OUTPATIENT
Start: 2021-02-15 | End: 2021-02-15

## 2021-02-15 RX ORDER — ROPIVACAINE HYDROCHLORIDE 5 MG/ML
INJECTION, SOLUTION EPIDURAL; INFILTRATION; PERINEURAL PRN
Status: DISCONTINUED | OUTPATIENT
Start: 2021-02-15 | End: 2021-02-15 | Stop reason: ALTCHOICE

## 2021-02-15 RX ORDER — SCOLOPAMINE TRANSDERMAL SYSTEM 1 MG/1
1 PATCH, EXTENDED RELEASE TRANSDERMAL ONCE
Status: CANCELLED | OUTPATIENT
Start: 2021-02-15 | End: 2021-02-15

## 2021-02-15 RX ORDER — ROPIVACAINE HYDROCHLORIDE 2 MG/ML
INJECTION, SOLUTION EPIDURAL; INFILTRATION; PERINEURAL
Status: DISCONTINUED
Start: 2021-02-15 | End: 2021-02-15 | Stop reason: HOSPADM

## 2021-02-15 RX ORDER — MIDAZOLAM HYDROCHLORIDE 1 MG/ML
INJECTION INTRAMUSCULAR; INTRAVENOUS PRN
Status: DISCONTINUED | OUTPATIENT
Start: 2021-02-15 | End: 2021-02-15 | Stop reason: SDUPTHER

## 2021-02-15 RX ORDER — LIDOCAINE HYDROCHLORIDE 10 MG/ML
INJECTION, SOLUTION EPIDURAL; INFILTRATION; INTRACAUDAL; PERINEURAL PRN
Status: DISCONTINUED | OUTPATIENT
Start: 2021-02-15 | End: 2021-02-15 | Stop reason: SDUPTHER

## 2021-02-15 RX ORDER — LABETALOL 20 MG/4 ML (5 MG/ML) INTRAVENOUS SYRINGE
5 EVERY 10 MIN PRN
Status: DISCONTINUED | OUTPATIENT
Start: 2021-02-15 | End: 2021-02-15 | Stop reason: HOSPADM

## 2021-02-15 RX ORDER — MORPHINE SULFATE 2 MG/ML
2 INJECTION, SOLUTION INTRAMUSCULAR; INTRAVENOUS EVERY 5 MIN PRN
Status: DISCONTINUED | OUTPATIENT
Start: 2021-02-15 | End: 2021-02-15 | Stop reason: HOSPADM

## 2021-02-15 RX ADMIN — APREPITANT 40 MG: 40 CAPSULE ORAL at 10:15

## 2021-02-15 RX ADMIN — FENTANYL CITRATE 50 MCG: 50 INJECTION, SOLUTION INTRAMUSCULAR; INTRAVENOUS at 10:41

## 2021-02-15 RX ADMIN — FENTANYL CITRATE 25 MCG: 50 INJECTION, SOLUTION INTRAMUSCULAR; INTRAVENOUS at 10:59

## 2021-02-15 RX ADMIN — LIDOCAINE HYDROCHLORIDE 50 MG: 10 INJECTION, SOLUTION EPIDURAL; INFILTRATION; INTRACAUDAL; PERINEURAL at 10:41

## 2021-02-15 RX ADMIN — DEXAMETHASONE SODIUM PHOSPHATE 10 MG: 10 INJECTION, SOLUTION INTRAMUSCULAR; INTRAVENOUS at 10:48

## 2021-02-15 RX ADMIN — PROPOFOL 200 MG: 10 INJECTION, EMULSION INTRAVENOUS at 10:41

## 2021-02-15 RX ADMIN — ONDANSETRON 4 MG: 2 INJECTION INTRAMUSCULAR; INTRAVENOUS at 10:48

## 2021-02-15 RX ADMIN — FENTANYL CITRATE 50 MCG: 50 INJECTION, SOLUTION INTRAMUSCULAR; INTRAVENOUS at 10:45

## 2021-02-15 RX ADMIN — SODIUM CHLORIDE, POTASSIUM CHLORIDE, SODIUM LACTATE AND CALCIUM CHLORIDE: 600; 310; 30; 20 INJECTION, SOLUTION INTRAVENOUS at 10:37

## 2021-02-15 RX ADMIN — OXYCODONE HYDROCHLORIDE AND ACETAMINOPHEN 2 TABLET: 5; 325 TABLET ORAL at 11:51

## 2021-02-15 RX ADMIN — FENTANYL CITRATE 25 MCG: 50 INJECTION, SOLUTION INTRAMUSCULAR; INTRAVENOUS at 10:58

## 2021-02-15 RX ADMIN — FENTANYL CITRATE 50 MCG: 50 INJECTION, SOLUTION INTRAMUSCULAR; INTRAVENOUS at 10:57

## 2021-02-15 RX ADMIN — MIDAZOLAM HYDROCHLORIDE 2 MG: 1 INJECTION, SOLUTION INTRAMUSCULAR; INTRAVENOUS at 10:04

## 2021-02-15 RX ADMIN — MIDAZOLAM HYDROCHLORIDE 2 MG: 1 INJECTION, SOLUTION INTRAMUSCULAR; INTRAVENOUS at 10:37

## 2021-02-15 RX ADMIN — FAMOTIDINE 20 MG: 10 INJECTION, SOLUTION INTRAVENOUS at 10:15

## 2021-02-15 ASSESSMENT — PULMONARY FUNCTION TESTS
PIF_VALUE: 16
PIF_VALUE: 15
PIF_VALUE: 16
PIF_VALUE: 16
PIF_VALUE: 19
PIF_VALUE: 3
PIF_VALUE: 1
PIF_VALUE: 16
PIF_VALUE: 16
PIF_VALUE: 2
PIF_VALUE: 17
PIF_VALUE: 7
PIF_VALUE: 17
PIF_VALUE: 2
PIF_VALUE: 16
PIF_VALUE: 16
PIF_VALUE: 17
PIF_VALUE: 18
PIF_VALUE: 17
PIF_VALUE: 18
PIF_VALUE: 16
PIF_VALUE: 1
PIF_VALUE: 18
PIF_VALUE: 2
PIF_VALUE: 17
PIF_VALUE: 17
PIF_VALUE: 19

## 2021-02-15 ASSESSMENT — ENCOUNTER SYMPTOMS: SHORTNESS OF BREATH: 1

## 2021-02-15 ASSESSMENT — PAIN DESCRIPTION - PAIN TYPE
TYPE: SURGICAL PAIN
TYPE: SURGICAL PAIN

## 2021-02-15 ASSESSMENT — PAIN DESCRIPTION - LOCATION: LOCATION: KNEE

## 2021-02-15 ASSESSMENT — PAIN SCALES - GENERAL
PAINLEVEL_OUTOF10: 10
PAINLEVEL_OUTOF10: 0
PAINLEVEL_OUTOF10: 8

## 2021-02-15 NOTE — H&P
ORTHOPEDIC PATIENT EVALUATION      HPI / Chief Complaint  Miquel Guerra is a 54 y.o. female who presents for medial meniscus tear left knee. Patient had a recent fall and was seen at [facility].  Patient has had 2 prior arthroscopies to the knee. Patient was initially seen 3 weeks ago and then again last week. Her symptoms did not change. MRI was not ordered due to the fact she has had previous arthroscopies. Patient had very positive [employer]. She presents for scopic partial medial meniscectomy of the left knee    Past Medical History  Vedia Curling  has a past medical history of Abnormal levels of other serum enzymes, Anxiety, Bilateral leg edema, Chronic kidney disease, Depression, DVT (deep venous thrombosis) (HCC), Elevated liver enzymes, Fibromyalgia, Headache(784.0), Hx of blood clots, Hypertension, Kidney stone, Obstructive sleep apnea syndrome, Pancreatitis, Pleural effusion, PONV (postoperative nausea and vomiting), SOB (shortness of breath), Supraventricular tachycardia ([address] and SVT (supraventricular tachycardia) ([address] Past Surgical History  Vedia Curling  has a past surgical history that includes Thyroid lobectomy (Right); Cholecystectomy (2001); Knee arthroscopy (Left); Foot surgery (Right); Endometrial ablation; Atrial ablation surgery; eye surgery (Bilateral); Endoscopy, colon, diagnostic; back surgery; pr cysto/uretero/pyeloscopy w/lithotripsy (Left, 9/20/2017); EXCISION LESION HAND / FINGER (Right, 1/25/2019); US BIOPSY LIVER PERCUTANEOUS (2/1/2021); and Upper gastrointestinal endoscopy (N/A, 2/2/2021).     Current Medications  Current Facility-Administered Medications   Medication Dose Route Frequency Provider Last Rate Last Admin    0.9 % sodium chloride infusion   Intravenous Continuous Jennifer Lloyd MD        lactated ringers infusion   Intravenous Continuous Jennifer Lloyd MD        sodium chloride flush 0.9 % injection 10 mL  10 mL Intravenous 2 times per day Jennifer Lloyd MD

## 2021-02-15 NOTE — ANESTHESIA POSTPROCEDURE EVALUATION
Department of Anesthesiology  Postprocedure Note    Patient: Shirley Alanis  MRN: [id number]  YOB: 1975  Date of evaluation: 2/15/2021  Time:  12:06 PM     Procedure Summary     Date: 02/15/21 Room / Location: [facility] [address] [place] [address]    Anesthesia Start: 1152 Anesthesia Stop: 1120    Procedure: KNEE ARTHROSCOPY WITH PARTIAL MEDIAL MENISCECTOMY (Left Knee) Diagnosis: (LEFT KNEE MEDIAL MENISCUS TEAR)    Surgeons: Mac Triana MD Responsible Provider: Sendy Winter MD    Anesthesia Type: general ASA Status: 3          Anesthesia Type: general    Chidi Phase I: Chidi Score: 6    Chidi Phase II: Chidi Score: 10    Last vitals: Reviewed and per EMR flowsheets.        Anesthesia Post Evaluation    Patient location during evaluation: PACU  Patient participation: complete - patient participated  Level of consciousness: awake and alert  Airway patency: patent  Nausea & Vomiting: no nausea and no vomiting  Complications: no  Cardiovascular status: hemodynamically stable  Respiratory status: nasal cannula and spontaneous ventilation  Hydration status: euvolemic

## 2021-02-15 NOTE — PROGRESS NOTES
CLINICAL PHARMACY NOTE: MEDS TO [address] Select Patient?: No  Total # of Prescriptions Filled: 1   The following medications were delivered to the patient:  · [id number] IR  Total # of Interventions Completed: 0  Time Spent (min): 0    Additional Documentation:

## 2021-02-15 NOTE — OP NOTE
Operative Note      Patient: Miquel Guerra  YOB: 1975  MRN: [id number]    Date of Procedure: 2/15/2021    Pre-Op Diagnosis: LEFT KNEE MEDIAL MENISCUS TEAR    Post-Op Diagnosis: Same       Procedure(s):  KNEE ARTHROSCOPY WITH PARTIAL MEDIAL MENISCECTOMY    Surgeon(s):  Remigio Ojeda MD    Assistant:   Betsy Zhao DO    Anesthesia: General    Estimated Blood Loss (mL): Minimal    Complications: None    Specimens:   * No specimens in log *    Implants:  * No implants in log *      Drains: * No LDAs found *    Findings: Medial meniscus tear left knee    Detailed Description of Procedure:     Patient is a 54y.o. year old female who presents with a history of pain, locking, and giving away sensations of their left knee. Physical examination was positive for Nova's examination. MRI was not performed given her very positive Nova's. Having failed conservative treatment, it was advised that arthroscopic examination and treatment of their knee would be beneficial and consent was obtained with risk and benefits explained to the patient. The patient was taken tot he operative room and general anesthesia was administered. A tourniquet was placed to the operatives lower extremity and then placed in the leg dominguez. The leg was exsanguinated and the tourniquet inflated to the 300mmHg. The leg was the prepped and draped in the usual sterile fashion. Time-out was called to verify laterality. Medial and lateral portals were made and the scope placed in the lateral portal. The patella femoral joint was examined and noted relatively unremarkable. The scope was then passes down the medial gutter into the medial compartment. A probe was used to assess the medial meniscus and a tear was identified in the posterior horn  of the medial meniscus. A partial medial menisectomy was carried ou with basket forceps and then smoothed out with a shaver. Repeat probing of the meniscus found it to be stable. There was no significant cartilage damage or wear. The arthroscope was then passed into the notch of the knee. The ACL was found not to have any significant damage or laxity. The scope was then passed in the lateral compartment. Thorough probing of the lateral meniscus and the articular cartilage did not demonstrate any significant finding that requires surgical intervention    The scope was then passed into the suprapatellar area and the shaver was used to remove any further soft tissue debris. The scope was removed and the knee evacuated of fluid and injected with 20cc of 0.5% ropivacaine. The sterile bulky dressing was applied and the leg then wrapped with a large 6-inch ace bandage from toes to the mid-thigh. The tourniquet was then deflated at less than 30 minutes. The patient was awaken and taken to the PACU in good condition.      Electronically signed by Adrienne Holley MD on 2/15/2021 at 11:10 AM

## 2021-02-15 NOTE — ANESTHESIA PRE PROCEDURE
Department of Anesthesiology  Preprocedure Note       Name:  Everett Alvarez   Age:  54 y.o.  :  1975                                          MRN:  [id number]         Date:  2/15/2021      Surgeon: Danilo Higgins):  Perla Kramer MD    Procedure: Procedure(s):  KNEE ARTHROSCOPY WITH PARTIAL MEDIAL MENISCECTOMY    Medications prior to admission:   Prior to Admission medications    Medication Sig Start Date End Date Taking? Authorizing Provider   oxyCODONE HCl (OXY-IR) 10 MG immediate release tablet Take 0.5 tablets by mouth every 6 hours as needed for Pain for up to 7 days. Intended supply: 30 days 2/15/21 2/22/21 Yes Perla Kramer MD   lipase-protease-amylase (CREON) 05486-82804 units delayed release capsule Take by mouth 3 times daily (with meals)   Yes Historical Provider, MD   oxyCODONE (ROXICODONE) 5 MG immediate release tablet Take 1 tablet by mouth every 6 hours as needed for Pain for up to 7 days. Intended supply: 7 days.  Take lowest dose possible to manage pain 2/8/21 2/15/21 Yes Perla Kramer MD   tiZANidine (ZANAFLEX) 4 MG tablet Take 4 mg by mouth nightly   Yes Historical Provider, MD   buPROPion (WELLBUTRIN XL) 150 MG extended release tablet Take 150 mg by mouth nightly   Yes Historical Provider, MD   losartan (COZAAR) 50 MG tablet Take 50 mg by mouth daily   Yes Historical Provider, MD   amitriptyline (ELAVIL) 25 MG tablet Take 25 mg by mouth nightly   Yes Historical Provider, MD   prazosin (MINIPRESS) 1 MG capsule Take 2 mg by mouth nightly    Yes Historical Provider, MD   melatonin 5 MG TABS tablet Take 10 mg by mouth daily    Yes Historical Provider, MD   QUEtiapine (SEROQUEL XR) 400 MG extended release tablet Take 800 mg by mouth nightly   Yes Historical Provider, MD   busPIRone (BUSPAR) 15 MG tablet Take 15 mg by mouth nightly Can take once in morning PRN   Yes Historical Provider, MD omeprazole (PRILOSEC) 40 MG delayed release capsule Take 40 mg by mouth daily   Yes Historical Provider, MD   Cholecalciferol (VITAMIN D) 50 MCG (2000 UT) CAPS capsule Take by mouth daily   Yes Historical Provider, MD   fexofenadine (RA ALLERGY RELIEF) 180 MG tablet take 1 tablet by mouth once daily  Patient taking differently: Take 180 mg by mouth daily as needed take 1 tablet by mouth once daily 11/7/18  Yes Joe Ruth MD   pramipexole (MIRAPEX) 0.5 MG tablet take 1 tablet by mouth twice a day  Patient taking differently: Take 0.25 mg by mouth daily take 1 tablet by mouth twice a day 11/7/18  Yes Joe Ruth MD   DULoxetine (CYMBALTA) 60 MG extended release capsule TAKE 1 CAPSULE BY MOUTH TWICE A DAY  Patient taking differently: Take 120 mg by mouth Daily with supper  8/3/18  Yes Joe Ruth MD   potassium chloride (KLOR-CON M) 10 MEQ extended release tablet Take 1 tablet by mouth 2 times daily 7/18/20   Cornelius Brothers MD   butalbital-APAP-caffeine (FIORICET) -40 MG CAPS per capsule Take 1 capsule by mouth every 6 hours as needed for Headaches 5/19/20 1/25/21  Army Monica DO   albuterol sulfate HFA (PROVENTIL HFA) 108 (90 Base) MCG/ACT inhaler Inhale 2 puffs into the lungs as needed 12/28/18   Historical Provider, MD   magnesium oxide (MAG-OX) 400 MG tablet Take 400 mg by mouth daily    Historical Provider, MD       Current medications:    Current Facility-Administered Medications   Medication Dose Route Frequency Provider Last Rate Last Admin    0.9 % sodium chloride infusion   Intravenous Continuous Bronson Crowell MD        lactated ringers infusion   Intravenous Continuous Bronson Crowell MD        sodium chloride flush 0.9 % injection 10 mL  10 mL Intravenous 2 times per day Bronson Crowell MD        sodium chloride flush 0.9 % injection 10 mL  10 mL Intravenous PRN Bronson Crowell MD           Allergies:     Allergies   Allergen Reactions    Aripiprazole      Bad reaction    Eletriptan Other reaction(s): Swelling-throat    Lasix [Furosemide] Other (See Comments)     Pt states pacreatitis    Sumatriptan Other (See Comments)    Triptans      Other reaction(s): Unknown    Lamotrigine Rash       Problem List:    Patient Active Problem List   Diagnosis Code    Anxiety F41.9    Class 3 severe obesity due to excess calories with serious comorbidity in adult ([facility]) E66.01    Restless leg syndrome G25.81    Previous back surgery Z98.890    Major depression F32.9    HTN (hypertension) I10    FHx: rheumatoid arthritis Z82.61    SVT (supraventricular tachycardia) ([facility]) I47.1    Right elbow pain M25.521    Pain of right lower extremity M79.604    Cough with sputum R05    Varicose vein of leg I83.90    Leg swelling M79.89    Herpes zoster without complication O33.1    Chest pain R07.9    Fibromyalgia M79.7    Acute pancreatitis K85.90    Sepsis ([facility]) A41.9    Morbid obesity ([facility]) E66.01    Abdominal pain, epigastric R10.13    Nausea R11.0    Acute respiratory failure with hypoxia ([facility]) J96.01    Hypocalcemia E83.51    History of anxiety disorder Z86.59    Abnormal levels of other serum enzymes R74.8    Elevated liver enzymes R74.8    Bilateral leg edema R60.0    Smoker F17.200    Severe episode of recurrent major depressive disorder, without psychotic features (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    Presbyopia H52.4    Pseudotumor cerebri syndrome G93.2    Insomnia due to psychological stress F51.02    Astigmatism H52.209    Generalized anxiety disorder F41.1    Carpal tunnel syndrome of right wrist G56.01    Pneumonia of both lungs due to infectious organism J18.9    Fatty liver K76.0    Pancreatic pseudocyst K86.3    History of depression Z86.59    Acute on chronic pancreatitis ([facility]) K85.90, K86.1    Abdominal pain R10.9       Past Medical History:        Diagnosis Date    Abnormal levels of other serum enzymes  Anxiety     Bilateral leg edema     Chronic kidney disease     right renal cyst    Depression     DVT (deep venous thrombosis) (HCC)     after delivery    Elevated liver enzymes     Fibromyalgia     Headache(784.0)     migraines    Hx of blood clots      left calf    Hypertension     Kidney stone     Obstructive sleep apnea syndrome     Pancreatitis     Pleural effusion     PONV (postoperative nausea and vomiting)     SOB (shortness of breath)     Supraventricular tachycardia (HCC)     SVT (supraventricular tachycardia) ([address] 2015       Past Surgical History:        Procedure Laterality Date    ATRIAL ABLATION SURGERY      BACK SURGERY      L4-5    CHOLECYSTECTOMY      ENDOMETRIAL ABLATION      e sure implants placed also    ENDOSCOPY, COLON, DIAGNOSTIC      EXCISION LESION HAND / FINGER Right 2019    HAND LESION BIOPSY EXCISION performed by Declan Carrillo MD at [address] Bilateral     left x2, right x1    FOOT SURGERY Right     x3    KNEE ARTHROSCOPY Left     x2    MO CYSTO/URETERO/PYELOSCOPY W/LITHOTRIPSY Left 2017    CYSTOSCOPY URETEROSCOPY HOLMIUM LASER LITHO WITH STONE BASKETING WITH  STENT  performed by Aman Baeza MD at [facility]     UPPER GASTROINTESTINAL ENDOSCOPY N/A 2021    EGD ESOPHAGOGASTRODUODENOSCOPY performed by Octavio Santiago MD at [address] [place] [address]  2021    [facility] ST ULTRASOUND       Social History:    Social History     Tobacco Use    Smoking status: Former Smoker     Packs/day: 1.00     Types: Cigarettes     Quit date: 2019     Years since quittin.2    Smokeless tobacco: Never Used    Tobacco comment: today last smoke   Substance Use Topics    Alcohol use: Not Currently     Alcohol/week: 0.0 standard drinks                                Counseling given: Not Answered Comment: today last smoke      Vital Signs (Current):   Vitals:    02/15/21 0947 02/15/21 1006   BP:  (!) 143/88   Pulse:  98   Resp:  20   Temp: 97.4 °F (36.3 °C)    TempSrc: Oral    SpO2:  98%   Weight:  264 lb (119.7 kg)   Height:  5' 5\" (1.651 m)                                              BP Readings from Last 3 Encounters:   02/15/21 (!) 143/88   02/10/21 (!) 144/65   02/03/21 105/71       NPO Status: Time of last liquid consumption: 1159                        Time of last solid consumption: 2200                        Date of last liquid consumption: 02/14/21                        Date of last solid food consumption: 02/14/21    BMI:   Wt Readings from Last 3 Encounters:   02/15/21 264 lb (119.7 kg)   02/10/21 267 lb (121.1 kg)   01/25/21 277 lb 1.6 oz (125.7 kg)     Body mass index is 43.93 kg/m². CBC:   Lab Results   Component Value Date    WBC 9.0 02/10/2021    RBC 4.06 02/10/2021    HGB 12.5 02/10/2021    HCT 39.6 02/10/2021    MCV 97.5 02/10/2021    RDW 12.8 02/10/2021     02/10/2021       CMP:   Lab Results   Component Value Date     02/10/2021    K 4.3 02/10/2021     02/10/2021    CO2 25 02/10/2021    BUN 14 02/10/2021    CREATININE 0.57 02/10/2021    GFRAA >60 02/10/2021    LABGLOM >60 02/10/2021    GLUCOSE 98 02/10/2021    PROT 7.4 02/03/2021    PROT 7.2 01/23/2015    CALCIUM 9.1 02/10/2021    BILITOT 0.41 02/03/2021    ALKPHOS 250 02/03/2021    AST 48 02/03/2021     02/03/2021       POC Tests: No results for input(s): POCGLU, POCNA, POCK, POCCL, POCBUN, POCHEMO, POCHCT in the last 72 hours.     Coags:   Lab Results   Component Value Date    PROTIME 9.6 02/01/2021    INR 0.9 02/01/2021    APTT 27.0 02/01/2021       HCG (If Applicable):   Lab Results   Component Value Date    HCG NEGATIVE 02/15/2021        ABGs:   Lab Results   Component Value Date    PHART 7.484 08/28/2020    PO2ART 66.1 08/28/2020    OSA4WYT 37.2 08/28/2020    KKM3QUU 27.9 08/28/2020

## 2021-02-25 ENCOUNTER — OFFICE VISIT (OUTPATIENT)
Dept: ORTHOPEDIC SURGERY | Age: 46
End: 2021-02-25

## 2021-02-25 VITALS — TEMPERATURE: 96.8 F

## 2021-02-25 DIAGNOSIS — Z98.890 S/P ARTHROSCOPY OF LEFT KNEE: Primary | ICD-10-CM

## 2021-02-25 DIAGNOSIS — S83.242D ACUTE MEDIAL MENISCUS TEAR OF LEFT KNEE, SUBSEQUENT ENCOUNTER: ICD-10-CM

## 2021-02-25 PROCEDURE — 99024 POSTOP FOLLOW-UP VISIT: CPT | Performed by: PHYSICIAN ASSISTANT

## 2021-02-25 RX ORDER — TRAMADOL HYDROCHLORIDE 50 MG/1
50 TABLET ORAL EVERY 8 HOURS PRN
Qty: 9 TABLET | Refills: 0 | Status: SHIPPED | OUTPATIENT
Start: 2021-02-25 | End: 2021-02-28

## 2021-02-25 NOTE — PROGRESS NOTES
Subjective  Cherelle Park returns today status post arthroscopic examination of her left knee with partial medial meniscectomy . her knee feels much better reporting that the sharp stabbing pain that she is having prior to surgery has resolved. She notes some postoperative stiffness and tightness otherwise no complaints. No recent fever, chills, nausea or vomiting. Objective  Examination notes her portal sites are pristine. There is no redness or drainage. No calf tenderness. Negative Homans. Motion is 0 to 100 degrees. No effusion. Assessment  Status post arthroscopic examination left knee. Plan  The patient can resume activity as tolerated. Avoid twisting, turning, crouching. Exercises were given as well. We will see her back here on a PRN basis. Call the office if there are any problems.

## 2021-12-29 ENCOUNTER — OFFICE VISIT (OUTPATIENT)
Dept: ORTHOPEDIC SURGERY | Age: 46
End: 2021-12-29
Payer: MEDICARE

## 2021-12-29 VITALS — WEIGHT: 264 LBS | HEIGHT: 65 IN | BODY MASS INDEX: 43.99 KG/M2

## 2021-12-29 DIAGNOSIS — M25.562 ACUTE PAIN OF LEFT KNEE: Primary | ICD-10-CM

## 2021-12-29 PROCEDURE — G8484 FLU IMMUNIZE NO ADMIN: HCPCS | Performed by: PHYSICIAN ASSISTANT

## 2021-12-29 PROCEDURE — G8427 DOCREV CUR MEDS BY ELIG CLIN: HCPCS | Performed by: PHYSICIAN ASSISTANT

## 2021-12-29 PROCEDURE — 1036F TOBACCO NON-USER: CPT | Performed by: PHYSICIAN ASSISTANT

## 2021-12-29 PROCEDURE — 99214 OFFICE O/P EST MOD 30 MIN: CPT | Performed by: PHYSICIAN ASSISTANT

## 2021-12-29 PROCEDURE — 20610 DRAIN/INJ JOINT/BURSA W/O US: CPT | Performed by: PHYSICIAN ASSISTANT

## 2021-12-29 PROCEDURE — G8417 CALC BMI ABV UP PARAM F/U: HCPCS | Performed by: PHYSICIAN ASSISTANT

## 2021-12-29 RX ORDER — BETAMETHASONE SODIUM PHOSPHATE AND BETAMETHASONE ACETATE 3; 3 MG/ML; MG/ML
12 INJECTION, SUSPENSION INTRA-ARTICULAR; INTRALESIONAL; INTRAMUSCULAR; SOFT TISSUE ONCE
Status: COMPLETED | OUTPATIENT
Start: 2021-12-29 | End: 2021-12-29

## 2021-12-29 RX ORDER — MELOXICAM 7.5 MG/1
7.5 TABLET ORAL 2 TIMES DAILY
Qty: 60 TABLET | Refills: 0 | Status: SHIPPED | OUTPATIENT
Start: 2021-12-29 | End: 2022-07-05

## 2021-12-29 RX ORDER — LIDOCAINE HYDROCHLORIDE 10 MG/ML
4 INJECTION, SOLUTION INFILTRATION; PERINEURAL ONCE
Status: COMPLETED | OUTPATIENT
Start: 2021-12-29 | End: 2021-12-29

## 2021-12-29 RX ADMIN — LIDOCAINE HYDROCHLORIDE 4 ML: 10 INJECTION, SOLUTION INFILTRATION; PERINEURAL at 10:50

## 2021-12-29 RX ADMIN — BETAMETHASONE SODIUM PHOSPHATE AND BETAMETHASONE ACETATE 12 MG: 3; 3 INJECTION, SUSPENSION INTRA-ARTICULAR; INTRALESIONAL; INTRAMUSCULAR; SOFT TISSUE at 10:49

## 2021-12-29 NOTE — PROGRESS NOTES
losartan (COZAAR) 50 MG tablet, Take 100 mg by mouth daily , Disp: , Rfl:     amitriptyline (ELAVIL) 25 MG tablet, Take 200 mg by mouth nightly , Disp: , Rfl:     prazosin (MINIPRESS) 1 MG capsule, Take 2 mg by mouth nightly , Disp: , Rfl:     melatonin 5 MG TABS tablet, Take 10 mg by mouth daily , Disp: , Rfl:     QUEtiapine (SEROQUEL XR) 400 MG extended release tablet, Take 800 mg by mouth nightly, Disp: , Rfl:     busPIRone (BUSPAR) 15 MG tablet, Take 30 mg by mouth nightly Can take once in morning PRN , Disp: , Rfl:     omeprazole (PRILOSEC) 40 MG delayed release capsule, Take 40 mg by mouth daily, Disp: , Rfl:     magnesium oxide (MAG-OX) 400 MG tablet, Take 400 mg by mouth daily, Disp: , Rfl:     fexofenadine (RA ALLERGY RELIEF) 180 MG tablet, take 1 tablet by mouth once daily (Patient taking differently: Take 180 mg by mouth daily as needed take 1 tablet by mouth once daily), Disp: 30 tablet, Rfl: 0    pramipexole (MIRAPEX) 0.5 MG tablet, take 1 tablet by mouth twice a day (Patient taking differently: Take 0.25 mg by mouth daily take 1 tablet by mouth twice a day), Disp: 60 tablet, Rfl: 0    DULoxetine (CYMBALTA) 60 MG extended release capsule, TAKE 1 CAPSULE BY MOUTH TWICE A DAY (Patient taking differently: Take 120 mg by mouth Daily with supper ), Disp: 60 capsule, Rfl: 3    buPROPion (WELLBUTRIN XL) 150 MG extended release tablet, Take 150 mg by mouth nightly, Disp: , Rfl:     Cholecalciferol (VITAMIN D) 50 MCG (2000 UT) CAPS capsule, Take by mouth daily (Patient not taking: Reported on 12/29/2021), Disp: , Rfl:     butalbital-APAP-caffeine (FIORICET) -40 MG CAPS per capsule, Take 1 capsule by mouth every 6 hours as needed for Headaches, Disp: 28 capsule, Rfl: 0    albuterol sulfate HFA (PROVENTIL HFA) 108 (90 Base) MCG/ACT inhaler, Inhale 2 puffs into the lungs as needed (Patient not taking: Reported on 12/29/2021), Disp: , Rfl:   Allergies   Allergen Reactions    Aripiprazole Bad reaction    Eletriptan      Other reaction(s): Swelling-throat    Lasix [Furosemide] Other (See Comments)     Pt states pacreatitis    Sumatriptan Other (See Comments)    Triptans      Other reaction(s): Unknown    Lamotrigine Rash     Social History     Socioeconomic History    Marital status:      Spouse name: Not on file    Number of children: Not on file    Years of education: Not on file    Highest education level: Not on file   Occupational History    Not on file   Tobacco Use    Smoking status: Former Smoker     Packs/day: 1.00     Types: Cigarettes     Quit date: 2019     Years since quittin.0    Smokeless tobacco: Never Used    Tobacco comment: today last smoke   Vaping Use    Vaping Use: Never used   Substance and Sexual Activity    Alcohol use: Not Currently     Alcohol/week: 0.0 standard drinks    Drug use: No    Sexual activity: Not on file   Other Topics Concern    Not on file   Social History Narrative    Not on file     Social Determinants of Health     Financial Resource Strain:     Difficulty of Paying Living Expenses: Not on file   Food Insecurity:     Worried About Running Out of Food in the Last Year: Not on file    Ralf of Food in the Last Year: Not on file   Transportation Needs:     Lack of Transportation (Medical): Not on file    Lack of Transportation (Non-Medical):  Not on file   Physical Activity:     Days of Exercise per Week: Not on file    Minutes of Exercise per Session: Not on file   Stress:     Feeling of Stress : Not on file   Social Connections:     Frequency of Communication with Friends and Family: Not on file    Frequency of Social Gatherings with Friends and Family: Not on file    Attends Faith Services: Not on file    Active Member of Clubs or Organizations: Not on file    Attends Club or Organization Meetings: Not on file    Marital Status: Not on file   Intimate Partner Violence:     Fear of Current or Ex-Partner: Not on file    Emotionally Abused: Not on file    Physically Abused: Not on file    Sexually Abused: Not on file   Housing Stability:     Unable to Pay for Housing in the Last Year: Not on file    Number of Places Lived in the Last Year: Not on file    Unstable Housing in the Last Year: Not on file     Past Medical History:   Diagnosis Date    Abnormal levels of other serum enzymes     Anxiety     Bilateral leg edema     Chronic kidney disease     right renal cyst    Depression     DVT (deep venous thrombosis) ([facility] [address] 1997    after delivery    Elevated liver enzymes     Fibromyalgia     Headache(784.0)     migraines    Hx of blood clots      left calf    Hypertension     Kidney stone     Obstructive sleep apnea syndrome     Pancreatitis     Pleural effusion     PONV (postoperative nausea and vomiting)     S/P left knee arthroscopy 02/15/2021    knee arthroscopy with partial medial menisectomy - left eleazar    SOB (shortness of breath)     Supraventricular tachycardia (HCC)     SVT (supraventricular tachycardia) ([facility] [address] 2015     Past Surgical History:   Procedure Laterality Date    ATRIAL ABLATION SURGERY      BACK SURGERY      L4-5    CHOLECYSTECTOMY      ENDOMETRIAL ABLATION      e sure implants placed also    ENDOSCOPY, COLON, DIAGNOSTIC      EXCISION LESION HAND / FINGER Right 2019    HAND LESION BIOPSY EXCISION performed by Nelly Kaye MD at [address] [place] [address] Bilateral     left x2, right x1    FOOT SURGERY Right     x3    KNEE ARTHROSCOPY Left     x2    KNEE ARTHROSCOPY Left 2/15/2021    KNEE ARTHROSCOPY WITH PARTIAL MEDIAL MENISCECTOMY performed by Polly Ibrahim MD at [place] [id number] Left 2017    CYSTOSCOPY URETEROSCOPY HOLMIUM LASER LITHO WITH STONE BASKETING WITH  STENT  performed by Vicente Salter [facility] at [facility]     UPPER GASTROINTESTINAL ENDOSCOPY N/A 2021 EGD ESOPHAGOGASTRODUODENOSCOPY performed by Mynor Krishnamurthy MD at [address] [facility]  2/1/2021    US GUIDED LIVER BIOPSY PERCUTANEOUS 2/1/2021 [facility] ULTRASOUND     Family History   Problem Relation Age of Onset    Heart Disease Father     High Blood Pressure Brother         Review of Systems   Constitutional: Negative for fever, chills, sweats. Eyes: Negative for changes in vision, or pain. HENT: Negative for ear ache, epistaxis, or sore throat. Respiratory/Cardio: Negative for Chest pain, palpitations, SOB, or cough. Gastrointestinal: Negative for abdominal pain, N/V/D. Genitourinary: Negative for dysuria, frequency, urgency, or hematuria. Neurological: Negative for headache, numbness, or weakness. Integumentary: Negative for rash, itching, laceration, or abrasion. Musculoskeletal: Positive for Follow-up (L Knee pain)       Physical Exam:  Constitutional: Patient is oriented to person, place, and time. Patient appears well-developed and well nourished. HENT: Negative otherwise noted  Head: Normocephalic and Atraumatic  Nose: Normal  Eyes: Conjunctivae and EOM are normal  Neck: Normal range of motion Neck supple. Respiratory/Cardio: Effort normal. No respiratory distress. Musculoskeletal:    Right Knee:     Skin: warm and dry, no rash or erythema  Vasculature: 2+ pedal pulses bilaterally  Neuro: Sensation grossly intact to light touch diffusely  Alignment: Normal  Tenderness: Severe tenderness to medial joint line and pes anserine bursa. No tenderness to quad/patellar tendon, pes anserine bursa or posterior knee.   Effusion: Small    ROM: (Degrees)       A P       Extension  0 0       Flexion   120 125       Crepitation  Yes       Muscle strength:         Flexion   5      Extension  5      SLR   5        Extensor lag   y          Special testing:  y    Pain with deep knee flexion     y    Patellar grind       n    Patellar apprehension      n    Patellar glide         n    Lachman       n    Anterior drawer      n    Pivot shift       n    Posterior drawer      n    Dial test       n    Posterolateral drawer      n    Posterior Sag       n    MCL        n    LCL          Severe    Medial joint line tenderness     n    Lateral joint line tenderness     Painful w/out clunk  McMurrey's         Neurological: Patient is alert and oriented to person, place, and time. Normal strenght. No sensory deficit. Skin: Skin is warm and dry  Psychiatric: Behavior is normal. Thought content normal.  Nursing note and vitals reviewed. Labs and Imaging:           X-rays taken in clinic today and preliminarily reviewed by me 12/29/21:  AP bilateral knees standing lateral view of the left knee demonstrates left knee with minimal medial compartmental narrowing. Small effusion present. There is no evidence of acute fracture or other acute osseous abnormality. Right knee with similar findings on AP view no evidence of acute fracture. Orders Placed This Encounter   Procedures    XR KNEE LEFT (1-2 VIEWS)     Standing Status:   Future     Number of Occurrences:   1     Standing Expiration Date:   12/29/2022       Assessment and Plan:  1. Acute pain of left knee          PLAN:  Anuja Wu is a 51 y.o. old female with history of left knee arthroscopy x3 most recently on 2/15/2021 for medial meniscus tear, who presents to the clinic today for evaluation of left knee pain. Patient reports she initially did very well after the left knee arthroscopy in fact she states she is virtually pain-free from March until October but over the last 2 months she has had gradually worsening pain in this left knee. Pain today is concerning for possible recurrent medial meniscus tear as patient is significantly painful over the medial joint line however has negative Nova's and Apley's on examination today.   Patient does have evidence of Pez anserine bursitis as well with significant tenderness over this area. There is no evidence of ligamentous cruciate or collateral involvement no evidence of ligament laxity. Patient is educated on all treatment options including both nonoperative and operative intervention. 1.  After discussion patient like to proceed conservatively I believe she will benefit from a corticosteroid injection which she elected to proceed with. 2.  Celestone injection given as outlined below. Patient educated on postinjection care. 3.  Patient is doing well with ibuprofen 800 mg on as-needed basis requesting refill of this and a prescription is provided. 4.  Recommend follow-up in 4 weeks should patient continue be significant painful over the medial joint line concerning for medial meniscus tear would like to have her follow-up for Dr. Aaron Bell due to concern for recurrent medial meniscus tear. This we discussed in further detail at follow-up. All question concerns addressed today    Electronically signed by DENNYS Lester on 12/29/2021 at 10:25 AM      Procedure Note: right Knee Celestone Injection   An informed verbal consent for the procedure was obtained and risks including, but not limited to: allergy to medications, injection, bleeding, stiffness of joint, recurrence of symptoms, loss of function, swelling, drainage, irrigation, need for surgery and pseudo-septic inflammation, were explained to the patient. Also, discussed was the potential for further injections, irrigation and debridement and surgery. Alternate means of treatment have also been discussed with the patient. Following an appropriate discussion with the patient regarding the risks and benefits of the procedure she consented to proceed. her right knee was prepped using betadine solution and alcohol swab.  Using aseptic technique and through a lateral joint line approach, which was then injected superficially with 4 cc of 1% lidocaine without epinephrine and subsequently with 2 cc of 6 mg/mL Celestone into the right knee. A band aid was applied to the injection site. she tolerated the injection with no immediate adverse reactions. Electronically signed by DENNYS Adrian on 12/29/21 at 10:22 AM EST        Please note that this chart was generated using voice recognition Dragon dictation software. Although every effort was made to ensure the accuracy of this automated transcription, some errors in transcription may have occurred.

## 2022-01-26 ENCOUNTER — OFFICE VISIT (OUTPATIENT)
Dept: ORTHOPEDIC SURGERY | Age: 47
End: 2022-01-26
Payer: MEDICARE

## 2022-01-26 VITALS — WEIGHT: 264 LBS | HEIGHT: 65 IN | BODY MASS INDEX: 43.99 KG/M2

## 2022-01-26 DIAGNOSIS — G89.29 CHRONIC PAIN OF RIGHT KNEE: Primary | ICD-10-CM

## 2022-01-26 DIAGNOSIS — M25.561 CHRONIC PAIN OF RIGHT KNEE: Primary | ICD-10-CM

## 2022-01-26 DIAGNOSIS — M25.562 ACUTE PAIN OF LEFT KNEE: ICD-10-CM

## 2022-01-26 PROCEDURE — 1036F TOBACCO NON-USER: CPT | Performed by: PHYSICIAN ASSISTANT

## 2022-01-26 PROCEDURE — G8427 DOCREV CUR MEDS BY ELIG CLIN: HCPCS | Performed by: PHYSICIAN ASSISTANT

## 2022-01-26 PROCEDURE — G8484 FLU IMMUNIZE NO ADMIN: HCPCS | Performed by: PHYSICIAN ASSISTANT

## 2022-01-26 PROCEDURE — 20610 DRAIN/INJ JOINT/BURSA W/O US: CPT | Performed by: PHYSICIAN ASSISTANT

## 2022-01-26 PROCEDURE — G8417 CALC BMI ABV UP PARAM F/U: HCPCS | Performed by: PHYSICIAN ASSISTANT

## 2022-01-26 RX ORDER — LIDOCAINE HYDROCHLORIDE 10 MG/ML
4 INJECTION, SOLUTION INFILTRATION; PERINEURAL ONCE
Status: COMPLETED | OUTPATIENT
Start: 2022-01-26 | End: 2022-01-26

## 2022-01-26 RX ORDER — BETAMETHASONE SODIUM PHOSPHATE AND BETAMETHASONE ACETATE 3; 3 MG/ML; MG/ML
12 INJECTION, SUSPENSION INTRA-ARTICULAR; INTRALESIONAL; INTRAMUSCULAR; SOFT TISSUE ONCE
Status: COMPLETED | OUTPATIENT
Start: 2022-01-26 | End: 2022-01-26

## 2022-01-26 RX ADMIN — BETAMETHASONE SODIUM PHOSPHATE AND BETAMETHASONE ACETATE 12 MG: 3; 3 INJECTION, SUSPENSION INTRA-ARTICULAR; INTRALESIONAL; INTRAMUSCULAR; SOFT TISSUE at 10:07

## 2022-01-26 RX ADMIN — LIDOCAINE HYDROCHLORIDE 4 ML: 10 INJECTION, SOLUTION INFILTRATION; PERINEURAL at 10:08

## 2022-01-26 NOTE — PROGRESS NOTES
[facility], [address] [place], [address] [place] [address], [address] [facility]           Dept Phone: [phone]           Dept Fax:  [phone] [facility]           Cedric Butts          Dept Phone: [phone]           Dept Fax:  [phone]      Chief Compliant:  Chief Complaint   Patient presents with    Knee Pain     left        History of Present Illness:  Melvin Hensley returns today. This is a 51 y.o. female who presents to the clinic today for follow up of left knee pain and evaluation of new right knee pain. Left knee pain:  Patient with history of left knee arthroscopy partial medial meniscectomy in February 2021. Initially did well with the surgery. But saw me in December due to increased pain over the last several months she underwent a corticosteroid injection at that time. Patient returns today stating that the cortisone injection provided significant relief of pain she is doing much better today than she was 4 weeks ago. She reports that when she does have pain it is very mild rating it as a 1/10 and is very happy with the results of the injection. Right knee pain:  Patient reports this pain has been present for approximately 3 months without any injury or trauma. She states it did start around the same time that her left knee pain did but the left knee was much worse back in December when I saw her. She reports pain is most severe to the anterior knee just above the kneecap seems to be aggravated by deeply bending the knee and any prolonged period of standing or walking. No injury or trauma she denies any joint warmth, redness, fever or chills. Does note intermittent swelling that seems to be activity related. Review of Systems   Constitutional: Negative for fever, chills, sweats, recent illness, or recent injury.    Neurological: Negative for headaches, numbness, or weakness. Integumentary: Negative for rash, itching, ecchymosis, abrasions, or laceration. Musculoskeletal: Positive for Knee Pain (left)       Physical Exam:  Constitutional: Patient is oriented to person, place, and time. Patient appears well-developed and well nourished. Musculoskeletal:    knee: Bilateral    Skin: warm and dry, no rash or erythema  Vasculature: 2+ pedal pulses bilaterally  Neuro: Sensation grossly intact to light touch diffusely  Alignment: Normal  Tenderness: right knee: Mild tenderness to the medial joint line. Mild tenderness to the quad tendon. No tenderness to the lateral joint line, patellar tendon, posterior knee. Left knee: No tenderness    ROM: (Degrees)    Right   A P   Left   A P    Extension  -5 -5   Extension  0 0  Flexion   115 115   Flexion   120  125    Crepitation  Yes    Crepitation  No      Muscle strength:    Right       Left    Flexion   5    Flexion   5  Extension  5    Extension  5  SLR   5    SLR   5    Extensor lag   n    Extensor lag  n      Special testing:    Right          Left    y    Pain with deep knee flexion   n  y    Patellar grind     n  n    Patellar apprehension    n  n    Patellar glide     n    n    Lachman     n  n    Anterior drawer    n  n    Pivot shift     n  n    Posterior drawer    n  n    Dial test     n  n    Posterolateral drawer    n  n    Posterior Sag     n  n    MCL      n  n    LCL      n    mild    Medial joint line tenderness   n  n    Lateral joint line tenderness   n  n    Appley's     n    Neurological: Patient is alert and oriented to person, place, and time. Normal strenght. No sensory deficit. Skin: Skin is warm and dry  Psychiatric: Behavior is normal. Thought content normal.  Nursing note and vitals reviewed. Labs and Imaging:     XR taken today:  No results found.      No new x-rays are taken today however x-rays from 12/29/2020 one of the left knee do have an AP bilateral knees standing used to look at the right knee at that time. Mild medial compartmental narrowing with no evidence of acute fracture or other acute abnormality of the right knee seen at that time. Similar to the left knee pain    Assessment and Plan:  1. Chronic pain of right knee        Administrations This Visit     betamethasone acetate-betamethasone sodium phosphate (CELESTONE) injection 12 mg     Admin Date  01/26/2022  10:07 Action  Given Dose  12 mg Route  Intra-artICUlar Site  Knee Right Administered By  Sarahi Larose LPN    Ordering Provider: DENNYS Bales    [facility]: [id number]    Lot#: [id number]    : [employer]    Patient Supplied?: No          lidocaine 1 % injection 4 mL     Admin Date  01/26/2022  10:08 Action  Given Dose  4 mL Route  Intra-artICUlar Site  Knee Right Administered By  Sarahi Larose LPN    Ordering Provider: [facility]ep[facility] [id number]    Lot#: [id number]    : [facility]    Patient Supplied?: No                  PLAN:  This is a 51 y.o. female who presents to the clinic today for follow up left knee pain which is doing excellent today since undergoing injection on 12/29/2021. Patient does present with 3-month history of right knee pain that reports she feels very similar to the left knee pain she had prior to the injection. 1.  On examination of the right knee there is no evidence of meniscal pathology or ligamentous instability. Radiographically patient does have some mild degenerative changes and a small effusion on exam.  2.  I believe patient will benefit from Celestone injection as she did very well the other knee last month. 3.  After discussion of risk versus benefits patient elected proceed with injection is done as outlined below  4.   We will see patient back per her request however she may call return at anytime for any questions or concerns in regards to her knees    Procedure Note: Right knee Celestone Injection   An informed verbal consent for the procedure was obtained and risks including, but not limited to: allergy to medications, injection, bleeding, stiffness of joint, recurrence of symptoms, loss of function, swelling, drainage, irrigation, need for surgery and pseudo-septic inflammation, were explained to the patient. Also, discussed was the potential for further injections, irrigation and debridement and surgery. Alternate means of treatment have also been discussed with the patient. Administrations This Visit     betamethasone acetate-betamethasone sodium phosphate (CELESTONE) injection 12 mg     Admin Date  01/26/2022  10:07 Action  Given Dose  12 mg Route  Intra-artICUlar Site  Knee Right Administered By  Ernestina Church LPN    Ordering Provider: DENNYS Astudillo. [address]: [id number]    Lot#: [id number]    : [employer]    Patient Supplied?: No          lidocaine 1 % injection 4 mL     Admin Date  01/26/2022  10:08 Action  Given Dose  4 mL Route  Intra-artICUlar Site  Knee Right Administered By  Ernestina Church LPN    Ordering Provider: Toby Astudillo. [address]: [id number]    Lot#: [id number]    : [id number] [facility]    Patient Supplied?: No                Following an appropriate discussion with the patient regarding the risks and benefits of the procedure she consented to proceed. her right knee was prepped using betadine solution and alcohol swab. Using aseptic technique and through a lateral joint line approach, her right knee was injected superficially with 4 cc of 1% lidocaine without epinephrine and subsequently with 2 cc of 6 mg/mL Celestone into the right knee joint. A band aid was applied to the injection site. she tolerated the injection with no immediate adverse reactions. Please note that this chart was generated using voice recognition Dragon dictation software.   Although every effort was made to ensure the accuracy of this automated transcription, some errors in transcription may have occurred.

## 2022-05-03 ENCOUNTER — OFFICE VISIT (OUTPATIENT)
Dept: ORTHOPEDIC SURGERY | Age: 47
End: 2022-05-03
Payer: MEDICARE

## 2022-05-03 VITALS — RESPIRATION RATE: 14 BRPM | BODY MASS INDEX: 48.82 KG/M2 | HEIGHT: 65 IN | WEIGHT: 293 LBS

## 2022-05-03 DIAGNOSIS — M17.0 BILATERAL PRIMARY OSTEOARTHRITIS OF KNEE: Primary | ICD-10-CM

## 2022-05-03 PROCEDURE — 20610 DRAIN/INJ JOINT/BURSA W/O US: CPT | Performed by: PHYSICIAN ASSISTANT

## 2022-05-03 RX ORDER — LIDOCAINE HYDROCHLORIDE 10 MG/ML
4 INJECTION, SOLUTION INFILTRATION; PERINEURAL ONCE
Status: COMPLETED | OUTPATIENT
Start: 2022-05-03 | End: 2022-05-03

## 2022-05-03 RX ORDER — BETAMETHASONE SODIUM PHOSPHATE AND BETAMETHASONE ACETATE 3; 3 MG/ML; MG/ML
12 INJECTION, SUSPENSION INTRA-ARTICULAR; INTRALESIONAL; INTRAMUSCULAR; SOFT TISSUE ONCE
Status: COMPLETED | OUTPATIENT
Start: 2022-05-03 | End: 2022-05-03

## 2022-05-03 RX ADMIN — BETAMETHASONE SODIUM PHOSPHATE AND BETAMETHASONE ACETATE 12 MG: 3; 3 INJECTION, SUSPENSION INTRA-ARTICULAR; INTRALESIONAL; INTRAMUSCULAR; SOFT TISSUE at 13:47

## 2022-05-03 RX ADMIN — LIDOCAINE HYDROCHLORIDE 4 ML: 10 INJECTION, SOLUTION INFILTRATION; PERINEURAL at 13:48

## 2022-05-03 RX ADMIN — BETAMETHASONE SODIUM PHOSPHATE AND BETAMETHASONE ACETATE 12 MG: 3; 3 INJECTION, SUSPENSION INTRA-ARTICULAR; INTRALESIONAL; INTRAMUSCULAR; SOFT TISSUE at 13:36

## 2022-05-03 RX ADMIN — LIDOCAINE HYDROCHLORIDE 4 ML: 10 INJECTION, SOLUTION INFILTRATION; PERINEURAL at 13:47

## 2022-05-03 NOTE — PROGRESS NOTES
[address] [facility], [address] [place], [address] [place], [facility] [address]           Dept Phone: [phone]           Dept Fax:  [phone] [facility]           Cedric Butts          Dept Phone: [phone]           Dept Fax:  [phone]      Chief Compliant:  Chief Complaint   Patient presents with    Follow-up     B/L knee pain        History of Present Illness: This is a 49 y.o. female who presents to the clinic today for evaluation of had concerns including Follow-up (B/L knee pain). Ms. Alan Pickens is a 58-year-old female who presents for evaluation of bilateral knee pain. Patient was initial eval for left knee pain on 12/29/2021 and underwent a corticosteroid injection subsequent seen for right knee pain on 1/26/2022 and also underwent a corticosteroid injection. Patient reports that the injections did provide significant relief of pain for several months she was hoping undergo repeat injection of both knees today. Patient denies any new injury or trauma no knee joint warmth, redness, fever or chills. She has tried Mobic in the past with minimal relief but states she is doing better on ibuprofen and 100 mg which she is taking on as-needed basis. Current pain in both knees started approximately 1 month ago with no injury or trauma. Pain is mostly to the medial aspect of left knee in the anterior medial aspect of the right knee. Pain seems to be aggravated by any prolonged period of standing or walking.        Past History:    Current Outpatient Medications:     meloxicam (MOBIC) 7.5 MG tablet, Take 1 tablet by mouth 2 times daily (Patient not taking: Reported on 5/3/2022), Disp: 60 tablet, Rfl: 0    lipase-protease-amylase (CREON) 11048-94876 units delayed release capsule, Take by mouth 3 times daily (with meals), Disp: , Rfl:    tiZANidine (ZANAFLEX) 4 MG tablet, Take 4 mg by mouth nightly, Disp: , Rfl:     losartan (COZAAR) 50 MG tablet, Take 100 mg by mouth daily , Disp: , Rfl:     amitriptyline (ELAVIL) 25 MG tablet, Take 200 mg by mouth nightly , Disp: , Rfl:     prazosin (MINIPRESS) 1 MG capsule, Take 2 mg by mouth nightly , Disp: , Rfl:     melatonin 5 MG TABS tablet, Take 10 mg by mouth daily , Disp: , Rfl:     QUEtiapine (SEROQUEL XR) 400 MG extended release tablet, Take 800 mg by mouth nightly, Disp: , Rfl:     busPIRone (BUSPAR) 15 MG tablet, Take 30 mg by mouth nightly Can take once in morning PRN , Disp: , Rfl:     omeprazole (PRILOSEC) 40 MG delayed release capsule, Take 40 mg by mouth daily, Disp: , Rfl:     Cholecalciferol (VITAMIN D) 50 MCG (2000 UT) CAPS capsule, Take by mouth daily (Patient not taking: Reported on 12/29/2021), Disp: , Rfl:     butalbital-APAP-caffeine (FIORICET) -40 MG CAPS per capsule, Take 1 capsule by mouth every 6 hours as needed for Headaches, Disp: 28 capsule, Rfl: 0    magnesium oxide (MAG-OX) 400 MG tablet, Take 400 mg by mouth daily, Disp: , Rfl:     fexofenadine (RA ALLERGY RELIEF) 180 MG tablet, take 1 tablet by mouth once daily (Patient taking differently: Take 180 mg by mouth daily as needed take 1 tablet by mouth once daily), Disp: 30 tablet, Rfl: 0    pramipexole (MIRAPEX) 0.5 MG tablet, take 1 tablet by mouth twice a day (Patient taking differently: Take 0.25 mg by mouth daily take 1 tablet by mouth twice a day), Disp: 60 tablet, Rfl: 0    DULoxetine (CYMBALTA) 60 MG extended release capsule, TAKE 1 CAPSULE BY MOUTH TWICE A DAY (Patient taking differently: Take 120 mg by mouth Daily with supper ), Disp: 60 capsule, Rfl: 3  Allergies   Allergen Reactions    Aripiprazole      Bad reaction    Eletriptan      Other reaction(s): Swelling-throat    Lasix [Furosemide] Other (See Comments)     Pt states pacreatitis    Sumatriptan Other (See Comments)    Triptans      Other reaction(s): Unknown    Lamotrigine Rash     Social History     Socioeconomic History    Marital status:      Spouse name: Not on file    Number of children: Not on file    Years of education: Not on file    Highest education level: Not on file   Occupational History    Not on file   Tobacco Use    Smoking status: Former Smoker     Packs/day: 1.00     Types: Cigarettes     Quit date: 2019     Years since quittin.4    Smokeless tobacco: Never Used    Tobacco comment: today last smoke   Vaping Use    Vaping Use: Never used   Substance and Sexual Activity    Alcohol use: Not Currently     Alcohol/week: 0.0 standard drinks    Drug use: No    Sexual activity: Not on file   Other Topics Concern    Not on file   Social History Narrative    Not on file     Social Determinants of Health     Financial Resource Strain:     Difficulty of Paying Living Expenses: Not on file   Food Insecurity:     Worried About [address] in the Last Year: Not on file    Ralf of Food in the Last Year: Not on file   Transportation Needs:     Lack of Transportation (Medical): Not on file    Lack of Transportation (Non-Medical):  Not on file   Physical Activity:     Days of Exercise per Week: Not on file    Minutes of Exercise per Session: Not on file   Stress:     Feeling of Stress : Not on file   Social Connections:     Frequency of Communication with Friends and Family: Not on file    Frequency of Social Gatherings with Friends and Family: Not on file    Attends Mosque Services: Not on file    Active Member of Clubs or Organizations: Not on file    Attends Club or Organization Meetings: Not on file    Marital Status: Not on file   Intimate Partner Violence:     Fear of Current or Ex-Partner: Not on file    Emotionally Abused: Not on file    Physically Abused: Not on file    Sexually Abused: Not on file   Housing Stability:     Unable to Pay for Housing in the Last Year: Not on file  Number of Places Lived in the Last Year: Not on file    Unstable Housing in the Last Year: Not on file     Past Medical History:   Diagnosis Date    Abnormal levels of other serum enzymes     Anxiety     Bilateral leg edema     Chronic kidney disease     right renal cyst    Depression     DVT (deep venous thrombosis) ([facility] [address] 1997    after delivery    Elevated liver enzymes     Fibromyalgia     Headache(784.0)     migraines    Hx of blood clots     1997 left calf    Hypertension     Kidney stone     Obstructive sleep apnea syndrome     Pancreatitis 2001    Pleural effusion 2001    PONV (postoperative nausea and vomiting)     S/P left knee arthroscopy 02/15/2021    knee arthroscopy with partial medial menisectomy - left eleazar    SOB (shortness of breath)     Supraventricular tachycardia (HCC)     SVT (supraventricular tachycardia) ([facility] [address] 9/8/2015     Past Surgical History:   Procedure Laterality Date    ATRIAL ABLATION SURGERY      BACK SURGERY      L4-5    CHOLECYSTECTOMY  2001    ENDOMETRIAL ABLATION      e sure implants placed also    ENDOSCOPY, COLON, DIAGNOSTIC      EXCISION LESION HAND / FINGER Right 1/25/2019    HAND LESION BIOPSY EXCISION performed by Fiorella Pelaez MD at [facility] Bilateral     left x2, right x1    FOOT SURGERY Right     x3    KNEE ARTHROSCOPY Left     x2    KNEE ARTHROSCOPY Left 2/15/2021    KNEE ARTHROSCOPY WITH PARTIAL MEDIAL MENISCECTOMY performed by Lottie Orlando MD at [facility] Left 9/20/2017    CYSTOSCOPY URETEROSCOPY HOLMIUM LASER LITHO WITH STONE BASKETING WITH  STENT  performed by Denver Rign MD at [facility] Right     UPPER GASTROINTESTINAL ENDOSCOPY N/A 2/2/2021    EGD ESOPHAGOGASTRODUODENOSCOPY performed by Kimberly Biggs MD at [address] [place] [address]  2/1/2021    [address] [place] [address] [place] 2/1/2021 PASTORA ULTRASOUND     Family History   Problem Relation Age of Onset    Heart Disease Father     High Blood Pressure Brother         Review of Systems   Constitutional: Negative for fever, chills, sweats. Eyes: Negative for changes in vision, or pain. HENT: Negative for ear ache, epistaxis, or sore throat. Respiratory/Cardio: Negative for Chest pain, palpitations, SOB, or cough. Gastrointestinal: Negative for abdominal pain, N/V/D. Genitourinary: Negative for dysuria, frequency, urgency, or hematuria. Neurological: Negative for headache, numbness, or weakness. Integumentary: Negative for rash, itching, laceration, or abrasion. Musculoskeletal: Positive for Follow-up (B/L knee pain)       Physical Exam:  Constitutional: Patient is oriented to person, place, and time. Patient appears well-developed and well nourished. HENT: Negative otherwise noted  Head: Normocephalic and Atraumatic  Nose: Normal  Eyes: Conjunctivae and EOM are normal  Neck: Normal range of motion Neck supple. Respiratory/Cardio: Effort normal. No respiratory distress. Musculoskeletal:    knee: Bilateral    Skin: warm and dry, no rash or erythema  Vasculature: 2+ pedal pulses bilaterally  Neuro: Sensation grossly intact to light touch diffusely  Alignment: Normal  Tenderness: Right knee: Moderate tenderness to the medial joint line and the quad tendon. No tenderness to the patellar tendon or lateral joint line. Left knee: Mild tenderness to the medial joint line and lateral joint line.   No tenderness to quad or patellar tendon    ROM: (Degrees)    Right   A P   Left   A P    Extension  0 0   Extension  0 0  Flexion   115 115   Flexion   115  120    Crepitation  Yes    Crepitation  Yes      Muscle strength:    Right       Left    Flexion   5    Flexion   5  Extension  5    Extension  5  SLR   5    SLR   5    Extensor lag   n    Extensor lag  n      Special testing:    Right          Left    y    Pain with deep knee flexion   y  y    Patellar grind     y  n    Patellar apprehension    n  n    Patellar glide     n    n    Lachman     n  n    Anterior drawer    n  n    Pivot shift     n  n    Posterior drawer    n  n    Dial test     n  n    Posterolateral drawer    n  n    Posterior Sag     n  n    MCL      n  n    LCL      n    y    Medial joint line tenderness   y  n    Lateral joint line tenderness   n  n    Appley's     y    Neurological: Patient is alert and oriented to person, place, and time. Normal strenght. No sensory deficit. Skin: Skin is warm and dry  Psychiatric: Behavior is normal. Thought content normal.  Nursing note and vitals reviewed. Labs and Imaging:     XR KNEE LEFT (1-2 VIEWS)  X-rays taken in clinic today and preliminarily reviewed by me 12/29/21:  AP bilateral knees standing lateral view of the left knee demonstrates   left knee with minimal medial compartmental narrowing. Small effusion   present. There is no evidence of acute fracture or other acute osseous   abnormality. Right knee with similar findings on AP view no evidence of   acute fracture.           X-rays taken in clinic today and preliminarily reviewed by me 5/3/22:  AP bilateral knees standing taken in clinic today demonstrate normal anatomic alignment. Very early joint line sclerosis of the medial compartment of both knees. Overall joint spaces otherwise maintained. No evidence of acute fracture or other acute osseous abnormality. Orders Placed This Encounter   Procedures    XR KNEE BILATERAL STANDING     Standing Status:   Future     Number of Occurrences:   1     Standing Expiration Date:   5/3/2023       Assessment and Plan:  1. Bilateral primary osteoarthritis of knee          PLAN:  Emiliano Gerber is a 49 y.o. old female who presents for evaluation of bilateral knee pain. Patient with history of left knee arthroscopy x3 mostly seen in February 2021.   I do question possible recurrent medial meniscus tear of the left knee on 12/29/2021 visit however patient had significant relief with corticosteroid injection. Examination today continues to be consistent with possible recurrent medial meniscus tear however patient has done well with injections in the past and requesting repeat injections today    Celestone injections given bilateral knees as outlined below patient tolerated procedure well. Follow-up on PRN basis    Procedure Note: bilateral knee Celestone Injection   An informed verbal consent for the procedure was obtained and risks including, but not limited to: allergy to medications, injection, bleeding, stiffness of joint, recurrence of symptoms, loss of function, swelling, drainage, irrigation, need for surgery and pseudo-septic inflammation, were explained to the patient. Also, discussed was the potential for further injections, irrigation and debridement and surgery. Alternate means of treatment have also been discussed with the patient. Following an appropriate discussion with the patient regarding the risks and benefits of the procedure she consented to proceed. her bilateral knee was prepped using betadine solution and alcohol swab. Using aseptic technique and through a lateral joint lien approach, her bilateral  knees was injected superficially with 4 cc mixture of 2 cc Lidocaine without epi and 2 cc of Marcaine and subsequently with 2 cc of 6 mg/mL Celestone into the bilateral knee. A band aid was applied to the injection site. she tolerated the injection with no immediate adverse reactions. Electronically signed by DENNYS De Dios on 5/3/22 at 10:59 AM EDT        Please note that this chart was generated using voice recognition Dragon dictation software. Although every effort was made to ensure the accuracy of this automated transcription, some errors in transcription may have occurred.

## 2022-05-06 DIAGNOSIS — M79.671 RIGHT FOOT PAIN: Primary | ICD-10-CM

## 2022-05-09 ENCOUNTER — OFFICE VISIT (OUTPATIENT)
Dept: ORTHOPEDIC SURGERY | Age: 47
End: 2022-05-09
Payer: MEDICARE

## 2022-05-09 VITALS — HEIGHT: 65 IN | WEIGHT: 293 LBS | BODY MASS INDEX: 48.82 KG/M2 | RESPIRATION RATE: 16 BRPM

## 2022-05-09 DIAGNOSIS — M92.60 HAGLUND'S DEFORMITY: ICD-10-CM

## 2022-05-09 DIAGNOSIS — M76.61 ACHILLES TENDINITIS OF RIGHT LOWER EXTREMITY: Primary | ICD-10-CM

## 2022-05-09 PROCEDURE — 99214 OFFICE O/P EST MOD 30 MIN: CPT | Performed by: ORTHOPAEDIC SURGERY

## 2022-05-09 PROCEDURE — 1036F TOBACCO NON-USER: CPT | Performed by: ORTHOPAEDIC SURGERY

## 2022-05-09 PROCEDURE — G8427 DOCREV CUR MEDS BY ELIG CLIN: HCPCS | Performed by: ORTHOPAEDIC SURGERY

## 2022-05-09 PROCEDURE — G8417 CALC BMI ABV UP PARAM F/U: HCPCS | Performed by: ORTHOPAEDIC SURGERY

## 2022-05-09 RX ORDER — ASPIRIN 325 MG
325 TABLET, DELAYED RELEASE (ENTERIC COATED) ORAL DAILY
Qty: 42 TABLET | Refills: 0 | Status: SHIPPED | OUTPATIENT
Start: 2022-05-09 | End: 2022-07-05

## 2022-05-09 NOTE — PROGRESS NOTES
[facility] [address].  [address] [facility] [address]  Dept: [phone]    Ambulatory Orthopedic Consult      CHIEF COMPLAINT:    Chief Complaint   Patient presents with    Foot Pain     Right       HISTORY OF PRESENT ILLNESS:      The patient is a 49 y.o. female who is being seen for evaluation of the above, which began around May 2021 atraumatically  . At today's visit, she is using no brace/assistive device. History is obtained today from:   [x]  the patient     [x]  EMR     []  one family member/friend    []  multiple family members/friends    []  other:      The patient has previously seen Dr. Medina Conner and Karen Allen for this problem. At today's visit, she localizes her pain to the right posterior heel. REVIEW OF SYSTEMS:  Musculoskeletal: See HPI for pertinent positives     Past Medical History:    She  has a past medical history of Abnormal levels of other serum enzymes, Anxiety, Bilateral leg edema, Chronic kidney disease, Depression, DVT (deep venous thrombosis) ([facility] [address] (1997), Elevated liver enzymes, Fibromyalgia, Headache(784.0), blood clots, Hypertension, Kidney stone, Obstructive sleep apnea syndrome, Pancreatitis (2001), Pleural effusion (2001), PONV (postoperative nausea and vomiting), S/P left knee arthroscopy (02/15/2021), SOB (shortness of breath), Supraventricular tachycardia ([facility] [address] and SVT (supraventricular tachycardia) ([facility] [address] (9/8/2015). Past Surgical History:    She  has a past surgical history that includes Thyroid lobectomy (Right); Cholecystectomy (2001); Knee arthroscopy (Left); Foot surgery (Right); Endometrial ablation; Atrial ablation surgery; eye surgery (Bilateral); Endoscopy, colon, diagnostic; back surgery; pr cysto/uretero/pyeloscopy w/lithotripsy (Left, 9/20/2017); EXCISION LESION HAND / FINGER (Right, 1/25/2019); US BIOPSY LIVER PERCUTANEOUS (2/1/2021);  Upper gastrointestinal endoscopy (N/A, 2/2/2021); and Knee arthroscopy (Left, 2/15/2021).      Current Medications:     Current Outpatient Medications:     meloxicam (MOBIC) 7.5 MG tablet, Take 1 tablet by mouth 2 times daily (Patient not taking: Reported on 5/3/2022), Disp: 60 tablet, Rfl: 0    lipase-protease-amylase (CREON) 18685-19897 units delayed release capsule, Take by mouth 3 times daily (with meals), Disp: , Rfl:     tiZANidine (ZANAFLEX) 4 MG tablet, Take 4 mg by mouth nightly, Disp: , Rfl:     losartan (COZAAR) 50 MG tablet, Take 100 mg by mouth daily , Disp: , Rfl:     amitriptyline (ELAVIL) 25 MG tablet, Take 200 mg by mouth nightly , Disp: , Rfl:     prazosin (MINIPRESS) 1 MG capsule, Take 2 mg by mouth nightly , Disp: , Rfl:     melatonin 5 MG TABS tablet, Take 10 mg by mouth daily , Disp: , Rfl:     QUEtiapine (SEROQUEL XR) 400 MG extended release tablet, Take 800 mg by mouth nightly, Disp: , Rfl:     busPIRone (BUSPAR) 15 MG tablet, Take 30 mg by mouth nightly Can take once in morning PRN , Disp: , Rfl:     omeprazole (PRILOSEC) 40 MG delayed release capsule, Take 40 mg by mouth daily, Disp: , Rfl:     Cholecalciferol (VITAMIN D) 50 MCG (2000 UT) CAPS capsule, Take by mouth daily (Patient not taking: Reported on 12/29/2021), Disp: , Rfl:     butalbital-APAP-caffeine (FIORICET) -40 MG CAPS per capsule, Take 1 capsule by mouth every 6 hours as needed for Headaches, Disp: 28 capsule, Rfl: 0    magnesium oxide (MAG-OX) 400 MG tablet, Take 400 mg by mouth daily, Disp: , Rfl:     fexofenadine (RA ALLERGY RELIEF) 180 MG tablet, take 1 tablet by mouth once daily (Patient taking differently: Take 180 mg by mouth daily as needed take 1 tablet by mouth once daily), Disp: 30 tablet, Rfl: 0    pramipexole (MIRAPEX) 0.5 MG tablet, take 1 tablet by mouth twice a day (Patient taking differently: Take 0.25 mg by mouth daily take 1 tablet by mouth twice a day), Disp: 60 tablet, Rfl: 0    DULoxetine (CYMBALTA) 60 MG extended release capsule, TAKE 1 CAPSULE BY MOUTH TWICE A DAY (Patient taking differently: Take 120 mg by mouth Daily with supper ), Disp: 60 capsule, Rfl: 3     Allergies:    Aripiprazole, Eletriptan, Lasix [furosemide], Sumatriptan, Triptans, and Lamotrigine    Family History:  family history includes Heart Disease in her father; High Blood Pressure in her brother. Social History:   Social History     Occupational History    Not on file   Tobacco Use    Smoking status: Former Smoker     Packs/day: 1.00     Types: Cigarettes     Quit date: 2019     Years since quittin.4    Smokeless tobacco: Never Used    Tobacco comment: today last smoke   Vaping Use    Vaping Use: Never used   Substance and Sexual Activity    Alcohol use: Not Currently     Alcohol/week: 0.0 standard drinks    Drug use: No    Sexual activity: Not on file     Works full-time at the [employer] 96:  Resp 16   Ht 5' 5\" (1.651 m)   Wt (!) 350 lb (158.8 kg)   BMI 58.24 kg/m²    Psych: awake, alert  Cardio:  well perfused extremities, no cyanosis  Resp:  normal respiratory effort  Musculoskeletal:    RLE:  Vascular: Limb well perfused, compartments soft/compressible. Skin: No erythema/ulcers. Intact. Neurovascular Status:  Grossly neurovascularly intact throughout   Tenderness to Palpation: Posterior heel  --Achilles:    -no nodules/thickening of Achilles tendon palpated  -no palpable gap of Achilles tendon noted  -no pain with resisted plantarflexion      LLE:  Vascular: Limb well perfused, compartments soft/compressible. Skin: No erythema/ulcers. Intact.    Neurovascular Status:  Grossly neurovascularly intact throughout   Tenderness to Palpation: Posterior heel--minimal  --Achilles:    -no nodules/thickening of Achilles tendon palpated  -no palpable gap of Achilles tendon noted  -no pain with resisted plantarflexion      RADIOLOGY:   2022 FINDINGS:  Three weightbearing views (AP, Mortise, and Lateral) of the right ankle and three weightbearing views (AP, Oblique, Lateral) of the right foot were obtained in the office today and reviewed, revealing no acute fracture, dislocation, or radioopaque foreign body/tumor. The ankle mortise is maintained with no widening of the clear spaces. Overall alignment is satisfactory. Calcification in the subfibular region, possibly secondary to remote trauma. Evidence of prior healed fracture at the fifth metatarsal.  Subtle calcifications posterior to the heel at the insertion of the Achilles tendon. IMPRESSION:  No acute fracture/dislocation. Electronically signed by Kelli Delgado MD    Relevant previous imaging reviewed, both imaging and report(s) as below:    No results found. ASSESSMENT AND PLAN:  Body mass index is 58.24 kg/m². She has right insertional Achilles tendinopathy with mild calcifications. Notably, she has a somewhat complex past medical history. She has a history of psychiatric/mental health diagnosis (bipolar disorder), fibromyalgia, history of SVTs, history of acute on chronic pancreatitis, chronic edema, morbid obesity (BMI greater than 58), history of DVT (denies taking blood thinners at this time; reports she is only 1 blood clot around the time of a pregnancy many years ago), and tobacco use (reports that she vapes). We had a discussion today about the likely diagnosis and its natural history, physical exam and imaging findings, as well as various treatment options in detail. Surgically, we discussed that an Achilles debridement is a potential surgical option, however, given her tobacco use as above, I did not recommend surgery, and we discussed the reasons why. At today's visit, I recommended conservative management. Orders/referrals were placed as below at today's visit. The patient was placed into a cam boot today and provided heel lifts. The patient was provided a night splint.   The patient will wear the night splint and use the cam boot at all times, with heel lifts PRN, to avoid pain provoking activity. We did discuss the risk of rupture. I referred the patient to physical therapy for Achilles tendinopathy. We also had a discussion about the risk of blood clot and thromboembolic events. The patient understands that there is an increased risk with surgery/immobilization, and understands nothing will completely eliminate the risk of DVT/PE's, and that any prophylactic medication does not substitute for early mobilization. Given her risk profile, I have recommended the following strategy to decrease the risk of blood clots, and the patient agrees and wishes to proceed:  Aspirin 325 mg PO q Day. We discussed that she is at higher risk for DVT, given her past medical history as above. All questions were answered and the above plan was agreed upon. The patient will return to clinic in 6 weeks without x-rays. At her next visit, I anticipate progressing her activity. At the patient's next visit, depending on how the patient is doing and/or new imaging/labs results, we may consider the following options:    []  Lace up ankle     []  CAM boot         []  removable wrist brace     []  PT:        []  Wean out immobilization         []  Adv activity      []  Footmind        []  Spenco       []  Custom Orthotic:               []  AZ brace                    []  Rocker Bottom      []  Night splint    []  Heel cups        []  Strap        []  Toe gizmos    []  Topl        []  NSAIDs         []  Diane        []  Ref:         []  Stress Xray    []  CT        []  MRI  []  Inj:          []  Consider OR      []  Pick OR date    No follow-ups on file. No orders of the defined types were placed in this encounter. No orders of the defined types were placed in this encounter. Skyla Zuniga MD  Orthopedic Surgery        Please excuse any typos/errors, as this note was created with the assistance of voice recognition software.  While intending to generate a document that actually reflects the content of the visit, the document can still have some errors including those of syntax and sound-a-like substitutions which may escape proof reading. In such instances, actual meaning can be extrapolated by context.

## 2022-05-09 NOTE — LETTER
[address]  [address] [address]  Phone: [phone]  Fax: [phone]           Jr Barry MD      May 9, 2022     Patient: Benny Emmanuel   MR Number: [id number]   YOB: 1975   Date of Visit: 5/9/2022       Dear Dr. Ramírez Florentino: Thank you for referring Cameron Rendon to me for evaluation/treatment. Below are the relevant portions of my assessment and plan of care. She has right insertional Achilles tendinopathy with mild calcifications. Notably, she has a somewhat complex past medical history. She has a history of psychiatric/mental health diagnosis (bipolar disorder), fibromyalgia, history of SVTs, history of acute on chronic pancreatitis, chronic edema, morbid obesity (BMI greater than 58), history of DVT (denies taking blood thinners at this time; reports she is only 1 blood clot around the time of a pregnancy many years ago), and tobacco use (reports that she vapes). We had a discussion today about the likely diagnosis and its natural history, physical exam and imaging findings, as well as various treatment options in detail. Surgically, we discussed that an Achilles debridement is a potential surgical option, however, given her tobacco use as above, I did not recommend surgery, and we discussed the reasons why. At today's visit, I recommended conservative management. Orders/referrals were placed as below at today's visit. The patient was placed into a cam boot today and provided heel lifts. The patient was provided a night splint. The patient will wear the night splint and use the cam boot at all times, with heel lifts PRN, to avoid pain provoking activity. We did discuss the risk of rupture. I referred the patient to physical therapy for Achilles tendinopathy. We also had a discussion about the risk of blood clot and thromboembolic events.  The patient understands that there is an increased risk with surgery/immobilization, and understands nothing will completely eliminate the risk of DVT/PE's, and that any prophylactic medication does not substitute for early mobilization. Given her risk profile, I have recommended the following strategy to decrease the risk of blood clots, and the patient agrees and wishes to proceed:  Aspirin 325 mg PO q Day. We discussed that she is at higher risk for DVT, given her past medical history as above. All questions were answered and the above plan was agreed upon. The patient will return to clinic in 6 weeks without x-rays. At her next visit, I anticipate progressing her activity. If you have questions, please do not hesitate to call me. I look forward to following Momo Swartz along with you.     Sincerely,        Anahi Chavez MD    CC providers:  Ronaldo Pena [phone] [facility]. [id number]  Via Fax: [phone]

## 2022-06-10 ENCOUNTER — OFFICE VISIT (OUTPATIENT)
Dept: ORTHOPEDIC SURGERY | Age: 47
End: 2022-06-10
Payer: MEDICARE

## 2022-06-10 DIAGNOSIS — M25.561 RIGHT KNEE PAIN, UNSPECIFIED CHRONICITY: Primary | ICD-10-CM

## 2022-06-10 DIAGNOSIS — S83.241A TEAR OF MEDIAL MENISCUS OF RIGHT KNEE, CURRENT, UNSPECIFIED TEAR TYPE, INITIAL ENCOUNTER: ICD-10-CM

## 2022-06-10 PROCEDURE — 99213 OFFICE O/P EST LOW 20 MIN: CPT | Performed by: ORTHOPAEDIC SURGERY

## 2022-06-10 PROCEDURE — G8417 CALC BMI ABV UP PARAM F/U: HCPCS | Performed by: ORTHOPAEDIC SURGERY

## 2022-06-10 PROCEDURE — G8428 CUR MEDS NOT DOCUMENT: HCPCS | Performed by: ORTHOPAEDIC SURGERY

## 2022-06-10 PROCEDURE — 1036F TOBACCO NON-USER: CPT | Performed by: ORTHOPAEDIC SURGERY

## 2022-06-10 RX ORDER — METHYLPREDNISOLONE 4 MG/1
4 TABLET ORAL SEE ADMIN INSTRUCTIONS
Qty: 1 KIT | Refills: 0 | Status: SHIPPED | OUTPATIENT
Start: 2022-06-10 | End: 2022-06-16

## 2022-06-10 NOTE — PROGRESS NOTES
Yehuda Cruz M.D.            [facility] [address] [facility] [facility],[address], [address] [facility]           Dept Phone: [phone]           Dept Fax:  [phone], [place]          Dept Phone: [phone]           Dept Fax:  [phone]      Chief Compliant:  Chief Complaint   Patient presents with    Pain     Rt knee        History of Present Illness: This is a 49 y.o. female who presents to the clinic today for evaluation / follow up of acute right knee pain. Patient is a 44-year-old female who is most recently been seen by [facility] for bilateral knee problems. She had a previous arthroscopy of her left knee in the past done by me. She was noted to have some early degenerative joint disease of both knees and she got injections to her knees May 3 of this year. She was doing very well up until about a week ago when she was walking the yard and stepped in a hole and twisted her right knee. She does not recall if she heard a pop or snap but she is describing some medial joint line pain and a little bit of swelling. .       Review of Systems   Constitutional: Negative for fever, chills, sweats. Eyes: Negative for changes in vision, or pain. HENT: Negative for ear ache, epistaxis, or sore throat. Respiratory/Cardio: Negative for Chest pain, palpitations, SOB, or cough. Gastrointestinal: Negative for abdominal pain, N/V/D. Genitourinary: Negative for dysuria, frequency, urgency, or hematuria. Neurological: Negative for headache, numbness, or weakness. Integumentary: Negative for rash, itching, laceration, or abrasion. Musculoskeletal: Positive for Pain (Rt knee)       Physical Exam:  Constitutional: Patient is oriented to person, place, and time. Patient appears well-developed and well nourished.    HENT: Negative otherwise noted  Head: Normocephalic and Atraumatic  Nose: Normal  Eyes: Conjunctivae and EOM are normal  Neck: Normal range of motion Neck supple. Respiratory/Cardio: Effort normal. No respiratory distress. Musculoskeletal: Physical examination of her right knee notes that she has a little bit of swelling but nothing too severe she has motion of 5 to but 110 degrees. She has pain after that. She does have medial joint line tenderness and a modestly positive Nova's. Collaterals and cruciates appear to be appropriate no calf tenderness negative Homans  Neurological: Patient is alert and oriented to person, place, and time. Normal strenght. No sensory deficit. Skin: Skin is warm and dry  Psychiatric: Behavior is normal. Thought content normal.  Nursing note and vitals reviewed. Labs and Imaging:     XR taken today: No new x-ray was taken today. Patient had x-rays taken on 5/3/2022 which show some early DJD both knees but nothing too severe  No results found. Orders Placed This Encounter   Procedures    MRI KNEE RIGHT WO CONTRAST     Standing Status:   Future     Standing Expiration Date:   6/10/2023       Assessment and Plan:  1. Right knee pain, unspecified chronicity    2. Tear of medial meniscus of right knee, current, unspecified tear type, initial encounter            This is a 49 y.o. female who presents to the clinic today for evaluation / follow up of suspicious for possible medial meniscus tear right knee.      Past History:    Current Outpatient Medications:     methylPREDNISolone (MEDROL DOSEPACK) 4 MG tablet, Take 1 tablet by mouth See Admin Instructions for 6 days Take by mouth., Disp: 1 kit, Rfl: 0    aspirin 325 MG EC tablet, Take 1 tablet by mouth daily, Disp: 42 tablet, Rfl: 0    meloxicam (MOBIC) 7.5 MG tablet, Take 1 tablet by mouth 2 times daily (Patient not taking: Reported on 5/3/2022), Disp: 60 tablet, Rfl: 0    lipase-protease-amylase (CREON) 60620-83051 units delayed release capsule, Take by mouth 3 times daily (with meals), Disp: , Rfl:     tiZANidine (ZANAFLEX) 4 MG tablet, Take 4 mg by mouth nightly, Disp: , Rfl:     losartan (COZAAR) 50 MG tablet, Take 100 mg by mouth daily , Disp: , Rfl:     amitriptyline (ELAVIL) 25 MG tablet, Take 200 mg by mouth nightly , Disp: , Rfl:     prazosin (MINIPRESS) 1 MG capsule, Take 2 mg by mouth nightly , Disp: , Rfl:     melatonin 5 MG TABS tablet, Take 10 mg by mouth daily , Disp: , Rfl:     QUEtiapine (SEROQUEL XR) 400 MG extended release tablet, Take 800 mg by mouth nightly, Disp: , Rfl:     busPIRone (BUSPAR) 15 MG tablet, Take 30 mg by mouth nightly Can take once in morning PRN , Disp: , Rfl:     omeprazole (PRILOSEC) 40 MG delayed release capsule, Take 40 mg by mouth daily, Disp: , Rfl:     Cholecalciferol (VITAMIN D) 50 MCG (2000 UT) CAPS capsule, Take by mouth daily (Patient not taking: Reported on 12/29/2021), Disp: , Rfl:     butalbital-APAP-caffeine (FIORICET) -40 MG CAPS per capsule, Take 1 capsule by mouth every 6 hours as needed for Headaches, Disp: 28 capsule, Rfl: 0    magnesium oxide (MAG-OX) 400 MG tablet, Take 400 mg by mouth daily, Disp: , Rfl:     fexofenadine (RA ALLERGY RELIEF) 180 MG tablet, take 1 tablet by mouth once daily (Patient taking differently: Take 180 mg by mouth daily as needed take 1 tablet by mouth once daily), Disp: 30 tablet, Rfl: 0    pramipexole (MIRAPEX) 0.5 MG tablet, take 1 tablet by mouth twice a day (Patient taking differently: Take 0.25 mg by mouth daily take 1 tablet by mouth twice a day), Disp: 60 tablet, Rfl: 0    DULoxetine (CYMBALTA) 60 MG extended release capsule, TAKE 1 CAPSULE BY MOUTH TWICE A DAY (Patient taking differently: Take 120 mg by mouth Daily with supper ), Disp: 60 capsule, Rfl: 3  Allergies   Allergen Reactions    Aripiprazole      Bad reaction    Eletriptan      Other reaction(s): Swelling-throat    Lasix [Furosemide] Other (See Comments)     Pt states pacreatitis    Sumatriptan Other (See Comments)    Triptans      Other reaction(s): Unknown    Lamotrigine Rash     Social History     Socioeconomic History    Marital status:      Spouse name: Not on file    Number of children: Not on file    Years of education: Not on file    Highest education level: Not on file   Occupational History    Not on file   Tobacco Use    Smoking status: Former Smoker     Packs/day: 1.00     Types: Cigarettes     Quit date: 2019     Years since quittin.5    Smokeless tobacco: Never Used    Tobacco comment: today last smoke   Vaping Use    Vaping Use: Never used   Substance and Sexual Activity    Alcohol use: Not Currently     Alcohol/week: 0.0 standard drinks    Drug use: No    Sexual activity: Not on file   Other Topics Concern    Not on file   Social History Narrative    Not on file     Social Determinants of Health     Financial Resource Strain:     Difficulty of Paying Living Expenses: Not on file   Food Insecurity:     Worried About [address] in the Last Year: Not on file    Ralf of Food in the Last Year: Not on file   Transportation Needs:     Lack of Transportation (Medical): Not on file    Lack of Transportation (Non-Medical):  Not on file   Physical Activity:     Days of Exercise per Week: Not on file    Minutes of Exercise per Session: Not on file   Stress:     Feeling of Stress : Not on file   Social Connections:     Frequency of Communication with Friends and Family: Not on file    Frequency of Social Gatherings with Friends and Family: Not on file    Attends Mandaeism Services: Not on file    Active Member of Clubs or Organizations: Not on file    Attends Club or Organization Meetings: Not on file    Marital Status: Not on file   Intimate Partner Violence:     Fear of Current or Ex-Partner: Not on file    Emotionally Abused: Not on file    Physically Abused: Not on file    Sexually Abused: Not on file   Housing Stability:     Unable to Pay for Housing in the Last Year: Not on file    Number of Places Lived in the Last Year: Not on file    Unstable Housing in the Last Year: Not on file     Past Medical History:   Diagnosis Date    Abnormal levels of other serum enzymes     Anxiety     Bilateral leg edema     Chronic kidney disease     right renal cyst    Depression     DVT (deep venous thrombosis) ([facility] 1997    after delivery    Elevated liver enzymes     Fibromyalgia     Headache(784.0)     migraines    Hx of blood clots     1997 left calf    Hypertension     Kidney stone     Obstructive sleep apnea syndrome     Pancreatitis 2001    Pleural effusion 2001    PONV (postoperative nausea and vomiting)     S/P left knee arthroscopy 02/15/2021    knee arthroscopy with partial medial menisectomy - left eleazar    SOB (shortness of breath)     Supraventricular tachycardia (HCC)     SVT (supraventricular tachycardia) ([facility] 9/8/2015     Past Surgical History:   Procedure Laterality Date    ATRIAL ABLATION SURGERY      BACK SURGERY      L4-5    CHOLECYSTECTOMY  2001    ENDOMETRIAL ABLATION      e sure implants placed also    ENDOSCOPY, COLON, DIAGNOSTIC      EXCISION LESION HAND / FINGER Right 1/25/2019    HAND LESION BIOPSY EXCISION performed by Joshua Sandoval MD at [address] [place] [address] Bilateral     left x2, right x1    FOOT SURGERY Right     x3    KNEE ARTHROSCOPY Left     x2    KNEE ARTHROSCOPY Left 2/15/2021    KNEE ARTHROSCOPY WITH PARTIAL MEDIAL MENISCECTOMY performed by Dougie Zendejas MD at [place] [address] Left 9/20/2017    CYSTOSCOPY URETEROSCOPY HOLMIUM LASER LITHO WITH STONE BASKETING WITH  STENT  performed by Mildred Patrick MD at [facility] Right     UPPER GASTROINTESTINAL ENDOSCOPY N/A 2/2/2021    EGD ESOPHAGOGASTRODUODENOSCOPY performed by Waldron Merlin, MD at [address] [place] [address]  2/1/2021     GUIDED LIVER BIOPSY PERCUTANEOUS 2/1/2021 [facility]     Family History   Problem Relation Age of Onset    Heart Disease Father     High Blood Pressure Brother    Plan  Inform the patient that is too soon to do another injection I will give her a Medrol Dosepak in the meantime I think an MRI is indicated to rule out possible meniscus tear we will get her scheduled accordingly      Provider Attestation:  Dinorah Webster, personally performed the services described in this documentation. All medical record entries made by the scribe were at my direction and in my presence. I have reviewed the chart and discharge instructions and agree that the records reflect my personal performance and is accurate and complete. Amanda Nye MD. 06/10/22      Please note that this chart was generated using voice recognition Dragon dictation software. Although every effort was made to ensure the accuracy of this automated transcription, some errors in transcription may have occurred.

## 2022-06-20 ENCOUNTER — TELEPHONE (OUTPATIENT)
Dept: ORTHOPEDIC SURGERY | Age: 47
End: 2022-06-20

## 2022-06-20 ENCOUNTER — HOSPITAL ENCOUNTER (OUTPATIENT)
Dept: MRI IMAGING | Age: 47
Discharge: HOME | End: 2022-06-22
Payer: MEDICARE

## 2022-06-20 DIAGNOSIS — M25.561 RIGHT KNEE PAIN, UNSPECIFIED CHRONICITY: ICD-10-CM

## 2022-06-20 DIAGNOSIS — S83.241A TEAR OF MEDIAL MENISCUS OF RIGHT KNEE, CURRENT, UNSPECIFIED TEAR TYPE, INITIAL ENCOUNTER: ICD-10-CM

## 2022-06-20 PROCEDURE — 73721 MRI JNT OF LWR EXTRE W/O DYE: CPT

## 2022-06-20 NOTE — TELEPHONE ENCOUNTER
Patient called with results but would like to hold off on surgery at this time. ----- Message from Jeremie Garcia MD sent at 6/20/2022  9:01 AM EDT -----  Medial meniscus tear of her right knee.   She may schedule a arthroscopy if she so desires

## 2022-07-05 ENCOUNTER — HOSPITAL ENCOUNTER (OUTPATIENT)
Dept: PREADMISSION TESTING | Age: 47
Discharge: HOME | End: 2022-07-09
Payer: MEDICARE

## 2022-07-05 ENCOUNTER — HOSPITAL ENCOUNTER (OUTPATIENT)
Age: 47
Setting detail: SPECIMEN
Discharge: HOME | End: 2022-07-05

## 2022-07-05 VITALS
WEIGHT: 293 LBS | OXYGEN SATURATION: 96 % | HEART RATE: 93 BPM | TEMPERATURE: 96.9 F | SYSTOLIC BLOOD PRESSURE: 148 MMHG | HEIGHT: 65 IN | DIASTOLIC BLOOD PRESSURE: 76 MMHG | RESPIRATION RATE: 18 BRPM | BODY MASS INDEX: 48.82 KG/M2

## 2022-07-05 DIAGNOSIS — Z01.818 PRE-OP TESTING: Primary | ICD-10-CM

## 2022-07-05 DIAGNOSIS — Z01.818 PRE-OP TESTING: ICD-10-CM

## 2022-07-05 LAB
ANION GAP SERPL CALCULATED.3IONS-SCNC: 13 MMOL/L (ref 9–17)
BUN BLDV-MCNC: 17 MG/DL (ref 6–20)
CALCIUM SERPL-MCNC: 9.3 MG/DL (ref 8.6–10.4)
CHLORIDE BLD-SCNC: 99 MMOL/L (ref 98–107)
CO2: 25 MMOL/L (ref 20–31)
CREAT SERPL-MCNC: 0.59 MG/DL (ref 0.5–0.9)
GFR AFRICAN AMERICAN: >60 ML/MIN
GFR NON-AFRICAN AMERICAN: >60 ML/MIN
GFR SERPL CREATININE-BSD FRML MDRD: ABNORMAL ML/MIN/{1.73_M2}
GLUCOSE BLD-MCNC: 125 MG/DL (ref 70–99)
HCT VFR BLD CALC: 36.1 % (ref 36.3–47.1)
HEMOGLOBIN: 11.9 G/DL (ref 11.9–15.1)
MCH RBC QN AUTO: 30.1 PG (ref 25.2–33.5)
MCHC RBC AUTO-ENTMCNC: 33 G/DL (ref 28.4–34.8)
MCV RBC AUTO: 91.2 FL (ref 82.6–102.9)
NRBC AUTOMATED: 0 PER 100 WBC
PDW BLD-RTO: 13.4 % (ref 11.8–14.4)
PLATELET # BLD: 274 K/UL (ref 138–453)
PMV BLD AUTO: 11.1 FL (ref 8.1–13.5)
POTASSIUM SERPL-SCNC: 4.8 MMOL/L (ref 3.7–5.3)
RBC # BLD: 3.96 M/UL (ref 3.95–5.11)
SODIUM BLD-SCNC: 137 MMOL/L (ref 135–144)
WBC # BLD: 8.2 K/UL (ref 3.5–11.3)

## 2022-07-05 PROCEDURE — 93005 ELECTROCARDIOGRAM TRACING: CPT | Performed by: ANESTHESIOLOGY

## 2022-07-05 RX ORDER — PROPRANOLOL HYDROCHLORIDE 10 MG/1
10 TABLET ORAL 3 TIMES DAILY
COMMUNITY

## 2022-07-05 RX ORDER — HYDROXYZINE 50 MG/1
50 TABLET, FILM COATED ORAL 2 TIMES DAILY
COMMUNITY

## 2022-07-05 ASSESSMENT — PAIN DESCRIPTION - LOCATION: LOCATION: KNEE

## 2022-07-05 ASSESSMENT — PAIN DESCRIPTION - ORIENTATION: ORIENTATION: RIGHT

## 2022-07-05 ASSESSMENT — PAIN SCALES - GENERAL: PAINLEVEL_OUTOF10: 5

## 2022-07-06 ENCOUNTER — ANESTHESIA EVENT (OUTPATIENT)
Dept: OPERATING ROOM | Age: 47
End: 2022-07-06
Payer: MEDICARE

## 2022-07-06 LAB
EKG ATRIAL RATE: 91 BPM
EKG P AXIS: 38 DEGREES
EKG P-R INTERVAL: 160 MS
EKG Q-T INTERVAL: 356 MS
EKG QRS DURATION: 90 MS
EKG QTC CALCULATION (BAZETT): 437 MS
EKG R AXIS: 13 DEGREES
EKG T AXIS: 31 DEGREES
EKG VENTRICULAR RATE: 91 BPM

## 2022-07-15 NOTE — DISCHARGE INSTRUCTIONS
Bozena Hernandez M.D.  [phone]  POST OPERATIVE DISCHARGE INSTRUCTIONS   KNEE ARTHROSCOPY     Follow-up in office seven to ten days after surgery. Call for appointment if not already made. ([phone]). Take pain medication as ordered. Keep the dressing/ace wrap on for 72 hours (3 days). After the 72 hours, may remove ace wrap and dressing, place Band-Aids over sutures and then if desired reapply ace wrap. Start wrapping at the ankle and wrap up to the top of the leg. You may shower, but keep the dressing & wounds dry with plastic bag around knee/leg until seen by Dr. Phoebe Nair. After surgery, it is weight bearing as tolerated, unless instructed differently by Dr. Phoebe Nair after surgery. Use crutches or a walker as needed based on how your knee feels. Be sure to keep your leg elevated when sitting or lying down. Use 2-3 pillows under leg. Ice to surgical site for 20 to 30 minutes four times a day for 48 hours. Dr. Phoebe Nair recommends taking one Aspirin 325 mg daily for 7 days after surgery to help prevent blood clots. Be sure to do your leg exercises daily. Examples of these exercises are in the knee arthroscopy booklet given in Dr. Rama Santos office. Call Dr. Rama Santos office if you experience any of the following:  Temperature above 101° F.  Persistent nausea and vomiting. Severe swelling in the knee, calf, or foot. Severe pain that is not relieved by pain medications.

## 2022-07-18 ENCOUNTER — ANESTHESIA (OUTPATIENT)
Dept: OPERATING ROOM | Age: 47
End: 2022-07-18
Payer: MEDICARE

## 2022-07-18 ENCOUNTER — HOSPITAL ENCOUNTER (OUTPATIENT)
Age: 47
Setting detail: OUTPATIENT SURGERY
Discharge: HOME | End: 2022-07-18
Attending: ORTHOPAEDIC SURGERY | Admitting: ORTHOPAEDIC SURGERY
Payer: MEDICARE

## 2022-07-18 VITALS
SYSTOLIC BLOOD PRESSURE: 114 MMHG | WEIGHT: 293 LBS | DIASTOLIC BLOOD PRESSURE: 61 MMHG | RESPIRATION RATE: 30 BRPM | HEART RATE: 90 BPM | BODY MASS INDEX: 50.02 KG/M2 | TEMPERATURE: 96.8 F | OXYGEN SATURATION: 93 % | HEIGHT: 64 IN

## 2022-07-18 DIAGNOSIS — S83.241A ACUTE MEDIAL MENISCUS TEAR OF RIGHT KNEE, INITIAL ENCOUNTER: Primary | ICD-10-CM

## 2022-07-18 LAB
GLUCOSE BLD-MCNC: 158 MG/DL (ref 65–105)
HCG, PREGNANCY URINE (POC): NEGATIVE

## 2022-07-18 PROCEDURE — 2500000003 HC RX 250 WO HCPCS: Performed by: ANESTHESIOLOGY

## 2022-07-18 PROCEDURE — 81025 URINE PREGNANCY TEST: CPT

## 2022-07-18 PROCEDURE — 82947 ASSAY GLUCOSE BLOOD QUANT: CPT

## 2022-07-18 PROCEDURE — 6360000002 HC RX W HCPCS: Performed by: ANESTHESIOLOGY

## 2022-07-18 PROCEDURE — 3600000003 HC SURGERY LEVEL 3 BASE: Performed by: ORTHOPAEDIC SURGERY

## 2022-07-18 PROCEDURE — 2580000003 HC RX 258: Performed by: ANESTHESIOLOGY

## 2022-07-18 PROCEDURE — 29881 ARTHRS KNE SRG MNISECTMY M/L: CPT | Performed by: ORTHOPAEDIC SURGERY

## 2022-07-18 PROCEDURE — 3700000001 HC ADD 15 MINUTES (ANESTHESIA): Performed by: ORTHOPAEDIC SURGERY

## 2022-07-18 PROCEDURE — 7100000011 HC PHASE II RECOVERY - ADDTL 15 MIN: Performed by: ORTHOPAEDIC SURGERY

## 2022-07-18 PROCEDURE — 3700000000 HC ANESTHESIA ATTENDED CARE: Performed by: ORTHOPAEDIC SURGERY

## 2022-07-18 PROCEDURE — 7100000000 HC PACU RECOVERY - FIRST 15 MIN: Performed by: ORTHOPAEDIC SURGERY

## 2022-07-18 PROCEDURE — 6370000000 HC RX 637 (ALT 250 FOR IP)

## 2022-07-18 PROCEDURE — 6360000002 HC RX W HCPCS: Performed by: ORTHOPAEDIC SURGERY

## 2022-07-18 PROCEDURE — 7100000001 HC PACU RECOVERY - ADDTL 15 MIN: Performed by: ORTHOPAEDIC SURGERY

## 2022-07-18 PROCEDURE — 7100000010 HC PHASE II RECOVERY - FIRST 15 MIN: Performed by: ORTHOPAEDIC SURGERY

## 2022-07-18 PROCEDURE — 6360000002 HC RX W HCPCS

## 2022-07-18 PROCEDURE — 2709999900 HC NON-CHARGEABLE SUPPLY: Performed by: ORTHOPAEDIC SURGERY

## 2022-07-18 PROCEDURE — 3600000013 HC SURGERY LEVEL 3 ADDTL 15MIN: Performed by: ORTHOPAEDIC SURGERY

## 2022-07-18 RX ORDER — SODIUM CHLORIDE 9 MG/ML
25 INJECTION, SOLUTION INTRAVENOUS PRN
Status: DISCONTINUED | OUTPATIENT
Start: 2022-07-18 | End: 2022-07-18 | Stop reason: HOSPADM

## 2022-07-18 RX ORDER — SODIUM CHLORIDE 0.9 % (FLUSH) 0.9 %
5-40 SYRINGE (ML) INJECTION EVERY 12 HOURS SCHEDULED
Status: DISCONTINUED | OUTPATIENT
Start: 2022-07-18 | End: 2022-07-18 | Stop reason: HOSPADM

## 2022-07-18 RX ORDER — MEPERIDINE HYDROCHLORIDE 50 MG/ML
12.5 INJECTION INTRAMUSCULAR; INTRAVENOUS; SUBCUTANEOUS ONCE
Status: DISCONTINUED | OUTPATIENT
Start: 2022-07-18 | End: 2022-07-18 | Stop reason: HOSPADM

## 2022-07-18 RX ORDER — SODIUM CHLORIDE 9 MG/ML
INJECTION, SOLUTION INTRAVENOUS PRN
Status: DISCONTINUED | OUTPATIENT
Start: 2022-07-18 | End: 2022-07-18 | Stop reason: HOSPADM

## 2022-07-18 RX ORDER — KETOROLAC TROMETHAMINE 30 MG/ML
INJECTION, SOLUTION INTRAMUSCULAR; INTRAVENOUS PRN
Status: DISCONTINUED | OUTPATIENT
Start: 2022-07-18 | End: 2022-07-18 | Stop reason: SDUPTHER

## 2022-07-18 RX ORDER — HYDROCODONE BITARTRATE AND ACETAMINOPHEN 5; 325 MG/1; MG/1
1 TABLET ORAL ONCE
Status: COMPLETED | OUTPATIENT
Start: 2022-07-18 | End: 2022-07-18

## 2022-07-18 RX ORDER — ONDANSETRON 2 MG/ML
4 INJECTION INTRAMUSCULAR; INTRAVENOUS
Status: DISCONTINUED | OUTPATIENT
Start: 2022-07-18 | End: 2022-07-18 | Stop reason: HOSPADM

## 2022-07-18 RX ORDER — LABETALOL HYDROCHLORIDE 5 MG/ML
INJECTION, SOLUTION INTRAVENOUS PRN
Status: DISCONTINUED | OUTPATIENT
Start: 2022-07-18 | End: 2022-07-18 | Stop reason: SDUPTHER

## 2022-07-18 RX ORDER — SODIUM CHLORIDE, SODIUM LACTATE, POTASSIUM CHLORIDE, CALCIUM CHLORIDE 600; 310; 30; 20 MG/100ML; MG/100ML; MG/100ML; MG/100ML
INJECTION, SOLUTION INTRAVENOUS CONTINUOUS PRN
Status: DISCONTINUED | OUTPATIENT
Start: 2022-07-18 | End: 2022-07-18 | Stop reason: SDUPTHER

## 2022-07-18 RX ORDER — LIDOCAINE HYDROCHLORIDE 10 MG/ML
INJECTION, SOLUTION EPIDURAL; INFILTRATION; INTRACAUDAL; PERINEURAL PRN
Status: DISCONTINUED | OUTPATIENT
Start: 2022-07-18 | End: 2022-07-18 | Stop reason: SDUPTHER

## 2022-07-18 RX ORDER — MORPHINE SULFATE 2 MG/ML
INJECTION, SOLUTION INTRAMUSCULAR; INTRAVENOUS
Status: COMPLETED
Start: 2022-07-18 | End: 2022-07-18

## 2022-07-18 RX ORDER — PROPOFOL 10 MG/ML
INJECTION, EMULSION INTRAVENOUS PRN
Status: DISCONTINUED | OUTPATIENT
Start: 2022-07-18 | End: 2022-07-18 | Stop reason: SDUPTHER

## 2022-07-18 RX ORDER — DEXAMETHASONE SODIUM PHOSPHATE 10 MG/ML
INJECTION, SOLUTION INTRAMUSCULAR; INTRAVENOUS PRN
Status: DISCONTINUED | OUTPATIENT
Start: 2022-07-18 | End: 2022-07-18 | Stop reason: SDUPTHER

## 2022-07-18 RX ORDER — SCOLOPAMINE TRANSDERMAL SYSTEM 1 MG/1
PATCH, EXTENDED RELEASE TRANSDERMAL
Status: DISCONTINUED
Start: 2022-07-18 | End: 2022-07-18 | Stop reason: HOSPADM

## 2022-07-18 RX ORDER — SODIUM CHLORIDE 0.9 % (FLUSH) 0.9 %
5-40 SYRINGE (ML) INJECTION PRN
Status: DISCONTINUED | OUTPATIENT
Start: 2022-07-18 | End: 2022-07-18 | Stop reason: HOSPADM

## 2022-07-18 RX ORDER — FENTANYL CITRATE 50 UG/ML
INJECTION, SOLUTION INTRAMUSCULAR; INTRAVENOUS PRN
Status: DISCONTINUED | OUTPATIENT
Start: 2022-07-18 | End: 2022-07-18 | Stop reason: SDUPTHER

## 2022-07-18 RX ORDER — HYDROCODONE BITARTRATE AND ACETAMINOPHEN 5; 325 MG/1; MG/1
TABLET ORAL
Status: COMPLETED
Start: 2022-07-18 | End: 2022-07-18

## 2022-07-18 RX ORDER — ONDANSETRON 2 MG/ML
INJECTION INTRAMUSCULAR; INTRAVENOUS PRN
Status: DISCONTINUED | OUTPATIENT
Start: 2022-07-18 | End: 2022-07-18 | Stop reason: SDUPTHER

## 2022-07-18 RX ORDER — SODIUM CHLORIDE 9 MG/ML
INJECTION, SOLUTION INTRAVENOUS CONTINUOUS
Status: DISCONTINUED | OUTPATIENT
Start: 2022-07-18 | End: 2022-07-18 | Stop reason: HOSPADM

## 2022-07-18 RX ORDER — SCOLOPAMINE TRANSDERMAL SYSTEM 1 MG/1
1 PATCH, EXTENDED RELEASE TRANSDERMAL ONCE
Status: DISCONTINUED | OUTPATIENT
Start: 2022-07-18 | End: 2022-07-18 | Stop reason: HOSPADM

## 2022-07-18 RX ORDER — DIPHENHYDRAMINE HYDROCHLORIDE 50 MG/ML
12.5 INJECTION INTRAMUSCULAR; INTRAVENOUS
Status: DISCONTINUED | OUTPATIENT
Start: 2022-07-18 | End: 2022-07-18 | Stop reason: HOSPADM

## 2022-07-18 RX ORDER — ROPIVACAINE HYDROCHLORIDE 2 MG/ML
INJECTION, SOLUTION EPIDURAL; INFILTRATION; PERINEURAL PRN
Status: DISCONTINUED | OUTPATIENT
Start: 2022-07-18 | End: 2022-07-18 | Stop reason: ALTCHOICE

## 2022-07-18 RX ORDER — HYDROCODONE BITARTRATE AND ACETAMINOPHEN 5; 325 MG/1; MG/1
1 TABLET ORAL EVERY 6 HOURS PRN
Qty: 20 TABLET | Refills: 0 | Status: SHIPPED | OUTPATIENT
Start: 2022-07-18 | End: 2022-07-23

## 2022-07-18 RX ORDER — MIDAZOLAM HYDROCHLORIDE 2 MG/2ML
2 INJECTION, SOLUTION INTRAMUSCULAR; INTRAVENOUS ONCE
Status: CANCELLED | OUTPATIENT
Start: 2022-07-18

## 2022-07-18 RX ORDER — MIDAZOLAM HYDROCHLORIDE 2 MG/2ML
2 INJECTION, SOLUTION INTRAMUSCULAR; INTRAVENOUS ONCE
Status: COMPLETED | OUTPATIENT
Start: 2022-07-18 | End: 2022-07-18

## 2022-07-18 RX ORDER — MORPHINE SULFATE 1 MG/ML
1 INJECTION, SOLUTION EPIDURAL; INTRATHECAL; INTRAVENOUS EVERY 5 MIN PRN
Status: DISCONTINUED | OUTPATIENT
Start: 2022-07-18 | End: 2022-07-18 | Stop reason: HOSPADM

## 2022-07-18 RX ORDER — ROPIVACAINE HYDROCHLORIDE 2 MG/ML
INJECTION, SOLUTION EPIDURAL; INFILTRATION; PERINEURAL
Status: DISCONTINUED
Start: 2022-07-18 | End: 2022-07-18 | Stop reason: HOSPADM

## 2022-07-18 RX ORDER — MIDAZOLAM HYDROCHLORIDE 1 MG/ML
INJECTION INTRAMUSCULAR; INTRAVENOUS PRN
Status: DISCONTINUED | OUTPATIENT
Start: 2022-07-18 | End: 2022-07-18 | Stop reason: SDUPTHER

## 2022-07-18 RX ORDER — MIDAZOLAM HYDROCHLORIDE 1 MG/ML
INJECTION INTRAMUSCULAR; INTRAVENOUS
Status: COMPLETED
Start: 2022-07-18 | End: 2022-07-18

## 2022-07-18 RX ORDER — SODIUM CHLORIDE, SODIUM LACTATE, POTASSIUM CHLORIDE, CALCIUM CHLORIDE 600; 310; 30; 20 MG/100ML; MG/100ML; MG/100ML; MG/100ML
INJECTION, SOLUTION INTRAVENOUS CONTINUOUS
Status: DISCONTINUED | OUTPATIENT
Start: 2022-07-18 | End: 2022-07-18 | Stop reason: HOSPADM

## 2022-07-18 RX ADMIN — SODIUM CHLORIDE, POTASSIUM CHLORIDE, SODIUM LACTATE AND CALCIUM CHLORIDE: 600; 310; 30; 20 INJECTION, SOLUTION INTRAVENOUS at 07:58

## 2022-07-18 RX ADMIN — SODIUM CHLORIDE: 9 INJECTION, SOLUTION INTRAVENOUS at 07:10

## 2022-07-18 RX ADMIN — FENTANYL CITRATE 25 MCG: 50 INJECTION, SOLUTION INTRAMUSCULAR; INTRAVENOUS at 08:25

## 2022-07-18 RX ADMIN — HYDROCODONE BITARTRATE AND ACETAMINOPHEN 1 TABLET: 5; 325 TABLET ORAL at 09:17

## 2022-07-18 RX ADMIN — FENTANYL CITRATE 25 MCG: 50 INJECTION, SOLUTION INTRAMUSCULAR; INTRAVENOUS at 08:06

## 2022-07-18 RX ADMIN — MORPHINE SULFATE 1 MG: 2 INJECTION, SOLUTION INTRAMUSCULAR; INTRAVENOUS at 09:01

## 2022-07-18 RX ADMIN — LIDOCAINE HYDROCHLORIDE 50 MG: 10 INJECTION, SOLUTION EPIDURAL; INFILTRATION; INTRACAUDAL; PERINEURAL at 08:04

## 2022-07-18 RX ADMIN — FENTANYL CITRATE 25 MCG: 50 INJECTION, SOLUTION INTRAMUSCULAR; INTRAVENOUS at 08:17

## 2022-07-18 RX ADMIN — MIDAZOLAM HYDROCHLORIDE 2 MG: 2 INJECTION, SOLUTION INTRAMUSCULAR; INTRAVENOUS at 07:13

## 2022-07-18 RX ADMIN — LABETALOL HYDROCHLORIDE 10 MG: 5 INJECTION, SOLUTION INTRAVENOUS at 08:29

## 2022-07-18 RX ADMIN — KETOROLAC TROMETHAMINE 30 MG: 30 INJECTION, SOLUTION INTRAMUSCULAR; INTRAVENOUS at 08:33

## 2022-07-18 RX ADMIN — PROPOFOL 150 MG: 10 INJECTION, EMULSION INTRAVENOUS at 08:04

## 2022-07-18 RX ADMIN — MIDAZOLAM HYDROCHLORIDE 2 MG: 1 INJECTION, SOLUTION INTRAMUSCULAR; INTRAVENOUS at 07:58

## 2022-07-18 RX ADMIN — ONDANSETRON 4 MG: 2 INJECTION INTRAMUSCULAR; INTRAVENOUS at 08:33

## 2022-07-18 RX ADMIN — MIDAZOLAM HYDROCHLORIDE 2 MG: 1 INJECTION, SOLUTION INTRAMUSCULAR; INTRAVENOUS at 07:13

## 2022-07-18 RX ADMIN — FENTANYL CITRATE 25 MCG: 50 INJECTION, SOLUTION INTRAMUSCULAR; INTRAVENOUS at 08:21

## 2022-07-18 RX ADMIN — DEXAMETHASONE SODIUM PHOSPHATE 8 MG: 10 INJECTION INTRAMUSCULAR; INTRAVENOUS at 08:04

## 2022-07-18 ASSESSMENT — PAIN DESCRIPTION - DESCRIPTORS
DESCRIPTORS: BURNING
DESCRIPTORS: ACHING

## 2022-07-18 ASSESSMENT — PAIN SCALES - GENERAL
PAINLEVEL_OUTOF10: 6
PAINLEVEL_OUTOF10: 5

## 2022-07-18 ASSESSMENT — PAIN - FUNCTIONAL ASSESSMENT: PAIN_FUNCTIONAL_ASSESSMENT: 0-10

## 2022-07-18 ASSESSMENT — PAIN DESCRIPTION - LOCATION: LOCATION: KNEE

## 2022-07-18 ASSESSMENT — ENCOUNTER SYMPTOMS: SHORTNESS OF BREATH: 1

## 2022-07-18 NOTE — H&P
ORTHOPEDIC PATIENT EVALUATION      HPI / Chief Complaint  Analia Cooney is a 49 y.o. female who presents for right knee pain. Patient comes with a history of catching locking sensation. Examination consistent with a medial meniscus tear. This was confirmed with Keisha Click MRI. Past Medical History  Angeles Samano  has a past medical history of Abnormal levels of other serum enzymes, Anxiety, Bilateral leg edema, Chronic kidney disease, Depression, Diabetes mellitus ([address] DVT (deep venous thrombosis) (HCC), Elevated liver enzymes, Fibromyalgia, Headache(784.0), Hx of blood clots, Hypertension, Kidney stone, Obstructive sleep apnea syndrome, Pancreatitis, Pleural effusion, PONV (postoperative nausea and vomiting), S/P left knee arthroscopy, SOB (shortness of breath), and SVT (supraventricular tachycardia) ([address] Past Surgical History  Angeles Samano  has a past surgical history that includes Thyroid lobectomy (Right); Cholecystectomy (2001); Knee arthroscopy (Left); Foot surgery (Right); Endometrial ablation; Atrial ablation surgery; eye surgery (Bilateral); Endoscopy, colon, diagnostic; back surgery; pr cysto/uretero/pyeloscopy w/lithotripsy (Left, 9/20/2017); EXCISION LESION HAND / FINGER (Right, 1/25/2019); US BIOPSY LIVER PERCUTANEOUS (2/1/2021); Upper gastrointestinal endoscopy (N/A, 2/2/2021); and Knee arthroscopy (Left, 2/15/2021).     Current Medications  Current Facility-Administered Medications   Medication Dose Route Frequency Provider Last Rate Last Admin    0.9 % sodium chloride infusion   IntraVENous Continuous Sally Handley MD        lactated ringers infusion   IntraVENous Continuous Sally Handley MD        sodium chloride flush 0.9 % injection 5-40 mL  5-40 mL IntraVENous 2 times per day Sally Handley MD        sodium chloride flush 0.9 % injection 5-40 mL  5-40 mL IntraVENous PRN Sally Handley MD        0.9 % sodium chloride infusion   IntraVENous PRN Sally Handley MD        midazolam PF (VERSED) injection 2 mg  2 mg IntraVENous Once Lion Scott MD        scopolamine (TRANSDERM-SCOP) transdermal patch 1 patch  1 patch TransDERmal Once Lion Scott MD           Allergies  Allergies have been reviewed. Arnold Park is allergic to aripiprazole, eletriptan, lasix [furosemide], sumatriptan, triptans, and lamotrigine. Social History  Arnold Park  reports that she quit smoking about 2 years ago. Her smoking use included cigarettes. She smoked an average of 1 pack per day. She has never used smokeless tobacco. She reports that she does not drink alcohol and does not use drugs. Family History  Loreta's family history includes Heart Disease in her father; High Blood Pressure in her brother. Review of Systems   History obtained from the patient. REVIEW OF SYSTEMS:   Constitution: negative for fever, chills, weight loss or malaise   Musculoskeletal: As noted in the HPI   Neurologic: As noted in the HPI    Physical Exam  BP (!) 140/76   Pulse 81   Temp 97.5 °F (36.4 °C) (Temporal)   Resp 16   Ht 5' 4\" (1.626 m)   Wt (!) 357 lb (161.9 kg)   SpO2 97%   BMI 61.28 kg/m²    General Appearance: alert, well appearing, and in no distress  Mental Status: alert, oriented to person, place, and time  Positive Mcdonald's the right knee no other contributory findings    Imaging Studies  MRI consistent with a tear of the posterior horn medial meniscus    Diagnostics and Labs  Relevant diagnostic, laboratory and radiological studies have been reviewed in the Electronic Medical Record. Assessment and Plan  David Jain is a 49 y.o. old female who presents with a medial meniscus tear of the right knee. She has consented with good comprehension of all risk benefits for arthroscopic examination of her right knee.     This note is created with the assistance of a speech recognition program.  While intending to generate adocument that actually reflects the content of the visit, the document can still have some errors including those of syntax and sound a like substitutions which may escape proof reading. It such instances, actual meaningcan be extrapolated by contextual diversion.

## 2022-07-18 NOTE — OP NOTE
Operative Note      Patient: Che Melgar  YOB: 1975  MRN: [id number]    Date of Procedure: 7/18/2022    Pre-Op Diagnosis: Tear of medial meniscus of right knee, unspecified tear type, unspecified whether old or current tear, subsequent encounter [W37.760E]    Post-Op Diagnosis: Same       Procedure(s):  RIGHT KNEE ARTHROSCOPIC PARTIAL MEDIAL MENISCECTOMY    Surgeon(s):  Rebecca Byrne MD    Assistant:   Resident: Bull Betts MD; Connie Edouard DO    Anesthesia: General    Estimated Blood Loss (mL): Minimal    Complications: None    Specimens:   * No specimens in log *    Implants:  * No implants in log *      Drains: * No LDAs found *    Findings: Degenerative medial meniscus tear right knee with 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 year old female who presents with a history of pain, locking, and giving away sensations of their right knee. Physical examination was positive for Nova's examination. MRI was consistent with a tear of the posterior horn the medial meniscus. Having failed conservative treatment, it was advised that arthroscopic examination and treatment of their knee would be beneficial and consent was obtained with risk and benefits explained to the patient. The patient was taken tot he operative room and general anesthesia was administered. A tourniquet was placed to the operatives lower extremity and then placed in the leg dominguez. The leg was exsanguinated and the tourniquet inflated to the 300mmHg. The leg was the prepped and draped in the usual sterile fashion. Time-out was called to verify laterality. Medial and lateral portals were made and the scope placed in the lateral portal. The patella femoral joint was examined and noted to have grade II chondromalacia. The scope was then passes down the medial gutter into the medial compartment.  A probe was used to assess the medial meniscus and a tear was identified in the posterior horn and into the body of the medial meniscus. A partial medial menisectomy was carried ou with basket forceps and then smoothed out with a shaver. Repeat probing of the meniscus found it to be stable. There was grade 2 to early grade III chondromalacia with some mild fissuring and delamination which was lightly debrided    The arthroscope was then passed into the notch of the knee. The ACL was found not to have any significant damage or laxity. The scope was then passed in the lateral compartment. Thorough probing of the lateral meniscus and the articular cartilage did not demonstrate any significant finding that requires surgical intervention    The scope was then passed into the suprapatellar area and the shaver was used to remove any further soft tissue debris. The scope was removed and the knee evacuated of fluid and injected with 20cc of 0.5% ropivacaine. The sterile bulky dressing was applied and the leg then wrapped with a large 6-inch ace bandage from toes to the mid-thigh. The tourniquet was then deflated at less than 30 minutes. The patient was awaken and taken to the PACU in good condition.       Electronically signed by Xiang Ellis MD on 7/18/2022 at 8:34 AM

## 2022-07-18 NOTE — ANESTHESIA PRE PROCEDURE
Department of Anesthesiology  Preprocedure Note       Name:  Shayna Caputo   Age:  49 y.o.  :  1975                                          MRN:  [id number]         Date:  2022      Surgeon: Shannan Kraus):  Tressa Rios MD    Procedure: Procedure(s):  RIGHT KNEE ARTHROSCOPIC PARTIAL MEDIAL MENISCECTOMY    Medications prior to admission:   Prior to Admission medications    Medication Sig Start Date End Date Taking? Authorizing Provider   propranolol (INDERAL) 10 MG tablet Take 10 mg by mouth 3 times daily    Historical Provider, MD   metFORMIN (GLUCOPHAGE) 500 MG tablet Take 500 mg by mouth 2 times daily (with meals)    Historical Provider, MD   hydrOXYzine HCl (ATARAX) 50 MG tablet Take 50 mg by mouth 2 times daily    Historical Provider, MD   tiZANidine (ZANAFLEX) 4 MG tablet Take 4 mg by mouth nightly    Historical Provider, MD   losartan (COZAAR) 50 MG tablet Take 100 mg by mouth daily     Historical Provider, MD   amitriptyline (ELAVIL) 25 MG tablet Take 200 mg by mouth nightly     Historical Provider, MD   prazosin (MINIPRESS) 1 MG capsule Take 2 mg by mouth nightly     Historical Provider, MD   melatonin 5 MG TABS tablet Take 10 mg by mouth daily     Historical Provider, MD   QUEtiapine (SEROQUEL XR) 400 MG extended release tablet Take 800 mg by mouth nightly    Historical Provider, MD   busPIRone (BUSPAR) 15 MG tablet Take 30 mg by mouth nightly Can take once in morning PRN     Historical Provider, MD   omeprazole (PRILOSEC) 40 MG delayed release capsule Take 40 mg by mouth daily    Historical Provider, MD   fexofenadine (RA ALLERGY RELIEF) 180 MG tablet take 1 tablet by mouth once daily  Patient taking differently: Take 180 mg by mouth daily as needed take 1 tablet by mouth once daily 18   Fernando Justice MD   pramipexole (MIRAPEX) 0.5 MG tablet take 1 tablet by mouth twice a day  Patient taking differently: Take 0.25 mg by mouth in the morning. take 1 tablet by mouth twice a day. LOBECTOMY Right     UPPER GASTROINTESTINAL ENDOSCOPY N/A 2021    EGD ESOPHAGOGASTRODUODENOSCOPY performed by Ashlyn Falk MD at [address] [facility]  2021    Christy 634 LIVER BIOPSY PERCUTANEOUS 2021 [facility] ULTRASOUND       Social History:    Social History     Tobacco Use    Smoking status: Former     Packs/day: 1.00     Types: Cigarettes     Quit date: 2019     Years since quittin.6    Smokeless tobacco: Never    Tobacco comments:     today last smoke   Substance Use Topics    Alcohol use: Never     Alcohol/week: 0.0 standard drinks                                Counseling given: Not Answered  Tobacco comments: today last smoke      Vital Signs (Current): There were no vitals filed for this visit.                                            BP Readings from Last 3 Encounters:   22 (!) 148/76   02/15/21 (!) 114/55   02/15/21 (!) 146/79       NPO Status: Time of last liquid consumption:                         Time of last solid consumption:                         Date of last liquid consumption: 22                        Date of last solid food consumption: 22    BMI:   Wt Readings from Last 3 Encounters:   22 (!) 350 lb (158.8 kg)   22 (!) 350 lb (158.8 kg)   22 (!) 350 lb (158.8 kg)     There is no height or weight on file to calculate BMI.    CBC:   Lab Results   Component Value Date/Time    WBC 8.2 2022 11:43 AM    RBC 3.96 2022 11:43 AM    HGB 11.9 2022 11:43 AM    HCT 36.1 2022 11:43 AM    MCV 91.2 2022 11:43 AM    RDW 13.4 2022 11:43 AM     2022 11:43 AM       CMP:   Lab Results   Component Value Date/Time     2022 11:43 AM    K 4.8 2022 11:43 AM    CL 99 2022 11:43 AM    CO2 25 2022 11:43 AM    BUN 17 2022 11:43 AM    CREATININE 0.59 2022 11:43 AM    GFRAA >60 2022 11:43 AM    LABGLOM >60 2022 11:43 AM GLUCOSE 125 07/05/2022 11:43 AM    PROT 7.4 02/03/2021 06:16 AM    PROT 7.2 01/23/2015 12:00 AM    CALCIUM 9.3 07/05/2022 11:43 AM    BILITOT 0.41 02/03/2021 06:16 AM    ALKPHOS 250 02/03/2021 06:16 AM    AST 48 02/03/2021 06:16 AM     02/03/2021 06:16 AM       POC Tests: No results for input(s): POCGLU, POCNA, POCK, POCCL, POCBUN, POCHEMO, POCHCT in the last 72 hours. Coags:   Lab Results   Component Value Date/Time    PROTIME 9.6 02/01/2021 09:33 AM    INR 0.9 02/01/2021 09:33 AM    APTT 27.0 02/01/2021 09:33 AM       HCG (If Applicable):   Lab Results   Component Value Date    HCG NEGATIVE 02/15/2021        ABGs:   Lab Results   Component Value Date/Time    PHART 7.484 08/28/2020 08:18 AM    PO2ART 66.1 08/28/2020 08:18 AM    TLR0AGO 37.2 08/28/2020 08:18 AM    DJQ3HBF 27.9 08/28/2020 08:18 AM    Q7BAADRM 92.1 08/28/2020 08:18 AM        Type & Screen (If Applicable):  No results found for: LABABO, LABRH    Drug/Infectious Status (If Applicable):  Lab Results   Component Value Date/Time    HEPCAB NONREACTIVE 08/21/2020 04:31 AM       COVID-19 Screening (If Applicable):   Lab Results   Component Value Date/Time    COVID19 Not Detected 02/11/2021 09:00 AM           Anesthesia Evaluation  Patient summary reviewed and Nursing notes reviewed   history of anesthetic complications: PONV.   Airway: Mallampati: III  TM distance: >3 FB   Neck ROM: full  Mouth opening: > = 3 FB   Dental: normal exam         Pulmonary:normal exam  breath sounds clear to auscultation  (+) pneumonia: resolved,  shortness of breath:  sleep apnea:                             Cardiovascular:    (+) hypertension:,         Rhythm: regular  Rate: normal                    Neuro/Psych:   (+) neuromuscular disease:, headaches:, psychiatric history: stable with treatmentdepression/anxiety             GI/Hepatic/Renal:   (+) liver disease:, morbid obesity          Endo/Other:    (+) DiabetesType II DM, no interval change, , : arthritis: rheumatoid and no interval change. , . Abdominal:   (+) obese,     Abdomen: soft. Vascular:   + DVT, . Other Findings:           Anesthesia Plan      general     ASA 3     (LMA)  Induction: intravenous. MIPS: Postoperative opioids intended and Prophylactic antiemetics administered. Anesthetic plan and risks discussed with patient. Plan discussed with CRNA.                     Sally Handley MD   7/18/2022

## 2022-07-18 NOTE — ANESTHESIA POSTPROCEDURE EVALUATION
POST- ANESTHESIA EVALUATION       Pt Name: Nelly Burt  MRN: [id number]  YOB: 1975  Date of evaluation: 7/18/2022  Time:  9:06 AM      /75   Pulse 88   Temp 96.8 °F (36 °C) (Temporal)   Resp 13   Ht 5' 4\" (1.626 m)   Wt (!) 357 lb (161.9 kg)   SpO2 93%   BMI 61.28 kg/m²      Consciousness Level  Awake  Cardiopulmonary Status  Stable  Pain Adequately Treated YES  Nausea / Vomiting  NO  Adequate Hydration  YES  Anesthesia Related Complications NONE      Electronically signed by Gilma Blue MD on 7/18/2022 at 9:06 AM       Department of Anesthesiology  Postprocedure Note    Patient: Nelly Burt  MRN: [id number]  [place]: 1975  Date of evaluation: 7/18/2022      Procedure Summary     Date: 07/18/22 Room / Location: [facility] 02 [address]    Anesthesia Start: 4396 Anesthesia Stop: 0492    Procedure: RIGHT KNEE ARTHROSCOPIC PARTIAL MEDIAL MENISCECTOMY (Right: Knee) Diagnosis:       Tear of medial meniscus of right knee, unspecified tear type, unspecified whether old or current tear, subsequent encounter      (Tear of medial meniscus of right knee, unspecified tear type, unspecified whether old or current tear, subsequent encounter [G69.822I])    Surgeons: Mali Spangler MD Responsible Provider: Ilene Burkett MD    Anesthesia Type: general ASA Status: 3          Anesthesia Type: No value filed.     Chidi Phase I: Chidi Score: 4    Chidi Phase II:        Anesthesia Post Evaluation

## 2022-07-29 ENCOUNTER — OFFICE VISIT (OUTPATIENT)
Dept: ORTHOPEDIC SURGERY | Age: 47
End: 2022-07-29

## 2022-07-29 DIAGNOSIS — S83.241A TEAR OF MEDIAL MENISCUS OF RIGHT KNEE, CURRENT, UNSPECIFIED TEAR TYPE, INITIAL ENCOUNTER: ICD-10-CM

## 2022-07-29 DIAGNOSIS — Z98.890 S/P RIGHT KNEE ARTHROSCOPY: Primary | ICD-10-CM

## 2022-07-29 PROCEDURE — 99024 POSTOP FOLLOW-UP VISIT: CPT | Performed by: ORTHOPAEDIC SURGERY

## 2022-07-29 RX ORDER — METHYLPREDNISOLONE 4 MG/1
4 TABLET ORAL SEE ADMIN INSTRUCTIONS
Qty: 1 KIT | Refills: 0 | Status: SHIPPED | OUTPATIENT
Start: 2022-07-29 | End: 2022-08-04

## 2022-07-29 NOTE — LETTER
[facility]  [address] [address] [address]  Phone: [phone]  Fax: [phone]    Sherrill Stapleton MD        July 29, 2022     Patient: David Jain   YOB: 1975   Date of Visit: 7/29/2022       To Whom It May Concern: It is my medical opinion that Haily Ward may return to work on 8/8/22 with no restrictions. If you have any questions or concerns, please don't hesitate to call.     Sincerely,          Sherrill Stapleton MD

## 2022-07-29 NOTE — PROGRESS NOTES
Patient returns today status post right knee arthroscopy with partial (medial/lateral) meniscectomy. Patient has no major complaints other than expected tightness/swelling with ROM. Sharp/stabbing pain has improved. On exam, portal sites are without redness or drainage. No calf tenderness; negative Sadaf's sign. Motion is 0-100 degrees. Patient does have a moderate effusion    Assessment  Status post right knee arthroscopy    Plan  Patient given exercises to perform. Patient also given prescription for Medrol Dosepak. Patient given activities/ motions to complete. Continue activities at home. Return to work on 8/8/2022. RTO PRN. Call with any future problems.

## 2022-08-03 ENCOUNTER — TELEPHONE (OUTPATIENT)
Dept: ORTHOPEDIC SURGERY | Age: 47
End: 2022-08-03

## 2022-08-03 NOTE — TELEPHONE ENCOUNTER
Would recommend appointment for reevaluation patient may benefit from aspiration and injection if she has significant swelling and continues to be painful.   In the meantime continue with ice, compression, elevation and rest.

## 2022-08-03 NOTE — TELEPHONE ENCOUNTER
Patient was seen by Dr Javier Evans on 7/29 for a right knee post op on 7/18. She was prescribed a Rush Constant and states she is not getting better and the pain seems to be worse. She is asking for a return call to discuss and if needed the pharmacy she is currently using is [facility] in [place]. Thank you.

## 2022-08-03 NOTE — TELEPHONE ENCOUNTER
Patient is still having pain and swelling has not gone down. She took the last dose of medrol dose pack today. She is icing using biofreeze and elevation. Meniscus repair right knee on 7/18  Please advise.

## 2022-08-05 ENCOUNTER — OFFICE VISIT (OUTPATIENT)
Dept: ORTHOPEDIC SURGERY | Age: 47
End: 2022-08-05
Payer: MEDICARE

## 2022-08-05 VITALS — WEIGHT: 293 LBS | HEIGHT: 64 IN | RESPIRATION RATE: 14 BRPM | BODY MASS INDEX: 50.02 KG/M2

## 2022-08-05 DIAGNOSIS — Z98.890 S/P RIGHT KNEE ARTHROSCOPY: Primary | ICD-10-CM

## 2022-08-05 PROCEDURE — 99024 POSTOP FOLLOW-UP VISIT: CPT | Performed by: PHYSICIAN ASSISTANT

## 2022-08-05 PROCEDURE — 20610 DRAIN/INJ JOINT/BURSA W/O US: CPT | Performed by: PHYSICIAN ASSISTANT

## 2022-08-05 RX ORDER — TRAMADOL HYDROCHLORIDE 50 MG/1
50 TABLET ORAL EVERY 4 HOURS PRN
Qty: 12 TABLET | Refills: 0 | Status: SHIPPED | OUTPATIENT
Start: 2022-08-05 | End: 2022-08-08

## 2022-08-05 RX ORDER — BETAMETHASONE SODIUM PHOSPHATE AND BETAMETHASONE ACETATE 3; 3 MG/ML; MG/ML
12 INJECTION, SUSPENSION INTRA-ARTICULAR; INTRALESIONAL; INTRAMUSCULAR; SOFT TISSUE ONCE
Status: COMPLETED | OUTPATIENT
Start: 2022-08-05 | End: 2022-08-05

## 2022-08-05 RX ORDER — TRAMADOL HYDROCHLORIDE 50 MG/1
50 TABLET ORAL EVERY 6 HOURS PRN
Qty: 12 TABLET | Refills: 0 | Status: CANCELLED | OUTPATIENT
Start: 2022-08-05 | End: 2022-08-08

## 2022-08-05 RX ORDER — BUPIVACAINE HYDROCHLORIDE 5 MG/ML
2 INJECTION, SOLUTION PERINEURAL ONCE
Status: COMPLETED | OUTPATIENT
Start: 2022-08-05 | End: 2022-08-05

## 2022-08-05 RX ORDER — LIDOCAINE HYDROCHLORIDE 10 MG/ML
2 INJECTION, SOLUTION INFILTRATION; PERINEURAL ONCE
Status: COMPLETED | OUTPATIENT
Start: 2022-08-05 | End: 2022-08-05

## 2022-08-05 RX ADMIN — BUPIVACAINE HYDROCHLORIDE 10 MG: 5 INJECTION, SOLUTION PERINEURAL at 13:34

## 2022-08-05 RX ADMIN — BETAMETHASONE SODIUM PHOSPHATE AND BETAMETHASONE ACETATE 12 MG: 3; 3 INJECTION, SUSPENSION INTRA-ARTICULAR; INTRALESIONAL; INTRAMUSCULAR; SOFT TISSUE at 13:34

## 2022-08-05 RX ADMIN — LIDOCAINE HYDROCHLORIDE 2 ML: 10 INJECTION, SOLUTION INFILTRATION; PERINEURAL at 13:33

## 2022-08-05 NOTE — LETTER
[facility]  [address] [facility] [address]  Phone: [phone]  Fax: [phone]    Toby Segura        August 5, 2022     Patient: Priscilla Robison   YOB: 1975   Date of Visit: 8/5/2022       To Whom It May Concern: It is my medical opinion that Samuel Pereira should remain out of work until 8/22/2022. If you have any questions or concerns, please don't hesitate to call.     Sincerely,          DENNYS Segura

## 2022-08-05 NOTE — PROGRESS NOTES
[address] [facility], [address] [place] [address] Road [address] [place] [address], [address] [place] [address], [address] [facility]           Dept Phone: [phone]           Dept Fax:  [phone] [address] [facility]           Cedric Butts          Dept Phone: [phone]           Dept Fax:  [phone]      Chief Compliant:  Chief Complaint   Patient presents with    Knee Pain     Right        History of Present Illness:  Rajinder Carrion returns today. This is a 49 y.o. female who presents to the clinic today for follow up of right knee pain and swelling and recent arthroscopic partial medial meniscectomy on 7/18/22. At initial postoperative appointment on 7/29/2022 patient had a small effusion was started on a Medrol Dosepak. She called to make this appointment earlier this week due to continued swelling despite medication. She denies any new injury or trauma states the swelling has been present since surgery with minimal improvement. Does continue have pain over the medial aspect albeit slightly improved. Patient denies any new joint warmth, redness, fever or chills. 2      Review of Systems   Constitutional: Negative for fever, chills, sweats, recent illness, or recent injury. Neurological: Negative for headaches, numbness, or weakness. Integumentary: Negative for rash, itching, ecchymosis, abrasions, or laceration. Musculoskeletal: Positive for Knee Pain (Right)       Physical Exam:  Constitutional: Patient is oriented to person, place, and time. Patient appears well-developed and well nourished.    Musculoskeletal:    Right Knee:     Skin: warm and dry, no rash or erythema  Vasculature: 2+ pedal pulses bilaterally  Neuro: Sensation grossly intact to light touch diffusely  Alignment: Normal  Tenderness: Severe tenderness to the medial joint line no tenderness to the lateral joint line, posterior anterior knee.  Effusion: Moderate    ROM: (Degrees)       A P       Extension  -5 -       Flexion   120 120       Crepitation  No       Muscle strength:         Flexion   5      Extension  5      SLR   5        Extensor lag   y          Special testing:  y    Pain with deep knee flexion     y    Patellar grind       n    Patellar apprehension      n    Patellar glide         n    Lachman       n    Anterior drawer      n    Pivot shift       n    Posterior drawer      n    Dial test       n    Posterolateral drawer      n    Posterior Sag       n    MCL        n    LCL          y    Medial joint line tenderness     n    Lateral joint line tenderness     n    McMurrey's         Neurological: Patient is alert and oriented to person, place, and time. Normal strenght. No sensory deficit. Skin: Skin is warm and dry  Psychiatric: Behavior is normal. Thought content normal.  Nursing note and vitals reviewed. Labs and Imaging:     XR taken today:  No results found. Assessment and Plan:  1. S/P right knee arthroscopy              PLAN:  This is a 49 y.o. female who presents to the clinic today for follow up status post right knee arthroscopy partial medial meniscectomy on 7/18/2022. Patient has unfortunate continue to have swelling and pain since the surgery she was started on Medrol Dosepak at 7/29/2022 postop visit but this continues today. 1.  On examination today patient was found to have an effusion given her continued pain despite Medrol Dosepak recommend she proceed with an aspiration and injection. 2.  Aspirate revealed 30 cc of clear, straw-colored synovial fluid Celestone injection was injected after the aspiration was performed patient tolerated both well. 3.  Started on a short course of Ultram to help with pain over the weekend  4. Follow-up on appearing basis.     Procedure Note: right knee aspiration and Celestone Injection   An informed verbal consent for the procedure was obtained and risks including, but not limited to: allergy to medications, injection, bleeding, stiffness of joint, recurrence of symptoms, loss of function, swelling, drainage, irrigation, need for surgery and pseudo-septic inflammation, were explained to the patient. Also, discussed was the potential for further injections, irrigation and debridement and surgery. Alternate means of treatment have also been discussed with the patient. Following an appropriate discussion with the patient regarding the risks and benefits of the procedure she consented to proceed. her right  knee was prepped using betadine solution and alcohol swab. Using aseptic technique and through a Superolateral approach, her left knee was injected superficially with 4 cc mixture of 2 cc Lidocaine without epi and 2 cc of Marcaine, utilizing an 18g needle 30 cc of clear, straw colored synovial fluid is aspirated from the knee joint and subsequently with 2 cc of 6 mg/mL Celestone into the right knee. A band aid was applied to the injection site. she tolerated the injection with no immediate adverse reactions. Please note that this chart was generated using voice recognition Dragon dictation software. Although every effort was made to ensure the accuracy of this automated transcription, some errors in transcription may have occurred.

## 2022-08-08 ENCOUNTER — TELEPHONE (OUTPATIENT)
Dept: ORTHOPEDIC SURGERY | Age: 47
End: 2022-08-08

## 2022-08-08 DIAGNOSIS — Z98.890 S/P RIGHT KNEE ARTHROSCOPY: ICD-10-CM

## 2022-08-08 RX ORDER — TRAMADOL HYDROCHLORIDE 50 MG/1
50 TABLET ORAL EVERY 4 HOURS PRN
Qty: 12 TABLET | Refills: 0 | OUTPATIENT
Start: 2022-08-08 | End: 2022-08-11

## 2022-08-08 NOTE — TELEPHONE ENCOUNTER
Patient called in requesting some advisement on her pain, patient had fluid drained 3 days ago. Please advise thank you!

## 2022-08-08 NOTE — TELEPHONE ENCOUNTER
Spoke with patient she would like a refill on the tramadol. She was advised that the medication/injection takes times to begin working.  Will send request for Tramadol in script request

## 2022-08-09 NOTE — TELEPHONE ENCOUNTER
Pt called and would like a call back regarding her pain medication and when she can get a refill.   Please reach out to pt at [phone], thank you

## 2022-08-09 NOTE — TELEPHONE ENCOUNTER
Pt called office back again for explanation for refill denial. I told [facility] that according to Boris's LOV note that the ultram script was for a short course and that April Sung denied it because he does not fill that a refill is appropriate. [facility] was going to relay this on to the patient.

## 2022-08-16 ENCOUNTER — OFFICE VISIT (OUTPATIENT)
Dept: ORTHOPEDIC SURGERY | Age: 47
End: 2022-08-16
Payer: MEDICARE

## 2022-08-16 ENCOUNTER — HOSPITAL ENCOUNTER (OUTPATIENT)
Age: 47
Setting detail: SPECIMEN
Discharge: HOME | End: 2022-08-16
Payer: MEDICARE

## 2022-08-16 VITALS — HEIGHT: 65 IN | WEIGHT: 293 LBS | BODY MASS INDEX: 48.82 KG/M2 | RESPIRATION RATE: 14 BRPM

## 2022-08-16 DIAGNOSIS — Z98.890 S/P RIGHT KNEE ARTHROSCOPY: Primary | ICD-10-CM

## 2022-08-16 LAB
APPEARANCE FLUID: NORMAL
COLOR FLUID: YELLOW
CRYSTALS, FLUID: NORMAL
RBC FLUID: 227 /MM3
SPECIMEN TYPE: NORMAL
SPECIMEN TYPE: NORMAL
WBC FLUID: 110 /MM3

## 2022-08-16 PROCEDURE — 87075 CULTR BACTERIA EXCEPT BLOOD: CPT

## 2022-08-16 PROCEDURE — 99024 POSTOP FOLLOW-UP VISIT: CPT | Performed by: PHYSICIAN ASSISTANT

## 2022-08-16 PROCEDURE — 87070 CULTURE OTHR SPECIMN AEROBIC: CPT

## 2022-08-16 PROCEDURE — 87205 SMEAR GRAM STAIN: CPT

## 2022-08-16 PROCEDURE — 20610 DRAIN/INJ JOINT/BURSA W/O US: CPT | Performed by: PHYSICIAN ASSISTANT

## 2022-08-16 PROCEDURE — 89060 EXAM SYNOVIAL FLUID CRYSTALS: CPT

## 2022-08-16 RX ORDER — LIDOCAINE HYDROCHLORIDE 10 MG/ML
4 INJECTION, SOLUTION INFILTRATION; PERINEURAL ONCE
Status: COMPLETED | OUTPATIENT
Start: 2022-08-16 | End: 2022-08-16

## 2022-08-16 RX ORDER — CELECOXIB 200 MG/1
200 CAPSULE ORAL 2 TIMES DAILY
Qty: 60 CAPSULE | Refills: 0 | Status: SHIPPED | OUTPATIENT
Start: 2022-08-16

## 2022-08-16 RX ADMIN — LIDOCAINE HYDROCHLORIDE 4 ML: 10 INJECTION, SOLUTION INFILTRATION; PERINEURAL at 13:22

## 2022-08-16 NOTE — PROGRESS NOTES
[address] [facility], [address] [place], [address] [place] [address] [place] [address]           Dept Phone: [phone]           Dept Fax:  [facility] [address] [facility]           Cedric Butts          Dept Phone: [phone]           Dept Fax:  [phone]      Chief Compliant:  Chief Complaint   Patient presents with    Knee Pain     SP Rt knee PMM 7/18/22; having issues with pain and swelling        History of Present Illness:  Roman Alegre returns today. This is a 49 y.o. female who presents to the clinic today for follow up of right knee pain and swelling history of right knee arthroscopy partial medial meniscectomy 7/18/2022. Patient was seen on 8/5/2022 for continued pain and swelling and underwent an aspiration and injection. She reports that this did provide moderate relief of pain for about 3 days however over the last 1 week her pain and swelling have progressively worsened. She returns today for reevaluation and discussion of treatment options. Patient does report she has been doing exercises at home but has not noticed significant relief of pain. She notes some warmth but denies any knee redness, fever or chills. No new injury or trauma. Review of Systems   Constitutional: Negative for fever, chills, sweats, recent illness, or recent injury. Neurological: Negative for headaches, numbness, or weakness. Integumentary: Negative for rash, itching, ecchymosis, abrasions, or laceration. Musculoskeletal: Positive for Knee Pain (SP Rt knee PMM 7/18/22; having issues with pain and swelling)       Physical Exam:  Constitutional: Patient is oriented to person, place, and time. Patient appears well-developed and well nourished.    Musculoskeletal:    Right Knee:     Skin: warm and dry, no rash or erythema  Vasculature: 2+ pedal pulses bilaterally  Neuro: Sensation grossly intact to light touch diffusely  Alignment: Normal  Tenderness: Mild tenderness to the medial joint line no tenderness to the lateral joint line, posterior anterior knee. Effusion: Moderate    ROM: (Degrees)       A P       Extension  0 0       Flexion   90 105       Crepitation  No       Muscle strength:         Flexion   5      Extension  5      SLR   5        Extensor lag   y          Special testing:  y    Pain with deep knee flexion     y    Patellar grind       n    Patellar apprehension      n    Patellar glide         n    Lachman       n    Anterior drawer      n    Pivot shift       n    Posterior drawer      n    Dial test       n    Posterolateral drawer      n    Posterior Sag       n    MCL        n    LCL          y    Medial joint line tenderness     n    Lateral joint line tenderness     n    McMurrey's         Neurological: Patient is alert and oriented to person, place, and time. Normal strenght. No sensory deficit. Skin: Skin is warm and dry  Psychiatric: Behavior is normal. Thought content normal.  Nursing note and vitals reviewed. Labs and Imaging:     XR taken today:  No results found. Assessment and Plan:  1. S/P right knee arthroscopy              PLAN:  This is a 49 y.o. female who presents to the clinic today for follow up right knee pain and swelling following right knee arthroscopy partial medial meniscectomy on 7/18/2022. 1.  Patient underwent aspiration and injection on 8/5/2022 which did provide relief of pain but only for 3 days returns today due to recurrent swelling and continued pain. 2.  Based on examination patient does have small effusion portal sites look pristine and well-healed without evidence of obvious infection. 3.  There is no evidence of recurrent meniscal or ligamentous tearing on examination no evidence of infectious process.   4.  Recommend repeat aspiration without injection today initiation of daily NSAID in the form of Celebrex 200 mg twice daily is electronically sent to her pharmacy and provided with a home exercise program.  5.  We did discuss possibility of physical therapy patient does request doing it at home before proceeding with formal PT referral.  6.  We will extend off work until 8/29/2022 to allow new medication and aspiration to improve her pain prior to returning to work. Procedure Note: Right knee aspiration without Injection   An informed verbal consent for the procedure was obtained and risks including, but not limited to: allergy to medications, injection, bleeding, stiffness of joint, recurrence of symptoms, loss of function, swelling, drainage, irrigation, need for surgery and pseudo-septic inflammation, were explained to the patient. Also, discussed was the potential for further injections, irrigation and debridement and surgery. Alternate means of treatment have also been discussed with the patient. Following an appropriate discussion with the patient regarding the risks and benefits of the procedure she consented to proceed. her right knee was prepped using betadine solution and alcohol swab. Using aseptic technique and through a superolateral approach, her right knee was injected superficially with 4 cc mixture of 2 cc Lidocaine without epi and 2 cc of Marcaine and subsequently with an 18g needle 30 cc of clear, straw colored synovial fluid is aspirated from the knee joint. A band aid was applied to the injection site. she tolerated the injection with no immediate adverse reactions. Please note that this chart was generated using voice recognition Dragon dictation software. Although every effort was made to ensure the accuracy of this automated transcription, some errors in transcription may have occurred.

## 2022-08-16 NOTE — PATIENT INSTRUCTIONS
Patient Education        Knee: Exercises  Introduction  Here are some examples of exercises for you to try. The exercises may be suggested for a condition or for rehabilitation. Start each exercise slowly. Ease off the exercises if you start to have pain. You will be told when to start these exercises and which ones will work bestfor you. How to do the exercises  Quad sets    Sit with your leg straight and supported on the floor or a firm bed. (If you feel discomfort in the front or back of your knee, place a small towel roll under your knee.)  Tighten the muscles on top of your thigh by pressing the back of your knee flat down to the floor. (If you feel discomfort under your kneecap, place a small towel roll under your knee.)  Hold for about 6 seconds, then rest for up to 10 seconds. Do 8 to 12 repetitions several times a day. Straight-leg raises to the front    Lie on your back with your good knee bent so that your foot rests flat on the floor. Your injured leg should be straight. Make sure that your low back has a normal curve. You should be able to slip your flat hand in between the floor and the small of your back, with your palm touching the floor and your back touching the back of your hand. Tighten the thigh muscles in the injured leg by pressing the back of your knee flat down to the floor. Hold your knee straight. Keeping the thigh muscles tight, lift your injured leg up so that your heel is about 12 inches off the floor. Hold for about 6 seconds and then lower slowly. Do 8 to 12 repetitions, 3 times a day. Straight-leg raises to the outside    Lie on your side, with your injured leg on top. Tighten the front thigh muscles of your injured leg to keep your knee straight. Keep your hip and your leg straight in line with the rest of your body, and keep your knee pointing forward. Do not drop your hip back. Lift your injured leg straight up toward the ceiling, about 12 inches off the floor.  Hold for about 6 seconds, then slowly lower your leg. Do 8 to 12 repetitions. Straight-leg raises to the back    Lie on your stomach, and lift your leg straight up behind you (toward the ceiling). Lift your toes about 6 inches off the floor, hold for about 6 seconds, then lower slowly. Do 8 to 12 repetitions. Straight-leg raises to the inside    Lie on the side of your body with the injured leg. You can either prop your other (good) leg up on a chair, or you can bend your good knee and put that foot in front of your injured knee. Do not drop your hip back. Tighten the muscles on the front of your thigh to straighten your injured knee. Keep your kneecap pointing forward, and lift your whole leg up toward the ceiling about 6 inches. Hold for about 6 seconds, then lower slowly. Do 8 to 12 repetitions. Heel dig bridging    Lie on your back with both knees bent and your ankles bent so that only your heels are digging into the floor. Your knees should be bent about 90 degrees. Then push your heels into the floor, squeeze your buttocks, and lift your hips off the floor until your shoulders, hips, and knees are all in a straight line. Hold for about 6 seconds as you continue to breathe normally, and then slowly lower your hips back down to the floor and rest for up to 10 seconds. Do 8 to 12 repetitions. Hamstring curls    Lie on your stomach with your knees straight. If your kneecap is uncomfortable, roll up a washcloth and put it under your leg just above your kneecap. Lift the foot of your injured leg by bending the knee so that you bring the foot up toward your buttock. If this motion hurts, try it without bending your knee quite as far. This may help you avoid any painful motion. Slowly lower your leg back to the floor. Do 8 to 12 repetitions. With permission from your doctor or physical therapist, you may also want to add a cuff weight to your ankle (not more than 5 pounds).  With weight, you do not have to

## 2022-08-17 ENCOUNTER — TELEPHONE (OUTPATIENT)
Dept: ORTHOPEDIC SURGERY | Age: 47
End: 2022-08-17

## 2022-08-17 LAB
BASO FLUID: ABNORMAL %
EOSINOPHIL FLUID: ABNORMAL %
FLUID DIFF COMMENT: ABNORMAL
LYMPHOCYTES, BODY FLUID: 41 %
MONOCYTE, FLUID: ABNORMAL %
NEUTROPHIL, FLUID: 17 %
OTHER CELLS FLUID: 42 %

## 2022-08-17 NOTE — TELEPHONE ENCOUNTER
Patient was started on Celebrex would recommend continuing with this medication as it is a nonsteroidal anti-inflammatory recommend taking twice daily for pain and swelling. Patient also was offered a physical therapy referral but declined yesterday I be happy to provide this if she does desire to pursue this.

## 2022-08-17 NOTE — TELEPHONE ENCOUNTER
Pt said that she will take a script for PT but does live in [facility]. She was advised to see who takes her insurance near her and she will call back with fax number for us to send a script.

## 2022-08-17 NOTE — TELEPHONE ENCOUNTER
Called pt with lab results. She said that he knee has already swelled back up. Hernan Eleni, anything you advise for her? She has tried a MDP prior to aspiration but that did not help.

## 2022-08-18 ENCOUNTER — TELEPHONE (OUTPATIENT)
Dept: ORTHOPEDIC SURGERY | Age: 47
End: 2022-08-18

## 2022-08-18 DIAGNOSIS — Z98.890 S/P RIGHT KNEE ARTHROSCOPY: Primary | ICD-10-CM

## 2022-08-18 DIAGNOSIS — S83.241A TEAR OF MEDIAL MENISCUS OF RIGHT KNEE, CURRENT, UNSPECIFIED TEAR TYPE, INITIAL ENCOUNTER: ICD-10-CM

## 2022-08-18 NOTE — TELEPHONE ENCOUNTER
Patient would like to go to [facility] for her PT and is asking for the referral to be faxed to [phone]. Thank you.

## 2022-08-22 LAB
CULTURE: ABNORMAL
DIRECT EXAM: ABNORMAL
SPECIMEN DESCRIPTION: ABNORMAL

## 2022-08-25 ENCOUNTER — OFFICE VISIT (OUTPATIENT)
Dept: ORTHOPEDIC SURGERY | Age: 47
End: 2022-08-25
Payer: MEDICARE

## 2022-08-25 VITALS — HEIGHT: 65 IN | WEIGHT: 293 LBS | RESPIRATION RATE: 14 BRPM | BODY MASS INDEX: 48.82 KG/M2

## 2022-08-25 DIAGNOSIS — G89.29 CHRONIC PAIN OF RIGHT KNEE: ICD-10-CM

## 2022-08-25 DIAGNOSIS — Z98.890 S/P RIGHT KNEE ARTHROSCOPY: Primary | ICD-10-CM

## 2022-08-25 DIAGNOSIS — M25.561 CHRONIC PAIN OF RIGHT KNEE: ICD-10-CM

## 2022-08-25 PROCEDURE — 99024 POSTOP FOLLOW-UP VISIT: CPT | Performed by: PHYSICIAN ASSISTANT

## 2022-08-25 PROCEDURE — 20610 DRAIN/INJ JOINT/BURSA W/O US: CPT | Performed by: PHYSICIAN ASSISTANT

## 2022-08-25 RX ORDER — BUPIVACAINE HYDROCHLORIDE 5 MG/ML
2 INJECTION, SOLUTION PERINEURAL ONCE
Status: COMPLETED | OUTPATIENT
Start: 2022-08-25 | End: 2022-08-25

## 2022-08-25 RX ORDER — TRAMADOL HYDROCHLORIDE 50 MG/1
50 TABLET ORAL EVERY 4 HOURS PRN
Qty: 18 TABLET | Refills: 0 | Status: CANCELLED | OUTPATIENT
Start: 2022-08-25 | End: 2022-08-28

## 2022-08-25 RX ORDER — LIDOCAINE HYDROCHLORIDE 10 MG/ML
2 INJECTION, SOLUTION INFILTRATION; PERINEURAL ONCE
Status: COMPLETED | OUTPATIENT
Start: 2022-08-25 | End: 2022-08-25

## 2022-08-25 RX ORDER — TRAMADOL HYDROCHLORIDE 50 MG/1
50 TABLET ORAL EVERY 4 HOURS PRN
Qty: 18 TABLET | Refills: 0 | Status: SHIPPED | OUTPATIENT
Start: 2022-08-25 | End: 2022-08-28

## 2022-08-25 RX ADMIN — LIDOCAINE HYDROCHLORIDE 2 ML: 10 INJECTION, SOLUTION INFILTRATION; PERINEURAL at 11:05

## 2022-08-25 RX ADMIN — BUPIVACAINE HYDROCHLORIDE 10 MG: 5 INJECTION, SOLUTION PERINEURAL at 11:09

## 2022-08-25 NOTE — PROGRESS NOTES
[facility], [address] [place], [address] [facility], [address] [facility]           Dept Phone: [phone]           Dept Fax:  [address]           [facility] [place]          Dept Phone: [phone]           Dept Fax:  [phone]      Chief Compliant:  Chief Complaint   Patient presents with    Knee Pain     Right        History of Present Illness:  Carolina Fraser returns today. This is a 49 y.o. female who presents to the clinic today for follow up of right knee pain and swelling history of right knee arthroscopy partial medial meniscectomy 7/18/2022. She unfortunately has continued have pain and swelling since surgery. Patient was seen on 8/5/2022 underwent aspiration and injection continue have pain and swelling so was subsequently aspirated on 8/16/2022. She reports this lasted aspiration did provide several days of fairly good pain relief she reports she did start physical therapy on Monday which seem to have aggravated her pain slightly. She continues to have pain over the medial aspect of the knee that she is trying to control with topical medication including Biofreeze, Voltaren as well as oral Celebrex. Patient denies any new injury or trauma but does note recurrent swelling and continued pain. Notes intermittent knee joint warmth but denies any knee redness, fever, chills. Patient is set to return this upcoming Monday 8/29/2022 but due to the physicality of her job she does not believe she would be able to return at this time. Review of Systems   Constitutional: Negative for fever, chills, sweats, recent illness, or recent injury. Neurological: Negative for headaches, numbness, or weakness. Integumentary: Negative for rash, itching, ecchymosis, abrasions, or laceration.    Musculoskeletal: Positive for Knee Pain (Right) Physical Exam:  Constitutional: Patient is oriented to person, place, and time. Patient appears well-developed and well nourished. Musculoskeletal:    Right Knee:     Skin: warm and dry, no rash or erythema  Vasculature: 2+ pedal pulses bilaterally  Neuro: Sensation grossly intact to light touch diffusely  Alignment: Normal  Tenderness: Mild tenderness to the medial joint line no tenderness to the lateral joint line, posterior anterior knee. Effusion: Moderate    ROM: (Degrees)       A P       Extension  0 0       Flexion   90 105       Crepitation  No       Muscle strength:         Flexion   5      Extension  5      SLR   5        Extensor lag   y          Special testing:  y    Pain with deep knee flexion     y    Patellar grind       n    Patellar apprehension      n    Patellar glide         n    Lachman       n    Anterior drawer      n    Pivot shift       n    Posterior drawer      n    Dial test       n    Posterolateral drawer      n    Posterior Sag       n    MCL        n    LCL          y    Medial joint line tenderness     n    Lateral joint line tenderness     n    McMurrey's         Neurological: Patient is alert and oriented to person, place, and time. Normal strenght. No sensory deficit. Skin: Skin is warm and dry  Psychiatric: Behavior is normal. Thought content normal.  Nursing note and vitals reviewed. Labs and Imaging:     XR taken today:  No results found. Assessment and Plan:  1. S/P right knee arthroscopy    2. Chronic pain of right knee              PLAN:  This is a 49 y.o. female who presents to the clinic today for follow up right knee pain and swelling following right knee arthroscopy partial medial meniscectomy on 7/18/2022. 1.  Patient underwent aspiration and injection on 8/5/2022 and a subsequent aspiration without injection on 8/16/2022. Both these provided several days of relief of pain but pain did return within a week of each procedure.   2.  She did start physical therapy this past Monday has done 2 visits at this time would recommend continuing with this  3. Continue with Celebrex 200 mg twice daily  4. We did discuss the possibility of Visco injection given patient's continued pain despite injections and aspirations. Before proceeding with this we would have to obtain prior authorization  5. For pain relief recommend aspiration without injection today which patient was agreeable with  6. We will extend off work for 2 more weeks given patient's continued pain and his physical out of her job. Tentative return to work date is 9/12/2022  7. The patient continues to be significantly painful despite the steroid aspiration and unsure of her return to work on 9/12/2022 she would benefit from seeing Dr. Jose G Orourke to discuss other treatment options. Procedure Note: Right knee aspiration without Injection   An informed verbal consent for the procedure was obtained and risks including, but not limited to: allergy to medications, injection, bleeding, stiffness of joint, recurrence of symptoms, loss of function, swelling, drainage, irrigation, need for surgery and pseudo-septic inflammation, were explained to the patient. Also, discussed was the potential for further injections, irrigation and debridement and surgery. Alternate means of treatment have also been discussed with the patient. Following an appropriate discussion with the patient regarding the risks and benefits of the procedure she consented to proceed. her right knee was prepped using betadine solution and alcohol swab. Using aseptic technique and through a superolateral approach, her right knee was injected superficially with 4 cc mixture of 2 cc Lidocaine without epi and 2 cc of Marcaine and subsequently with an 18g needle 35 cc of clear, straw colored synovial fluid is aspirated from the knee joint. A band aid was applied to the injection site.  she tolerated the injection with no immediate adverse reactions. Please note that this chart was generated using voice recognition Dragon dictation software. Although every effort was made to ensure the accuracy of this automated transcription, some errors in transcription may have occurred.

## 2022-08-26 ENCOUNTER — TELEPHONE (OUTPATIENT)
Dept: ORTHOPEDIC SURGERY | Age: 47
End: 2022-08-26

## 2022-08-26 NOTE — TELEPHONE ENCOUNTER
Spoke with patient gave her recommendations.  She was advised that we will contact her once we have an approval for the Visco supplementation

## 2022-08-26 NOTE — TELEPHONE ENCOUNTER
Patient was seen with Gilford Cedars for drainage of her right knee on 08-24, she is in a large amount of pain with feeling of stabbing in the knee and burning she is unsure if this is normal or not and she would like a returned call to speak with clinical staff, [phone], thank you

## 2022-08-26 NOTE — TELEPHONE ENCOUNTER
Recommend utilization of tramadol that was prescribed at last office visit. Continue with ice, elevation and compression. Some discomfort at the injection site can be normal however if she continues to have pain develops redness or drainage from the site would strongly be advised to see us next week or be evaluated in the ED over the weekend.

## 2022-09-02 NOTE — TELEPHONE ENCOUNTER
LVM for patient that prior authorization process was started for knee. Insurance stated it could take 1-3 buisness days track number #[id number]

## 2022-09-02 NOTE — TELEPHONE ENCOUNTER
Spoke with Raiza Cantu from [employer] she states that there isn't a prior authorization needed for Synvisc One injection. Patient notified. Patient is scheduled with  9/9/22 and is advised that she may have the injection at that time if she wishes.

## 2022-09-09 ENCOUNTER — OFFICE VISIT (OUTPATIENT)
Dept: ORTHOPEDIC SURGERY | Age: 47
End: 2022-09-09
Payer: MEDICARE

## 2022-09-09 DIAGNOSIS — Z98.890 S/P RIGHT KNEE ARTHROSCOPY: Primary | ICD-10-CM

## 2022-09-09 PROCEDURE — 20610 DRAIN/INJ JOINT/BURSA W/O US: CPT | Performed by: ORTHOPAEDIC SURGERY

## 2022-09-09 PROCEDURE — 99024 POSTOP FOLLOW-UP VISIT: CPT | Performed by: ORTHOPAEDIC SURGERY

## 2022-09-09 RX ORDER — LIDOCAINE HYDROCHLORIDE 10 MG/ML
2 INJECTION, SOLUTION INFILTRATION; PERINEURAL ONCE
Status: COMPLETED | OUTPATIENT
Start: 2022-09-09 | End: 2022-09-09

## 2022-09-09 RX ORDER — BUPIVACAINE HYDROCHLORIDE 5 MG/ML
2 INJECTION, SOLUTION PERINEURAL ONCE
Status: COMPLETED | OUTPATIENT
Start: 2022-09-09 | End: 2022-09-09

## 2022-09-09 RX ADMIN — BUPIVACAINE HYDROCHLORIDE 10 MG: 5 INJECTION, SOLUTION PERINEURAL at 09:02

## 2022-09-09 RX ADMIN — LIDOCAINE HYDROCHLORIDE 2 ML: 10 INJECTION, SOLUTION INFILTRATION; PERINEURAL at 09:03

## 2022-09-09 NOTE — LETTER
[facility]  [address] [address] [place] [address]  Phone: [phone]  Fax: [phone]    Gricel Velazquez MD        September 9, 2022     Patient: Nat Argueta   YOB: 1975   Date of Visit: 9/9/2022       To Whom It May Concern: It is my medical opinion that Shannon Coates  May return to work on 9/19/22 with no restrictions. If you have any questions or concerns, please don't hesitate to call.     Sincerely,        Gricel Velazquez MD

## 2022-09-09 NOTE — PROGRESS NOTES
are fine motion is tight no calf tenderness  Neurological: Patient is alert and oriented to person, place, and time. Normal strength. No sensory deficit. Skin: Skin is warm and dry  Psychiatric: Behavior is normal. Thought content normal.  Nursing note and vitals reviewed. Labs and Imaging:     XR taken today: None  No results found. Orders Placed This Encounter   Procedures    20610 - DRAIN/INJECT LARGE JOINT BURSA       Assessment and Plan:  1. S/P right knee arthroscopy        Administrations This Visit       bupivacaine (MARCAINE) 0.5 % injection 10 mg       Admin Date  09/09/2022  09:02 Action  Given Dose  10 mg Route  Intra-artICUlar Site  Knee Right Administered By  [employer] LPN    Ordering Provider: Cleveland Ling MD    NDC: 62339-659-14    Lot#: [id number]    : [address] [place] [address]    Patient Supplied?: No              Hylan injection 48 mg       Admin Date  09/09/2022  09:03 Action  Given Dose  48 mg Route  Intra-artICUlar Site  Knee Right Administered By  Rock [employer] LPN    Ordering Provider: Cleveland Ling MD    NDC: [address]-2    Lot#: [id number]    : Syl Caulk    Patient Supplied?: No              lidocaine 1 % injection 2 mL       Admin Date  09/09/2022  09:03 Action  Given Dose  2 mL Route  Intra-artICUlar Site  Knee Right Administered By  Rock [employer] LPN    Ordering Provider: Cleveland Ling MD    . [address]: [id number]    Lot#: [id number]    : [address] [place] [address]    Patient Supplied?: No                    This is a 49 y.o. female who presents to the clinic today for evaluation / follow up of recurrent effusions status post right knee arthroscopy. Past History:    Current Outpatient Medications:     celecoxib (CELEBREX) 200 MG capsule, Take 1 capsule by mouth in the morning and 1 capsule before bedtime. , Disp: 60 capsule, Rfl: 0    propranolol (INDERAL) 10 MG tablet, Take 10 mg by mouth 3 times daily, Disp: , Rfl:     metFORMIN (GLUCOPHAGE) 500 MG tablet, Take 500 mg by mouth 2 times daily (with meals), Disp: , Rfl:     hydrOXYzine HCl (ATARAX) 50 MG tablet, Take 50 mg by mouth 2 times daily, Disp: , Rfl:     tiZANidine (ZANAFLEX) 4 MG tablet, Take 4 mg by mouth nightly, Disp: , Rfl:     losartan (COZAAR) 50 MG tablet, Take 100 mg by mouth daily , Disp: , Rfl:     amitriptyline (ELAVIL) 25 MG tablet, Take 200 mg by mouth nightly , Disp: , Rfl:     prazosin (MINIPRESS) 1 MG capsule, Take 2 mg by mouth nightly , Disp: , Rfl:     melatonin 5 MG TABS tablet, Take 10 mg by mouth daily , Disp: , Rfl:     QUEtiapine (SEROQUEL XR) 400 MG extended release tablet, Take 800 mg by mouth nightly, Disp: , Rfl:     busPIRone (BUSPAR) 15 MG tablet, Take 30 mg by mouth nightly Can take once in morning PRN , Disp: , Rfl:     omeprazole (PRILOSEC) 40 MG delayed release capsule, Take 40 mg by mouth daily, Disp: , Rfl:     fexofenadine (RA ALLERGY RELIEF) 180 MG tablet, take 1 tablet by mouth once daily, Disp: 30 tablet, Rfl: 0    pramipexole (MIRAPEX) 0.5 MG tablet, take 1 tablet by mouth twice a day, Disp: 60 tablet, Rfl: 0    DULoxetine (CYMBALTA) 60 MG extended release capsule, TAKE 1 CAPSULE BY MOUTH TWICE A DAY, Disp: 60 capsule, Rfl: 3  Allergies   Allergen Reactions    Aripiprazole      Bad reaction    Eletriptan      Other reaction(s): Swelling-throat    Lasix [Furosemide] Other (See Comments)     Pt states pacreatitis    Sumatriptan Other (See Comments)    Triptans      Other reaction(s): Unknown    Lamotrigine Rash     Social History     Socioeconomic History    Marital status:      Spouse name: Not on file    Number of children: Not on file    Years of education: Not on file    Highest education level: Not on file   Occupational History    Not on file   Tobacco Use    Smoking status: Former     Packs/day: 1.00     Types: Cigarettes     Quit date: 2019     Years since quittin.7    Smokeless tobacco: Never    Tobacco comments: today last smoke   Vaping Use    Vaping Use: Every day    Substances: Nicotine   Substance and Sexual Activity    Alcohol use: Never     Alcohol/week: 0.0 standard drinks    Drug use: No     Comment: recovered from opiate abuse    Sexual activity: Not on file   Other Topics Concern    Not on file   Social History Narrative    Not on file     Social Determinants of Health     Financial Resource Strain: Not on file   Food Insecurity: Not on file   Transportation Needs: Not on file   Physical Activity: Not on file   Stress: Not on file   Social Connections: Not on file   Intimate Partner Violence: Not on file   Housing Stability: Not on file     Past Medical History:   Diagnosis Date    Abnormal levels of other serum enzymes     Anxiety     Bilateral leg edema     Chronic kidney disease     right renal cyst    Depression     Diabetes mellitus ([address]     DVT (deep venous thrombosis) ([facility] 1997    after delivery    Elevated liver enzymes     Fibromyalgia     Headache(784.0)     migraines    Hx of blood clots     1997 left calf    Hypertension     Kidney stone     Obstructive sleep apnea syndrome     Pancreatitis 2001    Pleural effusion 2001    PONV (postoperative nausea and vomiting)     S/P left knee arthroscopy 02/15/2021    knee arthroscopy with partial medial menisectomy - left eleazar    SOB (shortness of breath)     SVT (supraventricular tachycardia) ([facility] 9/8/2015     Past Surgical History:   Procedure Laterality Date    ATRIAL ABLATION SURGERY      BACK SURGERY      L4-5    CHOLECYSTECTOMY  2001    ENDOMETRIAL ABLATION      e sure implants placed also    ENDOSCOPY, COLON, DIAGNOSTIC      EXCISION LESION HAND / FINGER Right 01/25/2019    HAND LESION BIOPSY EXCISION performed by Yo Camacho MD at [address] Bilateral     left x2, right x1    FOOT SURGERY Right     x3    KNEE ARTHROSCOPY Left     x2    KNEE ARTHROSCOPY Left 02/15/2021    KNEE ARTHROSCOPY WITH PARTIAL MEDIAL MENISCECTOMY performed by Shiraz Tolbert MD at [address] [facility] ARTHROSCOPY Right 7/18/2022    RIGHT KNEE ARTHROSCOPIC PARTIAL MEDIAL MENISCECTOMY performed by Shiraz Tolbert MD at [facility] Right 07/18/2022    RIGHT KNEE ARTHROSCOPIC PARTIAL MEDIAL MENISCECTOMY (Right: Knee)    CT CYSTO/URETERO/PYELOSCOPY W/LITHOTRIPSY Left 09/20/2017    CYSTOSCOPY URETEROSCOPY HOLMIUM LASER LITHO WITH STONE BASKETING WITH  STENT  performed by Chace Vasquez MD at [facility] Right     UPPER GASTROINTESTINAL ENDOSCOPY N/A 02/02/2021    EGD ESOPHAGOGASTRODUODENOSCOPY performed by Jorge Garcia MD at [address]  02/01/2021    US GUIDED LIVER BIOPSY PERCUTANEOUS 2/1/2021 [facility] ULTRASOUND     Family History   Problem Relation Age of Onset    Heart Disease Father     High Blood Pressure Brother    Under sterile conditions patient's right knee injected with lidocaine 2 cc and bupivacaine 2 cc. She then was aspirated 35 cc of straw-colored synovial fluid. She was then injected with Synvisc 1 40 cc. Patient tolerated procedure well      Provider Attestation:  Parmjit Porter, personally performed the services described in this documentation. All medical record entries made by the scribe were at my direction and in my presence. I have reviewed the chart and discharge instructions and agree that the records reflect my personal performance and is accurate and complete. Shiraz Tolbert MD. 09/09/22      Please note that this chart was generated using voice recognition Dragon dictation software. Although every effort was made to ensure the accuracy of this automated transcription, some errors in transcription may have occurred.

## 2022-09-14 DIAGNOSIS — M25.561 CHRONIC PAIN OF RIGHT KNEE: Primary | ICD-10-CM

## 2022-09-14 DIAGNOSIS — G89.29 CHRONIC PAIN OF RIGHT KNEE: Primary | ICD-10-CM

## 2022-09-14 RX ORDER — METHYLPREDNISOLONE 4 MG/1
4 TABLET ORAL SEE ADMIN INSTRUCTIONS
Qty: 1 KIT | Refills: 0 | Status: SHIPPED | OUTPATIENT
Start: 2022-09-14 | End: 2022-09-20

## 2022-09-14 NOTE — TELEPHONE ENCOUNTER
Patient called, she was in Friday and had fluid drained from her right knee and syvisc put in her knee. On Monday she fell directly on her knee, has horrible pain, had PT this morning and could not do any of the PT.     Please advise pt [phone]

## 2022-09-22 ENCOUNTER — TELEPHONE (OUTPATIENT)
Dept: ORTHOPEDIC SURGERY | Age: 47
End: 2022-09-22

## 2022-09-22 ENCOUNTER — OFFICE VISIT (OUTPATIENT)
Dept: ORTHOPEDIC SURGERY | Age: 47
End: 2022-09-22
Payer: MEDICARE

## 2022-09-22 DIAGNOSIS — G89.29 CHRONIC PAIN OF RIGHT KNEE: ICD-10-CM

## 2022-09-22 DIAGNOSIS — Z98.890 S/P RIGHT KNEE ARTHROSCOPY: Primary | ICD-10-CM

## 2022-09-22 DIAGNOSIS — M25.561 CHRONIC PAIN OF RIGHT KNEE: ICD-10-CM

## 2022-09-22 PROCEDURE — 99024 POSTOP FOLLOW-UP VISIT: CPT | Performed by: ORTHOPAEDIC SURGERY

## 2022-09-22 PROCEDURE — 20610 DRAIN/INJ JOINT/BURSA W/O US: CPT | Performed by: ORTHOPAEDIC SURGERY

## 2022-09-22 RX ORDER — TRAMADOL HYDROCHLORIDE 50 MG/1
50 TABLET ORAL EVERY 4 HOURS PRN
Qty: 42 TABLET | Refills: 0 | Status: SHIPPED | OUTPATIENT
Start: 2022-09-22 | End: 2022-09-29

## 2022-09-22 RX ORDER — MELOXICAM 15 MG/1
15 TABLET ORAL DAILY
Qty: 30 TABLET | Refills: 3 | Status: SHIPPED | OUTPATIENT
Start: 2022-09-22

## 2022-09-22 NOTE — TELEPHONE ENCOUNTER
Patient had a scope in July and has had the knee drained a few times since the procedure last date of draining was 09/9 patient stated the pain is worsening. Is there any possibility patient could be seen today in [place]? Please advise thank you!

## 2022-09-22 NOTE — PROGRESS NOTES
Deja Egan M.D.            [facility] [address] [facility],[address], [address] [facility]           Dept Phone: [phone]           Dept Fax:  [phone]           Cedric Butts          Dept Phone: [phone]           Dept Fax:  [phone]      Chief Compliant:  Chief Complaint   Patient presents with    Pain     S/p  Rt knee scope 7/18/22        History of Present Illness: This is a [address] y.o. female who presents to the clinic today for evaluation / follow up of recurrent right knee pain and swelling she is status post arthroscopic partial medial meniscectomy on 7/18/2022. She has had several aspirations before this and a recently had a Synvisc injection. .       Review of Systems   Constitutional: Negative for fever, chills, sweats. Eyes: Negative for changes in vision, or pain. HENT: Negative for ear ache, epistaxis, or sore throat. Respiratory/Cardio: Negative for Chest pain, palpitations, SOB, or cough. Gastrointestinal: Negative for abdominal pain, N/V/D. Genitourinary: Negative for dysuria, frequency, urgency, or hematuria. Neurological: Negative for headache, numbness, or weakness. Integumentary: Negative for rash, itching, laceration, or abrasion. Musculoskeletal: Positive for Pain (S/p  Rt knee scope 7/18/22)       Physical Exam:  Constitutional: Patient is oriented to person, place, and time. Patient appears well-developed and well nourished. HENT: Negative otherwise noted  Head: Normocephalic and Atraumatic  Nose: Normal  Eyes: Conjunctivae and EOM are normal  Neck: Normal range of motion Neck supple. Respiratory/Cardio: Effort normal. No respiratory distress. Musculoskeletal: Lamination today notes a recurrent effusion although not as huge as before.   She is does not have any Nova's no calf tenderness motion is itself is some tightness but ligamentously grossly intact no other contributory findings    Neurological: Patient is alert and oriented to person, place, and time. Normal strength. No sensory deficit. Skin: Skin is warm and dry  Psychiatric: Behavior is normal. Thought content normal.  Nursing note and vitals reviewed. Labs and Imaging:     XR taken today: No new x-rays taken today  No results found. Orders Placed This Encounter   Procedures    20610 - DRAIN/INJECT LARGE JOINT BURSA       Assessment and Plan:  1. S/P right knee arthroscopy    2. Recurrent effusion right knee        This is a 49 y.o. female who presents to the clinic today for evaluation / follow up of recurrent effusion right knee. Past History:    Current Outpatient Medications:     celecoxib (CELEBREX) 200 MG capsule, Take 1 capsule by mouth in the morning and 1 capsule before bedtime. , Disp: 60 capsule, Rfl: 0    propranolol (INDERAL) 10 MG tablet, Take 10 mg by mouth 3 times daily, Disp: , Rfl:     metFORMIN (GLUCOPHAGE) 500 MG tablet, Take 500 mg by mouth 2 times daily (with meals), Disp: , Rfl:     hydrOXYzine HCl (ATARAX) 50 MG tablet, Take 50 mg by mouth 2 times daily, Disp: , Rfl:     tiZANidine (ZANAFLEX) 4 MG tablet, Take 4 mg by mouth nightly, Disp: , Rfl:     losartan (COZAAR) 50 MG tablet, Take 100 mg by mouth daily , Disp: , Rfl:     amitriptyline (ELAVIL) 25 MG tablet, Take 200 mg by mouth nightly , Disp: , Rfl:     prazosin (MINIPRESS) 1 MG capsule, Take 2 mg by mouth nightly , Disp: , Rfl:     melatonin 5 MG TABS tablet, Take 10 mg by mouth daily , Disp: , Rfl:     QUEtiapine (SEROQUEL XR) 400 MG extended release tablet, Take 800 mg by mouth nightly, Disp: , Rfl:     busPIRone (BUSPAR) 15 MG tablet, Take 30 mg by mouth nightly Can take once in morning PRN , Disp: , Rfl:     omeprazole (PRILOSEC) 40 MG delayed release capsule, Take 40 mg by mouth daily, Disp: , Rfl:     fexofenadine (RA ALLERGY RELIEF) 180 MG tablet, take 1 tablet by mouth once daily, Disp: 30 tablet, Rfl: 0    pramipexole (MIRAPEX) 0.5 MG tablet, take 1 tablet by mouth twice a day, Disp: 60 tablet, Rfl: 0    DULoxetine (CYMBALTA) 60 MG extended release capsule, TAKE 1 CAPSULE BY MOUTH TWICE A DAY, Disp: 60 capsule, Rfl: 3  Allergies   Allergen Reactions    Aripiprazole      Bad reaction    Eletriptan      Other reaction(s): Swelling-throat    Lasix [Furosemide] Other (See Comments)     Pt states pacreatitis    Sumatriptan Other (See Comments)    Triptans      Other reaction(s): Unknown    Lamotrigine Rash     Social History     Socioeconomic History    Marital status:      Spouse name: Not on file    Number of children: Not on file    Years of education: Not on file    Highest education level: Not on file   Occupational History    Not on file   Tobacco Use    Smoking status: Former     Packs/day: 1.00     Types: Cigarettes     Quit date: 2019     Years since quittin.8    Smokeless tobacco: Never    Tobacco comments:     today last smoke   Vaping Use    Vaping Use: Every day    Substances: Nicotine   Substance and Sexual Activity    Alcohol use: Never     Alcohol/week: 0.0 standard drinks    Drug use: No     Comment: recovered from opiate abuse    Sexual activity: Not on file   Other Topics Concern    Not on file   Social History Narrative    Not on file     Social Determinants of Health     Financial Resource Strain: Not on file   Food Insecurity: Not on file   Transportation Needs: Not on file   Physical Activity: Not on file   Stress: Not on file   Social Connections: Not on file   Intimate Partner Violence: Not on file   Housing Stability: Not on file     Past Medical History:   Diagnosis Date    Abnormal levels of other serum enzymes     Anxiety     Bilateral leg edema     Chronic kidney disease     right renal cyst    Depression     Diabetes mellitus (HCC)     DVT (deep venous thrombosis) ([facility] [address]     after delivery    Elevated liver enzymes Fibromyalgia     Headache(784.0)     migraines    Hx of blood clots     1997 left calf    Hypertension     Kidney stone     Obstructive sleep apnea syndrome     Pancreatitis 2001    Pleural effusion 2001    PONV (postoperative nausea and vomiting)     S/P left knee arthroscopy 02/15/2021    knee arthroscopy with partial medial menisectomy - left eleazar    SOB (shortness of breath)     SVT (supraventricular tachycardia) ([address] 9/8/2015     Past Surgical History:   Procedure Laterality Date    ATRIAL ABLATION SURGERY      BACK SURGERY      L4-5    CHOLECYSTECTOMY  2001    ENDOMETRIAL ABLATION      e sure implants placed also    ENDOSCOPY, COLON, DIAGNOSTIC      EXCISION LESION HAND / FINGER Right 01/25/2019    HAND LESION BIOPSY EXCISION performed by Stephanie Valdez MD at [address] Bilateral     left x2, right x1    FOOT SURGERY Right     x3    KNEE ARTHROSCOPY Left     x2    KNEE ARTHROSCOPY Left 02/15/2021    KNEE ARTHROSCOPY WITH PARTIAL MEDIAL MENISCECTOMY performed by Kelly Holder MD at [address] ARTHROSCOPY Right 7/18/2022    RIGHT KNEE ARTHROSCOPIC PARTIAL MEDIAL MENISCECTOMY performed by Kelly Holder MD at [facility] Right 07/18/2022    RIGHT KNEE ARTHROSCOPIC PARTIAL MEDIAL MENISCECTOMY (Right: Knee)    [facility] CYSTO/URETERO/PYELOSCOPY W/LITHOTRIPSY Left 09/20/2017    CYSTOSCOPY URETEROSCOPY HOLMIUM LASER LITHO WITH STONE BASKETING WITH  STENT  performed by Howard Romero MD at [place] [address] Right     UPPER GASTROINTESTINAL ENDOSCOPY N/A 02/02/2021    EGD ESOPHAGOGASTRODUODENOSCOPY performed by Herman Underwood MD at [address]  02/01/2021    US GUIDED LIVER BIOPSY PERCUTANEOUS 2/1/2021 [facility] ULTRASOUND     Family History   Problem Relation Age of Onset    Heart Disease Father     High Blood Pressure Brother    Plan  An informed verbal consent for the procedure was obtained and risks including, but not limited to: allergy to medications, injection, bleeding, stiffness of joint, recurrence of symptoms, loss of function, swelling, drainage, irrigation, need for surgery and pseudo-septic inflammation, were explained to the patient. Also, discussed was the potential for further injections, irrigation and debridement and surgery. Alternate means of treatment have also been discussed with the patient. Under sterile condition patient's right knee was injected with lidocaine 2 cc and bupivacaine 2 cc. I then aspirated 15 cc of blood-tinged synovial fluid. She did not receive 80 steroid steroid injection as she had just had a recent 1 with the Synvis    Patient will be started on Mobic 15 mg p.o. daily. She also requested Ultram.  We will see her back here hopefully on an as-needed basis                       Provider Attestation:  Parmjit Porter, personally performed the services described in this documentation. All medical record entries made by the scribe were at my direction and in my presence. I have reviewed the chart and discharge instructions and agree that the records reflect my personal performance and is accurate and complete. Shiraz Tolbert MD. 09/22/22      Please note that this chart was generated using voice recognition Dragon dictation software. Although every effort was made to ensure the accuracy of this automated transcription, some errors in transcription may have occurred.

## 2022-10-02 NOTE — ED NOTES
Bed: C  Expected date:   Expected time:   Means of arrival:   Comments:  Covid Clean at [address] [facility],[address], RN  05/19/20 [id number] yes...

## 2022-10-05 ENCOUNTER — OFFICE VISIT (OUTPATIENT)
Dept: ORTHOPEDIC SURGERY | Age: 47
End: 2022-10-05
Payer: MEDICARE

## 2022-10-05 VITALS — BODY MASS INDEX: 48.82 KG/M2 | HEIGHT: 65 IN | RESPIRATION RATE: 14 BRPM | WEIGHT: 293 LBS

## 2022-10-05 DIAGNOSIS — M25.561 CHRONIC PAIN OF RIGHT KNEE: Primary | ICD-10-CM

## 2022-10-05 DIAGNOSIS — M17.5 OTHER SECONDARY OSTEOARTHRITIS OF RIGHT KNEE: ICD-10-CM

## 2022-10-05 DIAGNOSIS — G89.29 CHRONIC PAIN OF RIGHT KNEE: Primary | ICD-10-CM

## 2022-10-05 PROCEDURE — 99024 POSTOP FOLLOW-UP VISIT: CPT | Performed by: PHYSICIAN ASSISTANT

## 2022-10-05 PROCEDURE — 20610 DRAIN/INJ JOINT/BURSA W/O US: CPT | Performed by: PHYSICIAN ASSISTANT

## 2022-10-05 RX ORDER — LIDOCAINE HYDROCHLORIDE 10 MG/ML
2 INJECTION, SOLUTION INFILTRATION; PERINEURAL ONCE
Status: COMPLETED | OUTPATIENT
Start: 2022-10-05 | End: 2022-10-05

## 2022-10-05 RX ORDER — BETAMETHASONE SODIUM PHOSPHATE AND BETAMETHASONE ACETATE 3; 3 MG/ML; MG/ML
12 INJECTION, SUSPENSION INTRA-ARTICULAR; INTRALESIONAL; INTRAMUSCULAR; SOFT TISSUE ONCE
Status: COMPLETED | OUTPATIENT
Start: 2022-10-05 | End: 2022-10-05

## 2022-10-05 RX ORDER — BUPIVACAINE HYDROCHLORIDE 5 MG/ML
2 INJECTION, SOLUTION PERINEURAL ONCE
Status: COMPLETED | OUTPATIENT
Start: 2022-10-05 | End: 2022-10-05

## 2022-10-05 RX ADMIN — BUPIVACAINE HYDROCHLORIDE 10 MG: 5 INJECTION, SOLUTION PERINEURAL at 11:51

## 2022-10-05 RX ADMIN — BETAMETHASONE SODIUM PHOSPHATE AND BETAMETHASONE ACETATE 12 MG: 3; 3 INJECTION, SUSPENSION INTRA-ARTICULAR; INTRALESIONAL; INTRAMUSCULAR; SOFT TISSUE at 11:52

## 2022-10-05 RX ADMIN — LIDOCAINE HYDROCHLORIDE 2 ML: 10 INJECTION, SOLUTION INFILTRATION; PERINEURAL at 11:50

## 2022-10-05 NOTE — PROGRESS NOTES
[facility], [address] [place] [address], [address] [place] [address], [id number] [place] [address]           Dept Phone: [phone]           Dept Fax:  [phone] [facility]           Cedric Butts          Dept Phone: [phone]           Dept Fax:  [phone]      Chief Compliant:  Chief Complaint   Patient presents with    Knee Pain     Right        History of Present Illness: This is a 49 y.o. female who presents to the clinic today for evaluation of had concerns including Knee Pain (Right). Ms. Constantin Gracia is a 71-year-old female returns today for reevaluation of chronic right knee pain following right knee arthroscopy partial medial meniscectomy on 7/18/2022. Since surgery unfortunate patient has had recurrent pain and swelling. She has been seen by myself and Dr. Jaspal Perez and has undergone multiple aspirations and injections. Most recently patient underwent aspiration and injection on 9/9/2022. She was injected with Synvisc 1 at that time. Subsequently on 9/22/2022 she continued have swelling so an aspiration was performed without injection. Her last corticosteroid injection was 8/5/2022. Patient denies any new injury or trauma reports pain is most severe to the medial aspect of the right knee aggravated by twisting or turning. She denies any joint warmth, redness, fever or chills. Past History:    Current Outpatient Medications:     meloxicam (MOBIC) 15 MG tablet, Take 1 tablet by mouth daily, Disp: 30 tablet, Rfl: 3    celecoxib (CELEBREX) 200 MG capsule, Take 1 capsule by mouth in the morning and 1 capsule before bedtime. , Disp: 60 capsule, Rfl: 0    propranolol (INDERAL) 10 MG tablet, Take 10 mg by mouth 3 times daily, Disp: , Rfl:     metFORMIN (GLUCOPHAGE) 500 MG tablet, Take 500 mg by mouth 2 times daily (with meals), Disp: , Rfl:     hydrOXYzine HCl (ATARAX) 50 MG tablet, Take 50 mg by mouth 2 times daily, Disp: , Rfl:     tiZANidine (ZANAFLEX) 4 MG tablet, Take 4 mg by mouth nightly, Disp: , Rfl:     losartan (COZAAR) 50 MG tablet, Take 100 mg by mouth daily , Disp: , Rfl:     amitriptyline (ELAVIL) 25 MG tablet, Take 200 mg by mouth nightly , Disp: , Rfl:     prazosin (MINIPRESS) 1 MG capsule, Take 2 mg by mouth nightly , Disp: , Rfl:     melatonin 5 MG TABS tablet, Take 10 mg by mouth daily , Disp: , Rfl:     QUEtiapine (SEROQUEL XR) 400 MG extended release tablet, Take 800 mg by mouth nightly, Disp: , Rfl:     busPIRone (BUSPAR) 15 MG tablet, Take 30 mg by mouth nightly Can take once in morning PRN , Disp: , Rfl:     omeprazole (PRILOSEC) 40 MG delayed release capsule, Take 40 mg by mouth daily, Disp: , Rfl:     fexofenadine (RA ALLERGY RELIEF) 180 MG tablet, take 1 tablet by mouth once daily, Disp: 30 tablet, Rfl: 0    pramipexole (MIRAPEX) 0.5 MG tablet, take 1 tablet by mouth twice a day, Disp: 60 tablet, Rfl: 0    DULoxetine (CYMBALTA) 60 MG extended release capsule, TAKE 1 CAPSULE BY MOUTH TWICE A DAY, Disp: 60 capsule, Rfl: 3  Allergies   Allergen Reactions    Aripiprazole      Bad reaction    Eletriptan      Other reaction(s): Swelling-throat    Lasix [Furosemide] Other (See Comments)     Pt states pacreatitis    Sumatriptan Other (See Comments)    Triptans      Other reaction(s): Unknown    Lamotrigine Rash     Social History     Socioeconomic History    Marital status:      Spouse name: Not on file    Number of children: Not on file    Years of education: Not on file    Highest education level: Not on file   Occupational History    Not on file   Tobacco Use    Smoking status: Former     Packs/day: 1.00     Types: Cigarettes     Quit date: 2019     Years since quittin.8    Smokeless tobacco: Never    Tobacco comments:     today last smoke   Vaping Use    Vaping Use: Every day    Substances: Nicotine   Substance and Sexual Activity Alcohol use: Never     Alcohol/week: 0.0 standard drinks    Drug use: No     Comment: recovered from opiate abuse    Sexual activity: Not on file   Other Topics Concern    Not on file   Social History Narrative    Not on file     Social Determinants of Health     Financial Resource Strain: Not on file   Food Insecurity: Not on file   Transportation Needs: Not on file   Physical Activity: Unknown    Days of Exercise per Week: 0 days    Minutes of Exercise per Session: Not on file   Stress: Not on file   Social Connections: Not on file   Intimate Partner Violence: Not At Risk    Fear of Current or Ex-Partner: No    Emotionally Abused: No    Physically Abused: No    Sexually Abused: No   Housing Stability: Not on file     Past Medical History:   Diagnosis Date    Abnormal levels of other serum enzymes     Anxiety     Bilateral leg edema     Chronic kidney disease     right renal cyst    Depression     Diabetes mellitus ([address]     DVT (deep venous thrombosis) ([address] 1997    after delivery    Elevated liver enzymes     Fibromyalgia     Headache(784.0)     migraines    Hx of blood clots     1997 left calf    Hypertension     Kidney stone     Obstructive sleep apnea syndrome     Pancreatitis 2001    Pleural effusion 2001    PONV (postoperative nausea and vomiting)     S/P left knee arthroscopy 02/15/2021    knee arthroscopy with partial medial menisectomy - left eleazar    SOB (shortness of breath)     SVT (supraventricular tachycardia) ([address] 9/8/2015     Past Surgical History:   Procedure Laterality Date    ATRIAL ABLATION SURGERY      BACK SURGERY      L4-5    CHOLECYSTECTOMY  2001    ENDOMETRIAL ABLATION      e sure implants placed also    ENDOSCOPY, COLON, DIAGNOSTIC      EXCISION LESION HAND / FINGER Right 01/25/2019    HAND LESION BIOPSY EXCISION performed by Hoda Bains MD [address] Bilateral     left x2, right x1    FOOT SURGERY Right     x3    KNEE ARTHROSCOPY Left     x2    KNEE ARTHROSCOPY Left 02/15/2021    KNEE ARTHROSCOPY WITH PARTIAL MEDIAL MENISCECTOMY performed by Tanmay Daniel MD at [address] ARTHROSCOPY Right 7/18/2022    RIGHT KNEE ARTHROSCOPIC PARTIAL MEDIAL MENISCECTOMY performed by Tanmay Daniel MD at [facility] Right 07/18/2022    RIGHT KNEE ARTHROSCOPIC PARTIAL MEDIAL MENISCECTOMY (Right: Knee)    IL CYSTO/URETERO/PYELOSCOPY W/LITHOTRIPSY Left 09/20/2017    CYSTOSCOPY URETEROSCOPY HOLMIUM LASER LITHO WITH STONE BASKETING WITH  STENT  performed by Dominga Patel MD at [facility] 12 Right     UPPER GASTROINTESTINAL ENDOSCOPY N/A 02/02/2021    EGD ESOPHAGOGASTRODUODENOSCOPY performed by Sari Snell MD at [address]  02/01/2021    US GUIDED LIVER BIOPSY PERCUTANEOUS 2/1/2021 [facility] ULTRASOUND     Family History   Problem Relation Age of Onset    Heart Disease Father     High Blood Pressure Brother         Review of Systems   Constitutional: Negative for fever, chills, sweats. Eyes: Negative for changes in vision, or pain. HENT: Negative for ear ache, epistaxis, or sore throat. Respiratory/Cardio: Negative for Chest pain, palpitations, SOB, or cough. Gastrointestinal: Negative for abdominal pain, N/V/D. Genitourinary: Negative for dysuria, frequency, urgency, or hematuria. Neurological: Negative for headache, numbness, or weakness. Integumentary: Negative for rash, itching, laceration, or abrasion. Musculoskeletal: Positive for Knee Pain (Right)       Physical Exam:  Constitutional: Patient is oriented to person, place, and time. Patient appears well-developed and well nourished. HENT: Negative otherwise noted  Head: Normocephalic and Atraumatic  Nose: Normal  Eyes: Conjunctivae and EOM are normal  Neck: Normal range of motion Neck supple. Respiratory/Cardio: Effort normal. No respiratory distress.   Musculoskeletal:    Right Knee:     Skin: warm and dry, no rash or erythema  Vasculature: 2+ pedal pulses bilaterally  Neuro: Sensation grossly intact to light touch diffusely  Alignment: Normal  Tenderness: Severe tenderness to medial joint line. No tenderness to quad/patellar tendon, pes anserine bursa or posterior knee. Effusion: Moderate    ROM: (Degrees)       A P       Extension  -5 -5       Flexion   120 125       Crepitation  Yes       Muscle strength:         Flexion   5      Extension  5      SLR   5        Extensor lag   y          Special testing:  y    Pain with deep knee flexion     y    Patellar grind       n    Patellar apprehension      n    Patellar glide         n    Lachman       n    Anterior drawer      n    Pivot shift       n    Posterior drawer      n    Dial test       n    Posterolateral drawer      n    Posterior Sag       n    MCL        n    LCL          severe    Medial joint line tenderness     n    Lateral joint line tenderness     n    McMurrey's         Neurological: Patient is alert and oriented to person, place, and time. Normal strenght. No sensory deficit. Skin: Skin is warm and dry  Psychiatric: Behavior is normal. Thought content normal.  Nursing note and vitals reviewed.      Labs and Imaging:     MRI KNEE RIGHT WO CONTRAST  Narrative: EXAMINATION:  MRI OF THE RIGHT KNEE WITHOUT CONTRAST, 6/20/2022 7:31 am    TECHNIQUE:  Multiplanar multisequence MRI of the right knee was performed without the  administration of intravenous contrast.    COMPARISON:  Right knee plain radiographs from 5/3/2022    HISTORY:  [address] [facility] [address]: Right knee pain, unspecified chronicity  TECHNOLOGIST PROVIDED HISTORY:  Reason for Exam: Right knee pain, unspecified chronicity, Tear of medial  meniscus of right knee, current, unspecif  Additional signs and symptoms: Right knee pain stepped in hole and twisted,,  pops, locks grinding noise    48-year-old female with right knee pain after twisting injury; evaluate for  meniscal tear    FINDINGS:  MENISCI: Lateral meniscus demonstrates normal morphology and signal  characteristics. No lateral meniscus tear. Radial type tear in the  posterior horn medial meniscus on image 24, series 8, with slight outward  subluxation and degeneration of the medial meniscus body. CRUCIATE LIGAMENTS: Anterior and posterior cruciate ligaments appear intact. EXTENSOR MECHANISM: Mild superior and inferior patellar tendinosis. Distal  quadriceps tendon and patellar retinacula appear intact. LATERAL COLLATERAL LIGAMENT COMPLEX: Popliteus muscle/tendon, iliotibial  band, lateral collateral ligament, and biceps femoris appear intact. MEDIAL COLLATERAL LIGAMENT COMPLEX: Periligamentous edema surrounding the  medial collateral ligament consistent with grade 1 MCL sprain. KNEE JOINT: Small joint effusion. Osseous alignment is normal.  No acute  fracture or dislocation. Grade 3 medial compartment chondromalacia. Grade 2 lateral compartment  chondromalacia. Grade 3 patellofemoral chondromalacia. BONE MARROW: Bone marrow signal intensity within the visualized osseous  structures is within normal limits. SOFT TISSUES: No sizable Baker's cyst.  Visualized popliteal neurovascular  bundle grossly unremarkable. Moderate edema in the subcutaneous fat along  the anterior knee. Impression: 1. Radial type tear in the posterior horn medial meniscus with slight outward  subluxation and degeneration of the medial meniscus body. 2. Grade 1 MCL sprain. 3. Mild superior and inferior patellar tendinosis. 4. Small joint effusion. 5. Moderate medial and patellofemoral chondromalacia. Mild lateral  compartment chondromalacia. X-rays taken in clinic today and preliminarily reviewed by me 10/5/22:  AP bilateral knees standing lateral view of the right knee compared with preoperative x-rays on 5/3/2022.   Right knee with fairly advanced medial compartmental narrowing not quite to bone-on-bone apposition at this point. Compared with preoperative x-rays on 5/3/2022 that there does appear to be a fair amount of progression. There is no evidence of osteonecrotic lesion, acute fracture or other acute osseous abnormality. Small suprapatellar joint effusion noted. Orders Placed This Encounter   Procedures    XR KNEE RIGHT (1-2 VIEWS)     Standing Status:   Future     Standing Expiration Date:   10/5/2023       Assessment and Plan:  1. Chronic pain of right knee    2. Other secondary osteoarthritis of right knee          PLAN:  Juan Joshua is a 49 y.o. old female who presents for evaluation of recurrent knee pain and swelling following right knee arthroscopy partial medial meniscectomy on 7/18/2022.  1.  Repeat radiographs today do demonstrate fair amount of progression of medial compartmental narrowing compared with preoperative x-rays. Patient is educated on the likely etiology of this and the prognosis associated with this. 2.  She is educated that although I do not appreciate any evidence of meniscal or ligamentous pathology or worsening osteoarthritis could be contributing to her recurrent swelling and pain. 3.  Patient is educated she may be a candidate for a unicompartmental arthroplasty in the future given her worsening degeneration of the medial compartment without evidence of significant patella femoral or lateral compartmental DJD. Patient would like to avoid a secondary surgery at all cost which is completely understandable. 4.  For symptomatic control patient will benefit from repeat aspiration and corticosteroid injection today as she has done well with aspirations in the past temporarily. 5.  Continue on meloxicam  6.   Follow-up on as needed basis    Procedure Note: right Knee Celestone Injection   An informed verbal consent for the procedure was obtained and risks including, but not limited to: allergy to medications, injection, bleeding, stiffness of joint, recurrence of symptoms, loss of function, swelling, drainage, irrigation, need for surgery and pseudo-septic inflammation, were explained to the patient. Also, discussed was the potential for further injections, irrigation and debridement and surgery. Alternate means of treatment have also been discussed with the patient. Following an appropriate discussion with the patient regarding the risks and benefits of the procedure she consented to proceed. her right knee was prepped using betadine solution and alcohol swab. Using aseptic technique and through a superolateral approach, which was then injected superficially with 4 cc of 1% lidocaine without epinephrine, utilizing 18-gauge needle 26 cc of blood-tinged, straw-colored synovial fluid is aspirated from the knee joint and subsequently with 2 cc of 6 mg/mL Celestone into the right knee. A band aid was applied to the injection site. she tolerated the injection with no immediate adverse reactions. Electronically signed by DENNYS Roberson on 10/5/22 at 11:09 AM EDT        Please note that this chart was generated using voice recognition Dragon dictation software. Although every effort was made to ensure the accuracy of this automated transcription, some errors in transcription may have occurred.

## 2022-10-12 RX ORDER — DICLOFENAC SODIUM 75 MG/1
75 TABLET, DELAYED RELEASE ORAL 2 TIMES DAILY WITH MEALS
Qty: 28 TABLET | Refills: 0 | Status: SHIPPED | OUTPATIENT
Start: 2022-10-12

## 2022-10-12 NOTE — TELEPHONE ENCOUNTER
Pt states she is a lot pain in R knee. She has had Mobic and Celebrex but would like to try Diclofenac. Please call pt to discuss.

## 2022-11-01 ENCOUNTER — OFFICE VISIT (OUTPATIENT)
Dept: ORTHOPEDIC SURGERY | Age: 47
End: 2022-11-01
Payer: MEDICARE

## 2022-11-01 VITALS — HEIGHT: 65 IN | RESPIRATION RATE: 14 BRPM | WEIGHT: 293 LBS | BODY MASS INDEX: 48.82 KG/M2

## 2022-11-01 DIAGNOSIS — M25.561 CHRONIC PAIN OF RIGHT KNEE: ICD-10-CM

## 2022-11-01 DIAGNOSIS — G89.29 CHRONIC PAIN OF RIGHT KNEE: ICD-10-CM

## 2022-11-01 DIAGNOSIS — Z98.890 S/P RIGHT KNEE ARTHROSCOPY: ICD-10-CM

## 2022-11-01 DIAGNOSIS — M17.5 OTHER SECONDARY OSTEOARTHRITIS OF RIGHT KNEE: Primary | ICD-10-CM

## 2022-11-01 PROCEDURE — 20610 DRAIN/INJ JOINT/BURSA W/O US: CPT | Performed by: PHYSICIAN ASSISTANT

## 2022-11-01 RX ORDER — KETOPROFEN 75 MG/1
75 CAPSULE ORAL 3 TIMES DAILY PRN
Qty: 120 CAPSULE | Refills: 0 | Status: SHIPPED | OUTPATIENT
Start: 2022-11-01

## 2022-11-01 RX ORDER — TRAMADOL HYDROCHLORIDE 50 MG/1
50 TABLET ORAL EVERY 6 HOURS PRN
Qty: 12 TABLET | Refills: 0 | Status: SHIPPED | OUTPATIENT
Start: 2022-11-01 | End: 2022-11-11 | Stop reason: SDUPTHER

## 2022-11-01 RX ORDER — LIDOCAINE HYDROCHLORIDE 10 MG/ML
4 INJECTION, SOLUTION INFILTRATION; PERINEURAL ONCE
Status: COMPLETED | OUTPATIENT
Start: 2022-11-01 | End: 2022-11-01

## 2022-11-01 RX ADMIN — LIDOCAINE HYDROCHLORIDE 4 ML: 10 INJECTION, SOLUTION INFILTRATION; PERINEURAL at 11:04

## 2022-11-01 NOTE — PROGRESS NOTES
[facility], [address] [place], [address] [place], Anne Marie Rajigna [address].           Dept Phone: [phone]           Dept Fax:  [phone] [facility]           Cedric Butts          Dept Phone: [phone]           Dept Fax:  [phone]      Chief Compliant:  Chief Complaint   Patient presents with    Knee Pain     right        History of Present Illness:  Yolanda Santos returns today. This is a 49 y.o. female who presents to the clinic today for follow up of right knee arthroscopy partial medial meniscectomy on 7/18/2022. Patient unfortunately continues to have chronic pain with recurrent swelling. She has been seen by myself and Dr. Karin Hanson and undergone multiple aspirations and injections. Post-operative timeline:  7/29/2022 Moderate Effusion Medrol dose pack  8/5/2022 Aspiration with Celestone injection  8/16/22 Right knee aspiration without injection. Started Celebrex BID  9/9/2022 Right knee aspiration with Synvisc One injection  9/22/2022 Right knee aspiration without injection. 10/5/2022 Right knee aspiration/Celestone injection    Patient reports that the last injection actually did provide moderate relief of pain but only lasted for about 3 weeks. The last 2 weeks pain has progressively returned. At that last appointment we did repeat x-rays which unfortunately demonstrated some progression of arthritis compared to preoperative films in May. We did discuss at that time possibility of appointment with Dr. Karin Hanson to discuss further interventions including unicompartmental arthroplasty versus repeat arthroscopic examination however patient elected to hold off on appointment at that time with Dr. Karin Hanson. Medication attempted postoperatively include Celebrex, Mobic and Voltaren with minimal relief of pain.     She returns today for reevaluation and requesting repeat aspiration. She denies any new injury or trauma no knee joint warmth, redness, fever or chills. Patient reports pain continues to be mostly over the anterior medial knee with occasional radiation to the posterior aspect continues note quite a bit of swelling. She has returned to work but has had to modify activity significantly given her increased pain with walking twisting. Review of Systems   Constitutional: Negative for fever, chills, sweats, recent illness, or recent injury. Neurological: Negative for headaches, numbness, or weakness. Integumentary: Negative for rash, itching, ecchymosis, abrasions, or laceration. Musculoskeletal: Positive for Knee Pain (right)       Physical Exam:  Constitutional: Patient is oriented to person, place, and time. Patient appears well-developed and well nourished. Musculoskeletal:    Right Knee:      Skin: warm and dry, no rash or erythema  Vasculature: 2+ pedal pulses bilaterally  Neuro: Sensation grossly intact to light touch diffusely  Alignment: Normal  Tenderness: Severe tenderness to medial joint line. No tenderness to quad/patellar tendon, pes anserine bursa or posterior knee.   Effusion: Moderate     ROM: (Degrees)                                         A          P                                       Extension                    -5         -5                                                           Flexion                         120      125                                   Crepitation                   Yes                                   Muscle strength:                                                                                   Flexion                         5                                                Extension                    5                                                SLR                             5                                                   Extensor lag                y Special testing:  y                                              Pain with deep knee flexion                              y                                              Patellar grind                                                     n                                              Patellar apprehension                                       n                                              Patellar glide                                                        n                                              Lachman                                                           n                                              Anterior drawer                                                 n                                              Pivot shift                                                          n                                              Posterior drawer                                               n                                              Dial test                                                             n                                              Posterolateral drawer                                        n                                              Posterior Sag                                                    n                                              MCL                                                                   n                                              LCL                                                                       severe                                     Medial joint line tenderness                              n                                              Lateral joint line tenderness                              positive                                            McMurrey's                                       Neurological: Patient is alert and oriented to person, place, and time. Normal strenght. No sensory deficit.   Skin: Skin is warm and dry  Psychiatric: Behavior is normal. Thought content normal.  Nursing note and vitals reviewed. Labs and Imaging:     XR taken today:  No results found. No repeat x-rays done today however those from 10/5/2022 again available for review  AP bilateral knees standing lateral view of the right knee compared with preoperative films from 5/3/2022. Right knee with moderate medial compartmental narrowing which had progressed since preoperative films on 5/3/2022. No evidence of osteonecrosis, acute fracture or malalignment    Assessment and Plan:  1. Chronic pain of right knee    2. Other secondary osteoarthritis of right knee    3. S/P right knee arthroscopy              PLAN:  This is a 49 y.o. female who presents to the clinic today for follow up chronic right knee pain following right knee arthroscopy partial medial meniscectomy on 7/18/2022. Patient unfortunately has had to undergo multiple aspirations and injections including Celestone and Synvisc 1 with minimal improvement of her pain. Last procedure was done on 10/5/2022 in the form of aspiration and Celestone injection. Patient returns today stating unfortunate she continues to be significantly painful and swollen in this right knee. We can discuss possible appointment with Dr. Seferino Padilla to discuss repeat arthroscopic examination versus discussions of unicompartmental arthroplasty given her progression of medial compartmental narrowing however patient would like to hold off on this appointment as she reports she has a second opinion with Dr. Haynes Kawasaki partner, Dr. Genoveva Pandey next week on 11/9/2022. Patient does request a repeat aspiration, she is informed that would not recommend corticosteroid injection with this but I be happy to aspiration which she did elect to proceed with. Aspiration performed as outlined below. Aspirate revealed 25 cc of clear, straw-colored synovial fluid without evidence of purulence or blood.   Patient has tried various NSAIDs including diclofenac, Celebrex and Mobic. She reports that the 1 she has tried previously this seems alkalosis ketoprofen and is requesting a short course of this is electronically sent to patient pharmacy recommend discontinue all other NSAIDs while on this. Follow-up on as needed basis    Procedure Note: Right knee aspiration without Injection   An informed verbal consent for the procedure was obtained and risks including, but not limited to: allergy to medications, injection, bleeding, stiffness of joint, recurrence of symptoms, loss of function, swelling, drainage, irrigation, need for surgery and pseudo-septic inflammation, were explained to the patient. Also, discussed was the potential for further injections, irrigation and debridement and surgery. Alternate means of treatment have also been discussed with the patient. Following an appropriate discussion with the patient regarding the risks and benefits of the procedure she consented to proceed. her right knee was prepped using betadine solution and alcohol swab. Using aseptic technique and through a superolateral approach, her right knee was injected superficially with 4 cc of Lidocaine and subsequently with an 18-gauge needle 25 cc of clear, straw-colored synovial fluid is aspirated from the knee joint. Into the right knee. A band aid was applied to the injection site. she tolerated the injection with no immediate adverse reactions. Please note that this chart was generated using voice recognition Dragon dictation software. Although every effort was made to ensure the accuracy of this automated transcription, some errors in transcription may have occurred.

## 2022-11-02 ENCOUNTER — TELEPHONE (OUTPATIENT)
Dept: ORTHOPEDIC SURGERY | Age: 47
End: 2022-11-02

## 2022-11-02 DIAGNOSIS — M17.5 OTHER SECONDARY OSTEOARTHRITIS OF RIGHT KNEE: ICD-10-CM

## 2022-11-02 DIAGNOSIS — M25.561 CHRONIC PAIN OF RIGHT KNEE: Primary | ICD-10-CM

## 2022-11-02 DIAGNOSIS — G89.29 CHRONIC PAIN OF RIGHT KNEE: Primary | ICD-10-CM

## 2022-11-02 RX ORDER — ETODOLAC 400 MG/1
400 TABLET, FILM COATED ORAL 2 TIMES DAILY
Qty: 60 TABLET | Refills: 0 | Status: SHIPPED | OUTPATIENT
Start: 2022-11-02 | End: 2022-12-02

## 2022-11-02 NOTE — TELEPHONE ENCOUNTER
Patient has tried various other NSAIDs but it does not appear she has tried Lodine in the past can send this to see if this helps with her pain related to this knee. Sent to [employer] in [facility].

## 2022-11-02 NOTE — TELEPHONE ENCOUNTER
Patient  was seen in office 11/1 and prescribed Ketoprofen 75 mg , however the medication is no longer available. Is there anything else your are willing to prescribe the patient?

## 2022-11-09 ENCOUNTER — OFFICE VISIT (OUTPATIENT)
Dept: ORTHOPEDIC SURGERY | Age: 47
End: 2022-11-09
Payer: MEDICARE

## 2022-11-09 VITALS — WEIGHT: 293 LBS | BODY MASS INDEX: 48.82 KG/M2 | HEIGHT: 65 IN

## 2022-11-09 DIAGNOSIS — M17.11 OSTEOARTHRITIS OF RIGHT KNEE, UNSPECIFIED OSTEOARTHRITIS TYPE: Primary | ICD-10-CM

## 2022-11-09 PROCEDURE — G8427 DOCREV CUR MEDS BY ELIG CLIN: HCPCS | Performed by: ORTHOPAEDIC SURGERY

## 2022-11-09 PROCEDURE — 1036F TOBACCO NON-USER: CPT | Performed by: ORTHOPAEDIC SURGERY

## 2022-11-09 PROCEDURE — G8484 FLU IMMUNIZE NO ADMIN: HCPCS | Performed by: ORTHOPAEDIC SURGERY

## 2022-11-09 PROCEDURE — G8417 CALC BMI ABV UP PARAM F/U: HCPCS | Performed by: ORTHOPAEDIC SURGERY

## 2022-11-09 PROCEDURE — 99203 OFFICE O/P NEW LOW 30 MIN: CPT | Performed by: ORTHOPAEDIC SURGERY

## 2022-11-09 NOTE — PROGRESS NOTES
[address] [place] [address] AND SPORTS MEDICINE  [facility] [place]  [address] [address]  Dept: [id number] [address] New Patient Visit      Subjective:   CHIEF COMPLAINT:    Chief Complaint   Patient presents with    Knee Pain     R knee follow up       HISTORY OF PRESENT ILLNESS:    The patient is a 49 y.o. female who is being seen as a new patient for right knee pain. The patient states that she has been having significant right knee pain ever since she underwent right knee arthroscopy with partial medial meniscectomy on 7/18/2022 with Dr. Chantel Huang. Since surgery, she has had persistent pain which she actually describes is worse than prior to having her meniscectomy. She has had multiple knee aspirations as well as multiple corticosteroid injections to the knee in an attempt to alleviate some of her pain. However, none of this has helped. She has tried multiple oral anti-inflammatory medications as well, which the patient states has not provided any relief. She is at a point now where she is hoping for some sort of relief for her knee pain and is seeking a second opinion from Dr. Lizette Whaley, for which she presented today. Today, she reports that she has significant knee pain worse on the medial side as compared with the lateral side, although the entire knee does hurt her. She states that the pain is sharp with standing and worse with walking. She states there are days where she is unable to walk secondary to the pain. She states that going downstairs is worse than going upstairs, but both activities are bad. She does not have any locking sensation but does have occasional grinding. She states that she has tried PT which has not provided her with any relief. She last had her knee aspirated by Dr. Abigail Taveras office 1 week ago where 25 cc of straw-colored fluid was removed.   She denies any numbness or tingling down the extremity. She has no other complaints today. .    Past Medical History:    Past Medical History:   Diagnosis Date    Abnormal levels of other serum enzymes     Anxiety     Bilateral leg edema     Chronic kidney disease     right renal cyst    Depression     Diabetes mellitus (HCC)     DVT (deep venous thrombosis) ([address] 1997    after delivery    Elevated liver enzymes     Fibromyalgia     Headache(784.0)     migraines    Hx of blood clots     1997 left calf    Hypertension     Kidney stone     Obstructive sleep apnea syndrome     Pancreatitis 2001    Pleural effusion 2001    PONV (postoperative nausea and vomiting)     S/P left knee arthroscopy 02/15/2021    knee arthroscopy with partial medial menisectomy - left eleazar    SOB (shortness of breath)     SVT (supraventricular tachycardia) ([address] 9/8/2015       Past Surgical History:    Past Surgical History:   Procedure Laterality Date    ATRIAL ABLATION SURGERY      BACK SURGERY      L4-5    CHOLECYSTECTOMY  2001    ENDOMETRIAL ABLATION      e sure implants placed also    ENDOSCOPY, COLON, DIAGNOSTIC      EXCISION LESION HAND / FINGER Right 01/25/2019    HAND LESION BIOPSY EXCISION performed by Imani Joyce MD at [address] [place] [address],[address] Bilateral     left x2, right x1    FOOT SURGERY Right     x3    KNEE ARTHROSCOPY Left     x2    KNEE ARTHROSCOPY Left 02/15/2021    KNEE ARTHROSCOPY WITH PARTIAL MEDIAL MENISCECTOMY performed by Valorie Corbett MD at [facility] [address] ARTHROSCOPY Right 7/18/2022    RIGHT KNEE ARTHROSCOPIC PARTIAL MEDIAL MENISCECTOMY performed by Valorie Corbett MD at [facility] Right 07/18/2022    RIGHT KNEE ARTHROSCOPIC PARTIAL MEDIAL MENISCECTOMY (Right: Knee)    CT CYSTO/URETERO/PYELOSCOPY W/LITHOTRIPSY Left 09/20/2017    CYSTOSCOPY URETEROSCOPY HOLMIUM LASER LITHO WITH STONE BASKETING WITH  STENT  performed by René Craft MD at [address] [place] [address] ENDOSCOPY N/A 02/02/2021    EGD ESOPHAGOGASTRODUODENOSCOPY performed by Martha Avalos MD at [address]  02/01/2021    US GUIDED LIVER BIOPSY PERCUTANEOUS 2/1/2021 [facility] ULTRASOUND       Current Medications:   Current Outpatient Medications   Medication Sig Dispense Refill    etodolac (LODINE) 400 MG tablet Take 1 tablet by mouth 2 times daily 60 tablet 0    ketoprofen (ORUDIS) 75 MG capsule Take 1 capsule by mouth 3 times daily as needed for Pain 120 capsule 0    diclofenac (VOLTAREN) 75 MG EC tablet Take 1 tablet by mouth 2 times daily (with meals) 28 tablet 0    meloxicam (MOBIC) 15 MG tablet Take 1 tablet by mouth daily 30 tablet 3    celecoxib (CELEBREX) 200 MG capsule Take 1 capsule by mouth in the morning and 1 capsule before bedtime.  60 capsule 0    propranolol (INDERAL) 10 MG tablet Take 10 mg by mouth 3 times daily      metFORMIN (GLUCOPHAGE) 500 MG tablet Take 500 mg by mouth 2 times daily (with meals)      hydrOXYzine HCl (ATARAX) 50 MG tablet Take 50 mg by mouth 2 times daily      tiZANidine (ZANAFLEX) 4 MG tablet Take 4 mg by mouth nightly      losartan (COZAAR) 50 MG tablet Take 100 mg by mouth daily       amitriptyline (ELAVIL) 25 MG tablet Take 200 mg by mouth nightly       prazosin (MINIPRESS) 1 MG capsule Take 2 mg by mouth nightly       melatonin 5 MG TABS tablet Take 10 mg by mouth daily       QUEtiapine (SEROQUEL XR) 400 MG extended release tablet Take 800 mg by mouth nightly      busPIRone (BUSPAR) 15 MG tablet Take 30 mg by mouth nightly Can take once in morning PRN       omeprazole (PRILOSEC) 40 MG delayed release capsule Take 40 mg by mouth daily      fexofenadine (RA ALLERGY RELIEF) 180 MG tablet take 1 tablet by mouth once daily 30 tablet 0    pramipexole (MIRAPEX) 0.5 MG tablet take 1 tablet by mouth twice a day 60 tablet 0    DULoxetine (CYMBALTA) 60 MG extended release capsule TAKE 1 CAPSULE BY MOUTH TWICE A DAY 60 capsule 3     No current facility-administered medications for this visit.        Allergies:    Aripiprazole, Eletriptan, Lasix [furosemide], Sumatriptan, Triptans, and Lamotrigine    Social History:   Social History     Socioeconomic History    Marital status:      Spouse name: Not on file    Number of children: Not on file    Years of education: Not on file    Highest education level: Not on file   Occupational History    Not on file   Tobacco Use    Smoking status: Former     Packs/day: 1.00     Types: Cigarettes     Quit date: 2019     Years since quittin.9    Smokeless tobacco: Never    Tobacco comments:     today last smoke   Vaping Use    Vaping Use: Every day    Substances: Nicotine   Substance and Sexual Activity    Alcohol use: Never     Alcohol/week: 0.0 standard drinks    Drug use: No     Comment: recovered from opiate abuse    Sexual activity: Not on file   Other Topics Concern    Not on file   Social History Narrative    Not on file     Social Determinants of Health     Financial Resource Strain: Not on file   Food Insecurity: Not on file   Transportation Needs: Not on file   Physical Activity: Unknown    Days of Exercise per Week: 0 days    Minutes of Exercise per Session: Not on file   Stress: Not on file   Social Connections: Not on file   Intimate Partner Violence: Not At Risk    Fear of Current or Ex-Partner: No    Emotionally Abused: No    Physically Abused: No    Sexually Abused: No   Housing Stability: Not on file       Family History:  Family History   Problem Relation Age of Onset    Heart Disease Father     High Blood Pressure Brother        REVIEW OF SYSTEMS:  Gen: no fevers/chills  Cardio: no chest pain  Lungs: no shortness of breath   MSK: Right knee pain  Neuro: no numbness, tingling, weakness    Objective :   PHYSICAL EXAM:  Gen: AAOx3, no acute distress  Cardio: regular rate   Lungs: symmetrical chest expansion   MSK: Right lower extremity: Surgical incisions from knee arthroscopy are well approximated and well-healed. Mild knee effusion appreciated today. Patient with range of motion from 0-120 degrees of flexion although painful. No instability with varus and valgus stress testing. No anterior or posterior drawer instability. There is significant pain with Nova evaluation. Sensation is intact to light touch. Extremities warm and well-perfused. Radiology:   No x-rays taken at today's visit. However, x-rays were reviewed from prior imaging, which did reveal progression of medial compartment osteoarthritis on her most recent x-rays as compared with x-rays taken earlier in the year. Assessment:   49y.o. year old female with the following:      Diagnosis Orders   1. Osteoarthritis of right knee, unspecified osteoarthritis type             Plan:      Patient was seen and examined today. Extensively discussed with the patient that unfortunately does appear as though her knee is at a point where she is at end-stage arthritis especially in the medial compartment of the right knee. We discussed options with the patient including living with the knee pain as she currently is versus continuing with conservative treatment including physical therapy, anti-inflammatories, and corticosteroid injections, versus right total knee arthroplasty. At this point, unfortunately due to the degenerative changes noted on x-ray imaging, her best option would be a right total knee arthroplasty. Patient is a diabetic and she is unaware of what her A1c is. Most recent A1c on file is from 2/2/2021 which was 5.9. She is not a smoker. However, unfortunately, Body mass index is 56.75 kg/m². We explained to the patient that a BMI of over 50 significantly increases her risk of infection postoperatively after a total knee replacement and so we would be very concerned about proceeding with any sort of knee replacement prior to getting BMI to a more acceptable value.   We offered the patient a referral to bariatrics at which point the patient became very upset and stated that she felt like she was being brushed off and being told \"I am too fat\" for surgery. We explained to the patient that we would just like to be cautious in proceeding in order to decrease risk of infection. The patient declined a referral to bariatric surgery at this time. The patient was requesting a right knee aspiration to remove fluid from the knee. However, she only has a mild effusion today and we explained to the patient that, given she just had an aspiration a week ago with only 25cc removed, the fluid was very likely to return quickly and likely would not provide any sort of lasting relief. We explained that with continually introducing foreign objects into the joint, does increase risk of infection and so the best option at this time would be working towards a total knee replacement. We deferred aspiration today. We instructed her to call us if she felt as though she would like this referral in the future. Patient will call us back on an as-needed basis. Return if symptoms worsen or fail to improve. No orders of the defined types were placed in this encounter. No orders of the defined types were placed in this encounter.        Electronically signed by Susy Chavez DO on11/9/2022 at 5:17 PM

## 2022-11-11 DIAGNOSIS — G89.29 CHRONIC PAIN OF RIGHT KNEE: ICD-10-CM

## 2022-11-11 DIAGNOSIS — Z98.890 S/P RIGHT KNEE ARTHROSCOPY: ICD-10-CM

## 2022-11-11 DIAGNOSIS — M25.561 CHRONIC PAIN OF RIGHT KNEE: ICD-10-CM

## 2022-11-11 DIAGNOSIS — M17.5 OTHER SECONDARY OSTEOARTHRITIS OF RIGHT KNEE: ICD-10-CM

## 2022-11-11 RX ORDER — TRAMADOL HYDROCHLORIDE 50 MG/1
50 TABLET ORAL EVERY 6 HOURS PRN
Qty: 12 TABLET | Refills: 0 | Status: SHIPPED | OUTPATIENT
Start: 2022-11-11 | End: 2022-11-14

## 2022-11-11 RX ORDER — TRAMADOL HYDROCHLORIDE 50 MG/1
50 TABLET ORAL EVERY 6 HOURS PRN
Qty: 12 TABLET | Refills: 0 | OUTPATIENT
Start: 2022-11-11 | End: 2022-11-14

## 2022-11-11 NOTE — TELEPHONE ENCOUNTER
Sudha Acuna I called patient and told her to call Dr. Oralia Yan office about a pain medication script. Patient states that she was unhappy with her visit with Dr. Kei Marquez on 11/9/22 and now is scheduled to see Dr. Roberto De La Torre for her knee at the end of the month. Sh is requesting the ultram to help her with the pain until she can be seen by Dr. Roberto De La Torre. Ultram script pended again.

## 2022-11-11 NOTE — TELEPHONE ENCOUNTER
Patient called and requested a prescription refill traMADol (ULTRAM) 50 mg to be sent to [facility] on [address] [facility] in [facility]. LRF 11/01/22  #12    Patient may be reached at [phone] if needed. Thank you.

## 2022-11-11 NOTE — TELEPHONE ENCOUNTER
Patient has not seen Dr. Anderson Person, would defer medication to his office, however would not recommend relying on narcotic pain medication

## 2022-11-23 ENCOUNTER — OFFICE VISIT (OUTPATIENT)
Dept: ORTHOPEDIC SURGERY | Age: 47
End: 2022-11-23
Payer: MEDICARE

## 2022-11-23 DIAGNOSIS — G89.29 CHRONIC PAIN OF RIGHT KNEE: Primary | ICD-10-CM

## 2022-11-23 DIAGNOSIS — M17.5 OTHER SECONDARY OSTEOARTHRITIS OF RIGHT KNEE: Primary | ICD-10-CM

## 2022-11-23 DIAGNOSIS — M25.561 CHRONIC PAIN OF RIGHT KNEE: Primary | ICD-10-CM

## 2022-11-23 PROCEDURE — 1036F TOBACCO NON-USER: CPT | Performed by: ORTHOPAEDIC SURGERY

## 2022-11-23 PROCEDURE — 99213 OFFICE O/P EST LOW 20 MIN: CPT | Performed by: ORTHOPAEDIC SURGERY

## 2022-11-23 PROCEDURE — G8484 FLU IMMUNIZE NO ADMIN: HCPCS | Performed by: ORTHOPAEDIC SURGERY

## 2022-11-23 PROCEDURE — G8428 CUR MEDS NOT DOCUMENT: HCPCS | Performed by: ORTHOPAEDIC SURGERY

## 2022-11-23 PROCEDURE — G8417 CALC BMI ABV UP PARAM F/U: HCPCS | Performed by: ORTHOPAEDIC SURGERY

## 2022-11-23 RX ORDER — TRAMADOL HYDROCHLORIDE 50 MG/1
50 TABLET ORAL EVERY 4 HOURS PRN
Qty: 30 TABLET | Refills: 0 | Status: SHIPPED | OUTPATIENT
Start: 2022-11-23 | End: 2022-11-28

## 2022-11-23 NOTE — PROGRESS NOTES
Garett Link M.D.            [facility] [address] [facility],[address], [address] [facility]           Dept Phone: [phone]           Dept Fax:  [address]           [place]ed[place] Butts          Dept Phone: [phone]           Dept Fax:  [phone]      Chief Compliant:  Chief Complaint   Patient presents with    Pain     Rt knee        History of Present Illness: This is a 49 y.o. female who presents to the clinic today for evaluation / follow up of severe right knee pain. Please refer to all my previous dictations as well as notes from [facility] wound and Dr. Cornel Mcclelland orthopedic resident. Patient is a 50-year-old patient who has a history of significant DJD of her right knee. She had a previous partial medial meniscectomy. She has had several aspiration injections both steroids as well as Synvisc. Most recent x-rays taken this past October have shown pretty rapid progression to the point where she is essentially bone-on-bone on the medial compartment. Patient states that that her life is miserable she cannot deal with the pain and she is wishing to discuss arthroplasty. .       Review of Systems   Constitutional: Negative for fever, chills, sweats. Eyes: Negative for changes in vision, or pain. HENT: Negative for ear ache, epistaxis, or sore throat. Respiratory/Cardio: Negative for Chest pain, palpitations, SOB, or cough. Gastrointestinal: Negative for abdominal pain, N/V/D. Genitourinary: Negative for dysuria, frequency, urgency, or hematuria. Neurological: Negative for headache, numbness, or weakness. Integumentary: Negative for rash, itching, laceration, or abrasion. Musculoskeletal: Positive for Pain (Rt knee)       Physical Exam:  Constitutional: Patient is oriented to person, place, and time. Patient appears well-developed and well nourished. HENT: Negative otherwise noted  Head: Normocephalic and Atraumatic  Nose: Normal  Eyes: Conjunctivae and EOM are normal  Neck: Normal range of motion Neck supple. Respiratory/Cardio: Effort normal. No respiratory distress. Musculoskeletal: Examination notes that she portal sites well-healed the she had does have a slight effusion today her knee motion is 3 to about 120 degrees crepitus is noted medially. Ligamentously she is grossly intact. Negative Nova's she has no hip rotational pain    Neurological: Patient is alert and oriented to person, place, and time. Normal strength. No sensory deficit. Skin: Skin is warm and dry  Psychiatric: Behavior is normal. Thought content normal.  Nursing note and vitals reviewed. Labs and Imaging:     XR taken today: No new x-rays taken today most recent x-rays taken October 2022 show essentially bone-on-bone disease medial compartment of the right knee. No results found. No orders of the defined types were placed in this encounter. Assessment and Plan:  End-stage degenerative joint disease right knee        This is a 49 y.o. female who presents to the clinic today for evaluation / follow up of end-stage degenerative joint disease right knee. Past History:    Current Outpatient Medications:     etodolac (LODINE) 400 MG tablet, Take 1 tablet by mouth 2 times daily, Disp: 60 tablet, Rfl: 0    ketoprofen (ORUDIS) 75 MG capsule, Take 1 capsule by mouth 3 times daily as needed for Pain, Disp: 120 capsule, Rfl: 0    diclofenac (VOLTAREN) 75 MG EC tablet, Take 1 tablet by mouth 2 times daily (with meals), Disp: 28 tablet, Rfl: 0    meloxicam (MOBIC) 15 MG tablet, Take 1 tablet by mouth daily, Disp: 30 tablet, Rfl: 3    celecoxib (CELEBREX) 200 MG capsule, Take 1 capsule by mouth in the morning and 1 capsule before bedtime. , Disp: 60 capsule, Rfl: 0    propranolol (INDERAL) 10 MG tablet, Take 10 mg by mouth 3 times daily, Disp: , Rfl:     metFORMIN (GLUCOPHAGE) 500 MG tablet, Take 500 mg by mouth 2 times daily (with meals), Disp: , Rfl:     hydrOXYzine HCl (ATARAX) 50 MG tablet, Take 50 mg by mouth 2 times daily, Disp: , Rfl:     tiZANidine (ZANAFLEX) 4 MG tablet, Take 4 mg by mouth nightly, Disp: , Rfl:     losartan (COZAAR) 50 MG tablet, Take 100 mg by mouth daily , Disp: , Rfl:     amitriptyline (ELAVIL) 25 MG tablet, Take 200 mg by mouth nightly , Disp: , Rfl:     prazosin (MINIPRESS) 1 MG capsule, Take 2 mg by mouth nightly , Disp: , Rfl:     melatonin 5 MG TABS tablet, Take 10 mg by mouth daily , Disp: , Rfl:     QUEtiapine (SEROQUEL XR) 400 MG extended release tablet, Take 800 mg by mouth nightly, Disp: , Rfl:     busPIRone (BUSPAR) 15 MG tablet, Take 30 mg by mouth nightly Can take once in morning PRN , Disp: , Rfl:     omeprazole (PRILOSEC) 40 MG delayed release capsule, Take 40 mg by mouth daily, Disp: , Rfl:     fexofenadine (RA ALLERGY RELIEF) 180 MG tablet, take 1 tablet by mouth once daily, Disp: 30 tablet, Rfl: 0    pramipexole (MIRAPEX) 0.5 MG tablet, take 1 tablet by mouth twice a day, Disp: 60 tablet, Rfl: 0    DULoxetine (CYMBALTA) 60 MG extended release capsule, TAKE 1 CAPSULE BY MOUTH TWICE A DAY, Disp: 60 capsule, Rfl: 3  Allergies   Allergen Reactions    Aripiprazole      Bad reaction    Eletriptan      Other reaction(s): Swelling-throat    Lasix [Furosemide] Other (See Comments)     Pt states pacreatitis    Sumatriptan Other (See Comments)    Triptans      Other reaction(s): Unknown    Lamotrigine Rash     Social History     Socioeconomic History    Marital status:      Spouse name: Not on file    Number of children: Not on file    Years of education: Not on file    Highest education level: Not on file   Occupational History    Not on file   Tobacco Use    Smoking status: Former     Packs/day: 1.00     Types: Cigarettes     Quit date: 2019     Years since quittin.9    Smokeless tobacco: Never    Tobacco comments: today last smoke   Vaping Use    Vaping Use: Every day    Substances: Nicotine   Substance and Sexual Activity    Alcohol use: Never     Alcohol/week: 0.0 standard drinks    Drug use: No     Comment: recovered from opiate abuse    Sexual activity: Not on file   Other Topics Concern    Not on file   Social History Narrative    Not on file     Social Determinants of Health     Financial Resource Strain: Not on file   Food Insecurity: Not on file   Transportation Needs: Not on file   Physical Activity: Unknown    Days of Exercise per Week: 0 days    Minutes of Exercise per Session: Not on file   Stress: Not on file   Social Connections: Not on file   Intimate Partner Violence: Not At Risk    Fear of Current or Ex-Partner: No    Emotionally Abused: No    Physically Abused: No    Sexually Abused: No   Housing Stability: Not on file     Past Medical History:   Diagnosis Date    Abnormal levels of other serum enzymes     Anxiety     Bilateral leg edema     Chronic kidney disease     right renal cyst    Depression     Diabetes mellitus ([address]     DVT (deep venous thrombosis) [address] 1997    after delivery    Elevated liver enzymes     Fibromyalgia     Headache(784.0)     migraines    Hx of blood clots     1997 left calf    Hypertension     Kidney stone     Obstructive sleep apnea syndrome     Pancreatitis 2001    Pleural effusion 2001    PONV (postoperative nausea and vomiting)     S/P left knee arthroscopy 02/15/2021    knee arthroscopy with partial medial menisectomy - left eleazar    SOB (shortness of breath)     SVT (supraventricular tachycardia) ([facility] [facility] 9/8/2015     Past Surgical History:   Procedure Laterality Date    ATRIAL ABLATION SURGERY      BACK SURGERY      L4-5    CHOLECYSTECTOMY  2001    ENDOMETRIAL ABLATION      e sure implants placed also    ENDOSCOPY, COLON, DIAGNOSTIC      EXCISION LESION HAND / FINGER Right 01/25/2019    HAND L[facility]ION [facility] [address] [facility] [address] Bilateral     left x2, right x1    FOOT SURGERY Right     x3    KNEE ARTHROSCOPY Left     x2    KNEE ARTHROSCOPY Left 02/15/2021    KNEE ARTHROSCOPY WITH PARTIAL MEDIAL MENISCECTOMY performed by Blanca Mendez MD at [address] ARTHROSCOPY Right 7/18/2022    RIGHT KNEE ARTHROSCOPIC PARTIAL MEDIAL MENISCECTOMY performed by Blanca Mendez MD at [facility] Right 07/18/2022    RIGHT KNEE ARTHROSCOPIC PARTIAL MEDIAL MENISCECTOMY (Right: Knee)    MS CYSTO/URETERO/PYELOSCOPY W/LITHOTRIPSY Left 09/20/2017    CYSTOSCOPY URETEROSCOPY HOLMIUM LASER LITHO WITH STONE BASKETING WITH  STENT  performed by Ricardo Bright MD at \[facility]"" 12 Right     UPPER GASTROINTESTINAL ENDOSCOPY N/A 02/02/2021    EGD ESOPHAGOGASTRODUODENOSCOPY performed by Peng Allred MD at [address]  02/01/2021    US GUIDED LIVER BIOPSY PERCUTANEOUS 2/1/2021 [facility] ULTRASOUND     Family History   Problem Relation Age of Onset    Heart Disease Father     High Blood Pressure Brother    Plan  Sofya and I had a lengthy discussion regarding arthroplasty. She is aware that she has high risk given her BMI of 56.75 however she states that she is not amenable to bariatric surgery as she is cannot live just day-to-day at this point. She realizes that she is overweight and she does have high risk but she does have well-controlled diabetes with her most recent hemoglobin A1c in the sixes. I did tell her that despite this that she does place her self at high risk for postoperative complications occluding wound healing delays and as well as infection. She is also at high risk for component failure. She is well aware of these. Patient will require preoperative optimization by. Family practice physician for her hemoglobin A1c. Also would request a nutritional consult including a preoperative albumin.     Patient to be scheduled for right

## 2022-12-09 DIAGNOSIS — M17.5 OTHER SECONDARY OSTEOARTHRITIS OF RIGHT KNEE: ICD-10-CM

## 2022-12-11 RX ORDER — TRAMADOL HYDROCHLORIDE 50 MG/1
50 TABLET ORAL EVERY 4 HOURS PRN
Qty: 30 TABLET | Refills: 0 | Status: SHIPPED | OUTPATIENT
Start: 2022-12-11 | End: 2022-12-16

## 2022-12-22 ENCOUNTER — TELEPHONE (OUTPATIENT)
Dept: ORTHOPEDIC SURGERY | Age: 47
End: 2022-12-22

## 2022-12-22 DIAGNOSIS — M17.5 OTHER SECONDARY OSTEOARTHRITIS OF RIGHT KNEE: ICD-10-CM

## 2022-12-22 RX ORDER — TRAMADOL HYDROCHLORIDE 50 MG/1
50 TABLET ORAL EVERY 4 HOURS PRN
Qty: 30 TABLET | Refills: 0 | Status: SHIPPED | OUTPATIENT
Start: 2022-12-22 | End: 2022-12-27

## 2022-12-27 PROBLEM — E66.01 OBESITY, CLASS III, BMI 40-49.9 (MORBID OBESITY) (HCC): Status: ACTIVE | Noted: 2020-09-28

## 2022-12-27 PROBLEM — Z01.818 PREOP EXAMINATION: Status: ACTIVE | Noted: 2022-12-27

## 2022-12-27 PROBLEM — F31.9 BIPOLAR DISORDER, UNSPECIFIED (HCC): Status: ACTIVE | Noted: 2021-05-13

## 2022-12-27 PROBLEM — M79.10 MYALGIA, UNSPECIFIED SITE: Status: ACTIVE | Noted: 2022-01-17

## 2022-12-27 PROBLEM — R06.02 SOB (SHORTNESS OF BREATH) ON EXERTION: Status: ACTIVE | Noted: 2020-07-29

## 2022-12-27 PROBLEM — G47.33 OBSTRUCTIVE SLEEP APNEA SYNDROME: Status: ACTIVE | Noted: 2020-07-29

## 2022-12-27 PROBLEM — K31.84 GASTROPARESIS: Status: ACTIVE | Noted: 2021-02-08

## 2022-12-27 PROBLEM — R42 DIZZINESS AND GIDDINESS: Status: ACTIVE | Noted: 2022-01-12

## 2022-12-27 PROBLEM — R73.09 OTHER ABNORMAL GLUCOSE: Status: ACTIVE | Noted: 2021-07-08

## 2022-12-27 PROBLEM — R06.00 DYSPNEA: Status: ACTIVE | Noted: 2022-12-27

## 2022-12-27 PROBLEM — G43.909 MIGRAINE: Status: ACTIVE | Noted: 2020-09-24

## 2022-12-27 PROBLEM — N89.8 VAGINAL DISCHARGE: Status: ACTIVE | Noted: 2017-03-10

## 2022-12-27 PROBLEM — I80.02 THROMBOPHLEBITIS OF SUPERFICIAL VEINS OF LEFT LOWER EXTREMITY: Status: ACTIVE | Noted: 2018-03-08

## 2022-12-27 PROBLEM — S70.00XA CONTUSION OF HIP: Status: ACTIVE | Noted: 2022-12-27

## 2022-12-27 PROBLEM — R73.03 PREDIABETES: Status: ACTIVE | Noted: 2022-07-07

## 2022-12-29 NOTE — H&P (VIEW-ONLY)
HISTORY and PHYSICAL  [facility]       NAME:  Loreta De Los Santos  MRN: [id number]   YOB: 1975   Date: 12/30/2022   Age: 47 y.o.  Gender: female       COMPLAINT AND PRESENT HISTORY:     Loreta De Los Santos is 47 y.o.,  female, undergoing preadmission testing for Osteoarthritis of right knee.     Patient will be having: KNEE TOTAL ARTHROPLASTY- Right    See office notes of Dr. Lewis on 11/09/22   HISTORY OF PRESENT ILLNESS:  The patient is a 47 y.o. female who is being seen as a new patient for right knee pain.  The patient states that she has been having significant right knee pain ever since she underwent right knee arthroscopy with partial medial meniscectomy on 7/18/2022 with Dr. Cardozo.  Since surgery, she has had persistent pain which she actually describes is worse than prior to having her meniscectomy.  She has had multiple knee aspirations as well as multiple corticosteroid injections to the knee in an attempt to alleviate some of her pain.  However, none of this has helped.  She has tried multiple oral anti-inflammatory medications as well, which the patient states has not provided any relief.  She is at a point now where she is hoping for some sort of relief for her knee pain and is seeking a second opinion from Dr. Lewis, for which she presented today.  Today, she reports that she has significant knee pain worse on the medial side as compared with the lateral side, although the entire knee does hurt her.  She states that the pain is sharp with standing and worse with walking.  She states there are days where she is unable to walk secondary to the pain.  She states that going downstairs is worse than going upstairs, but both activities are bad.  She does not have any locking sensation but does have occasional grinding.  She states that she has tried PT which has not provided her with any relief.  She last had her knee aspirated by Dr. Cardozo's office 1 week ago  where 25 cc of straw-colored fluid was removed. She denies any numbness or tingling down the extremity. See office note of Dr. Yao Denny on 11/23/22  History of Present Illness: This is a 49 y.o. female who presents to the clinic today for evaluation / follow up of severe right knee pain. Please refer to all my previous dictations as well as notes from [facility] wound and Dr. Hermelindo Childress orthopedic resident. Patient is a 44-year-old patient who has a history of significant DJD of her right knee. She had a previous partial medial meniscectomy. She has had several aspiration injections both steroids as well as Synvisc. Most recent x-rays taken this past October have shown pretty rapid progression to the point where she is essentially bone-on-bone on the medial compartment. Patient states that that her life is miserable she cannot deal with the pain and she is wishing to discuss arthroplasty. Above reviewed with patient and she concurs    Patient is s/p right knee arthroscopy partial medial meniscectomy on 7/18/2022. Patient c/o  pain , swelling, stiffness, grinding in the right Knee. Pt describes the pain as 7/10 in intensity at times. Onset of symptoms started 6 months ago. Patient has been using  now using Icing, Zanaflex, Tylenol and Advil to help in pain relief. Patient reports that  bending over really aggravates sxs, other factors are described above. Pt reports that  the knee does  experience a buckling or given away sensation  under the knee. No locking up of the knee. No recent falls or trauma. Patient states that she occasionally will use her cane. Patient denies any joint warmth, redness, fever or chills. Significant medical history:  CKD, DM, DVT, HTN, KAVEH-no machine, SOB, SVT. S/p atrial ablation, Vapes daily.  Morbid obesity BMI 56.8    Associated medications: Cozaar, minipress, Inderal, aldactone, glucaphage    Latest  A1c reading 6.2 as of 12/27/22    BP Readings from Last 3 Encounters:   12/30/22 (!) 103/50   07/18/22 114/61   07/05/22 (!) 148/76       Anticoagulants or blood thinners:  motrin    Pt denies any chest pain . Patient admits to SOB. No recent URI . Functional Capacity per patient:              1. Patient is not able to walk 2 city blocks on level ground without SOB. 2. Patient is not able to climb 2 flights of stairs without SOB. Patient has hx of PONV, otherwise denies any personal or family problems with anesthesia. Xrays or Imaging:     XR taken today: 11/23/22  No new x-rays taken today most recent x-rays taken October 2022 show essentially bone-on-bone disease medial compartment of the right knee. No results found.      PAST MEDICAL HISTORY     Past Medical History:   Diagnosis Date    Abnormal levels of other serum enzymes     Acute respiratory failure with hypoxia (HCC) 08/21/2020    Anxiety     Bilateral leg edema     Chronic kidney disease     right renal cyst    Depression     Diabetes mellitus ([address]     DVT (deep venous thrombosis) ([address] 1997    after delivery    Elevated liver enzymes     Fibromyalgia     Headache(784.0)     migraines    Hx of blood clots     1997 left calf    Hypertension     Kidney stone     Obstructive sleep apnea syndrome     no machine    Pancreatitis 2001    Pleural effusion 2001    Pneumonia of both lungs due to infectious organism 08/27/2020    PONV (postoperative nausea and vomiting)     Pseudotumor cerebri syndrome 10/03/2016    Right knee DJD     S/P left knee arthroscopy 02/15/2021    knee arthroscopy with partial medial menisectomy - left eleazar    Sepsis ([address] 08/20/2020    SOB (shortness of breath)     SVT (supraventricular tachycardia) ([address] 09/08/2015    Thyroid disease     mass removed    Varicose vein of leg 07/12/2017     SURGICAL HISTORY       Past Surgical History:   Procedure Laterality Date    ATRIAL ABLATION SURGERY  2015    BACK SURGERY      L4-5    CHOLECYSTECTOMY  2001 ENDOMETRIAL ABLATION      e sure implants placed also    ENDOSCOPY, COLON, DIAGNOSTIC      EXCISION LESION HAND / FINGER Right 01/25/2019    HAND LESION BIOPSY EXCISION performed by Jamila Paredes MD at [facility] Bilateral     left x2, right x1    FOOT SURGERY Right     x3    KNEE ARTHROSCOPY Left     x 3    KNEE ARTHROSCOPY Left 02/15/2021    KNEE ARTHROSCOPY WITH PARTIAL MEDIAL MENISCECTOMY performed by Nasim Talbert MD at [address] ARTHROSCOPY Right 07/18/2022    RIGHT KNEE ARTHROSCOPIC PARTIAL MEDIAL MENISCECTOMY performed by Nasim Talbert MD at [address] ARTHROSCOPY W/ MENISCECTOMY Right 07/18/2022    RIGHT KNEE ARTHROSCOPIC PARTIAL MEDIAL MENISCECTOMY (Right: Knee)    RI CYSTO/URETERO/PYELOSCOPY W/LITHOTRIPSY Left 09/20/2017    CYSTOSCOPY URETEROSCOPY HOLMIUM LASER LITHO WITH STONE BASKETING WITH  STENT  performed by Phylicia Awad MD at [facility] Right     UPPER GASTROINTESTINAL ENDOSCOPY N/A 02/02/2021    EGD ESOPHAGOGASTRODUODENOSCOPY performed by Johny Villar MD at [address]  02/01/2021    US GUIDED LIVER BIOPSY PERCUTANEOUS 2/1/2021 [facility] ULTRASOUND    VASCULAR SURGERY Left     leg       FAMILY HISTORY       Family History   Problem Relation Age of Onset    Heart Disease Father     High Blood Pressure Brother        SOCIAL HISTORY       Social History     Socioeconomic History    Marital status:      Spouse name: None    Number of children: None    Years of education: None    Highest education level: None   Tobacco Use    Smoking status: Former     Packs/day: 1.00     Types: Cigarettes     Quit date: 12/1/2019     Years since quitting: 3.0    Smokeless tobacco: Never    Tobacco comments:     today last smoke   Vaping Use    Vaping Use: Every day    Substances: Nicotine   Substance and Sexual Activity    Alcohol use: Never     Alcohol/week: 0.0 standard drinks    Drug use:  No Comment: recovered from opiate abuse     Social Determinants of Health     Physical Activity: Unknown    Days of Exercise per Week: 0 days   Intimate Partner Violence: Not At Risk    Fear of Current or Ex-Partner: No    Emotionally Abused: No    Physically Abused: No    Sexually Abused: No        REVIEW OF SYSTEMS      Allergies   Allergen Reactions    Aripiprazole      Bad reaction    Eletriptan      Other reaction(s): Swelling-throat    Lasix [Furosemide] Other (See Comments)     Pt states pacreatitis    Sumatriptan Other (See Comments)    Triptans      Other reaction(s): Unknown    Lamotrigine Rash       Current Outpatient Medications on File Prior to Encounter   Medication Sig Dispense Refill    ibuprofen (ADVIL;MOTRIN) 800 MG tablet Take 800 mg by mouth every 6 hours as needed for Pain      amLODIPine (NORVASC) 5 MG tablet Take 5 mg by mouth daily      spironolactone (ALDACTONE) 100 MG tablet Take 100 mg by mouth daily      nitrofurantoin, macrocrystal-monohydrate, (MACROBID) 100 MG capsule Take 100 mg by mouth 2 times daily      cephALEXin (KEFLEX) 500 MG capsule Take 500 mg by mouth 3 times daily      acetaminophen (TYLENOL) 500 MG tablet Take 500 mg by mouth every 6 hours as needed for Pain      propranolol (INDERAL) 10 MG tablet Take 10 mg by mouth 3 times daily      metFORMIN (GLUCOPHAGE) 500 MG tablet Take 1,000 mg by mouth 2 times daily (with meals)      hydrOXYzine HCl (ATARAX) 50 MG tablet Take 50 mg by mouth 2 times daily      tiZANidine (ZANAFLEX) 4 MG tablet Take 4 mg by mouth nightly      losartan (COZAAR) 50 MG tablet Take 100 mg by mouth at bedtime      amitriptyline (ELAVIL) 25 MG tablet Take 200 mg by mouth nightly       prazosin (MINIPRESS) 1 MG capsule Take 5 mg by mouth nightly      melatonin 5 MG TABS tablet Take 10 mg by mouth nightly as needed      QUEtiapine (SEROQUEL XR) 400 MG extended release tablet Take 800 mg by mouth nightly      busPIRone (BUSPAR) 15 MG tablet Take 30 mg by mouth 2 times daily at 0800 and 1400      omeprazole (PRILOSEC) 40 MG delayed release capsule Take 40 mg by mouth daily      fexofenadine (RA ALLERGY RELIEF) 180 MG tablet take 1 tablet by mouth once daily (Patient taking differently: as needed take 1 tablet by mouth once daily) 30 tablet 0    pramipexole (MIRAPEX) 0.5 MG tablet take 1 tablet by mouth twice a day (Patient taking differently: daily) 60 tablet 0    DULoxetine (CYMBALTA) 60 MG extended release capsule TAKE 1 CAPSULE BY MOUTH TWICE A DAY (Patient taking differently: at bedtime) 60 capsule 3     No current facility-administered medications on file prior to encounter. General health:  Fairly good. No fever or chills. Skin:  No itching, redness or rash. HEENT:  No headache, epistaxis or sore throat. Neck:  No pain, stiffness or masses. Cardiovascular/Respiratory system:  No chest pain, palpitation or shortness of breath. Gastrointestinal tract: No abdominal pain, Dysphagia, nausea, vomiting, diarrhea or constipation. Genitourinary:  No burning on micturition. No hesitancy, urgency, frequency or discoloration of urine. Locomotor: See HPI. Neuropsychiatric:  No referable complaints. GENERAL PHYSICAL EXAM:     Vitals: BP (!) 103/50 Comment: 97/57 left arm  Pulse 82   Temp 98.2 °F (36.8 °C)   Resp 20   Ht 5' 4\" (1.626 m)   Wt (!) 331 lb (150.1 kg)   SpO2 98%   BMI 56.82 kg/m²  Body mass index is 56.82 kg/m². GENERAL APPEARANCE:   Mira Tolentino is 49 y.o., female, severely obese, nourished, conscious, alert. Does not appear to be distress or pain at this time. SKIN:  Warm, dry, no cyanosis or jaundice. Multiple skin tags to neck. Noted acanthosis around left ankle, skin very dry and leathery. HEAD:  Normocephalic, atraumatic, no swelling or tenderness. EYES:  Pupils equal, reactive to light. EARS:  No discharge, no marked hearing loss. NOSE:  No rhinorrhea, epistaxis or septal deformity. THROAT:  Not congested. No ulceration bleeding or discharge. NECK:  No stiffness, trachea central.                  CHEST:  Symmetrical and equal on expansion. HEART:  RRR S1 > S2. No audible murmurs or gallops. LUNGS:  Equal on expansion, normal breath sounds. No adventitious sounds. ABDOMEN:  severely obese. Soft on palpation. No localized tenderness. No guarding or rigidity. LYMPHATICS:  No palpable cervical lymphadenopathy. LOCOMOTOR, BACK AND SPINE:  No tenderness or deformities. Pt has antalgic gait. EXTREMITIES:  Symmetrical, minimal pretibial edema. No calf tenderness. No discoloration or ulcerations. Tenderness on palpation of the right Knee joint space. more in the medial aspect. NEUROLOGIC:  The patient is conscious, alert, oriented,Cranial nerve II-XII intact, taste and smell were not examined. No apparent focal sensory or motor deficits. LAB REVIEW            Lab Results   Component Value Date    WBC 8.0 12/30/2022    HGB 11.8 (L) 12/30/2022    HCT 36.2 12/30/2022    MCV 92.1 12/30/2022     12/30/2022     Lab Results   Component Value Date/Time     12/30/2022 07:45 AM    K 4.9 12/30/2022 07:45 AM     12/30/2022 07:45 AM    CO2 24 12/30/2022 07:45 AM    BUN 25 12/30/2022 07:45 AM    CREATININE 1.06 12/30/2022 07:45 AM    GLUCOSE 116 12/30/2022 07:45 AM    CALCIUM 9.3 12/30/2022 07:45 AM                                            EKG REVIEW        EKG today reads Normal Sinus Rhythm     Last EKG was on 1/25/21 reading.   Normal sinus rhythm  Possible Left atrial enlargement  Borderline ECG           PROVISIONAL DIAGNOSES / SURGERY:      KNEE TOTAL ARTHROPLASTY- Right    Osteoarthritis of right knee.        Patient Active Problem List    Diagnosis Date Noted    Contusion of hip 12/27/2022    Dyspnea 12/27/2022    Tear of medial meniscus of right knee, current 07/18/2022    Prediabetes 07/07/2022    Myalgia, unspecified site 01/17/2022    Dizziness and giddiness 01/12/2022    Other abnormal glucose 07/08/2021    Bipolar disorder, unspecified ([address] 05/13/2021    Gastroparesis 02/08/2021    Obesity, Class III, BMI 40-49.9 (morbid obesity) ([address] 09/28/2020    Migraine 09/24/2020    Obstructive sleep apnea syndrome 07/29/2020    SOB (shortness of breath) on exertion 07/29/2020    Thrombophlebitis of superficial veins of left lower extremity 03/08/2018    Vaginal discharge 03/10/2017    Convergence excess 10/03/2016    Hyperopia with astigmatism and presbyopia, bilateral 10/03/2016    Acute medial meniscus tear of left knee     Abdominal pain 01/25/2021    Acute on chronic pancreatitis ([address] 11/10/2020    History of depression 09/17/2020    Pancreatic pseudocyst 09/16/2020    Fatty liver 09/09/2020    Pneumonia of both lungs due to infectious organism 08/27/2020    Smoker 08/25/2020    Carpal tunnel syndrome of right wrist 08/25/2020    Elevated liver enzymes     Bilateral leg edema     Abnormal levels of other serum enzymes     History of anxiety disorder     Acute respiratory failure with hypoxia ([address] 08/21/2020    Hypocalcemia 08/21/2020    Sepsis ([address] 08/20/2020    Morbid obesity ([address]     Abdominal pain, epigastric     Nausea     Acute pancreatitis 08/19/2020    Insomnia due to psychological stress 10/03/2019    Fibromyalgia     Chest pain 05/16/2019    Herpes zoster without complication 09/96/5800    Severe episode of recurrent major depressive disorder, without psychotic features ([address] 02/09/2018    Generalized anxiety disorder 10/18/2017    Leg swelling 09/25/2017    Varicose vein of leg 07/12/2017    Cough with sputum 01/20/2017    Pain of right lower extremity 11/07/2016    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 Dr. Emily Evangelista office will be responsible for making sure the clearance is obtained and is in the chart for surgery. Patient does exhibit mild anemia and renal abnormalities in Labs. Patient has set appmt to see her PCP on 1/03/23. For medical clearance.       ANIBAL MARTINEZ - CNP on 12/30/2022 at 8:36 AM    Total time spent on encounter- PAT provider minutes: 21-30 minutes

## 2022-12-29 NOTE — H&P
HISTORY and [address] [id number]       NAME:  Marilyn Rendon  MRN: [id number]   YOB: 1975   Date: 12/30/2022   Age: 49 y.o. Gender: female       COMPLAINT AND PRESENT HISTORY:     Marilyn Rendon is 49 y.o.,  female, undergoing preadmission testing for Osteoarthritis of right knee. Patient will be having: KNEE TOTAL ARTHROPLASTY- Right    See office notes of Dr. AARON WINSLOW on 11/09/22   HISTORY OF PRESENT ILLNESS:  The patient is a 49 y.o. female who is being seen as a new patient for right knee pain. The patient states that she has been having significant right knee pain ever since she underwent right knee arthroscopy with partial medial meniscectomy on 7/18/2022 with Dr. Johanny Forbes. Since surgery, she has had persistent pain which she actually describes is worse than prior to having her meniscectomy. She has had multiple knee aspirations as well as multiple corticosteroid injections to the knee in an attempt to alleviate some of her pain. However, none of this has helped. She has tried multiple oral anti-inflammatory medications as well, which the patient states has not provided any relief. She is at a point now where she is hoping for some sort of relief for her knee pain and is seeking a second opinion from Dr. AARON WINSLOW, for which she presented today. Today, she reports that she has significant knee pain worse on the medial side as compared with the lateral side, although the entire knee does hurt her. She states that the pain is sharp with standing and worse with walking. She states there are days where she is unable to walk secondary to the pain. She states that going downstairs is worse than going upstairs, but both activities are bad. She does not have any locking sensation but does have occasional grinding. She states that she has tried PT which has not provided her with any relief.   She last had her knee aspirated by Dr. Kasandra Ugarte office 1 week ago where 25 cc of straw-colored fluid was removed. She denies any numbness or tingling down the extremity. See office note of Dr. Kayleen Morocho on 11/23/22  History of Present Illness: This is a 49 y.o. female who presents to the clinic today for evaluation / follow up of severe right knee pain. Please refer to all my previous dictations as well as notes from [facility] wound and Dr. Wm Villareal orthopedic resident. Patient is a 48-year-old patient who has a history of significant DJD of her right knee. She had a previous partial medial meniscectomy. She has had several aspiration injections both steroids as well as Synvisc. Most recent x-rays taken this past October have shown pretty rapid progression to the point where she is essentially bone-on-bone on the medial compartment. Patient states that that her life is miserable she cannot deal with the pain and she is wishing to discuss arthroplasty. Above reviewed with patient and she concurs    Patient is s/p right knee arthroscopy partial medial meniscectomy on 7/18/2022. Patient c/o  pain , swelling, stiffness, grinding in the right Knee. Pt describes the pain as 7/10 in intensity at times. Onset of symptoms started 6 months ago. Patient has been using  now using Icing, Zanaflex, Tylenol and Advil to help in pain relief. Patient reports that  bending over really aggravates sxs, other factors are described above. Pt reports that  the knee does  experience a buckling or given away sensation  under the knee. No locking up of the knee. No recent falls or trauma. Patient states that she occasionally will use her cane. Patient denies any joint warmth, redness, fever or chills. Significant medical history:  CKD, DM, DVT, HTN, KAVEH-no machine, SOB, SVT. S/p atrial ablation, Vapes daily.  Morbid obesity BMI 56.8    Associated medications: Cozaar, minipress, Inderal, aldactone, glucaphage    Latest  A1c reading 6.2 as of 12/27/22    BP Readings from Last 3 Encounters:   12/30/22 (!) 103/50   07/18/22 114/61   07/05/22 (!) 148/76       Anticoagulants or blood thinners:  motrin    Pt denies any chest pain . Patient admits to SOB. No recent URI . Functional Capacity per patient:              1. Patient is not able to walk 2 city blocks on level ground without SOB. 2. Patient is not able to climb 2 flights of stairs without SOB. Patient has hx of PONV, otherwise denies any personal or family problems with anesthesia. Xrays or Imaging:     XR taken today: 11/23/22  No new x-rays taken today most recent x-rays taken October 2022 show essentially bone-on-bone disease medial compartment of the right knee. No results found.      PAST MEDICAL HISTORY     Past Medical History:   Diagnosis Date    Abnormal levels of other serum enzymes     Acute respiratory failure with hypoxia (HCC) 08/21/2020    Anxiety     Bilateral leg edema     Chronic kidney disease     right renal cyst    Depression     Diabetes mellitus ([address]     DVT (deep venous thrombosis) ([address] 1997    after delivery    Elevated liver enzymes     Fibromyalgia     Headache(784.0)     migraines    Hx of blood clots     1997 left calf    Hypertension     Kidney stone     Obstructive sleep apnea syndrome     no machine    Pancreatitis 2001    Pleural effusion 2001    Pneumonia of both lungs due to infectious organism 08/27/2020    PONV (postoperative nausea and vomiting)     Pseudotumor cerebri syndrome 10/03/2016    Right knee DJD     S/P left knee arthroscopy 02/15/2021    knee arthroscopy with partial medial menisectomy - left eleazar    Sepsis ([address] 08/20/2020    SOB (shortness of breath)     SVT (supraventricular tachycardia) ([address] 09/08/2015    Thyroid disease     mass removed    Varicose vein of leg 07/12/2017     SURGICAL HISTORY       Past Surgical History:   Procedure Laterality Date    ATRIAL ABLATION SURGERY  2015    BACK SURGERY      L4-5    CHOLECYSTECTOMY  2001 ENDOMETRIAL ABLATION      e sure implants placed also    ENDOSCOPY, COLON, DIAGNOSTIC      EXCISION LESION HAND / FINGER Right 01/25/2019    HAND LESION BIOPSY EXCISION performed by Rah Saenz MD at [address] Bilateral     left x2, right x1    FOOT SURGERY Right     x3    KNEE ARTHROSCOPY Left     x 3    KNEE ARTHROSCOPY Left 02/15/2021    KNEE ARTHROSCOPY WITH PARTIAL MEDIAL MENISCECTOMY performed by Manasa Kumari MD at [address] ARTHROSCOPY Right 07/18/2022    RIGHT KNEE ARTHROSCOPIC PARTIAL MEDIAL MENISCECTOMY performed by Manasa Kumari MD at [address] ARTHROSCOPY W/ MENISCECTOMY Right 07/18/2022    RIGHT KNEE ARTHROSCOPIC PARTIAL MEDIAL MENISCECTOMY (Right: Knee)    MI CYSTO/URETERO/PYELOSCOPY W/LITHOTRIPSY Left 09/20/2017    CYSTOSCOPY URETEROSCOPY HOLMIUM LASER LITHO WITH STONE BASKETING WITH  STENT  [facility]     UPPER GASTROINTESTINAL ENDOSCOPY N/A 02/02/2021    EGD ESOPHAGOGASTRODUODENOSCOPY performed by Mac Lovett MD at [address]  02/01/2021    US GUIDED LIVER BIOPSY PERCUTANEOUS 2/1/2021 [facility] ULTRASOUND    VASCULAR SURGERY Left     leg       FAMILY HISTORY       Family History   Problem Relation Age of Onset    Heart Disease Father     High Blood Pressure Brother        SOCIAL HISTORY       Social History     Socioeconomic History    Marital status:      Spouse name: None    Number of children: None    Years of education: None    Highest education level: None   Tobacco Use    Smoking status: Former     Packs/day: 1.00     Types: Cigarettes     Quit date: 12/1/2019     Years since quitting: 3.0    Smokeless tobacco: Never    Tobacco comments:     today last smoke   Vaping Use    Vaping Use: Every day    Substances: Nicotine   Substance and Sexual Activity    Alcohol use: Never     Alcohol/week: 0.0 standard drinks    Drug use:  No  Comment: recovered from opiate abuse     Social Determinants of Health     Physical Activity: Unknown    Days of Exercise per Week: 0 days   Intimate Partner Violence: Not At Risk    Fear of Current or Ex-Partner: No    Emotionally Abused: No    Physically Abused: No    Sexually Abused: No        REVIEW OF SYSTEMS      Allergies   Allergen Reactions    Aripiprazole      Bad reaction    Eletriptan      Other reaction(s): Swelling-throat    Lasix [Furosemide] Other (See Comments)     Pt states pacreatitis    Sumatriptan Other (See Comments)    Triptans      Other reaction(s): Unknown    Lamotrigine Rash       Current Outpatient Medications on File Prior to Encounter   Medication Sig Dispense Refill    ibuprofen (ADVIL;MOTRIN) 800 MG tablet Take 800 mg by mouth every 6 hours as needed for Pain      amLODIPine (NORVASC) 5 MG tablet Take 5 mg by mouth daily      spironolactone (ALDACTONE) 100 MG tablet Take 100 mg by mouth daily      nitrofurantoin, macrocrystal-monohydrate, (MACROBID) 100 MG capsule Take 100 mg by mouth 2 times daily      cephALEXin (KEFLEX) 500 MG capsule Take 500 mg by mouth 3 times daily      acetaminophen (TYLENOL) 500 MG tablet Take 500 mg by mouth every 6 hours as needed for Pain      propranolol (INDERAL) 10 MG tablet Take 10 mg by mouth 3 times daily      metFORMIN (GLUCOPHAGE) 500 MG tablet Take 1,000 mg by mouth 2 times daily (with meals)      hydrOXYzine HCl (ATARAX) 50 MG tablet Take 50 mg by mouth 2 times daily      tiZANidine (ZANAFLEX) 4 MG tablet Take 4 mg by mouth nightly      losartan (COZAAR) 50 MG tablet Take 100 mg by mouth at bedtime      amitriptyline (ELAVIL) 25 MG tablet Take 200 mg by mouth nightly       prazosin (MINIPRESS) 1 MG capsule Take 5 mg by mouth nightly      melatonin 5 MG TABS tablet Take 10 mg by mouth nightly as needed      QUEtiapine (SEROQUEL XR) 400 MG extended release tablet Take 800 mg by mouth nightly      busPIRone (BUSPAR) 15 MG tablet Take 30 mg by mouth  2 times daily at 0800 and 1400      omeprazole (PRILOSEC) 40 MG delayed release capsule Take 40 mg by mouth daily      fexofenadine (RA ALLERGY RELIEF) 180 MG tablet take 1 tablet by mouth once daily (Patient taking differently: as needed take 1 tablet by mouth once daily) 30 tablet 0    pramipexole (MIRAPEX) 0.5 MG tablet take 1 tablet by mouth twice a day (Patient taking differently: daily) 60 tablet 0    DULoxetine (CYMBALTA) 60 MG extended release capsule TAKE 1 CAPSULE BY MOUTH TWICE A DAY (Patient taking differently: at bedtime) 60 capsule 3     No current facility-administered medications on file prior to encounter. General health:  Fairly good. No fever or chills. Skin:  No itching, redness or rash. HEENT:  No headache, epistaxis or sore throat. Neck:  No pain, stiffness or masses. Cardiovascular/Respiratory system:  No chest pain, palpitation or shortness of breath. Gastrointestinal tract: No abdominal pain, Dysphagia, nausea, vomiting, diarrhea or constipation. Genitourinary:  No burning on micturition. No hesitancy, urgency, frequency or discoloration of urine. Locomotor: See HPI. Neuropsychiatric:  No referable complaints. GENERAL PHYSICAL EXAM:     Vitals: BP (!) 103/50 Comment: 97/57 left arm  Pulse 82   Temp 98.2 °F (36.8 °C)   Resp 20   Ht 5' 4\" (1.626 m)   Wt (!) 331 lb (150.1 kg)   SpO2 98%   BMI 56.82 kg/m²  Body mass index is 56.82 kg/m². GENERAL APPEARANCE:   Jose Guadalupe Do is 49 y.o., female, severely obese, nourished, conscious, alert. Does not appear to be distress or pain at this time. SKIN:  Warm, dry, no cyanosis or jaundice. Multiple skin tags to neck. Noted acanthosis around left ankle, skin very dry and leathery. HEAD:  Normocephalic, atraumatic, no swelling or tenderness. EYES:  Pupils equal, reactive to light. EARS:  No discharge, no marked hearing loss. NOSE:  No rhinorrhea, epistaxis or septal deformity. THROAT:  Not congested. No ulceration bleeding or discharge. NECK:  No stiffness, trachea central.                  CHEST:  Symmetrical and equal on expansion. HEART:  RRR S1 > S2. No audible murmurs or gallops. LUNGS:  Equal on expansion, normal breath sounds. No adventitious sounds. ABDOMEN:  severely obese. Soft on palpation. No localized tenderness. No guarding or rigidity. LYMPHATICS:  No palpable cervical lymphadenopathy. LOCOMOTOR, BACK AND SPINE:  No tenderness or deformities. Pt has antalgic gait. EXTREMITIES:  Symmetrical, minimal pretibial edema. No calf tenderness. No discoloration or ulcerations. Tenderness on palpation of the right Knee joint space. more in the medial aspect. NEUROLOGIC:  The patient is conscious, alert, oriented,Cranial nerve II-XII intact, taste and smell were not examined. No apparent focal sensory or motor deficits. LAB REVIEW            Lab Results   Component Value Date    WBC 8.0 12/30/2022    HGB 11.8 (L) 12/30/2022    HCT 36.2 12/30/2022    MCV 92.1 12/30/2022     12/30/2022     Lab Results   Component Value Date/Time     12/30/2022 07:45 AM    K 4.9 12/30/2022 07:45 AM     12/30/2022 07:45 AM    CO2 24 12/30/2022 07:45 AM    BUN 25 12/30/2022 07:45 AM    CREATININE 1.06 12/30/2022 07:45 AM    GLUCOSE 116 12/30/2022 07:45 AM    CALCIUM 9.3 12/30/2022 07:45 AM                                            EKG REVIEW        EKG today reads Normal Sinus Rhythm     Last EKG was on 1/25/21 reading.   Normal sinus rhythm  Possible Left atrial enlargement  Borderline ECG           PROVISIONAL DIAGNOSES / SURGERY:      KNEE TOTAL ARTHROPLASTY- Right    Osteoarthritis of right knee.        Patient Active Problem List    Diagnosis Date Noted    Contusion of hip 12/27/2022    Dyspnea 12/27/2022    Tear of medial meniscus of right knee, current 07/18/2022    Prediabetes 07/07/2022    Myalgia, unspecified site 01/17/2022    Dizziness and giddiness 01/12/2022    Other abnormal glucose 07/08/2021    Bipolar disorder, unspecified ([address] 05/13/2021    Gastroparesis 02/08/2021    Obesity, Class III, BMI 40-49.9 (morbid obesity) ([address] 09/28/2020    Migraine 09/24/2020    Obstructive sleep apnea syndrome 07/29/2020    SOB (shortness of breath) on exertion 07/29/2020    Thrombophlebitis of superficial veins of left lower extremity 03/08/2018    Vaginal discharge 03/10/2017    Convergence excess 10/03/2016    Hyperopia with astigmatism and presbyopia, bilateral 10/03/2016    Acute medial meniscus tear of left knee     Abdominal pain 01/25/2021    Acute on chronic pancreatitis ([address] 11/10/2020    History of depression 09/17/2020    Pancreatic pseudocyst 09/16/2020    Fatty liver 09/09/2020    Pneumonia of both lungs due to infectious organism 08/27/2020    Smoker 08/25/2020    Carpal tunnel syndrome of right wrist 08/25/2020    Elevated liver enzymes     Bilateral leg edema     Abnormal levels of other serum enzymes     History of anxiety disorder     Acute respiratory failure with hypoxia ([address] 08/21/2020    Hypocalcemia 08/21/2020    Sepsis ([address] 08/20/2020    Morbid obesity ([address]     Abdominal pain, epigastric     Nausea     Acute pancreatitis 08/19/2020    Insomnia due to psychological stress 10/03/2019    Fibromyalgia     Chest pain 05/16/2019    Herpes zoster without complication 89/19/8947    Severe episode of recurrent major depressive disorder, without psychotic features ([address] 02/09/2018    Generalized anxiety disorder 10/18/2017    Leg swelling 09/25/2017    Varicose vein of leg 07/12/2017    Cough with sputum 01/20/2017    Pain of right lower extremity 11/07/2016    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 Dr. Sheyla Jackson office will be responsible for making sure the clearance is obtained and is in the chart for surgery. Patient does exhibit mild anemia and renal abnormalities in Labs. Patient has set appmt to see her PCP on 1/03/23. For medical clearance.       ANIBAL MARTINEZ - CNP on 12/30/2022 at 8:36 AM    Total time spent on encounter- PAT provider minutes: 21-30 minutes

## 2022-12-30 ENCOUNTER — HOSPITAL ENCOUNTER (OUTPATIENT)
Dept: PREADMISSION TESTING | Age: 47
End: 2022-12-30
Payer: MEDICARE

## 2022-12-30 VITALS
SYSTOLIC BLOOD PRESSURE: 103 MMHG | DIASTOLIC BLOOD PRESSURE: 50 MMHG | OXYGEN SATURATION: 98 % | WEIGHT: 293 LBS | BODY MASS INDEX: 50.02 KG/M2 | HEART RATE: 82 BPM | RESPIRATION RATE: 20 BRPM | TEMPERATURE: 98.2 F | HEIGHT: 64 IN

## 2022-12-30 LAB
ABSOLUTE EOS #: 0.5 K/UL (ref 0–0.4)
ABSOLUTE LYMPH #: 2.5 K/UL (ref 1–4.8)
ABSOLUTE MONO #: 0.4 K/UL (ref 0.1–1.3)
AMORPHOUS: ABNORMAL
ANION GAP SERPL CALCULATED.3IONS-SCNC: 13 MMOL/L (ref 9–17)
BACTERIA: ABNORMAL
BASOPHILS # BLD: 1 % (ref 0–2)
BASOPHILS ABSOLUTE: 0.1 K/UL (ref 0–0.2)
BILIRUBIN URINE: NEGATIVE
BUN BLDV-MCNC: 25 MG/DL (ref 6–20)
CALCIUM SERPL-MCNC: 9.3 MG/DL (ref 8.6–10.4)
CASTS UA: ABNORMAL /LPF
CHLORIDE BLD-SCNC: 101 MMOL/L (ref 98–107)
CO2: 24 MMOL/L (ref 20–31)
COLOR: YELLOW
CREAT SERPL-MCNC: 1.06 MG/DL (ref 0.5–0.9)
EOSINOPHILS RELATIVE PERCENT: 7 % (ref 0–4)
EPITHELIAL CELLS UA: ABNORMAL /HPF
GFR SERPL CREATININE-BSD FRML MDRD: >60 ML/MIN/1.73M2
GLUCOSE BLD-MCNC: 116 MG/DL (ref 70–99)
GLUCOSE URINE: NEGATIVE
HCT VFR BLD CALC: 36.2 % (ref 36–46)
HEMOGLOBIN: 11.8 G/DL (ref 12–16)
KETONES, URINE: ABNORMAL
LEUKOCYTE ESTERASE, URINE: ABNORMAL
LYMPHOCYTES # BLD: 32 % (ref 24–44)
MCH RBC QN AUTO: 30 PG (ref 26–34)
MCHC RBC AUTO-ENTMCNC: 32.6 G/DL (ref 31–37)
MCV RBC AUTO: 92.1 FL (ref 80–100)
MONOCYTES # BLD: 6 % (ref 1–7)
NITRITE, URINE: NEGATIVE
PDW BLD-RTO: 14.9 % (ref 11.5–14.9)
PH UA: 5 (ref 5–8)
PLATELET # BLD: 253 K/UL (ref 150–450)
PMV BLD AUTO: 9.3 FL (ref 6–12)
POTASSIUM SERPL-SCNC: 4.9 MMOL/L (ref 3.7–5.3)
PROTEIN UA: ABNORMAL
RBC # BLD: 3.93 M/UL (ref 4–5.2)
RBC UA: ABNORMAL /HPF
SEG NEUTROPHILS: 54 % (ref 36–66)
SEGMENTED NEUTROPHILS ABSOLUTE COUNT: 4.4 K/UL (ref 1.3–9.1)
SODIUM BLD-SCNC: 138 MMOL/L (ref 135–144)
SPECIFIC GRAVITY UA: 1.02 (ref 1–1.03)
TURBIDITY: CLEAR
URINE HGB: NEGATIVE
UROBILINOGEN, URINE: NORMAL
WBC # BLD: 8 K/UL (ref 3.5–11)
WBC UA: ABNORMAL /HPF

## 2022-12-30 PROCEDURE — 36415 COLL VENOUS BLD VENIPUNCTURE: CPT

## 2022-12-30 PROCEDURE — 81001 URINALYSIS AUTO W/SCOPE: CPT

## 2022-12-30 PROCEDURE — 85025 COMPLETE CBC W/AUTO DIFF WBC: CPT

## 2022-12-30 PROCEDURE — 87641 MR-STAPH DNA AMP PROBE: CPT

## 2022-12-30 PROCEDURE — APPSS45 APP SPLIT SHARED TIME 31-45 MINUTES: Performed by: NURSE PRACTITIONER

## 2022-12-30 PROCEDURE — 80048 BASIC METABOLIC PNL TOTAL CA: CPT

## 2022-12-30 PROCEDURE — 93005 ELECTROCARDIOGRAM TRACING: CPT

## 2022-12-30 RX ORDER — AMLODIPINE BESYLATE 5 MG/1
5 TABLET ORAL DAILY
COMMUNITY

## 2022-12-30 RX ORDER — ACETAMINOPHEN 500 MG
500 TABLET ORAL EVERY 6 HOURS PRN
COMMUNITY

## 2022-12-30 RX ORDER — SPIRONOLACTONE 100 MG/1
100 TABLET, FILM COATED ORAL DAILY
COMMUNITY

## 2022-12-30 RX ORDER — CEPHALEXIN 500 MG/1
500 CAPSULE ORAL 3 TIMES DAILY
COMMUNITY

## 2022-12-30 RX ORDER — IBUPROFEN 800 MG/1
800 TABLET ORAL EVERY 6 HOURS PRN
COMMUNITY

## 2022-12-30 RX ORDER — NITROFURANTOIN 25; 75 MG/1; MG/1
100 CAPSULE ORAL 2 TIMES DAILY
COMMUNITY

## 2022-12-30 NOTE — DISCHARGE INSTRUCTIONS
Pre-op Instructions For Out-Patient Surgery    Medication Instructions:  Please stop herbs and any supplements now (includes vitamins and minerals). Please contact your surgeon and prescribing physician for pre-op instructions for any blood thinners. Stop Ibuprofen as directed    If you have inhalers/aerosol treatments at home, please use them the morning of your surgery and bring the inhalers with you to the hospital.    Please take the following medications the morning of your surgery with a sip of water:    Losartan, Mirapex, Propranolol, Amlodipine, Buspar, Omeprazole    Surgery Instructions:  After midnight before surgery:  Do not eat or drink anything, including water, mints, gum, and hard candy. You may brush your teeth without swallowing. No smoking, chewing tobacco, or street drugs. Please shower or bathe before surgery. If you were given Surgical Scrub Chlorhexidine Gluconate Liquid (CHG), please shower the night before and the morning of your surgery following the detailed instructions you received during your pre-admission visit. Please do not wear any cologne, lotion, powder, deodorant, jewelry, piercings, perfume, makeup, nail polish, hair accessories, or hair spray on the day of surgery. Wear loose comfortable clothing. Leave your valuables at home. Bring a storage case for any glasses/contacts. An adult who is responsible for you MUST drive you home and should be with you for the first 24 hours after surgery. If having out-patient knee and foot surgeries, please arrange for planned crutches, walker, or wheelchair before arriving to the hospital.    The Day of Surgery:  Arrive at [facility] at the time directed by your surgeon and check in at the desk. If you have a living will or healthcare power of , please bring a copy.     You will be taken to the pre-op holding area where you will be prepared for surgery. A physical assessment will be performed by a nurse practitioner or house officer. Your IV will be started and you will meet your anesthesiologist.    When you go to surgery, your family will be directed to the surgical waiting room, where the doctor should speak with them after your surgery. After surgery, you will be taken to the recovery room then when you are awake and stable you will go to the short stay unit for preparation to be discharged. If you use a Bi-PAP or C-PAP machine, please bring it with you and leave it in the car in case it is needed in recovery room.

## 2022-12-31 LAB
MRSA, DNA, NASAL: NEGATIVE
SPECIMEN DESCRIPTION: NORMAL

## 2023-01-03 LAB
EKG ATRIAL RATE: 84 BPM
EKG P AXIS: 76 DEGREES
EKG P-R INTERVAL: 176 MS
EKG Q-T INTERVAL: 374 MS
EKG QRS DURATION: 98 MS
EKG QTC CALCULATION (BAZETT): 441 MS
EKG R AXIS: 49 DEGREES
EKG T AXIS: 38 DEGREES
EKG VENTRICULAR RATE: 84 BPM

## 2023-01-05 ENCOUNTER — TELEPHONE (OUTPATIENT)
Dept: ORTHOPEDIC SURGERY | Age: 48
End: 2023-01-05

## 2023-01-05 DIAGNOSIS — M17.5 OTHER SECONDARY OSTEOARTHRITIS OF RIGHT KNEE: ICD-10-CM

## 2023-01-05 RX ORDER — TRAMADOL HYDROCHLORIDE 50 MG/1
50 TABLET ORAL EVERY 4 HOURS PRN
Qty: 30 TABLET | Refills: 0 | Status: SHIPPED | OUTPATIENT
Start: 2023-01-05 | End: 2023-01-10

## 2023-01-05 NOTE — TELEPHONE ENCOUNTER
Pt would like to obtain a refill of Tramadol 50 mg q 4 hrs. Please order through [facility] in [facility].

## 2023-01-09 ENCOUNTER — ANESTHESIA EVENT (OUTPATIENT)
Dept: OPERATING ROOM | Age: 48
End: 2023-01-09
Payer: MEDICARE

## 2023-01-09 NOTE — PRE-PROCEDURE INSTRUCTIONS
No answer, left message ? Unable to leave message ? When were you told to arrive at hospital ?  [facility]    Do you have a  ?y    Are you on any blood thinners ? n                  If yes when did you stop taking ? Do you have your prep Rx filled and instruction ? Nothing to eat the day before , only clear liquids. Are you experiencing any covid symptoms ? n    Do you have any infections or rash we should be aware of ?n      Do you have the Hibiclens soap to use the night before and the morning of surgery ?y    Nothing to eat or drink after midnight, only a sip of water to take any medication instructed to take the night before.   Wear comfortable clothing, leave any valuables at home, remove any jewelry and body piercing . y

## 2023-01-10 ENCOUNTER — APPOINTMENT (OUTPATIENT)
Dept: GENERAL RADIOLOGY | Age: 48
End: 2023-01-10
Attending: ORTHOPAEDIC SURGERY
Payer: MEDICARE

## 2023-01-10 ENCOUNTER — HOSPITAL ENCOUNTER (OUTPATIENT)
Age: 48
Discharge: HOME HEALTH | End: 2023-01-10
Attending: ORTHOPAEDIC SURGERY | Admitting: ORTHOPAEDIC SURGERY
Payer: MEDICARE

## 2023-01-10 ENCOUNTER — ANESTHESIA (OUTPATIENT)
Dept: OPERATING ROOM | Age: 48
End: 2023-01-10
Payer: MEDICARE

## 2023-01-10 VITALS
HEART RATE: 109 BPM | TEMPERATURE: 97.7 F | WEIGHT: 293 LBS | OXYGEN SATURATION: 92 % | SYSTOLIC BLOOD PRESSURE: 144 MMHG | BODY MASS INDEX: 50.02 KG/M2 | HEIGHT: 64 IN | DIASTOLIC BLOOD PRESSURE: 70 MMHG | RESPIRATION RATE: 16 BRPM

## 2023-01-10 DIAGNOSIS — M17.11 PRIMARY OSTEOARTHRITIS OF RIGHT KNEE: Primary | ICD-10-CM

## 2023-01-10 LAB
GLUCOSE BLD-MCNC: 118 MG/DL (ref 65–105)
GLUCOSE BLD-MCNC: 168 MG/DL (ref 65–105)

## 2023-01-10 PROCEDURE — 2500000003 HC RX 250 WO HCPCS: Performed by: ANESTHESIOLOGY

## 2023-01-10 PROCEDURE — 6360000002 HC RX W HCPCS: Performed by: ORTHOPAEDIC SURGERY

## 2023-01-10 PROCEDURE — 6370000000 HC RX 637 (ALT 250 FOR IP): Performed by: ORTHOPAEDIC SURGERY

## 2023-01-10 PROCEDURE — 3700000001 HC ADD 15 MINUTES (ANESTHESIA): Performed by: ORTHOPAEDIC SURGERY

## 2023-01-10 PROCEDURE — 7100000000 HC PACU RECOVERY - FIRST 15 MIN: Performed by: ORTHOPAEDIC SURGERY

## 2023-01-10 PROCEDURE — 2580000003 HC RX 258: Performed by: ORTHOPAEDIC SURGERY

## 2023-01-10 PROCEDURE — 97166 OT EVAL MOD COMPLEX 45 MIN: CPT

## 2023-01-10 PROCEDURE — 3700000000 HC ANESTHESIA ATTENDED CARE: Performed by: ORTHOPAEDIC SURGERY

## 2023-01-10 PROCEDURE — A4216 STERILE WATER/SALINE, 10 ML: HCPCS | Performed by: ORTHOPAEDIC SURGERY

## 2023-01-10 PROCEDURE — C1776 JOINT DEVICE (IMPLANTABLE): HCPCS | Performed by: ORTHOPAEDIC SURGERY

## 2023-01-10 PROCEDURE — 2580000003 HC RX 258: Performed by: ANESTHESIOLOGY

## 2023-01-10 PROCEDURE — 97161 PT EVAL LOW COMPLEX 20 MIN: CPT

## 2023-01-10 PROCEDURE — 2720000010 HC SURG SUPPLY STERILE: Performed by: ORTHOPAEDIC SURGERY

## 2023-01-10 PROCEDURE — 3600000013 HC SURGERY LEVEL 3 ADDTL 15MIN: Performed by: ORTHOPAEDIC SURGERY

## 2023-01-10 PROCEDURE — 6360000002 HC RX W HCPCS: Performed by: NURSE ANESTHETIST, CERTIFIED REGISTERED

## 2023-01-10 PROCEDURE — 6360000002 HC RX W HCPCS: Performed by: ANESTHESIOLOGY

## 2023-01-10 PROCEDURE — 73560 X-RAY EXAM OF KNEE 1 OR 2: CPT

## 2023-01-10 PROCEDURE — C1713 ANCHOR/SCREW BN/BN,TIS/BN: HCPCS | Performed by: ORTHOPAEDIC SURGERY

## 2023-01-10 PROCEDURE — 82947 ASSAY GLUCOSE BLOOD QUANT: CPT

## 2023-01-10 PROCEDURE — C9290 INJ, BUPIVACAINE LIPOSOME: HCPCS | Performed by: ANESTHESIOLOGY

## 2023-01-10 PROCEDURE — 2709999900 HC NON-CHARGEABLE SUPPLY: Performed by: ORTHOPAEDIC SURGERY

## 2023-01-10 PROCEDURE — 97530 THERAPEUTIC ACTIVITIES: CPT

## 2023-01-10 PROCEDURE — 97116 GAIT TRAINING THERAPY: CPT

## 2023-01-10 PROCEDURE — 3600000003 HC SURGERY LEVEL 3 BASE: Performed by: ORTHOPAEDIC SURGERY

## 2023-01-10 PROCEDURE — 2500000003 HC RX 250 WO HCPCS: Performed by: NURSE ANESTHETIST, CERTIFIED REGISTERED

## 2023-01-10 PROCEDURE — 7100000001 HC PACU RECOVERY - ADDTL 15 MIN: Performed by: ORTHOPAEDIC SURGERY

## 2023-01-10 PROCEDURE — 6370000000 HC RX 637 (ALT 250 FOR IP): Performed by: NURSE ANESTHETIST, CERTIFIED REGISTERED

## 2023-01-10 PROCEDURE — 64447 NJX AA&/STRD FEMORAL NRV IMG: CPT | Performed by: ANESTHESIOLOGY

## 2023-01-10 PROCEDURE — 97535 SELF CARE MNGMENT TRAINING: CPT

## 2023-01-10 PROCEDURE — 2500000003 HC RX 250 WO HCPCS: Performed by: ORTHOPAEDIC SURGERY

## 2023-01-10 DEVICE — IMPLANTABLE DEVICE: Type: IMPLANTABLE DEVICE | Site: KNEE | Status: FUNCTIONAL

## 2023-01-10 DEVICE — CEMENT BNE 40GM HI VISC RADPQ FOR REV SURG: Type: IMPLANTABLE DEVICE | Site: KNEE | Status: FUNCTIONAL

## 2023-01-10 DEVICE — TRAY TIB L71MM KNEE COPOLYESTER SIL INTLOK PRI CEM VANGUARD: Type: IMPLANTABLE DEVICE | Site: KNEE | Status: FUNCTIONAL

## 2023-01-10 DEVICE — COMPONENT PAT DIA34MM THK7.8MM THN KNEE POLY 3 PEG SER A: Type: IMPLANTABLE DEVICE | Site: KNEE | Status: FUNCTIONAL

## 2023-01-10 RX ORDER — SODIUM CHLORIDE, SODIUM LACTATE, POTASSIUM CHLORIDE, CALCIUM CHLORIDE 600; 310; 30; 20 MG/100ML; MG/100ML; MG/100ML; MG/100ML
INJECTION, SOLUTION INTRAVENOUS CONTINUOUS
Status: DISCONTINUED | OUTPATIENT
Start: 2023-01-10 | End: 2023-01-10 | Stop reason: HOSPADM

## 2023-01-10 RX ORDER — SODIUM CHLORIDE 0.9 % (FLUSH) 0.9 %
5-40 SYRINGE (ML) INJECTION EVERY 12 HOURS SCHEDULED
Status: DISCONTINUED | OUTPATIENT
Start: 2023-01-10 | End: 2023-01-10 | Stop reason: HOSPADM

## 2023-01-10 RX ORDER — BUPIVACAINE HYDROCHLORIDE 5 MG/ML
INJECTION, SOLUTION EPIDURAL; INTRACAUDAL
Status: DISCONTINUED | OUTPATIENT
Start: 2023-01-10 | End: 2023-01-10 | Stop reason: SDUPTHER

## 2023-01-10 RX ORDER — OXYCODONE HYDROCHLORIDE AND ACETAMINOPHEN 5; 325 MG/1; MG/1
1-2 TABLET ORAL EVERY 4 HOURS PRN
Qty: 42 TABLET | Refills: 0 | Status: SHIPPED | OUTPATIENT
Start: 2023-01-10 | End: 2023-01-17

## 2023-01-10 RX ORDER — SODIUM CHLORIDE 9 MG/ML
INJECTION, SOLUTION INTRAVENOUS PRN
Status: DISCONTINUED | OUTPATIENT
Start: 2023-01-10 | End: 2023-01-10 | Stop reason: HOSPADM

## 2023-01-10 RX ORDER — DIPHENHYDRAMINE HYDROCHLORIDE 50 MG/ML
12.5 INJECTION INTRAMUSCULAR; INTRAVENOUS
Status: DISCONTINUED | OUTPATIENT
Start: 2023-01-10 | End: 2023-01-10 | Stop reason: HOSPADM

## 2023-01-10 RX ORDER — ASPIRIN 81 MG/1
81 TABLET ORAL 2 TIMES DAILY
Status: DISCONTINUED | OUTPATIENT
Start: 2023-01-10 | End: 2023-01-10 | Stop reason: HOSPADM

## 2023-01-10 RX ORDER — SODIUM CHLORIDE 0.9 % (FLUSH) 0.9 %
5-40 SYRINGE (ML) INJECTION PRN
Status: DISCONTINUED | OUTPATIENT
Start: 2023-01-10 | End: 2023-01-10 | Stop reason: HOSPADM

## 2023-01-10 RX ORDER — FENTANYL CITRATE 50 UG/ML
INJECTION, SOLUTION INTRAMUSCULAR; INTRAVENOUS PRN
Status: DISCONTINUED | OUTPATIENT
Start: 2023-01-10 | End: 2023-01-10 | Stop reason: SDUPTHER

## 2023-01-10 RX ORDER — ASPIRIN 81 MG/1
81 TABLET ORAL 2 TIMES DAILY
Qty: 90 TABLET | Refills: 1 | Status: SHIPPED | OUTPATIENT
Start: 2023-01-10

## 2023-01-10 RX ORDER — METOCLOPRAMIDE HYDROCHLORIDE 5 MG/ML
10 INJECTION INTRAMUSCULAR; INTRAVENOUS
Status: DISCONTINUED | OUTPATIENT
Start: 2023-01-10 | End: 2023-01-10 | Stop reason: HOSPADM

## 2023-01-10 RX ORDER — SODIUM CHLORIDE 9 MG/ML
INJECTION, SOLUTION INTRAVENOUS PRN
Status: DISCONTINUED | OUTPATIENT
Start: 2023-01-10 | End: 2023-01-10 | Stop reason: SDUPTHER

## 2023-01-10 RX ORDER — LABETALOL HYDROCHLORIDE 5 MG/ML
10 INJECTION, SOLUTION INTRAVENOUS
Status: DISCONTINUED | OUTPATIENT
Start: 2023-01-10 | End: 2023-01-10 | Stop reason: HOSPADM

## 2023-01-10 RX ORDER — ACETAMINOPHEN 325 MG/1
650 TABLET ORAL EVERY 6 HOURS
Status: DISCONTINUED | OUTPATIENT
Start: 2023-01-10 | End: 2023-01-10 | Stop reason: HOSPADM

## 2023-01-10 RX ORDER — CEFDINIR 300 MG/1
300 CAPSULE ORAL 2 TIMES DAILY
Qty: 20 CAPSULE | Refills: 1 | Status: SHIPPED | OUTPATIENT
Start: 2023-01-10

## 2023-01-10 RX ORDER — DEXAMETHASONE SODIUM PHOSPHATE 10 MG/ML
10 INJECTION, SOLUTION INTRAMUSCULAR; INTRAVENOUS ONCE
Status: COMPLETED | OUTPATIENT
Start: 2023-01-10 | End: 2023-01-10

## 2023-01-10 RX ORDER — SUCCINYLCHOLINE/SOD CL,ISO/PF 200MG/10ML
SYRINGE (ML) INTRAVENOUS PRN
Status: DISCONTINUED | OUTPATIENT
Start: 2023-01-10 | End: 2023-01-10 | Stop reason: SDUPTHER

## 2023-01-10 RX ORDER — ACETAMINOPHEN 500 MG
1000 TABLET ORAL ONCE
Status: COMPLETED | OUTPATIENT
Start: 2023-01-10 | End: 2023-01-10

## 2023-01-10 RX ORDER — HYDRALAZINE HYDROCHLORIDE 20 MG/ML
10 INJECTION INTRAMUSCULAR; INTRAVENOUS
Status: DISCONTINUED | OUTPATIENT
Start: 2023-01-10 | End: 2023-01-10 | Stop reason: HOSPADM

## 2023-01-10 RX ORDER — PROPOFOL 10 MG/ML
INJECTION, EMULSION INTRAVENOUS PRN
Status: DISCONTINUED | OUTPATIENT
Start: 2023-01-10 | End: 2023-01-10 | Stop reason: SDUPTHER

## 2023-01-10 RX ORDER — ONDANSETRON 2 MG/ML
4 INJECTION INTRAMUSCULAR; INTRAVENOUS EVERY 6 HOURS PRN
Status: DISCONTINUED | OUTPATIENT
Start: 2023-01-10 | End: 2023-01-10 | Stop reason: HOSPADM

## 2023-01-10 RX ORDER — OXYCODONE HYDROCHLORIDE 10 MG/1
10 TABLET ORAL EVERY 4 HOURS PRN
Status: DISCONTINUED | OUTPATIENT
Start: 2023-01-10 | End: 2023-01-10 | Stop reason: HOSPADM

## 2023-01-10 RX ORDER — SCOLOPAMINE TRANSDERMAL SYSTEM 1 MG/1
1 PATCH, EXTENDED RELEASE TRANSDERMAL ONCE
Status: DISCONTINUED | OUTPATIENT
Start: 2023-01-10 | End: 2023-01-10

## 2023-01-10 RX ORDER — ONDANSETRON 2 MG/ML
INJECTION INTRAMUSCULAR; INTRAVENOUS PRN
Status: DISCONTINUED | OUTPATIENT
Start: 2023-01-10 | End: 2023-01-10 | Stop reason: SDUPTHER

## 2023-01-10 RX ORDER — LIDOCAINE HYDROCHLORIDE 10 MG/ML
1 INJECTION, SOLUTION EPIDURAL; INFILTRATION; INTRACAUDAL; PERINEURAL
Status: COMPLETED | OUTPATIENT
Start: 2023-01-10 | End: 2023-01-10

## 2023-01-10 RX ORDER — GABAPENTIN 400 MG/1
800 CAPSULE ORAL ONCE
Status: COMPLETED | OUTPATIENT
Start: 2023-01-10 | End: 2023-01-10

## 2023-01-10 RX ORDER — SODIUM CHLORIDE 9 MG/ML
25 INJECTION, SOLUTION INTRAVENOUS PRN
Status: DISCONTINUED | OUTPATIENT
Start: 2023-01-10 | End: 2023-01-10 | Stop reason: HOSPADM

## 2023-01-10 RX ORDER — CALCIUM CHLORIDE 100 MG/ML
INJECTION INTRAVENOUS; INTRAVENTRICULAR PRN
Status: DISCONTINUED | OUTPATIENT
Start: 2023-01-10 | End: 2023-01-10 | Stop reason: ALTCHOICE

## 2023-01-10 RX ORDER — HYDROMORPHONE HCL 110MG/55ML
PATIENT CONTROLLED ANALGESIA SYRINGE INTRAVENOUS PRN
Status: DISCONTINUED | OUTPATIENT
Start: 2023-01-10 | End: 2023-01-10 | Stop reason: SDUPTHER

## 2023-01-10 RX ORDER — MIDAZOLAM HYDROCHLORIDE 1 MG/ML
INJECTION INTRAMUSCULAR; INTRAVENOUS PRN
Status: DISCONTINUED | OUTPATIENT
Start: 2023-01-10 | End: 2023-01-10 | Stop reason: SDUPTHER

## 2023-01-10 RX ORDER — KETOROLAC TROMETHAMINE 30 MG/ML
30 INJECTION, SOLUTION INTRAMUSCULAR; INTRAVENOUS EVERY 6 HOURS
Status: DISCONTINUED | OUTPATIENT
Start: 2023-01-10 | End: 2023-01-10 | Stop reason: HOSPADM

## 2023-01-10 RX ORDER — ONDANSETRON 2 MG/ML
4 INJECTION INTRAMUSCULAR; INTRAVENOUS
Status: COMPLETED | OUTPATIENT
Start: 2023-01-10 | End: 2023-01-10

## 2023-01-10 RX ORDER — GLYCOPYRROLATE 0.2 MG/ML
INJECTION INTRAMUSCULAR; INTRAVENOUS PRN
Status: DISCONTINUED | OUTPATIENT
Start: 2023-01-10 | End: 2023-01-10 | Stop reason: SDUPTHER

## 2023-01-10 RX ORDER — FENTANYL CITRATE 0.05 MG/ML
25 INJECTION, SOLUTION INTRAMUSCULAR; INTRAVENOUS EVERY 5 MIN PRN
Status: DISCONTINUED | OUTPATIENT
Start: 2023-01-10 | End: 2023-01-10 | Stop reason: HOSPADM

## 2023-01-10 RX ORDER — ALBUTEROL SULFATE 90 UG/1
AEROSOL, METERED RESPIRATORY (INHALATION) PRN
Status: DISCONTINUED | OUTPATIENT
Start: 2023-01-10 | End: 2023-01-10 | Stop reason: SDUPTHER

## 2023-01-10 RX ORDER — KETAMINE HYDROCHLORIDE 10 MG/ML
INJECTION INTRAMUSCULAR; INTRAVENOUS PRN
Status: DISCONTINUED | OUTPATIENT
Start: 2023-01-10 | End: 2023-01-10 | Stop reason: SDUPTHER

## 2023-01-10 RX ORDER — ROCURONIUM BROMIDE 10 MG/ML
INJECTION, SOLUTION INTRAVENOUS PRN
Status: DISCONTINUED | OUTPATIENT
Start: 2023-01-10 | End: 2023-01-10 | Stop reason: SDUPTHER

## 2023-01-10 RX ORDER — OXYCODONE HYDROCHLORIDE 5 MG/1
5 TABLET ORAL EVERY 4 HOURS PRN
Status: DISCONTINUED | OUTPATIENT
Start: 2023-01-10 | End: 2023-01-10 | Stop reason: HOSPADM

## 2023-01-10 RX ORDER — SODIUM CHLORIDE 0.9 % (FLUSH) 0.9 %
5-40 SYRINGE (ML) INJECTION EVERY 12 HOURS SCHEDULED
Status: DISCONTINUED | OUTPATIENT
Start: 2023-01-10 | End: 2023-01-10 | Stop reason: SDUPTHER

## 2023-01-10 RX ORDER — METOCLOPRAMIDE HYDROCHLORIDE 5 MG/ML
INJECTION INTRAMUSCULAR; INTRAVENOUS PRN
Status: DISCONTINUED | OUTPATIENT
Start: 2023-01-10 | End: 2023-01-10 | Stop reason: SDUPTHER

## 2023-01-10 RX ORDER — TRANEXAMIC ACID 100 MG/ML
INJECTION, SOLUTION INTRAVENOUS PRN
Status: DISCONTINUED | OUTPATIENT
Start: 2023-01-10 | End: 2023-01-10 | Stop reason: SDUPTHER

## 2023-01-10 RX ORDER — SODIUM CHLORIDE 0.9 % (FLUSH) 0.9 %
5-40 SYRINGE (ML) INJECTION PRN
Status: DISCONTINUED | OUTPATIENT
Start: 2023-01-10 | End: 2023-01-10 | Stop reason: SDUPTHER

## 2023-01-10 RX ORDER — DEXAMETHASONE SODIUM PHOSPHATE 4 MG/ML
INJECTION, SOLUTION INTRA-ARTICULAR; INTRALESIONAL; INTRAMUSCULAR; INTRAVENOUS; SOFT TISSUE PRN
Status: DISCONTINUED | OUTPATIENT
Start: 2023-01-10 | End: 2023-01-10

## 2023-01-10 RX ORDER — PHENYLEPHRINE HYDROCHLORIDE 10 MG/ML
INJECTION INTRAVENOUS PRN
Status: DISCONTINUED | OUTPATIENT
Start: 2023-01-10 | End: 2023-01-10 | Stop reason: SDUPTHER

## 2023-01-10 RX ORDER — LIDOCAINE HYDROCHLORIDE 20 MG/ML
INJECTION, SOLUTION EPIDURAL; INFILTRATION; INTRACAUDAL; PERINEURAL PRN
Status: DISCONTINUED | OUTPATIENT
Start: 2023-01-10 | End: 2023-01-10 | Stop reason: SDUPTHER

## 2023-01-10 RX ADMIN — ACETAMINOPHEN 1000 MG: 500 TABLET ORAL at 06:43

## 2023-01-10 RX ADMIN — TRANEXAMIC ACID 1000 MG: 100 INJECTION, SOLUTION INTRAVENOUS at 08:54

## 2023-01-10 RX ADMIN — Medication 2 PUFF: at 08:20

## 2023-01-10 RX ADMIN — ACETAMINOPHEN 650 MG: 325 TABLET ORAL at 13:19

## 2023-01-10 RX ADMIN — Medication 3000 MG: at 14:03

## 2023-01-10 RX ADMIN — HYDROMORPHONE HYDROCHLORIDE 0.5 MG: 2 INJECTION, SOLUTION INTRAMUSCULAR; INTRAVENOUS; SUBCUTANEOUS at 08:10

## 2023-01-10 RX ADMIN — ONDANSETRON 4 MG: 2 INJECTION INTRAMUSCULAR; INTRAVENOUS at 10:10

## 2023-01-10 RX ADMIN — TRANEXAMIC ACID 1000 MG: 100 INJECTION, SOLUTION INTRAVENOUS at 07:55

## 2023-01-10 RX ADMIN — Medication 160 MG: at 07:37

## 2023-01-10 RX ADMIN — LIDOCAINE HYDROCHLORIDE 1 ML: 10 INJECTION, SOLUTION EPIDURAL; INFILTRATION; INTRACAUDAL; PERINEURAL at 06:45

## 2023-01-10 RX ADMIN — ONDANSETRON 4 MG: 2 INJECTION INTRAMUSCULAR; INTRAVENOUS at 09:00

## 2023-01-10 RX ADMIN — KETAMINE HYDROCHLORIDE 50 MG: 10 INJECTION INTRAMUSCULAR; INTRAVENOUS at 08:00

## 2023-01-10 RX ADMIN — ROCURONIUM BROMIDE 50 MG: 10 INJECTION, SOLUTION INTRAVENOUS at 07:45

## 2023-01-10 RX ADMIN — LIDOCAINE HYDROCHLORIDE 80 MG: 20 INJECTION, SOLUTION EPIDURAL; INFILTRATION; INTRACAUDAL; PERINEURAL at 07:37

## 2023-01-10 RX ADMIN — METOCLOPRAMIDE 10 MG: 5 INJECTION, SOLUTION INTRAMUSCULAR; INTRAVENOUS at 08:05

## 2023-01-10 RX ADMIN — ROCURONIUM BROMIDE 20 MG: 10 INJECTION, SOLUTION INTRAVENOUS at 08:32

## 2023-01-10 RX ADMIN — GABAPENTIN 800 MG: 400 CAPSULE ORAL at 06:39

## 2023-01-10 RX ADMIN — SODIUM CHLORIDE, POTASSIUM CHLORIDE, SODIUM LACTATE AND CALCIUM CHLORIDE: 600; 310; 30; 20 INJECTION, SOLUTION INTRAVENOUS at 07:02

## 2023-01-10 RX ADMIN — MIDAZOLAM 2 MG: 1 INJECTION INTRAMUSCULAR; INTRAVENOUS at 07:27

## 2023-01-10 RX ADMIN — SODIUM CHLORIDE, POTASSIUM CHLORIDE, SODIUM LACTATE AND CALCIUM CHLORIDE: 600; 310; 30; 20 INJECTION, SOLUTION INTRAVENOUS at 08:54

## 2023-01-10 RX ADMIN — SUGAMMADEX 500 MG: 100 INJECTION, SOLUTION INTRAVENOUS at 09:14

## 2023-01-10 RX ADMIN — BUPIVACAINE HYDROCHLORIDE 10 ML: 5 INJECTION, SOLUTION EPIDURAL; INTRACAUDAL; PERINEURAL at 09:35

## 2023-01-10 RX ADMIN — FENTANYL CITRATE 100 MCG: 50 INJECTION, SOLUTION INTRAMUSCULAR; INTRAVENOUS at 07:37

## 2023-01-10 RX ADMIN — PHENYLEPHRINE HYDROCHLORIDE 100 MCG: 10 INJECTION INTRAVENOUS at 08:03

## 2023-01-10 RX ADMIN — Medication 3000 MG: at 07:50

## 2023-01-10 RX ADMIN — PROPOFOL 50 MG: 10 INJECTION, EMULSION INTRAVENOUS at 09:05

## 2023-01-10 RX ADMIN — GLYCOPYRROLATE 0.2 MG: 0.2 INJECTION INTRAMUSCULAR; INTRAVENOUS at 07:27

## 2023-01-10 RX ADMIN — DEXAMETHASONE SODIUM PHOSPHATE 10 MG: 10 INJECTION INTRAMUSCULAR; INTRAVENOUS at 07:00

## 2023-01-10 RX ADMIN — BUPIVACAINE 10 ML: 13.3 INJECTION, SUSPENSION, LIPOSOMAL INFILTRATION at 09:35

## 2023-01-10 ASSESSMENT — PAIN DESCRIPTION - ORIENTATION: ORIENTATION: PROXIMAL

## 2023-01-10 ASSESSMENT — PAIN DESCRIPTION - PAIN TYPE: TYPE: SURGICAL PAIN

## 2023-01-10 ASSESSMENT — PAIN SCALES - GENERAL
PAINLEVEL_OUTOF10: 0
PAINLEVEL_OUTOF10: 7
PAINLEVEL_OUTOF10: 0
PAINLEVEL_OUTOF10: 0

## 2023-01-10 ASSESSMENT — PAIN DESCRIPTION - DESCRIPTORS: DESCRIPTORS: ACHING

## 2023-01-10 ASSESSMENT — PAIN DESCRIPTION - LOCATION: LOCATION: KNEE

## 2023-01-10 ASSESSMENT — PAIN - FUNCTIONAL ASSESSMENT: PAIN_FUNCTIONAL_ASSESSMENT: NONE - DENIES PAIN

## 2023-01-10 ASSESSMENT — LIFESTYLE VARIABLES: SMOKING_STATUS: 1

## 2023-01-10 NOTE — PROGRESS NOTES
Physical Therapy  Facility/Department: [facility] MED SURG  Physical Therapy Initial Assessment    Name: García Bonilla  : 1975  MRN: [id number]  Date of Service: 1/10/2023    Discharge Recommendations: The patient would benefit from additional Physical  Therapy after discharge from the facility upon return to their Home. PT Equipment Recommendations  Equipment Needed: No  Other: Pt has RW      Patient Diagnosis(es): The encounter diagnosis was Primary osteoarthritis of right knee. Past Medical History:  has a past medical history of Abnormal levels of other serum enzymes, Acute respiratory failure with hypoxia ([address] Anxiety, Bilateral leg edema, Chronic kidney disease, Depression, Diabetes mellitus ([address] DVT (deep venous thrombosis) (HCC), Elevated liver enzymes, Fibromyalgia, Headache(784.0), Hx of blood clots, Hypertension, Kidney stone, Obstructive sleep apnea syndrome, Pancreatitis, Pleural effusion, Pneumonia of both lungs due to infectious organism, PONV (postoperative nausea and vomiting), Pseudotumor cerebri syndrome, Right knee DJD, S/P left knee arthroscopy, Sepsis ([address] SOB (shortness of breath), SVT (supraventricular tachycardia) ([address] Thyroid disease, and Varicose vein of leg. Past Surgical History:  has a past surgical history that includes Thyroid lobectomy (Right); Cholecystectomy (); Knee arthroscopy (Left); Foot surgery (Right); Endometrial ablation; Atrial ablation surgery (); eye surgery (Bilateral); Endoscopy, colon, diagnostic; back surgery; pr cysto w/ureteroscopy w/lithotripsy (Left, 2017); EXCISION LESION HAND / FINGER (Right, 2019); US BIOPSY LIVER PERCUTANEOUS (2021); Upper gastrointestinal endoscopy (N/A, 2021); Knee arthroscopy (Left, 02/15/2021); Knee arthroscopy w/ meniscectomy (Right, 2022); Knee arthroscopy (Right, 2022); vascular surgery (Left); and Total knee arthroplasty (Right, 1/10/2023).     Assessment   Assessment: Pt is CGA to SBA for mobility. Recommend use of RW at all times upon discharge. Education provided to patient and pt's mother on ice, HEP, safety, activity level, and positioning. Treatment Diagnosis: impaired functional mobility 2* R TKA  Specific Instructions for Next Treatment: stairs, gait, HEP  Therapy Prognosis: Good  Decision Making: Low Complexity  History: R TKA 1/10/23  Exam: ROM, MMT, transfers, amb, stairs  Clinical Presentation: Pt alert, cooperative, pleasant, motivated despite pain  Barriers to Learning: pain  Requires PT Follow-Up: Yes  Activity Tolerance  Activity Tolerance: Patient limited by pain     Plan   Physcial Therapy Plan  General Plan: 2 times a day 7 days a week  Specific Instructions for Next Treatment: stairs, gait, HEP  Current Treatment Recommendations: Strengthening, ROM, Balance training, Functional mobility training, Transfer training, Endurance training, Gait training, Stair training, Equipment evaluation, education, & procurement, Patient/Caregiver education & training, Safety education & training, Home exercise program, Pain management, Therapeutic activities, Positioning  Safety Devices  Type of Devices: Call light within reach, Gait belt, Left in chair, Nurse notified, All fall risk precautions in place (FRANKO Santa)  Restraints  Restraints Initially in Place: No     Restrictions  Restrictions/Precautions  Restrictions/Precautions: Fall Risk, Weight Bearing  Required Braces or Orthoses?: No  Implants present? : Metal implants (R TKA; implants in fallopian tubes)  Lower Extremity Weight Bearing Restrictions  Right Lower Extremity Weight Bearing: Weight Bearing As Tolerated  Position Activity Restriction  Other position/activity restrictions: PT eval and treat     Subjective   Pain: Patient reports 7/10 pain R knee radiating to thigh.   General  Chart Reviewed: Yes  Patient assessed for rehabilitation services?: Yes  Additional Pertinent Hx: fibromyalgia  Family / Caregiver Present: Yes (mother)  Referring Practitioner: Nitesh Briceno MD  Referral Date : 01/10/23  Diagnosis: primary OA R knee  Follows Commands: Within Functional Limits  Subjective  Subjective: Pt up in recliner, agreeable to PT OT co trenton. RN Kar Marshall. Pt is on room air. Social/Functional History  Social/Functional History  Lives With:  (Mom)  Type of Home: House  Home Layout: Two level, Performs ADL's on one level, Able to Live on Main level with bedroom/bathroom  Home Access: Stairs to enter without rails  Entrance Stairs - Number of Steps: 2 platform steps  Bathroom Shower/Tub: Walk-in shower, Doors  Bathroom Toilet: Handicap height  Bathroom Equipment: Grab bars in shower, Hand-held shower  Bathroom Accessibility: Not accessible  Home Equipment: Walker, rolling, Long-handled shoehorn  Has the patient had two or more falls in the past year or any fall with injury in the past year?:  (patient reports 3 or 4 falls in the last year)  Receives Help From: Family  ADL Assistance: [address] [place] [address] Avenue: Independent  Homemaking Responsibilities: Yes  Ambulation Assistance: Independent  Transfer Assistance: Independent  Active : Yes  Mode of Transportation: Car  Occupation: Full time employment  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 However, patient will be staying with her parents for several weeks after surgery. Patient's mother reports that she will have assistance/support at home 24/7 if needed.   Vision/Hearing  Vision  Vision: Impaired  Vision Exceptions: Wears glasses at all times (typically wears contacts)  Hearing  Hearing: Within functional limits    Cognition   Orientation  Overall Orientation Status: Within Normal Limits  Cognition  Overall Cognitive Status: WFL     Objective   Heart Rate: 98  Heart Rate Source: Monitor  BP: 119/61  Patient Position: Supine  MAP (Calculated): 80  Resp: 16  SpO2: 95 %  O2 Device: None (Room air)     Observation/Palpation  Observation: RLE ACE wrapped        AROM RLE (degrees)  RLE AROM: WFL  RLE General AROM: knee AROM 0-78  AROM LLE (degrees)  LLE AROM : WNL  AROM RUE (degrees)  RUE General AROM: See OT  AROM LUE (degrees)  LUE General AROM: See OT  Strength RLE  Strength RLE: WFL  Comment: Grossly 3+ to 4-/5  Strength LLE  Strength LLE: WNL  Comment: Grossly 4/5  Strength RUE  Comment: See OT  Strength LUE  Comment: See OT     Sensation  Overall Sensation Status: WFL (denies)     Bed mobility  Bed Mobility Comments: Patient seated in recliner at start and end of session  Transfers  Sit to Stand: Contact guard assistance  Stand to Sit: Contact guard assistance  Comment: CGA with cues for technique from recliner. Increased time to complete and initial weight shifting prior to ambulation for increased weight acceptance on RLE. Ambulation  WB Status: WBAT RLE  Ambulation  Surface: Level tile  Device: Rolling Walker  Assistance: Contact guard assistance;Stand by assistance  Quality of Gait: antalgic gait, decreased stance time RLE, slow moving  Gait Deviations: Slow Reyna;Decreased step length;Decreased step height  Distance: 50'  Comments: Antalgic gait, pt able to safety amb but with heavy reliance on B UE  Stairs/Curb  Stairs?: Yes  Stairs  # Steps : 2  Stairs Height: 4\"  Rails: Bilateral (RW 2 UE support)  Device: Rolling walker  Assistance: Contact guard assistance;Minimal assistance  Comment: Step to pattern, 2 platform steps for home entry. Mother present for safety and technique. Gait belt used for education.      Balance  Posture: Good  Sitting - Static: Good  Sitting - Dynamic: Good;-  Standing - Static: Fair;+  Standing - Dynamic: Fair  Comments: Standing balance with RW           OutComes Score                                                  AM-PAC Score  AM-PAC Inpatient Mobility Raw Score : 18 (01/10/23 1446)  AM-PAC Inpatient T-Scale Score : 43.63 (01/10/23 1446)  Mobility Inpatient CMS 0-100% Score: 46.58 (01/10/23 1446)  Mobility Inpatient CMS G-Code Modifier : CK (01/10/23 1446)          Tinneti Score       Goals  Short Term Goals  Time Frame for Short Term Goals: 1-2 tx  Short Term Goal 1: Pt to demo bed mobility IND. Short Term Goal 2: Pt to perform transfers MOD I. Short Term Goal 3: pt to amb 150' MOD I. Short Term Goal 4: pt to ascend/descend 2 stairs CGA/SBA. Short Term Goal 5: Pt to demo good technique for HEP. Additional Goals?: Yes  Short Term Goal 6: Pt to reduce pain to 2-3/10 with mobility to progress independence. Patient Goals   Patient Goals : to go home       Education  Patient Education  Education Given To: Patient; Family  Education Provided: Role of Therapy;Plan of Care;Precautions; Home Exercise Program;Transfer Training;Family Education;Equipment  Education Method: Verbal;Demonstration;Printed Information/Hand-outs  Barriers to Learning: None  Education Outcome: Verbalized understanding;Demonstrated understanding      Therapy Time   Individual Concurrent Group Co-treatment   Time In [place]         Time Out 1303         Minutes 42         Timed Code Treatment Minutes: [address] Yusra Solis, PT

## 2023-01-10 NOTE — INTERVAL H&P NOTE
Update History & Physical    The patient's History and Physical of December 30, 2022 was reviewed with the patient and I examined the patient. There was no change. The surgical site was confirmed by the patient and me. Pt undergoing for KNEE TOTAL ARTHROPLASTY- Right per Dr Seferino Padilla  Pt denies fever/chills, chest pain or SOB  Pt NPO since the past midnight , she took all her am medication today with sip of water  Denies hx of MRSA infection   Pt has hx of blood clots in the left leg 1997  Currently she is not taking any blood thinner  Patient has hx of PONV, otherwise denies any personal or family problems with anesthesia. Physical exam remains unchanged including cardiac and pulmonary assessment  See nursing flow sheet for vital sings    Lab Results   Component Value Date    WBC 8.0 12/30/2022    HGB 11.8 (L) 12/30/2022    HCT 36.2 12/30/2022    MCV 92.1 12/30/2022     12/30/2022     Lab Results   Component Value Date/Time     12/30/2022 07:45 AM    K 4.9 12/30/2022 07:45 AM     12/30/2022 07:45 AM    CO2 24 12/30/2022 07:45 AM    BUN 25 12/30/2022 07:45 AM    CREATININE 1.06 12/30/2022 07:45 AM    GLUCOSE 116 12/30/2022 07:45 AM    CALCIUM 9.3 12/30/2022 07:45 AM      Lab Results   Component Value Date/Time    COLORU Yellow 12/30/2022 08:30 AM    NITRU NEGATIVE 12/30/2022 08:30 AM    GLUCOSEU NEGATIVE 12/30/2022 08:30 AM    KETUA TRACE 12/30/2022 08:30 AM    UROBILINOGEN Normal 12/30/2022 08:30 AM    BILIRUBINUR NEGATIVE 12/30/2022 08:30 AM       Plan: The risks, benefits, expected outcome, and alternative to the recommended procedure have been discussed with the patient. Patient understands and wants to proceed with the procedure.      Electronically signed by ANIBAL Mays CNP on 1/10/2023 at 6:02 AM

## 2023-01-10 NOTE — PROGRESS NOTES
CLINICAL PHARMACY NOTE: MEDS TO BEDS    Total # of Prescriptions Filled: 2   The following medications were delivered to the patient:  Oxycodone-acetaminophen 5-325  Cefdinir 300mg      Additional Documentation:delivered 2 meds to patient room 3:48 01/10/23 tasneem

## 2023-01-10 NOTE — ANESTHESIA POSTPROCEDURE EVALUATION
Department of Anesthesiology  Postprocedure Note    Patient: Kat Tadeo  MRN: [id number]  YOB: 1975  Date of evaluation: 1/10/2023      Procedure Summary     Date: 01/10/23 Room / Location[facility] [address] [place] [address]    Anesthesia Start: 0730 Anesthesia Stop: 7952    Procedure: KNEE TOTAL ARTHROPLASTY (Right: Knee) Diagnosis:       Osteoarthritis of right knee, unspecified osteoarthritis type      (DJD RIGHT KNEE)    Surgeons: Regina Ruiz MD Responsible Provider: Archana Buitrago MD    Anesthesia Type: general, regional ASA Status: 3          Anesthesia Type: No value filed.     Chidi Phase I: Chidi Score: 9    Chidi Phase II:        Anesthesia Post Evaluation    Comments: POST- ANESTHESIA EVALUATION       Pt Name: Kat Tadeo  MRN: [id number]  YOB: 1975  Date of evaluation: 1/10/2023  Time:  10:53 AM      /72   Pulse 99   Temp 98.6 °F (37 °C) (Infrared)   Resp 21   Ht 5' 4\" (1.626 m)   Wt (!) 331 lb (150.1 kg)   LMP  (Approximate) Comment: LMP 2016  SpO2 95%   BMI 56.82 kg/m²      Consciousness Level  Awake  Cardiopulmonary Status  Stable  Pain Adequately Treated YES  Nausea / Vomiting  NO  Adequate Hydration  YES  Anesthesia Related Complications NONE      Electronically signed by Archana Buitrago MD on 1/10/2023 at 10:53 AM

## 2023-01-10 NOTE — ANESTHESIA PROCEDURE NOTES
Peripheral Block    Patient location during procedure: post-op  Reason for block: post-op pain management and at surgeon's request  Start time: 1/10/2023 9:35 AM  End time: 1/10/2023 9:43 AM  Staffing  Performed: anesthesiologist   Anesthesiologist: Chris Tan MD  Preanesthetic Checklist  Completed: patient identified, IV checked, site marked, risks and benefits discussed, surgical/procedural consents, equipment checked, pre-op evaluation, timeout performed, anesthesia consent given, oxygen available, monitors applied/VS acknowledged, fire risk safety assessment completed and verbalized and blood product R/B/A discussed and consented  Peripheral Block   Patient position: supine  Prep: ChloraPrep  Provider prep: mask and sterile gloves  Patient monitoring: cardiac monitor, continuous pulse ox, frequent blood pressure checks, IV access, oxygen and responsive to questions  Block type: Saphenous  Laterality: right  Injection technique: single-shot  Guidance: ultrasound guided    Needle   Needle type: short-bevel   Needle gauge: 20 G  Needle localization: anatomical landmarks and ultrasound guidance  Needle insertion depth: 6 cm  Needle length: 8 cm  Assessment   Injection assessment: negative aspiration for heme, no paresthesia on injection, local visualized surrounding nerve on ultrasound and no intravascular symptoms  Paresthesia pain: none  Slow fractionated injection: yes  Hemodynamics: stable  Real-time US image taken/store: yes  Outcomes: uncomplicated and patient tolerated procedure well    Medications Administered  bupivacaine (PF) 0.5 % - Perineural   10 mL - 1/10/2023 9:35:00 AM  bupivacaine liposome 1.3 % - Perineural   10 mL - 1/10/2023 9:35:00 AM

## 2023-01-10 NOTE — CARE COORDINATION
[address] Encounter Date/Time: 1/10/2023 Angelito Strickland Account: [de-identified]    MRN: [id number]    Patient: Franck Kang    Contact Serial #: [id number]      ENCOUNTER          Patient Class: OP in a bed Private Enc? No Unit RM BD: [address] 15    Hospital Service: ORTHOPEDIC S   Encounter DX: Osteoarthritis of right *   ADM Provider: Rajan Dominguez MD   Procedure: IN ARTHRP KNE CONDYLE&am*   ATT Provider: Rajan Dominguez MD   REF Provider:        Admission DX: Osteoarthritis of right knee, unspecified osteoarthritis type, Primary osteoarthritis of right knee and DX codes: M17.11, M17.11      PATIENT                 Name: Franck Kang : 1975 (47 yrs)   Address: [address] [place] [address] Sex: Female   City: [address] [place] [address]         Marital Status:    Employer: UNEMPLOYED         Baptism: None   Primary Care Provider: Erin Miller.  [facility]         Primary Phone: [phone]   EMERGENCY CONTACT   Contact Name Legal Guardian? Relationship to Patient Home Phone Work Phone   1. Rachael De Los Santos  2. Mian Pichardo    No Parent  Other [phone]            GUARANTOR            Guarantor: Franck Kang     : 1975   Address: [address] [place] [address] Sex: Female     [place] [address]     Relation to Patient: Self       Home Phone: [phone]   Guarantor ID: [id number]       Work Phone:     Guarantor Employer: UNEMPLOYED         Status: NOT EMPLO*      COVERAGE        PRIMARY INSURANCE   Payor: [employer] Plan: [employer] ADVANTAGE   Payor Address: [address]       Group Number: [id number] Insurance Type: [address] Name: Ila Chirinos : 1975   Subscriber ID: [id number] Pat. Rel. to Sub: Self   SECONDARY INSURANCE   Payor:   Plan:     Payor Address:  ,           Group Number:   Insurance Type:     Subscriber Name:   Subscriber :     Subscriber ID:   Pat.  Rel. to Sub: CSN: [id number]        [facility]                                    Req/Control # [Problem retrieving Specimen ID]                                   Order Date:  Woodrow 10, 2023  [id number]                                          Patient Information      Name:  Shira Alas  :  1975  Age:  49 y.o. Address:  [place], [place]   Zip:  [address]  PCP: Alex Sorenson  [facility] Sex:  F  SSN: [id number]  Home Phone: [phone]  Work Phone:    Patient MRN:  [id number]    Alt Patient ID:  [id number]  PCP Phone: [phone]       Authorizing Provider Information       AUTHORIZING PROVIDER: Tanmay Daniel MD  Physician ID: [id number]  NPI:  [id number]  Site:   Address: [address] [place] [address] AT THE [address]: [address]  Phone: [phone]  Fax: [phone]             EQUIPMENT ORDERED  DME Order for Bath/Shower Seat at [place] [address] (ORD   #:   [id number]) Priority  Routine Class  Hospital Performed        Associated Diagnosis:          Comments:   Bath/Shower Bench     Current patient weight: Weight: (!) 331 lb (150.1 kg)   Current patient height: Height: 5' 4\" (162.6 cm)  Diagnosis: unsteady gait, s/p right total knee arthroplasty  Duration: Purchase            Scheduling Instructions:                                 Specimen Source             Collection Date    Collection Time    Order Status  Standing Expected Date                 Electronically Signed By  Tanmay Daniel MD  NPI:  [id number] Date  Woodrow 10, 2023  1:11 PM              Responsible Party [address] [place] [address]   Relationship Account Type Home Phone   Ramón Lugoana Hernandez [address] [place] [address], [address] Self P/F [phone]   Employer   Work Phone   UNEMPLOYED [address]  Insurance/Subscriber ID:  [id number]  [address] Name:  Vanessa GARCIA              Relationship to Patient: SelfSigned ABN: N    Payor Name:   [employer]   Plan:  Obadiah Barthel   Group: [id number]  Worker's Comp Date of Injury:

## 2023-01-10 NOTE — FLOWSHEET NOTE
Discharge instructions reviewed with the patient. All questions answered. Pt provided with anti em stockiings and her medications filled by meds to beds. Pt assisted to the car without difficulty.

## 2023-01-10 NOTE — PROGRESS NOTES
[address]   Occupational Therapy Evaluation  Date: 1/10/23  Patient Name: Dang Sen       Room: 4792/7999-07  MRN: [id number]  Account: [de-identified]   : 1975  (49 y.o.) Gender: female     Discharge Recommendations:  Further Occupational Therapy is recommended upon facility discharge. OT Equipment Recommendations  Equipment Needed: Yes  Mobility Devices: ADL Assistive Devices  Walker:  (recommend use of RW - patient has one present, will use at home)  ADL Assistive Devices: Toileting - Standard Commode, Shower Chair with back, Hand-held Shower, Reacher, Sock-Aid Hard, [employer], Long-handled Shoe [place], [address]    Referring Practitioner: Armen Victoria MD  Diagnosis: R TKA 1/10/23      Treatment Diagnosis: Impaired self-care status. Past Medical History:  has a past medical history of Abnormal levels of other serum enzymes, Acute respiratory failure with hypoxia ([address] Anxiety, Bilateral leg edema, Chronic kidney disease, Depression, Diabetes mellitus ([address] DVT (deep venous thrombosis) (HCC), Elevated liver enzymes, Fibromyalgia, Headache(784.0), Hx of blood clots, Hypertension, Kidney stone, Obstructive sleep apnea syndrome, Pancreatitis, Pleural effusion, Pneumonia of both lungs due to infectious organism, PONV (postoperative nausea and vomiting), Pseudotumor cerebri syndrome, Right knee DJD, S/P left knee arthroscopy, Sepsis ([address] SOB (shortness of breath), SVT (supraventricular tachycardia) ([address] Thyroid disease, and Varicose vein of leg. Past Surgical History:   has a past surgical history that includes Thyroid lobectomy (Right); Cholecystectomy (); Knee arthroscopy (Left); Foot surgery (Right); Endometrial ablation; Atrial ablation surgery (); eye surgery (Bilateral);  Endoscopy, colon, diagnostic; back surgery; pr cysto w/ureteroscopy w/lithotripsy (Left, 2017); EXCISION LESION HAND / FINGER (Right, 2019); US BIOPSY LIVER PERCUTANEOUS (02/01/2021); Upper gastrointestinal endoscopy (N/A, 02/02/2021); Knee arthroscopy (Left, 02/15/2021); Knee arthroscopy w/ meniscectomy (Right, 07/18/2022); Knee arthroscopy (Right, 07/18/2022); vascular surgery (Left); and Total knee arthroplasty (Right, 1/10/2023).    Restrictions  Restrictions/Precautions  Restrictions/Precautions: Fall Risk;Weight Bearing  Required Braces or Orthoses?: No  Implants present? : Metal implants (R TKA; implants in fallopian tubes)  Lower Extremity Weight Bearing Restrictions  Right Lower Extremity Weight Bearing: Weight Bearing As Tolerated      Vitals  Vitals  Heart Rate: 98  Heart Rate Source: Monitor  BP: 119/61  Patient Position: Supine  MAP (Calculated): 80  Resp: 16  SpO2: 95 %  O2 Device: Nasal cannula     Subjective  Subjective: \"Actually have to turn the walker sideways\" Patient reports that the RW partially fits inside bathroom.  Subjective  Pain: Patient reports 7/10 pain R knee radiating to thigh.      Social/Functional History  Social/Functional History  Lives With:  (Mom)  Type of Home: House  Home Layout: Two level, Performs ADL's on one level, Able to Live on Main level with bedroom/bathroom  Home Access: Stairs to enter without rails  Entrance Stairs - Number of Steps: 2 platform steps  Bathroom Shower/Tub: Walk-in shower, Doors  Bathroom Toilet: Handicap height  Bathroom Equipment: Grab bars in shower, Hand-held shower  Bathroom Accessibility: Not accessible  Home Equipment: Walker, rolling, Long-handled shoehorn  Has the patient had two or more falls in the past year or any fall with injury in the past year?:  (patient reports 3 or 4 falls in the last year)  Receives Help From: Family  ADL Assistance: Independent  Homemaking Assistance: Independent  Homemaking Responsibilities: Yes  Ambulation Assistance: Independent  Transfer Assistance: Independent  Active : Yes  Mode of Transportation: Car  Occupation: Full time employment  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 However, patient will be staying with her parents for several weeks after surgery. Patient's mother reports that she will have assistance/support at home 24/7 if needed. Objective     Cognition  Overall Cognitive Status: WFL   Sensation  Overall Sensation Status: WFL (denies)    ADL  Feeding: Independent  Grooming: Setup  UE Bathing: Supervision  LE Bathing: Minimal assistance  UE Dressing: Setup  LE Dressing: Moderate assistance  LE Dressing Skilled Clinical Factors: assist to thread L LE and don socks per patient report. OT provided education and handout regading use of reacher and sock aid to maximize IND with lower body dressing  Toileting: Contact guard assistance  Additional Comments: OT facilitated patient and patient's caregiver (mother) in discussion regarding safety and recommendations for self-care for discharge home. Recommended use of reacher and sock aid to maximize IND with lower body dressing. Recommended purchase and use of bedside commode as patient cannot safely access the bathroom at her home with use of RW. Recommended use and purchase of shower chair to maximize safety with showering as patient is unable to tolerate standing for duration of shower. Patient and patient's mother verbalize understanding of recommendations, however patient and patient's mother are resistant to bedside commode and state that they believe patient \"will be fine\" accessing the toilet. OT provided additional education regarding necessity of using RW for safety with ambulation as patient is utilizing B UE to support self with RW during ambulation. Patient acknowledges that she is using her B UE with RW significantly. Patient and patient's mother acknowledge recommendations.  OT provides patient with handout as well with recommendations for use of bedside commode, shower chair, and reacher/sock aid as well. Patient denies any additional questions or concerns regarding self-care for anticipated discharge home this date. UE Function  LUE AROM (degrees)  LUE AROM : WFL  Left Hand AROM (degrees)  Left Hand AROM: WFL  Tone LUE  LUE Tone: Normotonic  LUE Strength  Gross LUE Strength: WFL  L Hand General: 4+/5  LUE Strength Comment: Grossly 4+/5    RUE AROM (degrees)  RUE AROM : WFL  Right Hand AROM (degrees)  Right Hand AROM: WFL  Tone RUE  RUE Tone: Normotonic  RUE Strength  Gross RUE Strength: WFL  R Hand General: 4+/5  RUE Strength Comment: Grossly 4+/5         Fine Motor Skills/Coordination  Coordination  Movements Are Fluid And Coordinated: Yes              Bed Mobility  Bed mobility  Bed Mobility Comments: Patient seated in recliner at start and end of session    Balance  Balance  Sitting Balance: Supervision  Standing Balance: Contact guard assistance       Transfers  Transfers  Sit to stand: Contact guard assistance  Stand to sit: Contact guard assistance  Transfer Comments: with Minimal verbal cues for hand placement and safety    Functional Mobility  Functional - Mobility Device: Rolling Walker  Activity:  (to/from doorway and back)  Assist Level: Contact guard assistance  Functional Mobility Comments: increased time to complete; patient reports her arms are becoming fatigued after ambulating as she is using B UE for support via RW    Assessment  Assessment  Performance deficits / Impairments: Decreased functional mobility , Decreased ADL status, Decreased strength, Decreased endurance, Decreased balance, Decreased high-level IADLs  Treatment Diagnosis: Impaired self-care status.   Prognosis: Good  Decision Making: Medium Complexity  Discharge Recommendations: Patient would benefit from continued therapy after discharge    Activity Tolerance  Activity Tolerance: Patient Tolerated treatment well    Safety Devices  Type of Devices: Call light within reach, Gait belt, Left in chair, Nurse notified    Patient Education  Patient Education  Education Given To: Patient  Education Provided: Role of Therapy, Plan of Care, ADL Adaptive Strategies, Transfer Training, Precautions, Family Education, Equipment  Education Method: Verbal, Demonstration  Barriers to Learning: None  Education Outcome: Verbalized understanding      Functional Outcome Measures  AM-PAC Daily Activity Inpatient   How much help for putting on and taking off regular lower body clothing?: A Lot  How much help for Bathing?: A Little  How much help for Toileting?: A Little  How much help for putting on and taking off regular upper body clothing?: A Little  How much help for taking care of personal grooming?: A Little  How much help for eating meals?: None  AM-[facility] Inpatient Daily Activity Raw Score: 18  AM-PAC Inpatient ADL T-Scale Score : 38.66  ADL Inpatient CMS 0-100% Score: 46.65  ADL Inpatient CMS G-Code Modifier : CK       Goals  Patient Goals   Patient goals : To return home  Short Term Goals  Time Frame for Short Term Goals: By discharge, patient will  Short Term Goal 1: verbalize/demonstrate Good understanding of assistive equipment/durable medical equipment/modified techniques to maximize safety and independence with self-care and mobility  Short Term Goal 2: verbalize/demonstrate Good understanding of Fall Prevention Strategies to maximize safety and independence with self-care and mobility  Short Term Goal 3: perform BADLs with Supervision and Good safety  Short Term Goal 4: perform functional mobility and transfers during self-care with Supervision, RW, and Good safety    Plan  Occupational Therapy Plan  Times Per Week: 4-6  Times Per Day:  Once a day  Current Treatment Recommendations: Self-Care / ADL, Home management training, Strengthening, Balance training, Functional mobility training, Endurance training, Pain management, Safety education & training, Patient/Caregiver education & training, Equipment evaluation, education, & procurement      OT Individual Minutes  OT Individual Minutes  Time In: 7771  Time Out: 4644  Minutes: 35  Time Code Minutes   Timed Code Treatment Minutes: 15 Minutes        Electronically signed by NICHOLAS Rosenthal on 1/10/23 at 1:55 PM EST

## 2023-01-10 NOTE — OP NOTE
Operative Note      Patient: Claudetta Hurdle  YOB: 1975  MRN: [id number]    Date of Procedure: 1/10/2023    Pre-Op Diagnosis: DJD RIGHT KNEE    Post-Op Diagnosis: Same       Procedure(s):  KNEE TOTAL ARTHROPLASTY right    Surgeon(s):  Janice Shepherd MD    Assistant:   Resident: DO Anegl Dangelo CST    Anesthesia: General    Estimated Blood Loss (mL): Minimal    Complications: None    Specimens:   * No specimens in log *    Implants:  Implant Name Type Inv. Item Serial No.  Lot No. LRB No. Used Action   CEMENT BNE 40GM HI VISC RADPQ FOR REV SURG - [id number]  CEMENT BNE 40GM HI VISC RADPQ FOR REV SURG  [employer]- [id number] Right 2 Implanted   COMPONENT PAT QBD50DM THK7. 8MM THN KNEE POLY 3 PEG SER A - [id number]  COMPONENT PAT JLE91LC THK7. 8MM THN KNEE POLY 3 PEG SER A  [employer]- [id number] Right 1 Implanted   COMPONENT FEM 62. 5MM R KNEE CO CHROM NP CRUCE RET INTLOK - [id number]  COMPONENT FEM 62. 5MM R KNEE CO CHROM NP CRUCE RET INTLOK  [employer]- [id number] Right 1 Implanted   IMPL KNEE TIB STEM MOD FINNED 12.5X80MM - [id number]  IMPL KNEE TIB STEM MOD FINNED 12.5X80MM  [employer]- [id number] Right 1 Implanted   TRAY TIB L71MM KNEE COPOLYESTER VALENTIN INTLOK LARS FRANNY VANGUARD - [id number]  TRAY TIB L71MM KNEE COPOLYESTER VALENTIN INTLOK LARS FRANNY VANGUARD  [employer]- [id number] Right 1 Implanted   BEARING TIB 71X13 MM KNEE Margaretann Casa - [id number]  BEARING TIB 71X13 MM KNEE ANTR STBL VIVACIT-E [employer]- [id number] Right 1 Implanted         Drains: * No LDAs found *    Findings: DJD right knee    Detailed Description of Procedure:     Patient is a 49 y.o. female with a long standing history of right DJD of the knee. Patient has failed all types of conservative treatment included physical therapy, weight-loss counseling, NSAIDS, and injections.  The patient has significant restriction of activities of daily living and/or work/job place abilities, and, therefore, has been counseled for TKA. Patient is aware of all the risks and benefits as highlighted in the surgical consent form. The patient was given 3 grams of Ancef in the holding area as well as an adductor canal block. The patient was then taken to the operating suite where general anesthesia was administered. A tourniquet was applied to effected leg and then prepped and draped in the usual sterile fashion. Time out was called to verify laterality. The leg was examined   and the tourniquet inflated to 350 mm of Hg. Midline incision was utilized and taken down to the joint capsule where a medial parapatellar approach to the knee was performed. After a complete synovectomy, the patella was elevated, calibrated for thickness, and the above sized, patellar triple pegged drills guide positioned medially and then drilled. Atrial patellar button was positioned in order to re-established the original thickness. This was then replaced with a protective patellar plate. The knee was then flexed and revealed significant  arthrosis. Osteophytes were removed via rongeur. A drill hole was made in the distal femur and the femoral canal was then irrigated and suctioned. A fluted IM soumya was inserted and a distal femoral cut was made at 5 degrees of valgus at the +2 setting. The proximal tibia was then exposed after resection of the ACL and lateral meniscus. An extra-medullary tibial cutting jug was then aligned in reference to the tibia tubercle and ankle mortise and positional with a slight posterior slope. The cut was made with minimal cutting of the lowest depth of the tibial wear and the fragment removed. The distal femur was then approached and femoral sizing guide with 3 degrees of external rotation was placed flush with the surface and drill holes made and femoral size determined.  The appropriate size cutting jig was then placed and anterior, posterior, and chamfer cuts made with an oscillating saw. A laminar  was inserted with care to avoid damaging the MCL. Posterior osteophytes were removed and the capsule stripped from the posterior femoral condyles. The capsule was then injected with the orthopedic cocktail at this time. The tibial cut was then assessed with a baseplate and alignment soumya to assure proper cut. Spacer blocks were then inserted and the knee was assessed to determine the amount of soft tissue release and osteophyte removal necessary  to establish symmetric flexion and extension gaps. The tibia was then elevated, and the above sized tibial plate was positioned for proper external rotation with an alignment soumya down the middle-third of the tibial tubercles. This was pinned in place, the proximal tibialis reamed, the fins were then repacked. The appropriate size femur was positioned and various size of the polyethylene trials were inserted. The knee was assessed for  ROM and patellar tracking. Satisfied with this, this distal femoral logs were drilled and then all the trial components were removed. The proximal tibia was also reamed with a 10 mm reamer to accommodate a modular tibial stem that was elected to be used due to the patient's size. The knee was irrigated and dried while the cement was prepared. Cement was applied to to both implant and cut surfaces and the implants impacted and the compression with one size larger poly inserted and excess cement removed. The patella was placed and compressed as well. The knee was placed in full extension and then injected with the remainder  of the 100ml of the orthopedic cocktail. The Irrisept lavage was carried out for the 1 minutes. This was then irrigated and a permanent polyethylene was insert was injected with platelet rich/poor plasma and the tourniquet was released at 49 minutes. Hemostasis was excellent.      The knee was closed with a #1 Strata-Fix and reinforced with several throws of #5 FiberWire. The subcutaneous tissue closed with a 2-0 Monocryl Strata-Fix  and then a Zip-line used for the skin. This was covered with adaptic and Aquacel dressing and dressed with a large 6-inch Ace dressing from toes to mid-thigh. The patient was awakened and taken to PACU in good condition.       Electronically signed by Raheem Young MD on 1/10/2023 at 9:15 AM

## 2023-01-10 NOTE — DISCHARGE INSTR - COC
Continuity of Care Form    Patient Name: Lb Rodriguez   :  1975  MRN:  [id number]    Admit date:  1/10/2023  Discharge date:  1/10/23    Code Status Order: Full Code   Advance Directives:   [facility] Documentation       Date/Time Healthcare Directive Type of Healthcare Directive Copy in [address] Agent's Name Healthcare Agent's Phone Number    01/10/23 3264 Yes, patient has an advance directive for healthcare treatment Durable power of  for health care Yes, copy in chart Healthcare power of  -- --            Admitting Physician:  France Del Cid MD  PCP: Rafy Elaine [facility] 108    Discharging Nurse: [facility] Unit/Room#: 43 [facility]  Discharging Unit Phone Number: [phone]    Emergency Contact:   Extended Emergency Contact Information  Primary Emergency Contact: Rachael De Los Santos   [address] Phone: [phone]  Work Phone: [phone]  Mobile Phone: [phone]  Relation: Parent  Preferred language: English   needed?  No  Secondary Emergency Contact: Cristobal Litten  Mobile Phone: [phone]  Relation: Other    Past Surgical History:  Past Surgical History:   Procedure Laterality Date    ATRIAL ABLATION SURGERY  2015    BACK SURGERY      L4-5    CHOLECYSTECTOMY      ENDOMETRIAL ABLATION      e sure implants placed also    ENDOSCOPY, COLON, DIAGNOSTIC      EXCISION LESION HAND / FINGER Right 2019    HAND LESION BIOPSY EXCISION performed by Kiera Rothman MD at [address] Bilateral     left x2, right x1    FOOT SURGERY Right     x3    KNEE ARTHROSCOPY Left     x 3    KNEE ARTHROSCOPY Left 02/15/2021    KNEE ARTHROSCOPY WITH PARTIAL MEDIAL MENISCECTOMY performed by France Del Cid MD at [facility] ARTHROSCOPY Right 2022    RIGHT KNEE ARTHROSCOPIC PARTIAL MEDIAL MENISCECTOMY performed by France Del Cid MD at [place] [address] [address] MENISCECTOMY Right 07/18/2022    RIGHT KNEE ARTHROSCOPIC PARTIAL MEDIAL MENISCECTOMY (Right: Knee)    ID CYSTO W/URETEROSCOPY W/LITHOTRIPSY Left 09/20/2017    CYSTOSCOPY URETEROSCOPY HOLMIUM LASER LITHO WITH STONE BASKETING WITH  STENT  performed by Mare Botello MD at [facility] 12 Right     UPPER GASTROINTESTINAL ENDOSCOPY N/A 02/02/2021    EGD ESOPHAGOGASTRODUODENOSCOPY performed by Kiesha Bey MD at [address]  02/01/2021    [address] [place] [address] 2/1/2021 [facility] ULTRASOUND    VASCULAR SURGERY Left     leg       Immunization History:   Immunization History   Administered Date(s) Administered    Hepatitis A 02/22/2019    Influenza Virus Vaccine 12/19/2013    Influenza, FLUARIX, FLULAVAL, [place] (age 10 mo+) AND AFLURIA, (age 1 y+), PF, 0.5mL 11/24/2016, 09/05/2018    Tdap (Boostrix, Adacel) 09/15/2017       Active Problems:  Patient Active Problem List   Diagnosis Code    Anxiety F41.9    Class 3 severe obesity due to excess calories with serious comorbidity in adult [facility]) E66.01    Restless leg syndrome G25.81    Previous back surgery Z98.890    Major depression F32.9    HTN (hypertension) I10    FHx: rheumatoid arthritis Z82.61    SVT (supraventricular tachycardia) ([facility]) I47.1    Right elbow pain M25.521    Pain of right lower extremity M79.604    Cough with sputum R05.8    Varicose vein of leg I83.90    Leg swelling M79.89    Herpes zoster without complication J36.8    Chest pain R07.9    Fibromyalgia M79.7    Acute pancreatitis K85.90    Sepsis ([facility]) A41.9    Morbid obesity ([facility]) E66.01    Abdominal pain, epigastric R10.13    Nausea R11.0    Acute respiratory failure with hypoxia ([facility]) J96.01    Hypocalcemia E83.51    History of anxiety disorder Z86.59    Abnormal levels of other serum enzymes R74.8    Elevated liver enzymes R74.8    Bilateral leg edema R60.0    Smoker F17.200    Severe episode of recurrent major depressive disorder, without psychotic features ([facility] [address] F33.2    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    Presbyopia H52.4    Pseudotumor cerebri syndrome G93.2    Insomnia due to psychological stress F51.02    Astigmatism H52.209    Generalized anxiety disorder F41.1    Carpal tunnel syndrome of right wrist G56.01    Pneumonia of both lungs due to infectious organism J18.9    Fatty liver K76.0    Pancreatic pseudocyst K86.3    History of depression Z86.59    Acute on chronic pancreatitis (HCC) K85.90, K86.1    Abdominal pain R10.9    Acute medial meniscus tear of left knee S83.242A    Tear of medial meniscus of right knee, current S83.241A    Myalgia, unspecified site M79.10    Prediabetes R73.03    Other abnormal glucose R73.09    Contusion of hip S70.00XA    Convergence excess H51.12    Dizziness and giddiness R42    Gastroparesis K31.84    Hyperopia with astigmatism and presbyopia, bilateral H52.03, H52.203, H52.4    Migraine G43.909    Obesity, Class III, BMI 40-49.9 (morbid obesity) ([facility]) E66.01    Obstructive sleep apnea syndrome G47.33    Dyspnea R06.00    SOB (shortness of breath) on exertion R06.02    Thrombophlebitis of superficial veins of left lower extremity I80.02    Vaginal discharge N89.8    Bipolar disorder, unspecified ([facility]) F31.9    Primary osteoarthritis of right knee M17.11       Isolation/Infection:   Isolation            No Isolation          Patient Infection Status       Infection Onset Added Last Indicated Last Indicated By Review Planned Expiration Resolved Resolved By    None active    Resolved    COVID-19 (Rule Out) 02/11/21 02/11/21 02/11/21 COVID-19 (Ordered)   02/11/21 Rule-Out Test Resulted    COVID-19 (Rule Out) 09/23/20 09/23/20 09/23/20 COVID-19 (Ordered)   09/23/20 Rule-Out Test Resulted    COVID-19 (Rule Out) 08/26/20 08/26/20 08/26/20 COVID-19 (Ordered)   08/26/20 Rule-Out Test Resulted            Nurse Assessment:  Last Vital Signs: BP (!) 103/58   Pulse 82   Temp 97.1 °F (36.2 °C) (Infrared)   Resp 20   Ht 5' 4\" (1.626 m)   Wt (!) 331 lb (150.1 kg)   LMP  (Approximate) Comment: LMP 2016  SpO2 97%   BMI 56.82 kg/m²     Last documented pain score (0-10 scale): Pain Level: 0  Last Weight:   Wt Readings from Last 1 Encounters:   01/10/23 (!) 331 lb (150.1 kg)     Mental Status:  oriented and alert    IV Access:  - None    Nursing Mobility/ADLs:  Walking   Independent  Transfer  Assisted  Bathing  Assisted  Dressing  Independent  [address]  Independent  Med Delivery   whole    Wound Care Documentation and Therapy:  Incision 07/18/22 Knee Anterior;Right (Active)   Number of days: 176          Safety Concerns: At Risk for Falls    Impairments/Disabilities:      None    Nutrition Therapy:  Current Nutrition Therapy:   - Oral Diet:  General    Routes of Feeding: Oral  Liquids: No Restrictions  Daily Fluid Restriction: no  Last Modified Barium Swallow with Video (Video Swallowing Test): not done    Treatments at the Time of Hospital Discharge:   Respiratory Treatments: n/a  Oxygen Therapy:  is not on home oxygen therapy. Ventilator:    - No ventilator support    Rehab Therapies: Physical Therapy and Occupational Therapy  Weight Bearing Status/Restrictions: No weight bearing restrictions  Other Medical Equipment (for information only, NOT a DME order):  walker  Other Treatments: Skilled Nursing assessment and monitoring. Medication education and monitoring per protocol. TOTAL JOINT REPLACEMENT PATIENT HOME HEALTH CARE PROTOCOL  This patient is a post-operative total joint replacement patient. Expectations for this patients care are as follows:      Goals:  DailyTherapy for rehabilitative purposes for two weeks. Increased level of activity and ambulation each day. Well-controlled pain. Free from infection. Encourage patient to provide self-care when possible. Ambulation with a rolling walker.     Activity & Diet:  Therapy performed daily. (Instruct patient to take pain meds. 1 hour prior to therapy.)  Up in chair for all meals and majority of day. Range of motion for TKA patients. Hip precautions for RAHUL patients. Incentive Spirometer: 3- 4 inhalations every twenty minutes, during the day, while awake, the first week home after discharge  Increase oral intake of fruits, fiber and water and take a daily stool softener to prevent constipation. Consider stronger bowel protocol if no bowel movement is achieved after three days home. Increase protein intake and reduce sugar intake to promote healing and prevent infection. No pillow under the knee for TKA patients. [facility] go on in the a.m. and come off in the p.m. (Hand wash them every two or three days, roll in towel to remove most of moisture, and hang to dry.)    Incision Care: If patient has ACE bandage it may be removed POD #2  Keep incisional dressing intact until seen and removed by surgeon, unless saturated, in which case, call surgeon and request instructions. If dressing falls off, call surgeon. If using the Prevena dressing, with battery pack, place battery pack in waterproof bag during shower. If using Aquacel dressing then dressing is waterproof and patient may shower. If patient has a Prevena remove on POD #5 and replace with Aquacel dressing (patient was provided with dressing in hospital)  Elevated the leg and ice the affected area four times a day, for twenty minutes each time and after every physical therapy session. Normal Conditions - Will improve with provided comfort measures and time:  Some swelling in the operative leg is normal - this should reduce over time. Some post-operative pain is normal.  This will improve with prescribed medication, provided comfort measures and time. Constipation related to pain medications & decreased mobility is a common occurrence.   (Increase your fiber & water intake and take a stool softener.)   Slight warmth of operative site is normal and will diminish with time. Fatigue and moderate pain after therapy is normal and will improve with time. Numbness near the incision site is normal and will improve with time. NOTE: Ensure/Remind patient to go to their follow-up appointment with their orthopedic surgeon, which is scheduled    Abnormal Conditions - When to call the Surgeon:  Increasing/excessive redness, warmth or swelling at the incisional site not relieved with ice and elevation. Increasing/excessive pain not well-controlled by prescribed medications. Drainage or odor from or around the incision site.  (A little blood showing through the bandage is ok, but active leaking, of any color, coming out of the bandage is NOT normal.)  Temperature above 101 degrees. (A mild temp of 99 - 100 is normal - if temp gets to 101 you may use Tylenol once - if it does not improve, you may use a 2nd dose of Tylenol after 5 hours - if temperature is still at or above 101 degrees then call the surgeon.)  Calf tenderness, swelling, or redness or numbness of the foot/lower leg. Shortness of breath or chest pain. Any other incision or surgical-related concerns. CALL SURGEON with concerns PRIOR TO sending patient to hospital.      Patient's personal belongings (please select all that are sent with patient):   All belongings returned to patient    RN SIGNATURE:  Electronically signed by Catrachita Glass RN on 1/10/23 at 11:16 AM EST    CASE MANAGEMENT/SOCIAL WORK SECTION    Inpatient Status Date:     Readmission Risk Assessment Score:  Readmission Risk              Risk of Unplanned Readmission:  0           Discharging to [facility] [address], [address]  P: [phone]     / signature: Electronically signed by Catrachita Glass RN on 1/10/23 at 11:16 AM EST    PHYSICIAN SECTION    Prognosis: Good    Condition at Discharge: Stable    Rehab Potential (if transferring to Rehab): Good    Recommended Labs or Other Treatments After Discharge: see above    Physician Certification: I certify the above information and transfer of Loreta De Los Santos  is necessary for the continuing treatment of the diagnosis listed and that she requires Home Care for less 30 days.     Update Admission H&P: No change in H&P    PHYSICIAN SIGNATURE:  Electronically signed by Devendra Cardozo MD on 1/10/23 at 7:28 AM EST

## 2023-01-10 NOTE — CARE COORDINATION
Continuity of Care Form    Patient Name: Franck Kang   :  1975  MRN:  [id number]    Admit date:  1/10/2023  Discharge date:  1/10/23    Code Status Order: Full Code   Advance Directives:   [facility] Documentation       Date/Time Healthcare Directive Type of Healthcare Directive Copy in [address] Agent's Name Healthcare Agent's Phone Number    01/10/23 8043 Yes, patient has an advance directive for healthcare treatment Durable power of  for health care Yes, copy in chart Healthcare power of  -- --            Admitting Physician:  Rajan Dominguez MD  PCP: Clifford Nails. [facility]    Discharging Nurse: [facility] Unit/Room#: 43 [facility]  Discharging Unit Phone Number: [phone]    Emergency Contact:   Extended Emergency Contact Information  Primary Emergency Contact: Rachael De Los Santos   [address] Phone: [phone]  Work Phone: [phone]  Mobile Phone: [phone]  Relation: Parent  Preferred language: English   needed?  No  Secondary Emergency Contact: Clarencejess Kylee  Mobile Phone: [phone]  Relation: Other    Past Surgical History:  Past Surgical History:   Procedure Laterality Date    ATRIAL ABLATION SURGERY  2015    BACK SURGERY      L4-5    CHOLECYSTECTOMY      ENDOMETRIAL ABLATION      e sure implants placed also    ENDOSCOPY, COLON, DIAGNOSTIC      EXCISION LESION HAND / FINGER Right 2019    HAND LESION BIOPSY EXCISION performed by Nataliia Irvin MD at [address] Bilateral     left x2, right x1    FOOT SURGERY Right     x3    KNEE ARTHROSCOPY Left     x 3    KNEE ARTHROSCOPY Left 02/15/2021    KNEE ARTHROSCOPY WITH PARTIAL MEDIAL MENISCECTOMY performed by Rajan Dominguez MD at [address] ARTHROSCOPY Right 2022    RIGHT KNEE ARTHROSCOPIC PARTIAL MEDIAL MENISCECTOMY performed by Rajan Dominguez MD at [place] [address] [address] MENISCECTOMY Right 07/18/2022    RIGHT KNEE ARTHROSCOPIC PARTIAL MEDIAL MENISCECTOMY (Right: Knee)    [place] CYSTO W/URETEROSCOPY W/LITHOTRIPSY Left 09/20/2017    CYSTOSCOPY URETEROSCOPY HOLMIUM LASER LITHO WITH STONE BASKETING WITH  STENT  performed by Alma Delia Wallace MD at [address] Right     UPPER GASTROINTESTINAL ENDOSCOPY N/A 02/02/2021    EGD ESOPHAGOGASTRODUODENOSCOPY performed by Belkys Temple MD at [address]  02/01/2021    [address] [place] [address] 2/1/2021 ST ULTRASOUND    VASCULAR SURGERY Left     leg       Immunization History:   Immunization History   Administered Date(s) Administered    Hepatitis A 02/22/2019    Influenza Virus Vaccine 12/19/2013    Influenza, FLUARIX, FLULAVAL, Marcille Fly (age 10 mo+) AND AFLURIA, (age 1 y+), PF, 0.5mL 11/24/2016, 09/05/2018    Tdap (Boostrix, Adacel) 09/15/2017       Active Problems:  Patient Active Problem List   Diagnosis Code    Anxiety F41.9    Class 3 severe obesity due to excess calories with serious comorbidity in adult [facility]) E66.01    Restless leg syndrome G25.81    Previous back surgery Z98.890    Major depression F32.9    HTN (hypertension) I10    FHx: rheumatoid arthritis Z82.61    SVT (supraventricular tachycardia) ([facility]) I47.1    Right elbow pain M25.521    Pain of right lower extremity M79.604    Cough with sputum R05.8    Varicose vein of leg I83.90    Leg swelling M79.89    Herpes zoster without complication K66.0    Chest pain R07.9    Fibromyalgia M79.7    Acute pancreatitis K85.90    Sepsis (HCC) A41.9    Morbid obesity ([facility]) E66.01    Abdominal pain, epigastric R10.13    Nausea R11.0    Acute respiratory failure with hypoxia (HCC) J96.01    Hypocalcemia E83.51    History of anxiety disorder Z86.59    Abnormal levels of other serum enzymes R74.8    Elevated liver enzymes R74.8    Bilateral leg edema R60.0    Smoker F17.200    Severe episode of recurrent major depressive disorder, without psychotic features ([facility] [address] F33.2    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    Presbyopia H52.4    Pseudotumor cerebri syndrome G93.2    Insomnia due to psychological stress F51.02    Astigmatism H52.209    Generalized anxiety disorder F41.1    Carpal tunnel syndrome of right wrist G56.01    Pneumonia of both lungs due to infectious organism J18.9    Fatty liver K76.0    Pancreatic pseudocyst K86.3    History of depression Z86.59    Acute on chronic pancreatitis (HCC) K85.90, K86.1    Abdominal pain R10.9    Acute medial meniscus tear of left knee S83.242A    Tear of medial meniscus of right knee, current S83.241A    Myalgia, unspecified site M79.10    Prediabetes R73.03    Other abnormal glucose R73.09    Contusion of hip S70.00XA    Convergence excess H51.12    Dizziness and giddiness R42    Gastroparesis K31.84    Hyperopia with astigmatism and presbyopia, bilateral H52.03, H52.203, H52.4    Migraine G43.909    Obesity, Class III, BMI 40-49.9 (morbid obesity) ([facility]) E66.01    Obstructive sleep apnea syndrome G47.33    Dyspnea R06.00    SOB (shortness of breath) on exertion R06.02    Thrombophlebitis of superficial veins of left lower extremity I80.02    Vaginal discharge N89.8    Bipolar disorder, unspecified ([facility]) F31.9    Primary osteoarthritis of right knee M17.11       Isolation/Infection:   Isolation            No Isolation          Patient Infection Status       Infection Onset Added Last Indicated Last Indicated By Review Planned Expiration Resolved Resolved By    None active    Resolved    COVID-19 (Rule Out) 02/11/21 02/11/21 02/11/21 COVID-19 (Ordered)   02/11/21 Rule-Out Test Resulted    COVID-19 (Rule Out) 09/23/20 09/23/20 09/23/20 COVID-19 (Ordered)   09/23/20 Rule-Out Test Resulted    COVID-19 (Rule Out) 08/26/20 08/26/20 08/26/20 COVID-19 (Ordered)   08/26/20 Rule-Out Test Resulted            Nurse Assessment:  Last Vital Signs: BP (!) 103/58   Pulse 82   Temp 97.1 °F (36.2 °C) (Infrared)   Resp 20   Ht 5' 4\" (1.626 m)   Wt (!) 331 lb (150.1 kg)   LMP  (Approximate) Comment: LMP 2016  SpO2 97%   BMI 56.82 kg/m²     Last documented pain score (0-10 scale): Pain Level: 0  Last Weight:   Wt Readings from Last 1 Encounters:   01/10/23 (!) 331 lb (150.1 kg)     Mental Status:  oriented and alert    IV Access:  - None    Nursing Mobility/ADLs:  Walking   Independent  Transfer  Assisted  Bathing  Assisted  Dressing  Independent  [address]  Independent  Med Delivery   whole    Wound Care Documentation and Therapy:  Incision 07/18/22 Knee Anterior;Right (Active)   Number of days: 176          Safety Concerns: At Risk for Falls    Impairments/Disabilities:      None    Nutrition Therapy:  Current Nutrition Therapy:   - Oral Diet:  General    Routes of Feeding: Oral  Liquids: No Restrictions  Daily Fluid Restriction: no  Last Modified Barium Swallow with Video (Video Swallowing Test): not done    Treatments at the Time of Hospital Discharge:   Respiratory Treatments: n/a  Oxygen Therapy:  is not on home oxygen therapy. Ventilator:    - No ventilator support    Rehab Therapies: Physical Therapy and Occupational Therapy  Weight Bearing Status/Restrictions: No weight bearing restrictions  Other Medical Equipment (for information only, NOT a DME order):  walker  Other Treatments: Skilled Nursing assessment and monitoring. Medication education and monitoring per protocol. TOTAL JOINT REPLACEMENT PATIENT HOME HEALTH CARE PROTOCOL  This patient is a post-operative total joint replacement patient. Expectations for this patients care are as follows:      Goals:  DailyTherapy for rehabilitative purposes for two weeks. Increased level of activity and ambulation each day. Well-controlled pain. Free from infection. Encourage patient to provide self-care when possible. Ambulation with a rolling walker.     Activity & Diet:  Therapy performed daily. (Instruct patient to take pain meds. 1 hour prior to therapy.)  Up in chair for all meals and majority of day. Range of motion for TKA patients. Hip precautions for RAHUL patients. Incentive Spirometer: 3- 4 inhalations every twenty minutes, during the day, while awake, the first week home after discharge  Increase oral intake of fruits, fiber and water and take a daily stool softener to prevent constipation. Consider stronger bowel protocol if no bowel movement is achieved after three days home. Increase protein intake and reduce sugar intake to promote healing and prevent infection. No pillow under the knee for TKA patients. [facility] go on in the a.m. and come off in the p.m. (Hand wash them every two or three days, roll in towel to remove most of moisture, and hang to dry.)    Incision Care: If patient has ACE bandage it may be removed POD #2  Keep incisional dressing intact until seen and removed by surgeon, unless saturated, in which case, call surgeon and request instructions. If dressing falls off, call surgeon. If using the Prevena dressing, with battery pack, place battery pack in waterproof bag during shower. If using Aquacel dressing then dressing is waterproof and patient may shower. If patient has a Prevena remove on POD #5 and replace with Aquacel dressing (patient was provided with dressing in hospital)  Elevated the leg and ice the affected area four times a day, for twenty minutes each time and after every physical therapy session. Normal Conditions - Will improve with provided comfort measures and time:  Some swelling in the operative leg is normal - this should reduce over time. Some post-operative pain is normal.  This will improve with prescribed medication, provided comfort measures and time. Constipation related to pain medications & decreased mobility is a common occurrence.   (Increase your fiber & water intake and take a stool softener.)   Slight warmth of operative site is normal and will diminish with time. Fatigue and moderate pain after therapy is normal and will improve with time. Numbness near the incision site is normal and will improve with time. NOTE: Ensure/Remind patient to go to their follow-up appointment with their orthopedic surgeon, which is scheduled    Abnormal Conditions - When to call the Surgeon:  Increasing/excessive redness, warmth or swelling at the incisional site not relieved with ice and elevation. Increasing/excessive pain not well-controlled by prescribed medications. Drainage or odor from or around the incision site.  (A little blood showing through the bandage is ok, but active leaking, of any color, coming out of the bandage is NOT normal.)  Temperature above 101 degrees. (A mild temp of 99 - 100 is normal - if temp gets to 101 you may use Tylenol once - if it does not improve, you may use a 2nd dose of Tylenol after 5 hours - if temperature is still at or above 101 degrees then call the surgeon.)  Calf tenderness, swelling, or redness or numbness of the foot/lower leg. Shortness of breath or chest pain. Any other incision or surgical-related concerns. CALL SURGEON with concerns PRIOR TO sending patient to hospital.      Patient's personal belongings (please select all that are sent with patient):   All belongings returned to patient    RN SIGNATURE:  Electronically signed by Katty Vann RN on 1/10/23 at 11:16 AM EST    CASE MANAGEMENT/SOCIAL WORK SECTION    Inpatient Status Date:     Readmission Risk Assessment Score:  Readmission Risk              Risk of Unplanned Readmission:  0           Discharging to [facility] [address], [address]  P: [phone]     / signature: Electronically signed by Katty Vann RN on 1/10/23 at 11:16 AM EST    PHYSICIAN SECTION    Prognosis: Good    Condition at Discharge: Stable    Rehab Potential (if transferring to Rehab): Good    Recommended Labs or Other Treatments After Discharge: see above    Physician Certification: I certify the above information and transfer of Magalys Raman  is necessary for the continuing treatment of the diagnosis listed and that she requires Home Care for less 30 days. Update Admission H&P: No change in H&P    PHYSICIAN SIGNATURE:  Electronically signed by Varsha Goddard MD on 1/10/23 at 7:28 AM EST    Medication List    START taking these medications    START taking these medications   aspirin EC 81 MG EC tablet  Take 1 tablet by mouth in the morning and 1 tablet in the evening. cefdinir 300 MG capsule  Commonly known as: OMNICEF  Take 1 capsule by mouth 2 times daily   oxyCODONE-acetaminophen 5-325 MG per tablet  Commonly known as: Percocet  Take 1-2 tablets by mouth every 4 hours as needed for Pain for up to 7 days. Max Daily Amount: 12 tablets     CHANGE how you take these medications    CHANGE how you take these medications   DULoxetine 60 MG extended release capsule  Commonly known as: CYMBALTA  TAKE 1 CAPSULE BY MOUTH TWICE A DAY  What changed: See the new instructions.    fexofenadine 180 MG tablet  Commonly known as: RA Allergy Relief  take 1 tablet by mouth once daily  What changed:   when to take this  reasons to take this   pramipexole 0.5 MG tablet  Commonly known as: MIRAPEX  take 1 tablet by mouth twice a day  What changed:   when to take this  additional instructions     CONTINUE taking these medications    CONTINUE taking these medications   amitriptyline 25 MG tablet  Commonly known as: ELAVIL  Take 200 mg by mouth nightly   amLODIPine 5 MG tablet  Commonly known as: NORVASC  Take 5 mg by mouth daily   busPIRone 15 MG tablet  Commonly known as: BUSPAR  Take 30 mg by mouth 2 times daily at 0800 and 1400   cephALEXin 500 MG capsule  Commonly known as: KEFLEX  Take 500 mg by mouth 3 times daily   hydrOXYzine HCl 50 MG tablet  Commonly known as: ATARAX  Take 50 mg by mouth 2 times daily   losartan 50 MG tablet  Commonly known as: COZAAR  Take 100 mg by mouth at bedtime   melatonin 5 MG Tabs tablet  Take 10 mg by mouth nightly as needed   metFORMIN 500 MG tablet  Commonly known as: GLUCOPHAGE  Take 1,000 mg by mouth 2 times daily (with meals)   nitrofurantoin (macrocrystal-monohydrate) 100 MG capsule  Commonly known as: MACROBID  Take 100 mg by mouth 2 times daily   omeprazole 40 MG delayed release capsule  Commonly known as: PRILOSEC  Take 40 mg by mouth daily   prazosin 1 MG capsule  Commonly known as: MINIPRESS  Take 5 mg by mouth nightly   propranolol 10 MG tablet  Commonly known as: INDERAL  Take 10 mg by mouth 3 times daily   QUEtiapine 400 MG extended release tablet  Commonly known as: SEROQUEL XR  Take 800 mg by mouth nightly   spironolactone 100 MG tablet  Commonly known as: ALDACTONE  Take 100 mg by mouth daily   Vraylar 6 MG Caps capsule  Generic drug: Cariprazine HCl  Take 1 capsule by mouth nightly     STOP taking these medications    STOP taking these medications   acetaminophen 500 MG tablet  Commonly known as: TYLENOL   ibuprofen 800 MG tablet  Commonly known as: ADVIL;MOTRIN   traMADol 50 MG tablet  Commonly known as: Ultram     ASK your doctor about these medications    ASK your doctor about these medications   tiZANidine 4 MG tablet  Commonly known as: ZANAFLEX  Take 4 mg by mouth nightly   Where to Get Your Medications      These medications were sent to [address] [place] [address] 21, [address] [address] [address]  Phone: [phone]       aspirin EC 81 MG EC tablet      You can get these medications from any pharmacy    Bring a paper prescription for each of these medications      cefdinir 300 MG capsule        oxyCODONE-acetaminophen 5-325 MG per tablet MEDICATIONS  (PRN):  albuterol    90 MICROgram(s) HFA Inhaler 2 Puff(s) Inhalation every 6 hours PRN asthma  diphenhydrAMINE Injectable 50 milliGRAM(s) IntraMuscular once PRN Agitation  haloperidol     Tablet 5 milliGRAM(s) Oral every 6 hours PRN Agitation  haloperidol    Injectable 5 milliGRAM(s) IntraMuscular once PRN Agitation  hydrOXYzine hydrochloride 50 milliGRAM(s) Oral every 6 hours PRN Anxiety  LORazepam     Tablet 2 milliGRAM(s) Oral every 6 hours PRN Agitation  LORazepam   Injectable 2 milliGRAM(s) IntraMuscular once PRN Agitation  ondansetron    Tablet 8 milliGRAM(s) Oral every 8 hours PRN nausea

## 2023-01-10 NOTE — CARE COORDINATION
Joint Replacement Discharge Planning Note:    Admission Date:  1/10/2023 Joya Valdez is a 49 y.o.  female    Admitted for : Osteoarthritis of right knee, unspecified osteoarthritis type [M17.11]  Primary osteoarthritis of right knee [M17.11]    Met with:  Patient and Mother    PCP:  THERESA SCHNEIDER              Insurance:   [employer]    Current Residence/ Living Arrangements:  independently at home with her parents. Bed and bath on first floor. Current Services PTA:  No    Is patient agreeable to [address] [facility]: Yes    Is patient agreeable to outpatient physical therapy:  eventually    Freedom of choice provided: Yes         [address] [facility] Agency/Outpatient Therapy chosen: [address] Referral faxed and notified Niraj Bryant from [facility] of referral.    [address] needed: No    Current home DME:  walker    Pharmacy:  [facility] in [place]    Does Patient want to use MEDS to BEDS?([facility] only) Yes    Transportation Provider:  Patient and Family                       Discharge Plan:   Patient intends to discharge to Home    Patient does not need a wheeled walker. Anticipated discharge date 1/10/23      Readmission Risk              Risk of Unplanned Readmission:  0           Electronically signed by: Viktoriya Gilbert RN on 1/10/2023 at 11:17 AM    Niraj Bryant from [facility] ([facility] pt has been accepted for VNS. Electronically signed by Viktoriya Gilbert RN on 1/10/2023 at 12:01 PM    Faxed JUAN FRANCISCO and d/c med list to [address]     Electronically signed by Viktoriya Gilbert RN on 1/10/2023 at 12:02 PM

## 2023-01-10 NOTE — PLAN OF CARE
Problem: Discharge Planning  Goal: Discharge to home or other facility with appropriate resources  Outcome: Adequate for Discharge  Flowsheets  Taken 1/10/2023 1303  Discharge to home or other facility with appropriate resources:   Identify barriers to discharge with patient and caregiver   Arrange for needed discharge resources and transportation as appropriate   Identify discharge learning needs (meds, wound care, etc)   Arrange for interpreters to assist at discharge as needed  Taken 1/10/2023 1100  Discharge to home or other facility with appropriate resources:   Identify barriers to discharge with patient and caregiver   Arrange for needed discharge resources and transportation as appropriate   Identify discharge learning needs (meds, wound care, etc)   Arrange for interpreters to assist at discharge as needed   Refer to discharge planning if patient needs post-hospital services based on physician order or complex needs related to functional status, cognitive ability or social support system     Problem: Pain  Goal: Verbalizes/displays adequate comfort level or baseline comfort level  Outcome: Adequate for Discharge     Problem: Safety - Adult  Goal: Free from fall injury  Outcome: Adequate for Discharge

## 2023-01-10 NOTE — FLOWSHEET NOTE
Patient admitted to room 2037 per bed from PACU.  VS, assessment, and orientation to the room and call system completed. Orders and plan for the day reviewed with the patient.

## 2023-01-10 NOTE — DISCHARGE INSTRUCTIONS
DISCHARGE INSTRUCTIONS  Caring for yourself after joint replacement surgery (Total Hip and Total Knee Replacement)    Activity and Therapy  Receive physical therapy three times per week. (Pain medication one hour prior to therapy)   Perform PT exercises on own when not receiving home or outpatient PT. Ideally exercises should be at least two times a day. Increase level of activity and ambulation each day. Perform deep breathing exercises daily. Patient provides self-care when possible. Work on Range of motion for Total knee patients. No pillow under the knee for Total knee patients. Elevate the surgical leg when seated. No driving until cleared by surgeon      Diet:  Increase oral intake of fruits, fiber and water to prevent constipation. Drink fluids frequently and take stool softeners to aid in bowel motility. Increase protein intake/reduce high-sugar intake to help promote healing and prevent infection. Incision Care:  Keep Aquacel or other dressing intact until seen and removed by surgeon, unless saturated, in which case, call surgeon and request instructions. If dressing falls off, call surgeon. [facility] on in the am and off in the pm to reduce swelling. Ice affected area four times a day, for twenty minutes. Pain Medications and Anticoagulant  You have been place on an anticoagulant to prevent blood clots. Take this medication exactly as prescribed. Be alert for signs of bleeding. Take care not to injure yourself. You have been provided pain medicine to control your pain. Do not take more narcotics than prescribed. You may begin weaning from narcotics as your pain level improves by decreasing the amount or frequency of the narcotics. You may also take plain acetaminophen as an alternate to the narcotics. Never exceed the recommended dosage. Ice, rest and elevating the surgical limb also help with pain control.       When to call the Surgeon:  Increased redness, warmth, drainage, swelling or odor from incision site. Temperature above 101 degrees. Pain not controlled by prescribed medications. Calf tenderness, swelling, or redness. Shortness of breath or chest pain. If you cannot urinate and have been consuming liquids  Any incision or surgical-related concerns. Call surgeon with concerns PRIOR TO going to hospital.    Normal Conditions:  Swelling in the operative leg: this should reduce over time. Bruising behind the knee and around surgical area. Some post-operative pain. Constipation related to pain medications/decreased mobility. (Increase fiber & water intake.)   Slight warmth of operative leg. Fatigue and moderate pain after therapy. Numbness near the incision site. Nausea - take pain medications with food. Cut back on pain medication.     NOTE: Remember to go to follow-up orthopedic appointment with surgeon

## 2023-01-10 NOTE — CARE COORDINATION
DISCHARGE PLANNING NOTE:    Notified by therapy that they are recommending BSC and shower chair. Spoke with patient and she states she does NOT want BSC. She would like shower chair. Faxed facesheet and order for shower chair to [facility]. 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     Electronically signed by Emy Adams RN on 1/10/2023 at 1:14 PM

## 2023-01-10 NOTE — ANESTHESIA PRE PROCEDURE
Department of Anesthesiology  Preprocedure Note       Name:  Zeeshan Meier   Age:  49 y.o.  :  1975                                          MRN:  [id number]         Date:  1/10/2023      Surgeon: Tasia Cannon):  Vanita Calvert MD    Procedure: Procedure(s):  KNEE TOTAL ARTHROPLASTY    Medications prior to admission:   Prior to Admission medications    Medication Sig Start Date End Date Taking? Authorizing Provider   Cariprazine HCl (VRAYLAR) 6 MG CAPS capsule Take 1 capsule by mouth nightly 22  Yes Historical Provider, MD   traMADol (ULTRAM) 50 MG tablet Take 1 tablet by mouth every 4 hours as needed for Pain for up to 5 days. Intended supply: 5 days.  Take lowest dose possible to manage pain 1/5/23 1/10/23  Vanita Calvert MD   ibuprofen (ADVIL;MOTRIN) 800 MG tablet Take 800 mg by mouth every 6 hours as needed for Pain    Historical Provider, MD   amLODIPine (NORVASC) 5 MG tablet Take 5 mg by mouth daily    Historical Provider, MD   spironolactone (ALDACTONE) 100 MG tablet Take 100 mg by mouth daily    Historical Provider, MD   nitrofurantoin, macrocrystal-monohydrate, (MACROBID) 100 MG capsule Take 100 mg by mouth 2 times daily    Historical Provider, MD   cephALEXin (KEFLEX) 500 MG capsule Take 500 mg by mouth 3 times daily    Historical Provider, MD   acetaminophen (TYLENOL) 500 MG tablet Take 500 mg by mouth every 6 hours as needed for Pain    Historical Provider, MD   propranolol (INDERAL) 10 MG tablet Take 10 mg by mouth 3 times daily    Historical Provider, MD   metFORMIN (GLUCOPHAGE) 500 MG tablet Take 1,000 mg by mouth 2 times daily (with meals)    Historical Provider, MD   hydrOXYzine HCl (ATARAX) 50 MG tablet Take 50 mg by mouth 2 times daily    Historical Provider, MD   tiZANidine (ZANAFLEX) 4 MG tablet Take 4 mg by mouth nightly    Historical Provider, MD   losartan (COZAAR) 50 MG tablet Take 100 mg by mouth at bedtime    Historical Provider, MD   amitriptyline (ELAVIL) 25 MG tablet Take 200 mg by mouth nightly     Historical Provider, MD   prazosin (MINIPRESS) 1 MG capsule Take 5 mg by mouth nightly    Historical Provider, MD   melatonin 5 MG TABS tablet Take 10 mg by mouth nightly as needed    Historical Provider, MD   QUEtiapine (SEROQUEL XR) 400 MG extended release tablet Take 800 mg by mouth nightly    Historical Provider, MD   busPIRone (BUSPAR) 15 MG tablet Take 30 mg by mouth 2 times daily at 0800 and 1400    Historical Provider, MD   omeprazole (PRILOSEC) 40 MG delayed release capsule Take 40 mg by mouth daily    Historical Provider, MD   fexofenadine (RA ALLERGY RELIEF) 180 MG tablet take 1 tablet by mouth once daily  Patient taking differently: as needed take 1 tablet by mouth once daily 11/7/18   Micheline Garner MD   pramipexole (MIRAPEX) 0.5 MG tablet take 1 tablet by mouth twice a day  Patient taking differently: daily 11/7/18   Micheline Garner MD   DULoxetine (CYMBALTA) 60 MG extended release capsule TAKE 1 CAPSULE BY MOUTH TWICE A DAY  Patient taking differently: at bedtime 8/3/18   Micheline Garner MD       Current medications:    Current Facility-Administered Medications   Medication Dose Route Frequency Provider Last Rate Last Admin    sodium chloride flush 0.9 % injection 5-40 mL  5-40 mL IntraVENous PRN Armen Victoria MD        ceFAZolin (ANCEF) 3000 mg in dextrose 5 % 100 mL IVPB  3,000 mg IntraVENous On Call to  [address], MD        dexamethasone (PF) (DECADRON) injection 10 mg  10 mg IntraVENous Once Armen Victoria MD        scopolamine (TRANSDERM-SCOP) transdermal patch 1 patch  1 patch TransDERmal Once Armen Victoria MD        sodium chloride flush 0.9 % injection 5-40 mL  5-40 mL IntraVENous 2 times per day Shelly Quintero MD        0.9 % sodium chloride infusion   IntraVENous PRN Shelly Quintero MD        lactated ringers infusion   IntraVENous Continuous Shelly Quintero MD           Allergies:     Allergies   Allergen Reactions    Aripiprazole      Bad reaction    Eletriptan      Other reaction(s): Swelling-throat    Gabapentin      \"Made me feel weird and angry\"    Lasix [Furosemide] Other (See Comments)     Pt states pacreatitis    Pregabalin      \"Makes me feel weird\"    Sumatriptan Other (See Comments)    Triptans      Other reaction(s): Unknown    Lamotrigine Rash       Problem List:    Patient Active Problem List   Diagnosis Code    Anxiety F41.9    Class 3 severe obesity due to excess calories with serious comorbidity in adult ([facility] E66.01    Restless leg syndrome G25.81    Previous back surgery Z98.890    Major depression F32.9    HTN (hypertension) I10    FHx: rheumatoid arthritis Z82.61    SVT (supraventricular tachycardia) ([facility]) I47.1    Right elbow pain M25.521    Pain of right lower extremity M79.604    Cough with sputum R05.8    Varicose vein of leg I83.90    Leg swelling M79.89    Herpes zoster without complication G74.7    Chest pain R07.9    Fibromyalgia M79.7    Acute pancreatitis K85.90    Sepsis ([facility]) A41.9    Morbid obesity ([facility]) E66.01    Abdominal pain, epigastric R10.13    Nausea R11.0    Acute respiratory failure with hypoxia ([facility]) J96.01    Hypocalcemia E83.51    History of anxiety disorder Z86.59    Abnormal levels of other serum enzymes R74.8    Elevated liver enzymes R74.8    Bilateral leg edema R60.0    Smoker F17.200    Severe episode of recurrent major depressive disorder, without psychotic features ([facility] F33.2    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    Presbyopia H52.4    Pseudotumor cerebri syndrome G93.2    Insomnia due to psychological stress F51.02    Astigmatism H52.209    Generalized anxiety disorder F41.1    Carpal tunnel syndrome of right wrist G56.01    Pneumonia of both lungs due to infectious organism J18.9    Fatty liver K76.0    Pancreatic pseudocyst K86.3    History of depression Z86.59    Acute on chronic pancreatitis (HCC) K85.90, K86.1    Abdominal pain R10.9    Acute medial meniscus tear of left knee S83.242A    Tear of medial meniscus of right knee, current S83.241A    Myalgia, unspecified site M79.10    Prediabetes R73.03    Other abnormal glucose R73.09    Contusion of hip S70.00XA    Convergence excess H51.12    Dizziness and giddiness R42    Gastroparesis K31.84    Hyperopia with astigmatism and presbyopia, bilateral H52.03, H52.203, H52.4    Migraine G43.909    Obesity, Class III, BMI 40-49.9 (morbid obesity) ([facility]) E66.01    Obstructive sleep apnea syndrome G47.33    Dyspnea R06.00    SOB (shortness of breath) on exertion R06.02    Thrombophlebitis of superficial veins of left lower extremity I80.02    Vaginal discharge N89.8    Bipolar disorder, unspecified ([address] F31.9       Past Medical History:        Diagnosis Date    Abnormal levels of other serum enzymes     Acute respiratory failure with hypoxia ([facility]) 08/21/2020    Anxiety     Bilateral leg edema     Chronic kidney disease     right renal cyst    Depression     Diabetes mellitus ([address]     DVT (deep venous thrombosis) ([address] 1997    after delivery    Elevated liver enzymes     Fibromyalgia     Headache(784.0)     migraines    Hx of blood clots     1997 left calf    Hypertension     Kidney stone     Obstructive sleep apnea syndrome     no machine    Pancreatitis 2001    Pleural effusion 2001    Pneumonia of both lungs due to infectious organism 08/27/2020    PONV (postoperative nausea and vomiting)     Pseudotumor cerebri syndrome 10/03/2016    Right knee DJD     S/P left knee arthroscopy 02/15/2021    knee arthroscopy with partial medial menisectomy - left eleazar    Sepsis ([address] 08/20/2020    SOB (shortness of breath)     SVT (supraventricular tachycardia) ([address] 09/08/2015    Thyroid disease     mass removed    Varicose vein of leg 07/12/2017       Past Surgical History:        Procedure Laterality Date    ATRIAL ABLATION SURGERY  2015    BACK SURGERY      L4-5    CHOLECYSTECTOMY  2001    ENDOMETRIAL ABLATION      e sure implants placed also    ENDOSCOPY, COLON, DIAGNOSTIC      EXCISION LESION HAND / FINGER Right 01/25/2019    HAND LESION BIOPSY EXCISION performed by Tom Eduardo MD at [facility] Bilateral     left x2, right x1    FOOT SURGERY Right     x3    KNEE ARTHROSCOPY Left     x 3    KNEE ARTHROSCOPY Left 02/15/2021    KNEE ARTHROSCOPY WITH PARTIAL MEDIAL MENISCECTOMY performed by Simona Zacarias MD at [facility] ARTHROSCOPY Right 07/18/2022    RIGHT KNEE ARTHROSCOPIC PARTIAL MEDIAL MENISCECTOMY performed by Simona Zacarias MD at [facility] ARTHROSCOPY W/ MENISCECTOMY Right 07/18/2022    RIGHT KNEE ARTHROSCOPIC PARTIAL MEDIAL MENISCECTOMY (Right: Knee)    IN CYSTO W/URETEROSCOPY W/LITHOTRIPSY Left 09/20/2017    CYSTOSCOPY URETEROSCOPY HOLMIUM LASER LITHO WITH STONE BASKETING WITH  STENT  performed by Jorge Lovelace MD at [facility]     UPPER GASTROINTESTINAL ENDOSCOPY N/A 02/02/2021    EGD ESOPHAGOGASTRODUODENOSCOPY performed by Kody Craft MD at [address] [place] [address]  02/01/2021    US GUIDED LIVER BIOPSY PERCUTANEOUS 2/1/2021 [facility]TRAS[facility]    VASCULAR SURGERY Left     leg       Social History:    Social History     Tobacco Use    Smoking status: Former     Packs/day: 1.00     Types: Cigarettes     Quit date: 12/1/2019     Years since quitting: 3.1    Smokeless tobacco: Never    Tobacco comments:     today last smoke   Substance Use Topics    Alcohol use: Never     Alcohol/week: 0.0 standard drinks                                Counseling given: Not Answered  Tobacco comments: today last smoke      Vital Signs (Current): There were no vitals filed for this visit.                                            BP Readings from Last 3 Encounters:   12/30/22 (!) 103/50   07/18/22 114/61   07/05/22 (!) 148/76       NPO Status: Time of last liquid consumption: 2300                        Time of last solid consumption: 2300                        Date of last liquid consumption: 01/09/23                        Date of last solid food consumption: 01/09/23    BMI:   Wt Readings from Last 3 Encounters:   12/30/22 (!) 331 lb (150.1 kg)   11/09/22 (!) 341 lb (154.7 kg)   11/01/22 (!) 350 lb (158.8 kg)     There is no height or weight on file to calculate BMI.    CBC:   Lab Results   Component Value Date/Time    WBC 8.0 12/30/2022 07:45 AM    RBC 3.93 12/30/2022 07:45 AM    HGB 11.8 12/30/2022 07:45 AM    HCT 36.2 12/30/2022 07:45 AM    MCV 92.1 12/30/2022 07:45 AM    RDW 14.9 12/30/2022 07:45 AM     12/30/2022 07:45 AM       CMP:   Lab Results   Component Value Date/Time     12/30/2022 07:45 AM    K 4.9 12/30/2022 07:45 AM     12/30/2022 07:45 AM    CO2 24 12/30/2022 07:45 AM    BUN 25 12/30/2022 07:45 AM    CREATININE 1.06 12/30/2022 07:45 AM    GFRAA >60 07/05/2022 11:43 AM    LABGLOM >60 12/30/2022 07:45 AM    GLUCOSE 116 12/30/2022 07:45 AM    PROT 7.4 02/03/2021 06:16 AM    PROT 7.2 01/23/2015 12:00 AM    CALCIUM 9.3 12/30/2022 07:45 AM    BILITOT 0.41 02/03/2021 06:16 AM    ALKPHOS 250 02/03/2021 06:16 AM    AST 48 02/03/2021 06:16 AM     02/03/2021 06:16 AM       POC Tests: No results for input(s): POCGLU, POCNA, POCK, POCCL, POCBUN, POCHEMO, POCHCT in the last 72 hours.     Coags:   Lab Results   Component Value Date/Time    PROTIME 9.6 02/01/2021 09:33 AM    INR 0.9 02/01/2021 09:33 AM    APTT 27.0 02/01/2021 09:33 AM       HCG (If Applicable):   Lab Results   Component Value Date    HCG NEGATIVE 07/18/2022        ABGs:   Lab Results   Component Value Date/Time    PHART 7.484 08/28/2020 08:18 AM    PO2ART 66.1 08/28/2020 08:18 AM    LHF4GDU 37.2 08/28/2020 08:18 AM    LQK4NOW 27.9 08/28/2020 08:18 AM    E1XHORAH 92.1 08/28/2020 08:18 AM        Type & Screen (If Applicable):  No results found for: Ginette López    Drug/Infectious Status (If Applicable):  Lab Results   Component Value Date/Time    HEPCAB NONREACTIVE 08/21/2020 04:31 AM       COVID-19 Screening (If Applicable):   Lab Results   Component Value Date/Time    COVID19 Not Detected 02/11/2021 09:00 AM           Anesthesia Evaluation  Patient summary reviewed and Nursing notes reviewed   history of anesthetic complications: PONV. Airway: Mallampati: III  TM distance: >3 FB   Neck ROM: full  Mouth opening: > = 3 FB   Dental: normal exam         Pulmonary:normal exam  breath sounds clear to auscultation  (+) sleep apnea: on noncompliant,  current smoker                           Cardiovascular:    (+) hypertension:, dysrhythmias (s/p ablation): SVT, peripheral edema,       ECG reviewed  Rhythm: regular  Rate: normal  Echocardiogram reviewed                  Neuro/Psych:   (+) neuromuscular disease:, : migraine headaches, psychiatric history:depression/anxiety              ROS comment: fibromyalgia GI/Hepatic/Renal:   (+) GERD: well controlled, morbid obesity          Endo/Other:    (+) DiabetesType II DM, , : arthritis: OA., .                 Abdominal:             Vascular:   + DVT (1997), . Other Findings:           Anesthesia Plan      general and regional     ASA 3     (Postop adductor canal block - r/b/a discussed including bleeding, infection, and nerve injury. Patient agrees to proceed with procedure.)  Induction: intravenous. MIPS: Postoperative opioids intended and Prophylactic antiemetics administered. Anesthetic plan and risks discussed with patient. Plan discussed with CRNA.                     Kumar Verdugo MD   1/10/2023 Statement Selected

## 2023-01-12 ENCOUNTER — TELEPHONE (OUTPATIENT)
Dept: ORTHOPEDIC SURGERY | Age: 48
End: 2023-01-12

## 2023-01-12 NOTE — TELEPHONE ENCOUNTER
Pt called in needs a work note to give to employer with a update on surgery and at least a ball park on when she might be able to return to work.         States can send it to her on VEASYTt or call her if someone can pick it up in the office.      Also states is in a lot of pain and would like to know if there is anything she can do to help with this, states doesn't feel like the pain meds are helping      Please call pt back with any updates or questions @  [phone]

## 2023-01-12 NOTE — TELEPHONE ENCOUNTER
Patient stated she does not have any paperwork from her employer, she will just need a letter. Patient requested the letter be mailed to her parents home at [address] [place] [phone]  Letter mailed as requested.

## 2023-01-13 DIAGNOSIS — S83.241A TEAR OF MEDIAL MENISCUS OF RIGHT KNEE, CURRENT, UNSPECIFIED TEAR TYPE, INITIAL ENCOUNTER: Primary | ICD-10-CM

## 2023-01-13 DIAGNOSIS — S83.241A TEAR OF MEDIAL MENISCUS OF RIGHT KNEE, CURRENT, UNSPECIFIED TEAR TYPE, INITIAL ENCOUNTER: ICD-10-CM

## 2023-01-13 RX ORDER — OXYCODONE HYDROCHLORIDE AND ACETAMINOPHEN 5; 325 MG/1; MG/1
1 TABLET ORAL EVERY 6 HOURS PRN
Qty: 28 TABLET | Refills: 0 | Status: SHIPPED | OUTPATIENT
Start: 2023-01-13 | End: 2023-01-13 | Stop reason: CLARIF

## 2023-01-13 NOTE — TELEPHONE ENCOUNTER
Patient called in requesting refill of her pain medication. Pt stated she does not have enough to get her thru the weekend. Patient had right knee replacement on 1/10/2023 w/ Dr. Liliam Gil. Pt would like medication called into the [facility] in [place], [place].     Call back#: [phone]

## 2023-01-13 NOTE — TELEPHONE ENCOUNTER
Medication was sent to wrong pharmacy. It was also sent from the wrong department. Patient wants the medication sent to [facility] in [place].

## 2023-01-15 RX ORDER — OXYCODONE HYDROCHLORIDE AND ACETAMINOPHEN 5; 325 MG/1; MG/1
1 TABLET ORAL EVERY 6 HOURS PRN
Qty: 28 TABLET | Refills: 0 | Status: SHIPPED | OUTPATIENT
Start: 2023-01-15 | End: 2023-01-22

## 2023-01-17 DIAGNOSIS — Z96.659 STATUS POST TOTAL KNEE REPLACEMENT, UNSPECIFIED LATERALITY: Primary | ICD-10-CM

## 2023-01-17 RX ORDER — KETOROLAC TROMETHAMINE 10 MG/1
10 TABLET, FILM COATED ORAL 3 TIMES DAILY
Qty: 21 TABLET | Refills: 0 | Status: SHIPPED | OUTPATIENT
Start: 2023-01-17 | End: 2023-01-24

## 2023-01-17 NOTE — TELEPHONE ENCOUNTER
Spoke with patient and she would like to try the Toradol. 10mg 3 times daily 21 tabs pended for [facility] in [facility]. Pharmacy confirmed with patient.      Prescription pended

## 2023-01-17 NOTE — TELEPHONE ENCOUNTER
Dr. Reza Black,    Patient is SP TKA 1/10/23. She is experiencing some break-through pain and wanted to know if she could take the percocet every 4 hours instead of every 6 hours. She is also currently supplementing with IBU. I advised against taking the pain medication every 4 hours (unless she has to) as she would quickly run out of her medication and the end date for her current script is not until 1/22. Please advise with any other recommendations.

## 2023-01-19 ENCOUNTER — TELEPHONE (OUTPATIENT)
Dept: ORTHOPEDIC SURGERY | Age: 48
End: 2023-01-19

## 2023-01-19 NOTE — TELEPHONE ENCOUNTER
Dr. Raghavendra Chandra,    Patient called into office stating that this morning she noticed a large bump on the outside of her leg just below her knee that she just had sx on. SP Rt TKA 1/10/23. She described it as tender to the touch and firm feeling. She denies any trauma or falls to the knee. She also denied any calf tenderness or other signs of infection such a fever. Can you please advise on what this could be. Patient requested to see you tomorrow in [place] to address this concern and a few other questions that she has.  She was scheduled in a post op slot on 1/20 at 11:20am.

## 2023-01-19 NOTE — TELEPHONE ENCOUNTER
Please fax signed order for shower stool and latest face to face note to [facility] at [phone]. Per Raquel Griffiths they did not receive anything yesterday.

## 2023-01-23 DIAGNOSIS — Z96.659 STATUS POST TOTAL KNEE REPLACEMENT, UNSPECIFIED LATERALITY: Primary | ICD-10-CM

## 2023-01-23 DIAGNOSIS — M17.11 PRIMARY OSTEOARTHRITIS OF RIGHT KNEE: Primary | ICD-10-CM

## 2023-01-23 DIAGNOSIS — S83.241A TEAR OF MEDIAL MENISCUS OF RIGHT KNEE, CURRENT, UNSPECIFIED TEAR TYPE, INITIAL ENCOUNTER: ICD-10-CM

## 2023-01-23 DIAGNOSIS — Z96.651 STATUS POST RIGHT KNEE REPLACEMENT: ICD-10-CM

## 2023-01-23 RX ORDER — OXYCODONE HYDROCHLORIDE AND ACETAMINOPHEN 5; 325 MG/1; MG/1
1 TABLET ORAL EVERY 6 HOURS PRN
Qty: 28 TABLET | Refills: 0 | Status: SHIPPED | OUTPATIENT
Start: 2023-01-23 | End: 2023-01-30

## 2023-01-23 NOTE — TELEPHONE ENCOUNTER
Received call from patient requesting pain medications. Please sent to the [facility] in [place] as she is staying with a friend in [place]. Patient requesting call back once script is sent in. Call back #: [phone].

## 2023-01-25 ENCOUNTER — OFFICE VISIT (OUTPATIENT)
Dept: ORTHOPEDIC SURGERY | Age: 48
End: 2023-01-25

## 2023-01-25 DIAGNOSIS — Z96.659 STATUS POST TOTAL KNEE REPLACEMENT, UNSPECIFIED LATERALITY: Primary | ICD-10-CM

## 2023-01-25 NOTE — PROGRESS NOTES
Marvin Mendieta M.D.            [address] [place] [address]., [address] [facility],[address], [address] [facility]           Dept Phone: [phone]           Dept Fax:  [phone]           [place]edric Butts          Dept Phone: [phone]           Dept Fax:  [phone]      Chief Compliant:  Chief Complaint   Patient presents with    Post-Op Check     1/10/23 Rt TKA         History of Present Illness:  Patient returns today status post right TKA times 2 weeks. Patient has no major complaints     Review of Systems   Constitutional: Negative for fever, chills, sweats, recent injury, recent illness  Neurological: Negative for Headaches, numbness, or weakness. Integumentary: Negative for rash, itching, ecchymosis, or wounds. Musculoskeletal: Positive for Post-Op Check (1/10/23 Rt TKA )       Physical Exam:  Constitutional: Patient is oriented to person, place, and time. Patient appears well-developed and well nourished. Musculoskeletal: Normal gait. Motion 0-100 degrees with expected pain with ROM. No Calf tenderness, Negative Sadaf's sign. Neurovascular intact. Neurological: Patient is alert and oriented to person, place, and time. Normal strenght. No sensory deficit. Skin: Skin is warm and dry. Incision is healing well without signs of redness or drainage  Nursing note and vitals reviewed. Labs and Imaging:     XR taken today:  XR KNEE RIGHT (1-2 VIEWS)    Result Date: 1/25/2023  Rays taken today reviewed by me show standing AP of both knees and allow the right knee. Patient is status post a right total knee arthroplasty. She has a stemmed tibial component. Components appear to be in excellent alignment position both AP lateral views. Patellar height is appropriate. Patient has moderate medial joint space narrowing of her left knee.   No acute process is noted         Orders Placed This Encounter   Procedures    XR KNEE RIGHT (1-2 VIEWS)     Standing Status:   Future     Number of Occurrences:   1     Standing Expiration Date:   1/23/2024       Assessment and Plan:  1. Status post total knee replacement, unspecified laterality        2 weeks status post right TKA        This is a 49 y.o. female who presents to the clinic today status post right TKA. Continue anticoagulation. Transition to outpatient Pt. Restrictions given. RTO 5-6 weeks. Call if any problems/issues prior to that       Provider Attestation:  Susie Pimentel, personally performed the services described in this documentation. All medical record entries made by the scribe were at my direction and in my presence. I have reviewed the chart and discharge instructions and agree that the records reflect my personal performance and is accurate and complete. Rebecca Byrne MD. 01/25/23        Please note that this chart was generated using voice recognition Dragon dictation software. Although every effort was made to ensure the accuracy of this automated transcription, some errors in transcription may have occurred.

## 2023-01-25 NOTE — LETTER
[address]  [address]  [address] [facility] [address]  Phone: [phone]  Fax: [phone]    Mali Spangler MD        January 25, 2023     Patient: Nelly Burt   YOB: 1975   Date of Visit: 1/25/2023       To Whom It May Concern: It is my medical opinion that Gareth Guerra is to remain off work until 2/25/23. At that time she will be re evaluated. If you have any questions or concerns, please don't hesitate to call.     Sincerely,        Mali Spangler MD

## 2023-01-30 ENCOUNTER — OFFICE VISIT (OUTPATIENT)
Dept: ORTHOPEDIC SURGERY | Age: 48
End: 2023-01-30

## 2023-01-30 VITALS — RESPIRATION RATE: 14 BRPM | BODY MASS INDEX: 50.02 KG/M2 | HEIGHT: 64 IN | WEIGHT: 293 LBS

## 2023-01-30 DIAGNOSIS — Z96.651 STATUS POST TOTAL RIGHT KNEE REPLACEMENT: Primary | ICD-10-CM

## 2023-01-30 PROCEDURE — 99024 POSTOP FOLLOW-UP VISIT: CPT | Performed by: PHYSICIAN ASSISTANT

## 2023-01-30 NOTE — PROGRESS NOTES
Audie Strauss PA-C            [address] [address] [facility], [address] [facility]           Dept Phone: [phone]           Dept Fax:  [phone]           Cedric Butts          Dept Phone: [phone]           Dept Fax:  [phone]      Chief Compliant:  Chief Complaint   Patient presents with    Follow-up     Status post right TKA 1/10/2023          History of Present Illness:  Patient returns today status post right TKA 1/10/2023. Patient was seen for first postoperative appointment by Dr. Emily Chris on 1/25/2023. She is doing very well at that time states over the weekend however she had atraumatic onset of swelling. And some slight increased stiffness. Patient states the day before she did have some increased walking but nothing out of the norm. She states that pain has improved since the swelling increased but continues no swelling compared to contralateral knee. Patient denies any redness around the knee or fever or chills. No drainage coming from the wound. She continues working with home physical therapy and feels that her range of motion is improving. She is set up for initial outpatient evaluation with physical therapy on Wednesday, 2/1/2023    Review of Systems   Constitutional: Negative for fever, chills, sweats, recent injury, recent illness  Neurological: Negative for Headaches, numbness, or weakness. Integumentary: Negative for rash, itching, ecchymosis, or wounds. Musculoskeletal: Positive for No chief complaint on file. Physical Exam:  Constitutional: Patient is oriented to person, place, and time. Patient appears well-developed and well nourished. Musculoskeletal: Normal gait patient ambulating without assistive devices. Excellent range of motion 0 to approximately 100 degrees.   Patient with small suprapatellar joint effusion. Incision is well-healed Steri-Strips overlying. No incisional or surrounding erythema. No evidence of joint warmth greater than expected. No calf tenderness negative Homans' sign. Neurological: Patient is alert and oriented to person, place, and time. Normal strenght. No sensory deficit. Skin: Skin is warm and dry. Incision is healing well without signs of redness or drainage  Nursing note and vitals reviewed. Labs and Imaging:     XR taken today:  No results found. No orders of the defined types were placed in this encounter. Assessment and Plan:  1. Status post total right knee replacement        3 weeks status post right TKA with postoperative swelling        This is a 49 y.o. female who presents to the clinic today approximately 3 weeks status post right total knee arthroplasty on 1/10/2023. 1.  Patient presents today stating over the weekend she had atraumatic onset of worsening swelling which was concerning for her. Examination today does demonstrate small suprapatellar effusion but no concern for septic joint or cellulitic infection. 2.  Patient has excellent range of motion and seems to be progressing as anticipated. She is educated that the swelling can be a normal part of the healing process as we are less than 3 weeks out from surgical date and could be related to increased activity level prior to the day noticing the swelling. 3.  Continue with ice and compression she is provided with Ace wrap to wear for activity should swelling worsen. 4.  Continue with outpatient physical therapy as scheduled on 2/1/2023.  5.  Follow-up as previously scheduled with Dr. Yoa Denny on 3/29/2023 however patient may call return sooner for any questions or concerns    Please note that this chart was generated using voice recognition Dragon dictation software.   Although every effort was made to ensure the accuracy of this automated transcription, some errors in transcription may have occurred.

## 2023-01-31 ENCOUNTER — TELEPHONE (OUTPATIENT)
Dept: ORTHOPEDIC SURGERY | Age: 48
End: 2023-01-31

## 2023-01-31 DIAGNOSIS — Z96.651 STATUS POST TOTAL RIGHT KNEE REPLACEMENT: Primary | ICD-10-CM

## 2023-01-31 DIAGNOSIS — M17.11 OSTEOARTHRITIS OF RIGHT KNEE, UNSPECIFIED OSTEOARTHRITIS TYPE: ICD-10-CM

## 2023-01-31 NOTE — TELEPHONE ENCOUNTER
Patient called in requesting a new PT referral be sent to [facility] in [facility] as her appointment is tomorrow. Fax [phone] please advise thank you!

## 2023-02-06 ENCOUNTER — TELEPHONE (OUTPATIENT)
Dept: ORTHOPEDIC SURGERY | Age: 48
End: 2023-02-06

## 2023-02-06 DIAGNOSIS — Z96.651 STATUS POST TOTAL RIGHT KNEE REPLACEMENT: Primary | ICD-10-CM

## 2023-02-06 NOTE — TELEPHONE ENCOUNTER
Pt called and would like some pain medication called in for her- she stated she is on precet but would like something not so strong like Norco, please advise and reach out to pt- she uses [facility] in [place], [phone], thank you

## 2023-02-07 RX ORDER — HYDROCODONE BITARTRATE AND ACETAMINOPHEN 5; 325 MG/1; MG/1
1 TABLET ORAL EVERY 6 HOURS PRN
Qty: 28 TABLET | Refills: 0 | Status: SHIPPED | OUTPATIENT
Start: 2023-02-07 | End: 2023-02-14

## 2023-02-16 DIAGNOSIS — Z96.651 STATUS POST TOTAL RIGHT KNEE REPLACEMENT: ICD-10-CM

## 2023-02-16 RX ORDER — HYDROCODONE BITARTRATE AND ACETAMINOPHEN 5; 325 MG/1; MG/1
1 TABLET ORAL EVERY 6 HOURS PRN
Qty: 28 TABLET | Refills: 0 | Status: SHIPPED | OUTPATIENT
Start: 2023-02-16 | End: 2023-02-23

## 2023-02-16 NOTE — TELEPHONE ENCOUNTER
Will provide refill, would recommend transition to lesser Narcotic (Tramadol) for next refill if needed as patient is 5 weeks out from surgery.

## 2023-02-16 NOTE — TELEPHONE ENCOUNTER
Patient called to request Norco refill be sent to [facility] in [facility]

## 2023-02-23 ENCOUNTER — OFFICE VISIT (OUTPATIENT)
Dept: ORTHOPEDIC SURGERY | Age: 48
End: 2023-02-23

## 2023-02-23 ENCOUNTER — TELEPHONE (OUTPATIENT)
Dept: ORTHOPEDIC SURGERY | Age: 48
End: 2023-02-23

## 2023-02-23 VITALS — BODY MASS INDEX: 50.02 KG/M2 | RESPIRATION RATE: 14 BRPM | HEIGHT: 64 IN | WEIGHT: 293 LBS

## 2023-02-23 DIAGNOSIS — Z96.651 STATUS POST TOTAL RIGHT KNEE REPLACEMENT: Primary | ICD-10-CM

## 2023-02-23 PROCEDURE — 99024 POSTOP FOLLOW-UP VISIT: CPT | Performed by: PHYSICIAN ASSISTANT

## 2023-02-23 NOTE — LETTER
[address]  [address] [address] [facility] [address]  [address] [place] [address]  Phone: [phone]  Fax: [address] [place], [place]        February 23, 2023     Patient: Zeeshan Meier   YOB: 1975   Date of Visit: 2/23/2023       To Whom It May Concern: It is my medical opinion that Marek Ricketts may return to work on 3/13/2023 with the following restrictions: Limit work day to no more than 4 hours. If you have any questions or concerns, please don't hesitate to call.     Sincerely,          DENNYS Albarado

## 2023-02-23 NOTE — LETTER
[address]  [place] [facility] [address]  [address] [address]  Phone: [phone]  Fax: [address] [place], [place]        February 23, 2023     Patient: Dang Sen   YOB: 1975   Date of Visit: 2/23/2023       To Whom It May Concern: It is my medical opinion that Sarah Parker may return to work on 4/3/23 with reduced work hours to 4 hours per day. If you have any questions or concerns, please don't hesitate to call.     Sincerely,        DENNYS Quigley

## 2023-02-23 NOTE — PROGRESS NOTES
[address] [facility], [address] [facility] [address], [address] [place] [address], [address] [facility]           Dept Phone: [phone]           Dept Fax:  [phone] [address] [facility] [place]          Dept Phone: [phone]           Dept Fax:  [phone]      Chief Compliant:  Chief Complaint   Patient presents with    Post-Op Check     Right TKA        History of Present Illness:  Duong Jacobo returns today. This is a 49 y.o. female who presents to the clinic today for follow up of right total knee arthroplasty performed on 1/10/2023. Patient returns today for routine reevaluation stating that she is doing very well she notes some discomfort of the quadricep area when she first stands which does seem to ease up as she gets moving. She is progressing very well with physical therapy states she is nearly all the way straight and able to flex to about 115 degrees. She states pain is improved so much that she only requires ibuprofen approximately once a day at this point usually related to exercise soreness. Does continue to note some swelling albeit significant improved since last visit. .       Review of Systems   Constitutional: Negative for fever, chills, sweats, recent illness, or recent injury. Neurological: Negative for headaches, numbness, or weakness. Integumentary: Negative for rash, itching, ecchymosis, abrasions, or laceration. Musculoskeletal: Positive for Post-Op Check (Right TKA)       Physical Exam:  Constitutional: Patient is oriented to person, place, and time. Patient appears well-developed and well nourished. Musculoskeletal:    Right Knee    Gait:  Normal.   Incision:  {Well healed without any incisional erythema.     Tenderness:  none   Flexion ROM:  115   Extension ROM:  -2   Effusion:  mild   DVT Evaluation:  No evidence of DVT seen on physical Plan for CABG on Monday per CTS  Continue ASA BB  Chest pain free    7/18/2022 chest pain free awaiting cabg    7/21/22  -See plan under CABG   exam.      Neurological: Patient is alert and oriented to person, place, and time. Normal strenght. No sensory deficit. Skin: Skin is warm and dry  Psychiatric: Behavior is normal. Thought content normal.  Nursing note and vitals reviewed. Labs and Imaging:     XR taken today:  No results found. No new x-rays taken today however those from 1/25/2023 available for independent review AP bilateral knees standing lateral view of the right knee demonstrate status post right total knee arthroplasty with longstem tibial component. Components appear in excellent position without evidence of hardware complication. Assessment and Plan:  1. Status post total right knee replacement              PLAN:  This is a 49 y.o. female who presents to the clinic today for follow up right total knee arthroplasty 1/10/2023. Subjectively patient reports she is doing significantly better than last visit on 1/25/2023. She states swelling is improved and she has made great progress with physical therapy in regards to range of motion. Continue with PT  Patient is hoping to return to work sooner than initial scheduled day asking for part-time release. We had discussion that I would like her to wait until at least 2 months from surgical date and we decided on 3/13/2023 for patient return for no more than 4-hour shifts at a time. She states she is able to sit down at work but does do quite a bit of standing and walking. She is educated should she not feel comfortable returning at that time I be happy to provide extension if needed. Follow-up with Dr. Dolphus Ormond as previously scheduled on 3/29/2023 for routine reevaluation and likely clearance to return to full duty without restrictions. Please note that this chart was generated using voice recognition Dragon dictation software. Although every effort was made to ensure the accuracy of this automated transcription, some errors in transcription may have occurred.

## 2023-02-23 NOTE — TELEPHONE ENCOUNTER
Patient was seen by Tameka Yusuf on 2/23. RTW was discussed and Tameka Yusuf recommended that the patient take more time off of work but patient requested an earlier RTW date. They agreed upon 3/13 with the restriction of no more than 4 hours worked in 1 day. Tameka Yusuf mentioned that he would be willing to keep the patient off longer if she changes her mind about RTW. Patient called office back after getting home and talking to her family following the appt and she would like to change her RTW to 4/3/23. A new RTW letter was created and sent to the patient via [employer]. The four hour work day restriction was also included in the letter. Patient can discuss RTW date when she sees Dr. Anurag Inman on 3/29.

## 2023-03-14 DIAGNOSIS — Z96.651 STATUS POST TOTAL RIGHT KNEE REPLACEMENT: ICD-10-CM

## 2023-03-14 RX ORDER — HYDROCODONE BITARTRATE AND ACETAMINOPHEN 5; 325 MG/1; MG/1
1 TABLET ORAL EVERY 6 HOURS PRN
Qty: 28 TABLET | Refills: 0 | Status: SHIPPED | OUTPATIENT
Start: 2023-03-14 | End: 2023-03-21

## 2023-03-14 NOTE — TELEPHONE ENCOUNTER
Patient called in to request a refill on pain medication to be sent to [facility] in [facility] please advise thank you!

## 2023-03-29 ENCOUNTER — OFFICE VISIT (OUTPATIENT)
Dept: ORTHOPEDIC SURGERY | Age: 48
End: 2023-03-29

## 2023-03-29 DIAGNOSIS — Z96.651 STATUS POST TOTAL RIGHT KNEE REPLACEMENT: Primary | ICD-10-CM

## 2023-03-29 PROCEDURE — 99024 POSTOP FOLLOW-UP VISIT: CPT | Performed by: ORTHOPAEDIC SURGERY

## 2023-03-29 RX ORDER — HYDROCODONE BITARTRATE AND ACETAMINOPHEN 5; 325 MG/1; MG/1
1 TABLET ORAL EVERY 6 HOURS PRN
Qty: 28 TABLET | Refills: 0 | Status: SHIPPED | OUTPATIENT
Start: 2023-03-29 | End: 2023-04-05

## 2023-03-29 NOTE — LETTER
March 29, 2023       Joey Doctor YOB: 1975   Jonathan Mcadams [address] Date of Visit:  3/29/2023       To Whom It May Concern: It is my medical opinion that Corazon Donnelly {Work release (duty restriction):45555}. If you have any questions or concerns, please don't hesitate to call.     Sincerely,        Martín Amato MD

## 2023-03-29 NOTE — PROGRESS NOTES
Hillary Leos returns today status post right total knee on 1/10/2023. She says overall her knee is feeling very well. She has a little bit of pain at the beginning the day but the other day schedule a bit more pain but is amenable to icing. She still in physical therapy and says she has been measured 0 to 120 degrees she is making progress daily and she is considering returning back to work    Examination of her right knee notes her wound is pristine motion is indeed 0 to 120 degrees she has good varus valgus stability and good patellar tracking. Little bit of lower extremity edema but no calf tenderness negative Homans. No new x-rays taken today    Impression  Status post right total knee 1/10/2023    Plan  Patient is asking for 1 more Norco prescription. She wishes to return to work on 4/24/2023.   Back here in 4 months or call if there are problems prior to that time

## 2023-04-04 ENCOUNTER — TELEPHONE (OUTPATIENT)
Dept: ORTHOPEDIC SURGERY | Age: 48
End: 2023-04-04

## 2023-04-04 NOTE — TELEPHONE ENCOUNTER
Patient called in requesting to be able to return to work on 04/12/23 instead of the end of April. Please advise thank you!

## 2023-04-04 NOTE — LETTER
April 4, 2023       Juan Joshua YOB: 1975   Jonathan Mcadams [address] Date of Visit:  4/4/2023       To Whom It May Concern: It is my medical opinion that Jc Jacek {Work release (duty restriction):91047}. If you have any questions or concerns, please don't hesitate to call.     Sincerely,        Prince De Jesus MD

## 2023-04-05 DIAGNOSIS — Z96.651 STATUS POST TOTAL RIGHT KNEE REPLACEMENT: ICD-10-CM

## 2023-04-05 RX ORDER — HYDROCODONE BITARTRATE AND ACETAMINOPHEN 5; 325 MG/1; MG/1
1 TABLET ORAL EVERY 6 HOURS PRN
Qty: 28 TABLET | Refills: 0 | Status: SHIPPED | OUTPATIENT
Start: 2023-04-05 | End: 2023-04-12

## 2023-04-05 NOTE — TELEPHONE ENCOUNTER
Patient called in requesting a refill on the medication listed patient uses [facility] in [place] please advise

## 2023-04-19 ENCOUNTER — TELEPHONE (OUTPATIENT)
Dept: ORTHOPEDIC SURGERY | Age: 48
End: 2023-04-19

## 2023-04-19 NOTE — TELEPHONE ENCOUNTER
The patient called in today asking if we could write a note for her to work reduced hours (4 hours per day) though the end of April. She had a Right TKA on 1/10/23 and you had her returning to work on 4/12/23 with no restrictions. Is it okay to write the note?

## 2023-05-10 ENCOUNTER — OFFICE VISIT (OUTPATIENT)
Dept: ORTHOPEDIC SURGERY | Age: 48
End: 2023-05-10

## 2023-05-10 DIAGNOSIS — Z96.651 STATUS POST TOTAL RIGHT KNEE REPLACEMENT: Primary | ICD-10-CM

## 2023-05-10 NOTE — PROGRESS NOTES
Hoa Zambrano M.D.            [address] [facility] [address] [facility],[address], [address] [facility]           Dept Phone: [phone]           Dept Fax:  [phone]           [place]          Dept Phone: [phone]           Dept Fax:  [phone]      Chief Compliant:  Chief Complaint   Patient presents with    Knee Pain     right        History of Present Illness: This is a 51 y.o. female who presents to the clinic today for evaluation / follow up of right knee pain. Chapincito Delgado is a 51 female who had a total knee arthroplasty this past January she was back to work she has been having a little bit achiness but most of the achiness was start up pain when she got up to walk and she was not bad she had a fall at work and she is basically here to make sure she did not do any to her knee. She is still ambulating with it and she did take out the last 2 days. Review of Systems   Constitutional: Negative for fever, chills, sweats. Eyes: Negative for changes in vision, or pain. HENT: Negative for ear ache, epistaxis, or sore throat. Respiratory/Cardio: Negative for Chest pain, palpitations, SOB, or cough. Gastrointestinal: Negative for abdominal pain, N/V/D. Genitourinary: Negative for dysuria, frequency, urgency, or hematuria. Neurological: Negative for headache, numbness, or weakness. Integumentary: Negative for rash, itching, laceration, or abrasion. Musculoskeletal: Positive for Knee Pain (right)       Physical Exam:  Constitutional: Patient is oriented to person, place, and time. Patient appears well-developed and well nourished. HENT: Negative otherwise noted  Head: Normocephalic and Atraumatic  Nose: Normal  Eyes: Conjunctivae and EOM are normal  Neck: Normal range of motion Neck supple.     Respiratory/Cardio: Effort normal. No respiratory

## 2023-09-21 NOTE — LETTER
[address]  [address]  [address] [facility] [address]  Phone: [phone]  Fax: [phone]    Asia Schilling MD        January 12, 2023     Patient: Cecelia Shepherd   YOB: 1975   Date of Visit: 1/12/2023       To Whom It May Concern: It is my medical opinion that Sumeet Serrato had a total knee replacement on 1/10/23 and should remain off work until she is evaluated at her next appointment. If you have any questions or concerns, please don't hesitate to call.     Sincerely,        Asia Schilling MD n/a

## 2024-02-07 ENCOUNTER — HOSPITAL ENCOUNTER
Dept: HOSPITAL 101 - SLEEP | Age: 49
Discharge: HOME | End: 2024-02-07
Payer: COMMERCIAL

## 2024-02-07 DIAGNOSIS — G47.33: Primary | ICD-10-CM

## 2024-02-07 PROCEDURE — 95806 SLEEP STUDY UNATT&RESP EFFT: CPT

## 2024-08-07 DIAGNOSIS — M25.561 CHRONIC PAIN OF RIGHT KNEE: ICD-10-CM

## 2024-08-07 DIAGNOSIS — G89.29 CHRONIC PAIN OF RIGHT KNEE: ICD-10-CM

## 2024-08-11 DIAGNOSIS — Z96.651 STATUS POST TOTAL RIGHT KNEE REPLACEMENT: Primary | ICD-10-CM

## 2024-08-12 RX ORDER — METHYLPREDNISOLONE 4 MG/1
TABLET ORAL
Qty: 21 TABLET | Refills: 0 | Status: SHIPPED | OUTPATIENT
Start: 2024-08-12

## 2024-08-12 RX ORDER — TRAMADOL HYDROCHLORIDE 50 MG/1
50 TABLET ORAL EVERY 6 HOURS PRN
Qty: 42 TABLET | Refills: 0 | Status: SHIPPED | OUTPATIENT
Start: 2024-08-12 | End: 2024-08-26

## (undated) DEVICE — SOLUTION IRRIG 1000ML STRL H2O USP PLAS POUR BTL

## (undated) DEVICE — GLOVE SURG SZ 85 L12IN FNGR THK13MIL BRN LTX SYN POLYMER W

## (undated) DEVICE — PACK ARTHRO W PCH

## (undated) DEVICE — STOCKINETTE,IMPERVIOUS,12X48,STERILE: Brand: MEDLINE

## (undated) DEVICE — SOLUTION IV IRRIG WATER 1000ML POUR BRL 2F7114

## (undated) DEVICE — BAG URIN DRNGE 2000ML VYN INF CTRL GRAD ANTI REFLX VLV

## (undated) DEVICE — DISCONTINUED USE 112613 SWABSTICK ADHESIVE  BENZOIN TINCTURE LF

## (undated) DEVICE — DRESSING,GAUZE,XEROFORM,CURAD,1"X8",ST: Brand: CURAD

## (undated) DEVICE — BANDAGE,ELASTIC,ESMARK,STERILE,6"X9',LF: Brand: MEDLINE

## (undated) DEVICE — TUBING PMP L16FT MAIN DISP FOR AR-6400 AR-6475

## (undated) DEVICE — SINGLE PORT MANIFOLD: Brand: NEPTUNE 2

## (undated) DEVICE — CEMENT MIXING SYSTEM WITH FEMORAL BREAKWAY NOZZLE: Brand: REVOLUTION

## (undated) DEVICE — ZIMMER® STERILE DISPOSABLE TOURNIQUET CUFF WITH PLC, DUAL PORT, SINGLE BLADDER, 30 IN. (76 CM)

## (undated) DEVICE — 2108 SERIES SAGITTAL BLADE FLARED, GROUND  (29.0 X 1.32 X 84.0MM)

## (undated) DEVICE — CHLORAPREP 26ML ORANGE

## (undated) DEVICE — PATIENT RETURN ELECTRODE, SINGLE-USE, CONTACT QUALITY MONITORING, ADULT, WITH 9FT CORD, FOR PATIENTS WEIGING OVER 33LBS. (15KG): Brand: MEGADYNE

## (undated) DEVICE — BLANKET WRM W29.9XL79.1IN UP BODY FORC AIR MISTRAL-AIR

## (undated) DEVICE — DRAPE,REIN 53X77,STERILE: Brand: MEDLINE

## (undated) DEVICE — TUBING, SUCTION, 3/16" X 10', STRAIGHT: Brand: MEDLINE

## (undated) DEVICE — KIT SEP W/ BLD DRAW TB SYR NDL TRNQT PD

## (undated) DEVICE — AMBU AURASTRAIGHT U SIZE 4: Brand: AURASTRAIGHT

## (undated) DEVICE — SUTURE VCRL + SZ 3-0 L27IN ABSRB UD L26MM SH 1/2 CIR VCP416H

## (undated) DEVICE — CLOSURE SKIN FLX NONINVASIVE PRELOC TECHNOLOGY FOR 24IN

## (undated) DEVICE — PADDING UNDERCAST W6INXL4YD WYTEX 6 PER BG

## (undated) DEVICE — MERCY HEALTH ST CHARLES: Brand: MEDLINE INDUSTRIES, INC.

## (undated) DEVICE — ADAPTER URO SCP UROLOK LL

## (undated) DEVICE — GOWN,AURORA,NONREINFORCED,LARGE: Brand: MEDLINE

## (undated) DEVICE — SINGLE ACTION PUMPING SYSTEM

## (undated) DEVICE — SUTURE ETHLN SZ 3-0 L18IN NONABSORBABLE BLK FS-1 L24MM 3/8 663H

## (undated) DEVICE — Device

## (undated) DEVICE — GLOVE ORTHO 8   MSG9480

## (undated) DEVICE — 4-PORT MANIFOLD: Brand: NEPTUNE 2

## (undated) DEVICE — GLOVE SURG SZ 8 L12IN FNGR THK13MIL BRN LTX SYN POLYMER W

## (undated) DEVICE — DRSG POSTOP PRMSL AG 3.5X14IN

## (undated) DEVICE — DRESSING TRNSPAR W2XL2.75IN FLM SHT SEMIPERMEABLE WIND

## (undated) DEVICE — SOLUTION IRRIG 3000ML 0.9% SOD CHL USP UROMATIC PLAS CONT

## (undated) DEVICE — BANDAGE COMPR M W6INXL10YD WHT BGE VELC E MTRX HK AND LOOP

## (undated) DEVICE — STRIP,CLOSURE,WOUND,MEDI-STRIP,1/2X4: Brand: MEDLINE

## (undated) DEVICE — DUAL LUMEN URETERAL CATHETER

## (undated) DEVICE — FIBER LASER HOLM DISP 10611] FORTEC MEDICAL INC]

## (undated) DEVICE — SUTURE STRATAFIX SYMMETRIC PDS + SZ 1 L18IN ABSRB VLT L48MM SXPP1A400

## (undated) DEVICE — PAD,NON-ADHERENT,3X8,STERILE,LF,1/PK: Brand: MEDLINE

## (undated) DEVICE — SOLUTION IRRIG 1000ML 0.9% SOD CHL USP POUR PLAS BTL

## (undated) DEVICE — ELECTRODE ES L3IN S STL BLDE INSUL DISP VALLEYLAB EDGE

## (undated) DEVICE — STRAP,POSITIONING,KNEE/BODY,FOAM,4X60": Brand: MEDLINE

## (undated) DEVICE — DUAL CUT SAGITTAL BLADE

## (undated) DEVICE — 450 ML BOTTLE OF 0.05% CHLORHEXIDINE GLUCONATE IN 99.95% STERILE WATER FOR IRRIGATION, USP AND APPLICATOR.: Brand: IRRISEPT ANTIMICROBIAL WOUND LAVAGE

## (undated) DEVICE — DRAPE,ARTHRO,W/POUCH,STERILE: Brand: MEDLINE

## (undated) DEVICE — INTENDED TO SUPPORT AND MAINTAIN THE POSITION OF AN ANESTHETIZED PATIENT DURING SURGERY: Brand: HERMANTOR XL PINK KNEE POSITIONING PAD

## (undated) DEVICE — SYRINGE MED 10ML LUERLOCK TIP W/O SFTY DISP

## (undated) DEVICE — SUTURE STRATAFIX SPRL MCRYL + SZ 2 0 L27IN ABSRB UD W NDL SXMP1B419

## (undated) DEVICE — TUBING SUCT 8FR MAL ALUM SHFT FN CAP VENT UNIV CONN W/ OBT

## (undated) DEVICE — MHPB ARTHROSCOPY PACK: Brand: MEDLINE INDUSTRIES, INC.

## (undated) DEVICE — STERLING XTRASHARP SHAVER GREAT WHITE SHAVER BLADE, 4.2 MM: Brand: STERLING XTRASHARP SHAVER GREAT WHITE

## (undated) DEVICE — NITINOL STONE RETRIEVAL BASKET: Brand: ZERO TIP

## (undated) DEVICE — Z DISCONTINUED GLOVE SURG SZ 7.5 L12IN FNGR THK13MIL WHT ISOLEX

## (undated) DEVICE — DRAPE,U/ SHT,SPLIT,PLAS,STERIL: Brand: MEDLINE

## (undated) DEVICE — ZIMMER® STERILE DISPOSABLE TOURNIQUET CUFF WITH PLC, DUAL PORT, SINGLE BLADDER, 34 IN. (86 CM)

## (undated) DEVICE — MEDI-VAC YANKAUER SUCTION HANDLE W/BULBOUS TIP: Brand: CARDINAL HEALTH

## (undated) DEVICE — DISPOSABLE TOURNIQUET CUFF SINGLE BLADDER, SINGLE PORT AND QUICK CONNECT CONNECTOR: Brand: COLOR CUFF

## (undated) DEVICE — KIT AUTOTRNS APPL AERO 2 SET SYR 2 TIP FOR PLT SEP SYS GPS

## (undated) DEVICE — STANDARD HYPODERMIC NEEDLE,POLYPROPYLENE HUB: Brand: MONOJECT

## (undated) DEVICE — BLADE SHV L13CM DIA4MM EXCALIBUR AGG COOLCUT

## (undated) DEVICE — BANDAGE COBAN 4 IN COMPR W4INXL5YD FOAM COHESIVE QUIK STK SELF ADH SFT

## (undated) DEVICE — GLOVE SURG SZ 85 L12IN FNGR THK79MIL GRN LTX FREE

## (undated) DEVICE — ELECTROSURGICAL PENCIL BUTTON SWITCH E-Z CLEAN COATED BLADE ELECTRODE 10 FT (3 M) CORD HOLSTER: Brand: MEGADYNE

## (undated) DEVICE — DRESSING PETRO W3XL8IN OIL EMUL N ADH GZ KNIT IMPREG CELOS

## (undated) DEVICE — MEDI-VAC NON-CONDUCTIVE SUCTION TUBING: Brand: CARDINAL HEALTH

## (undated) DEVICE — SUTURE VCRL SZ 0 L36IN ABSRB UD CT-1 L36MM 1/2 CIR TAPR PNT VCP946H

## (undated) DEVICE — ST CHARLES CYSTO PACK: Brand: MEDLINE INDUSTRIES, INC.

## (undated) DEVICE — GUIDEWIRE URO L150CM DIA0.035IN STIFF NIT HYDRPHLC STR TIP

## (undated) DEVICE — COOLER THER 20-31IN L CRYO COMB W/ PD KNEE TB GRAV FLO

## (undated) DEVICE — INTEGRA® MILTEX® DISPOSABLE BIOPSY PUNCH 6.0MM: Brand: INTEGRA® MILTEX®

## (undated) DEVICE — MASTISOL ADHESIVE LIQ 2/3ML

## (undated) DEVICE — SOLUTION IV IRRIG LACTATED RINGERS 3000ML 2B7487

## (undated) DEVICE — BANDAGE COMPR W6INXL5YD SELF ADH COHESIVE CO FLX

## (undated) DEVICE — SOLUTION IRRIG 3000ML LAC RINGERS ARTHROMTC PLAS CONT